# Patient Record
Sex: FEMALE | Race: WHITE | NOT HISPANIC OR LATINO | Employment: OTHER | URBAN - METROPOLITAN AREA
[De-identification: names, ages, dates, MRNs, and addresses within clinical notes are randomized per-mention and may not be internally consistent; named-entity substitution may affect disease eponyms.]

---

## 2017-03-06 ENCOUNTER — APPOINTMENT (EMERGENCY)
Dept: RADIOLOGY | Facility: HOSPITAL | Age: 72
End: 2017-03-06
Payer: MEDICARE

## 2017-03-06 ENCOUNTER — HOSPITAL ENCOUNTER (OUTPATIENT)
Facility: HOSPITAL | Age: 72
Setting detail: OBSERVATION
Discharge: HOME | End: 2017-03-07
Attending: EMERGENCY MEDICINE | Admitting: FAMILY MEDICINE
Payer: MEDICARE

## 2017-03-06 ENCOUNTER — GENERIC CONVERSION - ENCOUNTER (OUTPATIENT)
Dept: OTHER | Facility: OTHER | Age: 72
End: 2017-03-06

## 2017-03-06 DIAGNOSIS — R06.00 DYSPNEA: ICD-10-CM

## 2017-03-06 DIAGNOSIS — R07.9 CHEST PAIN: Primary | ICD-10-CM

## 2017-03-06 LAB
ALBUMIN SERPL BCP-MCNC: 3.6 G/DL (ref 3.5–5)
ALP SERPL-CCNC: 60 U/L (ref 46–116)
ALT SERPL W P-5'-P-CCNC: 28 U/L (ref 12–78)
ANION GAP SERPL CALCULATED.3IONS-SCNC: 10 MMOL/L (ref 4–13)
APTT PPP: 27 SECONDS (ref 24–36)
AST SERPL W P-5'-P-CCNC: 15 U/L (ref 5–45)
BASOPHILS # BLD AUTO: 0.05 THOUSANDS/ΜL (ref 0–0.1)
BASOPHILS NFR BLD AUTO: 1 % (ref 0–1)
BILIRUB SERPL-MCNC: 0.4 MG/DL (ref 0.2–1)
BUN SERPL-MCNC: 35 MG/DL (ref 5–25)
CALCIUM SERPL-MCNC: 9.5 MG/DL (ref 8.3–10.1)
CHLORIDE SERPL-SCNC: 99 MMOL/L (ref 100–108)
CO2 SERPL-SCNC: 30 MMOL/L (ref 21–32)
CREAT SERPL-MCNC: 1.61 MG/DL (ref 0.6–1.3)
EOSINOPHIL # BLD AUTO: 0.37 THOUSAND/ΜL (ref 0–0.61)
EOSINOPHIL NFR BLD AUTO: 4 % (ref 0–6)
ERYTHROCYTE [DISTWIDTH] IN BLOOD BY AUTOMATED COUNT: 14.3 % (ref 11.6–15.1)
GFR SERPL CREATININE-BSD FRML MDRD: 31.4 ML/MIN/1.73SQ M
GLUCOSE SERPL-MCNC: 220 MG/DL (ref 65–140)
GLUCOSE SERPL-MCNC: 260 MG/DL (ref 65–140)
HCT VFR BLD AUTO: 41.2 % (ref 34.8–46.1)
HGB BLD-MCNC: 13.3 G/DL (ref 11.5–15.4)
INR PPP: 1.12 (ref 0.86–1.16)
LYMPHOCYTES # BLD AUTO: 2.38 THOUSANDS/ΜL (ref 0.6–4.47)
LYMPHOCYTES NFR BLD AUTO: 23 % (ref 14–44)
MCH RBC QN AUTO: 27.7 PG (ref 26.8–34.3)
MCHC RBC AUTO-ENTMCNC: 32.3 G/DL (ref 31.4–37.4)
MCV RBC AUTO: 86 FL (ref 82–98)
MONOCYTES # BLD AUTO: 0.74 THOUSAND/ΜL (ref 0.17–1.22)
MONOCYTES NFR BLD AUTO: 7 % (ref 4–12)
NEUTROPHILS # BLD AUTO: 6.58 THOUSANDS/ΜL (ref 1.85–7.62)
NEUTS SEG NFR BLD AUTO: 65 % (ref 43–75)
NRBC BLD AUTO-RTO: 0 /100 WBCS
NT-PROBNP SERPL-MCNC: 105 PG/ML
NT-PROBNP SERPL-MCNC: 110 PG/ML
PLATELET # BLD AUTO: 256 THOUSANDS/UL (ref 149–390)
PMV BLD AUTO: 9.4 FL (ref 8.9–12.7)
POTASSIUM SERPL-SCNC: 3.6 MMOL/L (ref 3.5–5.3)
PROT SERPL-MCNC: 8.1 G/DL (ref 6.4–8.2)
PROTHROMBIN TIME: 14.3 SECONDS (ref 12–14.3)
RBC # BLD AUTO: 4.81 MILLION/UL (ref 3.81–5.12)
SODIUM SERPL-SCNC: 139 MMOL/L (ref 136–145)
SPECIMEN SOURCE: NORMAL
TROPONIN I BLD-MCNC: 0.01 NG/ML (ref 0–0.08)
TROPONIN I SERPL-MCNC: <0.02 NG/ML
WBC # BLD AUTO: 10.16 THOUSAND/UL (ref 4.31–10.16)

## 2017-03-06 PROCEDURE — 93005 ELECTROCARDIOGRAM TRACING: CPT | Performed by: EMERGENCY MEDICINE

## 2017-03-06 PROCEDURE — 83036 HEMOGLOBIN GLYCOSYLATED A1C: CPT | Performed by: PHYSICIAN ASSISTANT

## 2017-03-06 PROCEDURE — 85610 PROTHROMBIN TIME: CPT | Performed by: EMERGENCY MEDICINE

## 2017-03-06 PROCEDURE — 84484 ASSAY OF TROPONIN QUANT: CPT | Performed by: PHYSICIAN ASSISTANT

## 2017-03-06 PROCEDURE — 36415 COLL VENOUS BLD VENIPUNCTURE: CPT | Performed by: PHYSICIAN ASSISTANT

## 2017-03-06 PROCEDURE — 36415 COLL VENOUS BLD VENIPUNCTURE: CPT | Performed by: EMERGENCY MEDICINE

## 2017-03-06 PROCEDURE — 84484 ASSAY OF TROPONIN QUANT: CPT

## 2017-03-06 PROCEDURE — 83880 ASSAY OF NATRIURETIC PEPTIDE: CPT | Performed by: EMERGENCY MEDICINE

## 2017-03-06 PROCEDURE — 85025 COMPLETE CBC W/AUTO DIFF WBC: CPT | Performed by: EMERGENCY MEDICINE

## 2017-03-06 PROCEDURE — 82948 REAGENT STRIP/BLOOD GLUCOSE: CPT

## 2017-03-06 PROCEDURE — 71020 HB CHEST X-RAY 2VW FRONTAL&LATL: CPT

## 2017-03-06 PROCEDURE — 85730 THROMBOPLASTIN TIME PARTIAL: CPT | Performed by: EMERGENCY MEDICINE

## 2017-03-06 PROCEDURE — 80053 COMPREHEN METABOLIC PANEL: CPT | Performed by: EMERGENCY MEDICINE

## 2017-03-06 RX ORDER — METOLAZONE 2.5 MG/1
2.5 TABLET ORAL DAILY
Status: DISCONTINUED | OUTPATIENT
Start: 2017-03-07 | End: 2017-03-07 | Stop reason: HOSPADM

## 2017-03-06 RX ORDER — INSULIN GLARGINE 100 [IU]/ML
65 INJECTION, SOLUTION SUBCUTANEOUS EVERY MORNING
Status: DISCONTINUED | OUTPATIENT
Start: 2017-03-07 | End: 2017-03-07 | Stop reason: HOSPADM

## 2017-03-06 RX ORDER — ACETAMINOPHEN 325 MG/1
650 TABLET ORAL EVERY 6 HOURS PRN
Status: DISCONTINUED | OUTPATIENT
Start: 2017-03-06 | End: 2017-03-07 | Stop reason: HOSPADM

## 2017-03-06 RX ORDER — LEVETIRACETAM 500 MG/1
250 TABLET ORAL 2 TIMES DAILY
Status: DISCONTINUED | OUTPATIENT
Start: 2017-03-07 | End: 2017-03-07 | Stop reason: HOSPADM

## 2017-03-06 RX ORDER — ALBUTEROL SULFATE 2.5 MG/3ML
2.5 SOLUTION RESPIRATORY (INHALATION) EVERY 6 HOURS PRN
Status: DISCONTINUED | OUTPATIENT
Start: 2017-03-06 | End: 2017-03-07 | Stop reason: HOSPADM

## 2017-03-06 RX ORDER — SACCHAROMYCES BOULARDII 250 MG
250 CAPSULE ORAL 2 TIMES DAILY
Status: DISCONTINUED | OUTPATIENT
Start: 2017-03-07 | End: 2017-03-07 | Stop reason: HOSPADM

## 2017-03-06 RX ORDER — IPRATROPIUM BROMIDE AND ALBUTEROL SULFATE 2.5; .5 MG/3ML; MG/3ML
3 SOLUTION RESPIRATORY (INHALATION) ONCE
Status: COMPLETED | OUTPATIENT
Start: 2017-03-06 | End: 2017-03-06

## 2017-03-06 RX ORDER — CALCIUM CARBONATE 200(500)MG
1000 TABLET,CHEWABLE ORAL DAILY PRN
Status: DISCONTINUED | OUTPATIENT
Start: 2017-03-06 | End: 2017-03-07 | Stop reason: HOSPADM

## 2017-03-06 RX ORDER — PANTOPRAZOLE SODIUM 20 MG/1
20 TABLET, DELAYED RELEASE ORAL
Status: DISCONTINUED | OUTPATIENT
Start: 2017-03-07 | End: 2017-03-07 | Stop reason: HOSPADM

## 2017-03-06 RX ORDER — ASPIRIN 81 MG/1
81 TABLET ORAL DAILY
Status: DISCONTINUED | OUTPATIENT
Start: 2017-03-07 | End: 2017-03-07 | Stop reason: HOSPADM

## 2017-03-06 RX ORDER — ONDANSETRON 2 MG/ML
4 INJECTION INTRAMUSCULAR; INTRAVENOUS EVERY 6 HOURS PRN
Status: DISCONTINUED | OUTPATIENT
Start: 2017-03-06 | End: 2017-03-07 | Stop reason: HOSPADM

## 2017-03-06 RX ORDER — DOCUSATE SODIUM 100 MG/1
100 CAPSULE, LIQUID FILLED ORAL 2 TIMES DAILY
Status: DISCONTINUED | OUTPATIENT
Start: 2017-03-07 | End: 2017-03-07 | Stop reason: HOSPADM

## 2017-03-06 RX ORDER — FUROSEMIDE 20 MG/1
20 TABLET ORAL DAILY
Status: DISCONTINUED | OUTPATIENT
Start: 2017-03-07 | End: 2017-03-07 | Stop reason: HOSPADM

## 2017-03-06 RX ORDER — PRAVASTATIN SODIUM 40 MG
40 TABLET ORAL
Status: DISCONTINUED | OUTPATIENT
Start: 2017-03-07 | End: 2017-03-07 | Stop reason: HOSPADM

## 2017-03-06 RX ORDER — PENTOXIFYLLINE 400 MG/1
400 TABLET, EXTENDED RELEASE ORAL 2 TIMES DAILY
Status: DISCONTINUED | OUTPATIENT
Start: 2017-03-07 | End: 2017-03-07 | Stop reason: HOSPADM

## 2017-03-06 RX ORDER — FOSINOPRIL SODIUM 20 MG/1
20 TABLET ORAL DAILY
Status: DISCONTINUED | OUTPATIENT
Start: 2017-03-07 | End: 2017-03-07 | Stop reason: HOSPADM

## 2017-03-06 RX ADMIN — IPRATROPIUM BROMIDE AND ALBUTEROL SULFATE 3 ML: .5; 3 SOLUTION RESPIRATORY (INHALATION) at 20:43

## 2017-03-07 VITALS
HEART RATE: 95 BPM | DIASTOLIC BLOOD PRESSURE: 82 MMHG | WEIGHT: 164 LBS | SYSTOLIC BLOOD PRESSURE: 113 MMHG | TEMPERATURE: 98.5 F | BODY MASS INDEX: 38.12 KG/M2 | OXYGEN SATURATION: 94 % | RESPIRATION RATE: 18 BRPM

## 2017-03-07 LAB
ANION GAP SERPL CALCULATED.3IONS-SCNC: 7 MMOL/L (ref 4–13)
ATRIAL RATE: 91 BPM
BUN SERPL-MCNC: 34 MG/DL (ref 5–25)
CALCIUM SERPL-MCNC: 8.9 MG/DL (ref 8.3–10.1)
CHLORIDE SERPL-SCNC: 101 MMOL/L (ref 100–108)
CO2 SERPL-SCNC: 30 MMOL/L (ref 21–32)
CREAT SERPL-MCNC: 1.43 MG/DL (ref 0.6–1.3)
ERYTHROCYTE [DISTWIDTH] IN BLOOD BY AUTOMATED COUNT: 14.6 % (ref 11.6–15.1)
EST. AVERAGE GLUCOSE BLD GHB EST-MCNC: 189 MG/DL
GFR SERPL CREATININE-BSD FRML MDRD: 36 ML/MIN/1.73SQ M
GLUCOSE SERPL-MCNC: 162 MG/DL (ref 65–140)
GLUCOSE SERPL-MCNC: 177 MG/DL (ref 65–140)
HBA1C MFR BLD: 8.2 % (ref 4.2–6.3)
HCT VFR BLD AUTO: 37.6 % (ref 34.8–46.1)
HGB BLD-MCNC: 12.1 G/DL (ref 11.5–15.4)
MCH RBC QN AUTO: 27.8 PG (ref 26.8–34.3)
MCHC RBC AUTO-ENTMCNC: 32.2 G/DL (ref 31.4–37.4)
MCV RBC AUTO: 86 FL (ref 82–98)
P AXIS: 53 DEGREES
PLATELET # BLD AUTO: 213 THOUSANDS/UL (ref 149–390)
PMV BLD AUTO: 9.3 FL (ref 8.9–12.7)
POTASSIUM SERPL-SCNC: 3.3 MMOL/L (ref 3.5–5.3)
PR INTERVAL: 174 MS
QRS AXIS: 16 DEGREES
QRSD INTERVAL: 104 MS
QT INTERVAL: 400 MS
QTC INTERVAL: 492 MS
RBC # BLD AUTO: 4.35 MILLION/UL (ref 3.81–5.12)
SODIUM SERPL-SCNC: 138 MMOL/L (ref 136–145)
T WAVE AXIS: 16 DEGREES
TROPONIN I SERPL-MCNC: <0.02 NG/ML
TROPONIN I SERPL-MCNC: <0.02 NG/ML
VENTRICULAR RATE: 91 BPM
WBC # BLD AUTO: 8.43 THOUSAND/UL (ref 4.31–10.16)

## 2017-03-07 PROCEDURE — 99285 EMERGENCY DEPT VISIT HI MDM: CPT

## 2017-03-07 PROCEDURE — 85027 COMPLETE CBC AUTOMATED: CPT | Performed by: PHYSICIAN ASSISTANT

## 2017-03-07 PROCEDURE — 82948 REAGENT STRIP/BLOOD GLUCOSE: CPT

## 2017-03-07 PROCEDURE — 84484 ASSAY OF TROPONIN QUANT: CPT | Performed by: PHYSICIAN ASSISTANT

## 2017-03-07 PROCEDURE — 80048 BASIC METABOLIC PNL TOTAL CA: CPT | Performed by: PHYSICIAN ASSISTANT

## 2017-03-07 PROCEDURE — 36415 COLL VENOUS BLD VENIPUNCTURE: CPT | Performed by: PHYSICIAN ASSISTANT

## 2017-03-07 RX ORDER — HEPARIN SODIUM 5000 [USP'U]/ML
5000 INJECTION, SOLUTION INTRAVENOUS; SUBCUTANEOUS EVERY 8 HOURS SCHEDULED
Status: DISCONTINUED | OUTPATIENT
Start: 2017-03-07 | End: 2017-03-07 | Stop reason: HOSPADM

## 2017-03-07 RX ADMIN — ASPIRIN 81 MG: 81 TABLET ORAL at 08:36

## 2017-03-07 RX ADMIN — INSULIN LISPRO 1 UNITS: 100 INJECTION, SOLUTION INTRAVENOUS; SUBCUTANEOUS at 08:36

## 2017-03-07 RX ADMIN — Medication 250 MG: at 11:55

## 2017-03-07 RX ADMIN — ALBUTEROL SULFATE 2.5 MG: 2.5 SOLUTION RESPIRATORY (INHALATION) at 05:57

## 2017-03-07 RX ADMIN — FOSINOPRIL SODIUM 20 MG: 20 TABLET ORAL at 11:56

## 2017-03-07 RX ADMIN — DOCUSATE SODIUM 100 MG: 100 CAPSULE, LIQUID FILLED ORAL at 08:35

## 2017-03-07 RX ADMIN — METOLAZONE 2.5 MG: 2.5 TABLET ORAL at 11:56

## 2017-03-07 RX ADMIN — PENTOXIFYLLINE 400 MG: 400 TABLET, FILM COATED, EXTENDED RELEASE ORAL at 11:56

## 2017-03-07 RX ADMIN — INSULIN LISPRO 2 UNITS: 100 INJECTION, SOLUTION INTRAVENOUS; SUBCUTANEOUS at 00:18

## 2017-03-07 RX ADMIN — LEVETIRACETAM 250 MG: 500 TABLET ORAL at 08:35

## 2017-03-07 RX ADMIN — HEPARIN SODIUM 5000 UNITS: 5000 INJECTION, SOLUTION INTRAVENOUS; SUBCUTANEOUS at 05:38

## 2017-03-07 RX ADMIN — INSULIN GLARGINE 65 UNITS: 100 INJECTION, SOLUTION SUBCUTANEOUS at 08:34

## 2017-03-07 RX ADMIN — FUROSEMIDE 20 MG: 20 TABLET ORAL at 08:35

## 2017-03-07 RX ADMIN — PANTOPRAZOLE SODIUM 20 MG: 20 TABLET, DELAYED RELEASE ORAL at 05:38

## 2017-05-30 ENCOUNTER — ALLSCRIPTS OFFICE VISIT (OUTPATIENT)
Dept: OTHER | Facility: OTHER | Age: 72
End: 2017-05-30

## 2017-05-30 DIAGNOSIS — R60.0 LOCALIZED EDEMA: ICD-10-CM

## 2017-05-30 DIAGNOSIS — I73.9 PERIPHERAL VASCULAR DISEASE (HCC): ICD-10-CM

## 2017-06-10 ENCOUNTER — HOSPITAL ENCOUNTER (EMERGENCY)
Facility: HOSPITAL | Age: 72
Discharge: HOME | End: 2017-06-10
Attending: EMERGENCY MEDICINE | Admitting: EMERGENCY MEDICINE
Payer: MEDICARE

## 2017-06-10 ENCOUNTER — APPOINTMENT (EMERGENCY)
Dept: CT IMAGING | Facility: HOSPITAL | Age: 72
End: 2017-06-10
Payer: MEDICARE

## 2017-06-10 VITALS
OXYGEN SATURATION: 97 % | SYSTOLIC BLOOD PRESSURE: 106 MMHG | HEART RATE: 84 BPM | BODY MASS INDEX: 37.51 KG/M2 | RESPIRATION RATE: 16 BRPM | WEIGHT: 161.38 LBS | TEMPERATURE: 97.8 F | DIASTOLIC BLOOD PRESSURE: 56 MMHG

## 2017-06-10 DIAGNOSIS — R19.7 NAUSEA VOMITING AND DIARRHEA: Primary | ICD-10-CM

## 2017-06-10 DIAGNOSIS — R11.2 NAUSEA VOMITING AND DIARRHEA: Primary | ICD-10-CM

## 2017-06-10 DIAGNOSIS — T50.905A MEDICATION SIDE EFFECT, INITIAL ENCOUNTER: ICD-10-CM

## 2017-06-10 LAB
ALBUMIN SERPL BCP-MCNC: 2.9 G/DL (ref 3.5–5)
ALP SERPL-CCNC: 52 U/L (ref 46–116)
ALT SERPL W P-5'-P-CCNC: 20 U/L (ref 12–78)
ANION GAP SERPL CALCULATED.3IONS-SCNC: 10 MMOL/L (ref 4–13)
AST SERPL W P-5'-P-CCNC: 20 U/L (ref 5–45)
BASOPHILS # BLD AUTO: 0.02 THOUSANDS/ΜL (ref 0–0.1)
BASOPHILS NFR BLD AUTO: 0 % (ref 0–1)
BILIRUB SERPL-MCNC: 0.4 MG/DL (ref 0.2–1)
BILIRUB UR QL STRIP: NEGATIVE
BUN SERPL-MCNC: 43 MG/DL (ref 5–25)
CALCIUM SERPL-MCNC: 8.8 MG/DL (ref 8.3–10.1)
CHLORIDE SERPL-SCNC: 105 MMOL/L (ref 100–108)
CLARITY UR: CLEAR
CO2 SERPL-SCNC: 24 MMOL/L (ref 21–32)
COLOR UR: YELLOW
CREAT SERPL-MCNC: 1.53 MG/DL (ref 0.6–1.3)
EOSINOPHIL # BLD AUTO: 0.34 THOUSAND/ΜL (ref 0–0.61)
EOSINOPHIL NFR BLD AUTO: 2 % (ref 0–6)
ERYTHROCYTE [DISTWIDTH] IN BLOOD BY AUTOMATED COUNT: 15.3 % (ref 11.6–15.1)
GFR SERPL CREATININE-BSD FRML MDRD: 33.3 ML/MIN/1.73SQ M
GLUCOSE SERPL-MCNC: 76 MG/DL (ref 65–140)
GLUCOSE UR STRIP-MCNC: NEGATIVE MG/DL
HCT VFR BLD AUTO: 38.8 % (ref 34.8–46.1)
HGB BLD-MCNC: 12.7 G/DL (ref 11.5–15.4)
HGB UR QL STRIP.AUTO: NEGATIVE
KETONES UR STRIP-MCNC: NEGATIVE MG/DL
LEUKOCYTE ESTERASE UR QL STRIP: NEGATIVE
LIPASE SERPL-CCNC: 47 U/L (ref 73–393)
LYMPHOCYTES # BLD AUTO: 2.55 THOUSANDS/ΜL (ref 0.6–4.47)
LYMPHOCYTES NFR BLD AUTO: 17 % (ref 14–44)
MCH RBC QN AUTO: 28.3 PG (ref 26.8–34.3)
MCHC RBC AUTO-ENTMCNC: 32.7 G/DL (ref 31.4–37.4)
MCV RBC AUTO: 86 FL (ref 82–98)
MONOCYTES # BLD AUTO: 0.98 THOUSAND/ΜL (ref 0.17–1.22)
MONOCYTES NFR BLD AUTO: 6 % (ref 4–12)
NEUTROPHILS # BLD AUTO: 11.29 THOUSANDS/ΜL (ref 1.85–7.62)
NEUTS SEG NFR BLD AUTO: 74 % (ref 43–75)
NITRITE UR QL STRIP: NEGATIVE
NRBC BLD AUTO-RTO: 0 /100 WBCS
PH UR STRIP.AUTO: 5.5 [PH] (ref 4.5–8)
PLATELET # BLD AUTO: 249 THOUSANDS/UL (ref 149–390)
PMV BLD AUTO: 9.8 FL (ref 8.9–12.7)
POTASSIUM SERPL-SCNC: 3.5 MMOL/L (ref 3.5–5.3)
PROT SERPL-MCNC: 6.8 G/DL (ref 6.4–8.2)
PROT UR STRIP-MCNC: NEGATIVE MG/DL
RBC # BLD AUTO: 4.49 MILLION/UL (ref 3.81–5.12)
SODIUM SERPL-SCNC: 139 MMOL/L (ref 136–145)
SP GR UR STRIP.AUTO: 1.01 (ref 1–1.03)
UROBILINOGEN UR QL STRIP.AUTO: 0.2 E.U./DL
WBC # BLD AUTO: 15.26 THOUSAND/UL (ref 4.31–10.16)

## 2017-06-10 PROCEDURE — 81003 URINALYSIS AUTO W/O SCOPE: CPT | Performed by: EMERGENCY MEDICINE

## 2017-06-10 PROCEDURE — 36415 COLL VENOUS BLD VENIPUNCTURE: CPT | Performed by: EMERGENCY MEDICINE

## 2017-06-10 PROCEDURE — 83690 ASSAY OF LIPASE: CPT | Performed by: EMERGENCY MEDICINE

## 2017-06-10 PROCEDURE — 80053 COMPREHEN METABOLIC PANEL: CPT | Performed by: EMERGENCY MEDICINE

## 2017-06-10 PROCEDURE — 96374 THER/PROPH/DIAG INJ IV PUSH: CPT

## 2017-06-10 PROCEDURE — 99284 EMERGENCY DEPT VISIT MOD MDM: CPT

## 2017-06-10 PROCEDURE — 96361 HYDRATE IV INFUSION ADD-ON: CPT

## 2017-06-10 PROCEDURE — 74176 CT ABD & PELVIS W/O CONTRAST: CPT

## 2017-06-10 PROCEDURE — 85025 COMPLETE CBC W/AUTO DIFF WBC: CPT | Performed by: EMERGENCY MEDICINE

## 2017-06-10 RX ORDER — ALBUTEROL SULFATE 2.5 MG/3ML
2.5 SOLUTION RESPIRATORY (INHALATION) EVERY 4 HOURS PRN
COMMUNITY
End: 2021-12-17

## 2017-06-10 RX ORDER — ONDANSETRON 4 MG/1
4 TABLET, ORALLY DISINTEGRATING ORAL EVERY 6 HOURS PRN
Qty: 20 TABLET | Refills: 0 | Status: SHIPPED | OUTPATIENT
Start: 2017-06-10 | End: 2019-02-26

## 2017-06-10 RX ORDER — ONDANSETRON 2 MG/ML
4 INJECTION INTRAMUSCULAR; INTRAVENOUS ONCE
Status: COMPLETED | OUTPATIENT
Start: 2017-06-10 | End: 2017-06-10

## 2017-06-10 RX ORDER — DIPHENOXYLATE HYDROCHLORIDE AND ATROPINE SULFATE 2.5; .025 MG/1; MG/1
TABLET ORAL
Qty: 60 TABLET | Refills: 0 | Status: SHIPPED | OUTPATIENT
Start: 2017-06-10 | End: 2019-02-26

## 2017-06-10 RX ORDER — FUROSEMIDE 40 MG/1
40 TABLET ORAL DAILY
COMMUNITY
End: 2019-05-15

## 2017-06-10 RX ORDER — CHOLECALCIFEROL (VITAMIN D3) 25 MCG
1000 CAPSULE ORAL DAILY
COMMUNITY
End: 2019-02-26

## 2017-06-10 RX ORDER — MONTELUKAST SODIUM 10 MG/1
10 TABLET ORAL
COMMUNITY
End: 2020-02-23

## 2017-06-10 RX ORDER — LEVETIRACETAM 250 MG/1
250 TABLET ORAL 2 TIMES DAILY
COMMUNITY
End: 2017-06-10

## 2017-06-10 RX ORDER — GLIPIZIDE 5 MG/1
5 TABLET ORAL 2 TIMES DAILY
Status: ON HOLD | COMMUNITY
End: 2019-03-26 | Stop reason: ALTCHOICE

## 2017-06-10 RX ORDER — FOSINOPRIL SODIUM 20 MG/1
20 TABLET ORAL DAILY
COMMUNITY
End: 2017-06-10

## 2017-06-10 RX ORDER — REPAGLINIDE 0.5 MG/1
0.5 TABLET ORAL
COMMUNITY
End: 2019-02-26

## 2017-06-10 RX ORDER — NICOTINE POLACRILEX 4 MG/1
20 GUM, CHEWING ORAL DAILY
COMMUNITY
End: 2017-06-10

## 2017-06-10 RX ADMIN — SODIUM CHLORIDE 1000 ML: 0.9 INJECTION, SOLUTION INTRAVENOUS at 14:51

## 2017-06-10 RX ADMIN — ONDANSETRON 4 MG: 2 INJECTION INTRAMUSCULAR; INTRAVENOUS at 14:52

## 2017-07-05 ENCOUNTER — HOSPITAL ENCOUNTER (OUTPATIENT)
Dept: NON INVASIVE DIAGNOSTICS | Facility: CLINIC | Age: 72
Discharge: HOME | End: 2017-07-05
Payer: MEDICARE

## 2017-07-05 DIAGNOSIS — I73.9 PERIPHERAL VASCULAR DISEASE (HCC): ICD-10-CM

## 2017-07-05 DIAGNOSIS — R60.0 LOCALIZED EDEMA: ICD-10-CM

## 2017-07-05 PROCEDURE — 93923 UPR/LXTR ART STDY 3+ LVLS: CPT

## 2017-07-05 PROCEDURE — 93970 EXTREMITY STUDY: CPT

## 2018-01-16 NOTE — PROCEDURES
Results/Data  Procedure: Electroencephalography (EEG)   Indications for the procedure include seizure (NOS) and Memory Difficulties  Were discussed with the patient  Written consent was obtained prior to the procedure and is detailed in the patient's record  Prior to the start of the procedure, a time out was taken and the identity of the patient was confirmed via name and date of birth with the patient  The correct site(s) and the procedure to be performed were confirmed and the site(s) were marked appropriately  The positioning of the patient and the availabilty of the correct equipment were verified  Certain medications (such as anticonvulsants and tranquilizers), stimulants, and alcohol were avoided for at least 24-48 hours prior to the procedure  Procedure Note:   Performed by: Talon File  Start Time: 10:00   End Time: 10:30   Electrode(s) Placement: Fp1, Fp2, F7, F3, Fz, F4, F8, T3, C3, CZ, C4, T4, T5, T6, P3, PZ, P4, O1, O2, A1 and A2  They were placed in a bipolar montage, referential montage, average reference montage, laplacian montage  The EEG was performed while the patient was exposed to of photic stimulation and awake and drowsy, but not hyperventilated and not sedated  Findings: EEG    This is a routine 18 channel EEG recording performed on a 53-year-old woman  with a history of memory difficulty   Background activities during wakefulness consist of mid amplitude 10 cycle per second activities emanating from the posterior head region  These activities are reactive to eye opening  Intermingled with background activities are a moderate amount of lower amplitude beta activities emanating from the frontocentral head regions  Episodes of drowsiness and sleep are manifest by attenuation of background activities, V waves and K complexes    Photic stimulation was performed over a wide range of flash frequencies and produced a symmetrical driving response   Hyperventilation was not obtained  Concomitant EKG revealed a sinus rhythm  IMPRESSION: This is a normal routine EEG    YANNI Loya  Impression:    Post-Procedure:   the patient tolerated the procedure well  Complications: There were no complications        Signatures   Electronically signed by : Bar Pablo MD; Lake 10 2016  4:28PM EST                       (Author)

## 2018-05-26 ENCOUNTER — HOSPITAL ENCOUNTER (OUTPATIENT)
Facility: HOSPITAL | Age: 73
Discharge: HOME HEALTH | End: 2018-06-11
Attending: PHYSICAL MEDICINE & REHABILITATION | Admitting: PHYSICAL MEDICINE & REHABILITATION

## 2018-05-28 LAB
ALBUMIN SERPL BCP-MCNC: 2.6 G/DL (ref 3.5–5)
ALP SERPL-CCNC: 53 U/L (ref 46–116)
ALT SERPL W P-5'-P-CCNC: 22 U/L (ref 12–78)
ANION GAP SERPL CALCULATED.3IONS-SCNC: 4 MMOL/L (ref 4–13)
AST SERPL W P-5'-P-CCNC: 17 U/L (ref 5–45)
BASOPHILS # BLD AUTO: 0.08 THOUSANDS/ΜL (ref 0–0.1)
BASOPHILS NFR BLD AUTO: 1 % (ref 0–1)
BILIRUB SERPL-MCNC: 0.5 MG/DL (ref 0.2–1)
BUN SERPL-MCNC: 25 MG/DL (ref 5–25)
CALCIUM SERPL-MCNC: 9.4 MG/DL (ref 8.3–10.1)
CHLORIDE SERPL-SCNC: 97 MMOL/L (ref 100–108)
CO2 SERPL-SCNC: 30 MMOL/L (ref 21–32)
CREAT SERPL-MCNC: 1.33 MG/DL (ref 0.6–1.3)
EOSINOPHIL # BLD AUTO: 0.23 THOUSAND/ΜL (ref 0–0.61)
EOSINOPHIL NFR BLD AUTO: 1 % (ref 0–6)
ERYTHROCYTE [DISTWIDTH] IN BLOOD BY AUTOMATED COUNT: 14.5 % (ref 11.6–15.1)
GFR SERPL CREATININE-BSD FRML MDRD: 39 ML/MIN/1.73SQ M
GLUCOSE SERPL-MCNC: 273 MG/DL (ref 65–140)
HCT VFR BLD AUTO: 36.6 % (ref 34.8–46.1)
HGB BLD-MCNC: 11.8 G/DL (ref 11.5–15.4)
IMM GRANULOCYTES # BLD AUTO: 0.14 THOUSAND/UL (ref 0–0.2)
IMM GRANULOCYTES NFR BLD AUTO: 1 % (ref 0–2)
LYMPHOCYTES # BLD AUTO: 2.13 THOUSANDS/ΜL (ref 0.6–4.47)
LYMPHOCYTES NFR BLD AUTO: 12 % (ref 14–44)
MCH RBC QN AUTO: 28.1 PG (ref 26.8–34.3)
MCHC RBC AUTO-ENTMCNC: 32.2 G/DL (ref 31.4–37.4)
MCV RBC AUTO: 87 FL (ref 82–98)
MONOCYTES # BLD AUTO: 1.14 THOUSAND/ΜL (ref 0.17–1.22)
MONOCYTES NFR BLD AUTO: 7 % (ref 4–12)
NEUTROPHILS # BLD AUTO: 13.67 THOUSANDS/ΜL (ref 1.85–7.62)
NEUTS SEG NFR BLD AUTO: 78 % (ref 43–75)
NRBC BLD AUTO-RTO: 0 /100 WBCS
PLATELET # BLD AUTO: 272 THOUSANDS/UL (ref 149–390)
PMV BLD AUTO: 9.4 FL (ref 8.9–12.7)
POTASSIUM SERPL-SCNC: 4.7 MMOL/L (ref 3.5–5.3)
PREALB SERPL-MCNC: 22.1 MG/DL (ref 18–40)
PROT SERPL-MCNC: 7.3 G/DL (ref 6.4–8.2)
RBC # BLD AUTO: 4.2 MILLION/UL (ref 3.81–5.12)
SODIUM SERPL-SCNC: 131 MMOL/L (ref 136–145)
WBC # BLD AUTO: 17.39 THOUSAND/UL (ref 4.31–10.16)

## 2018-05-28 PROCEDURE — 84134 ASSAY OF PREALBUMIN: CPT | Performed by: PHYSICAL MEDICINE & REHABILITATION

## 2018-05-28 PROCEDURE — 85025 COMPLETE CBC W/AUTO DIFF WBC: CPT | Performed by: PHYSICAL MEDICINE & REHABILITATION

## 2018-05-28 PROCEDURE — 80053 COMPREHEN METABOLIC PANEL: CPT | Performed by: PHYSICAL MEDICINE & REHABILITATION

## 2018-05-29 LAB
ANION GAP SERPL CALCULATED.3IONS-SCNC: 5 MMOL/L (ref 4–13)
BASOPHILS # BLD AUTO: 0.06 THOUSANDS/ΜL (ref 0–0.1)
BASOPHILS NFR BLD AUTO: 0 % (ref 0–1)
BUN SERPL-MCNC: 29 MG/DL (ref 5–25)
CALCIUM SERPL-MCNC: 8.8 MG/DL (ref 8.3–10.1)
CHLORIDE SERPL-SCNC: 97 MMOL/L (ref 100–108)
CO2 SERPL-SCNC: 29 MMOL/L (ref 21–32)
CREAT SERPL-MCNC: 1.2 MG/DL (ref 0.6–1.3)
EOSINOPHIL # BLD AUTO: 0.36 THOUSAND/ΜL (ref 0–0.61)
EOSINOPHIL NFR BLD AUTO: 2 % (ref 0–6)
ERYTHROCYTE [DISTWIDTH] IN BLOOD BY AUTOMATED COUNT: 14.5 % (ref 11.6–15.1)
GFR SERPL CREATININE-BSD FRML MDRD: 45 ML/MIN/1.73SQ M
GLUCOSE SERPL-MCNC: 219 MG/DL (ref 65–140)
HCT VFR BLD AUTO: 35.7 % (ref 34.8–46.1)
HGB BLD-MCNC: 11.6 G/DL (ref 11.5–15.4)
IMM GRANULOCYTES # BLD AUTO: 0.13 THOUSAND/UL (ref 0–0.2)
IMM GRANULOCYTES NFR BLD AUTO: 1 % (ref 0–2)
LYMPHOCYTES # BLD AUTO: 2.47 THOUSANDS/ΜL (ref 0.6–4.47)
LYMPHOCYTES NFR BLD AUTO: 16 % (ref 14–44)
MCH RBC QN AUTO: 28.1 PG (ref 26.8–34.3)
MCHC RBC AUTO-ENTMCNC: 32.5 G/DL (ref 31.4–37.4)
MCV RBC AUTO: 86 FL (ref 82–98)
MONOCYTES # BLD AUTO: 1 THOUSAND/ΜL (ref 0.17–1.22)
MONOCYTES NFR BLD AUTO: 7 % (ref 4–12)
NEUTROPHILS # BLD AUTO: 11.3 THOUSANDS/ΜL (ref 1.85–7.62)
NEUTS SEG NFR BLD AUTO: 74 % (ref 43–75)
NRBC BLD AUTO-RTO: 0 /100 WBCS
PLATELET # BLD AUTO: 259 THOUSANDS/UL (ref 149–390)
PMV BLD AUTO: 9.4 FL (ref 8.9–12.7)
POTASSIUM SERPL-SCNC: 4.9 MMOL/L (ref 3.5–5.3)
RBC # BLD AUTO: 4.13 MILLION/UL (ref 3.81–5.12)
SODIUM SERPL-SCNC: 131 MMOL/L (ref 136–145)
WBC # BLD AUTO: 15.32 THOUSAND/UL (ref 4.31–10.16)

## 2018-05-29 PROCEDURE — 80048 BASIC METABOLIC PNL TOTAL CA: CPT | Performed by: PHYSICAL MEDICINE & REHABILITATION

## 2018-05-29 PROCEDURE — 85025 COMPLETE CBC W/AUTO DIFF WBC: CPT | Performed by: PHYSICAL MEDICINE & REHABILITATION

## 2018-06-04 LAB
ANION GAP SERPL CALCULATED.3IONS-SCNC: 9 MMOL/L (ref 4–13)
BASOPHILS # BLD AUTO: 0.04 THOUSANDS/ΜL (ref 0–0.1)
BASOPHILS NFR BLD AUTO: 0 % (ref 0–1)
BUN SERPL-MCNC: 34 MG/DL (ref 5–25)
CALCIUM SERPL-MCNC: 9.7 MG/DL (ref 8.3–10.1)
CHLORIDE SERPL-SCNC: 101 MMOL/L (ref 100–108)
CO2 SERPL-SCNC: 28 MMOL/L (ref 21–32)
CREAT SERPL-MCNC: 1.38 MG/DL (ref 0.6–1.3)
EOSINOPHIL # BLD AUTO: 0.4 THOUSAND/ΜL (ref 0–0.61)
EOSINOPHIL NFR BLD AUTO: 4 % (ref 0–6)
ERYTHROCYTE [DISTWIDTH] IN BLOOD BY AUTOMATED COUNT: 14.4 % (ref 11.6–15.1)
GFR SERPL CREATININE-BSD FRML MDRD: 38 ML/MIN/1.73SQ M
GLUCOSE SERPL-MCNC: 192 MG/DL (ref 65–140)
HCT VFR BLD AUTO: 34.6 % (ref 34.8–46.1)
HGB BLD-MCNC: 11 G/DL (ref 11.5–15.4)
IMM GRANULOCYTES # BLD AUTO: 0.08 THOUSAND/UL (ref 0–0.2)
IMM GRANULOCYTES NFR BLD AUTO: 1 % (ref 0–2)
LYMPHOCYTES # BLD AUTO: 2.35 THOUSANDS/ΜL (ref 0.6–4.47)
LYMPHOCYTES NFR BLD AUTO: 21 % (ref 14–44)
MCH RBC QN AUTO: 27.8 PG (ref 26.8–34.3)
MCHC RBC AUTO-ENTMCNC: 31.8 G/DL (ref 31.4–37.4)
MCV RBC AUTO: 88 FL (ref 82–98)
MONOCYTES # BLD AUTO: 0.6 THOUSAND/ΜL (ref 0.17–1.22)
MONOCYTES NFR BLD AUTO: 5 % (ref 4–12)
NEUTROPHILS # BLD AUTO: 7.96 THOUSANDS/ΜL (ref 1.85–7.62)
NEUTS SEG NFR BLD AUTO: 69 % (ref 43–75)
NRBC BLD AUTO-RTO: 0 /100 WBCS
PLATELET # BLD AUTO: 323 THOUSANDS/UL (ref 149–390)
PMV BLD AUTO: 9.1 FL (ref 8.9–12.7)
POTASSIUM SERPL-SCNC: 4.6 MMOL/L (ref 3.5–5.3)
RBC # BLD AUTO: 3.95 MILLION/UL (ref 3.81–5.12)
SODIUM SERPL-SCNC: 138 MMOL/L (ref 136–145)
WBC # BLD AUTO: 11.43 THOUSAND/UL (ref 4.31–10.16)

## 2018-06-04 PROCEDURE — 80048 BASIC METABOLIC PNL TOTAL CA: CPT | Performed by: INTERNAL MEDICINE

## 2018-06-04 PROCEDURE — 85025 COMPLETE CBC W/AUTO DIFF WBC: CPT | Performed by: INTERNAL MEDICINE

## 2018-06-08 LAB
ANION GAP SERPL CALCULATED.3IONS-SCNC: 11 MMOL/L (ref 4–13)
BUN SERPL-MCNC: 33 MG/DL (ref 5–25)
CALCIUM SERPL-MCNC: 9.4 MG/DL (ref 8.3–10.1)
CHLORIDE SERPL-SCNC: 101 MMOL/L (ref 100–108)
CO2 SERPL-SCNC: 28 MMOL/L (ref 21–32)
CREAT SERPL-MCNC: 1.45 MG/DL (ref 0.6–1.3)
GFR SERPL CREATININE-BSD FRML MDRD: 36 ML/MIN/1.73SQ M
GLUCOSE SERPL-MCNC: 58 MG/DL (ref 65–140)
POTASSIUM SERPL-SCNC: 3.8 MMOL/L (ref 3.5–5.3)
SODIUM SERPL-SCNC: 140 MMOL/L (ref 136–145)

## 2018-06-08 PROCEDURE — 80048 BASIC METABOLIC PNL TOTAL CA: CPT | Performed by: PHYSICAL MEDICINE & REHABILITATION

## 2018-06-11 LAB
ANION GAP SERPL CALCULATED.3IONS-SCNC: 8 MMOL/L (ref 4–13)
BASOPHILS # BLD AUTO: 0.05 THOUSANDS/ΜL (ref 0–0.1)
BASOPHILS NFR BLD AUTO: 1 % (ref 0–1)
BUN SERPL-MCNC: 23 MG/DL (ref 5–25)
CALCIUM SERPL-MCNC: 9.5 MG/DL (ref 8.3–10.1)
CHLORIDE SERPL-SCNC: 103 MMOL/L (ref 100–108)
CO2 SERPL-SCNC: 29 MMOL/L (ref 21–32)
CREAT SERPL-MCNC: 1.12 MG/DL (ref 0.6–1.3)
EOSINOPHIL # BLD AUTO: 0.3 THOUSAND/ΜL (ref 0–0.61)
EOSINOPHIL NFR BLD AUTO: 3 % (ref 0–6)
ERYTHROCYTE [DISTWIDTH] IN BLOOD BY AUTOMATED COUNT: 13.9 % (ref 11.6–15.1)
GFR SERPL CREATININE-BSD FRML MDRD: 49 ML/MIN/1.73SQ M
GLUCOSE P FAST SERPL-MCNC: 72 MG/DL (ref 65–99)
GLUCOSE SERPL-MCNC: 72 MG/DL (ref 65–140)
HCT VFR BLD AUTO: 36.4 % (ref 34.8–46.1)
HGB BLD-MCNC: 11.7 G/DL (ref 11.5–15.4)
IMM GRANULOCYTES # BLD AUTO: 0.04 THOUSAND/UL (ref 0–0.2)
IMM GRANULOCYTES NFR BLD AUTO: 0 % (ref 0–2)
LYMPHOCYTES # BLD AUTO: 2.01 THOUSANDS/ΜL (ref 0.6–4.47)
LYMPHOCYTES NFR BLD AUTO: 19 % (ref 14–44)
MCH RBC QN AUTO: 28.1 PG (ref 26.8–34.3)
MCHC RBC AUTO-ENTMCNC: 32.1 G/DL (ref 31.4–37.4)
MCV RBC AUTO: 87 FL (ref 82–98)
MONOCYTES # BLD AUTO: 0.66 THOUSAND/ΜL (ref 0.17–1.22)
MONOCYTES NFR BLD AUTO: 6 % (ref 4–12)
NEUTROPHILS # BLD AUTO: 7.32 THOUSANDS/ΜL (ref 1.85–7.62)
NEUTS SEG NFR BLD AUTO: 71 % (ref 43–75)
NRBC BLD AUTO-RTO: 0 /100 WBCS
PLATELET # BLD AUTO: 344 THOUSANDS/UL (ref 149–390)
PMV BLD AUTO: 9 FL (ref 8.9–12.7)
POTASSIUM SERPL-SCNC: 4.3 MMOL/L (ref 3.5–5.3)
RBC # BLD AUTO: 4.17 MILLION/UL (ref 3.81–5.12)
SODIUM SERPL-SCNC: 140 MMOL/L (ref 136–145)
WBC # BLD AUTO: 10.38 THOUSAND/UL (ref 4.31–10.16)

## 2018-06-11 PROCEDURE — 85025 COMPLETE CBC W/AUTO DIFF WBC: CPT | Performed by: INTERNAL MEDICINE

## 2018-06-11 PROCEDURE — 80048 BASIC METABOLIC PNL TOTAL CA: CPT | Performed by: INTERNAL MEDICINE

## 2018-06-26 ENCOUNTER — APPOINTMENT (OUTPATIENT)
Dept: RADIOLOGY | Facility: CLINIC | Age: 73
End: 2018-06-26
Payer: MEDICARE

## 2018-06-26 ENCOUNTER — TRANSCRIBE ORDERS (OUTPATIENT)
Dept: ADMINISTRATIVE | Facility: HOSPITAL | Age: 73
End: 2018-06-26

## 2018-06-26 DIAGNOSIS — M48.02 CERVICAL STENOSIS OF SPINE: ICD-10-CM

## 2018-06-26 DIAGNOSIS — M48.02 CERVICAL STENOSIS OF SPINE: Primary | ICD-10-CM

## 2018-06-26 PROCEDURE — 72050 X-RAY EXAM NECK SPINE 4/5VWS: CPT

## 2019-02-26 ENCOUNTER — OFFICE VISIT (OUTPATIENT)
Dept: GASTROENTEROLOGY | Facility: CLINIC | Age: 74
End: 2019-02-26
Payer: MEDICARE

## 2019-02-26 VITALS
HEART RATE: 83 BPM | RESPIRATION RATE: 18 BRPM | BODY MASS INDEX: 36.33 KG/M2 | WEIGHT: 157 LBS | HEIGHT: 55 IN | SYSTOLIC BLOOD PRESSURE: 126 MMHG | DIASTOLIC BLOOD PRESSURE: 80 MMHG

## 2019-02-26 DIAGNOSIS — R10.84 GENERALIZED ABDOMINAL PAIN: Primary | ICD-10-CM

## 2019-02-26 DIAGNOSIS — R10.13 DYSPEPSIA: ICD-10-CM

## 2019-02-26 DIAGNOSIS — K92.1 MELENA: ICD-10-CM

## 2019-02-26 PROBLEM — R10.9 ABDOMINAL PAIN: Status: ACTIVE | Noted: 2019-02-26

## 2019-02-26 PROCEDURE — 99214 OFFICE O/P EST MOD 30 MIN: CPT | Performed by: NURSE PRACTITIONER

## 2019-02-26 RX ORDER — ASPIRIN 81 MG/1
81 TABLET ORAL DAILY
COMMUNITY
End: 2022-06-24

## 2019-02-26 RX ORDER — OMEPRAZOLE 20 MG/1
20 CAPSULE, DELAYED RELEASE ORAL DAILY
Status: ON HOLD | COMMUNITY
End: 2019-03-26 | Stop reason: ALTCHOICE

## 2019-02-26 RX ORDER — FAMOTIDINE 20 MG/1
20 TABLET, FILM COATED ORAL 2 TIMES DAILY
Qty: 180 TABLET | Refills: 3 | Status: SHIPPED | OUTPATIENT
Start: 2019-02-26 | End: 2020-11-19

## 2019-02-26 RX ORDER — LEVETIRACETAM 250 MG/1
250 TABLET ORAL 2 TIMES DAILY
COMMUNITY
End: 2020-02-23

## 2019-02-26 RX ORDER — MELATONIN
1000 DAILY
COMMUNITY
End: 2022-06-24

## 2019-02-26 RX ORDER — ATORVASTATIN CALCIUM 10 MG/1
10 TABLET, FILM COATED ORAL DAILY
COMMUNITY
End: 2019-08-17 | Stop reason: ALTCHOICE

## 2019-02-26 RX ORDER — HYOSCYAMINE SULFATE 0.125 MG
0.12 TABLET ORAL EVERY 6 HOURS PRN
Qty: 30 TABLET | Refills: 3 | Status: SHIPPED | OUTPATIENT
Start: 2019-02-26 | End: 2021-03-22

## 2019-02-26 RX ORDER — FOSINOPRIL SODIUM 20 MG/1
20 TABLET ORAL DAILY
COMMUNITY
End: 2020-02-10

## 2019-02-26 NOTE — LETTER
February 26, 2019     [place] [address]    Patient: Romero Dukes   YOB: 1945   Date of Visit: 2/26/2019       Dear Dr Catia Boo: Thank you for referring Romero Dukes to me for evaluation  Below are my notes for this consultation  If you have questions, please do not hesitate to call me  I look forward to following your patient along with you  Sincerely,        JOSHUA Hewitt        CC: No Recipients  JOSHUA Hewitt  2/26/2019 12:37 PM  Attested  Assessment/Plan:    The patient reports melena bright red blood per rectum as well as generalized abdominal pain, urgency and a two week history of melena mixed with diarrhea  Plan:  EGD and colonoscopy  Fit test and blood work  Pepcid b i d  Past medical history includes diabetes and renal insufficiency, chronic back pain and spinal surgery  Patient denies use of NC  NSAIDS       Diagnoses and all orders for this visit:    Generalized abdominal pain  -     Iron Panel; Future  -     Comprehensive metabolic panel; Future  -     CBC and differential; Future  -     Occult Blood, Fecal Immunochemical; Future  -     famotidine (PEPCID) 20 mg tablet; Take 1 tablet (20 mg total) by mouth 2 (two) times a day for 90 days  -     hyoscyamine (ANASPAZ,LEVSIN) 0 125 MG tablet; Take 1 tablet (0 125 mg total) by mouth every 6 (six) hours as needed for cramping    Melena  -     Iron Panel; Future  -     Comprehensive metabolic panel; Future  -     CBC and differential; Future  -     Occult Blood, Fecal Immunochemical; Future  -     famotidine (PEPCID) 20 mg tablet; Take 1 tablet (20 mg total) by mouth 2 (two) times a day for 90 days  -     hyoscyamine (ANASPAZ,LEVSIN) 0 125 MG tablet; Take 1 tablet (0 125 mg total) by mouth every 6 (six) hours as needed for cramping    Dyspepsia  -     Iron Panel; Future  -     Comprehensive metabolic panel;  Future  -     CBC and differential; Future  -     Occult Blood, Fecal Immunochemical; Future  -     famotidine (PEPCID) 20 mg tablet; Take 1 tablet (20 mg total) by mouth 2 (two) times a day for 90 days  -     hyoscyamine (ANASPAZ,LEVSIN) 0 125 MG tablet; Take 1 tablet (0 125 mg total) by mouth every 6 (six) hours as needed for cramping    Other orders  -     omeprazole (PriLOSEC) 20 mg delayed release capsule; Take 20 mg by mouth daily  -     aspirin (ECOTRIN LOW STRENGTH) 81 mg EC tablet; Take 81 mg by mouth daily  -     atorvastatin (LIPITOR) 10 mg tablet; Take 10 mg by mouth daily  -     fosinopril (MONOPRIL) 20 mg tablet; Take 20 mg by mouth daily  -     levETIRAcetam (KEPPRA) 250 mg tablet; Take 250 mg by mouth 2 (two) times a day  -     cholecalciferol (VITAMIN D3) 1,000 units tablet; Take 1,000 Units by mouth daily            Subjective:      Patient ID: Leilani Desouza is a 74 y o  female  68-year-old female with two week history of epigastric pain, dyspepsia, looser than normal stools up to 3 times a day with melena and bright red blood per rectum  No constipation a, hoarse voice or sore throat no unintentional weight loss  Patient with past medical history includes diabetes type 2, glaucoma, chronic kidney insufficiency, COPD  She states she started truly a insulin and while she was on that she had all of these GI symptoms  She states she had the symptoms before and had a colonoscopy in 2016 that she believes was normal but I do not have it with me as and she does not have the results with her  She has not had an endoscopy when she lived in [place] so that was before 2015 and does not remember what the results of that were either  She has history of colon resection when her bowel was neck during abdominal surgery for fibroid  That was very many years ago  She has had no unintentional weight loss but she is tired and no recent blood work and no recent fit test      Abdominal Pain   Associated symptoms include diarrhea     Diarrhea    Associated symptoms include abdominal pain  The following portions of the patient's history were reviewed and updated as appropriate: She  has a past medical history of COPD (chronic obstructive pulmonary disease) ([facility] ), Diabetes mellitus ([facility] ), Hyperlipidemia, Hypertension, Irritable bowel syndrome, and Renal disorder  She   Patient Active Problem List    Diagnosis Date Noted    Dyspepsia 02/26/2019    Melena 02/26/2019    Generalized abdominal pain 02/26/2019    Chest pain 03/06/2017    Urinary tract infection without hematuria 11/26/2016    Acute kidney injury superimposed on chronic kidney disease ([facility] ) 11/26/2016    Diarrhea 11/26/2016    COPD (chronic obstructive pulmonary disease) ([facility] )     Diabetes mellitus (HCC)     Hyperlipidemia     Hypertension     Renal disorder      She  has a past surgical history that includes Neck surgery (05/24/2018) and Brain surgery  Her family history includes Asthma in her mother; Brain cancer in her brother; Cancer in her father; Diabetes in her sister; Emphysema in her father; Heart disease in her brother and mother; Kidney cancer in her sister; Prostate cancer in her father  She  reports that she has quit smoking  She quit after 10 00 years of use  She has never used smokeless tobacco  She reports that she does not drink alcohol or use drugs    Current Outpatient Medications   Medication Sig Dispense Refill    albuterol (2 5 mg/3 mL) 0 083 % nebulizer solution Take 2 5 mg by nebulization every 4 (four) hours as needed      aspirin (ECOTRIN LOW STRENGTH) 81 mg EC tablet Take 81 mg by mouth daily      atorvastatin (LIPITOR) 10 mg tablet Take 10 mg by mouth daily      cholecalciferol (VITAMIN D3) 1,000 units tablet Take 1,000 Units by mouth daily      fosinopril (MONOPRIL) 20 mg tablet Take 20 mg by mouth daily      furosemide (LASIX) 40 mg tablet Take 40 mg by mouth daily      glipiZIDE (GLUCOTROL) 5 mg tablet Take 5 mg by mouth 2 (two) times a day      insulin aspart (NovoLOG) 100 units/mL injection Inject 1 Units under the skin 3 (three) times a day before meals      INSULIN GLARGINE SC Inject 65 Units under the skin daily      levETIRAcetam (KEPPRA) 250 mg tablet Take 250 mg by mouth 2 (two) times a day      montelukast (SINGULAIR) 10 mg tablet Take 10 mg by mouth daily at bedtime      Multiple Vitamin (MULTI VITAMIN DAILY PO) Take 1 tablet by mouth daily      omeprazole (PriLOSEC) 20 mg delayed release capsule Take 20 mg by mouth daily      Probiotic Product (PROBIOTIC DAILY PO) Take by mouth      famotidine (PEPCID) 20 mg tablet Take 1 tablet (20 mg total) by mouth 2 (two) times a day for 90 days 180 tablet 3    hyoscyamine (ANASPAZ,LEVSIN) 0 125 MG tablet Take 1 tablet (0 125 mg total) by mouth every 6 (six) hours as needed for cramping 30 tablet 3     No current facility-administered medications for this visit        Current Outpatient Medications on File Prior to Visit   Medication Sig    albuterol (2 5 mg/3 mL) 0 083 % nebulizer solution Take 2 5 mg by nebulization every 4 (four) hours as needed    aspirin (ECOTRIN LOW STRENGTH) 81 mg EC tablet Take 81 mg by mouth daily    atorvastatin (LIPITOR) 10 mg tablet Take 10 mg by mouth daily    cholecalciferol (VITAMIN D3) 1,000 units tablet Take 1,000 Units by mouth daily    fosinopril (MONOPRIL) 20 mg tablet Take 20 mg by mouth daily    furosemide (LASIX) 40 mg tablet Take 40 mg by mouth daily    glipiZIDE (GLUCOTROL) 5 mg tablet Take 5 mg by mouth 2 (two) times a day    insulin aspart (NovoLOG) 100 units/mL injection Inject 1 Units under the skin 3 (three) times a day before meals    INSULIN GLARGINE SC Inject 65 Units under the skin daily    levETIRAcetam (KEPPRA) 250 mg tablet Take 250 mg by mouth 2 (two) times a day    montelukast (SINGULAIR) 10 mg tablet Take 10 mg by mouth daily at bedtime    Multiple Vitamin (MULTI VITAMIN DAILY PO) Take 1 tablet by mouth daily    omeprazole (PriLOSEC) 20 mg delayed release capsule Take 20 mg by mouth daily    Probiotic Product (PROBIOTIC DAILY PO) Take by mouth    [DISCONTINUED] aspirin (ASPIR-81) 81 mg EC tablet Take 1 tablet by mouth daily for 30 days    [DISCONTINUED] Cholecalciferol (VITAMIN D-3) 1000 units CAPS Take 1,000 Units by mouth daily    [DISCONTINUED] diphenoxylate-atropine (LOMOTIL) 2 5-0 025 mg per tablet Take two tablets 4 times a day until bowel movements slow, decrease by 2 tablets a day as bowel movements remain stable (Patient not taking: Reported on 2/26/2019)    [DISCONTINUED] levETIRAcetam (KEPPRA) 250 mg tablet Take 1 tablet by mouth 2 (two) times a day for 30 days    [DISCONTINUED] omeprazole (PriLOSEC) 20 mg delayed release capsule Take 1 capsule by mouth daily for 15 days    [DISCONTINUED] ondansetron (ZOFRAN-ODT) 4 mg disintegrating tablet Take 1 tablet by mouth every 6 (six) hours as needed for nausea or vomiting (Patient not taking: Reported on 2/26/2019)    [DISCONTINUED] Pentoxifylline (PENTOXIL PO) Take by mouth    [DISCONTINUED] repaglinide (PRANDIN) 0 5 mg tablet Take 0 5 mg by mouth 3 (three) times a day before meals    [DISCONTINUED] simvastatin (ZOCOR) 40 mg tablet Take 1 tablet by mouth daily at bedtime for 30 days     No current facility-administered medications on file prior to visit  She is allergic to sulfa antibiotics       Review of Systems   Gastrointestinal: Positive for abdominal pain and diarrhea  Objective:      /80   Pulse 83   Resp 18   Ht 4' 7" (1 397 m)   Wt 71 2 kg (157 lb)   BMI 36 49 kg/m²           Physical Exam    Attestation signed by Yoana Peter MD at 2/26/2019  1:41 PM:  I supervised the Advanced Practitioner  I reviewed the Advanced Practitioner note and agree      Yoana Peter MD 02/26/19

## 2019-02-26 NOTE — PROGRESS NOTES
Assessment/Plan:    The patient reports melena bright red blood per rectum as well as generalized abdominal pain, urgency and a two week history of melena mixed with diarrhea  Plan:  EGD and colonoscopy  Fit test and blood work  Pepcid b i d  Past medical history includes diabetes and renal insufficiency, chronic back pain and spinal surgery  Patient denies use of NC  NSAIDS       Diagnoses and all orders for this visit:    Generalized abdominal pain  -     Iron Panel; Future  -     Comprehensive metabolic panel; Future  -     CBC and differential; Future  -     Occult Blood, Fecal Immunochemical; Future  -     famotidine (PEPCID) 20 mg tablet; Take 1 tablet (20 mg total) by mouth 2 (two) times a day for 90 days  -     hyoscyamine (ANASPAZ,LEVSIN) 0 125 MG tablet; Take 1 tablet (0 125 mg total) by mouth every 6 (six) hours as needed for cramping    Melena  -     Iron Panel; Future  -     Comprehensive metabolic panel; Future  -     CBC and differential; Future  -     Occult Blood, Fecal Immunochemical; Future  -     famotidine (PEPCID) 20 mg tablet; Take 1 tablet (20 mg total) by mouth 2 (two) times a day for 90 days  -     hyoscyamine (ANASPAZ,LEVSIN) 0 125 MG tablet; Take 1 tablet (0 125 mg total) by mouth every 6 (six) hours as needed for cramping    Dyspepsia  -     Iron Panel; Future  -     Comprehensive metabolic panel; Future  -     CBC and differential; Future  -     Occult Blood, Fecal Immunochemical; Future  -     famotidine (PEPCID) 20 mg tablet; Take 1 tablet (20 mg total) by mouth 2 (two) times a day for 90 days  -     hyoscyamine (ANASPAZ,LEVSIN) 0 125 MG tablet; Take 1 tablet (0 125 mg total) by mouth every 6 (six) hours as needed for cramping    Other orders  -     omeprazole (PriLOSEC) 20 mg delayed release capsule; Take 20 mg by mouth daily  -     aspirin (ECOTRIN LOW STRENGTH) 81 mg EC tablet; Take 81 mg by mouth daily  -     atorvastatin (LIPITOR) 10 mg tablet;  Take 10 mg by mouth daily  - fosinopril (MONOPRIL) 20 mg tablet; Take 20 mg by mouth daily  -     levETIRAcetam (KEPPRA) 250 mg tablet; Take 250 mg by mouth 2 (two) times a day  -     cholecalciferol (VITAMIN D3) 1,000 units tablet; Take 1,000 Units by mouth daily            Subjective:      Patient ID: Sona Duron is a 74 y o  female  39-year-old female with two week history of epigastric pain, dyspepsia, looser than normal stools up to 3 times a day with melena and bright red blood per rectum  No constipation a, hoarse voice or sore throat no unintentional weight loss  Patient with past medical history includes diabetes type 2, glaucoma, chronic kidney insufficiency, COPD  She states she started truly a insulin and while she was on that she had all of these GI symptoms  She states she had the symptoms before and had a colonoscopy in 2016 that she believes was normal but I do not have it with me as and she does not have the results with her  She has not had an endoscopy when she lived in [place] so that was before 2015 and does not remember what the results of that were either  She has history of colon resection when her bowel was neck during abdominal surgery for fibroid  That was very many years ago  She has had no unintentional weight loss but she is tired and no recent blood work and no recent fit test      Abdominal Pain   Associated symptoms include diarrhea  Diarrhea    Associated symptoms include abdominal pain  The following portions of the patient's history were reviewed and updated as appropriate: She  has a past medical history of COPD (chronic obstructive pulmonary disease) ([facility] ), Diabetes mellitus ([facility] ), Hyperlipidemia, Hypertension, Irritable bowel syndrome, and Renal disorder    She   Patient Active Problem List    Diagnosis Date Noted    Dyspepsia 02/26/2019    Melena 02/26/2019    Generalized abdominal pain 02/26/2019    Chest pain 03/06/2017    Urinary tract infection without hematuria 11/26/2016    Acute kidney injury superimposed on chronic kidney disease ([facility] ) 11/26/2016    Diarrhea 11/26/2016    COPD (chronic obstructive pulmonary disease) ([place] [address] )     Diabetes mellitus (HCC)     Hyperlipidemia     Hypertension     Renal disorder      She  has a past surgical history that includes Neck surgery (05/24/2018) and Brain surgery  Her family history includes Asthma in her mother; Brain cancer in her brother; Cancer in her father; Diabetes in her sister; Emphysema in her father; Heart disease in her brother and mother; Kidney cancer in her sister; Prostate cancer in her father  She  reports that she has quit smoking  She quit after 10 00 years of use  She has never used smokeless tobacco  She reports that she does not drink alcohol or use drugs    Current Outpatient Medications   Medication Sig Dispense Refill    albuterol (2 5 mg/3 mL) 0 083 % nebulizer solution Take 2 5 mg by nebulization every 4 (four) hours as needed      aspirin (ECOTRIN LOW STRENGTH) 81 mg EC tablet Take 81 mg by mouth daily      atorvastatin (LIPITOR) 10 mg tablet Take 10 mg by mouth daily      cholecalciferol (VITAMIN D3) 1,000 units tablet Take 1,000 Units by mouth daily      fosinopril (MONOPRIL) 20 mg tablet Take 20 mg by mouth daily      furosemide (LASIX) 40 mg tablet Take 40 mg by mouth daily      glipiZIDE (GLUCOTROL) 5 mg tablet Take 5 mg by mouth 2 (two) times a day      insulin aspart (NovoLOG) 100 units/mL injection Inject 1 Units under the skin 3 (three) times a day before meals      INSULIN GLARGINE SC Inject 65 Units under the skin daily      levETIRAcetam (KEPPRA) 250 mg tablet Take 250 mg by mouth 2 (two) times a day      montelukast (SINGULAIR) 10 mg tablet Take 10 mg by mouth daily at bedtime      Multiple Vitamin (MULTI VITAMIN DAILY PO) Take 1 tablet by mouth daily      omeprazole (PriLOSEC) 20 mg delayed release capsule Take 20 mg by mouth daily      Probiotic Product (PROBIOTIC DAILY PO) Take by mouth      famotidine (PEPCID) 20 mg tablet Take 1 tablet (20 mg total) by mouth 2 (two) times a day for 90 days 180 tablet 3    hyoscyamine (ANASPAZ,LEVSIN) 0 125 MG tablet Take 1 tablet (0 125 mg total) by mouth every 6 (six) hours as needed for cramping 30 tablet 3     No current facility-administered medications for this visit        Current Outpatient Medications on File Prior to Visit   Medication Sig    albuterol (2 5 mg/3 mL) 0 083 % nebulizer solution Take 2 5 mg by nebulization every 4 (four) hours as needed    aspirin (ECOTRIN LOW STRENGTH) 81 mg EC tablet Take 81 mg by mouth daily    atorvastatin (LIPITOR) 10 mg tablet Take 10 mg by mouth daily    cholecalciferol (VITAMIN D3) 1,000 units tablet Take 1,000 Units by mouth daily    fosinopril (MONOPRIL) 20 mg tablet Take 20 mg by mouth daily    furosemide (LASIX) 40 mg tablet Take 40 mg by mouth daily    glipiZIDE (GLUCOTROL) 5 mg tablet Take 5 mg by mouth 2 (two) times a day    insulin aspart (NovoLOG) 100 units/mL injection Inject 1 Units under the skin 3 (three) times a day before meals    INSULIN GLARGINE SC Inject 65 Units under the skin daily    levETIRAcetam (KEPPRA) 250 mg tablet Take 250 mg by mouth 2 (two) times a day    montelukast (SINGULAIR) 10 mg tablet Take 10 mg by mouth daily at bedtime    Multiple Vitamin (MULTI VITAMIN DAILY PO) Take 1 tablet by mouth daily    omeprazole (PriLOSEC) 20 mg delayed release capsule Take 20 mg by mouth daily    Probiotic Product (PROBIOTIC DAILY PO) Take by mouth    [DISCONTINUED] aspirin (ASPIR-81) 81 mg EC tablet Take 1 tablet by mouth daily for 30 days    [DISCONTINUED] Cholecalciferol (VITAMIN D-3) 1000 units CAPS Take 1,000 Units by mouth daily    [DISCONTINUED] diphenoxylate-atropine (LOMOTIL) 2 5-0 025 mg per tablet Take two tablets 4 times a day until bowel movements slow, decrease by 2 tablets a day as bowel movements remain stable (Patient not taking: Reported on 2/26/2019)    [DISCONTINUED] levETIRAcetam (KEPPRA) 250 mg tablet Take 1 tablet by mouth 2 (two) times a day for 30 days    [DISCONTINUED] omeprazole (PriLOSEC) 20 mg delayed release capsule Take 1 capsule by mouth daily for 15 days    [DISCONTINUED] ondansetron (ZOFRAN-ODT) 4 mg disintegrating tablet Take 1 tablet by mouth every 6 (six) hours as needed for nausea or vomiting (Patient not taking: Reported on 2/26/2019)    [DISCONTINUED] Pentoxifylline (PENTOXIL PO) Take by mouth    [DISCONTINUED] repaglinide (PRANDIN) 0 5 mg tablet Take 0 5 mg by mouth 3 (three) times a day before meals    [DISCONTINUED] simvastatin (ZOCOR) 40 mg tablet Take 1 tablet by mouth daily at bedtime for 30 days     No current facility-administered medications on file prior to visit  She is allergic to sulfa antibiotics       Review of Systems   Gastrointestinal: Positive for abdominal pain and diarrhea           Objective:      /80   Pulse 83   Resp 18   Ht 4' 7" (1 397 m)   Wt 71 2 kg (157 lb)   BMI 36 49 kg/m²          Physical Exam

## 2019-03-09 ENCOUNTER — APPOINTMENT (OUTPATIENT)
Dept: LAB | Facility: CLINIC | Age: 74
End: 2019-03-09
Payer: MEDICARE

## 2019-03-09 DIAGNOSIS — K92.1 MELENA: ICD-10-CM

## 2019-03-09 DIAGNOSIS — R10.13 DYSPEPSIA: ICD-10-CM

## 2019-03-09 DIAGNOSIS — R10.84 GENERALIZED ABDOMINAL PAIN: ICD-10-CM

## 2019-03-09 LAB
ALBUMIN SERPL BCP-MCNC: 3.4 G/DL (ref 3.5–5)
ALP SERPL-CCNC: 60 U/L (ref 46–116)
ALT SERPL W P-5'-P-CCNC: 26 U/L (ref 12–78)
ANION GAP SERPL CALCULATED.3IONS-SCNC: 9 MMOL/L (ref 4–13)
AST SERPL W P-5'-P-CCNC: 18 U/L (ref 5–45)
BASOPHILS # BLD AUTO: 0.07 THOUSANDS/ΜL (ref 0–0.1)
BASOPHILS NFR BLD AUTO: 1 % (ref 0–1)
BILIRUB SERPL-MCNC: 0.4 MG/DL (ref 0.2–1)
BUN SERPL-MCNC: 24 MG/DL (ref 5–25)
CALCIUM SERPL-MCNC: 9.1 MG/DL (ref 8.3–10.1)
CHLORIDE SERPL-SCNC: 103 MMOL/L (ref 100–108)
CO2 SERPL-SCNC: 27 MMOL/L (ref 21–32)
CREAT SERPL-MCNC: 1.17 MG/DL (ref 0.6–1.3)
EOSINOPHIL # BLD AUTO: 0.43 THOUSAND/ΜL (ref 0–0.61)
EOSINOPHIL NFR BLD AUTO: 5 % (ref 0–6)
ERYTHROCYTE [DISTWIDTH] IN BLOOD BY AUTOMATED COUNT: 14.3 % (ref 11.6–15.1)
FERRITIN SERPL-MCNC: 68 NG/ML (ref 8–388)
GFR SERPL CREATININE-BSD FRML MDRD: 46 ML/MIN/1.73SQ M
GLUCOSE P FAST SERPL-MCNC: 160 MG/DL (ref 65–99)
HCT VFR BLD AUTO: 41.3 % (ref 34.8–46.1)
HGB BLD-MCNC: 12.8 G/DL (ref 11.5–15.4)
IMM GRANULOCYTES # BLD AUTO: 0.04 THOUSAND/UL (ref 0–0.2)
IMM GRANULOCYTES NFR BLD AUTO: 1 % (ref 0–2)
IRON SATN MFR SERPL: 24 %
IRON SERPL-MCNC: 60 UG/DL (ref 50–170)
LYMPHOCYTES # BLD AUTO: 1.88 THOUSANDS/ΜL (ref 0.6–4.47)
LYMPHOCYTES NFR BLD AUTO: 22 % (ref 14–44)
MCH RBC QN AUTO: 27.4 PG (ref 26.8–34.3)
MCHC RBC AUTO-ENTMCNC: 31 G/DL (ref 31.4–37.4)
MCV RBC AUTO: 88 FL (ref 82–98)
MONOCYTES # BLD AUTO: 0.6 THOUSAND/ΜL (ref 0.17–1.22)
MONOCYTES NFR BLD AUTO: 7 % (ref 4–12)
NEUTROPHILS # BLD AUTO: 5.43 THOUSANDS/ΜL (ref 1.85–7.62)
NEUTS SEG NFR BLD AUTO: 64 % (ref 43–75)
NRBC BLD AUTO-RTO: 0 /100 WBCS
PLATELET # BLD AUTO: 231 THOUSANDS/UL (ref 149–390)
PMV BLD AUTO: 9.8 FL (ref 8.9–12.7)
POTASSIUM SERPL-SCNC: 4.2 MMOL/L (ref 3.5–5.3)
PROT SERPL-MCNC: 7.2 G/DL (ref 6.4–8.2)
RBC # BLD AUTO: 4.68 MILLION/UL (ref 3.81–5.12)
SODIUM SERPL-SCNC: 139 MMOL/L (ref 136–145)
TIBC SERPL-MCNC: 250 UG/DL (ref 250–450)
WBC # BLD AUTO: 8.45 THOUSAND/UL (ref 4.31–10.16)

## 2019-03-09 PROCEDURE — 85025 COMPLETE CBC W/AUTO DIFF WBC: CPT

## 2019-03-09 PROCEDURE — 80053 COMPREHEN METABOLIC PANEL: CPT

## 2019-03-09 PROCEDURE — 83540 ASSAY OF IRON: CPT

## 2019-03-09 PROCEDURE — 82728 ASSAY OF FERRITIN: CPT

## 2019-03-09 PROCEDURE — 83550 IRON BINDING TEST: CPT

## 2019-03-09 PROCEDURE — 36415 COLL VENOUS BLD VENIPUNCTURE: CPT

## 2019-03-13 ENCOUNTER — APPOINTMENT (OUTPATIENT)
Dept: LAB | Facility: CLINIC | Age: 74
End: 2019-03-13
Payer: MEDICARE

## 2019-03-13 ENCOUNTER — TELEPHONE (OUTPATIENT)
Dept: GASTROENTEROLOGY | Facility: CLINIC | Age: 74
End: 2019-03-13

## 2019-03-13 DIAGNOSIS — R10.13 DYSPEPSIA: ICD-10-CM

## 2019-03-13 DIAGNOSIS — K92.1 MELENA: ICD-10-CM

## 2019-03-13 DIAGNOSIS — R10.84 GENERALIZED ABDOMINAL PAIN: ICD-10-CM

## 2019-03-13 LAB — HEMOCCULT STL QL IA: NEGATIVE

## 2019-03-13 PROCEDURE — G0328 FECAL BLOOD SCRN IMMUNOASSAY: HCPCS

## 2019-03-13 NOTE — TELEPHONE ENCOUNTER
----- Message from Lon Krueger PA-C sent at 3/13/2019 12:49 PM EDT -----  Please let patient know that her blood counts are normal, and she is not iron deficient  Proceed with EGD/colonoscopy was planned

## 2019-03-14 ENCOUNTER — TELEPHONE (OUTPATIENT)
Dept: GASTROENTEROLOGY | Facility: CLINIC | Age: 74
End: 2019-03-14

## 2019-03-14 NOTE — TELEPHONE ENCOUNTER
Called pt and informed her that her stool came back negative but to keep her colon appointment due to her symptoms

## 2019-03-14 NOTE — TELEPHONE ENCOUNTER
----- Message from Amber Johnson PA-C sent at 3/14/2019 12:09 PM EDT -----  Please let patient know that her fecal immunochemical test was negative  However, would still recommend colonoscopy because of her symptoms    Thank you

## 2019-03-25 ENCOUNTER — ANESTHESIA EVENT (OUTPATIENT)
Dept: PERIOP | Facility: HOSPITAL | Age: 74
End: 2019-03-25
Payer: MEDICARE

## 2019-03-26 ENCOUNTER — HOSPITAL ENCOUNTER (OUTPATIENT)
Facility: HOSPITAL | Age: 74
Setting detail: OUTPATIENT SURGERY
Discharge: HOME | End: 2019-03-26
Attending: INTERNAL MEDICINE | Admitting: INTERNAL MEDICINE
Payer: MEDICARE

## 2019-03-26 ENCOUNTER — ANESTHESIA (OUTPATIENT)
Dept: PERIOP | Facility: HOSPITAL | Age: 74
End: 2019-03-26
Payer: MEDICARE

## 2019-03-26 VITALS
RESPIRATION RATE: 22 BRPM | DIASTOLIC BLOOD PRESSURE: 68 MMHG | WEIGHT: 154.4 LBS | SYSTOLIC BLOOD PRESSURE: 121 MMHG | HEIGHT: 55 IN | OXYGEN SATURATION: 94 % | BODY MASS INDEX: 35.73 KG/M2 | HEART RATE: 82 BPM | TEMPERATURE: 98.5 F

## 2019-03-26 DIAGNOSIS — R10.9 ABDOMINAL PAIN, UNSPECIFIED ABDOMINAL LOCATION: ICD-10-CM

## 2019-03-26 DIAGNOSIS — R10.13 DYSPEPSIA: ICD-10-CM

## 2019-03-26 DIAGNOSIS — K92.1 MELENA: ICD-10-CM

## 2019-03-26 LAB — GLUCOSE SERPL-MCNC: 215 MG/DL (ref 65–140)

## 2019-03-26 PROCEDURE — 45380 COLONOSCOPY AND BIOPSY: CPT | Performed by: INTERNAL MEDICINE

## 2019-03-26 PROCEDURE — 88305 TISSUE EXAM BY PATHOLOGIST: CPT | Performed by: PATHOLOGY

## 2019-03-26 PROCEDURE — 82948 REAGENT STRIP/BLOOD GLUCOSE: CPT

## 2019-03-26 PROCEDURE — 45385 COLONOSCOPY W/LESION REMOVAL: CPT | Performed by: INTERNAL MEDICINE

## 2019-03-26 PROCEDURE — 43251 EGD REMOVE LESION SNARE: CPT | Performed by: INTERNAL MEDICINE

## 2019-03-26 PROCEDURE — 43239 EGD BIOPSY SINGLE/MULTIPLE: CPT | Performed by: INTERNAL MEDICINE

## 2019-03-26 RX ORDER — PROPOFOL 10 MG/ML
INJECTION, EMULSION INTRAVENOUS AS NEEDED
Status: DISCONTINUED | OUTPATIENT
Start: 2019-03-26 | End: 2019-03-26 | Stop reason: SURG

## 2019-03-26 RX ORDER — SODIUM CHLORIDE, SODIUM LACTATE, POTASSIUM CHLORIDE, CALCIUM CHLORIDE 600; 310; 30; 20 MG/100ML; MG/100ML; MG/100ML; MG/100ML
125 INJECTION, SOLUTION INTRAVENOUS CONTINUOUS
Status: DISCONTINUED | OUTPATIENT
Start: 2019-03-26 | End: 2019-03-26 | Stop reason: HOSPADM

## 2019-03-26 RX ORDER — PENTOXIFYLLINE 400 MG/1
400 TABLET, EXTENDED RELEASE ORAL
COMMUNITY
End: 2020-05-04

## 2019-03-26 RX ADMIN — PROPOFOL 20 MG: 10 INJECTION, EMULSION INTRAVENOUS at 07:34

## 2019-03-26 RX ADMIN — SODIUM CHLORIDE, SODIUM LACTATE, POTASSIUM CHLORIDE, AND CALCIUM CHLORIDE 125 ML/HR: .6; .31; .03; .02 INJECTION, SOLUTION INTRAVENOUS at 07:07

## 2019-03-26 RX ADMIN — PROPOFOL 20 MG: 10 INJECTION, EMULSION INTRAVENOUS at 07:48

## 2019-03-26 RX ADMIN — PROPOFOL 20 MG: 10 INJECTION, EMULSION INTRAVENOUS at 07:43

## 2019-03-26 RX ADMIN — PROPOFOL 20 MG: 10 INJECTION, EMULSION INTRAVENOUS at 07:38

## 2019-03-26 RX ADMIN — PROPOFOL 100 MG: 10 INJECTION, EMULSION INTRAVENOUS at 07:30

## 2019-03-26 NOTE — ANESTHESIA POSTPROCEDURE EVALUATION
Post-Op Assessment Note    CV Status:  Stable  Pain Score: 0    Pain management: adequate     Mental Status:  Alert and awake   Hydration Status:  Stable   PONV Controlled:  None   Airway Patency:  Patent and adequate   Post Op Vitals Reviewed: Yes      Staff: Anesthesiologist, CRNA           BP   92/56   Temp      Pulse  76   Resp   18   SpO2  97%

## 2019-03-26 NOTE — ANESTHESIA PREPROCEDURE EVALUATION
Review of Systems/Medical History  Patient summary reviewed        Cardiovascular  Negative cardio ROS Hyperlipidemia, Hypertension ,    Pulmonary  COPD , Shortness of breath,        GI/Hepatic    GERD ,        Negative  ROS        Endo/Other  Diabetes ,      GYN  Negative gynecology ROS          Hematology  Negative hematology ROS      Musculoskeletal  Negative musculoskeletal ROS        Neurology  Seizures well controlled,     Psychology   Negative psychology ROS              Physical Exam    Airway    Mallampati score: II  TM Distance: >3 FB  Neck ROM: full     Dental   upper dentures,     Cardiovascular  Comment: Negative ROS, Cardiovascular exam normal    Pulmonary  Pulmonary exam normal     Other Findings        Anesthesia Plan  ASA Score- 3     Anesthesia Type- IV sedation with anesthesia with ASA Monitors  Additional Monitors:   Airway Plan:         Plan Factors-    Induction- intravenous  Postoperative Plan-     Informed Consent- Anesthetic plan and risks discussed with patient  I personally reviewed this patient with the CRNA  Discussed and agreed on the Anesthesia Plan with the CRNA  Williams Howard

## 2019-04-02 ENCOUNTER — TELEPHONE (OUTPATIENT)
Dept: GASTROENTEROLOGY | Facility: CLINIC | Age: 74
End: 2019-04-02

## 2019-05-14 PROBLEM — Z76.89 ENCOUNTER TO ESTABLISH CARE: Status: ACTIVE | Noted: 2019-05-14

## 2019-05-15 ENCOUNTER — OFFICE VISIT (OUTPATIENT)
Dept: FAMILY MEDICINE CLINIC | Facility: CLINIC | Age: 74
End: 2019-05-15
Payer: MEDICARE

## 2019-05-15 VITALS
RESPIRATION RATE: 18 BRPM | HEART RATE: 84 BPM | DIASTOLIC BLOOD PRESSURE: 70 MMHG | WEIGHT: 158 LBS | SYSTOLIC BLOOD PRESSURE: 124 MMHG | HEIGHT: 55 IN | OXYGEN SATURATION: 95 % | BODY MASS INDEX: 36.57 KG/M2

## 2019-05-15 DIAGNOSIS — D68.9 COAGULOPATHY (HCC): ICD-10-CM

## 2019-05-15 DIAGNOSIS — N18.9 ACUTE KIDNEY INJURY SUPERIMPOSED ON CHRONIC KIDNEY DISEASE (HCC): ICD-10-CM

## 2019-05-15 DIAGNOSIS — E11.9 TYPE 2 DIABETES MELLITUS WITHOUT COMPLICATION, WITH LONG-TERM CURRENT USE OF INSULIN (HCC): ICD-10-CM

## 2019-05-15 DIAGNOSIS — E78.2 MIXED HYPERLIPIDEMIA: ICD-10-CM

## 2019-05-15 DIAGNOSIS — Z76.89 ENCOUNTER TO ESTABLISH CARE: Primary | ICD-10-CM

## 2019-05-15 DIAGNOSIS — Z79.4 TYPE 2 DIABETES MELLITUS WITHOUT COMPLICATION, WITH LONG-TERM CURRENT USE OF INSULIN (HCC): ICD-10-CM

## 2019-05-15 DIAGNOSIS — E55.9 VITAMIN D DEFICIENCY: ICD-10-CM

## 2019-05-15 DIAGNOSIS — I10 ESSENTIAL HYPERTENSION: ICD-10-CM

## 2019-05-15 DIAGNOSIS — J41.1 MUCOPURULENT CHRONIC BRONCHITIS (HCC): ICD-10-CM

## 2019-05-15 DIAGNOSIS — N17.9 ACUTE KIDNEY INJURY SUPERIMPOSED ON CHRONIC KIDNEY DISEASE (HCC): ICD-10-CM

## 2019-05-15 DIAGNOSIS — I73.9 PAD (PERIPHERAL ARTERY DISEASE) (HCC): ICD-10-CM

## 2019-05-15 DIAGNOSIS — E66.9 OBESITY (BMI 30-39.9): ICD-10-CM

## 2019-05-15 PROCEDURE — 99204 OFFICE O/P NEW MOD 45 MIN: CPT | Performed by: NURSE PRACTITIONER

## 2019-05-15 RX ORDER — FUROSEMIDE 20 MG/1
TABLET ORAL
COMMUNITY
Start: 2017-10-25 | End: 2020-11-14 | Stop reason: HOSPADM

## 2019-05-15 RX ORDER — REPAGLINIDE 0.5 MG/1
TABLET ORAL
COMMUNITY
End: 2020-06-04 | Stop reason: ALTCHOICE

## 2019-05-15 RX ORDER — INSULIN DEGLUDEC INJECTION 100 U/ML
INJECTION, SOLUTION SUBCUTANEOUS
Refills: 0 | COMMUNITY
Start: 2019-04-15 | End: 2019-05-15

## 2019-07-08 ENCOUNTER — TELEPHONE (OUTPATIENT)
Dept: FAMILY MEDICINE CLINIC | Facility: CLINIC | Age: 74
End: 2019-07-08

## 2019-07-08 NOTE — TELEPHONE ENCOUNTER
Pt called in needing an application/ letter  to [facility]  - diabetic shoes  Her podiatrist wrote a script ut they will only accepts something from her PCP  She wants Medicare to be able to pay for the shoes  No application was provided    [place] phone # is [phone]  Fax # [phone]

## 2019-08-16 ENCOUNTER — APPOINTMENT (OUTPATIENT)
Dept: LAB | Facility: CLINIC | Age: 74
End: 2019-08-16
Payer: MEDICARE

## 2019-08-16 DIAGNOSIS — Z76.89 ENCOUNTER TO ESTABLISH CARE: ICD-10-CM

## 2019-08-16 DIAGNOSIS — Z79.4 TYPE 2 DIABETES MELLITUS WITHOUT COMPLICATION, WITH LONG-TERM CURRENT USE OF INSULIN (HCC): ICD-10-CM

## 2019-08-16 DIAGNOSIS — I73.9 PAD (PERIPHERAL ARTERY DISEASE) (HCC): ICD-10-CM

## 2019-08-16 DIAGNOSIS — N18.9 ACUTE KIDNEY INJURY SUPERIMPOSED ON CHRONIC KIDNEY DISEASE (HCC): ICD-10-CM

## 2019-08-16 DIAGNOSIS — J41.1 MUCOPURULENT CHRONIC BRONCHITIS (HCC): ICD-10-CM

## 2019-08-16 DIAGNOSIS — N17.9 ACUTE KIDNEY INJURY SUPERIMPOSED ON CHRONIC KIDNEY DISEASE (HCC): ICD-10-CM

## 2019-08-16 DIAGNOSIS — I10 ESSENTIAL HYPERTENSION: ICD-10-CM

## 2019-08-16 DIAGNOSIS — E66.9 OBESITY (BMI 30-39.9): ICD-10-CM

## 2019-08-16 DIAGNOSIS — E78.2 MIXED HYPERLIPIDEMIA: ICD-10-CM

## 2019-08-16 DIAGNOSIS — E55.9 VITAMIN D DEFICIENCY: ICD-10-CM

## 2019-08-16 DIAGNOSIS — E11.9 TYPE 2 DIABETES MELLITUS WITHOUT COMPLICATION, WITH LONG-TERM CURRENT USE OF INSULIN (HCC): ICD-10-CM

## 2019-08-16 LAB
25(OH)D3 SERPL-MCNC: 28.1 NG/ML (ref 30–100)
ALBUMIN SERPL BCP-MCNC: 4 G/DL (ref 3.5–5)
ALP SERPL-CCNC: 51 U/L (ref 46–116)
ALT SERPL W P-5'-P-CCNC: 22 U/L (ref 12–78)
ANION GAP SERPL CALCULATED.3IONS-SCNC: 7 MMOL/L (ref 4–13)
AST SERPL W P-5'-P-CCNC: 14 U/L (ref 5–45)
BASOPHILS # BLD AUTO: 0.08 THOUSANDS/ΜL (ref 0–0.1)
BASOPHILS NFR BLD AUTO: 1 % (ref 0–1)
BILIRUB SERPL-MCNC: 0.35 MG/DL (ref 0.2–1)
BUN SERPL-MCNC: 36 MG/DL (ref 5–25)
CALCIUM SERPL-MCNC: 9.7 MG/DL (ref 8.3–10.1)
CHLORIDE SERPL-SCNC: 107 MMOL/L (ref 100–108)
CHOLEST SERPL-MCNC: 215 MG/DL (ref 50–200)
CO2 SERPL-SCNC: 27 MMOL/L (ref 21–32)
CREAT SERPL-MCNC: 1.18 MG/DL (ref 0.6–1.3)
EOSINOPHIL # BLD AUTO: 0.42 THOUSAND/ΜL (ref 0–0.61)
EOSINOPHIL NFR BLD AUTO: 5 % (ref 0–6)
ERYTHROCYTE [DISTWIDTH] IN BLOOD BY AUTOMATED COUNT: 13.9 % (ref 11.6–15.1)
EST. AVERAGE GLUCOSE BLD GHB EST-MCNC: 160 MG/DL
GFR SERPL CREATININE-BSD FRML MDRD: 45 ML/MIN/1.73SQ M
GLUCOSE P FAST SERPL-MCNC: 79 MG/DL (ref 65–99)
HBA1C MFR BLD: 7.2 % (ref 4.2–6.3)
HCT VFR BLD AUTO: 43 % (ref 34.8–46.1)
HDLC SERPL-MCNC: 42 MG/DL (ref 40–60)
HGB BLD-MCNC: 13.5 G/DL (ref 11.5–15.4)
IMM GRANULOCYTES # BLD AUTO: 0.04 THOUSAND/UL (ref 0–0.2)
IMM GRANULOCYTES NFR BLD AUTO: 0 % (ref 0–2)
LDLC SERPL CALC-MCNC: 116 MG/DL (ref 0–100)
LYMPHOCYTES # BLD AUTO: 1.85 THOUSANDS/ΜL (ref 0.6–4.47)
LYMPHOCYTES NFR BLD AUTO: 20 % (ref 14–44)
MCH RBC QN AUTO: 28.5 PG (ref 26.8–34.3)
MCHC RBC AUTO-ENTMCNC: 31.4 G/DL (ref 31.4–37.4)
MCV RBC AUTO: 91 FL (ref 82–98)
MONOCYTES # BLD AUTO: 0.65 THOUSAND/ΜL (ref 0.17–1.22)
MONOCYTES NFR BLD AUTO: 7 % (ref 4–12)
NEUTROPHILS # BLD AUTO: 6.34 THOUSANDS/ΜL (ref 1.85–7.62)
NEUTS SEG NFR BLD AUTO: 67 % (ref 43–75)
NONHDLC SERPL-MCNC: 173 MG/DL
NRBC BLD AUTO-RTO: 0 /100 WBCS
PLATELET # BLD AUTO: 223 THOUSANDS/UL (ref 149–390)
PMV BLD AUTO: 10.2 FL (ref 8.9–12.7)
POTASSIUM SERPL-SCNC: 4.2 MMOL/L (ref 3.5–5.3)
PROT SERPL-MCNC: 8 G/DL (ref 6.4–8.2)
RBC # BLD AUTO: 4.73 MILLION/UL (ref 3.81–5.12)
SODIUM SERPL-SCNC: 141 MMOL/L (ref 136–145)
TRIGL SERPL-MCNC: 283 MG/DL
TSH SERPL DL<=0.05 MIU/L-ACNC: 3.37 UIU/ML (ref 0.36–3.74)
WBC # BLD AUTO: 9.38 THOUSAND/UL (ref 4.31–10.16)

## 2019-08-16 PROCEDURE — 80053 COMPREHEN METABOLIC PANEL: CPT

## 2019-08-16 PROCEDURE — 82306 VITAMIN D 25 HYDROXY: CPT

## 2019-08-16 PROCEDURE — 36415 COLL VENOUS BLD VENIPUNCTURE: CPT

## 2019-08-16 PROCEDURE — 80061 LIPID PANEL: CPT

## 2019-08-16 PROCEDURE — 84443 ASSAY THYROID STIM HORMONE: CPT

## 2019-08-16 PROCEDURE — 83036 HEMOGLOBIN GLYCOSYLATED A1C: CPT

## 2019-08-16 PROCEDURE — 85025 COMPLETE CBC W/AUTO DIFF WBC: CPT

## 2019-08-17 DIAGNOSIS — E78.2 ELEVATED TRIGLYCERIDES WITH HIGH CHOLESTEROL: ICD-10-CM

## 2019-08-17 DIAGNOSIS — E78.2 HYPERLIPIDEMIA, MIXED: Primary | ICD-10-CM

## 2019-08-17 RX ORDER — ATORVASTATIN CALCIUM 20 MG/1
20 TABLET, FILM COATED ORAL DAILY
Qty: 30 TABLET | Refills: 5 | Status: SHIPPED | OUTPATIENT
Start: 2019-08-17 | End: 2019-09-11

## 2019-08-17 RX ORDER — FENOFIBRATE 145 MG/1
145 TABLET, COATED ORAL DAILY
Qty: 30 TABLET | Refills: 2 | Status: SHIPPED | OUTPATIENT
Start: 2019-08-17 | End: 2020-11-19

## 2019-09-09 NOTE — PROGRESS NOTES
Subjective:      66year old   female  who presents to the office today for a preoperative consultation at the request of surgeon [facility] who plans on performing lumbar laminectomy on September 19  This consultation is requested for the specific conditions prompting preoperative evaluation (i e  because of potential affect on operative risk): hypertension, COPD, obesity, seizure d/o, hyperlipidemia, CKD III, diabetic neuropathy  Planned anesthesia: general  The patient has the following known anesthesia issues: none  Patients bleeding risk: no recent abnormal bleeding, no remote history of abnormal bleeding and no use of Ca-channel blockers  Patient does not have objections to receiving blood products if needed  The following portions of the patient's history were reviewed and updated as appropriate:   She  has a past medical history of Carpal tunnel syndrome, Colloid cyst of brain ([facility] [address] ), COPD (chronic obstructive pulmonary disease) ([facility] [address] ), Diabetes mellitus ([facility] [address] ), GERD (gastroesophageal reflux disease), Glaucoma, Hyperlipidemia, Hypertension, Irritable bowel syndrome, Renal disorder, Seizures ([facility] [address] ), and Shortness of breath    She   Patient Active Problem List    Diagnosis Date Noted    Pre-op examination 09/11/2019    CKD (chronic kidney disease) stage 3, GFR 30-59 ml/min ([facility]) 09/11/2019    Vitamin D deficiency 05/15/2019    PAD (peripheral artery disease) ([facility] [address] ) 05/15/2019    Obesity (BMI 30-39 9) 05/15/2019    Coagulopathy ([facility] [address] ) 05/15/2019    Encounter to establish care 05/14/2019    Dyspepsia 02/26/2019    Melena 02/26/2019    Generalized abdominal pain 02/26/2019    Abdominal pain 02/26/2019    Chest pain 03/06/2017    Urinary tract infection without hematuria 11/26/2016    Acute kidney injury superimposed on chronic kidney disease ([address] ) 11/26/2016    Diarrhea 11/26/2016    COPD (chronic obstructive pulmonary disease) ([address] )     Diabetes mellitus (HCC)     Hyperlipidemia  Hypertension     Renal disorder      She  has a past surgical history that includes Neck surgery (05/24/2018); Brain surgery; Cataract extraction; Cholecystectomy; Incision tendon sheath hand; Hysterectomy; Neuroplasty / transposition median nerve at carpal tunnel; COLECTOMY RADHA; Retinal detachment surgery; Replacement total knee (Right); Hernia repair; Tubal ligation; pr colonoscopy flx dx w/collj spec when pfrmd (N/A, 3/26/2019); and pr esophagogastroduodenoscopy transoral diagnostic (N/A, 3/26/2019)  Her family history includes Asthma in her mother; Bone cancer in her brother; Brain cancer in her brother; Cancer in her father; Diabetes in her sister; Emphysema in her father; Heart disease in her brother and mother; Kidney cancer in her sister; Kidney disease in her sister; Prostate cancer in her father  She  reports that she quit smoking about 18 years ago  She quit after 10 00 years of use  She has never used smokeless tobacco  She reports that she does not drink alcohol or use drugs    Current Outpatient Medications   Medication Sig Dispense Refill    atorvastatin (LIPITOR) 10 mg tablet Take 10 mg by mouth daily      albuterol (2 5 mg/3 mL) 0 083 % nebulizer solution Take 2 5 mg by nebulization every 4 (four) hours as needed      aspirin (ECOTRIN LOW STRENGTH) 81 mg EC tablet Take 81 mg by mouth daily      B-D ULTRAFINE III SHORT PEN 31G X 8 MM MISC   0    BASAGLAR KWIKPEN 100 units/mL injection pen   0    brimonidine (ALPHAGAN P) 0 15 % ophthalmic solution brimonidine 0 15 % eye drops   INSTILL 1 DROP INTO AFFECTED EYE(S) BY OPHTHALMIC ROUTE 2 TIMES PER DAY      brimonidine tartrate 0 2 % ophthalmic solution brimonidine 0 2 % eye drops      cholecalciferol (VITAMIN D3) 1,000 units tablet Take 1,000 Units by mouth daily      famotidine (PEPCID) 20 mg tablet Take 1 tablet (20 mg total) by mouth 2 (two) times a day for 90 days 180 tablet 3    fenofibrate (TRICOR) 145 mg tablet Take 1 tablet (145 mg total) by mouth daily for 30 days 30 tablet 2    fosinopril (MONOPRIL) 20 mg tablet Take 20 mg by mouth daily      furosemide (LASIX) 20 mg tablet furosemide 20 mg tablet      hyoscyamine (ANASPAZ,LEVSIN) 0 125 MG tablet Take 1 tablet (0 125 mg total) by mouth every 6 (six) hours as needed for cramping 30 tablet 3    ibuprofen (MOTRIN) 600 mg tablet take 1 tablet by mouth every 6 hours if needed with food  0    insulin aspart (NOVOLOG FLEXPEN) 100 Units/mL injection pen Novolog Flexpen U-100 Insulin aspart 100 unit/mL (3 mL) subcutaneous      JANUVIA 25 MG tablet   0    latanoprost (XALATAN) 0 005 % ophthalmic solution latanoprost 0 005 % eye drops      levETIRAcetam (KEPPRA) 250 mg tablet Take 250 mg by mouth 2 (two) times a day      metolazone (ZAROXOLYN) 2 5 mg tablet Daily      montelukast (SINGULAIR) 10 mg tablet Take 10 mg by mouth daily at bedtime      Multiple Vitamin (MULTI VITAMIN DAILY PO) Take 1 tablet by mouth daily      omeprazole (PriLOSEC OTC) 20 MG tablet Take 1 tablet by mouth      pentoxifylline (TRENtal) 400 mg ER tablet Take 400 mg by mouth 3 (three) times a day with meals      Probiotic Product (PROBIOTIC DAILY PO) Take by mouth      repaglinide (PRANDIN) 0 5 mg tablet repaglinide 0 5 mg tablet      traMADol (ULTRAM) 50 mg tablet 1 tablet       No current facility-administered medications for this visit        Current Outpatient Medications on File Prior to Visit   Medication Sig    atorvastatin (LIPITOR) 10 mg tablet Take 10 mg by mouth daily    albuterol (2 5 mg/3 mL) 0 083 % nebulizer solution Take 2 5 mg by nebulization every 4 (four) hours as needed    aspirin (ECOTRIN LOW STRENGTH) 81 mg EC tablet Take 81 mg by mouth daily    B-D ULTRAFINE III SHORT PEN 31G X 8 MM MISC     BASAGLAR KWIKPEN 100 units/mL injection pen     brimonidine (ALPHAGAN P) 0 15 % ophthalmic solution brimonidine 0 15 % eye drops   INSTILL 1 DROP INTO AFFECTED EYE(S) BY OPHTHALMIC ROUTE 2 TIMES PER DAY    brimonidine tartrate 0 2 % ophthalmic solution brimonidine 0 2 % eye drops    cholecalciferol (VITAMIN D3) 1,000 units tablet Take 1,000 Units by mouth daily    famotidine (PEPCID) 20 mg tablet Take 1 tablet (20 mg total) by mouth 2 (two) times a day for 90 days    fenofibrate (TRICOR) 145 mg tablet Take 1 tablet (145 mg total) by mouth daily for 30 days    fosinopril (MONOPRIL) 20 mg tablet Take 20 mg by mouth daily    furosemide (LASIX) 20 mg tablet furosemide 20 mg tablet    hyoscyamine (ANASPAZ,LEVSIN) 0 125 MG tablet Take 1 tablet (0 125 mg total) by mouth every 6 (six) hours as needed for cramping    ibuprofen (MOTRIN) 600 mg tablet take 1 tablet by mouth every 6 hours if needed with food    insulin aspart (NOVOLOG FLEXPEN) 100 Units/mL injection pen Novolog Flexpen U-100 Insulin aspart 100 unit/mL (3 mL) subcutaneous    JANUVIA 25 MG tablet     latanoprost (XALATAN) 0 005 % ophthalmic solution latanoprost 0 005 % eye drops    levETIRAcetam (KEPPRA) 250 mg tablet Take 250 mg by mouth 2 (two) times a day    metolazone (ZAROXOLYN) 2 5 mg tablet Daily    montelukast (SINGULAIR) 10 mg tablet Take 10 mg by mouth daily at bedtime    Multiple Vitamin (MULTI VITAMIN DAILY PO) Take 1 tablet by mouth daily    omeprazole (PriLOSEC OTC) 20 MG tablet Take 1 tablet by mouth    pentoxifylline (TRENtal) 400 mg ER tablet Take 400 mg by mouth 3 (three) times a day with meals    Probiotic Product (PROBIOTIC DAILY PO) Take by mouth    repaglinide (PRANDIN) 0 5 mg tablet repaglinide 0 5 mg tablet    traMADol (ULTRAM) 50 mg tablet 1 tablet    [DISCONTINUED] atorvastatin (LIPITOR) 20 mg tablet Take 1 tablet (20 mg total) by mouth daily    [DISCONTINUED] insulin aspart (NovoLOG) 100 units/mL injection Inject 1 Units under the skin 3 (three) times a day before meals     No current facility-administered medications on file prior to visit  She is allergic to sulfa antibiotics       Review of Systems  Pertinent items are noted in HPI  Neuro- no recent seizures  One seizure s/p colloid cyst removal   Cardiac-+ LE edema  Denies palpitations or chest pain  Resp- SPB with activity, denies cough  + ex tobacco  GI- Denies n/v/d/c  Musc- chornic back pain  Neuro- denies headache or dizziness   + numbness of fingers r/t diabetic neuropathy    Objective:     /62 (BP Location: Left arm, Patient Position: Sitting)   Pulse 68   Resp 18   Ht 4' 7" (1 397 m)   Wt 71 8 kg (158 lb 6 oz)   SpO2 96%   BMI 36 81 kg/m²     General Appearance:    Alert, cooperative, no distress, appears stated age   Head:    Normocephalic, without obvious abnormality, atraumatic   Eyes:    PERRL, conjunctiva/corneas clear, EOM's intact, fundi     benign, both eyes   Ears:    Normal TM's and external ear canals, both ears   Nose:   Nares normal, septum midline, mucosa normal, no drainage    or sinus tenderness   Throat:   Lips, mucosa, and tongue normal; teeth and gums normal   Neck:   Supple, symmetrical, trachea midline, no adenopathy;     thyroid:  no enlargement/tenderness/nodules; no carotid    bruit or JVD   Back:     Symmetric, no curvature, ROM normal, no CVA tenderness   Lungs:     Clear to auscultation bilaterally, respirations unlabored   Chest Wall:    No tenderness or deformity    Heart:    Regular rate and rhythm, S1 and S2 normal, no murmur, rub   or gallop   Breast Exam:       Abdomen:     Soft, non-tender, bowel sounds active all four quadrants,     no masses, no organomegaly   Genitalia:       Rectal:      Extremities:   Extremities normal, atraumatic, no cyanosis or edema   Pulses:   2+ and symmetric all extremities   Skin:   Skin color, texture, turgor normal, no rashes or lesions   Lymph nodes:   Cervical, supraclavicular, and axillary nodes normal   Neurologic:   CNII-XII intact, normal strength, sensation and reflexes     throughout         Cardiographics  EC19 NSR no ischemia    Imaging  Chest x-ray: needs cxr     Lab Review    Results for Deborah Joyce (MRN [id number]) as of 9/9/2019 12:08   Ref  Range 8/16/2019 08:56   Sodium Latest Ref Range: 136 - 145 mmol/L 141   Potassium Latest Ref Range: 3 5 - 5 3 mmol/L 4 2   Chloride Latest Ref Range: 100 - 108 mmol/L 107   CO2 Latest Ref Range: 21 - 32 mmol/L 27   Anion Gap Latest Ref Range: 4 - 13 mmol/L 7   BUN Latest Ref Range: 5 - 25 mg/dL 36 (H)   Creatinine Latest Ref Range: 0 60 - 1 30 mg/dL 1 18   GLUCOSE FASTING Latest Ref Range: 65 - 99 mg/dL 79   Calcium Latest Ref Range: 8 3 - 10 1 mg/dL 9 7   AST Latest Ref Range: 5 - 45 U/L 14   ALT Latest Ref Range: 12 - 78 U/L 22   Alkaline Phosphatase Latest Ref Range: 46 - 116 U/L 51   Total Protein Latest Ref Range: 6 4 - 8 2 g/dL 8 0   Albumin Latest Ref Range: 3 5 - 5 0 g/dL 4 0   TOTAL BILIRUBIN Latest Ref Range: 0 20 - 1 00 mg/dL 0 35   eGFR Latest Units: ml/min/1 73sq m 45   Cholesterol Latest Ref Range: 50 - 200 mg/dL 215 (H)   Triglycerides Latest Ref Range: <=150 mg/dL 283 (H)   HDL Latest Ref Range: 40 - 60 mg/dL 42   Non-HDL Cholesterol Latest Units: mg/dl 173   LDL Direct Latest Ref Range: 0 - 100 mg/dL 116 (H)       Assessment:     66 y o  female with planned surgery as above  Known risk factors for perioperative complications:hypertension, uncontrolled TG, diabetes, COPD, h/o seizure disorder, PAD, CKD III  Difficulty with intubation is not anticipated  Cardiac Risk Estimation: Class III risk 10 1%    Current medications which may produce withdrawal symptoms if withheld perioperatively: none    Plan:     1  Preoperative workup as follows chest x-ray, ECG  2  Change in medication regimen before surgery: discontinue ASA 14 days before surgery and discontinue NSAIDs (trental) 14 days before surgery   Discontinue vitamin D tomorrow 9-12-19

## 2019-09-11 ENCOUNTER — OFFICE VISIT (OUTPATIENT)
Dept: FAMILY MEDICINE CLINIC | Facility: CLINIC | Age: 74
End: 2019-09-11
Payer: MEDICARE

## 2019-09-11 ENCOUNTER — APPOINTMENT (OUTPATIENT)
Dept: RADIOLOGY | Facility: CLINIC | Age: 74
End: 2019-09-11
Payer: MEDICARE

## 2019-09-11 VITALS
SYSTOLIC BLOOD PRESSURE: 134 MMHG | RESPIRATION RATE: 18 BRPM | OXYGEN SATURATION: 96 % | BODY MASS INDEX: 36.65 KG/M2 | DIASTOLIC BLOOD PRESSURE: 62 MMHG | HEART RATE: 68 BPM | HEIGHT: 55 IN | WEIGHT: 158.38 LBS

## 2019-09-11 DIAGNOSIS — I10 ESSENTIAL HYPERTENSION: ICD-10-CM

## 2019-09-11 DIAGNOSIS — E66.9 OBESITY (BMI 30-39.9): ICD-10-CM

## 2019-09-11 DIAGNOSIS — Z00.00 MEDICARE ANNUAL WELLNESS VISIT, SUBSEQUENT: Primary | ICD-10-CM

## 2019-09-11 DIAGNOSIS — E78.2 MIXED HYPERLIPIDEMIA: ICD-10-CM

## 2019-09-11 DIAGNOSIS — N18.30 TYPE 2 DIABETES MELLITUS WITH STAGE 3 CHRONIC KIDNEY DISEASE, WITH LONG-TERM CURRENT USE OF INSULIN (HCC): ICD-10-CM

## 2019-09-11 DIAGNOSIS — D68.9 COAGULOPATHY (HCC): ICD-10-CM

## 2019-09-11 DIAGNOSIS — J06.9 URI WITH COUGH AND CONGESTION: ICD-10-CM

## 2019-09-11 DIAGNOSIS — J41.1 MUCOPURULENT CHRONIC BRONCHITIS (HCC): ICD-10-CM

## 2019-09-11 DIAGNOSIS — E11.22 TYPE 2 DIABETES MELLITUS WITH STAGE 3 CHRONIC KIDNEY DISEASE, WITH LONG-TERM CURRENT USE OF INSULIN (HCC): ICD-10-CM

## 2019-09-11 DIAGNOSIS — I73.9 PAD (PERIPHERAL ARTERY DISEASE) (HCC): ICD-10-CM

## 2019-09-11 DIAGNOSIS — Z23 NEED FOR VACCINATION AGAINST STREPTOCOCCUS PNEUMONIAE: ICD-10-CM

## 2019-09-11 DIAGNOSIS — Z79.4 TYPE 2 DIABETES MELLITUS WITH STAGE 3 CHRONIC KIDNEY DISEASE, WITH LONG-TERM CURRENT USE OF INSULIN (HCC): ICD-10-CM

## 2019-09-11 DIAGNOSIS — N18.30 CKD (CHRONIC KIDNEY DISEASE) STAGE 3, GFR 30-59 ML/MIN (HCC): ICD-10-CM

## 2019-09-11 DIAGNOSIS — Z01.818 PRE-OP EXAMINATION: ICD-10-CM

## 2019-09-11 DIAGNOSIS — E55.9 VITAMIN D DEFICIENCY: ICD-10-CM

## 2019-09-11 PROCEDURE — G0438 PPPS, INITIAL VISIT: HCPCS | Performed by: NURSE PRACTITIONER

## 2019-09-11 PROCEDURE — G0403 EKG FOR INITIAL PREVENT EXAM: HCPCS | Performed by: NURSE PRACTITIONER

## 2019-09-11 PROCEDURE — 99214 OFFICE O/P EST MOD 30 MIN: CPT | Performed by: NURSE PRACTITIONER

## 2019-09-11 PROCEDURE — 90732 PPSV23 VACC 2 YRS+ SUBQ/IM: CPT | Performed by: NURSE PRACTITIONER

## 2019-09-11 PROCEDURE — 71046 X-RAY EXAM CHEST 2 VIEWS: CPT

## 2019-09-11 PROCEDURE — G0009 ADMIN PNEUMOCOCCAL VACCINE: HCPCS | Performed by: NURSE PRACTITIONER

## 2019-09-11 RX ORDER — PEN NEEDLE, DIABETIC 31 GX5/16"
NEEDLE, DISPOSABLE MISCELLANEOUS
Refills: 0 | COMMUNITY
Start: 2019-08-26 | End: 2020-02-07 | Stop reason: SDUPTHER

## 2019-09-11 RX ORDER — SITAGLIPTIN 25 MG/1
TABLET, FILM COATED ORAL
Refills: 0 | COMMUNITY
Start: 2019-08-22 | End: 2020-11-19

## 2019-09-11 RX ORDER — BRIMONIDINE TARTRATE 0.15 %
DROPS OPHTHALMIC (EYE)
COMMUNITY
End: 2020-11-12 | Stop reason: ALTCHOICE

## 2019-09-11 RX ORDER — INSULIN GLARGINE 100 [IU]/ML
INJECTION, SOLUTION SUBCUTANEOUS
Refills: 0 | COMMUNITY
Start: 2019-08-26 | End: 2020-10-30

## 2019-09-11 RX ORDER — ATORVASTATIN CALCIUM 10 MG/1
10 TABLET, FILM COATED ORAL DAILY
COMMUNITY
Start: 2019-03-17 | End: 2020-02-07 | Stop reason: SDUPTHER

## 2019-09-11 RX ORDER — LATANOPROST 50 UG/ML
SOLUTION/ DROPS OPHTHALMIC
COMMUNITY
End: 2021-09-07

## 2019-09-11 RX ORDER — BROMPHENIRAMINE MALEATE, PSEUDOEPHEDRINE HYDROCHLORIDE, AND DEXTROMETHORPHAN HYDROBROMIDE 2; 30; 10 MG/5ML; MG/5ML; MG/5ML
5 SYRUP ORAL 4 TIMES DAILY PRN
Qty: 120 ML | Refills: 0 | Status: SHIPPED | OUTPATIENT
Start: 2019-09-11 | End: 2019-09-18

## 2019-09-11 RX ORDER — OMEPRAZOLE 20 MG/1
1 TABLET, DELAYED RELEASE ORAL
COMMUNITY
End: 2020-11-19 | Stop reason: SDUPTHER

## 2019-09-11 RX ORDER — METOLAZONE 2.5 MG/1
TABLET ORAL DAILY
COMMUNITY
End: 2020-11-14 | Stop reason: HOSPADM

## 2019-09-11 RX ORDER — IBUPROFEN 600 MG/1
TABLET ORAL
Refills: 0 | COMMUNITY
Start: 2019-07-19 | End: 2020-11-14 | Stop reason: HOSPADM

## 2019-09-11 RX ORDER — TRAMADOL HYDROCHLORIDE 50 MG/1
1 TABLET ORAL
COMMUNITY
End: 2020-11-19

## 2019-09-11 RX ORDER — BRIMONIDINE TARTRATE 2 MG/ML
1 SOLUTION/ DROPS OPHTHALMIC
COMMUNITY
End: 2020-11-19

## 2019-09-11 NOTE — PATIENT INSTRUCTIONS
Patient is here for preop clearance  Chronic medical conditions:  Diabetes- A1C is 7 2  Epilepsy- s/p colloid cyst removal  NO further seizures  Stable on Keppra  Hypertension- stable  COPD- stable  PAD- on trental  Hyperlipidemia- TG not at goal  Needs chest xray  EKG done in office today    Will be cleared for surgery  Medicare Preventive Visit Patient Instructions  Thank you for completing your Welcome to Medicare Visit or Medicare Annual Wellness Visit today  Your next wellness visit will be due in one year (9/11/2020)  The screening/preventive services that you may require over the next 5-10 years are detailed below  Some tests may not apply to you based off risk factors and/or age  Screening tests ordered at today's visit but not completed yet may show as past due  Also, please note that scanned in results may not display below  Preventive Screenings:  Service Recommendations Previous Testing/Comments   Colorectal Cancer Screening  * Colonoscopy    * Fecal Occult Blood Test (FOBT)/Fecal Immunochemical Test (FIT)  * Fecal DNA/Cologuard Test  * Flexible Sigmoidoscopy Age: 54-65 years old   Colonoscopy: every 10 years (may be performed more frequently if at higher risk)  OR  FOBT/FIT: every 1 year  OR  Cologuard: every 3 years  OR  Sigmoidoscopy: every 5 years  Screening may be recommended earlier than age 48 if at higher risk for colorectal cancer  Also, an individualized decision between you and your healthcare provider will decide whether screening between the ages of 74-80 would be appropriate  Colonoscopy: 03/26/2019  FOBT/FIT: 03/13/2019  Cologuard: Not on file  Sigmoidoscopy: Not on file    Screening Current     Breast Cancer Screening Age: 36 years old  Frequency: every 1-2 years  Not required if history of left and right mastectomy Mammogram: Not on file       Cervical Cancer Screening Between the ages of 21-29, pap smear recommended once every 3 years     Between the ages of 33-67, can perform pap smear with HPV co-testing every 5 years  Recommendations may differ for women with a history of total hysterectomy, cervical cancer, or abnormal pap smears in past  Pap Smear: Not on file    Screening Not Indicated   Hepatitis C Screening Once for adults born between 1945 and 1965  More frequently in patients at high risk for Hepatitis C Hep C Antibody: Not on file       Diabetes Screening 1-2 times per year if you're at risk for diabetes or have pre-diabetes Fasting glucose: 79 mg/dL   A1C: 7 2 %    Screening Not Indicated  History Diabetes   Cholesterol Screening Once every 5 years if you don't have a lipid disorder  May order more often based on risk factors  Lipid panel: 08/16/2019    Screening Not Indicated  History Lipid Disorder     Other Preventive Screenings Covered by Medicare:  1  Abdominal Aortic Aneurysm (AAA) Screening: covered once if your at risk  You're considered to be at risk if you have a family history of AAA  2  Lung Cancer Screening: covers low dose CT scan once per year if you meet all of the following conditions: (1) Age 50-69; (2) No signs or symptoms of lung cancer; (3) Current smoker or have quit smoking within the last 15 years; (4) You have a tobacco smoking history of at least 30 pack years (packs per day multiplied by number of years you smoked); (5) You get a written order from a healthcare provider  3  Glaucoma Screening: covered annually if you're considered high risk: (1) You have diabetes OR (2) Family history of glaucoma OR (3)  aged 48 and older OR (3)  American aged 72 and older  3   Osteoporosis Screening: covered every 2 years if you meet one of the following conditions: (1) You're estrogen deficient and at risk for osteoporosis based off medical history and other findings; (2) Have a vertebral abnormality; (3) On glucocorticoid therapy for more than 3 months; (4) Have primary hyperparathyroidism; (5) On osteoporosis medications and need to assess response to drug therapy  · Last bone density test (DXA Scan): Not on file  5  HIV Screening: covered annually if you're between the age of 12-76  Also covered annually if you are younger than 13 and older than 72 with risk factors for HIV infection  For pregnant patients, it is covered up to 3 times per pregnancy  Immunizations:  Immunization Recommendations   Influenza Vaccine Annual influenza vaccination during flu season is recommended for all persons aged >= 6 months who do not have contraindications   Pneumococcal Vaccine (Prevnar and Pneumovax)  * Prevnar = PCV13  * Pneumovax = PPSV23   Adults 25-60 years old: 1-3 doses may be recommended based on certain risk factors  Adults 72 years old: Prevnar (PCV13) vaccine recommended followed by Pneumovax (PPSV23) vaccine  If already received PPSV23 since turning 65, then PCV13 recommended at least one year after PPSV23 dose  Hepatitis B Vaccine 3 dose series if at intermediate or high risk (ex: diabetes, end stage renal disease, liver disease)   Tetanus (Td) Vaccine - COST NOT COVERED BY MEDICARE PART B Following completion of primary series, a booster dose should be given every 10 years to maintain immunity against tetanus  Td may also be given as tetanus wound prophylaxis  Tdap Vaccine - COST NOT COVERED BY MEDICARE PART B Recommended at least once for all adults  For pregnant patients, recommended with each pregnancy  Shingles Vaccine (Shingrix) - COST NOT COVERED BY MEDICARE PART B  2 shot series recommended in those aged 48 and above     Health Maintenance Due:      Topic Date Due    CRC Screening: Colonoscopy  03/26/2024     Immunizations Due:      Topic Date Due    Pneumococcal Vaccine: 65+ Years (2 of 2 - PPSV23) 11/27/2018     Advance Directives   What are advance directives? Advance directives are legal documents that state your wishes and plans for medical care   These plans are made ahead of time in case you lose your ability to make decisions for yourself  Advance directives can apply to any medical decision, such as the treatments you want, and if you want to donate organs  What are the types of advance directives? There are many types of advance directives, and each state has rules about how to use them  You may choose a combination of any of the following:  · Living will: This is a written record of the treatment you want  You can also choose which treatments you do not want, which to limit, and which to stop at a certain time  This includes surgery, medicine, IV fluid, and tube feedings  · Durable power of  for healthcare [facility]): This is a written record that states who you want to make healthcare choices for you when you are unable to make them for yourself  This person, called a proxy, is usually a family member or a friend  You may choose more than 1 proxy  · Do not resuscitate (DNR) order:  A DNR order is used in case your heart stops beating or you stop breathing  It is a request not to have certain forms of treatment, such as CPR  A DNR order may be included in other types of advance directives  · Medical directive: This covers the care that you want if you are in a coma, near death, or unable to make decisions for yourself  You can list the treatments you want for each condition  Treatment may include pain medicine, surgery, blood transfusions, dialysis, IV or tube feedings, and a ventilator (breathing machine)  · Values history: This document has questions about your views, beliefs, and how you feel and think about life  This information can help others choose the care that you would choose  Why are advance directives important? An advance directive helps you control your care  Although spoken wishes may be used, it is better to have your wishes written down  Spoken wishes can be misunderstood, or not followed  Treatments may be given even if you do not want them   An advance directive may make it easier for your family to make difficult choices about your care  Fall Prevention    Fall prevention  includes ways to make your home and other areas safer  It also includes ways you can move more carefully to prevent a fall  Health conditions that cause changes in your blood pressure, vision, or muscle strength and coordination may increase your risk for falls  Medicines may also increase your risk for falls if they make you dizzy, weak, or sleepy  Fall prevention tips:   · Stand or sit up slowly  · Use assistive devices as directed  · Wear shoes that fit well and have soles that   · Wear a personal alarm  · Stay active  · Manage your medical conditions  Home Safety Tips:  · Add items to prevent falls in the bathroom  · Keep paths clear  · Install bright lights in your home  · Keep items you use often on shelves within reach  · Paint or place reflective tape on the edges of your stairs  Urinary Incontinence   Urinary incontinence (UI)  is when you lose control of your bladder  UI develops because your bladder cannot store or empty urine properly  The 3 most common types of UI are stress incontinence, urge incontinence, or both  Medicines:   · May be given to help strengthen your bladder control  Report any side effects of medication to your healthcare provider  Do pelvic muscle exercises often:  Your pelvic muscles help you stop urinating  Squeeze these muscles tight for 5 seconds, then relax for 5 seconds  Gradually work up to squeezing for 10 seconds  Do 3 sets of 15 repetitions a day, or as directed  This will help strengthen your pelvic muscles and improve bladder control  Train your bladder:  Go to the bathroom at set times, such as every 2 hours, even if you do not feel the urge to go  You can also try to hold your urine when you feel the urge to go  For example, hold your urine for 5 minutes when you feel the urge to go  As that becomes easier, hold your urine for 10 minutes  Self-care:   · Keep a UI record  Write down how often you leak urine and how much you leak  Make a note of what you were doing when you leaked urine  · Drink liquids as directed  You may need to limit the amount of liquid you drink to help control your urine leakage  Do not drink any liquid right before you go to bed  Limit or do not have drinks that contain caffeine or alcohol  · Prevent constipation  Eat a variety of high-fiber foods  Good examples are high-fiber cereals, beans, vegetables, and whole-grain breads  Walking is the best way to trigger your intestines to have a bowel movement  · Exercise regularly and maintain a healthy weight  Weight loss and exercise will decrease pressure on your bladder and help you control your leakage  · Use a catheter as directed  to help empty your bladder  A catheter is a tiny, plastic tube that is put into your bladder to drain your urine  · Go to behavior therapy as directed  Behavior therapy may be used to help you learn to control your urge to urinate  Weight Management   Why it is important to manage your weight:  Being overweight increases your risk of health conditions such as heart disease, high blood pressure, type 2 diabetes, and certain types of cancer  It can also increase your risk for osteoarthritis, sleep apnea, and other respiratory problems  Aim for a slow, steady weight loss  Even a small amount of weight loss can lower your risk of health problems  How to lose weight safely:  A safe and healthy way to lose weight is to eat fewer calories and get regular exercise  You can lose up about 1 pound a week by decreasing the number of calories you eat by 500 calories each day  Healthy meal plan for weight management:  A healthy meal plan includes a variety of foods, contains fewer calories, and helps you stay healthy  A healthy meal plan includes the following:  · Eat whole-grain foods more often  A healthy meal plan should contain fiber   Fiber is the part of grains, fruits, and vegetables that is not broken down by your body  Whole-grain foods are healthy and provide extra fiber in your diet  Some examples of whole-grain foods are whole-wheat breads and pastas, oatmeal, brown rice, and bulgur  · Eat a variety of vegetables every day  Include dark, leafy greens such as spinach, kale, estefany greens, and mustard greens  Eat yellow and orange vegetables such as carrots, sweet potatoes, and winter squash  · Eat a variety of fruits every day  Choose fresh or canned fruit (canned in its own juice or light syrup) instead of juice  Fruit juice has very little or no fiber  · Eat low-fat dairy foods  Drink fat-free (skim) milk or 1% milk  Eat fat-free yogurt and low-fat cottage cheese  Try low-fat cheeses such as mozzarella and other reduced-fat cheeses  · Choose meat and other protein foods that are low in fat  Choose beans or other legumes such as split peas or lentils  Choose fish, skinless poultry (chicken or turkey), or lean cuts of red meat (beef or pork)  Before you cook meat or poultry, cut off any visible fat  · Use less fat and oil  Try baking foods instead of frying them  Add less fat, such as margarine, sour cream, regular salad dressing and mayonnaise to foods  Eat fewer high-fat foods  Some examples of high-fat foods include french fries, doughnuts, ice cream, and cakes  · Eat fewer sweets  Limit foods and drinks that are high in sugar  This includes candy, cookies, regular soda, and sweetened drinks  Exercise:  Exercise at least 30 minutes per day on most days of the week  Some examples of exercise include walking, biking, dancing, and swimming  You can also fit in more physical activity by taking the stairs instead of the elevator or parking farther away from stores  Ask your healthcare provider about the best exercise plan for you        © Copyright [employer] 2018 Information is for End User's use only and may not be sold, redistributed or otherwise used for commercial purposes   All illustrations and images included in CareNotes® are the copyrighted property of [employer]  or [facility] Osorio Batista

## 2019-09-11 NOTE — PROGRESS NOTES
Assessment and Plan:     Problem List Items Addressed This Visit        Endocrine    Diabetes mellitus ([address] )    Relevant Medications    JANUVIA 25 MG tablet    BASAGLAR KWIKPEN 100 units/mL injection pen       Respiratory    COPD (chronic obstructive pulmonary disease) (HCC)       Cardiovascular and Mediastinum    Hypertension    Relevant Medications    metolazone (ZAROXOLYN) 2 5 mg tablet    PAD (peripheral artery disease) (HCC)       Hematopoietic and Hemostatic    Coagulopathy (HCC)       Genitourinary    CKD (chronic kidney disease) stage 3, GFR 30-59 ml/min (HCC)    Relevant Medications    metolazone (ZAROXOLYN) 2 5 mg tablet       Other    Hyperlipidemia    Relevant Medications    atorvastatin (LIPITOR) 10 mg tablet    Medicare annual wellness visit, subsequent - Primary    Vitamin D deficiency    Obesity (BMI 30-39  9)    Pre-op examination    Relevant Orders    ECG 12 lead    XR chest pa & lateral    Need for vaccination against Streptococcus pneumoniae    Relevant Orders    PNEUMOCOCCAL POLYSACCHARIDE VACCINE 23-VALENT =>1YO SQ IM          Falls Plan of Care: balance, strength, and gait training instructions were provided  Recommended assistive device to help with gait and balance  Medications that increase falls were reviewed  Assessed feet and footwear  Vitamin D supplementation was recommended  Cognitive screening performed  Lumbar surgery scheduled for next week  She will have physical therapy after surgery      Preventive health issues were discussed with patient, and age appropriate screening tests were ordered as noted in patient's After Visit Summary  Personalized health advice and appropriate referrals for health education or preventive services given if needed, as noted in patient's After Visit Summary       History of Present Illness:     Patient presents for Medicare Annual Wellness visit    Patient Care Team:  Iris Tang as PCP - General (Nurse Practitioner)  MD Shashank Villarreal MD Elsy Lock MD as Endoscopist     Problem List:     Patient Active Problem List   Diagnosis    COPD (chronic obstructive pulmonary disease) ([place] [address] )    Diabetes mellitus ([place] [address] )    Hyperlipidemia    Hypertension    Renal disorder    Urinary tract infection without hematuria    Acute kidney injury superimposed on chronic kidney disease ([place] [address] )    Diarrhea    Chest pain    Dyspepsia    Melena    Generalized abdominal pain    Abdominal pain    Medicare annual wellness visit, subsequent    Vitamin D deficiency    PAD (peripheral artery disease) ([place] [address] )    Obesity (BMI 30-39  9)    Coagulopathy ([place] [address] )    Pre-op examination    CKD (chronic kidney disease) stage 3, GFR 30-59 ml/min ([facility])    Need for vaccination against Streptococcus pneumoniae      Past Medical and Surgical History:     Past Medical History:   Diagnosis Date    Carpal tunnel syndrome     Colloid cyst of brain ([place] [address] )     COPD (chronic obstructive pulmonary disease) ([place] [address] )     Diabetes mellitus ([facility])     GERD (gastroesophageal reflux disease)     Glaucoma     Hyperlipidemia     Hypertension     Irritable bowel syndrome     Renal disorder     Seizures ([place] [address] )     last 2015    Shortness of breath      Past Surgical History:   Procedure Laterality Date    BRAIN SURGERY      CATARACT EXTRACTION      CHOLECYSTECTOMY      COLECTOMY RADHA      HERNIA REPAIR      HYSTERECTOMY      INCISION TENDON SHEATH HAND      NECK SURGERY  05/24/2018    NEUROPLASTY / TRANSPOSITION MEDIAN NERVE AT CARPAL TUNNEL      AL COLONOSCOPY FLX DX W/COLLJ SPEC WHEN PFRMD N/A 3/26/2019    Procedure: COLONOSCOPY;  Surgeon: Elsy Rey MD;  Location: [facility]; Service: Gastroenterology    AL ESOPHAGOGASTRODUODENOSCOPY TRANSORAL DIAGNOSTIC N/A 3/26/2019    Procedure: ESOPHAGOGASTRODUODENOSCOPY (EGD); Surgeon: Elsy Rey MD;  Location: [facility];   Service: Gastroenterology    REPLACEMENT TOTAL KNEE Right     RETINAL DETACHMENT SURGERY      TUBAL LIGATION        Family History:     Family History   Problem Relation Age of Onset    Asthma Mother     Heart disease Mother     Emphysema Father     Cancer Father     Prostate cancer Father     Kidney cancer Sister     Diabetes Sister     Kidney disease Sister     Brain cancer Brother     Bone cancer Brother     Heart disease Brother       Social History:     Social History     Socioeconomic History    Marital status:      Spouse name: None    Number of children: None    Years of education: None    Highest education level: None   Occupational History    None   Social Needs    Financial resource strain: None    Food insecurity:     Worry: None     Inability: None    Transportation needs:     Medical: None     Non-medical: None   Tobacco Use    Smoking status: Former Smoker     Years: 10 00     Last attempt to quit:      Years since quittin 7    Smokeless tobacco: Never Used    Tobacco comment: stopped many years ago   Substance and Sexual Activity    Alcohol use: No    Drug use: No    Sexual activity: Never   Lifestyle    Physical activity:     Days per week: None     Minutes per session: None    Stress: None   Relationships    Social connections:     Talks on phone: None     Gets together: None     Attends [employer] witness service: None     Active member of club or organization: None     Attends meetings of clubs or organizations: None     Relationship status: None    Intimate partner violence:     Fear of current or ex partner: None     Emotionally abused: None     Physically abused: None     Forced sexual activity: None   Other Topics Concern    None   Social History Narrative    None       Medications and Allergies:     Current Outpatient Medications   Medication Sig Dispense Refill    atorvastatin (LIPITOR) 10 mg tablet Take 10 mg by mouth daily      albuterol (2 5 mg/3 mL) 0 083 % nebulizer solution Take 2 5 mg by nebulization every 4 (four) hours as needed      aspirin (ECOTRIN LOW STRENGTH) 81 mg EC tablet Take 81 mg by mouth daily      B-D ULTRAFINE III SHORT PEN 31G X 8 MM MISC   0    BASAGLAR KWIKPEN 100 units/mL injection pen   0    brimonidine (ALPHAGAN P) 0 15 % ophthalmic solution brimonidine 0 15 % eye drops   INSTILL 1 DROP INTO AFFECTED EYE(S) BY OPHTHALMIC ROUTE 2 TIMES PER DAY      brimonidine tartrate 0 2 % ophthalmic solution brimonidine 0 2 % eye drops      cholecalciferol (VITAMIN D3) 1,000 units tablet Take 1,000 Units by mouth daily      famotidine (PEPCID) 20 mg tablet Take 1 tablet (20 mg total) by mouth 2 (two) times a day for 90 days 180 tablet 3    fenofibrate (TRICOR) 145 mg tablet Take 1 tablet (145 mg total) by mouth daily for 30 days 30 tablet 2    fosinopril (MONOPRIL) 20 mg tablet Take 20 mg by mouth daily      furosemide (LASIX) 20 mg tablet furosemide 20 mg tablet      hyoscyamine (ANASPAZ,LEVSIN) 0 125 MG tablet Take 1 tablet (0 125 mg total) by mouth every 6 (six) hours as needed for cramping 30 tablet 3    ibuprofen (MOTRIN) 600 mg tablet take 1 tablet by mouth every 6 hours if needed with food  0    insulin aspart (NOVOLOG FLEXPEN) 100 Units/mL injection pen Novolog Flexpen U-100 Insulin aspart 100 unit/mL (3 mL) subcutaneous      JANUVIA 25 MG tablet   0    latanoprost (XALATAN) 0 005 % ophthalmic solution latanoprost 0 005 % eye drops      levETIRAcetam (KEPPRA) 250 mg tablet Take 250 mg by mouth 2 (two) times a day      metolazone (ZAROXOLYN) 2 5 mg tablet Daily      montelukast (SINGULAIR) 10 mg tablet Take 10 mg by mouth daily at bedtime      Multiple Vitamin (MULTI VITAMIN DAILY PO) Take 1 tablet by mouth daily      omeprazole (PriLOSEC OTC) 20 MG tablet Take 1 tablet by mouth      pentoxifylline (TRENtal) 400 mg ER tablet Take 400 mg by mouth 3 (three) times a day with meals      Probiotic Product (PROBIOTIC DAILY PO) Take by mouth      repaglinide (PRANDIN) 0 5 mg tablet repaglinide 0 5 mg tablet      traMADol (ULTRAM) 50 mg tablet 1 tablet       No current facility-administered medications for this visit  Allergies   Allergen Reactions    Sulfa Antibiotics Rash      Immunizations:     Immunization History   Administered Date(s) Administered    Pneumococcal Conjugate 13-Valent 11/27/2017      Health Maintenance:         Topic Date Due    CRC Screening: Colonoscopy  03/26/2024         Topic Date Due    Pneumococcal Vaccine: 65+ Years (2 of 2 - PPSV23) 11/27/2018      Medicare Health Risk Assessment:     /62 (BP Location: Left arm, Patient Position: Sitting)   Pulse 68   Resp 18   Ht 4' 7" (1 397 m)   Wt 71 8 kg (158 lb 6 oz)   SpO2 96%   BMI 36 81 kg/m²      Vanessa Armstrong is here for her Initial Wellness visit  Health Risk Assessment:   Patient rates overall health as fair  Patient feels that their physical health rating is same  Eyesight was rated as same  Hearing was rated as same  Patient feels that their emotional and mental health rating is same  Pain experienced in the last 7 days has been some  Patient's pain rating has been 6/10  Patient states that she has experienced no weight loss or gain in last 6 months  Fall Risk Screening: In the past year, patient has experienced: history of falling in past year    Number of falls: 1  Injured during fall?: Yes    Feels unsteady when standing or walking?: Yes    Worried about falling?: Yes      Urinary Incontinence Screening:   Patient has leaked urine accidently in the last six months  Home Safety:  Patient has trouble with stairs inside or outside of their home  Patient has working smoke alarms and has working carbon monoxide detector  Home safety hazards include: poor household lighting  Nutrition:   Current diet is Regular  Medications:   Patient is currently taking over-the-counter supplements  OTC medications include: see medication list  Patient is able to manage medications       Activities of Daily Living (ADLs)/Instrumental Activities of Daily Living (IADLs):   Walk and transfer into and out of bed and chair?: Yes  Dress and groom yourself?: Yes    Bathe or shower yourself?: Yes    Feed yourself? Yes  Do your laundry/housekeeping?: Yes  Manage your money, pay your bills and track your expenses?: Yes  Make your own meals?: Yes    Do your own shopping?: Yes    Previous Hospitalizations:   Any hospitalizations or ED visits within the last 12 months?: Yes    How many hospitalizations have you had in the last year?: 1-2    Advance Care Planning:   Living will: No    Durable POA for healthcare: No    Advanced directive: No    Advanced directive counseling given: Yes    Five wishes given: Yes    Patient declined ACP directive: No    End of Life Decisions reviewed with patient: Yes    Provider agrees with end of life decisions: Yes      Comments: Pt wishes dnr/dni    Cognitive Screening:   Provider or family/friend/caregiver concerned regarding cognition?: No    PREVENTIVE SCREENINGS      Cardiovascular Screening:    General: Screening Not Indicated, History Lipid Disorder and Screening Current      Diabetes Screening:     General: Screening Not Indicated, History Diabetes and Screening Current      Colorectal Cancer Screening:     General: Screening Current      Breast Cancer Screening:     General: Screening Current      Cervical Cancer Screening:    General: Screening Not Indicated      Osteoporosis Screening:    General: Patient Declines      Abdominal Aortic Aneurysm (AAA) Screening:        General: Screening Not Indicated      Lung Cancer Screening:     General: Screening Not Indicated    Other Counseling Topics:   Car/seat belt/driving safety, skin self-exam and regular weightbearing exercise and calcium and vitamin D intake         JOSHUA Carpenter

## 2019-09-23 ENCOUNTER — TELEPHONE (OUTPATIENT)
Dept: FAMILY MEDICINE CLINIC | Facility: CLINIC | Age: 74
End: 2019-09-23

## 2019-09-23 DIAGNOSIS — K59.03 DRUG-INDUCED CONSTIPATION: Primary | ICD-10-CM

## 2019-09-23 RX ORDER — DOCUSATE SODIUM 100 MG/1
100 CAPSULE, LIQUID FILLED ORAL 2 TIMES DAILY
Qty: 10 CAPSULE | Refills: 0 | Status: SHIPPED | OUTPATIENT
Start: 2019-09-23 | End: 2021-03-22

## 2019-09-23 NOTE — TELEPHONE ENCOUNTER
Patient has not had a bowel  Movement since Wed 9/18/19  She did go in for surgery on Thurs 9/19  They were wondering if you could recommend something over the counter, send in a RX or if you would like to see her       She can be reached at [phone]

## 2019-09-23 NOTE — TELEPHONE ENCOUNTER
I started colace twice daily  Also sennekot liquid to be taken at nighttime   If no results in next two days, she will need fleets enema--which is over the counter

## 2019-09-23 NOTE — TELEPHONE ENCOUNTER
She was taking Vicodin every 4-6 hours in the hospital but since she came home she only takes it at bedtime  She was also on the colace in the hospital but they did not give her a script for that for home  She is taking Doculax and drinking water and prune juice but that does not seem to be working  [facility]  If you call in  colace or something else

## 2019-10-21 ENCOUNTER — TRANSCRIBE ORDERS (OUTPATIENT)
Dept: ADMINISTRATIVE | Facility: HOSPITAL | Age: 74
End: 2019-10-21

## 2019-10-21 DIAGNOSIS — M25.561 RIGHT KNEE PAIN, UNSPECIFIED CHRONICITY: Primary | ICD-10-CM

## 2019-10-31 ENCOUNTER — HOSPITAL ENCOUNTER (OUTPATIENT)
Dept: NUCLEAR MEDICINE | Facility: HOSPITAL | Age: 74
Discharge: HOME | End: 2019-10-31
Payer: MEDICARE

## 2019-10-31 DIAGNOSIS — M25.561 RIGHT KNEE PAIN, UNSPECIFIED CHRONICITY: ICD-10-CM

## 2019-10-31 PROCEDURE — 78315 BONE IMAGING 3 PHASE: CPT

## 2019-10-31 PROCEDURE — A9503 TC99M MEDRONATE: HCPCS

## 2019-11-11 LAB
LEFT EYE DIABETIC RETINOPATHY: NORMAL
RIGHT EYE DIABETIC RETINOPATHY: NORMAL

## 2019-11-13 DIAGNOSIS — I73.9 PVD (PERIPHERAL VASCULAR DISEASE) (HCC): Primary | ICD-10-CM

## 2020-01-12 NOTE — PROGRESS NOTES
Assessment/Plan:       Diagnoses and all orders for this visit:    Type 2 diabetes mellitus with stage 3 chronic kidney disease, with long-term current use of insulin (HCC)  -     CBC and differential; Future  -     Comprehensive metabolic panel; Future  -     Lipid panel; Future  -     Hemoglobin A1C; Future  -     Microalbumin / creatinine urine ratio    Mucopurulent chronic bronchitis (HCC)  -     CBC and differential; Future  -     Comprehensive metabolic panel; Future  -     Lipid panel; Future  -     Hemoglobin A1C; Future  -     Microalbumin / creatinine urine ratio    Essential hypertension  -     CBC and differential; Future  -     Comprehensive metabolic panel; Future  -     Lipid panel; Future  -     Hemoglobin A1C; Future  -     Microalbumin / creatinine urine ratio    PAD (peripheral artery disease) (HCC)  -     CBC and differential; Future  -     Comprehensive metabolic panel; Future  -     Lipid panel; Future  -     Hemoglobin A1C; Future  -     Microalbumin / creatinine urine ratio    Coagulopathy (HCC)  -     CBC and differential; Future  -     Comprehensive metabolic panel; Future  -     Lipid panel; Future  -     Hemoglobin A1C; Future  -     Microalbumin / creatinine urine ratio    CKD (chronic kidney disease) stage 3, GFR 30-59 ml/min (HCC)  -     CBC and differential; Future  -     Comprehensive metabolic panel; Future  -     Lipid panel; Future  -     Hemoglobin A1C; Future  -     Microalbumin / creatinine urine ratio    Mixed hyperlipidemia  -     CBC and differential; Future  -     Comprehensive metabolic panel; Future  -     Lipid panel; Future  -     Hemoglobin A1C; Future  -     Microalbumin / creatinine urine ratio    Vitamin D deficiency  -     CBC and differential; Future  -     Comprehensive metabolic panel; Future  -     Lipid panel;  Future  -     Hemoglobin A1C; Future  -     Microalbumin / creatinine urine ratio    Obesity (BMI 30-39 9)  -     CBC and differential; Future  - Comprehensive metabolic panel; Future  -     Lipid panel; Future  -     Hemoglobin A1C; Future  -     Microalbumin / creatinine urine ratio    Neuropathy  -     Ambulatory referral to Neurology; Future  -     Ambulatory referral to Pain Management; Future  -     gabapentin (NEURONTIN) 300 mg capsule; Take 1 capsule (300 mg total) by mouth 2 (two) times a day    Pain in both lower extremities  -     Ambulatory referral to Neurology; Future  -     Ambulatory referral to Pain Management; Future  -     Pneumatic compression pumps  -     gabapentin (NEURONTIN) 300 mg capsule; Take 1 capsule (300 mg total) by mouth 2 (two) times a day    Bilateral leg edema  -     Pneumatic compression pumps    Chronic pain syndrome  -     gabapentin (NEURONTIN) 300 mg capsule; Take 1 capsule (300 mg total) by mouth 2 (two) times a day        No problem-specific Assessment & Plan notes found for this encounter  Subjective:      Patient ID: Julio Matthews is a 66 y o  female  Patient is here for follow up of chronic medical conditions    htn-stable  hld-? status  Obesity-no exercise  PAD-b/l leg pain  Needs to see Neurology  She has seen Vascular surgery  DM-home glucose running over 200  She insists her glucose is controlled and rises with anxiety  She is managed by endocrine  She does see eye doctor and had appmnt December 2019  Vit d def-? status  B/l leg pain- patient insists this is not Neuropathy  Patient underwent lower back spine surgery in September and she is having daily low back pain with radiation down the left leg         The following portions of the patient's history were reviewed and updated as appropriate:   She has a past medical history of Carpal tunnel syndrome, Colloid cyst of brain ([address] ), COPD (chronic obstructive pulmonary disease) ([address] ), Diabetes mellitus ([address] ), GERD (gastroesophageal reflux disease), Glaucoma, Hyperlipidemia, Hypertension, Irritable bowel syndrome, Renal disorder, Seizures ([address] ), and Shortness of breath ,  does not have any pertinent problems on file  ,   has a past surgical history that includes Neck surgery (05/24/2018); Brain surgery; Cataract extraction; Cholecystectomy; Incision tendon sheath hand; Hysterectomy; Neuroplasty / transposition median nerve at carpal tunnel; COLECTOMY RADHA; Retinal detachment surgery; Replacement total knee (Right); Hernia repair; Tubal ligation; pr colonoscopy flx dx w/collj spec when pfrmd (N/A, 3/26/2019); and pr esophagogastroduodenoscopy transoral diagnostic (N/A, 3/26/2019)  ,  family history includes Asthma in her mother; Bone cancer in her brother; Brain cancer in her brother; Cancer in her father; Diabetes in her sister; Emphysema in her father; Heart disease in her brother and mother; Kidney cancer in her sister; Kidney disease in her sister; Prostate cancer in her father  ,   reports that she quit smoking about 19 years ago  She quit after 10 00 years of use  She has never used smokeless tobacco  She reports that she does not drink alcohol or use drugs  ,  is allergic to sulfa antibiotics     Current Outpatient Medications   Medication Sig Dispense Refill    albuterol (2 5 mg/3 mL) 0 083 % nebulizer solution Take 2 5 mg by nebulization every 4 (four) hours as needed      aspirin (ECOTRIN LOW STRENGTH) 81 mg EC tablet Take 81 mg by mouth daily      atorvastatin (LIPITOR) 10 mg tablet Take 10 mg by mouth daily      B-D ULTRAFINE III SHORT PEN 31G X 8 MM MISC   0    BASAGLAR KWIKPEN 100 units/mL injection pen   0    brimonidine (ALPHAGAN P) 0 15 % ophthalmic solution brimonidine 0 15 % eye drops   INSTILL 1 DROP INTO AFFECTED EYE(S) BY OPHTHALMIC ROUTE 2 TIMES PER DAY      brimonidine tartrate 0 2 % ophthalmic solution brimonidine 0 2 % eye drops      cholecalciferol (VITAMIN D3) 1,000 units tablet Take 1,000 Units by mouth daily      docusate sodium (COLACE) 100 mg capsule Take 1 capsule (100 mg total) by mouth 2 (two) times a day 10 capsule 0    famotidine (PEPCID) 20 mg tablet Take 1 tablet (20 mg total) by mouth 2 (two) times a day for 90 days 180 tablet 3    fenofibrate (TRICOR) 145 mg tablet Take 1 tablet (145 mg total) by mouth daily for 30 days 30 tablet 2    fosinopril (MONOPRIL) 20 mg tablet Take 20 mg by mouth daily      furosemide (LASIX) 20 mg tablet furosemide 20 mg tablet      gabapentin (NEURONTIN) 300 mg capsule Take 1 capsule (300 mg total) by mouth 2 (two) times a day 60 capsule 3    hyoscyamine (ANASPAZ,LEVSIN) 0 125 MG tablet Take 1 tablet (0 125 mg total) by mouth every 6 (six) hours as needed for cramping 30 tablet 3    ibuprofen (MOTRIN) 600 mg tablet take 1 tablet by mouth every 6 hours if needed with food  0    insulin aspart (NOVOLOG FLEXPEN) 100 Units/mL injection pen Novolog Flexpen U-100 Insulin aspart 100 unit/mL (3 mL) subcutaneous      JANUVIA 25 MG tablet   0    latanoprost (XALATAN) 0 005 % ophthalmic solution latanoprost 0 005 % eye drops      levETIRAcetam (KEPPRA) 250 mg tablet Take 250 mg by mouth 2 (two) times a day      metolazone (ZAROXOLYN) 2 5 mg tablet Daily      montelukast (SINGULAIR) 10 mg tablet Take 10 mg by mouth daily at bedtime      Multiple Vitamin (MULTI VITAMIN DAILY PO) Take 1 tablet by mouth daily      omeprazole (PriLOSEC OTC) 20 MG tablet Take 1 tablet by mouth      pentoxifylline (TRENtal) 400 mg ER tablet Take 400 mg by mouth 3 (three) times a day with meals      Probiotic Product (PROBIOTIC DAILY PO) Take by mouth      repaglinide (PRANDIN) 0 5 mg tablet repaglinide 0 5 mg tablet      senna 8 8 mg/5 mL syrup Take 10 mL (17 6 mg total) by mouth daily at bedtime as needed for constipation 240 mL 0    traMADol (ULTRAM) 50 mg tablet 1 tablet       No current facility-administered medications for this visit  Review of Systems   Constitutional: Negative  Negative for fatigue and fever  HENT: Negative  Negative for congestion  Eyes: Positive for visual disturbance  Respiratory: Negative for cough, chest tightness, shortness of breath and wheezing  Cardiovascular: Positive for leg swelling  Gastrointestinal: Negative  Negative for abdominal distention, abdominal pain, blood in stool, diarrhea and nausea  Endocrine: Negative for polydipsia, polyphagia and polyuria  Genitourinary: Negative for difficulty urinating and flank pain  Musculoskeletal: Positive for arthralgias, back pain, gait problem, joint swelling and myalgias  Skin: Negative  Negative for color change, pallor and rash  Allergic/Immunologic: Negative for immunocompromised state  Neurological: Positive for numbness  Negative for dizziness, weakness, light-headedness and headaches  Hematological: Negative for adenopathy  Bruises/bleeds easily  Psychiatric/Behavioral: Positive for sleep disturbance  Negative for confusion and decreased concentration  All other systems reviewed and are negative  Objective:  Vitals:    01/14/20 0954   BP: 128/72   BP Location: Left arm   Patient Position: Sitting   Pulse: 89   Resp: 18   Temp: 98 4 °F (36 9 °C)   SpO2: 95%   Weight: 70 8 kg (156 lb)   Height: 4' 7" (1 397 m)     Body mass index is 36 26 kg/m²  Physical Exam   Constitutional: She is oriented to person, place, and time  She appears well-developed and well-nourished  No distress  HENT:   Head: Normocephalic and atraumatic  Right Ear: External ear normal    Left Ear: External ear normal    Nose: Nose normal    Mouth/Throat: Oropharynx is clear and moist  No oropharyngeal exudate  Eyes: Pupils are equal, round, and reactive to light  Conjunctivae are normal  No scleral icterus  Disconjugate gaze with blindness right eye   Neck: Normal range of motion  Neck supple  No JVD present  No thyromegaly present  Cardiovascular: Normal rate, regular rhythm, normal heart sounds and intact distal pulses  Exam reveals no gallop and no friction rub  No murmur heard    Pulmonary/Chest: Effort normal and breath sounds normal  No respiratory distress  Abdominal: Soft  Bowel sounds are normal  She exhibits no distension  There is no tenderness  There is no guarding  Musculoskeletal: She exhibits no edema  Lymphadenopathy:     She has no cervical adenopathy  Neurological: She is alert and oriented to person, place, and time  Coordination normal    Skin: Skin is warm and dry  Capillary refill takes less than 2 seconds  No rash noted  She is not diaphoretic  Psychiatric: She has a normal mood and affect  Her behavior is normal  Judgment and thought content normal    Nursing note and vitals reviewed

## 2020-01-14 ENCOUNTER — OFFICE VISIT (OUTPATIENT)
Dept: FAMILY MEDICINE CLINIC | Facility: CLINIC | Age: 75
End: 2020-01-14
Payer: COMMERCIAL

## 2020-01-14 VITALS
TEMPERATURE: 98.4 F | DIASTOLIC BLOOD PRESSURE: 72 MMHG | SYSTOLIC BLOOD PRESSURE: 128 MMHG | HEART RATE: 89 BPM | BODY MASS INDEX: 36.1 KG/M2 | HEIGHT: 55 IN | OXYGEN SATURATION: 95 % | RESPIRATION RATE: 18 BRPM | WEIGHT: 156 LBS

## 2020-01-14 DIAGNOSIS — E66.9 OBESITY (BMI 30-39.9): ICD-10-CM

## 2020-01-14 DIAGNOSIS — M79.604 PAIN IN BOTH LOWER EXTREMITIES: ICD-10-CM

## 2020-01-14 DIAGNOSIS — J41.1 MUCOPURULENT CHRONIC BRONCHITIS (HCC): ICD-10-CM

## 2020-01-14 DIAGNOSIS — Z79.4 TYPE 2 DIABETES MELLITUS WITH STAGE 3 CHRONIC KIDNEY DISEASE, WITH LONG-TERM CURRENT USE OF INSULIN (HCC): Primary | ICD-10-CM

## 2020-01-14 DIAGNOSIS — I73.9 PAD (PERIPHERAL ARTERY DISEASE) (HCC): ICD-10-CM

## 2020-01-14 DIAGNOSIS — G62.9 NEUROPATHY: ICD-10-CM

## 2020-01-14 DIAGNOSIS — I10 ESSENTIAL HYPERTENSION: ICD-10-CM

## 2020-01-14 DIAGNOSIS — N18.30 CKD (CHRONIC KIDNEY DISEASE) STAGE 3, GFR 30-59 ML/MIN (HCC): ICD-10-CM

## 2020-01-14 DIAGNOSIS — G89.4 CHRONIC PAIN SYNDROME: ICD-10-CM

## 2020-01-14 DIAGNOSIS — N18.30 TYPE 2 DIABETES MELLITUS WITH STAGE 3 CHRONIC KIDNEY DISEASE, WITH LONG-TERM CURRENT USE OF INSULIN (HCC): Primary | ICD-10-CM

## 2020-01-14 DIAGNOSIS — D68.9 COAGULOPATHY (HCC): ICD-10-CM

## 2020-01-14 DIAGNOSIS — R60.0 BILATERAL LEG EDEMA: ICD-10-CM

## 2020-01-14 DIAGNOSIS — M79.605 PAIN IN BOTH LOWER EXTREMITIES: ICD-10-CM

## 2020-01-14 DIAGNOSIS — E11.22 TYPE 2 DIABETES MELLITUS WITH STAGE 3 CHRONIC KIDNEY DISEASE, WITH LONG-TERM CURRENT USE OF INSULIN (HCC): Primary | ICD-10-CM

## 2020-01-14 DIAGNOSIS — E55.9 VITAMIN D DEFICIENCY: ICD-10-CM

## 2020-01-14 DIAGNOSIS — E78.2 MIXED HYPERLIPIDEMIA: ICD-10-CM

## 2020-01-14 PROCEDURE — 3074F SYST BP LT 130 MM HG: CPT | Performed by: NURSE PRACTITIONER

## 2020-01-14 PROCEDURE — 1160F RVW MEDS BY RX/DR IN RCRD: CPT | Performed by: NURSE PRACTITIONER

## 2020-01-14 PROCEDURE — 3078F DIAST BP <80 MM HG: CPT | Performed by: NURSE PRACTITIONER

## 2020-01-14 PROCEDURE — 99214 OFFICE O/P EST MOD 30 MIN: CPT | Performed by: NURSE PRACTITIONER

## 2020-01-14 RX ORDER — GABAPENTIN 300 MG/1
300 CAPSULE ORAL 2 TIMES DAILY
Qty: 60 CAPSULE | Refills: 3 | Status: SHIPPED | OUTPATIENT
Start: 2020-01-14 | End: 2020-01-24

## 2020-01-14 NOTE — PATIENT INSTRUCTIONS
htn-stable  Encouraged to follow a low sodium diet  hld-? Status  Encouraged diet high in fruits and veggies  Obesity-no exercise  Encouraged decreased calories, portions and lower carbohydrates  PAD-b/l leg pain  Refer to Neurology  She does see Vascular surgery  DM-home glucose running over 200  She insists her glucose is controlled and rises with anxiety  Will need to check labs  She does follow with endocrine  Vit d def-? status  B/l leg pain- patient insists this is not Neuropathy  Refer to Pain Mngmnt  Patient underwent lower back spine surgery in September and she is having daily low back pain with radiation down the left leg  Advised that family practice cannot prescribe long term pain medication and I recommend she follow with a pain mngmtn specialist  Referral placed for Dr Cipriano Sandifer  Bilateral leg edema- compression device ordered  Company will contact you and come out for set up  Obtain labs  and follow up in 4 months in office

## 2020-01-23 ENCOUNTER — TELEPHONE (OUTPATIENT)
Dept: FAMILY MEDICINE CLINIC | Facility: CLINIC | Age: 75
End: 2020-01-23

## 2020-01-23 NOTE — TELEPHONE ENCOUNTER
Patient called and left a message stating that she was recently started on Neurontin and is having side effects - stated " Diarrhea and stuff"  Would like to know if there is anything else you can rx her?

## 2020-01-24 ENCOUNTER — TELEPHONE (OUTPATIENT)
Dept: FAMILY MEDICINE CLINIC | Facility: CLINIC | Age: 75
End: 2020-01-24

## 2020-01-24 DIAGNOSIS — G62.9 NEUROPATHY: Primary | ICD-10-CM

## 2020-01-24 RX ORDER — DULOXETIN HYDROCHLORIDE 30 MG/1
30 CAPSULE, DELAYED RELEASE ORAL DAILY
Qty: 30 CAPSULE | Refills: 1 | Status: SHIPPED | OUTPATIENT
Start: 2020-01-24 | End: 2020-11-12 | Stop reason: ALTCHOICE

## 2020-01-24 NOTE — TELEPHONE ENCOUNTER
Since she is already taking high dose ibuprofen, I cannot add additional NSAID  I did refer her to [facility] and I would advise that she follow up there

## 2020-02-04 ENCOUNTER — TRANSCRIBE ORDERS (OUTPATIENT)
Dept: ADMINISTRATIVE | Facility: HOSPITAL | Age: 75
End: 2020-02-04

## 2020-02-04 ENCOUNTER — APPOINTMENT (OUTPATIENT)
Dept: LAB | Facility: CLINIC | Age: 75
End: 2020-02-04
Payer: COMMERCIAL

## 2020-02-04 DIAGNOSIS — N18.30 TYPE 2 DIABETES MELLITUS WITH STAGE 3 CHRONIC KIDNEY DISEASE, WITH LONG-TERM CURRENT USE OF INSULIN (HCC): ICD-10-CM

## 2020-02-04 DIAGNOSIS — I73.9 PAD (PERIPHERAL ARTERY DISEASE) (HCC): ICD-10-CM

## 2020-02-04 DIAGNOSIS — J41.1 MUCOPURULENT CHRONIC BRONCHITIS (HCC): ICD-10-CM

## 2020-02-04 DIAGNOSIS — E78.5 HYPERLIPIDEMIA, UNSPECIFIED HYPERLIPIDEMIA TYPE: ICD-10-CM

## 2020-02-04 DIAGNOSIS — E66.9 OBESITY (BMI 30-39.9): ICD-10-CM

## 2020-02-04 DIAGNOSIS — E78.2 MIXED HYPERLIPIDEMIA: ICD-10-CM

## 2020-02-04 DIAGNOSIS — E55.9 VITAMIN D DEFICIENCY: ICD-10-CM

## 2020-02-04 DIAGNOSIS — N18.30 CKD (CHRONIC KIDNEY DISEASE) STAGE 3, GFR 30-59 ML/MIN (HCC): ICD-10-CM

## 2020-02-04 DIAGNOSIS — D68.9 COAGULOPATHY (HCC): ICD-10-CM

## 2020-02-04 DIAGNOSIS — Z79.4 TYPE 2 DIABETES MELLITUS WITH STAGE 3 CHRONIC KIDNEY DISEASE, WITH LONG-TERM CURRENT USE OF INSULIN (HCC): ICD-10-CM

## 2020-02-04 DIAGNOSIS — E04.1 NONTOXIC UNINODULAR GOITER: ICD-10-CM

## 2020-02-04 DIAGNOSIS — I10 ESSENTIAL HYPERTENSION: ICD-10-CM

## 2020-02-04 DIAGNOSIS — E11.65 UNCONTROLLED TYPE 2 DIABETES MELLITUS WITH HYPERGLYCEMIA (HCC): Primary | ICD-10-CM

## 2020-02-04 DIAGNOSIS — E11.22 TYPE 2 DIABETES MELLITUS WITH STAGE 3 CHRONIC KIDNEY DISEASE, WITH LONG-TERM CURRENT USE OF INSULIN (HCC): ICD-10-CM

## 2020-02-04 DIAGNOSIS — E11.65 UNCONTROLLED TYPE 2 DIABETES MELLITUS WITH HYPERGLYCEMIA (HCC): ICD-10-CM

## 2020-02-04 LAB
25(OH)D3 SERPL-MCNC: 42.4 NG/ML (ref 30–100)
ALBUMIN SERPL BCP-MCNC: 3.7 G/DL (ref 3.5–5)
ALP SERPL-CCNC: 56 U/L (ref 46–116)
ALT SERPL W P-5'-P-CCNC: 22 U/L (ref 12–78)
ANION GAP SERPL CALCULATED.3IONS-SCNC: 6 MMOL/L (ref 4–13)
AST SERPL W P-5'-P-CCNC: 14 U/L (ref 5–45)
BASOPHILS # BLD AUTO: 0.05 THOUSANDS/ΜL (ref 0–0.1)
BASOPHILS NFR BLD AUTO: 1 % (ref 0–1)
BILIRUB SERPL-MCNC: 0.54 MG/DL (ref 0.2–1)
BUN SERPL-MCNC: 21 MG/DL (ref 5–25)
CALCIUM SERPL-MCNC: 9.6 MG/DL (ref 8.3–10.1)
CHLORIDE SERPL-SCNC: 103 MMOL/L (ref 100–108)
CHOLEST SERPL-MCNC: 184 MG/DL (ref 50–200)
CK SERPL-CCNC: 28 U/L (ref 26–192)
CO2 SERPL-SCNC: 28 MMOL/L (ref 21–32)
CREAT SERPL-MCNC: 1.15 MG/DL (ref 0.6–1.3)
CREAT UR-MCNC: 123 MG/DL
EOSINOPHIL # BLD AUTO: 1.2 THOUSAND/ΜL (ref 0–0.61)
EOSINOPHIL NFR BLD AUTO: 13 % (ref 0–6)
ERYTHROCYTE [DISTWIDTH] IN BLOOD BY AUTOMATED COUNT: 14 % (ref 11.6–15.1)
EST. AVERAGE GLUCOSE BLD GHB EST-MCNC: 189 MG/DL
GFR SERPL CREATININE-BSD FRML MDRD: 46 ML/MIN/1.73SQ M
GLUCOSE P FAST SERPL-MCNC: 197 MG/DL (ref 65–99)
HBA1C MFR BLD: 8.2 % (ref 4.2–6.3)
HCT VFR BLD AUTO: 44.9 % (ref 34.8–46.1)
HDLC SERPL-MCNC: 40 MG/DL
HGB BLD-MCNC: 14 G/DL (ref 11.5–15.4)
IMM GRANULOCYTES # BLD AUTO: 0.03 THOUSAND/UL (ref 0–0.2)
IMM GRANULOCYTES NFR BLD AUTO: 0 % (ref 0–2)
LDLC SERPL CALC-MCNC: 106 MG/DL (ref 0–100)
LYMPHOCYTES # BLD AUTO: 1.29 THOUSANDS/ΜL (ref 0.6–4.47)
LYMPHOCYTES NFR BLD AUTO: 14 % (ref 14–44)
MCH RBC QN AUTO: 28.5 PG (ref 26.8–34.3)
MCHC RBC AUTO-ENTMCNC: 31.2 G/DL (ref 31.4–37.4)
MCV RBC AUTO: 91 FL (ref 82–98)
MICROALBUMIN UR-MCNC: 36.8 MG/L (ref 0–20)
MICROALBUMIN/CREAT 24H UR: 30 MG/G CREATININE (ref 0–30)
MONOCYTES # BLD AUTO: 0.54 THOUSAND/ΜL (ref 0.17–1.22)
MONOCYTES NFR BLD AUTO: 6 % (ref 4–12)
NEUTROPHILS # BLD AUTO: 6.47 THOUSANDS/ΜL (ref 1.85–7.62)
NEUTS SEG NFR BLD AUTO: 66 % (ref 43–75)
NONHDLC SERPL-MCNC: 144 MG/DL
NRBC BLD AUTO-RTO: 0 /100 WBCS
PLATELET # BLD AUTO: 256 THOUSANDS/UL (ref 149–390)
PMV BLD AUTO: 10.4 FL (ref 8.9–12.7)
POTASSIUM SERPL-SCNC: 4.2 MMOL/L (ref 3.5–5.3)
PROT SERPL-MCNC: 8.1 G/DL (ref 6.4–8.2)
RBC # BLD AUTO: 4.91 MILLION/UL (ref 3.81–5.12)
SODIUM SERPL-SCNC: 137 MMOL/L (ref 136–145)
TRIGL SERPL-MCNC: 188 MG/DL
TSH SERPL DL<=0.05 MIU/L-ACNC: 2.19 UIU/ML (ref 0.36–3.74)
WBC # BLD AUTO: 9.58 THOUSAND/UL (ref 4.31–10.16)

## 2020-02-04 PROCEDURE — 83036 HEMOGLOBIN GLYCOSYLATED A1C: CPT

## 2020-02-04 PROCEDURE — 80061 LIPID PANEL: CPT

## 2020-02-04 PROCEDURE — 82570 ASSAY OF URINE CREATININE: CPT | Performed by: NURSE PRACTITIONER

## 2020-02-04 PROCEDURE — 82306 VITAMIN D 25 HYDROXY: CPT

## 2020-02-04 PROCEDURE — 82550 ASSAY OF CK (CPK): CPT

## 2020-02-04 PROCEDURE — 36415 COLL VENOUS BLD VENIPUNCTURE: CPT

## 2020-02-04 PROCEDURE — 80053 COMPREHEN METABOLIC PANEL: CPT

## 2020-02-04 PROCEDURE — 82043 UR ALBUMIN QUANTITATIVE: CPT | Performed by: NURSE PRACTITIONER

## 2020-02-04 PROCEDURE — 85025 COMPLETE CBC W/AUTO DIFF WBC: CPT

## 2020-02-04 PROCEDURE — 84443 ASSAY THYROID STIM HORMONE: CPT

## 2020-02-04 PROCEDURE — 84681 ASSAY OF C-PEPTIDE: CPT

## 2020-02-05 LAB — C PEPTIDE SERPL-MCNC: 3.6 NG/ML (ref 1.1–4.4)

## 2020-02-07 DIAGNOSIS — Z79.4 TYPE 2 DIABETES MELLITUS WITH STAGE 3 CHRONIC KIDNEY DISEASE, WITH LONG-TERM CURRENT USE OF INSULIN (HCC): Primary | ICD-10-CM

## 2020-02-07 DIAGNOSIS — N18.30 TYPE 2 DIABETES MELLITUS WITH STAGE 3 CHRONIC KIDNEY DISEASE, WITH LONG-TERM CURRENT USE OF INSULIN (HCC): Primary | ICD-10-CM

## 2020-02-07 DIAGNOSIS — E11.22 TYPE 2 DIABETES MELLITUS WITH STAGE 3 CHRONIC KIDNEY DISEASE, WITH LONG-TERM CURRENT USE OF INSULIN (HCC): Primary | ICD-10-CM

## 2020-02-07 DIAGNOSIS — E78.2 MIXED HYPERLIPIDEMIA: ICD-10-CM

## 2020-02-07 RX ORDER — PEN NEEDLE, DIABETIC 31 GX5/16"
NEEDLE, DISPOSABLE MISCELLANEOUS DAILY
Qty: 90 EACH | Refills: 1 | Status: SHIPPED | OUTPATIENT
Start: 2020-02-07 | End: 2020-04-27 | Stop reason: SDUPTHER

## 2020-02-07 RX ORDER — ATORVASTATIN CALCIUM 10 MG/1
10 TABLET, FILM COATED ORAL DAILY
Qty: 30 TABLET | Refills: 11 | Status: SHIPPED | OUTPATIENT
Start: 2020-02-07 | End: 2021-06-15

## 2020-02-09 DIAGNOSIS — I10 ESSENTIAL HYPERTENSION: Primary | ICD-10-CM

## 2020-02-10 RX ORDER — FOSINOPRIL SODIUM 20 MG/1
TABLET ORAL
Qty: 90 TABLET | Refills: 1 | Status: SHIPPED | OUTPATIENT
Start: 2020-02-10 | End: 2020-08-03 | Stop reason: SDUPTHER

## 2020-02-21 DIAGNOSIS — J41.1 MUCOPURULENT CHRONIC BRONCHITIS (HCC): Primary | ICD-10-CM

## 2020-02-21 DIAGNOSIS — R56.9 SEIZURE (HCC): ICD-10-CM

## 2020-02-23 RX ORDER — LEVETIRACETAM 250 MG/1
TABLET ORAL
Qty: 180 TABLET | Refills: 0 | Status: SHIPPED | OUTPATIENT
Start: 2020-02-23 | End: 2020-04-21

## 2020-02-23 RX ORDER — MONTELUKAST SODIUM 10 MG/1
TABLET ORAL
Qty: 90 TABLET | Refills: 1 | Status: SHIPPED | OUTPATIENT
Start: 2020-02-23 | End: 2020-07-20

## 2020-04-21 DIAGNOSIS — R56.9 SEIZURE (HCC): ICD-10-CM

## 2020-04-21 RX ORDER — LEVETIRACETAM 250 MG/1
TABLET ORAL
Qty: 180 TABLET | Refills: 0 | Status: SHIPPED | OUTPATIENT
Start: 2020-04-21 | End: 2020-05-27 | Stop reason: SDUPTHER

## 2020-04-27 DIAGNOSIS — E11.22 TYPE 2 DIABETES MELLITUS WITH STAGE 3 CHRONIC KIDNEY DISEASE, WITH LONG-TERM CURRENT USE OF INSULIN (HCC): ICD-10-CM

## 2020-04-27 DIAGNOSIS — N18.30 TYPE 2 DIABETES MELLITUS WITH STAGE 3 CHRONIC KIDNEY DISEASE, WITH LONG-TERM CURRENT USE OF INSULIN (HCC): ICD-10-CM

## 2020-04-27 DIAGNOSIS — Z79.4 TYPE 2 DIABETES MELLITUS WITH STAGE 3 CHRONIC KIDNEY DISEASE, WITH LONG-TERM CURRENT USE OF INSULIN (HCC): ICD-10-CM

## 2020-04-27 RX ORDER — PEN NEEDLE, DIABETIC 31 GX5/16"
NEEDLE, DISPOSABLE MISCELLANEOUS DAILY
Qty: 90 EACH | Refills: 1 | Status: SHIPPED | OUTPATIENT
Start: 2020-04-27 | End: 2020-04-28

## 2020-04-28 ENCOUNTER — TELEPHONE (OUTPATIENT)
Dept: NEUROLOGY | Facility: CLINIC | Age: 75
End: 2020-04-28

## 2020-04-28 DIAGNOSIS — Z79.4 TYPE 2 DIABETES MELLITUS WITH STAGE 3 CHRONIC KIDNEY DISEASE, WITH LONG-TERM CURRENT USE OF INSULIN (HCC): ICD-10-CM

## 2020-04-28 DIAGNOSIS — E11.22 TYPE 2 DIABETES MELLITUS WITH STAGE 3 CHRONIC KIDNEY DISEASE, WITH LONG-TERM CURRENT USE OF INSULIN (HCC): ICD-10-CM

## 2020-04-28 DIAGNOSIS — N18.30 TYPE 2 DIABETES MELLITUS WITH STAGE 3 CHRONIC KIDNEY DISEASE, WITH LONG-TERM CURRENT USE OF INSULIN (HCC): ICD-10-CM

## 2020-04-28 RX ORDER — PEN NEEDLE, DIABETIC 31 GX5/16"
NEEDLE, DISPOSABLE MISCELLANEOUS DAILY
Qty: 90 EACH | Refills: 1 | Status: SHIPPED | OUTPATIENT
Start: 2020-04-28 | End: 2022-05-03 | Stop reason: SDUPTHER

## 2020-05-04 DIAGNOSIS — D68.9 COAGULOPATHY (HCC): Primary | ICD-10-CM

## 2020-05-04 RX ORDER — PENTOXIFYLLINE 400 MG/1
TABLET, EXTENDED RELEASE ORAL
Qty: 270 TABLET | Refills: 1 | Status: SHIPPED | OUTPATIENT
Start: 2020-05-04 | End: 2021-03-02

## 2020-05-12 ENCOUNTER — OFFICE VISIT (OUTPATIENT)
Dept: GASTROENTEROLOGY | Facility: CLINIC | Age: 75
End: 2020-05-12
Payer: COMMERCIAL

## 2020-05-12 VITALS
WEIGHT: 154 LBS | BODY MASS INDEX: 35.79 KG/M2 | SYSTOLIC BLOOD PRESSURE: 144 MMHG | DIASTOLIC BLOOD PRESSURE: 87 MMHG | HEART RATE: 88 BPM

## 2020-05-12 DIAGNOSIS — K58.9 IRRITABLE BOWEL SYNDROME, UNSPECIFIED TYPE: ICD-10-CM

## 2020-05-12 DIAGNOSIS — R19.7 DIARRHEA, UNSPECIFIED TYPE: ICD-10-CM

## 2020-05-12 DIAGNOSIS — K92.1 MELENA: Primary | ICD-10-CM

## 2020-05-12 DIAGNOSIS — R10.30 LOWER ABDOMINAL PAIN: ICD-10-CM

## 2020-05-12 DIAGNOSIS — R19.8 ALTERNATING CONSTIPATION AND DIARRHEA: ICD-10-CM

## 2020-05-12 DIAGNOSIS — Z11.59 SPECIAL SCREENING EXAMINATION FOR UNSPECIFIED VIRAL DISEASE: ICD-10-CM

## 2020-05-12 DIAGNOSIS — Z86.010 HISTORY OF COLON POLYPS: ICD-10-CM

## 2020-05-12 DIAGNOSIS — D13.1 GASTRIC ADENOMA: ICD-10-CM

## 2020-05-12 PROCEDURE — 1036F TOBACCO NON-USER: CPT | Performed by: INTERNAL MEDICINE

## 2020-05-12 PROCEDURE — 4040F PNEUMOC VAC/ADMIN/RCVD: CPT | Performed by: INTERNAL MEDICINE

## 2020-05-12 PROCEDURE — 99214 OFFICE O/P EST MOD 30 MIN: CPT | Performed by: INTERNAL MEDICINE

## 2020-05-12 PROCEDURE — 3052F HG A1C>EQUAL 8.0%<EQUAL 9.0%: CPT | Performed by: INTERNAL MEDICINE

## 2020-05-12 PROCEDURE — 1160F RVW MEDS BY RX/DR IN RCRD: CPT | Performed by: INTERNAL MEDICINE

## 2020-05-12 PROCEDURE — 2022F DILAT RTA XM EVC RTNOPTHY: CPT | Performed by: INTERNAL MEDICINE

## 2020-05-12 PROCEDURE — 3066F NEPHROPATHY DOC TX: CPT | Performed by: INTERNAL MEDICINE

## 2020-05-12 PROCEDURE — 3079F DIAST BP 80-89 MM HG: CPT | Performed by: INTERNAL MEDICINE

## 2020-05-12 PROCEDURE — 3077F SYST BP >= 140 MM HG: CPT | Performed by: INTERNAL MEDICINE

## 2020-05-12 PROCEDURE — 3060F POS MICROALBUMINURIA REV: CPT | Performed by: INTERNAL MEDICINE

## 2020-05-27 DIAGNOSIS — R56.9 SEIZURE (HCC): ICD-10-CM

## 2020-05-27 RX ORDER — LEVETIRACETAM 250 MG/1
250 TABLET ORAL EVERY 12 HOURS
Qty: 180 TABLET | Refills: 3 | Status: SHIPPED | OUTPATIENT
Start: 2020-05-27 | End: 2021-04-29

## 2020-05-29 DIAGNOSIS — Z11.59 SPECIAL SCREENING EXAMINATION FOR UNSPECIFIED VIRAL DISEASE: ICD-10-CM

## 2020-05-29 PROCEDURE — U0003 INFECTIOUS AGENT DETECTION BY NUCLEIC ACID (DNA OR RNA); SEVERE ACUTE RESPIRATORY SYNDROME CORONAVIRUS 2 (SARS-COV-2) (CORONAVIRUS DISEASE [COVID-19]), AMPLIFIED PROBE TECHNIQUE, MAKING USE OF HIGH THROUGHPUT TECHNOLOGIES AS DESCRIBED BY CMS-2020-01-R: HCPCS

## 2020-05-31 LAB — SARS-COV-2 RNA SPEC QL NAA+PROBE: NOT DETECTED

## 2020-06-03 ENCOUNTER — TELEPHONE (OUTPATIENT)
Dept: GASTROENTEROLOGY | Facility: CLINIC | Age: 75
End: 2020-06-03

## 2020-06-04 ENCOUNTER — HOSPITAL ENCOUNTER (OUTPATIENT)
Dept: GASTROENTEROLOGY | Facility: HOSPITAL | Age: 75
Setting detail: OUTPATIENT SURGERY
Discharge: HOME | End: 2020-06-04
Attending: INTERNAL MEDICINE | Admitting: INTERNAL MEDICINE
Payer: COMMERCIAL

## 2020-06-04 ENCOUNTER — ANESTHESIA (OUTPATIENT)
Dept: GASTROENTEROLOGY | Facility: HOSPITAL | Age: 75
End: 2020-06-04

## 2020-06-04 ENCOUNTER — ANESTHESIA EVENT (OUTPATIENT)
Dept: GASTROENTEROLOGY | Facility: HOSPITAL | Age: 75
End: 2020-06-04

## 2020-06-04 VITALS
SYSTOLIC BLOOD PRESSURE: 114 MMHG | OXYGEN SATURATION: 98 % | HEART RATE: 82 BPM | TEMPERATURE: 98.4 F | RESPIRATION RATE: 16 BRPM | DIASTOLIC BLOOD PRESSURE: 84 MMHG

## 2020-06-04 DIAGNOSIS — R19.7 DIARRHEA, UNSPECIFIED TYPE: ICD-10-CM

## 2020-06-04 DIAGNOSIS — R10.13 DYSPEPSIA: ICD-10-CM

## 2020-06-04 DIAGNOSIS — R19.8 ALTERNATING CONSTIPATION AND DIARRHEA: ICD-10-CM

## 2020-06-04 DIAGNOSIS — K58.9 IRRITABLE BOWEL SYNDROME, UNSPECIFIED TYPE: ICD-10-CM

## 2020-06-04 DIAGNOSIS — K92.1 MELENA: ICD-10-CM

## 2020-06-04 DIAGNOSIS — R10.30 LOWER ABDOMINAL PAIN: ICD-10-CM

## 2020-06-04 DIAGNOSIS — R10.84 GENERALIZED ABDOMINAL PAIN: ICD-10-CM

## 2020-06-04 DIAGNOSIS — Z86.010 HISTORY OF COLON POLYPS: ICD-10-CM

## 2020-06-04 DIAGNOSIS — D13.1 GASTRIC ADENOMA: ICD-10-CM

## 2020-06-04 LAB — GLUCOSE SERPL-MCNC: 155 MG/DL (ref 65–140)

## 2020-06-04 PROCEDURE — 82948 REAGENT STRIP/BLOOD GLUCOSE: CPT

## 2020-06-04 PROCEDURE — 45380 COLONOSCOPY AND BIOPSY: CPT | Performed by: INTERNAL MEDICINE

## 2020-06-04 PROCEDURE — NC001 PR NO CHARGE: Performed by: INTERNAL MEDICINE

## 2020-06-04 PROCEDURE — 43235 EGD DIAGNOSTIC BRUSH WASH: CPT | Performed by: INTERNAL MEDICINE

## 2020-06-04 PROCEDURE — 88305 TISSUE EXAM BY PATHOLOGIST: CPT | Performed by: PATHOLOGY

## 2020-06-04 RX ORDER — PROPOFOL 10 MG/ML
INJECTION, EMULSION INTRAVENOUS AS NEEDED
Status: DISCONTINUED | OUTPATIENT
Start: 2020-06-04 | End: 2020-06-04 | Stop reason: SURG

## 2020-06-04 RX ORDER — SODIUM CHLORIDE, SODIUM LACTATE, POTASSIUM CHLORIDE, CALCIUM CHLORIDE 600; 310; 30; 20 MG/100ML; MG/100ML; MG/100ML; MG/100ML
125 INJECTION, SOLUTION INTRAVENOUS CONTINUOUS
Status: DISCONTINUED | OUTPATIENT
Start: 2020-06-04 | End: 2020-06-08 | Stop reason: HOSPADM

## 2020-06-04 RX ORDER — SODIUM CHLORIDE, SODIUM LACTATE, POTASSIUM CHLORIDE, CALCIUM CHLORIDE 600; 310; 30; 20 MG/100ML; MG/100ML; MG/100ML; MG/100ML
INJECTION, SOLUTION INTRAVENOUS CONTINUOUS PRN
Status: DISCONTINUED | OUTPATIENT
Start: 2020-06-04 | End: 2020-06-04 | Stop reason: SURG

## 2020-06-04 RX ADMIN — PROPOFOL 50 MG: 10 INJECTION, EMULSION INTRAVENOUS at 08:14

## 2020-06-04 RX ADMIN — PROPOFOL 100 MG: 10 INJECTION, EMULSION INTRAVENOUS at 08:08

## 2020-06-04 RX ADMIN — PROPOFOL 100 MG: 10 INJECTION, EMULSION INTRAVENOUS at 08:11

## 2020-06-04 RX ADMIN — SODIUM CHLORIDE, SODIUM LACTATE, POTASSIUM CHLORIDE, AND CALCIUM CHLORIDE: .6; .31; .03; .02 INJECTION, SOLUTION INTRAVENOUS at 08:03

## 2020-06-04 RX ADMIN — PHENYLEPHRINE HYDROCHLORIDE 200 MCG: 10 INJECTION INTRAVENOUS at 08:14

## 2020-06-04 RX ADMIN — PROPOFOL 50 MG: 10 INJECTION, EMULSION INTRAVENOUS at 08:17

## 2020-06-11 ENCOUNTER — TELEPHONE (OUTPATIENT)
Dept: GASTROENTEROLOGY | Facility: CLINIC | Age: 75
End: 2020-06-11

## 2020-06-11 DIAGNOSIS — D13.1 GASTRIC ADENOMA: ICD-10-CM

## 2020-06-11 DIAGNOSIS — R10.30 LOWER ABDOMINAL PAIN: Primary | ICD-10-CM

## 2020-06-11 DIAGNOSIS — R10.84 GENERALIZED ABDOMINAL PAIN: Primary | ICD-10-CM

## 2020-06-11 RX ORDER — DICYCLOMINE HCL 20 MG
20 TABLET ORAL 4 TIMES DAILY
Qty: 90 TABLET | Refills: 5 | Status: SHIPPED | OUTPATIENT
Start: 2020-06-11 | End: 2020-11-19

## 2020-06-24 ENCOUNTER — TELEPHONE (OUTPATIENT)
Dept: SURGERY | Facility: CLINIC | Age: 75
End: 2020-06-24

## 2020-07-07 ENCOUNTER — CONSULT (OUTPATIENT)
Dept: SURGERY | Facility: CLINIC | Age: 75
End: 2020-07-07
Payer: COMMERCIAL

## 2020-07-07 VITALS
WEIGHT: 150.8 LBS | HEIGHT: 55 IN | TEMPERATURE: 97.8 F | DIASTOLIC BLOOD PRESSURE: 72 MMHG | HEART RATE: 80 BPM | SYSTOLIC BLOOD PRESSURE: 128 MMHG | BODY MASS INDEX: 34.9 KG/M2

## 2020-07-07 DIAGNOSIS — R10.84 GENERALIZED ABDOMINAL PAIN: Primary | ICD-10-CM

## 2020-07-07 PROCEDURE — 99204 OFFICE O/P NEW MOD 45 MIN: CPT | Performed by: SURGERY

## 2020-07-07 PROCEDURE — 3052F HG A1C>EQUAL 8.0%<EQUAL 9.0%: CPT | Performed by: SURGERY

## 2020-07-07 NOTE — PROGRESS NOTES
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  Glaucoma  Hyperlipidemia  Hypertension  Plan:  The patient recently had EGD and colonoscopy, I will complete the workup with CT scan of the abdomen and pelvis, she will return to my office in a week or so to discuss the results  Further recommendations will be made pending on the results  In the meantime the patient was advised to continue taking Metamucil once a day  History of Present Illness     HPI:  Gaurang Camilo is a 66 y o  female who presents to my office accompanied by her daughter for evaluation of abdominal pain  The patient stated that her pain started approximately 4 months ago, localized to the mid abdomen and radiates on both sides, lower and upper abdomen  It is not related with any type of food ingestion but she denies nausea or vomiting  She does complain of constipation for which she takes Metamucil, may care stool loose, she has been incontinent couple times  Usually she started experiencing severe mid abdominal pain followed by loose bowel movement, the pain radiates to both flanks, lower and upper abdomen, she has noted dark stools but she was taking the Pepto-Bismol  She had EGD and colonoscopy last month revealing no evidence of source for bleeding  Review of Systems   Constitutional: Negative for chills and fever  HENT: Negative for sore throat  Eyes: Negative for pain and discharge  Respiratory: Negative for cough and shortness of breath  Cardiovascular: Negative for chest pain and palpitations  Gastrointestinal:        As per history of present illness  Endocrine: Negative for cold intolerance and heat intolerance  Genitourinary: Negative for dysuria and hematuria     Neurological: Negative for seizures and headaches  Hematological: Negative for adenopathy  Does not bruise/bleed easily  Psychiatric/Behavioral: Negative for confusion  The patient is not nervous/anxious  Historical Information   Past Medical History:   Diagnosis Date    Carpal tunnel syndrome     Chronic kidney disease     Colloid cyst of brain (HCC)     COPD (chronic obstructive pulmonary disease) (HCC)     Diabetes mellitus (HCC)     GERD (gastroesophageal reflux disease)     Glaucoma     Hyperlipidemia     Hypertension     Irritable bowel syndrome     Renal disorder     Seizures ([address] )     last     Shortness of breath      Past Surgical History:   Procedure Laterality Date    BRAIN SURGERY      CATARACT EXTRACTION      CHOLECYSTECTOMY      COLECTOMY RADHA      DECOMPRESSION SPINE LUMBAR POSTERIOR  2019    HERNIA REPAIR      HYSTERECTOMY      INCISION TENDON SHEATH HAND      NECK SURGERY  2018    NEUROPLASTY / TRANSPOSITION MEDIAN NERVE AT CARPAL TUNNEL      NV COLONOSCOPY FLX DX W/COLLJ SPEC WHEN PFRMD N/A 3/26/2019    Procedure: COLONOSCOPY;  Surgeon: Jennifer Lanza MD;  Location: [facility]; Service: Gastroenterology    NV ESOPHAGOGASTRODUODENOSCOPY TRANSORAL DIAGNOSTIC N/A 3/26/2019    Procedure: ESOPHAGOGASTRODUODENOSCOPY (EGD); Surgeon: Jennifer Lanza MD;  Location: [facility];   Service: Gastroenterology    REPLACEMENT TOTAL KNEE Right     RETINAL DETACHMENT SURGERY      TUBAL LIGATION       Social History   Social History     Substance and Sexual Activity   Alcohol Use No     Social History     Substance and Sexual Activity   Drug Use No     Social History     Tobacco Use   Smoking Status Former Smoker    Years: 10     Last attempt to quit:     Years since quittin 5   Smokeless Tobacco Never Used   Tobacco Comment    stopped many years ago     Family History: non-contributory    Meds/Allergies   all medications and allergies reviewed     Current Outpatient Medications:     albuterol (2 5 mg/3 mL) 0 083 % nebulizer solution, Take 2 5 mg by nebulization every 4 (four) hours as needed, Disp: , Rfl:     aspirin (ECOTRIN LOW STRENGTH) 81 mg EC tablet, Take 81 mg by mouth daily, Disp: , Rfl:     atorvastatin (LIPITOR) 10 mg tablet, Take 1 tablet (10 mg total) by mouth daily, Disp: 30 tablet, Rfl: 11    B-D ULTRAFINE III SHORT PEN 31G X 8 MM MISC, inject UNDER THE SKIN DAILY, Disp: 90 each, Rfl: 1    BASAGLAR KWIKPEN 100 units/mL injection pen, , Disp: , Rfl: 0    brimonidine (ALPHAGAN P) 0 15 % ophthalmic solution, brimonidine 0 15 % eye drops  INSTILL 1 DROP INTO AFFECTED EYE(S) BY OPHTHALMIC ROUTE 2 TIMES PER DAY, Disp: , Rfl:     brimonidine tartrate 0 2 % ophthalmic solution, brimonidine 0 2 % eye drops, Disp: , Rfl:     cholecalciferol (VITAMIN D3) 1,000 units tablet, Take 1,000 Units by mouth daily, Disp: , Rfl:     clotrimazole (LOTRIMIN) 1 % cream, APPLY TO INGUINAL AREA ONCE DAILY, Disp: , Rfl:     dicyclomine (BENTYL) 20 mg tablet, Take 1 tablet (20 mg total) by mouth 4 (four) times a day 1-1/2 hour a c   And HS, Disp: 90 tablet, Rfl: 5    docusate sodium (COLACE) 100 mg capsule, Take 1 capsule (100 mg total) by mouth 2 (two) times a day, Disp: 10 capsule, Rfl: 0    DULoxetine (CYMBALTA) 30 mg delayed release capsule, Take 1 capsule (30 mg total) by mouth daily, Disp: 30 capsule, Rfl: 1    famotidine (PEPCID) 20 mg tablet, Take 1 tablet (20 mg total) by mouth 2 (two) times a day for 90 days, Disp: 180 tablet, Rfl: 3    fenofibrate (TRICOR) 145 mg tablet, Take 1 tablet (145 mg total) by mouth daily for 30 days, Disp: 30 tablet, Rfl: 2    fosinopril (MONOPRIL) 20 mg tablet, take 1 tablet by mouth daily, Disp: 90 tablet, Rfl: 1    furosemide (LASIX) 20 mg tablet, furosemide 20 mg tablet, Disp: , Rfl:     hyoscyamine (ANASPAZ,LEVSIN) 0 125 MG tablet, Take 1 tablet (0 125 mg total) by mouth every 6 (six) hours as needed for cramping, Disp: 30 tablet, Rfl: 3    ibuprofen (MOTRIN) 600 mg tablet, take 1 tablet by mouth every 6 hours if needed with food, Disp: , Rfl: 0    insulin aspart (NOVOLOG FLEXPEN) 100 Units/mL injection pen, Novolog Flexpen U-100 Insulin aspart 100 unit/mL (3 mL) subcutaneous, Disp: , Rfl:     JANUVIA 25 MG tablet, , Disp: , Rfl: 0    latanoprost (XALATAN) 0 005 % ophthalmic solution, latanoprost 0 005 % eye drops, Disp: , Rfl:     levETIRAcetam (KEPPRA) 250 mg tablet, Take 1 tablet (250 mg total) by mouth every 12 (twelve) hours, Disp: 180 tablet, Rfl: 3    metolazone (ZAROXOLYN) 2 5 mg tablet, Daily, Disp: , Rfl:     montelukast (SINGULAIR) 10 mg tablet, take 1 tablet by mouth every evening, Disp: 90 tablet, Rfl: 1    Multiple Vitamin (MULTI VITAMIN DAILY PO), Take 1 tablet by mouth daily, Disp: , Rfl:     omeprazole (PriLOSEC OTC) 20 MG tablet, Take 1 tablet by mouth, Disp: , Rfl:     pentoxifylline (TRENtal) 400 mg ER tablet, take 1 tablet by mouth three times a day with meals, Disp: 270 tablet, Rfl: 1    Probiotic Product (PROBIOTIC DAILY PO), Take by mouth, Disp: , Rfl:     senna 8 8 mg/5 mL syrup, Take 10 mL (17 6 mg total) by mouth daily at bedtime as needed for constipation, Disp: 240 mL, Rfl: 0    traMADol (ULTRAM) 50 mg tablet, 1 tablet, Disp: , Rfl:   Allergies   Allergen Reactions    Sulfa Antibiotics Rash       Objective     Current Vitals:   Blood Pressure: 128/72 (07/07/20 1109)  Pulse: 80 (07/07/20 1109)  Temperature: 97 8 °F (36 6 °C) (07/07/20 1109)  Temp Source: Oral (07/07/20 1109)  Height: 4' 7" (139 7 cm) (07/07/20 1109)  Weight - Scale: 68 4 kg (150 lb 12 8 oz) (07/07/20 1109)      Physical Exam   Constitutional: She is oriented to person, place, and time  She appears well-developed and well-nourished  No distress  Morbidly obese  Patient uses the walker, she usually has imbalance and she has fallen several times  HENT:   Head: Normocephalic  Mouth/Throat: No oropharyngeal exudate  Cardiovascular: Normal rate and regular rhythm  No murmur heard  Pulmonary/Chest: Effort normal and breath sounds normal  No respiratory distress  Abdominal:   Abdomen is obese, soft, mildly diffusely tender without guarding or rebound  Multiple surgical scar in the abdomen  There is no obvious visceromegaly or mass palpable  Neurological: She is alert and oriented to person, place, and time  No cranial nerve deficit  Skin: No rash noted  No erythema  Psychiatric: She has a normal mood and affect  Her behavior is normal    Nursing note and vitals reviewed

## 2020-07-07 NOTE — PROGRESS NOTES
Assessment/Plan:    No problem-specific Assessment & Plan notes found for this encounter  Subjective: Lower abdominal pain     Patient ID: Elder Mathis is a 66 y o  female  Objective: There were no vitals taken for this visit           Physical Exam

## 2020-07-20 ENCOUNTER — APPOINTMENT (OUTPATIENT)
Dept: LAB | Facility: CLINIC | Age: 75
End: 2020-07-20
Payer: COMMERCIAL

## 2020-07-20 DIAGNOSIS — J41.1 MUCOPURULENT CHRONIC BRONCHITIS (HCC): ICD-10-CM

## 2020-07-20 DIAGNOSIS — R10.84 GENERALIZED ABDOMINAL PAIN: ICD-10-CM

## 2020-07-20 PROCEDURE — 36415 COLL VENOUS BLD VENIPUNCTURE: CPT

## 2020-07-20 PROCEDURE — 84520 ASSAY OF UREA NITROGEN: CPT

## 2020-07-20 PROCEDURE — 82565 ASSAY OF CREATININE: CPT

## 2020-07-20 RX ORDER — MONTELUKAST SODIUM 10 MG/1
TABLET ORAL
Qty: 90 TABLET | Refills: 1 | Status: SHIPPED | OUTPATIENT
Start: 2020-07-20 | End: 2020-10-05

## 2020-07-21 LAB
BUN SERPL-MCNC: 20 MG/DL (ref 5–25)
CREAT SERPL-MCNC: 1.11 MG/DL (ref 0.6–1.3)
GFR SERPL CREATININE-BSD FRML MDRD: 48 ML/MIN/1.73SQ M

## 2020-07-22 ENCOUNTER — HOSPITAL ENCOUNTER (OUTPATIENT)
Dept: CT IMAGING | Facility: HOSPITAL | Age: 75
Discharge: HOME | End: 2020-07-22
Attending: SURGERY
Payer: COMMERCIAL

## 2020-07-22 DIAGNOSIS — R10.84 GENERALIZED ABDOMINAL PAIN: ICD-10-CM

## 2020-07-22 PROCEDURE — 74177 CT ABD & PELVIS W/CONTRAST: CPT

## 2020-07-22 RX ADMIN — IOHEXOL 100 ML: 350 INJECTION, SOLUTION INTRAVENOUS at 10:50

## 2020-08-03 DIAGNOSIS — I10 ESSENTIAL HYPERTENSION: ICD-10-CM

## 2020-08-03 RX ORDER — FOSINOPRIL SODIUM 20 MG/1
20 TABLET ORAL DAILY
Qty: 90 TABLET | Refills: 3 | Status: SHIPPED | OUTPATIENT
Start: 2020-08-03 | End: 2020-11-14 | Stop reason: HOSPADM

## 2020-08-11 ENCOUNTER — OFFICE VISIT (OUTPATIENT)
Dept: SURGERY | Facility: CLINIC | Age: 75
End: 2020-08-11
Payer: COMMERCIAL

## 2020-08-11 VITALS
DIASTOLIC BLOOD PRESSURE: 72 MMHG | BODY MASS INDEX: 34.07 KG/M2 | HEART RATE: 71 BPM | WEIGHT: 147.2 LBS | SYSTOLIC BLOOD PRESSURE: 122 MMHG | HEIGHT: 55 IN | TEMPERATURE: 97.9 F

## 2020-08-11 DIAGNOSIS — D13.1 GASTRIC ADENOMA: ICD-10-CM

## 2020-08-11 DIAGNOSIS — R10.84 GENERALIZED ABDOMINAL PAIN: Primary | ICD-10-CM

## 2020-08-11 DIAGNOSIS — R10.30 LOWER ABDOMINAL PAIN: ICD-10-CM

## 2020-08-11 PROCEDURE — 4040F PNEUMOC VAC/ADMIN/RCVD: CPT | Performed by: SURGERY

## 2020-08-11 PROCEDURE — 1160F RVW MEDS BY RX/DR IN RCRD: CPT | Performed by: SURGERY

## 2020-08-11 PROCEDURE — 2022F DILAT RTA XM EVC RTNOPTHY: CPT | Performed by: SURGERY

## 2020-08-11 PROCEDURE — 1036F TOBACCO NON-USER: CPT | Performed by: SURGERY

## 2020-08-11 PROCEDURE — 3078F DIAST BP <80 MM HG: CPT | Performed by: SURGERY

## 2020-08-11 PROCEDURE — 3074F SYST BP LT 130 MM HG: CPT | Performed by: SURGERY

## 2020-08-11 PROCEDURE — 3066F NEPHROPATHY DOC TX: CPT | Performed by: SURGERY

## 2020-08-11 PROCEDURE — 3052F HG A1C>EQUAL 8.0%<EQUAL 9.0%: CPT | Performed by: SURGERY

## 2020-08-11 PROCEDURE — 99214 OFFICE O/P EST MOD 30 MIN: CPT | Performed by: SURGERY

## 2020-08-11 PROCEDURE — 3060F POS MICROALBUMINURIA REV: CPT | Performed by: SURGERY

## 2020-08-11 PROCEDURE — 3008F BODY MASS INDEX DOCD: CPT | Performed by: SURGERY

## 2020-08-11 NOTE — PROGRESS NOTES
Follow Up - General Surgery   Karthik Metcalf 66 y o  female MRN: [id number]  Unit/Bed#:  Encounter: [id number]    Assessment/Plan     Assessment:  Chronic abdominal pain most likely secondary to intra-abdominal adhesions from multiple abdominal surgeries  Long-term diabetes with peripheral neuropathy  COPD  Hyperlipidemia  Glaucoma  Hypertension  History of seizure disorder  History of brain surgery  Plan:  I had a long discussion with patient and daughter regarding the CT scan findings and overall medical condition  No surgical intervention or further workup is needed from the surgical standpoint  Patient will be referred to dietitian  History of Present Illness     HPI:  Karthik Metcalf is a 66 y o  female who presents to my office accompanied by her daughter for follow-up after a CT scan of the abdomen and pelvis for chronic abdominal pain  I reviewed the CT scan report and films with patient and daughter  Patient overall feeling better with less abdominal pain and having better control of the bowel movements after taking probiotics  She stated that she does have occasional loose bowel movement depending on what he she eats  Denies any blood in the stools or mucus in stool  Review of Systems   All other systems reviewed and are negative        Historical Information   Past Medical History:   Diagnosis Date    Carpal tunnel syndrome     Chronic kidney disease     Colloid cyst of brain (HCC)     COPD (chronic obstructive pulmonary disease) (HCC)     Diabetes mellitus ([address] )     GERD (gastroesophageal reflux disease)     Glaucoma     Hyperlipidemia     Hypertension     Irritable bowel syndrome     Renal disorder     Seiz[facility] [address]     last 2015    Shortness of breath      Past Surgical History:   Procedure Laterality Date    BRAIN SURGERY      CATARACT EXTRACTION      CHOLECYSTECTOMY      COLECTOMY RADHA      DECOMPRESSION SPINE LUMBAR POSTERIOR  08/19/2019    HERNIA REPAIR      HYSTERECTOMY  INCISION TENDON SHEATH HAND      NECK SURGERY  2018    NEUROPLASTY / TRANSPOSITION MEDIAN NERVE AT CARPAL TUNNEL      MS COLONOSCOPY FLX DX W/COLLJ SPEC WHEN PFRMD N/A 3/26/2019    Procedure: COLONOSCOPY;  Surgeon: Yoana Peter MD;  Location: [facility]; Service: Gastroenterology    MS ESOPHAGOGASTRODUODENOSCOPY TRANSORAL DIAGNOSTIC N/A 3/26/2019    Procedure: ESOPHAGOGASTRODUODENOSCOPY (EGD); Surgeon: Yoana Peter MD;  Location[facility];   Service: Gastroenterology    REPLACEMENT TOTAL KNEE Right     RETINAL DETACHMENT SURGERY      TUBAL LIGATION       Social History   Social History     Substance and Sexual Activity   Alcohol Use No     Social History     Substance and Sexual Activity   Drug Use No     Social History     Tobacco Use   Smoking Status Former Smoker    Years: 10 00    Last attempt to quit:     Years since quittin 6   Smokeless Tobacco Never Used   Tobacco Comment    stopped many years ago     Family History: non-contributory    Meds/Allergies   all medications and allergies reviewed     Current Outpatient Medications:     albuterol (2 5 mg/3 mL) 0 083 % nebulizer solution, Take 2 5 mg by nebulization every 4 (four) hours as needed, Disp: , Rfl:     aspirin (ECOTRIN LOW STRENGTH) 81 mg EC tablet, Take 81 mg by mouth daily, Disp: , Rfl:     atorvastatin (LIPITOR) 10 mg tablet, Take 1 tablet (10 mg total) by mouth daily, Disp: 30 tablet, Rfl: 11    B-D ULTRAFINE III SHORT PEN 31G X 8 MM MISC, inject UNDER THE SKIN DAILY, Disp: 90 each, Rfl: 1    BASAGLAR KWIKPEN 100 units/mL injection pen, , Disp: , Rfl: 0    brimonidine (ALPHAGAN P) 0 15 % ophthalmic solution, brimonidine 0 15 % eye drops  INSTILL 1 DROP INTO AFFECTED EYE(S) BY OPHTHALMIC ROUTE 2 TIMES PER DAY, Disp: , Rfl:     brimonidine tartrate 0 2 % ophthalmic solution, brimonidine 0 2 % eye drops, Disp: , Rfl:     cholecalciferol (VITAMIN D3) 1,000 units tablet, Take 1,000 Units by mouth daily, Disp: , Rfl:     clotrimazole (LOTRIMIN) 1 % cream, APPLY TO INGUINAL AREA ONCE DAILY, Disp: , Rfl:     dicyclomine (BENTYL) 20 mg tablet, Take 1 tablet (20 mg total) by mouth 4 (four) times a day 1-1/2 hour a c   And HS, Disp: 90 tablet, Rfl: 5    docusate sodium (COLACE) 100 mg capsule, Take 1 capsule (100 mg total) by mouth 2 (two) times a day, Disp: 10 capsule, Rfl: 0    DULoxetine (CYMBALTA) 30 mg delayed release capsule, Take 1 capsule (30 mg total) by mouth daily, Disp: 30 capsule, Rfl: 1    famotidine (PEPCID) 20 mg tablet, Take 1 tablet (20 mg total) by mouth 2 (two) times a day for 90 days, Disp: 180 tablet, Rfl: 3    fenofibrate (TRICOR) 145 mg tablet, Take 1 tablet (145 mg total) by mouth daily for 30 days, Disp: 30 tablet, Rfl: 2    fosinopril (MONOPRIL) 20 mg tablet, Take 1 tablet (20 mg total) by mouth daily, Disp: 90 tablet, Rfl: 3    furosemide (LASIX) 20 mg tablet, furosemide 20 mg tablet, Disp: , Rfl:     hyoscyamine (ANASPAZ,LEVSIN) 0 125 MG tablet, Take 1 tablet (0 125 mg total) by mouth every 6 (six) hours as needed for cramping, Disp: 30 tablet, Rfl: 3    ibuprofen (MOTRIN) 600 mg tablet, take 1 tablet by mouth every 6 hours if needed with food, Disp: , Rfl: 0    insulin aspart (NOVOLOG FLEXPEN) 100 Units/mL injection pen, Novolog Flexpen U-100 Insulin aspart 100 unit/mL (3 mL) subcutaneous, Disp: , Rfl:     JANUVIA 25 MG tablet, , Disp: , Rfl: 0    latanoprost (XALATAN) 0 005 % ophthalmic solution, latanoprost 0 005 % eye drops, Disp: , Rfl:     levETIRAcetam (KEPPRA) 250 mg tablet, Take 1 tablet (250 mg total) by mouth every 12 (twelve) hours, Disp: 180 tablet, Rfl: 3    metolazone (ZAROXOLYN) 2 5 mg tablet, Daily, Disp: , Rfl:     montelukast (SINGULAIR) 10 mg tablet, take 1 tablet by mouth every evening, Disp: 90 tablet, Rfl: 1    Multiple Vitamin (MULTI VITAMIN DAILY PO), Take 1 tablet by mouth daily, Disp: , Rfl:     omeprazole (PriLOSEC OTC) 20 MG tablet, Take 1 tablet by mouth, Disp: , Rfl:     pentoxifylline (TRENtal) 400 mg ER tablet, take 1 tablet by mouth three times a day with meals, Disp: 270 tablet, Rfl: 1    Probiotic Product (PROBIOTIC DAILY PO), Take by mouth, Disp: , Rfl:     senna 8 8 mg/5 mL syrup, Take 10 mL (17 6 mg total) by mouth daily at bedtime as needed for constipation, Disp: 240 mL, Rfl: 0    traMADol (ULTRAM) 50 mg tablet, 1 tablet, Disp: , Rfl:   Allergies   Allergen Reactions    Sulfa Antibiotics Rash       Objective     Current Vitals:   Blood Pressure: 122/72 (08/11/20 1259)  Pulse: 71 (08/11/20 1259)  Temperature: 97 9 °F (36 6 °C) (08/11/20 1259)  Temp Source: Temporal (08/11/20 1259)  Height: 4' 7" (139 7 cm) (08/11/20 1259)  Weight - Scale: 66 8 kg (147 lb 3 2 oz) (08/11/20 1259)      Physical Exam  Vitals signs and nursing note reviewed  Constitutional:       Appearance: She is obese  She is not ill-appearing  HENT:      Head: Normocephalic and atraumatic  Cardiovascular:      Rate and Rhythm: Normal rate and regular rhythm  Heart sounds: No murmur  Pulmonary:      Effort: Pulmonary effort is normal  No respiratory distress  Breath sounds: Normal breath sounds  Abdominal:      Comments: Abdomen multiple surgical scar, mildly diffusely tender without guarding or rebound  There is no obvious palpable incisional hernias noted  Skin:     Findings: No erythema or rash  Neurological:      Mental Status: She is alert and oriented to person, place, and time  Cranial Nerves: No cranial nerve deficit  Psychiatric:         Mood and Affect: Mood normal          Thought Content: Thought content normal            Imaging: I have personally reviewed pertinent reports  Procedure: Ct Abdomen Pelvis W Contrast    Result Date: 7/24/2020  Narrative: CT ABDOMEN AND PELVIS WITH IV CONTRAST INDICATION:   R10 84: Generalized abdominal pain  Chronic abdominal pain  Loose bowel movements    Mid abdominal pain with radiation for approximately 4 months  COMPARISON:  CT, dated 6/10/2017  TECHNIQUE:  CT examination of the abdomen and pelvis was performed  Axial, sagittal, and coronal 2D reformatted images were created from the source data and submitted for interpretation  Radiation dose length product (DLP) for this visit:  507 mGy-cm   This examination, like all CT scans performed in the [facility], was performed utilizing techniques to minimize radiation dose exposure, including the use of iterative reconstruction and automated exposure control  IV Contrast:  100 mL of iohexol (OMNIPAQUE) Enteric Contrast:  Enteric contrast was not administered  FINDINGS: ABDOMEN LOWER CHEST:  No clinically significant abnormality identified in the visualized lower chest   Note is made of a single small pulmonary cyst in the left lower lobe  LIVER/BILIARY TREE:  Unremarkable  GALLBLADDER:  Gallbladder is surgically absent  Mild intrahepatic biliary ductal dilatation and common bile duct dilatation is expected  SPLEEN:  Unremarkable  PANCREAS:  Unremarkable  ADRENAL GLANDS:  Unremarkable  KIDNEYS/URETERS:  No hydronephrosis or urinary tract calculus  One or more sharply circumscribed subcentimeter renal hypodensities are present, too small to accurately characterize, and statistically most likely benign findings  According to recent literature (Radiology 2019) no further workup of these findings is recommended  STOMACH AND BOWEL:  Unremarkable  APPENDIX:  No findings to suggest appendicitis  ABDOMINOPELVIC CAVITY:  No ascites  No pneumoperitoneum  No lymphadenopathy  VESSELS:  Atherosclerotic changes are present  No evidence of aneurysm  PELVIS REPRODUCTIVE ORGANS:  Unremarkable for patient's age  URINARY BLADDER:  Unremarkable  ABDOMINAL WALL/INGUINAL REGIONS:  Multiple dilated veins are present throughout the bilateral lower groin regions  OSSEOUS STRUCTURES:  No acute fracture or destructive osseous lesion    Spinal degenerative changes are noted  Impression: 1  No intra-abdominal findings to explain the patient's chronic abdominal pain  2   Dilated collateral veins throughout the bilateral groins are nonspecific, however could relate to a component of venous stasis or central venous pathology   Workstation performed: [id number]     EKG, Pathology, and Other Studies: I have personally reviewed pertinent films in PACS

## 2020-08-11 NOTE — PROGRESS NOTES
Assessment/Plan:    No problem-specific Assessment & Plan notes found for this encounter  Subjective: CT Results / Abdominal pain     Patient ID: Thao Bonds is a 66 y o  female  Objective: There were no vitals taken for this visit           Physical Exam

## 2020-10-03 DIAGNOSIS — J41.1 MUCOPURULENT CHRONIC BRONCHITIS (HCC): ICD-10-CM

## 2020-10-05 RX ORDER — MONTELUKAST SODIUM 10 MG/1
TABLET ORAL
Qty: 90 TABLET | Refills: 1 | Status: SHIPPED | OUTPATIENT
Start: 2020-10-05 | End: 2021-04-26

## 2020-10-07 ENCOUNTER — OFFICE VISIT (OUTPATIENT)
Dept: FAMILY MEDICINE CLINIC | Facility: CLINIC | Age: 75
End: 2020-10-07
Payer: COMMERCIAL

## 2020-10-07 VITALS
BODY MASS INDEX: 34.25 KG/M2 | TEMPERATURE: 97.8 F | OXYGEN SATURATION: 97 % | HEART RATE: 89 BPM | DIASTOLIC BLOOD PRESSURE: 78 MMHG | WEIGHT: 148 LBS | SYSTOLIC BLOOD PRESSURE: 124 MMHG | HEIGHT: 55 IN

## 2020-10-07 DIAGNOSIS — L03.032 PARONYCHIA OF GREAT TOE OF LEFT FOOT: Primary | ICD-10-CM

## 2020-10-07 DIAGNOSIS — Z00.00 MEDICARE ANNUAL WELLNESS VISIT, SUBSEQUENT: ICD-10-CM

## 2020-10-07 DIAGNOSIS — E11.22 TYPE 2 DIABETES MELLITUS WITH STAGE 3 CHRONIC KIDNEY DISEASE, UNSPECIFIED WHETHER LONG TERM INSULIN USE, UNSPECIFIED WHETHER STAGE 3A OR 3B CKD (HCC): ICD-10-CM

## 2020-10-07 DIAGNOSIS — N18.30 TYPE 2 DIABETES MELLITUS WITH STAGE 3 CHRONIC KIDNEY DISEASE, UNSPECIFIED WHETHER LONG TERM INSULIN USE, UNSPECIFIED WHETHER STAGE 3A OR 3B CKD (HCC): ICD-10-CM

## 2020-10-07 PROBLEM — M79.646 PAIN IN FINGER: Status: ACTIVE | Noted: 2017-06-15

## 2020-10-07 PROBLEM — M20.029 BOUTONNIERE DEFORMITY: Status: RESOLVED | Noted: 2017-07-08 | Resolved: 2020-10-07

## 2020-10-07 PROBLEM — Z79.4 LONG TERM CURRENT USE OF INSULIN (HCC): Status: ACTIVE | Noted: 2017-11-20

## 2020-10-07 PROBLEM — F90.9 ADHD (ATTENTION DEFICIT HYPERACTIVITY DISORDER): Status: RESOLVED | Noted: 2019-03-17 | Resolved: 2020-10-07

## 2020-10-07 PROBLEM — R10.84 GENERALIZED ABDOMINAL PAIN: Status: RESOLVED | Noted: 2019-02-26 | Resolved: 2020-10-07

## 2020-10-07 PROBLEM — M79.646 PAIN IN FINGER: Status: RESOLVED | Noted: 2017-06-15 | Resolved: 2020-10-07

## 2020-10-07 PROBLEM — F90.9 ADHD (ATTENTION DEFICIT HYPERACTIVITY DISORDER): Status: ACTIVE | Noted: 2019-03-17

## 2020-10-07 PROBLEM — Z01.818 PRE-OP EXAMINATION: Status: RESOLVED | Noted: 2019-09-11 | Resolved: 2020-10-07

## 2020-10-07 PROBLEM — R10.9 ABDOMINAL PAIN: Status: RESOLVED | Noted: 2019-02-26 | Resolved: 2020-10-07

## 2020-10-07 PROBLEM — H35.372 MACULAR PUCKER, LEFT: Status: ACTIVE | Noted: 2020-10-07

## 2020-10-07 PROBLEM — G62.9 NEUROPATHY: Status: RESOLVED | Noted: 2020-01-14 | Resolved: 2020-10-07

## 2020-10-07 PROBLEM — Z86.69 HISTORY OF BRAIN DISORDER: Status: ACTIVE | Noted: 2020-10-07

## 2020-10-07 PROBLEM — S62.627A: Status: RESOLVED | Noted: 2017-07-08 | Resolved: 2020-10-07

## 2020-10-07 PROBLEM — Z86.69 HISTORY OF BRAIN DISORDER: Status: RESOLVED | Noted: 2020-10-07 | Resolved: 2020-10-07

## 2020-10-07 PROBLEM — I73.9 CLAUDICATION (HCC): Status: ACTIVE | Noted: 2019-03-15

## 2020-10-07 PROBLEM — E11.65 UNCONTROLLED TYPE 2 DIABETES MELLITUS WITH HYPERGLYCEMIA (HCC): Status: ACTIVE | Noted: 2017-11-20

## 2020-10-07 PROBLEM — M79.604 PAIN IN BOTH LOWER EXTREMITIES: Status: RESOLVED | Noted: 2020-01-14 | Resolved: 2020-10-07

## 2020-10-07 PROBLEM — S62.627A: Status: ACTIVE | Noted: 2017-07-08

## 2020-10-07 PROBLEM — Z23 NEED FOR VACCINATION AGAINST STREPTOCOCCUS PNEUMONIAE: Status: RESOLVED | Noted: 2019-09-11 | Resolved: 2020-10-07

## 2020-10-07 PROBLEM — M79.605 PAIN IN BOTH LOWER EXTREMITIES: Status: RESOLVED | Noted: 2020-01-14 | Resolved: 2020-10-07

## 2020-10-07 PROBLEM — S62.629A: Status: ACTIVE | Noted: 2017-06-22

## 2020-10-07 PROBLEM — S62.629A: Status: RESOLVED | Noted: 2017-06-22 | Resolved: 2020-10-07

## 2020-10-07 PROBLEM — E11.65 UNCONTROLLED TYPE 2 DIABETES MELLITUS WITH HYPERGLYCEMIA (HCC): Status: RESOLVED | Noted: 2017-11-20 | Resolved: 2020-10-07

## 2020-10-07 PROBLEM — K92.1 MELENA: Status: RESOLVED | Noted: 2019-02-26 | Resolved: 2020-10-07

## 2020-10-07 PROBLEM — R07.9 CHEST PAIN: Status: RESOLVED | Noted: 2017-03-06 | Resolved: 2020-10-07

## 2020-10-07 PROBLEM — M20.029 BOUTONNIERE DEFORMITY: Status: ACTIVE | Noted: 2017-07-08

## 2020-10-07 PROBLEM — Z79.4 LONG TERM CURRENT USE OF INSULIN (HCC): Status: RESOLVED | Noted: 2017-11-20 | Resolved: 2020-10-07

## 2020-10-07 PROBLEM — J06.9 URI WITH COUGH AND CONGESTION: Status: RESOLVED | Noted: 2019-09-11 | Resolved: 2020-10-07

## 2020-10-07 LAB — SL AMB POCT HEMOGLOBIN AIC: 6.7 (ref ?–6.5)

## 2020-10-07 PROCEDURE — 99214 OFFICE O/P EST MOD 30 MIN: CPT | Performed by: FAMILY MEDICINE

## 2020-10-07 PROCEDURE — 83036 HEMOGLOBIN GLYCOSYLATED A1C: CPT | Performed by: FAMILY MEDICINE

## 2020-10-07 PROCEDURE — G0439 PPPS, SUBSEQ VISIT: HCPCS | Performed by: FAMILY MEDICINE

## 2020-10-07 RX ORDER — CEPHALEXIN 500 MG/1
500 CAPSULE ORAL EVERY 8 HOURS SCHEDULED
Qty: 15 CAPSULE | Refills: 0 | Status: SHIPPED | OUTPATIENT
Start: 2020-10-07 | End: 2020-10-12

## 2020-10-14 ENCOUNTER — TRANSCRIBE ORDERS (OUTPATIENT)
Dept: ADMINISTRATIVE | Facility: HOSPITAL | Age: 75
End: 2020-10-14

## 2020-10-14 DIAGNOSIS — L03.032 CELLULITIS OF LEFT TOE: Primary | ICD-10-CM

## 2020-10-30 DIAGNOSIS — E11.42 DIABETIC PERIPHERAL NEUROPATHY (HCC): Primary | ICD-10-CM

## 2020-10-30 RX ORDER — INSULIN GLARGINE 100 [IU]/ML
INJECTION, SOLUTION SUBCUTANEOUS
Qty: 27 ML | Refills: 3 | Status: SHIPPED | OUTPATIENT
Start: 2020-10-30 | End: 2020-11-14 | Stop reason: HOSPADM

## 2020-11-11 ENCOUNTER — HOSPITAL ENCOUNTER (INPATIENT)
Facility: HOSPITAL | Age: 75
LOS: 2 days | Discharge: HOME HEALTH | DRG: 641 | End: 2020-11-14
Attending: EMERGENCY MEDICINE | Admitting: INTERNAL MEDICINE
Payer: COMMERCIAL

## 2020-11-11 ENCOUNTER — TELEPHONE (OUTPATIENT)
Dept: FAMILY MEDICINE CLINIC | Facility: CLINIC | Age: 75
End: 2020-11-11

## 2020-11-11 ENCOUNTER — APPOINTMENT (EMERGENCY)
Dept: CT IMAGING | Facility: HOSPITAL | Age: 75
DRG: 641 | End: 2020-11-11
Payer: COMMERCIAL

## 2020-11-11 DIAGNOSIS — G89.4 CHRONIC PAIN SYNDROME: ICD-10-CM

## 2020-11-11 DIAGNOSIS — G40.909 SEIZURE DISORDER (HCC): ICD-10-CM

## 2020-11-11 DIAGNOSIS — R55 SYNCOPE: ICD-10-CM

## 2020-11-11 DIAGNOSIS — N18.32 STAGE 3B CHRONIC KIDNEY DISEASE (HCC): ICD-10-CM

## 2020-11-11 DIAGNOSIS — E11.9 DIABETES MELLITUS (HCC): ICD-10-CM
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
## 2020-11-11 LAB
ALBUMIN SERPL BCP-MCNC: 3.5 G/DL (ref 3.5–5)
ALP SERPL-CCNC: 52 U/L (ref 46–116)
ALT SERPL W P-5'-P-CCNC: 24 U/L (ref 12–78)
ANION GAP SERPL CALCULATED.3IONS-SCNC: 9 MMOL/L (ref 4–13)
AST SERPL W P-5'-P-CCNC: 15 U/L (ref 5–45)
BACTERIA UR QL AUTO: ABNORMAL /HPF
BASOPHILS # BLD AUTO: 0.05 THOUSANDS/ΜL (ref 0–0.1)
BASOPHILS NFR BLD AUTO: 0 % (ref 0–1)
BILIRUB SERPL-MCNC: 0.3 MG/DL (ref 0.2–1)
BILIRUB UR QL STRIP: NEGATIVE
BUN SERPL-MCNC: 37 MG/DL (ref 5–25)
CALCIUM SERPL-MCNC: 9.7 MG/DL (ref 8.3–10.1)
CHLORIDE SERPL-SCNC: 105 MMOL/L (ref 100–108)
CLARITY UR: CLEAR
CO2 SERPL-SCNC: 28 MMOL/L (ref 21–32)
COLOR UR: YELLOW
CREAT SERPL-MCNC: 1.49 MG/DL (ref 0.6–1.3)
EOSINOPHIL # BLD AUTO: 0.46 THOUSAND/ΜL (ref 0–0.61)
EOSINOPHIL NFR BLD AUTO: 4 % (ref 0–6)
ERYTHROCYTE [DISTWIDTH] IN BLOOD BY AUTOMATED COUNT: 13.7 % (ref 11.6–15.1)
GFR SERPL CREATININE-BSD FRML MDRD: 34 ML/MIN/1.73SQ M
GLUCOSE SERPL-MCNC: 183 MG/DL (ref 65–140)
GLUCOSE SERPL-MCNC: 187 MG/DL (ref 65–140)
GLUCOSE UR STRIP-MCNC: NEGATIVE MG/DL
HCT VFR BLD AUTO: 41.7 % (ref 34.8–46.1)
HGB BLD-MCNC: 13.1 G/DL (ref 11.5–15.4)
HGB UR QL STRIP.AUTO: NEGATIVE
IMM GRANULOCYTES # BLD AUTO: 0.05 THOUSAND/UL (ref 0–0.2)
IMM GRANULOCYTES NFR BLD AUTO: 0 % (ref 0–2)
KETONES UR STRIP-MCNC: NEGATIVE MG/DL
LEUKOCYTE ESTERASE UR QL STRIP: ABNORMAL
LYMPHOCYTES # BLD AUTO: 2.45 THOUSANDS/ΜL (ref 0.6–4.47)
LYMPHOCYTES NFR BLD AUTO: 21 % (ref 14–44)
MCH RBC QN AUTO: 28.4 PG (ref 26.8–34.3)
MCHC RBC AUTO-ENTMCNC: 31.4 G/DL (ref 31.4–37.4)
MCV RBC AUTO: 90 FL (ref 82–98)
MONOCYTES # BLD AUTO: 0.67 THOUSAND/ΜL (ref 0.17–1.22)
MONOCYTES NFR BLD AUTO: 6 % (ref 4–12)
NEUTROPHILS # BLD AUTO: 8.05 THOUSANDS/ΜL (ref 1.85–7.62)
NEUTS SEG NFR BLD AUTO: 69 % (ref 43–75)
NITRITE UR QL STRIP: NEGATIVE
NON-SQ EPI CELLS URNS QL MICRO: ABNORMAL /HPF
NRBC BLD AUTO-RTO: 0 /100 WBCS
PH UR STRIP.AUTO: 6 [PH]
PLATELET # BLD AUTO: 215 THOUSANDS/UL (ref 149–390)
PLATELET # BLD AUTO: 246 THOUSANDS/UL (ref 149–390)
PMV BLD AUTO: 9.7 FL (ref 8.9–12.7)
PMV BLD AUTO: 9.8 FL (ref 8.9–12.7)
POTASSIUM SERPL-SCNC: 4.1 MMOL/L (ref 3.5–5.3)
PROT SERPL-MCNC: 7.8 G/DL (ref 6.4–8.2)
PROT UR STRIP-MCNC: NEGATIVE MG/DL
RBC # BLD AUTO: 4.62 MILLION/UL (ref 3.81–5.12)
RBC #/AREA URNS AUTO: ABNORMAL /HPF
SODIUM SERPL-SCNC: 142 MMOL/L (ref 136–145)
SP GR UR STRIP.AUTO: 1.02 (ref 1–1.03)
TROPONIN I SERPL-MCNC: <0.02 NG/ML
TROPONIN I SERPL-MCNC: <0.02 NG/ML
UROBILINOGEN UR QL STRIP.AUTO: 0.2 E.U./DL
WBC # BLD AUTO: 11.73 THOUSAND/UL (ref 4.31–10.16)
WBC #/AREA URNS AUTO: ABNORMAL /HPF

## 2020-11-11 PROCEDURE — 99285 EMERGENCY DEPT VISIT HI MDM: CPT | Performed by: EMERGENCY MEDICINE

## 2020-11-11 PROCEDURE — 81001 URINALYSIS AUTO W/SCOPE: CPT | Performed by: EMERGENCY MEDICINE

## 2020-11-11 PROCEDURE — 85025 COMPLETE CBC W/AUTO DIFF WBC: CPT | Performed by: EMERGENCY MEDICINE

## 2020-11-11 PROCEDURE — 36415 COLL VENOUS BLD VENIPUNCTURE: CPT | Performed by: EMERGENCY MEDICINE

## 2020-11-11 PROCEDURE — 70450 CT HEAD/BRAIN W/O DYE: CPT

## 2020-11-11 PROCEDURE — 85049 AUTOMATED PLATELET COUNT: CPT | Performed by: INTERNAL MEDICINE

## 2020-11-11 PROCEDURE — 80053 COMPREHEN METABOLIC PANEL: CPT | Performed by: EMERGENCY MEDICINE

## 2020-11-11 PROCEDURE — 99285 EMERGENCY DEPT VISIT HI MDM: CPT

## 2020-11-11 PROCEDURE — 93005 ELECTROCARDIOGRAM TRACING: CPT

## 2020-11-11 PROCEDURE — G1004 CDSM NDSC: HCPCS

## 2020-11-11 PROCEDURE — 82948 REAGENT STRIP/BLOOD GLUCOSE: CPT

## 2020-11-11 PROCEDURE — 84484 ASSAY OF TROPONIN QUANT: CPT | Performed by: INTERNAL MEDICINE

## 2020-11-11 PROCEDURE — 84484 ASSAY OF TROPONIN QUANT: CPT | Performed by: EMERGENCY MEDICINE

## 2020-11-11 PROCEDURE — 99219 PR INITIAL OBSERVATION CARE/DAY 50 MINUTES: CPT | Performed by: INTERNAL MEDICINE

## 2020-11-11 RX ORDER — BRIMONIDINE TARTRATE 0.15 %
1 DROPS OPHTHALMIC (EYE) 2 TIMES DAILY
Status: DISCONTINUED | OUTPATIENT
Start: 2020-11-11 | End: 2020-11-14 | Stop reason: HOSPADM

## 2020-11-11 RX ORDER — ALBUTEROL SULFATE 2.5 MG/3ML
2.5 SOLUTION RESPIRATORY (INHALATION) EVERY 4 HOURS PRN
Status: DISCONTINUED | OUTPATIENT
Start: 2020-11-11 | End: 2020-11-14 | Stop reason: HOSPADM

## 2020-11-11 RX ORDER — DULOXETIN HYDROCHLORIDE 30 MG/1
30 CAPSULE, DELAYED RELEASE ORAL DAILY
Status: DISCONTINUED | OUTPATIENT
Start: 2020-11-12 | End: 2020-11-14 | Stop reason: HOSPADM

## 2020-11-11 RX ORDER — INSULIN GLARGINE 100 [IU]/ML
25 INJECTION, SOLUTION SUBCUTANEOUS
Status: DISCONTINUED | OUTPATIENT
Start: 2020-11-11 | End: 2020-11-14 | Stop reason: HOSPADM

## 2020-11-11 RX ORDER — ACETAMINOPHEN 325 MG/1
650 TABLET ORAL EVERY 6 HOURS PRN
Status: DISCONTINUED | OUTPATIENT
Start: 2020-11-11 | End: 2020-11-14 | Stop reason: HOSPADM

## 2020-11-11 RX ORDER — FENOFIBRATE 145 MG/1
145 TABLET, COATED ORAL DAILY
Status: DISCONTINUED | OUTPATIENT
Start: 2020-11-12 | End: 2020-11-11

## 2020-11-11 RX ORDER — FAMOTIDINE 20 MG/1
20 TABLET, FILM COATED ORAL 2 TIMES DAILY
Status: DISCONTINUED | OUTPATIENT
Start: 2020-11-12 | End: 2020-11-14 | Stop reason: HOSPADM

## 2020-11-11 RX ORDER — HEPARIN SODIUM 5000 [USP'U]/ML
5000 INJECTION, SOLUTION INTRAVENOUS; SUBCUTANEOUS EVERY 8 HOURS SCHEDULED
Status: DISCONTINUED | OUTPATIENT
Start: 2020-11-11 | End: 2020-11-14 | Stop reason: HOSPADM

## 2020-11-11 RX ORDER — LATANOPROST 50 UG/ML
1 SOLUTION/ DROPS OPHTHALMIC
Status: DISCONTINUED | OUTPATIENT
Start: 2020-11-11 | End: 2020-11-14 | Stop reason: HOSPADM

## 2020-11-11 RX ORDER — ATORVASTATIN CALCIUM 10 MG/1
10 TABLET, FILM COATED ORAL DAILY
Status: DISCONTINUED | OUTPATIENT
Start: 2020-11-12 | End: 2020-11-14 | Stop reason: HOSPADM

## 2020-11-11 RX ORDER — LEVETIRACETAM 500 MG/1
250 TABLET ORAL EVERY 12 HOURS
Status: DISCONTINUED | OUTPATIENT
Start: 2020-11-11 | End: 2020-11-14 | Stop reason: HOSPADM

## 2020-11-11 RX ORDER — ASPIRIN 81 MG/1
81 TABLET ORAL DAILY
Status: DISCONTINUED | OUTPATIENT
Start: 2020-11-12 | End: 2020-11-14 | Stop reason: HOSPADM

## 2020-11-11 RX ORDER — DOCUSATE SODIUM 100 MG/1
100 CAPSULE, LIQUID FILLED ORAL 2 TIMES DAILY
Status: DISCONTINUED | OUTPATIENT
Start: 2020-11-12 | End: 2020-11-14 | Stop reason: HOSPADM

## 2020-11-11 RX ADMIN — LEVETIRACETAM 250 MG: 500 TABLET, FILM COATED ORAL at 23:59

## 2020-11-11 RX ADMIN — HEPARIN SODIUM 5000 UNITS: 5000 INJECTION INTRAVENOUS; SUBCUTANEOUS at 23:59

## 2020-11-11 RX ADMIN — ACETAMINOPHEN 650 MG: 325 TABLET, FILM COATED ORAL at 23:59

## 2020-11-12 ENCOUNTER — APPOINTMENT (OUTPATIENT)
Dept: NON INVASIVE DIAGNOSTICS | Facility: HOSPITAL | Age: 75
DRG: 641 | End: 2020-11-12
Payer: COMMERCIAL

## 2020-11-12 ENCOUNTER — APPOINTMENT (OUTPATIENT)
Dept: RADIOLOGY | Facility: HOSPITAL | Age: 75
DRG: 641 | End: 2020-11-12
Payer: COMMERCIAL

## 2020-11-12 PROBLEM — R26.9 NEUROLOGIC GAIT DYSFUNCTION: Status: ACTIVE | Noted: 2020-11-12

## 2020-11-12 LAB
ANION GAP SERPL CALCULATED.3IONS-SCNC: 7 MMOL/L (ref 4–13)
ATRIAL RATE: 89 BPM
BASOPHILS # BLD AUTO: 0.03 THOUSANDS/ΜL (ref 0–0.1)
BASOPHILS NFR BLD AUTO: 0 % (ref 0–1)
BUN SERPL-MCNC: 32 MG/DL (ref 5–25)
CALCIUM SERPL-MCNC: 9.3 MG/DL (ref 8.3–10.1)
CHLORIDE SERPL-SCNC: 104 MMOL/L (ref 100–108)
CO2 SERPL-SCNC: 29 MMOL/L (ref 21–32)
CREAT SERPL-MCNC: 1.29 MG/DL (ref 0.6–1.3)
EOSINOPHIL # BLD AUTO: 0.34 THOUSAND/ΜL (ref 0–0.61)
EOSINOPHIL NFR BLD AUTO: 4 % (ref 0–6)
ERYTHROCYTE [DISTWIDTH] IN BLOOD BY AUTOMATED COUNT: 13.5 % (ref 11.6–15.1)
GFR SERPL CREATININE-BSD FRML MDRD: 40 ML/MIN/1.73SQ M
GLUCOSE SERPL-MCNC: 120 MG/DL (ref 65–140)
GLUCOSE SERPL-MCNC: 139 MG/DL (ref 65–140)
GLUCOSE SERPL-MCNC: 146 MG/DL (ref 65–140)
GLUCOSE SERPL-MCNC: 183 MG/DL (ref 65–140)
GLUCOSE SERPL-MCNC: 270 MG/DL (ref 65–140)
GLUCOSE SERPL-MCNC: 304 MG/DL (ref 65–140)
HCT VFR BLD AUTO: 41.3 % (ref 34.8–46.1)
HGB BLD-MCNC: 13 G/DL (ref 11.5–15.4)
IMM GRANULOCYTES # BLD AUTO: 0.02 THOUSAND/UL (ref 0–0.2)
IMM GRANULOCYTES NFR BLD AUTO: 0 % (ref 0–2)
LYMPHOCYTES # BLD AUTO: 1.73 THOUSANDS/ΜL (ref 0.6–4.47)
LYMPHOCYTES NFR BLD AUTO: 21 % (ref 14–44)
MCH RBC QN AUTO: 28.4 PG (ref 26.8–34.3)
MCHC RBC AUTO-ENTMCNC: 31.5 G/DL (ref 31.4–37.4)
MCV RBC AUTO: 90 FL (ref 82–98)
MONOCYTES # BLD AUTO: 0.45 THOUSAND/ΜL (ref 0.17–1.22)
MONOCYTES NFR BLD AUTO: 6 % (ref 4–12)
NEUTROPHILS # BLD AUTO: 5.64 THOUSANDS/ΜL (ref 1.85–7.62)
NEUTS SEG NFR BLD AUTO: 69 % (ref 43–75)
NRBC BLD AUTO-RTO: 0 /100 WBCS
P AXIS: 59 DEGREES
PLATELET # BLD AUTO: 231 THOUSANDS/UL (ref 149–390)
PMV BLD AUTO: 10.7 FL (ref 8.9–12.7)
POTASSIUM SERPL-SCNC: 4.3 MMOL/L (ref 3.5–5.3)
PR INTERVAL: 168 MS
QRS AXIS: 31 DEGREES
QRSD INTERVAL: 100 MS
QT INTERVAL: 400 MS
QTC INTERVAL: 486 MS
RBC # BLD AUTO: 4.58 MILLION/UL (ref 3.81–5.12)
SODIUM SERPL-SCNC: 140 MMOL/L (ref 136–145)
T WAVE AXIS: 13 DEGREES
TROPONIN I SERPL-MCNC: <0.02 NG/ML
VENTRICULAR RATE: 89 BPM
WBC # BLD AUTO: 8.21 THOUSAND/UL (ref 4.31–10.16)

## 2020-11-12 PROCEDURE — 83540 ASSAY OF IRON: CPT | Performed by: INTERNAL MEDICINE

## 2020-11-12 PROCEDURE — 82948 REAGENT STRIP/BLOOD GLUCOSE: CPT

## 2020-11-12 PROCEDURE — 93306 TTE W/DOPPLER COMPLETE: CPT

## 2020-11-12 PROCEDURE — 71046 X-RAY EXAM CHEST 2 VIEWS: CPT

## 2020-11-12 PROCEDURE — 99222 1ST HOSP IP/OBS MODERATE 55: CPT | Performed by: INTERNAL MEDICINE

## 2020-11-12 PROCEDURE — 93306 TTE W/DOPPLER COMPLETE: CPT | Performed by: INTERNAL MEDICINE

## 2020-11-12 PROCEDURE — 82746 ASSAY OF FOLIC ACID SERUM: CPT | Performed by: INTERNAL MEDICINE

## 2020-11-12 PROCEDURE — 93010 ELECTROCARDIOGRAM REPORT: CPT | Performed by: INTERNAL MEDICINE

## 2020-11-12 PROCEDURE — 84484 ASSAY OF TROPONIN QUANT: CPT | Performed by: INTERNAL MEDICINE

## 2020-11-12 PROCEDURE — 97163 PT EVAL HIGH COMPLEX 45 MIN: CPT

## 2020-11-12 PROCEDURE — 85025 COMPLETE CBC W/AUTO DIFF WBC: CPT | Performed by: INTERNAL MEDICINE

## 2020-11-12 PROCEDURE — 36415 COLL VENOUS BLD VENIPUNCTURE: CPT | Performed by: INTERNAL MEDICINE

## 2020-11-12 PROCEDURE — 80048 BASIC METABOLIC PNL TOTAL CA: CPT | Performed by: INTERNAL MEDICINE

## 2020-11-12 PROCEDURE — 83550 IRON BINDING TEST: CPT | Performed by: INTERNAL MEDICINE

## 2020-11-12 PROCEDURE — 82728 ASSAY OF FERRITIN: CPT | Performed by: INTERNAL MEDICINE

## 2020-11-12 PROCEDURE — 99222 1ST HOSP IP/OBS MODERATE 55: CPT | Performed by: PSYCHIATRY & NEUROLOGY

## 2020-11-12 PROCEDURE — 99233 SBSQ HOSP IP/OBS HIGH 50: CPT | Performed by: INTERNAL MEDICINE

## 2020-11-12 PROCEDURE — 97167 OT EVAL HIGH COMPLEX 60 MIN: CPT

## 2020-11-12 RX ORDER — SIMETHICONE 80 MG
80 TABLET,CHEWABLE ORAL EVERY 6 HOURS PRN
Status: DISCONTINUED | OUTPATIENT
Start: 2020-11-12 | End: 2020-11-14 | Stop reason: HOSPADM

## 2020-11-12 RX ORDER — LISINOPRIL 20 MG/1
20 TABLET ORAL DAILY
Status: DISCONTINUED | OUTPATIENT
Start: 2020-11-12 | End: 2020-11-13

## 2020-11-12 RX ADMIN — LEVETIRACETAM 250 MG: 500 TABLET, FILM COATED ORAL at 22:36

## 2020-11-12 RX ADMIN — LATANOPROST 1 DROP: 50 SOLUTION OPHTHALMIC at 00:00

## 2020-11-12 RX ADMIN — LATANOPROST 1 DROP: 50 SOLUTION OPHTHALMIC at 22:37

## 2020-11-12 RX ADMIN — INSULIN LISPRO 4 UNITS: 100 INJECTION, SOLUTION INTRAVENOUS; SUBCUTANEOUS at 12:41

## 2020-11-12 RX ADMIN — LEVETIRACETAM 250 MG: 500 TABLET, FILM COATED ORAL at 10:28

## 2020-11-12 RX ADMIN — HEPARIN SODIUM 5000 UNITS: 5000 INJECTION INTRAVENOUS; SUBCUTANEOUS at 10:20

## 2020-11-12 RX ADMIN — ASPIRIN 81 MG: 81 TABLET ORAL at 10:19

## 2020-11-12 RX ADMIN — ATORVASTATIN CALCIUM 10 MG: 10 TABLET, FILM COATED ORAL at 17:13

## 2020-11-12 RX ADMIN — HEPARIN SODIUM 5000 UNITS: 5000 INJECTION INTRAVENOUS; SUBCUTANEOUS at 22:36

## 2020-11-12 RX ADMIN — INSULIN GLARGINE 25 UNITS: 100 INJECTION, SOLUTION SUBCUTANEOUS at 22:36

## 2020-11-12 RX ADMIN — BRIMONIDINE TARTRATE 1 DROP: 1.5 SOLUTION OPHTHALMIC at 10:19

## 2020-11-12 RX ADMIN — SIMETHICONE 80 MG: 80 TABLET, CHEWABLE ORAL at 23:34

## 2020-11-12 RX ADMIN — LISINOPRIL 20 MG: 20 TABLET ORAL at 13:43

## 2020-11-12 RX ADMIN — INSULIN LISPRO 4 UNITS: 100 INJECTION, SOLUTION INTRAVENOUS; SUBCUTANEOUS at 18:17

## 2020-11-12 RX ADMIN — INSULIN LISPRO 1 UNITS: 100 INJECTION, SOLUTION INTRAVENOUS; SUBCUTANEOUS at 18:17

## 2020-11-12 RX ADMIN — INSULIN LISPRO 8 UNITS: 100 INJECTION, SOLUTION INTRAVENOUS; SUBCUTANEOUS at 12:42

## 2020-11-12 RX ADMIN — HEPARIN SODIUM 5000 UNITS: 5000 INJECTION INTRAVENOUS; SUBCUTANEOUS at 17:13

## 2020-11-13 ENCOUNTER — APPOINTMENT (INPATIENT)
Dept: NEUROLOGY | Facility: HOSPITAL | Age: 75
DRG: 641 | End: 2020-11-13
Payer: COMMERCIAL

## 2020-11-13 ENCOUNTER — APPOINTMENT (INPATIENT)
Dept: NUCLEAR MEDICINE | Facility: HOSPITAL | Age: 75
DRG: 641 | End: 2020-11-13
Payer: COMMERCIAL

## 2020-11-13 ENCOUNTER — APPOINTMENT (INPATIENT)
Dept: NON INVASIVE DIAGNOSTICS | Facility: HOSPITAL | Age: 75
DRG: 641 | End: 2020-11-13
Payer: COMMERCIAL

## 2020-11-13 DIAGNOSIS — R55 SYNCOPE AND COLLAPSE: Primary | ICD-10-CM

## 2020-11-13 LAB
ANION GAP SERPL CALCULATED.3IONS-SCNC: 9 MMOL/L (ref 4–13)
ATRIAL RATE: 91 BPM
BACTERIA UR QL AUTO: ABNORMAL /HPF
BASOPHILS # BLD AUTO: 0.06 THOUSANDS/ΜL (ref 0–0.1)
BASOPHILS NFR BLD AUTO: 1 % (ref 0–1)
BILIRUB UR QL STRIP: NEGATIVE
BUN SERPL-MCNC: 44 MG/DL (ref 5–25)
CALCIUM SERPL-MCNC: 9.4 MG/DL (ref 8.3–10.1)
CHEST PAIN STATEMENT: NORMAL
CHLORIDE SERPL-SCNC: 106 MMOL/L (ref 100–108)
CK SERPL-CCNC: 25 U/L (ref 26–192)
CLARITY UR: ABNORMAL
CO2 SERPL-SCNC: 24 MMOL/L (ref 21–32)
COLOR UR: YELLOW
CREAT SERPL-MCNC: 2.46 MG/DL (ref 0.6–1.3)
CREAT UR-MCNC: 164 MG/DL
EOSINOPHIL # BLD AUTO: 0.47 THOUSAND/ΜL (ref 0–0.61)
EOSINOPHIL NFR BLD AUTO: 5 % (ref 0–6)
ERYTHROCYTE [DISTWIDTH] IN BLOOD BY AUTOMATED COUNT: 13.8 % (ref 11.6–15.1)
FERRITIN SERPL-MCNC: 122 NG/ML (ref 8–388)
FOLATE SERPL-MCNC: >20 NG/ML (ref 3.1–17.5)
GFR SERPL CREATININE-BSD FRML MDRD: 18 ML/MIN/1.73SQ M
GLUCOSE SERPL-MCNC: 128 MG/DL (ref 65–140)
GLUCOSE SERPL-MCNC: 139 MG/DL (ref 65–140)
GLUCOSE SERPL-MCNC: 144 MG/DL (ref 65–140)
GLUCOSE SERPL-MCNC: 148 MG/DL (ref 65–140)
GLUCOSE SERPL-MCNC: 195 MG/DL (ref 65–140)
GLUCOSE SERPL-MCNC: 196 MG/DL (ref 65–140)
GLUCOSE UR STRIP-MCNC: NEGATIVE MG/DL
HCT VFR BLD AUTO: 40.6 % (ref 34.8–46.1)
HGB BLD-MCNC: 12.5 G/DL (ref 11.5–15.4)
HGB UR QL STRIP.AUTO: ABNORMAL
IMM GRANULOCYTES # BLD AUTO: 0.03 THOUSAND/UL (ref 0–0.2)
IMM GRANULOCYTES NFR BLD AUTO: 0 % (ref 0–2)
IRON SATN MFR SERPL: 22 %
IRON SERPL-MCNC: 50 UG/DL (ref 50–170)
KETONES UR STRIP-MCNC: NEGATIVE MG/DL
LEUKOCYTE ESTERASE UR QL STRIP: ABNORMAL
LYMPHOCYTES # BLD AUTO: 2.62 THOUSANDS/ΜL (ref 0.6–4.47)
LYMPHOCYTES NFR BLD AUTO: 25 % (ref 14–44)
MAX DIASTOLIC BP: 70 MMHG
MAX HEART RATE: 104 BPM
MAX PREDICTED HEART RATE: 143 BPM
MAX. SYSTOLIC BP: 147 MMHG
MCH RBC QN AUTO: 28.3 PG (ref 26.8–34.3)
MCHC RBC AUTO-ENTMCNC: 30.8 G/DL (ref 31.4–37.4)
MCV RBC AUTO: 92 FL (ref 82–98)
MONOCYTES # BLD AUTO: 0.79 THOUSAND/ΜL (ref 0.17–1.22)
MONOCYTES NFR BLD AUTO: 8 % (ref 4–12)
NEUTROPHILS # BLD AUTO: 6.48 THOUSANDS/ΜL (ref 1.85–7.62)
NEUTS SEG NFR BLD AUTO: 61 % (ref 43–75)
NITRITE UR QL STRIP: NEGATIVE
NON-SQ EPI CELLS URNS QL MICRO: ABNORMAL /HPF
NRBC BLD AUTO-RTO: 0 /100 WBCS
P AXIS: 68 DEGREES
PH UR STRIP.AUTO: 5.5 [PH]
PLATELET # BLD AUTO: 220 THOUSANDS/UL (ref 149–390)
PMV BLD AUTO: 9.8 FL (ref 8.9–12.7)
POTASSIUM SERPL-SCNC: 4.7 MMOL/L (ref 3.5–5.3)
PR INTERVAL: 192 MS
PROT UR STRIP-MCNC: ABNORMAL MG/DL
PROTOCOL NAME: NORMAL
QRS AXIS: 6 DEGREES
QRSD INTERVAL: 104 MS
QT INTERVAL: 400 MS
QTC INTERVAL: 492 MS
RBC # BLD AUTO: 4.41 MILLION/UL (ref 3.81–5.12)
RBC #/AREA URNS AUTO: ABNORMAL /HPF
REASON FOR TERMINATION: NORMAL
SODIUM 24H UR-SCNC: 44 MOL/L
SODIUM SERPL-SCNC: 139 MMOL/L (ref 136–145)
SP GR UR STRIP.AUTO: 1.02 (ref 1–1.03)
T WAVE AXIS: 33 DEGREES
T4 FREE SERPL-MCNC: 1.05 NG/DL (ref 0.76–1.46)
TARGET HR FORMULA: NORMAL
TEST INDICATION: NORMAL
TIBC SERPL-MCNC: 224 UG/DL (ref 250–450)
TIME IN EXERCISE PHASE: NORMAL
TROPONIN I SERPL-MCNC: <0.02 NG/ML
TROPONIN I SERPL-MCNC: <0.02 NG/ML
TSH SERPL DL<=0.05 MIU/L-ACNC: 3.91 UIU/ML (ref 0.36–3.74)
UROBILINOGEN UR QL STRIP.AUTO: 0.2 E.U./DL
VENTRICULAR RATE: 91 BPM
WBC # BLD AUTO: 10.45 THOUSAND/UL (ref 4.31–10.16)
WBC #/AREA URNS AUTO: ABNORMAL /HPF

## 2020-11-13 PROCEDURE — 80177 DRUG SCRN QUAN LEVETIRACETAM: CPT | Performed by: INTERNAL MEDICINE

## 2020-11-13 PROCEDURE — 84484 ASSAY OF TROPONIN QUANT: CPT | Performed by: INTERNAL MEDICINE

## 2020-11-13 PROCEDURE — 95816 EEG AWAKE AND DROWSY: CPT

## 2020-11-13 PROCEDURE — 85025 COMPLETE CBC W/AUTO DIFF WBC: CPT | Performed by: INTERNAL MEDICINE

## 2020-11-13 PROCEDURE — 82570 ASSAY OF URINE CREATININE: CPT | Performed by: INTERNAL MEDICINE

## 2020-11-13 PROCEDURE — 82607 VITAMIN B-12: CPT | Performed by: INTERNAL MEDICINE

## 2020-11-13 PROCEDURE — A9502 TC99M TETROFOSMIN: HCPCS

## 2020-11-13 PROCEDURE — 93005 ELECTROCARDIOGRAM TRACING: CPT

## 2020-11-13 PROCEDURE — 99232 SBSQ HOSP IP/OBS MODERATE 35: CPT | Performed by: INTERNAL MEDICINE

## 2020-11-13 PROCEDURE — 94760 N-INVAS EAR/PLS OXIMETRY 1: CPT

## 2020-11-13 PROCEDURE — 82550 ASSAY OF CK (CPK): CPT | Performed by: INTERNAL MEDICINE

## 2020-11-13 PROCEDURE — 93018 CV STRESS TEST I&R ONLY: CPT | Performed by: INTERNAL MEDICINE

## 2020-11-13 PROCEDURE — 78452 HT MUSCLE IMAGE SPECT MULT: CPT

## 2020-11-13 PROCEDURE — 94664 DEMO&/EVAL PT USE INHALER: CPT

## 2020-11-13 PROCEDURE — 93017 CV STRESS TEST TRACING ONLY: CPT

## 2020-11-13 PROCEDURE — 78452 HT MUSCLE IMAGE SPECT MULT: CPT | Performed by: INTERNAL MEDICINE

## 2020-11-13 PROCEDURE — 81001 URINALYSIS AUTO W/SCOPE: CPT | Performed by: INTERNAL MEDICINE

## 2020-11-13 PROCEDURE — 84443 ASSAY THYROID STIM HORMONE: CPT | Performed by: INTERNAL MEDICINE

## 2020-11-13 PROCEDURE — 93016 CV STRESS TEST SUPVJ ONLY: CPT | Performed by: INTERNAL MEDICINE

## 2020-11-13 PROCEDURE — 82948 REAGENT STRIP/BLOOD GLUCOSE: CPT

## 2020-11-13 PROCEDURE — 84439 ASSAY OF FREE THYROXINE: CPT | Performed by: INTERNAL MEDICINE

## 2020-11-13 PROCEDURE — 80048 BASIC METABOLIC PNL TOTAL CA: CPT | Performed by: INTERNAL MEDICINE

## 2020-11-13 PROCEDURE — 93010 ELECTROCARDIOGRAM REPORT: CPT | Performed by: INTERNAL MEDICINE

## 2020-11-13 PROCEDURE — G1004 CDSM NDSC: HCPCS

## 2020-11-13 PROCEDURE — 84300 ASSAY OF URINE SODIUM: CPT | Performed by: INTERNAL MEDICINE

## 2020-11-13 PROCEDURE — 99233 SBSQ HOSP IP/OBS HIGH 50: CPT | Performed by: INTERNAL MEDICINE

## 2020-11-13 RX ORDER — PREGABALIN 75 MG/1
75 CAPSULE ORAL 3 TIMES DAILY
Status: DISCONTINUED | OUTPATIENT
Start: 2020-11-13 | End: 2020-11-13

## 2020-11-13 RX ORDER — PREGABALIN 50 MG/1
50 CAPSULE ORAL 3 TIMES DAILY
Status: DISCONTINUED | OUTPATIENT
Start: 2020-11-13 | End: 2020-11-14

## 2020-11-13 RX ORDER — SODIUM CHLORIDE, SODIUM LACTATE, POTASSIUM CHLORIDE, CALCIUM CHLORIDE 600; 310; 30; 20 MG/100ML; MG/100ML; MG/100ML; MG/100ML
100 INJECTION, SOLUTION INTRAVENOUS CONTINUOUS
Status: DISPENSED | OUTPATIENT
Start: 2020-11-13 | End: 2020-11-14

## 2020-11-13 RX ORDER — LOPERAMIDE HYDROCHLORIDE 2 MG/1
2 CAPSULE ORAL 4 TIMES DAILY PRN
Status: DISCONTINUED | OUTPATIENT
Start: 2020-11-13 | End: 2020-11-14 | Stop reason: HOSPADM

## 2020-11-13 RX ADMIN — LATANOPROST 1 DROP: 50 SOLUTION OPHTHALMIC at 21:08

## 2020-11-13 RX ADMIN — BRIMONIDINE TARTRATE 1 DROP: 1.5 SOLUTION OPHTHALMIC at 11:33

## 2020-11-13 RX ADMIN — DULOXETINE HYDROCHLORIDE 30 MG: 30 CAPSULE, DELAYED RELEASE ORAL at 11:38

## 2020-11-13 RX ADMIN — REGADENOSON 0.4 MG: 0.08 INJECTION, SOLUTION INTRAVENOUS at 10:07

## 2020-11-13 RX ADMIN — ATORVASTATIN CALCIUM 10 MG: 10 TABLET, FILM COATED ORAL at 18:26

## 2020-11-13 RX ADMIN — LOPERAMIDE HYDROCHLORIDE 2 MG: 2 CAPSULE ORAL at 18:26

## 2020-11-13 RX ADMIN — PREGABALIN 50 MG: 50 CAPSULE ORAL at 14:40

## 2020-11-13 RX ADMIN — LEVETIRACETAM 250 MG: 500 TABLET, FILM COATED ORAL at 11:32

## 2020-11-13 RX ADMIN — LEVETIRACETAM 250 MG: 500 TABLET, FILM COATED ORAL at 22:03

## 2020-11-13 RX ADMIN — HEPARIN SODIUM 5000 UNITS: 5000 INJECTION INTRAVENOUS; SUBCUTANEOUS at 14:09

## 2020-11-13 RX ADMIN — SIMETHICONE 80 MG: 80 TABLET, CHEWABLE ORAL at 11:32

## 2020-11-13 RX ADMIN — PREGABALIN 50 MG: 50 CAPSULE ORAL at 21:08

## 2020-11-13 RX ADMIN — INSULIN GLARGINE 25 UNITS: 100 INJECTION, SOLUTION SUBCUTANEOUS at 21:07

## 2020-11-13 RX ADMIN — INSULIN LISPRO 8 UNITS: 100 INJECTION, SOLUTION INTRAVENOUS; SUBCUTANEOUS at 18:27

## 2020-11-13 RX ADMIN — INSULIN LISPRO 2 UNITS: 100 INJECTION, SOLUTION INTRAVENOUS; SUBCUTANEOUS at 18:27

## 2020-11-13 RX ADMIN — ASPIRIN 81 MG: 81 TABLET ORAL at 11:32

## 2020-11-13 RX ADMIN — FAMOTIDINE 20 MG: 20 TABLET, FILM COATED ORAL at 18:26

## 2020-11-13 RX ADMIN — SODIUM CHLORIDE, SODIUM LACTATE, POTASSIUM CHLORIDE, AND CALCIUM CHLORIDE 100 ML/HR: .6; .31; .03; .02 INJECTION, SOLUTION INTRAVENOUS at 11:31

## 2020-11-13 RX ADMIN — HEPARIN SODIUM 5000 UNITS: 5000 INJECTION INTRAVENOUS; SUBCUTANEOUS at 21:07

## 2020-11-14 LAB
ANION GAP SERPL CALCULATED.3IONS-SCNC: 7 MMOL/L (ref 4–13)
BASOPHILS # BLD AUTO: 0.05 THOUSANDS/ΜL (ref 0–0.1)
BASOPHILS NFR BLD AUTO: 1 % (ref 0–1)
BUN SERPL-MCNC: 40 MG/DL (ref 5–25)
CALCIUM SERPL-MCNC: 9.2 MG/DL (ref 8.3–10.1)
CHLORIDE SERPL-SCNC: 107 MMOL/L (ref 100–108)
CO2 SERPL-SCNC: 26 MMOL/L (ref 21–32)
CREAT SERPL-MCNC: 2.15 MG/DL (ref 0.6–1.3)
EOSINOPHIL # BLD AUTO: 0.53 THOUSAND/ΜL (ref 0–0.61)
EOSINOPHIL NFR BLD AUTO: 7 % (ref 0–6)
ERYTHROCYTE [DISTWIDTH] IN BLOOD BY AUTOMATED COUNT: 13.4 % (ref 11.6–15.1)
GFR SERPL CREATININE-BSD FRML MDRD: 22 ML/MIN/1.73SQ M
GLUCOSE SERPL-MCNC: 133 MG/DL (ref 65–140)
GLUCOSE SERPL-MCNC: 145 MG/DL (ref 65–140)
HCT VFR BLD AUTO: 39.2 % (ref 34.8–46.1)
HGB BLD-MCNC: 12.5 G/DL (ref 11.5–15.4)
IMM GRANULOCYTES # BLD AUTO: 0.03 THOUSAND/UL (ref 0–0.2)
IMM GRANULOCYTES NFR BLD AUTO: 0 % (ref 0–2)
LACTATE SERPL-SCNC: 1.2 MMOL/L (ref 0.5–2)
LYMPHOCYTES # BLD AUTO: 1.72 THOUSANDS/ΜL (ref 0.6–4.47)
LYMPHOCYTES NFR BLD AUTO: 22 % (ref 14–44)
MCH RBC QN AUTO: 28.7 PG (ref 26.8–34.3)
MCHC RBC AUTO-ENTMCNC: 31.9 G/DL (ref 31.4–37.4)
MCV RBC AUTO: 90 FL (ref 82–98)
MONOCYTES # BLD AUTO: 0.53 THOUSAND/ΜL (ref 0.17–1.22)
MONOCYTES NFR BLD AUTO: 7 % (ref 4–12)
NEUTROPHILS # BLD AUTO: 5.08 THOUSANDS/ΜL (ref 1.85–7.62)
NEUTS SEG NFR BLD AUTO: 63 % (ref 43–75)
NRBC BLD AUTO-RTO: 0 /100 WBCS
PLATELET # BLD AUTO: 194 THOUSANDS/UL (ref 149–390)
PMV BLD AUTO: 9.7 FL (ref 8.9–12.7)
POTASSIUM SERPL-SCNC: 4.8 MMOL/L (ref 3.5–5.3)
RBC # BLD AUTO: 4.35 MILLION/UL (ref 3.81–5.12)
SODIUM SERPL-SCNC: 140 MMOL/L (ref 136–145)
TROPONIN I SERPL-MCNC: <0.02 NG/ML
VIT B12 SERPL-MCNC: 957 PG/ML (ref 100–900)
WBC # BLD AUTO: 7.94 THOUSAND/UL (ref 4.31–10.16)

## 2020-11-14 PROCEDURE — 83605 ASSAY OF LACTIC ACID: CPT | Performed by: INTERNAL MEDICINE

## 2020-11-14 PROCEDURE — 85025 COMPLETE CBC W/AUTO DIFF WBC: CPT | Performed by: INTERNAL MEDICINE

## 2020-11-14 PROCEDURE — 95819 EEG AWAKE AND ASLEEP: CPT | Performed by: STUDENT IN AN ORGANIZED HEALTH CARE EDUCATION/TRAINING PROGRAM

## 2020-11-14 PROCEDURE — 84484 ASSAY OF TROPONIN QUANT: CPT | Performed by: INTERNAL MEDICINE

## 2020-11-14 PROCEDURE — 82948 REAGENT STRIP/BLOOD GLUCOSE: CPT

## 2020-11-14 PROCEDURE — 80048 BASIC METABOLIC PNL TOTAL CA: CPT | Performed by: INTERNAL MEDICINE

## 2020-11-14 PROCEDURE — 99239 HOSP IP/OBS DSCHRG MGMT >30: CPT | Performed by: INTERNAL MEDICINE

## 2020-11-14 RX ORDER — PREGABALIN 50 MG/1
50 CAPSULE ORAL
Status: DISCONTINUED | OUTPATIENT
Start: 2020-11-14 | End: 2020-11-14

## 2020-11-14 RX ORDER — INSULIN GLARGINE 100 [IU]/ML
25 INJECTION, SOLUTION SUBCUTANEOUS
Qty: 750 UNITS | Refills: 0 | Status: SHIPPED | OUTPATIENT
Start: 2020-11-14 | End: 2021-03-23 | Stop reason: SDUPTHER

## 2020-11-14 RX ORDER — PREGABALIN 25 MG/1
25 CAPSULE ORAL
Status: DISCONTINUED | OUTPATIENT
Start: 2020-11-14 | End: 2020-11-14 | Stop reason: HOSPADM

## 2020-11-14 RX ORDER — ACETAMINOPHEN 325 MG/1
650 TABLET ORAL EVERY 6 HOURS PRN
Qty: 50 TABLET | Refills: 0 | Status: SHIPPED | OUTPATIENT
Start: 2020-11-14 | End: 2020-12-14

## 2020-11-14 RX ORDER — BRIMONIDINE TARTRATE 0.15 %
1 DROPS OPHTHALMIC (EYE) 3 TIMES DAILY
Qty: 5 ML | Refills: 0 | Status: SHIPPED | OUTPATIENT
Start: 2020-11-14 | End: 2020-11-19

## 2020-11-14 RX ADMIN — ASPIRIN 81 MG: 81 TABLET ORAL at 08:32

## 2020-11-14 RX ADMIN — BRIMONIDINE TARTRATE 1 DROP: 1.5 SOLUTION OPHTHALMIC at 08:35

## 2020-11-14 RX ADMIN — HEPARIN SODIUM 5000 UNITS: 5000 INJECTION INTRAVENOUS; SUBCUTANEOUS at 08:33

## 2020-11-14 RX ADMIN — DULOXETINE HYDROCHLORIDE 30 MG: 30 CAPSULE, DELAYED RELEASE ORAL at 08:32

## 2020-11-14 RX ADMIN — FAMOTIDINE 20 MG: 20 TABLET, FILM COATED ORAL at 08:32

## 2020-11-14 RX ADMIN — PREGABALIN 50 MG: 50 CAPSULE ORAL at 08:33

## 2020-11-15 VITALS
BODY MASS INDEX: 35.36 KG/M2 | HEART RATE: 94 BPM | RESPIRATION RATE: 18 BRPM | HEIGHT: 55 IN | WEIGHT: 152.78 LBS | SYSTOLIC BLOOD PRESSURE: 127 MMHG | DIASTOLIC BLOOD PRESSURE: 69 MMHG | OXYGEN SATURATION: 95 % | TEMPERATURE: 98.1 F

## 2020-11-16 ENCOUNTER — TRANSITIONAL CARE MANAGEMENT (OUTPATIENT)
Dept: FAMILY MEDICINE CLINIC | Facility: CLINIC | Age: 75
End: 2020-11-16

## 2020-11-17 LAB — LEVETIRACETAM SERPL-MCNC: 13.7 UG/ML (ref 10–40)

## 2020-11-18 ENCOUNTER — LAB (OUTPATIENT)
Dept: LAB | Facility: CLINIC | Age: 75
End: 2020-11-18
Payer: COMMERCIAL

## 2020-11-18 DIAGNOSIS — N18.32 STAGE 3B CHRONIC KIDNEY DISEASE (HCC): ICD-10-CM

## 2020-11-18 LAB
ANION GAP SERPL CALCULATED.3IONS-SCNC: 6 MMOL/L (ref 4–13)
BUN SERPL-MCNC: 37 MG/DL (ref 5–25)
CALCIUM SERPL-MCNC: 10 MG/DL (ref 8.3–10.1)
CHLORIDE SERPL-SCNC: 106 MMOL/L (ref 100–108)
CO2 SERPL-SCNC: 27 MMOL/L (ref 21–32)
CREAT SERPL-MCNC: 1.51 MG/DL (ref 0.6–1.3)
GFR SERPL CREATININE-BSD FRML MDRD: 33 ML/MIN/1.73SQ M
GLUCOSE P FAST SERPL-MCNC: 154 MG/DL (ref 65–99)
POTASSIUM SERPL-SCNC: 4.2 MMOL/L (ref 3.5–5.3)
SODIUM SERPL-SCNC: 139 MMOL/L (ref 136–145)

## 2020-11-18 PROCEDURE — 36415 COLL VENOUS BLD VENIPUNCTURE: CPT

## 2020-11-18 PROCEDURE — 80048 BASIC METABOLIC PNL TOTAL CA: CPT

## 2020-11-19 ENCOUNTER — OFFICE VISIT (OUTPATIENT)
Dept: FAMILY MEDICINE CLINIC | Facility: CLINIC | Age: 75
End: 2020-11-19
Payer: COMMERCIAL

## 2020-11-19 DIAGNOSIS — R29.898 MUSCULAR DECONDITIONING: Primary | ICD-10-CM

## 2020-11-19 DIAGNOSIS — Z76.89 ENCOUNTER FOR SUPPORT AND COORDINATION OF TRANSITION OF CARE: Primary | ICD-10-CM

## 2020-11-19 DIAGNOSIS — R29.898 MUSCULAR DECONDITIONING: ICD-10-CM

## 2020-11-19 DIAGNOSIS — N18.32 STAGE 3B CHRONIC KIDNEY DISEASE (HCC): ICD-10-CM

## 2020-11-19 DIAGNOSIS — R55 SYNCOPE AND COLLAPSE: ICD-10-CM

## 2020-11-19 DIAGNOSIS — K21.9 GASTROESOPHAGEAL REFLUX DISEASE WITHOUT ESOPHAGITIS: ICD-10-CM

## 2020-11-19 DIAGNOSIS — J41.1 MUCOPURULENT CHRONIC BRONCHITIS (HCC): ICD-10-CM

## 2020-11-19 DIAGNOSIS — E11.69 HYPERLIPIDEMIA ASSOCIATED WITH TYPE 2 DIABETES MELLITUS (HCC): ICD-10-CM

## 2020-11-19 DIAGNOSIS — E78.5 HYPERLIPIDEMIA ASSOCIATED WITH TYPE 2 DIABETES MELLITUS (HCC): ICD-10-CM

## 2020-11-19 DIAGNOSIS — I73.9 PAD (PERIPHERAL ARTERY DISEASE) (HCC): ICD-10-CM

## 2020-11-19 PROCEDURE — 99495 TRANSJ CARE MGMT MOD F2F 14D: CPT | Performed by: NURSE PRACTITIONER

## 2020-11-19 RX ORDER — OMEPRAZOLE 20 MG/1
20 TABLET, DELAYED RELEASE ORAL DAILY
Qty: 90 TABLET | Refills: 1 | Status: SHIPPED | OUTPATIENT
Start: 2020-11-19 | End: 2021-04-27

## 2020-11-19 RX ORDER — PNEUMOCOCCAL 13-VALENT CONJUGATE VACCINE 2.2; 2.2; 2.2; 2.2; 2.2; 4.4; 2.2; 2.2; 2.2; 2.2; 2.2; 2.2; 2.2 UG/.5ML; UG/.5ML; UG/.5ML; UG/.5ML; UG/.5ML; UG/.5ML; UG/.5ML; UG/.5ML; UG/.5ML; UG/.5ML; UG/.5ML; UG/.5ML; UG/.5ML
INJECTION, SUSPENSION INTRAMUSCULAR
COMMUNITY
End: 2020-11-19

## 2020-11-20 ENCOUNTER — TELEPHONE (OUTPATIENT)
Dept: FAMILY MEDICINE CLINIC | Facility: CLINIC | Age: 75
End: 2020-11-20

## 2020-11-20 DIAGNOSIS — E11.42 DIABETIC PERIPHERAL NEUROPATHY (HCC): Primary | ICD-10-CM

## 2020-11-20 RX ORDER — INSULIN ASPART 100 [IU]/ML
INJECTION, SOLUTION INTRAVENOUS; SUBCUTANEOUS
Qty: 15 ML | Refills: 3 | Status: SHIPPED | OUTPATIENT
Start: 2020-11-20 | End: 2021-03-25

## 2021-03-02 DIAGNOSIS — D68.9 COAGULOPATHY (HCC): ICD-10-CM

## 2021-03-02 RX ORDER — PENTOXIFYLLINE 400 MG/1
TABLET, EXTENDED RELEASE ORAL
Qty: 270 TABLET | Refills: 1 | Status: SHIPPED | OUTPATIENT
Start: 2021-03-02 | End: 2021-10-18

## 2021-03-22 PROBLEM — E11.40 TYPE 2 DIABETES MELLITUS WITH DIABETIC NEUROPATHY (HCC): Status: ACTIVE | Noted: 2020-10-21

## 2021-03-22 PROBLEM — E11.9 TYPE 2 DIABETES MELLITUS WITHOUT COMPLICATION (HCC): Status: ACTIVE | Noted: 2020-10-21

## 2021-03-22 PROBLEM — R29.898 MUSCULAR DECONDITIONING: Status: RESOLVED | Noted: 2020-11-19 | Resolved: 2021-03-22

## 2021-03-22 PROBLEM — R10.13 DYSPEPSIA: Status: RESOLVED | Noted: 2019-02-26 | Resolved: 2021-03-22

## 2021-03-22 PROBLEM — L03.032 PARONYCHIA OF GREAT TOE OF LEFT FOOT: Status: RESOLVED | Noted: 2020-10-07 | Resolved: 2021-03-22

## 2021-03-22 PROBLEM — Z76.89 ENCOUNTER FOR SUPPORT AND COORDINATION OF TRANSITION OF CARE: Status: RESOLVED | Noted: 2019-05-14 | Resolved: 2021-03-22

## 2021-03-22 PROBLEM — R55 SYNCOPE AND COLLAPSE: Status: RESOLVED | Noted: 2020-11-11 | Resolved: 2021-03-22

## 2021-03-22 RX ORDER — CARBOXYMETHYLCELLULOSE SODIUM 5 MG/ML
SOLUTION/ DROPS OPHTHALMIC
COMMUNITY
End: 2022-06-24

## 2021-03-22 RX ORDER — OMEPRAZOLE 20 MG/1
20 CAPSULE, DELAYED RELEASE ORAL DAILY
COMMUNITY
Start: 2021-02-09 | End: 2021-04-27 | Stop reason: SDUPTHER

## 2021-03-22 RX ORDER — FOSINOPRIL SODIUM 20 MG/1
TABLET ORAL
COMMUNITY
End: 2021-04-13 | Stop reason: SDUPTHER

## 2021-03-22 RX ORDER — SENNOSIDES 8.6 MG
650 CAPSULE ORAL
COMMUNITY
End: 2022-06-24

## 2021-03-22 RX ORDER — FUROSEMIDE 20 MG/1
20 TABLET ORAL DAILY
COMMUNITY
Start: 2021-02-16 | End: 2022-05-03 | Stop reason: SDUPTHER

## 2021-03-22 NOTE — PROGRESS NOTES
Herbert Hill 1945 female MRN: [id number]      ASSESSMENT/PLAN  Problem List Items Addressed This Visit        Endocrine    Diabetic peripheral neuropathy ([facility] )    Relevant Medications    insulin glargine (LANTUS) 100 units/mL subcutaneous injection    Other Relevant Orders    Ambulatory referral to Neurology    Hyperlipidemia associated with type 2 diabetes mellitus ([facility] )    Relevant Medications    insulin glargine (LANTUS) 100 units/mL subcutaneous injection    Other Relevant Orders    Comprehensive metabolic panel    Lipid panel    Type 2 diabetes mellitus with diabetic neuropathy (HCC) - Primary    Relevant Medications    insulin glargine (LANTUS) 100 units/mL subcutaneous injection    Other Relevant Orders    POCT hemoglobin A1c (Completed)    Comprehensive metabolic panel    Lipid panel    Microalbumin / creatinine urine ratio       Respiratory    COPD (chronic obstructive pulmonary disease) ([facility])       Cardiovascular and Mediastinum    Chronic heart failure with preserved ejection fraction ([facility])    Coronary artery disease without angina pectoris    Relevant Orders    Comprehensive metabolic panel    Lipid panel    Ambulatory referral to Cardiology    Hypertension    Relevant Medications    furosemide (LASIX) 20 mg tablet    fosinopril (MONOPRIL) 20 mg tablet    Other Relevant Orders    Comprehensive metabolic panel    Lipid panel    PAD (peripheral artery disease) ([facility])    Relevant Orders    Comprehensive metabolic panel    Lipid panel    Ambulatory referral to Cardiology       Nervous and Auditory    Seizure disorder [facility])    Relevant Orders    Ambulatory referral to Neurology       Hematopoietic and Hemostatic    Coagulopathy ([place] [address] )       Genitourinary    Stage 3b chronic kidney disease    Relevant Medications    furosemide (LASIX) 20 mg tablet    Other Relevant Orders    Ambulatory referral to Nephrology       Other    Claudication [facility])    Relevant Orders    Ambulatory referral to Cardiology Other Visit Diagnoses     Diabetes mellitus ([facility] )        Relevant Medications    insulin glargine (LANTUS) 100 units/mL subcutaneous injection    Other Relevant Orders    POCT hemoglobin A1c (Completed)    Comprehensive metabolic panel    Lipid panel    Microalbumin / creatinine urine ratio        DM: A1c 6 9% (up from 6 7%) -- somewhat increased, though still within goal  Continue current regimen  Referral to Neuro provided, per pt request   HTN: Within goal  HLD: Update lipids   HFpEF/PAD/Claudication: Referral to Cardio provided   CKD3b: Referral to Nephro provided   Seizure Dx: Stable on current AED  COPD: F/u with Pulm as scheduled       Of note, as pt was leaving appointment, she developed lightheadedness  Blood sugar was 74  Pt was given candy, and repeat sugar was 75  Pt was then given glucose tab with repeat sugar of 80  Pt's vitals remained stable, and pt's symptoms improved with PO glucose intake  No future appointments  SUBJECTIVE  CC: Follow-up (on medical issues) and Diabetes      HPI:  Dse Grubbs is a 66 y o  female who presents for chronic follow up as below  DM: Due for A1c, urine testing; home sugars 130 this morning; only has hypoglycemia if she doesn't eat    Has significant leg pain B/L that she states is from neuropathy -- she would like a referral to Neurology   HTN: Home BPs 121/65 this morning   HLD: Not in statin   HFpEF/PAD/Claudication: Pt would like a referral to Cardiology   CKD3b: Doesn't follow with Nephro -- they don't take her insurance -- she would like a referral to a new provider   Seizure Dx: No recent seizure activity   COPD: Following with Pulm     Review of Systems   Constitutional: Negative for diaphoresis  Eyes: Negative for visual disturbance  Respiratory: Positive for wheezing  Negative for shortness of breath  Cardiovascular: Positive for leg swelling  Negative for chest pain ("a little tiny bit once in a blue moon") and palpitations  Musculoskeletal: Positive for arthralgias  Neurological: Negative for dizziness, tremors and headaches  (+) Neuropathy in both legs   Psychiatric/Behavioral: Positive for sleep disturbance  Historical Information   The patient history was reviewed and updated as follows:    Past Medical History:   Diagnosis Date    Acute kidney injury superimposed on chronic kidney disease ([facility] ) 11/26/2016    ADHD (attention deficit hyperactivity disorder) 3/17/2019    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    Carpal tunnel syndrome     Chest pain 3/6/2017    Chronic kidney disease     Closed fracture of base of middle phalanx of finger 6/22/2017    Closed fracture of middle phalanx of left little finger 7/8/2017    Colloid cyst of brain (HCC)     COPD (chronic obstructive pulmonary disease) (HCC)     Diabetes mellitus (HCC)     GERD (gastroesophageal reflux disease)     Glaucoma     History of brain disorder 10/7/2020    Hyperlipidemia     Hypertension     Irritable bowel syndrome     Melena 2/26/2019    Paronychia of great toe of left foot 10/7/2020    Post-traumatic seizures ([facility] ) 7/23/2015    Renal disorder     Seizures ([facility] )     last 2015    Shortness of breath     Single seizure ([facility] ) 5/31/2016    Syncope and collapse 11/11/2020    Urinary tract infection without hematuria 11/26/2016     Past Surgical History:   Procedure Laterality Date    BRAIN SURGERY      CATARACT EXTRACTION      CHOLECYSTECTOMY      COLECTOMY RADHA      DECOMPRESSION SPINE LUMBAR POSTERIOR  08/19/2019    HERNIA REPAIR      HYSTERECTOMY      INCISION TENDON SHEATH HAND      NECK SURGERY  05/24/2018    NEUROPLASTY / TRANSPOSITION MEDIAN NERVE AT CARPAL TUNNEL      [place] COLONOSCOPY FLX DX W/COLLJ SPEC WHEN PFRMD N/A 3/26/2019    Procedure: COLONOSCOPY;  Surgeon: Michel Choi MD;  [facility];   Service: Gastroenterology    [place] ESOPHAGOGASTRODUODENOSCOPY TRANSORAL DIAGNOSTIC N/A 3/26/2019    Procedure: ESOPHAGOGASTRODUODENOSCOPY (EGD); Surgeon: Shefali Gottlieb MD;  Location: [facility];   Service: Gastroenterology    REPLACEMENT TOTAL KNEE Right     RETINAL DETACHMENT SURGERY      TUBAL LIGATION       Family History   Problem Relation Age of Onset    Asthma Mother     Heart disease Mother     Emphysema Father     Cancer Father     Prostate cancer Father     Kidney cancer Sister     Diabetes Sister     Kidney disease Sister     Brain cancer Brother     Bone cancer Brother     Heart disease Brother       Social History   Social History     Substance and Sexual Activity   Alcohol Use Never    Frequency: Never     Social History     Substance and Sexual Activity   Drug Use Never     Social History     Tobacco Use   Smoking Status Former Smoker    Years: 10 00    Quit date:     Years since quittin 2   Smokeless Tobacco Never Used   Tobacco Comment    stopped many years ago       Medications:     Current Outpatient Medications:     acetaminophen (TYLENOL) 650 mg CR tablet, Take 650 mg by mouth, Disp: , Rfl:     albuterol (2 5 mg/3 mL) 0 083 % nebulizer solution, Take 2 5 mg by nebulization every 4 (four) hours as needed, Disp: , Rfl:     aspirin (ECOTRIN LOW STRENGTH) 81 mg EC tablet, Take 81 mg by mouth daily, Disp: , Rfl:     atorvastatin (LIPITOR) 10 mg tablet, Take 1 tablet (10 mg total) by mouth daily (Patient taking differently: Take 10 mg by mouth daily at bedtime ), Disp: 30 tablet, Rfl: 11    B-D ULTRAFINE III SHORT PEN 31G X 8 MM MISC, inject UNDER THE SKIN DAILY, Disp: 90 each, Rfl: 1    carboxymethylcellulose (REFRESH PLUS) 0 5 % SOLN, As directed, Disp: , Rfl:     cholecalciferol (VITAMIN D3) 1,000 units tablet, Take 1,000 Units by mouth daily, Disp: , Rfl:     fosinopril (MONOPRIL) 20 mg tablet, On hold since last hospital stay 2020, Disp: , Rfl:     furosemide (LASIX) 20 mg tablet, Take 20 mg by mouth daily, Disp: , Rfl:     insulin glargine (LANTUS) 100 units/mL subcutaneous injection, Inject 30 Units under the skin daily at bedtime, Disp: 30 mL, Rfl: 1    insulin lispro (HumaLOG) 100 units/mL injection, Inject 8 Units under the skin 3 (three) times a day with meals, Disp: 7 2 mL, Rfl: 0    latanoprost (XALATAN) 0 005 % ophthalmic solution, latanoprost 0 005 % eye drops, Disp: , Rfl:     levETIRAcetam (KEPPRA) 250 mg tablet, Take 1 tablet (250 mg total) by mouth every 12 (twelve) hours, Disp: 180 tablet, Rfl: 3    montelukast (SINGULAIR) 10 mg tablet, take 1 tablet by mouth every evening, Disp: 90 tablet, Rfl: 1    Multiple Vitamin (MULTI VITAMIN DAILY PO), Take 1 tablet by mouth daily, Disp: , Rfl:     NovoLOG FlexPen 100 units/mL injection pen, INJECT 12 UNITS BEFORE MEALS, Disp: 15 mL, Rfl: 3    omeprazole (PriLOSEC OTC) 20 MG tablet, Take 1 tablet (20 mg total) by mouth daily, Disp: 90 tablet, Rfl: 1    pentoxifylline (TRENtal) 400 mg ER tablet, take 1 tablet by mouth three times a day with meals, Disp: 270 tablet, Rfl: 1    Probiotic Product (PROBIOTIC DAILY PO), Take by mouth, Disp: , Rfl:     senna 8 8 mg/5 mL syrup, Take 10 mL (17 6 mg total) by mouth daily at bedtime as needed for constipation, Disp: 240 mL, Rfl: 0    omeprazole (PriLOSEC) 20 mg delayed release capsule, Take 20 mg by mouth daily, Disp: , Rfl:   Allergies   Allergen Reactions    Brimonidine Other (See Comments)    Sulfa Antibiotics Rash       OBJECTIVE    Vitals:   Vitals:    03/23/21 1107   BP: 120/64   BP Location: Left arm   Patient Position: Sitting   Cuff Size: Adult   Pulse: 88   Resp: 18   Temp: (!) 97 1 °F (36 2 °C)   SpO2: 94%   Weight: 68 9 kg (151 lb 12 8 oz)   Height: 4' 7" (1 397 m)           Physical Exam  Vitals signs and nursing note reviewed  Constitutional:       General: She is not in acute distress  Appearance: Normal appearance  HENT:      Head: Normocephalic and atraumatic        Right Ear: Tympanic membrane and ear canal normal       Left Ear: Tympanic membrane and ear canal normal    Eyes:      Conjunctiva/sclera: Conjunctivae normal    Cardiovascular:      Rate and Rhythm: Normal rate and regular rhythm  Pulmonary:      Effort: Pulmonary effort is normal  No respiratory distress  Breath sounds: Normal breath sounds  Abdominal:      General: Bowel sounds are normal  There is no distension  Palpations: Abdomen is soft  Tenderness: There is no abdominal tenderness  Musculoskeletal:      Right lower leg: No edema  Left lower leg: No edema  Comments: Ambulating with rolator   Lymphadenopathy:      Cervical: No cervical adenopathy  Skin:     General: Skin is warm and dry  Neurological:      General: No focal deficit present  Mental Status: She is alert     Psychiatric:         Mood and Affect: Mood normal                     DO Nirali Lambert [address] [facility]   3/23/2021  11:31 AM

## 2021-03-23 ENCOUNTER — OFFICE VISIT (OUTPATIENT)
Dept: FAMILY MEDICINE CLINIC | Facility: CLINIC | Age: 76
End: 2021-03-23
Payer: COMMERCIAL

## 2021-03-23 VITALS
TEMPERATURE: 97.1 F | HEIGHT: 55 IN | WEIGHT: 151.8 LBS | HEART RATE: 88 BPM | SYSTOLIC BLOOD PRESSURE: 120 MMHG | RESPIRATION RATE: 18 BRPM | BODY MASS INDEX: 35.13 KG/M2 | DIASTOLIC BLOOD PRESSURE: 64 MMHG | OXYGEN SATURATION: 94 %

## 2021-03-23 DIAGNOSIS — I50.32 CHRONIC HEART FAILURE WITH PRESERVED EJECTION FRACTION (HCC): ICD-10-CM

## 2021-03-23 DIAGNOSIS — E78.5 HYPERLIPIDEMIA ASSOCIATED WITH TYPE 2 DIABETES MELLITUS (HCC): ICD-10-CM

## 2021-03-23 DIAGNOSIS — I10 ESSENTIAL HYPERTENSION: ICD-10-CM

## 2021-03-23 DIAGNOSIS — N18.32 STAGE 3B CHRONIC KIDNEY DISEASE (HCC): ICD-10-CM

## 2021-03-23 DIAGNOSIS — I73.9 CLAUDICATION (HCC): ICD-10-CM

## 2021-03-23 DIAGNOSIS — I25.10 CORONARY ARTERY DISEASE INVOLVING NATIVE CORONARY ARTERY OF NATIVE HEART WITHOUT ANGINA PECTORIS: ICD-10-CM

## 2021-03-23 DIAGNOSIS — G40.909 SEIZURE DISORDER (HCC): ICD-10-CM

## 2021-03-23 DIAGNOSIS — J41.1 MUCOPURULENT CHRONIC BRONCHITIS (HCC): ICD-10-CM

## 2021-03-23 DIAGNOSIS — E11.42 DIABETIC PERIPHERAL NEUROPATHY (HCC): ICD-10-CM

## 2021-03-23 DIAGNOSIS — Z79.4 TYPE 2 DIABETES MELLITUS WITH DIABETIC NEUROPATHY, WITH LONG-TERM CURRENT USE OF INSULIN (HCC): Primary | ICD-10-CM

## 2021-03-23 DIAGNOSIS — I73.9 PAD (PERIPHERAL ARTERY DISEASE) (HCC): ICD-10-CM

## 2021-03-23 DIAGNOSIS — E11.69 HYPERLIPIDEMIA ASSOCIATED WITH TYPE 2 DIABETES MELLITUS (HCC): ICD-10-CM

## 2021-03-23 DIAGNOSIS — E11.9 DIABETES MELLITUS (HCC): ICD-10-CM

## 2021-03-23 DIAGNOSIS — E11.40 TYPE 2 DIABETES MELLITUS WITH DIABETIC NEUROPATHY, WITH LONG-TERM CURRENT USE OF INSULIN (HCC): Primary | ICD-10-CM

## 2021-03-23 DIAGNOSIS — D68.9 COAGULOPATHY (HCC): ICD-10-CM

## 2021-03-23 LAB — SL AMB POCT HEMOGLOBIN AIC: 6.9 (ref ?–6.5)

## 2021-03-23 PROCEDURE — 99214 OFFICE O/P EST MOD 30 MIN: CPT | Performed by: FAMILY MEDICINE

## 2021-03-23 PROCEDURE — 83036 HEMOGLOBIN GLYCOSYLATED A1C: CPT | Performed by: FAMILY MEDICINE

## 2021-03-23 RX ORDER — INSULIN GLARGINE 100 [IU]/ML
30 INJECTION, SOLUTION SUBCUTANEOUS
Qty: 30 ML | Refills: 1 | Status: SHIPPED | OUTPATIENT
Start: 2021-03-23 | End: 2021-03-25

## 2021-03-25 ENCOUNTER — TELEPHONE (OUTPATIENT)
Dept: FAMILY MEDICINE CLINIC | Facility: CLINIC | Age: 76
End: 2021-03-25

## 2021-03-25 DIAGNOSIS — E11.40 TYPE 2 DIABETES MELLITUS WITH DIABETIC NEUROPATHY, WITH LONG-TERM CURRENT USE OF INSULIN (HCC): Primary | ICD-10-CM

## 2021-03-25 DIAGNOSIS — E11.42 DIABETIC PERIPHERAL NEUROPATHY (HCC): ICD-10-CM

## 2021-03-25 DIAGNOSIS — Z79.4 TYPE 2 DIABETES MELLITUS WITH DIABETIC NEUROPATHY, WITH LONG-TERM CURRENT USE OF INSULIN (HCC): Primary | ICD-10-CM

## 2021-03-25 RX ORDER — INSULIN GLARGINE 100 [IU]/ML
30 INJECTION, SOLUTION SUBCUTANEOUS
Qty: 15 ML | Refills: 2 | Status: SHIPPED | OUTPATIENT
Start: 2021-03-25 | End: 2021-06-09 | Stop reason: SDUPTHER

## 2021-03-25 RX ORDER — INSULIN ASPART 100 [IU]/ML
INJECTION, SOLUTION INTRAVENOUS; SUBCUTANEOUS
Qty: 15 ML | Refills: 3 | Status: SHIPPED | OUTPATIENT
Start: 2021-03-25 | End: 2021-09-07

## 2021-04-13 DIAGNOSIS — I10 ESSENTIAL HYPERTENSION: Primary | ICD-10-CM

## 2021-04-13 RX ORDER — FOSINOPRIL SODIUM 20 MG/1
20 TABLET ORAL DAILY
Qty: 90 TABLET | Refills: 1 | Status: SHIPPED | OUTPATIENT
Start: 2021-04-13 | End: 2021-10-12 | Stop reason: SDUPTHER

## 2021-04-19 ENCOUNTER — TELEPHONE (OUTPATIENT)
Dept: FAMILY MEDICINE CLINIC | Facility: CLINIC | Age: 76
End: 2021-04-19

## 2021-04-19 NOTE — TELEPHONE ENCOUNTER
Spoke with Nicolas Clifton and she has tried them but has has extreme diarrhea  She did use Toujeo and it worked  I put form on your desk if your desk if you can sign ?

## 2021-04-19 NOTE — TELEPHONE ENCOUNTER
Pt's insurance does not cover Lantus -- has she ever taken/had an issue with Levemir or [place] or James Shingles?

## 2021-04-19 NOTE — TELEPHONE ENCOUNTER
If she has had an adverse effect with them, then her insurance should cover the Lantus -- I will send in the form! Thanks!

## 2021-04-24 DIAGNOSIS — J41.1 MUCOPURULENT CHRONIC BRONCHITIS (HCC): ICD-10-CM

## 2021-04-26 RX ORDER — MONTELUKAST SODIUM 10 MG/1
TABLET ORAL
Qty: 90 TABLET | Refills: 1 | Status: SHIPPED | OUTPATIENT
Start: 2021-04-26 | End: 2021-08-02

## 2021-04-27 DIAGNOSIS — K21.9 GASTROESOPHAGEAL REFLUX DISEASE WITHOUT ESOPHAGITIS: ICD-10-CM

## 2021-04-27 RX ORDER — OMEPRAZOLE 20 MG/1
20 CAPSULE, DELAYED RELEASE ORAL DAILY
Qty: 90 CAPSULE | Refills: 1 | Status: SHIPPED | OUTPATIENT
Start: 2021-04-27 | End: 2021-10-12 | Stop reason: SDUPTHER

## 2021-04-29 DIAGNOSIS — R56.9 SEIZURE (HCC): ICD-10-CM

## 2021-04-29 RX ORDER — LEVETIRACETAM 250 MG/1
TABLET ORAL
Qty: 180 TABLET | Refills: 3 | Status: SHIPPED | OUTPATIENT
Start: 2021-04-29 | End: 2022-03-07

## 2021-06-09 DIAGNOSIS — Z79.4 TYPE 2 DIABETES MELLITUS WITH DIABETIC NEUROPATHY, WITH LONG-TERM CURRENT USE OF INSULIN (HCC): ICD-10-CM

## 2021-06-09 DIAGNOSIS — E11.40 TYPE 2 DIABETES MELLITUS WITH DIABETIC NEUROPATHY, WITH LONG-TERM CURRENT USE OF INSULIN (HCC): ICD-10-CM

## 2021-06-09 RX ORDER — INSULIN GLARGINE 100 [IU]/ML
30 INJECTION, SOLUTION SUBCUTANEOUS
Qty: 15 ML | Refills: 2 | Status: SHIPPED | OUTPATIENT
Start: 2021-06-09 | End: 2021-11-17

## 2021-06-15 DIAGNOSIS — E78.2 MIXED HYPERLIPIDEMIA: ICD-10-CM

## 2021-06-15 RX ORDER — ATORVASTATIN CALCIUM 10 MG/1
10 TABLET, FILM COATED ORAL
Qty: 90 TABLET | Refills: 3 | Status: SHIPPED | OUTPATIENT
Start: 2021-06-15 | End: 2022-06-01

## 2021-06-24 ENCOUNTER — APPOINTMENT (OUTPATIENT)
Dept: LAB | Facility: CLINIC | Age: 76
End: 2021-06-24
Payer: COMMERCIAL

## 2021-06-24 DIAGNOSIS — Z79.4 TYPE 2 DIABETES MELLITUS WITH DIABETIC NEUROPATHY, WITH LONG-TERM CURRENT USE OF INSULIN (HCC): ICD-10-CM

## 2021-06-24 DIAGNOSIS — I25.10 CORONARY ARTERY DISEASE INVOLVING NATIVE CORONARY ARTERY OF NATIVE HEART WITHOUT ANGINA PECTORIS: ICD-10-CM

## 2021-06-24 DIAGNOSIS — E11.40 TYPE 2 DIABETES MELLITUS WITH DIABETIC NEUROPATHY, WITH LONG-TERM CURRENT USE OF INSULIN (HCC): ICD-10-CM

## 2021-06-24 DIAGNOSIS — I10 ESSENTIAL HYPERTENSION: ICD-10-CM

## 2021-06-24 DIAGNOSIS — E11.69 HYPERLIPIDEMIA ASSOCIATED WITH TYPE 2 DIABETES MELLITUS (HCC): ICD-10-CM

## 2021-06-24 DIAGNOSIS — I73.9 PAD (PERIPHERAL ARTERY DISEASE) (HCC): ICD-10-CM

## 2021-06-24 DIAGNOSIS — E78.5 HYPERLIPIDEMIA ASSOCIATED WITH TYPE 2 DIABETES MELLITUS (HCC): ICD-10-CM

## 2021-06-24 LAB
ALBUMIN SERPL BCP-MCNC: 3.7 G/DL (ref 3.5–5)
ALP SERPL-CCNC: 50 U/L (ref 46–116)
ALT SERPL W P-5'-P-CCNC: 26 U/L (ref 12–78)
ANION GAP SERPL CALCULATED.3IONS-SCNC: 4 MMOL/L (ref 4–13)
AST SERPL W P-5'-P-CCNC: 12 U/L (ref 5–45)
BILIRUB SERPL-MCNC: 0.51 MG/DL (ref 0.2–1)
BUN SERPL-MCNC: 33 MG/DL (ref 5–25)
CALCIUM SERPL-MCNC: 9.3 MG/DL (ref 8.3–10.1)
CHLORIDE SERPL-SCNC: 108 MMOL/L (ref 100–108)
CHOLEST SERPL-MCNC: 138 MG/DL (ref 50–200)
CO2 SERPL-SCNC: 27 MMOL/L (ref 21–32)
CREAT SERPL-MCNC: 1.27 MG/DL (ref 0.6–1.3)
CREAT UR-MCNC: 39 MG/DL
GFR SERPL CREATININE-BSD FRML MDRD: 41 ML/MIN/1.73SQ M
GLUCOSE P FAST SERPL-MCNC: 125 MG/DL (ref 65–99)
HDLC SERPL-MCNC: 46 MG/DL
LDLC SERPL CALC-MCNC: 68 MG/DL (ref 0–100)
MICROALBUMIN UR-MCNC: 26.5 MG/L (ref 0–20)
MICROALBUMIN/CREAT 24H UR: 68 MG/G CREATININE (ref 0–30)
NONHDLC SERPL-MCNC: 92 MG/DL
POTASSIUM SERPL-SCNC: 4.3 MMOL/L (ref 3.5–5.3)
PROT SERPL-MCNC: 8.1 G/DL (ref 6.4–8.2)
SODIUM SERPL-SCNC: 139 MMOL/L (ref 136–145)
TRIGL SERPL-MCNC: 120 MG/DL

## 2021-06-24 PROCEDURE — 80053 COMPREHEN METABOLIC PANEL: CPT

## 2021-06-24 PROCEDURE — 82570 ASSAY OF URINE CREATININE: CPT | Performed by: FAMILY MEDICINE

## 2021-06-24 PROCEDURE — 82043 UR ALBUMIN QUANTITATIVE: CPT | Performed by: FAMILY MEDICINE

## 2021-06-24 PROCEDURE — 36415 COLL VENOUS BLD VENIPUNCTURE: CPT

## 2021-06-24 PROCEDURE — 80061 LIPID PANEL: CPT

## 2021-06-29 ENCOUNTER — OFFICE VISIT (OUTPATIENT)
Dept: FAMILY MEDICINE CLINIC | Facility: CLINIC | Age: 76
End: 2021-06-29
Payer: COMMERCIAL

## 2021-06-29 VITALS
OXYGEN SATURATION: 96 % | BODY MASS INDEX: 34.9 KG/M2 | WEIGHT: 150.8 LBS | SYSTOLIC BLOOD PRESSURE: 100 MMHG | HEART RATE: 80 BPM | HEIGHT: 55 IN | DIASTOLIC BLOOD PRESSURE: 60 MMHG | TEMPERATURE: 99 F

## 2021-06-29 DIAGNOSIS — E11.69 HYPERLIPIDEMIA ASSOCIATED WITH TYPE 2 DIABETES MELLITUS (HCC): ICD-10-CM

## 2021-06-29 DIAGNOSIS — I50.32 CHRONIC HEART FAILURE WITH PRESERVED EJECTION FRACTION (HCC): ICD-10-CM

## 2021-06-29 DIAGNOSIS — I25.10 CORONARY ARTERY DISEASE INVOLVING NATIVE CORONARY ARTERY OF NATIVE HEART WITHOUT ANGINA PECTORIS: ICD-10-CM

## 2021-06-29 DIAGNOSIS — I73.9 PAD (PERIPHERAL ARTERY DISEASE) (HCC): ICD-10-CM

## 2021-06-29 DIAGNOSIS — I73.9 CLAUDICATION (HCC): ICD-10-CM

## 2021-06-29 DIAGNOSIS — J41.1 MUCOPURULENT CHRONIC BRONCHITIS (HCC): ICD-10-CM

## 2021-06-29 DIAGNOSIS — Z79.4 TYPE 2 DIABETES MELLITUS WITH DIABETIC NEUROPATHY, WITH LONG-TERM CURRENT USE OF INSULIN (HCC): Primary | ICD-10-CM

## 2021-06-29 DIAGNOSIS — E78.5 HYPERLIPIDEMIA ASSOCIATED WITH TYPE 2 DIABETES MELLITUS (HCC): ICD-10-CM

## 2021-06-29 DIAGNOSIS — I10 ESSENTIAL HYPERTENSION: ICD-10-CM

## 2021-06-29 DIAGNOSIS — G40.909 SEIZURE DISORDER (HCC): ICD-10-CM

## 2021-06-29 DIAGNOSIS — N18.32 STAGE 3B CHRONIC KIDNEY DISEASE (HCC): ICD-10-CM

## 2021-06-29 DIAGNOSIS — E11.40 TYPE 2 DIABETES MELLITUS WITH DIABETIC NEUROPATHY, WITH LONG-TERM CURRENT USE OF INSULIN (HCC): Primary | ICD-10-CM

## 2021-06-29 DIAGNOSIS — E11.42 DIABETIC PERIPHERAL NEUROPATHY (HCC): ICD-10-CM

## 2021-06-29 LAB — SL AMB POCT HEMOGLOBIN AIC: 6.2 (ref ?–6.5)

## 2021-06-29 PROCEDURE — 83036 HEMOGLOBIN GLYCOSYLATED A1C: CPT | Performed by: FAMILY MEDICINE

## 2021-06-29 PROCEDURE — 99214 OFFICE O/P EST MOD 30 MIN: CPT | Performed by: FAMILY MEDICINE

## 2021-06-29 RX ORDER — FLASH GLUCOSE SENSOR
1 KIT MISCELLANEOUS
Qty: 6 EACH | Refills: 3 | Status: SHIPPED | OUTPATIENT
Start: 2021-06-29 | End: 2022-03-23

## 2021-06-29 RX ORDER — DULOXETIN HYDROCHLORIDE 30 MG/1
CAPSULE, DELAYED RELEASE ORAL
COMMUNITY
End: 2021-09-29

## 2021-06-29 RX ORDER — FLASH GLUCOSE SCANNING READER
1 EACH MISCELLANEOUS
Qty: 1 EACH | Refills: 0 | Status: SHIPPED | OUTPATIENT
Start: 2021-06-29 | End: 2022-03-23

## 2021-06-29 NOTE — PROGRESS NOTES
Scott Diaz 1945 female MRN: [id number]      ASSESSMENT/PLAN  Problem List Items Addressed This Visit        Endocrine    Diabetic peripheral neuropathy ([place] [address] )    Hyperlipidemia associated with type 2 diabetes mellitus ([place] [address] )    Type 2 diabetes mellitus with diabetic neuropathy ([facility]) - Primary    Relevant Medications    Continuous Blood Gluc Sensor (FreeStyle Mary 14 Day Sensor) MISC    Continuous Blood Gluc  (FreeStyle Mary 14 Day [place]) YOLANDA    Other Relevant Orders    POCT hemoglobin A1c (Completed)       Respiratory    COPD (chronic obstructive pulmonary disease) ([facility])       Cardiovascular and Mediastinum    Chronic heart failure with preserved ejection fraction ([facility])    Coronary artery disease without angina pectoris    Hypertension    PAD (peripheral artery disease) ([facility])       Nervous and Auditory    Seizure disorder ([facility])       Genitourinary    Stage 3b chronic kidney disease ([place] [address] )       Other    Claudication ([place] [address] )        DM w/ Neuropathy and Microalbuminuria: A1c 6 2%, well controlled  On ACE  Will send for Freestyle Mary to lessen finger sticks   HTN: Within goal   HLD: Well controlled   CAD/HFpEF/PAD w/ Claudication: F/u with Cardio as scheduled, reviewed that her leg symptoms are likely PAD and Neuropathy and she would benefit from follow up for both   CKD3: Stable, improved  COPD: F/u with Pulm as scheduled   Seizure Dx: Asymptomatic, well controlled     Had J&J vaccine       Future Appointments   Date Time Provider Steffany Porras   9/28/2021 11:00 AM DO [facility]          SUBJECTIVE  CC: Diabetes      HPI:  Scott Diaz is a 66 y o  female who presents with her granddaughter for chronic follow up and lab review as below  DM w/ Neuropathy and Microalbuminuria: Home sugars: 120s in AM; Having significant leg pain -- using topical tx with some relief; no hypo/hyperglycemia   She is checking her blood sugars four times daily, but is having a lot of pain in her fingers and difficulty manipulating testing equipment due to poor circulation and arthritis  ADDENDUM 07/30: Pt is currently injection insulin 4 times daily -- Lantus once daily and Humalog TID   ADDENDUM 08/13: Pt has been on insulin for >1 year  HTN: Home BPs "normal"   HLD: Lipids: Total 138, LDL 68, HDL 46, ; LFTs WNL; Tolerating statin without issue   CAD/HFpEF/PAD w/ Claudication: Following with Cardio   CKD3: Cr 1 27/GFR 41 (improved from previous)   COPD: Following with Pulm, having shortness of breath   Seizure Dx: No recent seizure activity     Review of Systems   Constitutional: Negative for diaphoresis  Respiratory: Positive for shortness of breath and wheezing  Cardiovascular: Positive for chest pain (intermittent pinching in her chest)  Negative for palpitations and leg swelling  Gastrointestinal: Positive for diarrhea (sometimes)  Negative for constipation  Taking Colon Health pill   Endocrine: Negative for polydipsia and polyuria  Neurological: Negative for dizziness, tremors and seizures         Historical Information   The patient history was reviewed and updated as follows:    Past Medical History:   Diagnosis Date    Acute kidney injury superimposed on chronic kidney disease ([facility] ) 11/26/2016    ADHD (attention deficit hyperactivity disorder) 3/17/2019    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    Carpal tunnel syndrome     Chest pain 3/6/2017    Chronic kidney disease     Closed fracture of base of middle phalanx of finger 6/22/2017    Closed fracture of middle phalanx of left little finger 7/8/2017    Colloid cyst of brain ([facility] )     COPD (chronic obstructive pulmonary disease) (HCC)     Diabetes mellitus (HCC)     GERD (gastroesophageal reflux disease)     Glaucoma     History of brain disorder 10/7/2020    Hyperlipidemia     Hypertension     Irritable bowel syndrome     Melena 2/26/2019    Paronychia of great toe of left foot 10/7/2020    [facility]-traumatic seizures ([facility] ) 2015    Renal disorder     Seizures ([facility] )     last     Shortness of breath     Single seizure ([facility] ) 2016    Syncope and collapse 2020    Urinary tract infection without hematuria 2016     Past Surgical History:   Procedure Laterality Date    BRAIN SURGERY      CATARACT EXTRACTION      CHOLECYSTECTOMY      COLECTOMY RADHA      DECOMPRESSION SPINE LUMBAR POSTERIOR  2019    HERNIA REPAIR      HYSTERECTOMY      INCISION TENDON SHEATH HAND      NECK SURGERY  2018    NEUROPLASTY / TRANSPOSITION MEDIAN NERVE AT CARPAL TUNNEL      [place] COLONOSCOPY FLX DX W/COLLJ SPEC WHEN PFRMD N/A 3/26/2019    Procedure: COLONOSCOPY;  Surgeon: Shaila Miramontes MD;  [facility]: [facility]; Service: Gastroenterology    [place] ESOPHAGOGASTRODUODENOSCOPY TRANSORAL DIAGNOSTIC N/A 3/26/2019    Procedure: ESOPHAGOGASTRODUODENOSCOPY (EGD); Surgeon: Shaila Miramontes MD;  [facility];   Service: Gastroenterology    REPLACEMENT TOTAL KNEE Right     RETINAL DETACHMENT SURGERY      TUBAL LIGATION       Family History   Problem Relation Age of Onset    Asthma Mother     Heart disease Mother     Emphysema Father     Cancer Father     Prostate cancer Father     Kidney cancer Sister     Diabetes Sister     Kidney disease Sister     Brain cancer Brother     Bone cancer Brother     Heart disease Brother       Social History   Social History     Substance and Sexual Activity   Alcohol Use Never     Social History     Substance and Sexual Activity   Drug Use Never     Social History     Tobacco Use   Smoking Status Former Smoker    Years: 10     Quit date:     Years since quittin 5   Smokeless Tobacco Never Used   Tobacco Comment    stopped many years ago       Medications:     Current Outpatient Medications:     acetaminophen (TYLENOL) 650 mg CR tablet, Take 650 mg by mouth, Disp: , Rfl:     albuterol (2 5 mg/3 mL) 0 083 % nebulizer solution, Take 2 5 mg by nebulization every 4 (four) hours as needed, Disp: , Rfl:     aspirin (ECOTRIN LOW STRENGTH) 81 mg EC tablet, Take 81 mg by mouth daily, Disp: , Rfl:     atorvastatin (LIPITOR) 10 mg tablet, Take 1 tablet (10 mg total) by mouth daily at bedtime, Disp: 90 tablet, Rfl: 3    B-D ULTRAFINE III SHORT PEN 31G X 8 MM MISC, inject UNDER THE SKIN DAILY, Disp: 90 each, Rfl: 1    carboxymethylcellulose (REFRESH PLUS) 0 5 % SOLN, As directed, Disp: , Rfl:     cholecalciferol (VITAMIN D3) 1,000 units tablet, Take 1,000 Units by mouth daily, Disp: , Rfl:     DULoxetine (CYMBALTA) 30 mg delayed release capsule, duloxetine 30 mg capsule,delayed release, Disp: , Rfl:     fosinopril (MONOPRIL) 20 mg tablet, Take 1 tablet (20 mg total) by mouth daily, Disp: 90 tablet, Rfl: 1    furosemide (LASIX) 20 mg tablet, Take 20 mg by mouth daily, Disp: , Rfl:     glucose blood test strip, Use 1 each 4 (four) times a day (before meals and at bedtime) Use as instructed, Disp: 100 each, Rfl: 5    insulin glargine (Lantus SoloStar) 100 units/mL injection pen, Inject 30 Units under the skin daily at bedtime, Disp: 15 mL, Rfl: 2    insulin lispro (HumaLOG) 100 units/mL injection, Inject 8 Units under the skin 3 (three) times a day with meals, Disp: 7 2 mL, Rfl: 0    Insulin Pen Needle 31G X 8 MM MISC, use as directed four times a day, Disp: , Rfl:     Lancets MISC, Use 4 (four) times a day (before meals and at bedtime), Disp: 100 each, Rfl: 5    latanoprost (XALATAN) 0 005 % ophthalmic solution, latanoprost 0 005 % eye drops, Disp: , Rfl:     levETIRAcetam (KEPPRA) 250 mg tablet, take 1 tablet by mouth every 12 hours, Disp: 180 tablet, Rfl: 3    montelukast (SINGULAIR) 10 mg tablet, take 1 tablet by mouth every evening, Disp: 90 tablet, Rfl: 1    Multiple Vitamin (MULTI VITAMIN DAILY PO), Take 1 tablet by mouth daily, Disp: , Rfl:     mupirocin (BACTROBAN) 2 % ointment, mupirocin 2 % topical ointment, Disp: , Rfl:     NovoLOG FlexPen 100 units/mL injection pen, INJECT 12 UNITS BEFORE MEALS, Disp: 15 mL, Rfl: 3    omeprazole (PriLOSEC) 20 mg delayed release capsule, Take 1 capsule (20 mg total) by mouth daily, Disp: 90 capsule, Rfl: 1    pentoxifylline (TRENtal) 400 mg ER tablet, take 1 tablet by mouth three times a day with meals, Disp: 270 tablet, Rfl: 1    Probiotic Product (PROBIOTIC DAILY PO), Take by mouth, Disp: , Rfl:     Continuous Blood Gluc  (FreeStyle Mary 14 Day [place]) YOLANDA, Use 1 application 4 times day, Disp: 1 each, Rfl: 0    Continuous Blood Gluc Sensor (FreeStyle Mary 14 Day Sensor) MISC, Use 1 application every 14 (fourteen) days, Disp: 6 each, Rfl: 3    senna 8 8 mg/5 mL syrup, Take 10 mL (17 6 mg total) by mouth daily at bedtime as needed for constipation, Disp: 240 mL, Rfl: 0  Allergies   Allergen Reactions    Brimonidine Other (See Comments)    Sulfa Antibiotics Rash       OBJECTIVE    Vitals:   Vitals:    06/29/21 1111   BP: 100/60   BP Location: Left arm   Patient Position: Sitting   Cuff Size: Large   Pulse: 80   Temp: 99 °F (37 2 °C)   SpO2: 96%   Weight: 68 4 kg (150 lb 12 8 oz)   Height: 4' 7" (1 397 m)           Physical Exam  Vitals and nursing note reviewed  Constitutional:       General: She is not in acute distress  Appearance: Normal appearance  HENT:      Head: Normocephalic and atraumatic  Right Ear: Tympanic membrane, ear canal and external ear normal       Left Ear: Tympanic membrane, ear canal and external ear normal    Eyes:      Conjunctiva/sclera: Conjunctivae normal    Cardiovascular:      Rate and Rhythm: Normal rate and regular rhythm  Comments: B/L LE pitting edema up to mid shin, tender without erythema, warmth   Pulmonary:      Effort: Pulmonary effort is normal  No respiratory distress  Breath sounds: Normal breath sounds  Abdominal:      General: Bowel sounds are normal  There is no distension  Palpations: Abdomen is soft        Tenderness: There is no abdominal tenderness  Musculoskeletal:      Comments: Ambulates with rolling walker with seat   Lymphadenopathy:      Cervical: No cervical adenopathy  Skin:     General: Skin is warm and dry  Neurological:      General: No focal deficit present  Mental Status: She is alert     Psychiatric:         Mood and Affect: Mood normal                     DO Nirali Lambert [address] [facility]   6/29/2021  12:53 PM

## 2021-06-30 DIAGNOSIS — Z79.4 TYPE 2 DIABETES MELLITUS WITH DIABETIC NEUROPATHY, WITH LONG-TERM CURRENT USE OF INSULIN (HCC): Primary | ICD-10-CM

## 2021-06-30 DIAGNOSIS — E11.40 TYPE 2 DIABETES MELLITUS WITH DIABETIC NEUROPATHY, WITH LONG-TERM CURRENT USE OF INSULIN (HCC): Primary | ICD-10-CM

## 2021-07-30 ENCOUNTER — TELEPHONE (OUTPATIENT)
Dept: FAMILY MEDICINE CLINIC | Facility: CLINIC | Age: 76
End: 2021-07-30

## 2021-07-30 NOTE — TELEPHONE ENCOUNTER
Maggie Ward called asking us to please call [facility] and inform them how many times she injects her diabetic meds so she can get her freestyle supplies  Apparently they are waiting on us  The number to call is [phone] ext [phone]  I spoke with Phyllis Sanchez she asked if we could do an addendum to the office notes stating how many time she injects a day have dr sign it and fax back to [facility]  They did receive everything Dalila Patton faxed down to them but it was missing this info in the note   Maggie Ward told me she injects meds 7 times a day but she  Seemed very confused so Im not sure that's correct

## 2021-08-02 DIAGNOSIS — J41.1 MUCOPURULENT CHRONIC BRONCHITIS (HCC): ICD-10-CM

## 2021-08-02 RX ORDER — MONTELUKAST SODIUM 10 MG/1
TABLET ORAL
Qty: 90 TABLET | Refills: 1 | Status: SHIPPED | OUTPATIENT
Start: 2021-08-02 | End: 2022-02-02

## 2021-09-05 DIAGNOSIS — E11.42 DIABETIC PERIPHERAL NEUROPATHY (HCC): Primary | ICD-10-CM

## 2021-09-07 RX ORDER — LATANOPROST 50 UG/ML
SOLUTION/ DROPS OPHTHALMIC
Qty: 2.5 ML | Refills: 2 | Status: SHIPPED | OUTPATIENT
Start: 2021-09-07 | End: 2022-03-24 | Stop reason: SDUPTHER

## 2021-09-07 RX ORDER — INSULIN ASPART 100 [IU]/ML
INJECTION, SOLUTION INTRAVENOUS; SUBCUTANEOUS
Qty: 15 ML | Refills: 3 | Status: SHIPPED | OUTPATIENT
Start: 2021-09-07 | End: 2022-02-11

## 2021-09-20 ENCOUNTER — TELEPHONE (OUTPATIENT)
Dept: FAMILY MEDICINE CLINIC | Facility: CLINIC | Age: 76
End: 2021-09-20

## 2021-09-20 NOTE — TELEPHONE ENCOUNTER
Pt calls and asks for cream for rash she gets uner her stomach   Says she had it before from not getting enough air

## 2021-09-27 LAB
LEFT EYE DIABETIC RETINOPATHY: NORMAL
RIGHT EYE DIABETIC RETINOPATHY: NORMAL

## 2021-09-28 ENCOUNTER — TELEPHONE (OUTPATIENT)
Dept: FAMILY MEDICINE CLINIC | Facility: CLINIC | Age: 76
End: 2021-09-28

## 2021-09-28 NOTE — TELEPHONE ENCOUNTER
Pt and pt's daughter (?) presented to office for an appointment today  Informed pt she was not scheduled for today, but instead tomorrow @ 11a with Dr Daniel Rankin  Pt proceeded to get very upset stating we changed her appointment without telling her  I did inform pt the appointment was rescheduled in August from 9/28 to 9/29 but I was unsure why as I did not change the appointment  Pt's daughter (?) then got very upset with pt stating she cannot bring her in tomorrow  I offered to reschedule pt to another day that worked for both of them and apologized for the inconvenience  Pt requested I ask Dr Daniel Rankin to see pt today  Asked Dr Daniel Rankin if she was able to and unfortunately she could not d/t mandatory meeting and other pt's  Pt stated her "legs are so bad she may end up in the hospital later this week"  Pt was informed to be seen at urgent care or ER if legs were this bad, or I could try and schedule her with another provider  Pt declined stating no one else knows her  Pt and daughter (?) left office and stated they will not be back

## 2021-09-29 ENCOUNTER — OFFICE VISIT (OUTPATIENT)
Dept: FAMILY MEDICINE CLINIC | Facility: CLINIC | Age: 76
End: 2021-09-29
Payer: COMMERCIAL

## 2021-09-29 VITALS
TEMPERATURE: 97.9 F | DIASTOLIC BLOOD PRESSURE: 74 MMHG | HEIGHT: 55 IN | BODY MASS INDEX: 35.64 KG/M2 | WEIGHT: 154 LBS | OXYGEN SATURATION: 97 % | HEART RATE: 86 BPM | SYSTOLIC BLOOD PRESSURE: 132 MMHG

## 2021-09-29 DIAGNOSIS — E11.42 DIABETIC PERIPHERAL NEUROPATHY (HCC): ICD-10-CM

## 2021-09-29 DIAGNOSIS — I73.9 PAD (PERIPHERAL ARTERY DISEASE) (HCC): ICD-10-CM

## 2021-09-29 DIAGNOSIS — E11.40 TYPE 2 DIABETES MELLITUS WITH DIABETIC NEUROPATHY, WITH LONG-TERM CURRENT USE OF INSULIN (HCC): Primary | ICD-10-CM

## 2021-09-29 DIAGNOSIS — J41.1 MUCOPURULENT CHRONIC BRONCHITIS (HCC): ICD-10-CM

## 2021-09-29 DIAGNOSIS — Z79.4 TYPE 2 DIABETES MELLITUS WITH DIABETIC NEUROPATHY, WITH LONG-TERM CURRENT USE OF INSULIN (HCC): Primary | ICD-10-CM

## 2021-09-29 DIAGNOSIS — I50.32 CHRONIC HEART FAILURE WITH PRESERVED EJECTION FRACTION (HCC): ICD-10-CM

## 2021-09-29 DIAGNOSIS — N18.32 STAGE 3B CHRONIC KIDNEY DISEASE (HCC): ICD-10-CM

## 2021-09-29 DIAGNOSIS — E11.69 HYPERLIPIDEMIA ASSOCIATED WITH TYPE 2 DIABETES MELLITUS (HCC): ICD-10-CM

## 2021-09-29 DIAGNOSIS — I10 ESSENTIAL HYPERTENSION: ICD-10-CM

## 2021-09-29 DIAGNOSIS — I73.9 CLAUDICATION (HCC): ICD-10-CM

## 2021-09-29 DIAGNOSIS — I25.10 CORONARY ARTERY DISEASE INVOLVING NATIVE CORONARY ARTERY OF NATIVE HEART WITHOUT ANGINA PECTORIS: ICD-10-CM

## 2021-09-29 DIAGNOSIS — E78.5 HYPERLIPIDEMIA ASSOCIATED WITH TYPE 2 DIABETES MELLITUS (HCC): ICD-10-CM

## 2021-09-29 DIAGNOSIS — E66.01 OBESITY, MORBID (HCC): ICD-10-CM

## 2021-09-29 LAB — SL AMB POCT HEMOGLOBIN AIC: 6.1 (ref ?–6.5)

## 2021-09-29 PROCEDURE — 83036 HEMOGLOBIN GLYCOSYLATED A1C: CPT | Performed by: FAMILY MEDICINE

## 2021-09-29 PROCEDURE — 99214 OFFICE O/P EST MOD 30 MIN: CPT | Performed by: FAMILY MEDICINE

## 2021-09-29 RX ORDER — PREDNISOLONE ACETATE 10 MG/ML
SUSPENSION/ DROPS OPHTHALMIC
COMMUNITY
Start: 2021-09-27 | End: 2021-12-17

## 2021-09-29 NOTE — PROGRESS NOTES
Aakash Manuel 1945 female MRN: [id number]      ASSESSMENT/PLAN  Problem List Items Addressed This Visit        Endocrine    Diabetic peripheral neuropathy ([place] [address] )    Hyperlipidemia associated with type 2 diabetes mellitus ([place] [address] )    Type 2 diabetes mellitus with diabetic neuropathy ([place] [address] ) - Primary    Relevant Orders    POCT hemoglobin A1c (Completed)       Respiratory    COPD (chronic obstructive pulmonary disease) ([place] [address] )    Relevant Orders    Ambulatory referral to Pulmonology       Cardiovascular and Mediastinum    Chronic heart failure with preserved ejection fraction [facility])    Relevant Orders    Ambulatory referral to Cardiology    Coronary artery disease without angina pectoris    Relevant Orders    Ambulatory referral to Cardiology    Hypertension    PAD (peripheral artery disease) ([place] [address] )    Relevant Orders    Ambulatory referral to Cardiology       Nervous and Auditory    Seizure disorder ([place] [address] )       Genitourinary    Stage 3b chronic kidney disease ([place] [address] )    Relevant Orders    Ambulatory referral to Nephrology       Other    Claudication ([place] [address] )    Obesity, morbid ([place] [address] )        DM: A1c 6 1% (down from 6 2%)   F/u with Podiatry as scheduled   HTN: Within goal   HLD: Continue statin   COPD: F/u with Pulm   CKD3b: F/u with Nephro, continue good hydration     Encouraged to schedule visit with Cardiology, though discuss that LE edema/pain is likely multifactorial in setting of PAD, neuropathy, and CHF/venous stasis  Would benefit from compression stockings, but pt unable to tolerate  Discussed trial of Turmeric + Tart Cherry supplement for overall decrease in inflammation to help with joint pain  No future appointments  SUBJECTIVE  CC: Follow-up (swollen legs and feet )      HPI:  Aakash Manuel is a 64 y o  female who presents with her daughter for chronic follow up as below       DM w/ neuropathy: Home sugars are quite variable 80s-270s   Taking Lantus 30U + Humalog sliding scale   HTN: 100s/50s at home; avoids salt   HLD: Tolerating statin without issue   COPD: Coughing, bring up phlegm -- needs to follow up with Pulm   CKD3b: Following with Neurology   She has been having swelling in her feet/legs, intermittent -- affecting her ability to walk at times  Is planning to see Dr Flores Cregeraldine  Planning to see Dr Jordan Butcher for Podiatry     Having some trouble with her eyes -- on prednisone for one week   Pt also has significant joint pain, but has tried multiple topical therapies without relief  Review of Systems   Constitutional: Negative for diaphoresis  Respiratory: Positive for cough  Cardiovascular: Positive for leg swelling  Negative for chest pain and palpitations  Gastrointestinal: Positive for constipation and diarrhea  Musculoskeletal: Positive for arthralgias and gait problem  Neurological: Positive for weakness and numbness  Negative for dizziness and tremors         Historical Information   The patient history was reviewed and updated as follows:    Past Medical History:   Diagnosis Date    Acute kidney injury superimposed on chronic kidney disease ([facility] ) 11/26/2016    ADHD (attention deficit hyperactivity disorder) 3/17/2019    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    Carpal tunnel syndrome     Chest pain 3/6/2017    Chronic kidney disease     Closed fracture of base of middle phalanx of finger 6/22/2017    Closed fracture of middle phalanx of left little finger 7/8/2017    [facility]oid cyst [facility] brain [facility] )     COPD (chronic obstructive pulmonary disease) (HCC)     Diabetes mellitus (HCC)     GERD (gastroesophageal reflux disease)     Glaucoma     History of brain disorder 10/7/2020    Hyperlipidemia     Hypertension     Irritable bowel syndrome     Melena 2/26/2019    Paronychia of great toe of left foot 10/7/2020    Post-traumatic seizures ([facility] ) 7/23/2015    Renal disorder     Seizures ([facility] )     last 2015    Shortness of breath     [facility] ) 5/31/2016    Syncope and collapse 2020    Urinary tract infection without hematuria 2016     Past Surgical History:   Procedure Laterality Date    BRAIN SURGERY      CATARACT EXTRACTION      CHOLECYSTECTOMY      COLECTOMY RADHA      DECOMPRESSION SPINE LUMBAR POSTERIOR  2019    HERNIA REPAIR      HYSTERECTOMY      INCISION TENDON SHEATH HAND      NECK SURGERY  2018    NEUROPLASTY / TRANSPOSITION MEDIAN NERVE AT CARPAL TUNNEL      MN COLONOSCOPY FLX DX W/COLLJ SPEC WHEN PFRMD N/A 3/26/2019    Procedure: COLONOSCOPY;  Surgeon: Betty Breen MD;  Location: [facility]; Service: Gastroenterology    MN ESOPHAGOGASTRODUODENOSCOPY TRANSORAL DIAGNOSTIC N/A 3/26/2019    Procedure: ESOPHAGOGASTRODUODENOSCOPY (EGD); Surgeon: Betty Breen MD;  Location: [facility];   Service: Gastroenterology    REPLACEMENT TOTAL KNEE Right     RETINAL DETACHMENT SURGERY      TUBAL LIGATION       Family History   Problem Relation Age of Onset    Asthma Mother     Heart disease Mother     Emphysema Father     Cancer Father     Prostate cancer Father     Kidney cancer Sister     Diabetes Sister     Kidney disease Sister     Brain cancer Brother     Bone cancer Brother     Heart disease Brother       Social History   Social History     Substance and Sexual Activity   Alcohol Use Never     Social History     Substance and Sexual Activity   Drug Use Never     Social History     Tobacco Use   Smoking Status Former Smoker    Years: 10 00    Quit date:     Years since quittin 7   Smokeless Tobacco Never Used   Tobacco Comment    stopped many years ago       Medications:     Current Outpatient Medications:     acetaminophen (TYLENOL) 650 mg CR tablet, Take 650 mg by mouth, Disp: , Rfl:     albuterol (2 5 mg/3 mL) 0 083 % nebulizer solution, Take 2 5 mg by nebulization every 4 (four) hours as needed, Disp: , Rfl:     aspirin (ECOTRIN LOW STRENGTH) 81 mg EC tablet, Take 81 mg by mouth daily, Disp: , Rfl:    atorvastatin (LIPITOR) 10 mg tablet, Take 1 tablet (10 mg total) by mouth daily at bedtime, Disp: 90 tablet, Rfl: 3    B-D ULTRAFINE III SHORT PEN 31G X 8 MM MISC, inject UNDER THE SKIN DAILY, Disp: 90 each, Rfl: 1    carboxymethylcellulose (REFRESH PLUS) 0 5 % SOLN, As directed, Disp: , Rfl:     cholecalciferol (VITAMIN D3) 1,000 units tablet, Take 1,000 Units by mouth daily, Disp: , Rfl:     Continuous Blood Gluc  (FreeStyle Mary 14 Day [place]) YOLANDA, Use 1 application 4 times day, Disp: 1 each, Rfl: 0    Continuous Blood Gluc Sensor (FreeStyle Mary 14 Day Sensor) MISC, Use 1 application every 14 (fourteen) days, Disp: 6 each, Rfl: 3    DULoxetine (CYMBALTA) 30 mg delayed release capsule, duloxetine 30 mg capsule,delayed release, Disp: , Rfl:     fosinopril (MONOPRIL) 20 mg tablet, Take 1 tablet (20 mg total) by mouth daily, Disp: 90 tablet, Rfl: 1    furosemide (LASIX) 20 mg tablet, Take 20 mg by mouth daily, Disp: , Rfl:     glucose blood test strip, Use 1 each 4 (four) times a day (before meals and at bedtime) Use as instructed, Disp: 100 each, Rfl: 5    insulin glargine (Lantus SoloStar) 100 units/mL injection pen, Inject 30 Units under the skin daily at bedtime, Disp: 15 mL, Rfl: 2    Insulin Pen Needle 31G X 8 MM MISC, use as directed four times a day, Disp: , Rfl:     ketoconazole (NIZORAL) 2 % cream, Apply topically daily, Disp: 15 g, Rfl: 0    latanoprost (XALATAN) 0 005 % ophthalmic solution, INSTILL 1 DROP INTO LEFT EYE AT BEDTIME, Disp: 2 5 mL, Rfl: 2    levETIRAcetam (KEPPRA) 250 mg tablet, take 1 tablet by mouth every 12 hours, Disp: 180 tablet, Rfl: 3    [employer] Lancets MISC, TEST BS 5 X A DAY, Disp: 100 each, Rfl: 5    montelukast (SINGULAIR) 10 mg tablet, take 1 tablet by mouth every evening, Disp: 90 tablet, Rfl: 1    Multiple Vitamin (MULTI VITAMIN DAILY PO), Take 1 tablet by mouth daily, Disp: , Rfl:     mupirocin (BACTROBAN) 2 % ointment, mupirocin 2 % topical ointment, Disp: , Rfl:     NovoLOG FlexPen 100 units/mL injection pen, INJECT 12 UNITS BEFORE MEALS (3 MEALS), Disp: 15 mL, Rfl: 3    nystatin (MYCOSTATIN) powder, Apply topically 3 (three) times a day as needed (rash), Disp: 15 g, Rfl: 0    omeprazole (PriLOSEC) 20 mg delayed release capsule, Take 1 capsule (20 mg total) by mouth daily, Disp: 90 capsule, Rfl: 1    pentoxifylline (TRENtal) 400 mg ER tablet, take 1 tablet by mouth three times a day with meals, Disp: 270 tablet, Rfl: 1    prednisoLONE acetate (PRED FORTE) 1 % ophthalmic suspension, , Disp: , Rfl:     Probiotic Product (PROBIOTIC DAILY PO), Take by mouth, Disp: , Rfl:     insulin lispro (HumaLOG) 100 units/mL injection, Inject 8 Units under the skin 3 (three) times a day with meals, Disp: 7 2 mL, Rfl: 0    senna 8 8 mg/5 mL syrup, Take 10 mL (17 6 mg total) by mouth daily at bedtime as needed for constipation, Disp: 240 mL, Rfl: 0  Allergies   Allergen Reactions    Brimonidine Other (See Comments)    Sulfa Antibiotics Rash       OBJECTIVE    Vitals:   Vitals:    09/29/21 1111   BP: 132/74   Pulse: 86   Temp: 97 9 °F (36 6 °C)   SpO2: 97%   Weight: 69 9 kg (154 lb)   Height: 4' 7" (1 397 m)           Physical Exam  Vitals and nursing note reviewed  Constitutional:       General: She is not in acute distress  Appearance: Normal appearance  HENT:      Head: Normocephalic and atraumatic  Right Ear: External ear normal  There is impacted cerumen  Left Ear: External ear normal  There is impacted cerumen  Eyes:      Conjunctiva/sclera: Conjunctivae normal    Cardiovascular:      Rate and Rhythm: Normal rate and regular rhythm  Pulses: Pulses are weak  Dorsalis pedis pulses are 1+ on the right side and 1+ on the left side  Pulmonary:      Effort: Pulmonary effort is normal  No respiratory distress  Breath sounds: Normal breath sounds     Abdominal:      General: Bowel sounds are normal  There is no distension  Palpations: Abdomen is soft  Tenderness: There is no abdominal tenderness  Musculoskeletal:      Right lower leg: Edema (pedal) present  Left lower leg: Edema (pedal) present  Comments: Ambulates with rolater   Feet:      Right foot:      Skin integrity: Callus and dry skin present  Left foot:      Skin integrity: Callus and dry skin present  Lymphadenopathy:      Cervical: No cervical adenopathy  Skin:     General: Skin is warm and dry  Neurological:      General: No focal deficit present  Mental Status: She is alert  Psychiatric:         Mood and Affect: Mood normal               Patient's shoes and socks removed  Right Foot/Ankle   Right Foot Inspection  Skin Exam: dry skin, callus and callus                          Toe Exam:  no right toe deformity  Sensory   Vibration: intact    Monofilament testing: diminished  Vascular  Capillary refills: < 3 seconds  The right DP pulse is 1+  Left Foot/Ankle  Left Foot Inspection  Skin Exam: dry skin and callus                         Toe Exam: no left toe deformity                   Sensory   Vibration: intact    Monofilament: diminished  Vascular  Capillary refills: < 3 seconds  The left DP pulse is 1+  Assign Risk Category:  No deformity present;  Loss of protective sensation; Weak pulses       Risk: 2          DO Lili [facility]   9/29/2021  11:39 AM

## 2021-10-05 ENCOUNTER — TELEPHONE (OUTPATIENT)
Dept: NEPHROLOGY | Facility: CLINIC | Age: 76
End: 2021-10-05

## 2021-10-12 DIAGNOSIS — I10 ESSENTIAL HYPERTENSION: ICD-10-CM

## 2021-10-12 DIAGNOSIS — K21.9 GASTROESOPHAGEAL REFLUX DISEASE WITHOUT ESOPHAGITIS: ICD-10-CM

## 2021-10-12 RX ORDER — OMEPRAZOLE 20 MG/1
20 CAPSULE, DELAYED RELEASE ORAL DAILY
Qty: 90 CAPSULE | Refills: 1 | Status: SHIPPED | OUTPATIENT
Start: 2021-10-12 | End: 2022-03-04 | Stop reason: SDUPTHER

## 2021-10-12 RX ORDER — FOSINOPRIL SODIUM 20 MG/1
20 TABLET ORAL DAILY
Qty: 90 TABLET | Refills: 1 | Status: SHIPPED | OUTPATIENT
Start: 2021-10-12 | End: 2022-06-24 | Stop reason: SDUPTHER

## 2021-10-16 DIAGNOSIS — D68.9 COAGULOPATHY (HCC): ICD-10-CM

## 2021-10-18 RX ORDER — PENTOXIFYLLINE 400 MG/1
TABLET, EXTENDED RELEASE ORAL
Qty: 270 TABLET | Refills: 1 | Status: SHIPPED | OUTPATIENT
Start: 2021-10-18 | End: 2022-08-03

## 2021-11-08 ENCOUNTER — CONSULT (OUTPATIENT)
Dept: CARDIOLOGY CLINIC | Facility: CLINIC | Age: 76
End: 2021-11-08
Payer: COMMERCIAL

## 2021-11-08 VITALS
SYSTOLIC BLOOD PRESSURE: 128 MMHG | BODY MASS INDEX: 35.18 KG/M2 | WEIGHT: 152 LBS | HEIGHT: 55 IN | HEART RATE: 86 BPM | DIASTOLIC BLOOD PRESSURE: 60 MMHG | RESPIRATION RATE: 16 BRPM | OXYGEN SATURATION: 98 %

## 2021-11-08 DIAGNOSIS — I25.10 CORONARY ARTERY DISEASE INVOLVING NATIVE CORONARY ARTERY OF NATIVE HEART WITHOUT ANGINA PECTORIS: Primary | ICD-10-CM

## 2021-11-08 DIAGNOSIS — R07.9 CHEST PAIN AT REST: ICD-10-CM

## 2021-11-08 DIAGNOSIS — I73.9 PAD (PERIPHERAL ARTERY DISEASE) (HCC): ICD-10-CM

## 2021-11-08 DIAGNOSIS — I50.32 CHRONIC HEART FAILURE WITH PRESERVED EJECTION FRACTION (HCC): ICD-10-CM

## 2021-11-08 PROCEDURE — 93000 ELECTROCARDIOGRAM COMPLETE: CPT | Performed by: INTERNAL MEDICINE

## 2021-11-08 PROCEDURE — 3078F DIAST BP <80 MM HG: CPT | Performed by: INTERNAL MEDICINE

## 2021-11-08 PROCEDURE — 3074F SYST BP LT 130 MM HG: CPT | Performed by: INTERNAL MEDICINE

## 2021-11-08 PROCEDURE — 99204 OFFICE O/P NEW MOD 45 MIN: CPT | Performed by: INTERNAL MEDICINE

## 2021-11-17 DIAGNOSIS — E11.40 TYPE 2 DIABETES MELLITUS WITH DIABETIC NEUROPATHY, WITH LONG-TERM CURRENT USE OF INSULIN (HCC): ICD-10-CM

## 2021-11-17 DIAGNOSIS — Z79.4 TYPE 2 DIABETES MELLITUS WITH DIABETIC NEUROPATHY, WITH LONG-TERM CURRENT USE OF INSULIN (HCC): ICD-10-CM

## 2021-11-17 RX ORDER — INSULIN GLARGINE 100 [IU]/ML
30 INJECTION, SOLUTION SUBCUTANEOUS
Qty: 15 ML | Refills: 2 | Status: SHIPPED | OUTPATIENT
Start: 2021-11-17 | End: 2022-01-24 | Stop reason: SDUPTHER

## 2021-11-26 ENCOUNTER — HOSPITAL ENCOUNTER (EMERGENCY)
Facility: HOSPITAL | Age: 76
Discharge: HOME | End: 2021-11-26
Attending: EMERGENCY MEDICINE | Admitting: EMERGENCY MEDICINE
Payer: COMMERCIAL

## 2021-11-26 ENCOUNTER — TELEPHONE (OUTPATIENT)
Dept: FAMILY MEDICINE CLINIC | Facility: CLINIC | Age: 76
End: 2021-11-26

## 2021-11-26 ENCOUNTER — APPOINTMENT (EMERGENCY)
Dept: RADIOLOGY | Facility: HOSPITAL | Age: 76
End: 2021-11-26
Payer: COMMERCIAL

## 2021-11-26 VITALS
BODY MASS INDEX: 34.71 KG/M2 | RESPIRATION RATE: 19 BRPM | TEMPERATURE: 97.5 F | HEART RATE: 80 BPM | DIASTOLIC BLOOD PRESSURE: 63 MMHG | HEIGHT: 55 IN | WEIGHT: 150 LBS | SYSTOLIC BLOOD PRESSURE: 158 MMHG | OXYGEN SATURATION: 98 %

## 2021-11-26 DIAGNOSIS — L03.116 CELLULITIS OF LEFT FOOT: Primary | ICD-10-CM

## 2021-11-26 LAB
GLUCOSE SERPL-MCNC: 240 MG/DL (ref 65–140)
GLUCOSE SERPL-MCNC: 96 MG/DL (ref 65–140)

## 2021-11-26 PROCEDURE — 99283 EMERGENCY DEPT VISIT LOW MDM: CPT

## 2021-11-26 PROCEDURE — 73630 X-RAY EXAM OF FOOT: CPT

## 2021-11-26 PROCEDURE — 82948 REAGENT STRIP/BLOOD GLUCOSE: CPT

## 2021-11-26 PROCEDURE — 99284 EMERGENCY DEPT VISIT MOD MDM: CPT | Performed by: EMERGENCY MEDICINE

## 2021-11-26 RX ORDER — OXYCODONE HYDROCHLORIDE AND ACETAMINOPHEN 5; 325 MG/1; MG/1
1 TABLET ORAL ONCE
Status: COMPLETED | OUTPATIENT
Start: 2021-11-26 | End: 2021-11-26

## 2021-11-26 RX ORDER — CEPHALEXIN 250 MG/1
500 CAPSULE ORAL ONCE
Status: COMPLETED | OUTPATIENT
Start: 2021-11-26 | End: 2021-11-26

## 2021-11-26 RX ORDER — CEPHALEXIN 500 MG/1
500 CAPSULE ORAL EVERY 6 HOURS SCHEDULED
Qty: 40 CAPSULE | Refills: 0 | Status: SHIPPED | OUTPATIENT
Start: 2021-11-26 | End: 2021-12-07

## 2021-11-26 RX ORDER — OXYCODONE HYDROCHLORIDE AND ACETAMINOPHEN 5; 325 MG/1; MG/1
1 TABLET ORAL EVERY 4 HOURS PRN
Qty: 10 TABLET | Refills: 0 | Status: SHIPPED | OUTPATIENT
Start: 2021-11-26 | End: 2021-12-17

## 2021-11-26 RX ADMIN — CEPHALEXIN 500 MG: 250 CAPSULE ORAL at 21:08

## 2021-11-26 RX ADMIN — OXYCODONE HYDROCHLORIDE AND ACETAMINOPHEN 1 TABLET: 5; 325 TABLET ORAL at 21:08

## 2021-12-06 ENCOUNTER — TELEPHONE (OUTPATIENT)
Dept: NEPHROLOGY | Facility: CLINIC | Age: 76
End: 2021-12-06

## 2021-12-07 ENCOUNTER — CONSULT (OUTPATIENT)
Dept: NEPHROLOGY | Facility: CLINIC | Age: 76
End: 2021-12-07
Payer: COMMERCIAL

## 2021-12-07 VITALS
HEIGHT: 56 IN | SYSTOLIC BLOOD PRESSURE: 120 MMHG | RESPIRATION RATE: 16 BRPM | BODY MASS INDEX: 33.52 KG/M2 | DIASTOLIC BLOOD PRESSURE: 70 MMHG | WEIGHT: 149 LBS | TEMPERATURE: 97.2 F | HEART RATE: 78 BPM

## 2021-12-07 DIAGNOSIS — E11.40 TYPE 2 DIABETES MELLITUS WITH DIABETIC NEUROPATHY, WITH LONG-TERM CURRENT USE OF INSULIN (HCC): ICD-10-CM

## 2021-12-07 DIAGNOSIS — N18.9 CHRONIC KIDNEY DISEASE-MINERAL AND BONE DISORDER: Primary | ICD-10-CM

## 2021-12-07 DIAGNOSIS — I10 PRIMARY HYPERTENSION: ICD-10-CM

## 2021-12-07 DIAGNOSIS — E66.9 OBESITY (BMI 30-39.9): ICD-10-CM

## 2021-12-07 DIAGNOSIS — I25.10 CORONARY ARTERY DISEASE INVOLVING NATIVE CORONARY ARTERY OF NATIVE HEART WITHOUT ANGINA PECTORIS: ICD-10-CM

## 2021-12-07 DIAGNOSIS — M89.9 CHRONIC KIDNEY DISEASE-MINERAL AND BONE DISORDER: Primary | ICD-10-CM

## 2021-12-07 DIAGNOSIS — I50.32 CHRONIC HEART FAILURE WITH PRESERVED EJECTION FRACTION (HCC): ICD-10-CM

## 2021-12-07 DIAGNOSIS — Z79.4 TYPE 2 DIABETES MELLITUS WITH DIABETIC NEUROPATHY, WITH LONG-TERM CURRENT USE OF INSULIN (HCC): ICD-10-CM

## 2021-12-07 DIAGNOSIS — E83.9 CHRONIC KIDNEY DISEASE-MINERAL AND BONE DISORDER: Primary | ICD-10-CM

## 2021-12-07 DIAGNOSIS — N18.32 STAGE 3B CHRONIC KIDNEY DISEASE (HCC): ICD-10-CM

## 2021-12-07 PROCEDURE — 99204 OFFICE O/P NEW MOD 45 MIN: CPT | Performed by: INTERNAL MEDICINE

## 2021-12-08 ENCOUNTER — APPOINTMENT (OUTPATIENT)
Dept: LAB | Facility: CLINIC | Age: 76
End: 2021-12-08
Payer: COMMERCIAL

## 2021-12-08 ENCOUNTER — CONSULT (OUTPATIENT)
Dept: PULMONOLOGY | Facility: CLINIC | Age: 76
End: 2021-12-08
Payer: COMMERCIAL

## 2021-12-08 VITALS
BODY MASS INDEX: 33.47 KG/M2 | DIASTOLIC BLOOD PRESSURE: 80 MMHG | WEIGHT: 148.8 LBS | TEMPERATURE: 97 F | OXYGEN SATURATION: 95 % | HEIGHT: 56 IN | HEART RATE: 67 BPM | SYSTOLIC BLOOD PRESSURE: 124 MMHG

## 2021-12-08 DIAGNOSIS — N18.30 STAGE 3 CHRONIC KIDNEY DISEASE, UNSPECIFIED WHETHER STAGE 3A OR 3B CKD (HCC): ICD-10-CM

## 2021-12-08 DIAGNOSIS — E66.9 OBESITY (BMI 30-39.9): ICD-10-CM

## 2021-12-08 DIAGNOSIS — Z87.891 FORMER SMOKER: ICD-10-CM

## 2021-12-08 DIAGNOSIS — M89.9 CHRONIC KIDNEY DISEASE-MINERAL AND BONE DISORDER: ICD-10-CM

## 2021-12-08 DIAGNOSIS — N18.9 CHRONIC KIDNEY DISEASE-MINERAL AND BONE DISORDER: ICD-10-CM

## 2021-12-08 DIAGNOSIS — E83.9 CHRONIC KIDNEY DISEASE-MINERAL AND BONE DISORDER: ICD-10-CM

## 2021-12-08 DIAGNOSIS — R06.02 SOB (SHORTNESS OF BREATH): Primary | ICD-10-CM

## 2021-12-08 DIAGNOSIS — N18.32 STAGE 3B CHRONIC KIDNEY DISEASE (HCC): ICD-10-CM

## 2021-12-08 DIAGNOSIS — J41.0 SIMPLE CHRONIC BRONCHITIS (HCC): ICD-10-CM

## 2021-12-08 LAB
25(OH)D3 SERPL-MCNC: 61 NG/ML (ref 30–100)
ANION GAP SERPL CALCULATED.3IONS-SCNC: 6 MMOL/L (ref 4–13)
BASOPHILS # BLD AUTO: 0.07 THOUSANDS/ΜL (ref 0–0.1)
BASOPHILS NFR BLD AUTO: 1 % (ref 0–1)
BUN SERPL-MCNC: 29 MG/DL (ref 5–25)
CALCIUM SERPL-MCNC: 10.7 MG/DL (ref 8.3–10.1)
CHLORIDE SERPL-SCNC: 105 MMOL/L (ref 100–108)
CO2 SERPL-SCNC: 25 MMOL/L (ref 21–32)
CREAT SERPL-MCNC: 1.57 MG/DL (ref 0.6–1.3)
EOSINOPHIL # BLD AUTO: 2.37 THOUSAND/ΜL (ref 0–0.61)
EOSINOPHIL NFR BLD AUTO: 20 % (ref 0–6)
ERYTHROCYTE [DISTWIDTH] IN BLOOD BY AUTOMATED COUNT: 14.2 % (ref 11.6–15.1)
GFR SERPL CREATININE-BSD FRML MDRD: 31 ML/MIN/1.73SQ M
GLUCOSE P FAST SERPL-MCNC: 120 MG/DL (ref 65–99)
HCT VFR BLD AUTO: 44.3 % (ref 34.8–46.1)
HGB BLD-MCNC: 13.7 G/DL (ref 11.5–15.4)
IMM GRANULOCYTES # BLD AUTO: 0.04 THOUSAND/UL (ref 0–0.2)
IMM GRANULOCYTES NFR BLD AUTO: 0 % (ref 0–2)
LYMPHOCYTES # BLD AUTO: 2.14 THOUSANDS/ΜL (ref 0.6–4.47)
LYMPHOCYTES NFR BLD AUTO: 18 % (ref 14–44)
MCH RBC QN AUTO: 28.7 PG (ref 26.8–34.3)
MCHC RBC AUTO-ENTMCNC: 30.9 G/DL (ref 31.4–37.4)
MCV RBC AUTO: 93 FL (ref 82–98)
MONOCYTES # BLD AUTO: 0.51 THOUSAND/ΜL (ref 0.17–1.22)
MONOCYTES NFR BLD AUTO: 4 % (ref 4–12)
NEUTROPHILS # BLD AUTO: 7 THOUSANDS/ΜL (ref 1.85–7.62)
NEUTS SEG NFR BLD AUTO: 57 % (ref 43–75)
NRBC BLD AUTO-RTO: 0 /100 WBCS
PHOSPHATE SERPL-MCNC: 3.7 MG/DL (ref 2.3–4.1)
PLATELET # BLD AUTO: 234 THOUSANDS/UL (ref 149–390)
PMV BLD AUTO: 10.3 FL (ref 8.9–12.7)
POTASSIUM SERPL-SCNC: 5 MMOL/L (ref 3.5–5.3)
PTH-INTACT SERPL-MCNC: 42.5 PG/ML (ref 18.4–80.1)
RBC # BLD AUTO: 4.77 MILLION/UL (ref 3.81–5.12)
SODIUM SERPL-SCNC: 136 MMOL/L (ref 136–145)
WBC # BLD AUTO: 12.13 THOUSAND/UL (ref 4.31–10.16)

## 2021-12-08 PROCEDURE — 80048 BASIC METABOLIC PNL TOTAL CA: CPT

## 2021-12-08 PROCEDURE — 82306 VITAMIN D 25 HYDROXY: CPT

## 2021-12-08 PROCEDURE — 85025 COMPLETE CBC W/AUTO DIFF WBC: CPT

## 2021-12-08 PROCEDURE — 83970 ASSAY OF PARATHORMONE: CPT

## 2021-12-08 PROCEDURE — 84100 ASSAY OF PHOSPHORUS: CPT

## 2021-12-08 PROCEDURE — 36415 COLL VENOUS BLD VENIPUNCTURE: CPT

## 2021-12-08 PROCEDURE — 99204 OFFICE O/P NEW MOD 45 MIN: CPT | Performed by: INTERNAL MEDICINE

## 2021-12-08 RX ORDER — ALBUTEROL SULFATE 90 UG/1
2 AEROSOL, METERED RESPIRATORY (INHALATION) EVERY 6 HOURS PRN
Qty: 8 G | Refills: 1 | Status: SHIPPED | OUTPATIENT
Start: 2021-12-08 | End: 2022-06-24

## 2021-12-08 RX ORDER — ALBUTEROL SULFATE 2.5 MG/3ML
2.5 SOLUTION RESPIRATORY (INHALATION) EVERY 6 HOURS PRN
Qty: 360 ML | Refills: 1 | Status: SHIPPED | OUTPATIENT
Start: 2021-12-08 | End: 2022-06-24

## 2021-12-09 ENCOUNTER — TELEPHONE (OUTPATIENT)
Dept: NEPHROLOGY | Facility: CLINIC | Age: 76
End: 2021-12-09

## 2021-12-09 ENCOUNTER — TELEPHONE (OUTPATIENT)
Dept: FAMILY MEDICINE CLINIC | Facility: CLINIC | Age: 76
End: 2021-12-09

## 2021-12-10 ENCOUNTER — TELEPHONE (OUTPATIENT)
Dept: FAMILY MEDICINE CLINIC | Facility: CLINIC | Age: 76
End: 2021-12-10

## 2021-12-16 PROBLEM — E66.811 CLASS 1 OBESITY DUE TO EXCESS CALORIES WITH SERIOUS COMORBIDITY AND BODY MASS INDEX (BMI) OF 33.0 TO 33.9 IN ADULT: Status: ACTIVE | Noted: 2021-09-29

## 2021-12-16 PROBLEM — E66.09 CLASS 1 OBESITY DUE TO EXCESS CALORIES WITH SERIOUS COMORBIDITY AND BODY MASS INDEX (BMI) OF 33.0 TO 33.9 IN ADULT: Status: ACTIVE | Noted: 2021-09-29

## 2021-12-17 ENCOUNTER — OFFICE VISIT (OUTPATIENT)
Dept: FAMILY MEDICINE CLINIC | Facility: CLINIC | Age: 76
End: 2021-12-17
Payer: COMMERCIAL

## 2021-12-17 ENCOUNTER — TELEPHONE (OUTPATIENT)
Dept: ADMINISTRATIVE | Facility: OTHER | Age: 76
End: 2021-12-17

## 2021-12-17 VITALS
DIASTOLIC BLOOD PRESSURE: 78 MMHG | TEMPERATURE: 97 F | WEIGHT: 149 LBS | HEIGHT: 56 IN | HEART RATE: 63 BPM | OXYGEN SATURATION: 99 % | SYSTOLIC BLOOD PRESSURE: 114 MMHG | BODY MASS INDEX: 33.52 KG/M2

## 2021-12-17 DIAGNOSIS — Z00.00 MEDICARE ANNUAL WELLNESS VISIT, SUBSEQUENT: ICD-10-CM

## 2021-12-17 DIAGNOSIS — Z78.0 POSTMENOPAUSAL ESTROGEN DEFICIENCY: ICD-10-CM

## 2021-12-17 DIAGNOSIS — Z13.31 SCREENING FOR DEPRESSION: ICD-10-CM

## 2021-12-17 DIAGNOSIS — Z13.820 SCREENING FOR OSTEOPOROSIS: ICD-10-CM

## 2021-12-17 DIAGNOSIS — L03.116 CELLULITIS OF LEFT FOOT: Primary | ICD-10-CM

## 2021-12-17 PROCEDURE — 1170F FXNL STATUS ASSESSED: CPT | Performed by: FAMILY MEDICINE

## 2021-12-17 PROCEDURE — 1160F RVW MEDS BY RX/DR IN RCRD: CPT | Performed by: FAMILY MEDICINE

## 2021-12-17 PROCEDURE — 3078F DIAST BP <80 MM HG: CPT | Performed by: FAMILY MEDICINE

## 2021-12-17 PROCEDURE — 3725F SCREEN DEPRESSION PERFORMED: CPT | Performed by: FAMILY MEDICINE

## 2021-12-17 PROCEDURE — 1125F AMNT PAIN NOTED PAIN PRSNT: CPT | Performed by: FAMILY MEDICINE

## 2021-12-17 PROCEDURE — G0439 PPPS, SUBSEQ VISIT: HCPCS | Performed by: FAMILY MEDICINE

## 2021-12-17 PROCEDURE — G0444 DEPRESSION SCREEN ANNUAL: HCPCS | Performed by: FAMILY MEDICINE

## 2021-12-17 PROCEDURE — 3288F FALL RISK ASSESSMENT DOCD: CPT | Performed by: FAMILY MEDICINE

## 2021-12-17 PROCEDURE — 3074F SYST BP LT 130 MM HG: CPT | Performed by: FAMILY MEDICINE

## 2021-12-17 PROCEDURE — 99213 OFFICE O/P EST LOW 20 MIN: CPT | Performed by: FAMILY MEDICINE

## 2021-12-17 RX ORDER — CEPHALEXIN 500 MG/1
500 CAPSULE ORAL EVERY 12 HOURS SCHEDULED
Qty: 14 CAPSULE | Refills: 0 | Status: CANCELLED | OUTPATIENT
Start: 2021-12-17 | End: 2021-12-24

## 2022-01-12 ENCOUNTER — HOSPITAL ENCOUNTER (OUTPATIENT)
Dept: RADIOLOGY | Facility: HOSPITAL | Age: 77
Discharge: HOME | End: 2022-01-12
Payer: COMMERCIAL

## 2022-01-12 ENCOUNTER — HOSPITAL ENCOUNTER (OUTPATIENT)
Dept: PULMONOLOGY | Facility: HOSPITAL | Age: 77
Discharge: HOME | End: 2022-01-12
Attending: INTERNAL MEDICINE
Payer: COMMERCIAL

## 2022-01-12 DIAGNOSIS — J41.0 SIMPLE CHRONIC BRONCHITIS (HCC): ICD-10-CM

## 2022-01-12 DIAGNOSIS — R06.02 SOB (SHORTNESS OF BREATH): ICD-10-CM

## 2022-01-12 PROCEDURE — 94618 PULMONARY STRESS TESTING: CPT | Performed by: INTERNAL MEDICINE

## 2022-01-12 PROCEDURE — 94060 EVALUATION OF WHEEZING: CPT

## 2022-01-12 PROCEDURE — 94761 N-INVAS EAR/PLS OXIMETRY MLT: CPT

## 2022-01-12 PROCEDURE — 94727 GAS DIL/WSHOT DETER LNG VOL: CPT | Performed by: INTERNAL MEDICINE

## 2022-01-12 PROCEDURE — 94060 EVALUATION OF WHEEZING: CPT | Performed by: INTERNAL MEDICINE

## 2022-01-12 PROCEDURE — 94729 DIFFUSING CAPACITY: CPT | Performed by: INTERNAL MEDICINE

## 2022-01-12 PROCEDURE — 94729 DIFFUSING CAPACITY: CPT

## 2022-01-12 PROCEDURE — 71046 X-RAY EXAM CHEST 2 VIEWS: CPT

## 2022-01-12 PROCEDURE — 94727 GAS DIL/WSHOT DETER LNG VOL: CPT

## 2022-01-12 RX ORDER — ALBUTEROL SULFATE 2.5 MG/3ML
2.5 SOLUTION RESPIRATORY (INHALATION) ONCE
Status: COMPLETED | OUTPATIENT
Start: 2022-01-12 | End: 2022-01-12

## 2022-01-12 RX ADMIN — ALBUTEROL SULFATE 2.5 MG: 2.5 SOLUTION RESPIRATORY (INHALATION) at 14:00

## 2022-01-20 ENCOUNTER — TELEPHONE (OUTPATIENT)
Dept: OTHER | Facility: OTHER | Age: 77
End: 2022-01-20

## 2022-01-20 NOTE — TELEPHONE ENCOUNTER
Patient's son is looking to see if they can have the patient's appointment for 1/25 moved back to 12pm  That was what the office wrote on their original card

## 2022-01-20 NOTE — TELEPHONE ENCOUNTER
SPOKE TO PT'S SON AND DAUGHTER, PT DOESN'T HAVE TRANSPORTATION BEFORE 12 PM, PT WILL BE HERE AT 12 PM

## 2022-01-24 DIAGNOSIS — Z79.4 TYPE 2 DIABETES MELLITUS WITH DIABETIC NEUROPATHY, WITH LONG-TERM CURRENT USE OF INSULIN (HCC): ICD-10-CM

## 2022-01-24 DIAGNOSIS — E11.40 TYPE 2 DIABETES MELLITUS WITH DIABETIC NEUROPATHY, WITH LONG-TERM CURRENT USE OF INSULIN (HCC): ICD-10-CM

## 2022-01-24 RX ORDER — INSULIN GLARGINE 100 [IU]/ML
30 INJECTION, SOLUTION SUBCUTANEOUS
Qty: 15 ML | Refills: 2 | Status: SHIPPED | OUTPATIENT
Start: 2022-01-24 | End: 2022-06-24 | Stop reason: SDUPTHER

## 2022-01-24 RX ORDER — INSULIN GLARGINE 100 [IU]/ML
30 INJECTION, SOLUTION SUBCUTANEOUS
Qty: 15 ML | Refills: 2 | Status: SHIPPED | OUTPATIENT
Start: 2022-01-24 | End: 2022-01-24 | Stop reason: SDUPTHER

## 2022-01-25 ENCOUNTER — OFFICE VISIT (OUTPATIENT)
Dept: PULMONOLOGY | Facility: CLINIC | Age: 77
End: 2022-01-25

## 2022-01-25 VITALS
OXYGEN SATURATION: 94 % | SYSTOLIC BLOOD PRESSURE: 114 MMHG | HEART RATE: 78 BPM | DIASTOLIC BLOOD PRESSURE: 70 MMHG | RESPIRATION RATE: 16 BRPM

## 2022-01-25 DIAGNOSIS — Z87.891 FORMER SMOKER: ICD-10-CM

## 2022-01-25 DIAGNOSIS — E66.01 OBESITY, MORBID (HCC): ICD-10-CM

## 2022-01-25 DIAGNOSIS — R06.02 SOB (SHORTNESS OF BREATH): Primary | ICD-10-CM

## 2022-01-25 DIAGNOSIS — E66.9 OBESITY (BMI 30-39.9): ICD-10-CM

## 2022-01-25 DIAGNOSIS — J41.0 SIMPLE CHRONIC BRONCHITIS (HCC): ICD-10-CM

## 2022-01-25 PROCEDURE — 99214 OFFICE O/P EST MOD 30 MIN: CPT | Performed by: INTERNAL MEDICINE

## 2022-01-25 PROCEDURE — 3078F DIAST BP <80 MM HG: CPT | Performed by: INTERNAL MEDICINE

## 2022-01-25 PROCEDURE — 1160F RVW MEDS BY RX/DR IN RCRD: CPT | Performed by: INTERNAL MEDICINE

## 2022-01-25 PROCEDURE — 3074F SYST BP LT 130 MM HG: CPT | Performed by: INTERNAL MEDICINE

## 2022-01-25 NOTE — PROGRESS NOTES
Assessment/Plan:   Diagnoses and all orders for this visit:    SOB (shortness of breath)    Simple chronic bronchitis ([facility] [address] )  -     Spacer Device for Inhaler    Former smoker    Obesity (BMI 30-39  9)    Obesity, morbid (HCC)      shortness of breath and dyspnea on exertion chronic simple bronchitis in this patient with 40 pack-year smoking history who quit about 20 years ago, PFTs not demonstrate any evidence of any obstructive lung disease, mildly decreased DLCO  6 minute walk test without any need for supplemental oxygen  Chest x-ray PA and lateral view was normal   She does have relief with her shortness of breath and mucus cough and mucus with the use of albuterol via nebulizer discussed with the patient to use it 3 to 4 times a day as needed   MDI technique again reviewed with the patient and also give her a spacer to use along with the inhaler   Again discussed there is no need for any long-acting bronchodilators   If any worsening symptoms they will give us a call   Otherwise will see her back in 3 months   Vaccinations up-to-date    Return in about 3 months (around 4/25/2022)  All questions are answered to the patient's satisfaction and understanding  She verbalizes understanding  She is encouraged to call with any further questions or concerns  Portions of the record may have been created with voice recognition software  Occasional wrong word or "sound a like" substitutions may have occurred due to the inherent limitations of voice recognition software  Read the chart carefully and recognize, using context, where substitutions have occurred      Electronically Signed by Sahra Candelario MD    ______________________________________________________________________    Chief Complaint:   Chief Complaint   Patient presents with    Follow-up       Patient ID: Giovanni Becerra is a 64 y o  y o  female has a past medical history of Acute kidney injury superimposed on chronic kidney disease ([facility] [address] ) (11/26/2016), ADHD (attention deficit hyperactivity disorder) (3/17/2019), Boutonniere deformity (7/8/2017), Carpal tunnel syndrome, Chest pain (3/6/2017), Chronic kidney disease, Closed fracture of base of middle phalanx of finger (6/22/2017), Closed fracture of middle phalanx of left little finger (7/8/2017), Colloid cyst of brain ([facility] [address] ), COPD (chronic obstructive pulmonary disease) ([facility] [address] ), COPD (chronic obstructive pulmonary disease) ([facility] [address] ), Coronary artery disease, Cough, Diabetes mellitus ([facility] [address] ), GERD (gastroesophageal reflux disease), Glaucoma, History of brain disorder (10/7/2020), Hyperlipidemia, Hypertension, Irritable bowel syndrome, Melena (2/26/2019), Paronychia of great toe of left foot (10/7/2020), Post-traumatic seizures ([facility] [address] ) (7/23/2015), Renal disorder, Seizures ([facility] [address] ), Shortness of breath, Single seizure ([facility] [address] ) (5/31/2016), SOB (shortness of breath), Syncope and collapse (11/11/2020), Urinary tract infection without hematuria (11/26/2016), and Wheezing     1/25/2022  Patient presents today for follow-up visit  Antoine Ribeiro is a very pleasant 68-year-old lady here for evaluation for COPD she states with her son for this office visit  She has about 40 pack-year smoking history who quit 20 years ago, she was diagnosed with COPD for used to follow-up at [facility] but has not followed up with a pulmonologist there for about 2-3 years and currently wants to establish with lili Werner[facility] she states      She does have shortness of breath and dyspnea on exertion which she states is very gradually worsening, she used to be on bronchodilators in the past currently does not have any albuterol inhaler she does not remember if she was on any long-acting bronchodilators  Last visit she was given albuterol via nebulizer which she used once or twice a day and she states it did help her, but when she went in for the PFT they asked her to hold off on the medication so since then she states she thought she should not be taking it until she comes into the office so she has not taken it since her PFT testing and she has been short of breath she states, she has been trying to use her inhaler albuterol but because of the arthritis has not been able to use the inhaler      Review of Systems   Constitutional: Negative  HENT: Negative  Eyes: Negative  Respiratory: Positive for cough and shortness of breath  Cardiovascular: Negative  Gastrointestinal: Negative  Endocrine: Negative  Genitourinary: Negative  Musculoskeletal: Negative  Allergic/Immunologic: Negative  Neurological: Negative  Hematological: Negative  Psychiatric/Behavioral: Negative  Smoking history: She reports that she quit smoking about 21 years ago  She started smoking about 61 years ago  She has a 20 00 pack-year smoking history   She has never used smokeless tobacco     The following portions of the patient's history were reviewed and updated as appropriate: allergies, current medications, past family history, past medical history, past social history, past surgical history and problem list     Immunization History   Administered Date(s) Administered    COVID-19 J&J ([employer]) vaccine 0 5 mL 06/03/2021    Pneumococcal Conjugate 13-Valent 11/27/2017, 10/07/2020    Pneumococcal Polysaccharide PPV23 09/11/2019     Current Outpatient Medications   Medication Sig Dispense Refill    albuterol (2 5 mg/3 mL) 0 083 % nebulizer solution Take 3 mL (2 5 mg total) by nebulization every 6 (six) hours as needed for wheezing or shortness of breath 360 mL 1    albuterol (Ventolin HFA) 90 mcg/act inhaler Inhale 2 puffs every 6 (six) hours as needed for wheezing 8 g 1    aspirin (ECOTRIN LOW STRENGTH) 81 mg EC tablet Take 81 mg by mouth daily      atorvastatin (LIPITOR) 10 mg tablet Take 1 tablet (10 mg total) by mouth daily at bedtime 90 tablet 3    B-D ULTRAFINE III SHORT PEN 31G X 8 MM MISC inject UNDER THE SKIN DAILY 90 each 1    carboxymethylcellulose (REFRESH PLUS) 0 5 % SOLN As directed      cholecalciferol (VITAMIN D3) 1,000 units tablet Take 1,000 Units by mouth daily      Continuous Blood Gluc Sensor (FreeStyle Mary 14 Day Sensor) MISC Use 1 application every 14 (fourteen) days 6 each 3    fosinopril (MONOPRIL) 20 mg tablet Take 1 tablet (20 mg total) by mouth daily 90 tablet 1    furosemide (LASIX) 20 mg tablet Take 20 mg by mouth daily      insulin glargine (Lantus SoloStar) 100 units/mL injection pen Inject 30 Units under the skin daily at bedtime 15 mL 2    acetaminophen (TYLENOL) 650 mg CR tablet Take 650 mg by mouth   (Patient not taking: Reported on 1/25/2022 )      Continuous Blood Gluc  (FreeStyle Mary 14 Day [place]) YOLANDA Use 1 application 4 times day 1 each 0    insulin lispro (HumaLOG) 100 units/mL injection Inject 8 Units under the skin 3 (three) times a day with meals (Patient not taking: Reported on 11/8/2021 ) 7 2 mL 0    Insulin Pen Needle 31G X 8 MM MISC use as directed four times a day      ketoconazole (NIZORAL) 2 % cream Apply topically daily 15 g 0    latanoprost (XALATAN) 0 005 % ophthalmic solution INSTILL 1 DROP INTO LEFT EYE AT BEDTIME 2 5 mL 2    levETIRAcetam (KEPPRA) 250 mg tablet take 1 tablet by mouth every 12 hours 180 tablet 3    montelukast (SINGULAIR) 10 mg tablet take 1 tablet by mouth every evening 90 tablet 1    Multiple Vitamin (MULTI VITAMIN DAILY PO) Take 1 tablet by mouth daily      NovoLOG FlexPen 100 units/mL injection pen INJECT 12 UNITS BEFORE MEALS (3 MEALS) 15 mL 3    omeprazole (PriLOSEC) 20 mg delayed release capsule Take 1 capsule (20 mg total) by mouth daily 90 capsule 1    pentoxifylline (TRENtal) 400 mg ER tablet take 1 tablet by mouth three times a day with meals 270 tablet 1    Probiotic Product (PROBIOTIC DAILY PO) Take by mouth (Patient not taking: Reported on 1/25/2022 )       No current facility-administered medications for this visit       Allergies: Brimonidine and Sulfa antibiotics    Objective:  Vitals:    01/25/22 1226   BP: 114/70   Pulse: 78   Resp: 16   SpO2: 94%   Oxygen Therapy  SpO2: 94 % (Room air)    Wt Readings from Last 3 Encounters:   12/17/21 67 6 kg (149 lb)   12/08/21 67 5 kg (148 lb 12 8 oz)   12/07/21 67 6 kg (149 lb)     There is no height or weight on file to calculate BMI  Physical Exam  Vitals and nursing note reviewed  Constitutional:       Appearance: She is well-developed  HENT:      Head: Normocephalic and atraumatic  Eyes:      Conjunctiva/sclera: Conjunctivae normal       Pupils: Pupils are equal, round, and reactive to light  Neck:      Thyroid: No thyromegaly  Vascular: No JVD  Cardiovascular:      Rate and Rhythm: Normal rate and regular rhythm  Heart sounds: Normal heart sounds  No murmur heard  No friction rub  No gallop  Pulmonary:      Effort: Pulmonary effort is normal  No respiratory distress  Breath sounds: Normal breath sounds  No wheezing or rales  Chest:      Chest wall: No tenderness  Musculoskeletal:         General: No tenderness or deformity  Normal range of motion  Cervical back: Normal range of motion and neck supple  Lymphadenopathy:      Cervical: No cervical adenopathy  Skin:     General: Skin is warm and dry  Neurological:      Mental Status: She is alert and oriented to person, place, and time  Diagnostics:  I have personally reviewed pertinent films in PACS  XR chest pa & lateral    Result Date: 1/14/2022  Narrative: CHEST INDICATION:   J41 0: Simple chronic bronchitis  COMPARISON:  Chest radiograph from 11/12/2020 and abdomen CT from 7/22/2020  EXAM PERFORMED/VIEWS:  XR CHEST PA & LATERAL  DUAL ENERGY SUBTRACTION  FINDINGS: Cardiomediastinal silhouette appears unremarkable  The lungs are clear  No pneumothorax or pleural effusion  Cervical fusion  Cholecystectomy  Impression: No acute cardiopulmonary disease   Workstation performed: [id number]     Complete PFT with post Bronchodilator and Six Minute walk    Result Date: 2022  Narrative: Pulmonary Function and a 6 minute walk Test Report Mauricio Castro 64 y o  female MRN: [id number] Date of Testin2022 Requesting Physician: Keith Mckeon MD Reason for Testing:  Shortness of breath Reference set for interpretation: NHANES III Procedure: The patient was taken to pulmonary function testing laboratory  The patient demonstrated good effort and cooperation  The results of this test meet ATS standards for acceptability and repeatability  Data set appears appropriate for interpretation  Post bronchodilator testing performed after the administration of 2 5mg albuterol in 3cc normal saline administered via nebulizer per bronchodilator protocol  Results: FEV1/FVC Ratio:  77 Forced Vital Capacity:  1 80 L, 95 % predicted FEV1:  1 40 L, 95 % predicted After administration of bronchodilator FEV1:  1 53 L, 104% predicted with no appreciable response to the bronchodilator Lung volumes by  nitrogen wash out : Total Lung Capacity 88 % predicted Residual volume  76 % predicted DLCO corrected for patients hemoglobin level:  72 % Patient had a 6 minute walk test prior to the walk the blood pressure was 122/73 mmHg heart rate 73 beats per minute saturating 96% on room air on ambulation on room air oxygen saturation remained between 93-96% throughout, heart rate was between 64-87 beats per minute patient could walk a total distance of 122 m in 6 minutes  Stopped once because of back pain and neck pain  Interpretation: Patient had a full lung function testing with spirometry lung volumes and DLCO  And a 6 minute walk test Patient gave a good effort  Results meet the ats standards for acceptability and repeatability  The flow volume curve is normal   The spirometry is normal   The lung volumes are normal   The DLCO is mildly decreased    Patient could walk a total distance of 122 m in 6 minutes without the need for supplemental oxygen    Modesto Olivier MD [facility]

## 2022-02-02 DIAGNOSIS — J41.1 MUCOPURULENT CHRONIC BRONCHITIS (HCC): ICD-10-CM

## 2022-02-02 RX ORDER — MONTELUKAST SODIUM 10 MG/1
TABLET ORAL
Qty: 90 TABLET | Refills: 1 | Status: SHIPPED | OUTPATIENT
Start: 2022-02-02 | End: 2022-07-05

## 2022-02-11 DIAGNOSIS — E11.42 DIABETIC PERIPHERAL NEUROPATHY (HCC): ICD-10-CM

## 2022-02-11 RX ORDER — INSULIN ASPART 100 [IU]/ML
INJECTION, SOLUTION INTRAVENOUS; SUBCUTANEOUS
Qty: 15 ML | Refills: 3 | Status: SHIPPED | OUTPATIENT
Start: 2022-02-11 | End: 2022-06-24

## 2022-02-22 ENCOUNTER — TELEPHONE (OUTPATIENT)
Dept: NEPHROLOGY | Facility: CLINIC | Age: 77
End: 2022-02-22

## 2022-02-22 NOTE — TELEPHONE ENCOUNTER
Pt's son called regarding labs prior to 03/03 appt  Labs ordered, pt will get it done prior to 03/03 appt

## 2022-02-24 ENCOUNTER — HOSPITAL ENCOUNTER (OUTPATIENT)
Dept: BONE DENSITY | Facility: CLINIC | Age: 77
Discharge: HOME | End: 2022-02-24
Payer: COMMERCIAL

## 2022-02-24 DIAGNOSIS — Z78.0 POSTMENOPAUSAL ESTROGEN DEFICIENCY: ICD-10-CM

## 2022-02-24 DIAGNOSIS — Z13.820 SCREENING FOR OSTEOPOROSIS: ICD-10-CM

## 2022-02-24 PROCEDURE — 77080 DXA BONE DENSITY AXIAL: CPT

## 2022-02-26 ENCOUNTER — APPOINTMENT (OUTPATIENT)
Dept: LAB | Facility: CLINIC | Age: 77
End: 2022-02-26
Payer: COMMERCIAL

## 2022-02-26 DIAGNOSIS — N18.32 STAGE 3B CHRONIC KIDNEY DISEASE (HCC): ICD-10-CM

## 2022-02-26 LAB
25(OH)D3 SERPL-MCNC: 40.6 NG/ML (ref 30–100)
ANION GAP SERPL CALCULATED.3IONS-SCNC: 4 MMOL/L (ref 4–13)
BASOPHILS # BLD AUTO: 0.06 THOUSANDS/ΜL (ref 0–0.1)
BASOPHILS NFR BLD AUTO: 1 % (ref 0–1)
BUN SERPL-MCNC: 42 MG/DL (ref 5–25)
CALCIUM SERPL-MCNC: 10 MG/DL (ref 8.3–10.1)
CHLORIDE SERPL-SCNC: 112 MMOL/L (ref 100–108)
CO2 SERPL-SCNC: 25 MMOL/L (ref 21–32)
CREAT SERPL-MCNC: 1.47 MG/DL (ref 0.6–1.3)
EOSINOPHIL # BLD AUTO: 0.32 THOUSAND/ΜL (ref 0–0.61)
EOSINOPHIL NFR BLD AUTO: 4 % (ref 0–6)
ERYTHROCYTE [DISTWIDTH] IN BLOOD BY AUTOMATED COUNT: 13.2 % (ref 11.6–15.1)
GFR SERPL CREATININE-BSD FRML MDRD: 33 ML/MIN/1.73SQ M
GLUCOSE P FAST SERPL-MCNC: 76 MG/DL (ref 65–99)
HCT VFR BLD AUTO: 38.2 % (ref 34.8–46.1)
HGB BLD-MCNC: 12.1 G/DL (ref 11.5–15.4)
IMM GRANULOCYTES # BLD AUTO: 0.04 THOUSAND/UL (ref 0–0.2)
IMM GRANULOCYTES NFR BLD AUTO: 1 % (ref 0–2)
LYMPHOCYTES # BLD AUTO: 1.84 THOUSANDS/ΜL (ref 0.6–4.47)
LYMPHOCYTES NFR BLD AUTO: 23 % (ref 14–44)
MCH RBC QN AUTO: 28.5 PG (ref 26.8–34.3)
MCHC RBC AUTO-ENTMCNC: 31.7 G/DL (ref 31.4–37.4)
MCV RBC AUTO: 90 FL (ref 82–98)
MONOCYTES # BLD AUTO: 0.59 THOUSAND/ΜL (ref 0.17–1.22)
MONOCYTES NFR BLD AUTO: 7 % (ref 4–12)
NEUTROPHILS # BLD AUTO: 5.22 THOUSANDS/ΜL (ref 1.85–7.62)
NEUTS SEG NFR BLD AUTO: 64 % (ref 43–75)
NRBC BLD AUTO-RTO: 0 /100 WBCS
PHOSPHATE SERPL-MCNC: 3 MG/DL (ref 2.3–4.1)
PLATELET # BLD AUTO: 257 THOUSANDS/UL (ref 149–390)
PMV BLD AUTO: 10.6 FL (ref 8.9–12.7)
POTASSIUM SERPL-SCNC: 4.5 MMOL/L (ref 3.5–5.3)
PTH-INTACT SERPL-MCNC: 45.8 PG/ML (ref 18.4–80.1)
RBC # BLD AUTO: 4.24 MILLION/UL (ref 3.81–5.12)
SODIUM SERPL-SCNC: 141 MMOL/L (ref 136–145)
WBC # BLD AUTO: 8.07 THOUSAND/UL (ref 4.31–10.16)

## 2022-02-26 PROCEDURE — 80048 BASIC METABOLIC PNL TOTAL CA: CPT

## 2022-02-26 PROCEDURE — 36415 COLL VENOUS BLD VENIPUNCTURE: CPT

## 2022-02-26 PROCEDURE — 82306 VITAMIN D 25 HYDROXY: CPT

## 2022-02-26 PROCEDURE — 85025 COMPLETE CBC W/AUTO DIFF WBC: CPT

## 2022-02-26 PROCEDURE — 84100 ASSAY OF PHOSPHORUS: CPT

## 2022-02-26 PROCEDURE — 83970 ASSAY OF PARATHORMONE: CPT

## 2022-03-02 ENCOUNTER — TELEPHONE (OUTPATIENT)
Dept: NEPHROLOGY | Facility: CLINIC | Age: 77
End: 2022-03-02

## 2022-03-03 ENCOUNTER — OFFICE VISIT (OUTPATIENT)
Dept: NEPHROLOGY | Facility: CLINIC | Age: 77
End: 2022-03-03
Payer: COMMERCIAL

## 2022-03-03 VITALS
DIASTOLIC BLOOD PRESSURE: 70 MMHG | OXYGEN SATURATION: 99 % | RESPIRATION RATE: 16 BRPM | HEART RATE: 98 BPM | BODY MASS INDEX: 34.07 KG/M2 | HEIGHT: 55 IN | TEMPERATURE: 97.5 F | WEIGHT: 147.2 LBS | SYSTOLIC BLOOD PRESSURE: 120 MMHG

## 2022-03-03 DIAGNOSIS — I10 PRIMARY HYPERTENSION: ICD-10-CM

## 2022-03-03 DIAGNOSIS — M89.9 CHRONIC KIDNEY DISEASE-MINERAL AND BONE DISORDER: ICD-10-CM

## 2022-03-03 DIAGNOSIS — Z79.4 TYPE 2 DIABETES MELLITUS WITH DIABETIC NEUROPATHY, WITH LONG-TERM CURRENT USE OF INSULIN (HCC): ICD-10-CM

## 2022-03-03 DIAGNOSIS — E11.40 TYPE 2 DIABETES MELLITUS WITH DIABETIC NEUROPATHY, WITH LONG-TERM CURRENT USE OF INSULIN (HCC): ICD-10-CM

## 2022-03-03 DIAGNOSIS — N18.9 CHRONIC KIDNEY DISEASE-MINERAL AND BONE DISORDER: ICD-10-CM

## 2022-03-03 DIAGNOSIS — E83.9 CHRONIC KIDNEY DISEASE-MINERAL AND BONE DISORDER: ICD-10-CM

## 2022-03-03 DIAGNOSIS — N18.32 STAGE 3B CHRONIC KIDNEY DISEASE (HCC): Primary | ICD-10-CM

## 2022-03-03 PROCEDURE — 99214 OFFICE O/P EST MOD 30 MIN: CPT | Performed by: INTERNAL MEDICINE

## 2022-03-03 NOTE — ASSESSMENT & PLAN NOTE
Lab Results   Component Value Date    EGFR 33 02/26/2022    EGFR 31 12/08/2021    EGFR 41 06/24/2021    CREATININE 1 47 (H) 02/26/2022    CREATININE 1 57 (H) 12/08/2021    CREATININE 1 27 06/24/2021   Patient renal function seems stable with GFR of about 33  Likely etiology diabetic nephropathy  Asymptomatic and progressive nature of kidney disease discussed with the patient    Importance of hydration and avoiding nephrotoxic medicine also discussed with them

## 2022-03-03 NOTE — ASSESSMENT & PLAN NOTE
Lab Results   Component Value Date    HGBA1C 6 1 09/29/2021   Diabetes is reasonably well control    Importance of diabetic control and effect on kidney disease discussed with the patient

## 2022-03-03 NOTE — PATIENT INSTRUCTIONS
Chronic Kidney Disease   AMBULATORY CARE:   Chronic kidney disease (CKD)  is the gradual and permanent loss of kidney function  Normally, the kidneys remove fluid, chemicals, and waste from your blood  These wastes are turned into urine by your kidneys  CKD may worsen over time and lead to kidney failure  Common signs and symptoms include the following:   · Changes in how often you need to urinate    · Swelling in your arms, legs, or feet    · Shortness of breath    · Fatigue or weakness    · Bad or bitter taste in your mouth    · Nausea, vomiting, or loss of appetite    Call your local emergency number (911 in the [address] [facility]) if:   · You have a seizure  · You have shortness of breath  Seek care immediately if:   · You are confused and very drowsy  Call your doctor or nephrologist if:   · You suddenly gain or lose more weight than your healthcare provider has told you is okay  · You have itchy skin or a rash  · You urinate more or less than you normally do  · You have blood in your urine  · You have nausea and are vomiting  · You have fatigue or muscle weakness  · You have hiccups that will not stop  · You have questions or concerns about your condition or care  How CKD is diagnosed:  CKD has 5 stages  Your healthcare provider will use results from the following tests to find the stage of CKD you have:  · Blood and urine tests  show how well your kidneys are working  They may also show the cause of your CKD  · Ultrasound, CT scan, or MRI  pictures may be used to check your kidneys  You may be given contrast liquid to help your kidneys show up better in the pictures  Tell the healthcare provider if you have ever had an allergic reaction to contrast liquid  Do not enter the MRI room with anything metal  Metal can cause serious injury  Tell the healthcare provider if you have any metal in or on your body      · A biopsy  is a procedure to remove a small piece of tissue from your kidney  It is done to find the cause of your CKD  Treatment  can help control signs and symptoms, and prevent a worse stage of CKD  Your care team may include specialists, such as a dietitian or a heart specialist  This depends on the stage of your CKD and if you have other health conditions to manage  Healthcare providers will work with you to create a plan based on your decisions for treatment  Your treatment plan may include any of the following:  · Medicines  may be given to decrease your blood pressure and get rid of extra fluid  You may also receive medicine to manage health conditions that may occur with CKD, such as anemia, diabetes, and heart disease  · Dialysis  is a treatment to remove chemicals and waste from your blood when your kidneys can no longer do this  · Surgery  may be needed to create an arteriovenous fistula (AVF) in your arm or insert a catheter into your abdomen  This is done so you can receive dialysis  · A kidney transplant  may be done if your CKD becomes severe  What you can do to manage CKD: Management may include making some lifestyle changes  Tell your healthcare provider if you have any concerns about being able to make changes  He or she can help you find solutions, including working with specialists  Ask for help creating a plan to break large goals into smaller steps  Your plan may include any of the following:  · Manage other health conditions  Your healthcare provider will work with you to make a care plan that meets your needs  You will be checked regularly for heart disease or other conditions that can make CKD worse, such as diabetes  Your blood pressure will be closely monitored  You will also get a target blood pressure and help making a plan to reach your target  This may include taking your blood pressure at home  · Maintain a healthy weight  Your weight and body mass index (BMI) will be checked regularly   BMI helps find if your weight is healthy for your height  Your healthcare provider will use other tests to check your muscle and protein levels  Extra weight can strain your kidneys  A low weight or low muscle mass can make you feel more tired  You may have trouble doing your daily activities  Ask your provider what a healthy weight is for you  He or she can help you create a plan to lose or gain weight safely, if needed  The plan may include keeping a food diary  This is a list of foods and liquids you have each day  Your provider will use the diary to help you make changes, if needed  Changes are based on your health and any other conditions you have, such as diabetes  · Create an exercise plan  Regular exercise can help you manage CKD, high blood pressure, and diabetes  Exercise also helps control weight  Your provider can help you create exercise goals and a plan to reach those goals  For example, your goal may be to exercise for 30 minutes in a day  Your plan can include breaking exercise into 10 minute sessions, 3 times during the day  · Create a healthy eating plan  Your provider may tell you to eat food low in potassium, phosphorus, or protein  Your provider may also recommend vitamin or mineral supplements  Do not take any supplements without talking to your provider  A dietitian can help you plan meals if needed  Ask how much liquid to drink each day and which liquids are best for you  · Limit sodium (salt) as directed  You may need to limit sodium to less than 2,300 milligrams (mg) each day  Ask your dietitian or healthcare provider how much sodium you can have each day  The amount depends on your stage of kidney disease  Table salt, canned foods, soups, salted snacks, and processed meats, like deli meats and sausage, are high in sodium  Your provider or a dietitian can show you how to read food labels for sodium  · Limit alcohol as directed  Alcohol can cause fluid retention and can affect your kidneys   Ask how much alcohol is safe for you  A drink of alcohol is 12 ounces of beer, 5 ounces of wine, or 1½ ounces of liquor  · Do not smoke  Nicotine and other chemicals in cigarettes and cigars can cause kidney damage  Ask your provider for information if you currently smoke and need help to quit  E-cigarettes or smokeless tobacco still contain nicotine  Talk to your provider before you use these products  · Ask about over-the-counter medicines  Medicines such as NSAIDs and laxatives may harm your kidneys  Some cough and cold medicines can raise your blood pressure  Always ask if a medicine is safe before you take it  · Ask about vaccines you may need  CKD can increase your risk for infections such as pneumonia, influenza, and hepatitis  Vaccines lower your risk for infection  Your healthcare provider will tell you which vaccines you need and when to get them  Follow up with your doctor or nephrologist as directed: You will need to return for tests to monitor your kidney and nerve function, and your parathyroid hormone level  Your medicines may be changed, based on certain test results  Write down your questions so you remember to ask them during your visits  © Copyright [employer] 2022 Information is for End User's use only and may not be sold, redistributed or otherwise used for commercial purposes  All illustrations and images included in CareNotes® are the copyrighted property of [employer]  or Becky Batista  The above information is an  only  It is not intended as medical advice for individual conditions or treatments  Talk to your doctor, nurse or pharmacist before following any medical regimen to see if it is safe and effective for you

## 2022-03-03 NOTE — PROGRESS NOTES
NEPHROLOGY OFFICE FOLLOW UP  Stacia Dumont 64 y o  female MRN: [id number]    Encounter: [id number] 3/3/2022    REASON FOR VISIT: Stacia Dumont is a 64 y o  female who is here on 3/3/2022 for Chronic Kidney Disease and Follow-up    HPI:    Robel Camp came in today for follow-up of CKD  [de-identified] year woman who is doing well overall  Denies any acute complaint  Patient does COPD with some breathing problem being closely monitored by pulmonologist     Denies any chest pain or palpitation     Does have diffuse joint pain with hip and back pain but does not taking nonsteroidal pain killer     Denies any urinary complaint      REVIEW OF SYSTEMS:    Review of Systems   Constitutional: Negative for activity change and fatigue  HENT: Negative for congestion and ear discharge  Eyes: Negative for photophobia and pain  Respiratory: Negative for apnea and choking  Cardiovascular: Negative for chest pain and palpitations  Gastrointestinal: Negative for abdominal distention and blood in stool  Endocrine: Negative for heat intolerance and polyphagia  Genitourinary: Negative for flank pain and urgency  Musculoskeletal: Negative for neck pain and neck stiffness  Skin: Negative for color change and wound  Allergic/Immunologic: Negative for food allergies and immunocompromised state  Neurological: Negative for seizures and facial asymmetry  Hematological: Negative for adenopathy  Does not bruise/bleed easily  Psychiatric/Behavioral: Negative for self-injury and suicidal ideas           PAST MEDICAL HISTORY:  Past Medical History:   Diagnosis Date    Acute kidney injury superimposed on chronic kidney disease (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    Carpal tunnel syndrome     Chest pain 3/6/2017    Chronic kidney disease     Closed fracture of base of middle phalanx of finger 6/22/2017    Closed fracture of middle phalanx of left little finger 7/8/2017    Colloid cyst of brain (HCC)     COPD (chronic obstructive pulmonary disease) (HCC)     COPD (chronic obstructive pulmonary disease) (HCC)     Coronary artery disease     Cough     Diabetes mellitus (HCC)     GERD (gastroesophageal reflux disease)     Glaucoma     History of brain disorder 10/7/2020    Hyperlipidemia     Hypertension     Irritable bowel syndrome     Melena 2/26/2019    Paronychia of great toe of left foot 10/7/2020    Post-traumatic seizures ([facility] ) 7/23/2015    Renal disorder     Seizures ([facility] )     last 2015    Shortness of breath     Single seizure ([facility] ) 5/31/2016    SOB (shortness of breath)     Syncope and collapse 11/11/2020    Urinary tract infection without hematuria 11/26/2016    Wheezing        PAST SURGICAL HISTORY:  Past Surgical History:   Procedure Laterality Date    BRAIN SURGERY      CATARACT EXTRACTION      CHOLECYSTECTOMY      COLECTOMY RADHA      DECOMPRESSION SPINE LUMBAR POSTERIOR  08/19/2019    HERNIA REPAIR      HYSTERECTOMY      INCISION TENDON SHEATH HAND      NECK SURGERY  05/24/2018    NEUROPLASTY / TRANSPOSITION MEDIAN NERVE AT CARPAL TUNNEL      [place] COLONOSCOPY FLX DX W/COLLJ SPEC WHEN PFRMD N/A 3/26/2019    Procedure: COLONOSCOPY;  Surgeon: Tarun Garcia MD;  [facility]; Service: Gastroenterology    MN ESOPHAGOGASTRODUODENOSCOPY TRANSORAL DIAGNOSTIC N/A 3/26/2019    Procedure: ESOPHAGOGASTRODUODENOSCOPY (EGD); Surgeon: Tarun Garcia MD;  [facility];   Service: Gastroenterology    REPLACEMENT TOTAL KNEE Right     RETINAL DETACHMENT SURGERY      TUBAL LIGATION         SOCIAL HISTORY:  Social History     Substance and Sexual Activity   Alcohol Use Never     Social History     Substance and Sexual Activity   Drug Use Never     Social History     Tobacco Use   Smoking Status Former Smoker    Packs/day: 0 50    Years: 40 00    Pack years: 20 00    Start date: 1961    Quit date:     Years since quittin 1   Smokeless Tobacco Never Used   Tobacco Comment    stopped many years ago       FAMILY HISTORY:  Family History   Problem Relation Age of Onset    Asthma Mother     Heart disease Mother     Emphysema Father     Cancer Father     Prostate cancer Father     Kidney cancer Sister     Diabetes Sister     Kidney disease Sister     Brain cancer Brother     Bone cancer Brother     Heart disease Brother        MEDICATIONS:    Current Outpatient Medications:     acetaminophen (TYLENOL) 650 mg CR tablet, Take 650 mg by mouth  , Disp: , Rfl:     albuterol (2 5 mg/3 mL) 0 083 % nebulizer solution, Take 3 mL (2 5 mg total) by nebulization every 6 (six) hours as needed for wheezing or shortness of breath, Disp: 360 mL, Rfl: 1    albuterol (Ventolin HFA) 90 mcg/act inhaler, Inhale 2 puffs every 6 (six) hours as needed for wheezing, Disp: 8 g, Rfl: 1    aspirin (ECOTRIN LOW STRENGTH) 81 mg EC tablet, Take 81 mg by mouth daily, Disp: , Rfl:     atorvastatin (LIPITOR) 10 mg tablet, Take 1 tablet (10 mg total) by mouth daily at bedtime, Disp: 90 tablet, Rfl: 3    B-D ULTRAFINE III SHORT PEN 31G X 8 MM MISC, inject UNDER THE SKIN DAILY, Disp: 90 each, Rfl: 1    carboxymethylcellulose (REFRESH PLUS) 0 5 % SOLN, As directed, Disp: , Rfl:     cholecalciferol (VITAMIN D3) 1,000 units tablet, Take 1,000 Units by mouth daily, Disp: , Rfl:     Continuous Blood Gluc  (FreeStyle Mary 14 Day [place]) [facility], Use 1 application 4 times day, Disp: 1 each, Rfl: 0    Continuous Blood Gluc Sensor (FreeStyle Mary 14 Day Sensor) MISC, Use 1 application every 14 (fourteen) days, Disp: 6 each, Rfl: 3    fosinopril (MONOPRIL) 20 mg tablet, Take 1 tablet (20 mg total) by mouth daily, Disp: 90 tablet, Rfl: 1    furosemide (LASIX) 20 mg tablet, Take 20 mg by mouth daily, Disp: , Rfl:     insulin glargine (Lantus SoloStar) 100 units/mL injection pen, Inject 30 Units under the skin daily at bedtime, Disp: 15 mL, Rfl: 2    Insulin Pen Needle 31G X 8 MM MISC, use as directed four times a day, Disp: , Rfl:     latanoprost (XALATAN) 0 005 % ophthalmic solution, INSTILL 1 DROP INTO LEFT EYE AT BEDTIME, Disp: 2 5 mL, Rfl: 2    levETIRAcetam (KEPPRA) 250 mg tablet, take 1 tablet by mouth every 12 hours, Disp: 180 tablet, Rfl: 3    montelukast (SINGULAIR) 10 mg tablet, take 1 tablet by mouth every evening, Disp: 90 tablet, Rfl: 1    Multiple Vitamin (MULTI VITAMIN DAILY PO), Take 1 tablet by mouth daily, Disp: , Rfl:     NovoLOG FlexPen 100 units/mL injection pen, INJECT 12 UNITS BEFORE MEALS (3 MEALS) (Patient taking differently: Pt injects 12 units before meals (4 Meals) ), Disp: 15 mL, Rfl: 3    omeprazole (PriLOSEC) 20 mg delayed release capsule, Take 1 capsule (20 mg total) by mouth daily (Patient taking differently: Take 20 mg by mouth 2 (two) times a day  ), Disp: 90 capsule, Rfl: 1    pentoxifylline (TRENtal) 400 mg ER tablet, take 1 tablet by mouth three times a day with meals, Disp: 270 tablet, Rfl: 1    Probiotic Product (PROBIOTIC DAILY PO), Take by mouth  , Disp: , Rfl:     insulin lispro (HumaLOG) 100 units/mL injection, Inject 8 Units under the skin 3 (three) times a day with meals (Patient not taking: Reported on 11/8/2021 ), Disp: 7 2 mL, Rfl: 0    ketoconazole (NIZORAL) 2 % cream, Apply topically daily (Patient not taking: Reported on 3/3/2022 ), Disp: 15 g, Rfl: 0    PHYSICAL EXAM:  Vitals:    03/03/22 1047   BP: 120/70   BP Location: Right arm   Patient Position: Sitting   Pulse: 98   Resp: 16   Temp: 97 5 °F (36 4 °C)   TempSrc: Temporal   SpO2: 99%   Weight: 66 8 kg (147 lb 3 2 oz)   Height: 4' 7" (1 397 m)     Body mass index is 34 21 kg/m²  Physical Exam  Constitutional:       General: She is not in acute distress  Appearance: She is well-developed  HENT:      Head: Normocephalic  Eyes:      General: No scleral icterus       Conjunctiva/sclera: Conjunctivae normal  Pupils: Pupils are equal, round, and reactive to light  Neck:      Vascular: No JVD  Cardiovascular:      Rate and Rhythm: Normal rate  Heart sounds: Normal heart sounds  Pulmonary:      Effort: Pulmonary effort is normal       Breath sounds: No wheezing  Abdominal:      General: Bowel sounds are normal  There is no distension  Palpations: Abdomen is soft  Tenderness: There is no abdominal tenderness  Musculoskeletal:         General: No swelling  Normal range of motion  Cervical back: Neck supple  Skin:     General: Skin is warm  Findings: No rash  Neurological:      Mental Status: She is alert and oriented to person, place, and time     Psychiatric:         Behavior: Behavior normal          LAB RESULTS:  Results for orders placed or performed in visit on 77/13/11   Basic metabolic panel   Result Value Ref Range    Sodium 141 136 - 145 mmol/L    Potassium 4 5 3 5 - 5 3 mmol/L    Chloride 112 (H) 100 - 108 mmol/L    CO2 25 21 - 32 mmol/L    ANION GAP 4 4 - 13 mmol/L    BUN 42 (H) 5 - 25 mg/dL    Creatinine 1 47 (H) 0 60 - 1 30 mg/dL    Glucose, Fasting 76 65 - 99 mg/dL    Calcium 10 0 8 3 - 10 1 mg/dL    eGFR 33 ml/min/1 73sq m   CBC and differential   Result Value Ref Range    WBC 8 07 4 31 - 10 16 Thousand/uL    RBC 4 24 3 81 - 5 12 Million/uL    Hemoglobin 12 1 11 5 - 15 4 g/dL    Hematocrit 38 2 34 8 - 46 1 %    MCV 90 82 - 98 fL    MCH 28 5 26 8 - 34 3 pg    MCHC 31 7 31 4 - 37 4 g/dL    RDW 13 2 11 6 - 15 1 %    MPV 10 6 8 9 - 12 7 fL    Platelets 739 044 - 635 Thousands/uL    nRBC 0 /100 WBCs    Neutrophils Relative 64 43 - 75 %    Immat GRANS % 1 0 - 2 %    Lymphocytes Relative 23 14 - 44 %    Monocytes Relative 7 4 - 12 %    Eosinophils Relative 4 0 - 6 %    Basophils Relative 1 0 - 1 %    Neutrophils Absolute 5 22 1 85 - 7 62 Thousands/µL    Immature Grans Absolute 0 04 0 00 - 0 20 Thousand/uL    Lymphocytes Absolute 1 84 0 60 - 4 47 Thousands/µL    Monocytes Absolute 0 59 0 17 - 1 22 Thousand/µL    Eosinophils Absolute 0 32 0 00 - 0 61 Thousand/µL    Basophils Absolute 0 06 0 00 - 0 10 Thousands/µL   Phosphorus   Result Value Ref Range    Phosphorus 3 0 2 3 - 4 1 mg/dL   PTH, intact   Result Value Ref Range    PTH 45 8 18 4 - 80 1 pg/mL   Vitamin D 25 hydroxy   Result Value Ref Range    Vit D, 25-Hydroxy 40 6 30 0 - 100 0 ng/mL       ASSESSMENT and PLAN:      Stage 3b chronic kidney disease (HCC)  Lab Results   Component Value Date    EGFR 33 02/26/2022    EGFR 31 12/08/2021    EGFR 41 06/24/2021    CREATININE 1 47 (H) 02/26/2022    CREATININE 1 57 (H) 12/08/2021    CREATININE 1 27 06/24/2021   Patient renal function seems stable with GFR of about 33  Likely etiology diabetic nephropathy  Asymptomatic and progressive nature of kidney disease discussed with the patient  Importance of hydration and avoiding nephrotoxic medicine also discussed with them    Chronic kidney disease-mineral and bone disorder  Lab Results   Component Value Date    EGFR 33 02/26/2022    EGFR 31 12/08/2021    EGFR 41 06/24/2021    CREATININE 1 47 (H) 02/26/2022    CREATININE 1 57 (H) 12/08/2021    CREATININE 1 27 06/24/2021   PTH and phosphorus along with vitamin-D are within acceptable range and will continue to monitor    Type 2 diabetes mellitus with diabetic neuropathy [facility])    Lab Results   Component Value Date    HGBA1C 6 1 09/29/2021   Diabetes is reasonably well control  Importance of diabetic control and effect on kidney disease discussed with the patient    Hypertension  Very well controlled with present medication        Everything discussed with patient at length  I will see her back in 4 months  Will get blood and urine test before that visit      Portions of the record may have been created with voice recognition software  Occasional wrong word or "sound a like" substitutions may have occurred due to the inherent limitations of voice recognition software   Read the chart carefully and recognize, using context, where substitutions have occurred  If you have any questions, please contact the dictating provider

## 2022-03-03 NOTE — ASSESSMENT & PLAN NOTE
Lab Results   Component Value Date    EGFR 33 02/26/2022    EGFR 31 12/08/2021    EGFR 41 06/24/2021    CREATININE 1 47 (H) 02/26/2022    CREATININE 1 57 (H) 12/08/2021    CREATININE 1 27 06/24/2021   PTH and phosphorus along with vitamin-D are within acceptable range and will continue to monitor

## 2022-03-04 DIAGNOSIS — K21.9 GASTROESOPHAGEAL REFLUX DISEASE WITHOUT ESOPHAGITIS: ICD-10-CM

## 2022-03-04 RX ORDER — OMEPRAZOLE 20 MG/1
20 CAPSULE, DELAYED RELEASE ORAL 2 TIMES DAILY
Qty: 180 CAPSULE | Refills: 1 | Status: SHIPPED | OUTPATIENT
Start: 2022-03-04 | End: 2022-06-24 | Stop reason: SDUPTHER

## 2022-03-07 DIAGNOSIS — R56.9 SEIZURE (HCC): ICD-10-CM

## 2022-03-07 RX ORDER — LEVETIRACETAM 250 MG/1
TABLET ORAL
Qty: 180 TABLET | Refills: 3 | Status: SHIPPED | OUTPATIENT
Start: 2022-03-07

## 2022-03-18 ENCOUNTER — OFFICE VISIT (OUTPATIENT)
Dept: FAMILY MEDICINE CLINIC | Facility: CLINIC | Age: 77
End: 2022-03-18
Payer: COMMERCIAL

## 2022-03-18 VITALS
HEART RATE: 76 BPM | HEIGHT: 55 IN | DIASTOLIC BLOOD PRESSURE: 60 MMHG | BODY MASS INDEX: 33.56 KG/M2 | WEIGHT: 145 LBS | SYSTOLIC BLOOD PRESSURE: 106 MMHG | OXYGEN SATURATION: 97 % | TEMPERATURE: 98.5 F

## 2022-03-18 DIAGNOSIS — M89.9 CHRONIC KIDNEY DISEASE-MINERAL AND BONE DISORDER: ICD-10-CM

## 2022-03-18 DIAGNOSIS — I73.9 PAD (PERIPHERAL ARTERY DISEASE) (HCC): ICD-10-CM

## 2022-03-18 DIAGNOSIS — I25.10 CORONARY ARTERY DISEASE INVOLVING NATIVE CORONARY ARTERY OF NATIVE HEART WITHOUT ANGINA PECTORIS: ICD-10-CM

## 2022-03-18 DIAGNOSIS — I50.32 CHRONIC HEART FAILURE WITH PRESERVED EJECTION FRACTION (HCC): ICD-10-CM

## 2022-03-18 DIAGNOSIS — G40.909 SEIZURE DISORDER (HCC): ICD-10-CM

## 2022-03-18 DIAGNOSIS — I10 PRIMARY HYPERTENSION: ICD-10-CM

## 2022-03-18 DIAGNOSIS — Z79.4 TYPE 2 DIABETES MELLITUS WITH DIABETIC NEUROPATHY, WITH LONG-TERM CURRENT USE OF INSULIN (HCC): Primary | ICD-10-CM

## 2022-03-18 DIAGNOSIS — E11.42 DIABETIC PERIPHERAL NEUROPATHY (HCC): ICD-10-CM

## 2022-03-18 DIAGNOSIS — E78.5 HYPERLIPIDEMIA ASSOCIATED WITH TYPE 2 DIABETES MELLITUS (HCC): ICD-10-CM

## 2022-03-18 DIAGNOSIS — M25.552 BILATERAL HIP PAIN: ICD-10-CM

## 2022-03-18 DIAGNOSIS — N18.9 CHRONIC KIDNEY DISEASE-MINERAL AND BONE DISORDER: ICD-10-CM

## 2022-03-18 DIAGNOSIS — N18.32 STAGE 3B CHRONIC KIDNEY DISEASE (HCC): ICD-10-CM

## 2022-03-18 DIAGNOSIS — I73.9 CLAUDICATION (HCC): ICD-10-CM

## 2022-03-18 DIAGNOSIS — E11.40 TYPE 2 DIABETES MELLITUS WITH DIABETIC NEUROPATHY, WITH LONG-TERM CURRENT USE OF INSULIN (HCC): Primary | ICD-10-CM

## 2022-03-18 DIAGNOSIS — M25.551 BILATERAL HIP PAIN: ICD-10-CM

## 2022-03-18 DIAGNOSIS — E11.69 HYPERLIPIDEMIA ASSOCIATED WITH TYPE 2 DIABETES MELLITUS (HCC): ICD-10-CM

## 2022-03-18 DIAGNOSIS — E83.9 CHRONIC KIDNEY DISEASE-MINERAL AND BONE DISORDER: ICD-10-CM

## 2022-03-18 DIAGNOSIS — J41.1 MUCOPURULENT CHRONIC BRONCHITIS (HCC): ICD-10-CM

## 2022-03-18 PROBLEM — E66.9 OBESITY (BMI 30-39.9): Status: RESOLVED | Noted: 2019-05-15 | Resolved: 2022-03-18

## 2022-03-18 PROBLEM — E66.01 OBESITY, MORBID (HCC): Status: RESOLVED | Noted: 2022-01-25 | Resolved: 2022-03-18

## 2022-03-18 PROBLEM — M85.89 OSTEOPENIA OF MULTIPLE SITES: Status: ACTIVE | Noted: 2022-03-18

## 2022-03-18 PROBLEM — D68.9 COAGULOPATHY (HCC): Status: RESOLVED | Noted: 2019-05-15 | Resolved: 2022-03-18

## 2022-03-18 LAB — SL AMB POCT HEMOGLOBIN AIC: 6.3 (ref ?–6.5)

## 2022-03-18 PROCEDURE — 3078F DIAST BP <80 MM HG: CPT | Performed by: FAMILY MEDICINE

## 2022-03-18 PROCEDURE — 1160F RVW MEDS BY RX/DR IN RCRD: CPT | Performed by: FAMILY MEDICINE

## 2022-03-18 PROCEDURE — 83036 HEMOGLOBIN GLYCOSYLATED A1C: CPT | Performed by: FAMILY MEDICINE

## 2022-03-18 PROCEDURE — 3074F SYST BP LT 130 MM HG: CPT | Performed by: FAMILY MEDICINE

## 2022-03-18 PROCEDURE — 99214 OFFICE O/P EST MOD 30 MIN: CPT | Performed by: FAMILY MEDICINE

## 2022-03-18 PROCEDURE — 3725F SCREEN DEPRESSION PERFORMED: CPT | Performed by: FAMILY MEDICINE

## 2022-03-18 NOTE — PROGRESS NOTES
Sudeep Dose 1945 female MRN: [id number]      ASSESSMENT/PLAN  Problem List Items Addressed This Visit        Endocrine    Diabetic peripheral neuropathy ([place] [address] )    Hyperlipidemia associated with type 2 diabetes mellitus ([place] [address] )    Type 2 diabetes mellitus with diabetic neuropathy ([place] [address] ) - Primary    Relevant Orders    POCT hemoglobin A1c       Respiratory    COPD (chronic obstructive pulmonary disease) (HCC)       Cardiovascular and Mediastinum    Chronic heart failure with preserved ejection fraction (HCC)    Coronary artery disease without angina pectoris    Hypertension    PAD (peripheral artery disease) ([facility])       Nervous and Auditory    Seizure disorder (HCC)       Genitourinary    Chronic kidney disease-mineral and bone disorder    Stage 3b chronic kidney disease (HCC)       Other    Class 1 obesity due to excess calories with serious comorbidity and body mass index (BMI) of 34 0 to 34 9 in adult    Claudication ([place] [address] )        DM w/ neuropathy: A1c 6 3% (up from 6 1%); blood sugars are a bit labile, pt adjusts insulin on her own   HTN: Discussed she is low-normal, could consider cutting back on ACE; pt would like to continue current regimen as it is stable   HLD: Continue Lipitor   CAD/HFpEF: Stable, asymptomatic  PAD/Claudication: Persistent LE pain   Will XR B/L hips, as pt states that it where her pain originates, though it is likely combination of PAD and neuropathy   CKD3a with bone disease: Stable, f/u with Nephro as scheduled   COPD: Stable, f/u with Pulm   Seizure Dx: Asymptomatic     Pt agreeable to Prolia -- will start prior auth      Future Appointments   Date Time Provider Steffany Santanai   4/13/2022  9:20 AM MD SILVINO [facility]   7/5/2022 11:00 AM Noam Portillo MD [address] [place] [address]   12/19/2022  9:20 AM DO ELISA Lambert  [facility]          SUBJECTIVE  CC: Diabetes (Patient seen in office today for a 3 month check up) and Results (Had Dexa Scan done )      HPI:  Neil Coronado is a 64 y o  female who presents with her son for chronic follow up as detailed below  DM w/ neuropathy: Home sugars 110s; intermittent low sugars when she is having a lot of GI symptoms   HTN: Home BPs 100s/50s  HLD: Tolerating statin without issue   CAD/HFpEF: ROS as below   PAD/Claudication: Continues B/L LE pain   CKD3a with bone disease: Following with Nephro, stable on last check   COPD: Breathing is "okay"; sometimes uses nebulizer; follows with Pulm   Seizure Dx: No breakthrough symptoms     DEXA scan showed osteopenia + high FRAX risk   Needs prior auth for omeprazole     Review of Systems   Constitutional: Negative for diaphoresis  Respiratory: Positive for shortness of breath  Cardiovascular: Negative for chest pain and palpitations  Gastrointestinal: Positive for diarrhea  Endocrine: Negative for polydipsia and polyuria  Musculoskeletal: Positive for arthralgias and myalgias  Neurological: Negative for dizziness, tremors and seizures         Historical Information   The patient history was reviewed and updated as follows:    Past Medical History:   Diagnosis Date    Acute kidney injury superimposed on chronic kidney disease ([facility] ) 11/26/2016    ADHD (attention deficit hyperactivity disorder) 3/17/2019    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    Carpal tunnel syndrome     Chest pain 3/6/2017    Chronic kidney disease     Closed fracture of base of middle phalanx of finger 6/22/2017    Closed fracture of middle phalanx of left little finger 7/8/2017    Coagulopathy ([facility] ) 5/15/2019    Colloid cyst of brain (HCC)     COPD (chronic obstructive pulmonary disease) (HCC)     COPD (chronic obstructive pulmonary disease) (HCC)     Coronary artery disease     Cough     Diabetes mellitus (HCC)     GERD (gastroesophageal reflux disease)     Glaucoma     History of brain disorder 10/7/2020    Hyperlipidemia     Hypertension     Irritable bowel syndrome     Melena 2019    Paronychia of great toe of left foot 10/7/2020    Post-traumatic seizures ([facility] ) 2015    Renal disorder     Seizures ([facility] )     last     Shortness of breath     Single seizure ([facility] ) 2016    SOB (shortness of breath)     Syncope and collapse 2020    Urinary tract infection without hematuria 2016    Wheezing      Past Surgical History:   Procedure Laterality Date    BRAIN SURGERY      CATARACT EXTRACTION      CHOLECYSTECTOMY      COLECTOMY RADHA      DECOMPRESSION SPINE LUMBAR POSTERIOR  2019    HERNIA REPAIR      HYSTERECTOMY      INCISION TENDON SHEATH HAND      NECK SURGERY  2018    NEUROPLASTY / TRANSPOSITION MEDIAN NERVE AT CARPAL TUNNEL      [place] COLONOSCOPY FLX DX W/COLLJ SPEC WHEN PFRMD N/A 3/26/2019    Procedure: COLONOSCOPY;  Surgeon: Mckay Cortez MD;  [facility]; Service: Gastroenterology    VA ESOPHAGOGASTRODUODENOSCOPY TRANSORAL DIAGNOSTIC N/A 3/26/2019    Procedure: ESOPHAGOGASTRODUODENOSCOPY (EGD); Surgeon: Mckay Cortez MD;  [facility];   Service: Gastroenterology    REPLACEMENT TOTAL KNEE Right     RETINAL DETACHMENT SURGERY      TUBAL LIGATION       Family History   Problem Relation Age of Onset    Asthma Mother     Heart disease Mother     Emphysema Father     Cancer Father     Prostate cancer Father     Kidney cancer Sister     Diabetes Sister     Kidney disease Sister     Brain cancer Brother     Bone cancer Brother     Heart disease Brother       Social History   Social History     Substance and Sexual Activity   Alcohol Use Never     Social History     Substance and Sexual Activity   Drug Use Never     Social History     Tobacco Use   Smoking Status Former Smoker    Packs/day: 0 50    Years: 40 00    Pack years: 20 00    Start date:     Quit date:     Years since quittin 2   Smokeless Tobacco Never Used   Tobacco Comment    stopped many years ago       Medications: Current Outpatient Medications:     acetaminophen (TYLENOL) 650 mg CR tablet, Take 650 mg by mouth  , Disp: , Rfl:     albuterol (2 5 mg/3 mL) 0 083 % nebulizer solution, Take 3 mL (2 5 mg total) by nebulization every 6 (six) hours as needed for wheezing or shortness of breath, Disp: 360 mL, Rfl: 1    albuterol (Ventolin HFA) 90 mcg/act inhaler, Inhale 2 puffs every 6 (six) hours as needed for wheezing, Disp: 8 g, Rfl: 1    aspirin (ECOTRIN LOW STRENGTH) 81 mg EC tablet, Take 81 mg by mouth daily, Disp: , Rfl:     atorvastatin (LIPITOR) 10 mg tablet, Take 1 tablet (10 mg total) by mouth daily at bedtime, Disp: 90 tablet, Rfl: 3    B-D ULTRAFINE III SHORT PEN 31G X 8 MM MISC, inject UNDER THE SKIN DAILY, Disp: 90 each, Rfl: 1    carboxymethylcellulose (REFRESH PLUS) 0 5 % SOLN, As directed, Disp: , Rfl:     cholecalciferol (VITAMIN D3) 1,000 units tablet, Take 1,000 Units by mouth daily, Disp: , Rfl:     Continuous Blood Gluc  (FreeStyle Mary 14 Day [place]) [facility], Use 1 application 4 times day, Disp: 1 each, Rfl: 0    Continuous Blood Gluc Sensor (FreeStyle Mary 14 Day Sensor) MISC, Use 1 application every 14 (fourteen) days, Disp: 6 each, Rfl: 3    fosinopril (MONOPRIL) 20 mg tablet, Take 1 tablet (20 mg total) by mouth daily, Disp: 90 tablet, Rfl: 1    furosemide (LASIX) 20 mg tablet, Take 20 mg by mouth daily, Disp: , Rfl:     insulin glargine (Lantus SoloStar) 100 units/mL injection pen, Inject 30 Units under the skin daily at bedtime, Disp: 15 mL, Rfl: 2    Insulin Pen Needle 31G X 8 MM MISC, use as directed four times a day, Disp: , Rfl:     latanoprost (XALATAN) 0 005 % ophthalmic solution, INSTILL 1 DROP INTO LEFT EYE AT BEDTIME, Disp: 2 5 mL, Rfl: 2    levETIRAcetam (KEPPRA) 250 mg tablet, take 1 tablet by mouth every 12 hours, Disp: 180 tablet, Rfl: 3    montelukast (SINGULAIR) 10 mg tablet, take 1 tablet by mouth every evening, Disp: 90 tablet, Rfl: 1    Multiple Vitamin (MULTI VITAMIN DAILY PO), Take 1 tablet by mouth daily, Disp: , Rfl:     NovoLOG FlexPen 100 units/mL injection pen, INJECT 12 UNITS BEFORE MEALS (3 MEALS) (Patient taking differently: Pt injects 12 units before meals (4 Meals) ), Disp: 15 mL, Rfl: 3    omeprazole (PriLOSEC) 20 mg delayed release capsule, Take 1 capsule (20 mg total) by mouth 2 (two) times a day, Disp: 180 capsule, Rfl: 1    pentoxifylline (TRENtal) 400 mg ER tablet, take 1 tablet by mouth three times a day with meals, Disp: 270 tablet, Rfl: 1    Probiotic Product (PROBIOTIC DAILY PO), Take by mouth  , Disp: , Rfl:   Allergies   Allergen Reactions    Brimonidine Other (See Comments)    Sulfa Antibiotics Rash       OBJECTIVE    Vitals:   Vitals:    03/18/22 1355   BP: 106/60   BP Location: Left arm   Patient Position: Sitting   Cuff Size: Standard   Pulse: 76   Temp: 98 5 °F (36 9 °C)   SpO2: 97%   Weight: 65 8 kg (145 lb)   Height: 4' 7" (1 397 m)           Physical Exam  Vitals and nursing note reviewed  Constitutional:       General: She is not in acute distress  Appearance: Normal appearance  HENT:      Head: Normocephalic and atraumatic  Right Ear: Tympanic membrane, ear canal and external ear normal       Left Ear: Tympanic membrane, ear canal and external ear normal       Nose: Nose normal       Mouth/Throat:      Mouth: Mucous membranes are moist       Pharynx: No oropharyngeal exudate or posterior oropharyngeal erythema  Eyes:      Conjunctiva/sclera: Conjunctivae normal    Cardiovascular:      Rate and Rhythm: Normal rate and regular rhythm  Comments: B/L LE pitting edema  Pulmonary:      Effort: Pulmonary effort is normal  No respiratory distress  Breath sounds: Normal breath sounds  Abdominal:      General: Bowel sounds are normal  There is no distension  Palpations: Abdomen is soft  Tenderness: There is no abdominal tenderness     Musculoskeletal:      Comments: Ambulates with rolling walker  Legs quite tender to palpation    Lymphadenopathy:      Cervical: No cervical adenopathy  Skin:     General: Skin is warm and dry  Neurological:      General: No focal deficit present  Mental Status: She is alert     Psychiatric:         Mood and Affect: Mood normal                     DO Nirali Lambert [address] [facility]   3/18/2022  2:02 PM

## 2022-03-23 ENCOUNTER — APPOINTMENT (OUTPATIENT)
Dept: RADIOLOGY | Facility: CLINIC | Age: 77
End: 2022-03-23
Payer: COMMERCIAL

## 2022-03-23 DIAGNOSIS — E11.40 TYPE 2 DIABETES MELLITUS WITH DIABETIC NEUROPATHY, WITH LONG-TERM CURRENT USE OF INSULIN (HCC): Primary | ICD-10-CM

## 2022-03-23 DIAGNOSIS — M25.552 BILATERAL HIP PAIN: ICD-10-CM

## 2022-03-23 DIAGNOSIS — Z79.4 TYPE 2 DIABETES MELLITUS WITH DIABETIC NEUROPATHY, WITH LONG-TERM CURRENT USE OF INSULIN (HCC): Primary | ICD-10-CM

## 2022-03-23 DIAGNOSIS — M25.551 BILATERAL HIP PAIN: ICD-10-CM

## 2022-03-23 PROCEDURE — 73522 X-RAY EXAM HIPS BI 3-4 VIEWS: CPT

## 2022-03-23 RX ORDER — FLASH GLUCOSE SCANNING READER
EACH MISCELLANEOUS
Qty: 1 EACH | Refills: 0 | Status: SHIPPED | OUTPATIENT
Start: 2022-03-23

## 2022-03-23 RX ORDER — FLASH GLUCOSE SENSOR
KIT MISCELLANEOUS
Qty: 1 EACH | Refills: 3 | Status: SHIPPED | OUTPATIENT
Start: 2022-03-23 | End: 2022-03-28 | Stop reason: SDUPTHER

## 2022-03-24 DIAGNOSIS — H35.372 MACULAR PUCKER, LEFT: Primary | ICD-10-CM

## 2022-03-24 DIAGNOSIS — E11.42 DIABETIC PERIPHERAL NEUROPATHY (HCC): ICD-10-CM

## 2022-03-24 RX ORDER — LATANOPROST 50 UG/ML
1 SOLUTION/ DROPS OPHTHALMIC
Qty: 2.5 ML | Refills: 2 | Status: SHIPPED | OUTPATIENT
Start: 2022-03-24

## 2022-03-28 NOTE — TELEPHONE ENCOUNTER
Pt is requesting an rx for [employer] sent to [facility]  Pt has a voucher for a free new one after receiving a defective device

## 2022-04-13 ENCOUNTER — OFFICE VISIT (OUTPATIENT)
Dept: PULMONOLOGY | Facility: CLINIC | Age: 77
End: 2022-04-13
Payer: COMMERCIAL

## 2022-04-13 VITALS
WEIGHT: 148 LBS | BODY MASS INDEX: 34.25 KG/M2 | HEART RATE: 65 BPM | OXYGEN SATURATION: 97 % | SYSTOLIC BLOOD PRESSURE: 100 MMHG | DIASTOLIC BLOOD PRESSURE: 56 MMHG | TEMPERATURE: 97.6 F | HEIGHT: 55 IN

## 2022-04-13 DIAGNOSIS — E66.9 OBESITY (BMI 30-39.9): ICD-10-CM

## 2022-04-13 DIAGNOSIS — Z87.891 FORMER SMOKER: ICD-10-CM

## 2022-04-13 DIAGNOSIS — R06.02 SOB (SHORTNESS OF BREATH): ICD-10-CM

## 2022-04-13 DIAGNOSIS — J41.0 SIMPLE CHRONIC BRONCHITIS (HCC): Primary | ICD-10-CM

## 2022-04-13 PROCEDURE — 99214 OFFICE O/P EST MOD 30 MIN: CPT | Performed by: INTERNAL MEDICINE

## 2022-04-13 PROCEDURE — 1160F RVW MEDS BY RX/DR IN RCRD: CPT | Performed by: INTERNAL MEDICINE

## 2022-04-13 RX ORDER — FLUTICASONE FUROATE, UMECLIDINIUM BROMIDE AND VILANTEROL TRIFENATATE 200; 62.5; 25 UG/1; UG/1; UG/1
1 POWDER RESPIRATORY (INHALATION) DAILY
Qty: 60 BLISTER | Refills: 0 | Status: SHIPPED | OUTPATIENT
Start: 2022-04-13 | End: 2022-06-24 | Stop reason: SDUPTHER

## 2022-04-13 NOTE — PROGRESS NOTES
Assessment/Plan:   Diagnoses and all orders for this visit:    Simple chronic bronchitis ([address] )  -     fluticasone-umeclidinium-vilanterol (Trelegy Ellipta) 200-62 5-25 MCG/INH AEPB inhaler; Inhale 1 puff daily Rinse mouth after use  SOB (shortness of breath)    Obesity (BMI 30-39  9)    Former smoker        Shortness of breath and dyspnea on exertion chronic simple bronchitis in this patient with 40 pack-year smoking history who quit about 20 years ago, PFTs no evidence of any obstructive lung disease mildly decreased DLCO  6 minute walk test without any need for supplemental oxygen  Chest x-ray PA and lateral view was normal  She does have some relief with the shortness of breath and mucus with the use of the albuterol again via nebulizer she is using it once or twice a day but still given the worsening shortness of breath she wants to see if he can use a long-acting inhaler discussed that given the PFTs demonstrate normal FEV1 but given significant coughing and the mucus with and anticholinergic agent into the nebulizer ipratropium, but she does not want to use the nebulizer to medications and she states she heard about trelegy wants to try  Discussed that it has 3 medications also discussed regarding inhaled corticosteroid anti cholinergic as well as long-acting beta agonist  Side effects of the medication discussed with the patient also patient's son was in the office discussed  We see her back in 3 months or p r n  earlier as needed  Return in about 3 months (around 7/13/2022)  All questions are answered to the patient's satisfaction and understanding  She verbalizes understanding  She is encouraged to call with any further questions or concerns  Portions of the record may have been created with voice recognition software  Occasional wrong word or "sound a like" substitutions may have occurred due to the inherent limitations of voice recognition software    Read the chart carefully and recognize, using context, where substitutions have occurred  Electronically Signed by Oma Callejas MD    ______________________________________________________________________    Chief Complaint: No chief complaint on file  Patient ID: Lizzeth Malik is a 64 y o  y o  female has a past medical history of Acute kidney injury superimposed on chronic kidney disease ([facility] ) (11/26/2016), ADHD (attention deficit hyperactivity disorder) (3/17/2019), Arthritis, Boutonniere deformity (7/8/2017), Carpal tunnel syndrome, Chest pain (3/6/2017), Chronic kidney disease, Closed fracture of base of middle phalanx of finger (6/22/2017), Closed fracture of middle phalanx of left little finger (7/8/2017), Coagulopathy ([facility] ) (5/15/2019), Colloid cyst of brain ([facility] ), COPD (chronic obstructive pulmonary disease) ([facility] ), COPD (chronic obstructive pulmonary disease) ([facility] ), Coronary artery disease, Cough, Diabetes mellitus ([facility] ), GERD (gastroesophageal reflux disease), Glaucoma, History of brain disorder (10/7/2020), Hyperlipidemia, Hypertension, Irritable bowel syndrome, Melena (2/26/2019), Paronychia of great toe of left foot (10/7/2020), Post-traumatic seizures ([facility] ) (7/23/2015), Renal disorder, Seizures ([facility] ), Shortness of breath, Single seizure ([facility] ) (5/31/2016), SOB (shortness of breath), Syncope and collapse (11/11/2020), Urinary tract infection without hematuria (11/26/2016), and Wheezing  4/13/2022  Patient presents today for follow-up visit  Lizzeth Malik is a very pleasant 38-year-old lady here for evaluation for COPD she states with her son for this office visit  She has about 40 pack-year smoking history who quit 20 years ago, she was diagnosed with COPD for used to follow-up at [facility] but has not followed up with a pulmonologist there for about 2-3 years and currently wants to establish with [facility] she states      She does have shortness of breath and dyspnea on exertion which she states is very gradually worsening, she used to be on bronchodilators in the past currently does not have any albuterol inhaler she does not remember if she was on any long-acting bronchodilators           Review of Systems   Constitutional: Negative  HENT: Negative  Eyes: Negative  Respiratory: Positive for cough, shortness of breath and wheezing  Cardiovascular: Negative  Gastrointestinal: Negative  Endocrine: Negative  Genitourinary: Negative  Musculoskeletal: Negative  Allergic/Immunologic: Negative  Neurological: Negative  Hematological: Negative  Psychiatric/Behavioral: Negative  Smoking history: She reports that she quit smoking about 21 years ago  She started smoking about 61 years ago  She has a 20 00 pack-year smoking history   She has never used smokeless tobacco     The following portions of the patient's history were reviewed and updated as appropriate: allergies, current medications, past family history, past medical history, past social history, past surgical history and problem list     Immunization History   Administered Date(s) Administered    COVID-19 J&J (Yugma) vaccine 0 5 mL 06/03/2021    Pneumococcal Conjugate 13-Valent 11/27/2017, 10/07/2020    Pneumococcal Polysaccharide PPV23 09/11/2019     Current Outpatient Medications   Medication Sig Dispense Refill    acetaminophen (TYLENOL) 650 mg CR tablet Take 650 mg by mouth        albuterol (2 5 mg/3 mL) 0 083 % nebulizer solution Take 3 mL (2 5 mg total) by nebulization every 6 (six) hours as needed for wheezing or shortness of breath 360 mL 1    albuterol (Ventolin HFA) 90 mcg/act inhaler Inhale 2 puffs every 6 (six) hours as needed for wheezing 8 g 1    aspirin (ECOTRIN LOW STRENGTH) 81 mg EC tablet Take 81 mg by mouth daily      atorvastatin (LIPITOR) 10 mg tablet Take 1 tablet (10 mg total) by mouth daily at bedtime 90 tablet 3    B-D ULTRAFINE III SHORT PEN 31G X 8 MM MISC inject UNDER THE SKIN DAILY 90 each 1    carboxymethylcellulose (REFRESH PLUS) 0 5 % SOLN As directed      cholecalciferol (VITAMIN D3) 1,000 units tablet Take 1,000 Units by mouth daily      Continuous Blood Gluc  (FreeStyle [place] 2 Bypro) YOLANDA Before Breakfast and 2 hours after meals 1 each 0    Continuous Blood Gluc Sensor (FreeStyle Mary 2 Sensor) MISC Before Breakfast and 2 hours after meals 1 each 3    fosinopril (MONOPRIL) 20 mg tablet Take 1 tablet (20 mg total) by mouth daily 90 tablet 1    furosemide (LASIX) 20 mg tablet Take 20 mg by mouth daily      insulin glargine (Lantus SoloStar) 100 units/mL injection pen Inject 30 Units under the skin daily at bedtime 15 mL 2    Insulin Pen Needle 31G X 8 MM MISC use as directed four times a day      latanoprost (XALATAN) 0 005 % ophthalmic solution Administer 1 drop into the left eye daily at bedtime 2 5 mL 2    levETIRAcetam (KEPPRA) 250 mg tablet take 1 tablet by mouth every 12 hours 180 tablet 3    montelukast (SINGULAIR) 10 mg tablet take 1 tablet by mouth every evening 90 tablet 1    Multiple Vitamin (MULTI VITAMIN DAILY PO) Take 1 tablet by mouth daily      NovoLOG FlexPen 100 units/mL injection pen INJECT 12 UNITS BEFORE MEALS (3 MEALS) (Patient taking differently: Pt injects 12 units before meals (4 Meals) ) 15 mL 3    omeprazole (PriLOSEC) 20 mg delayed release capsule Take 1 capsule (20 mg total) by mouth 2 (two) times a day 180 capsule 1    pentoxifylline (TRENtal) 400 mg ER tablet take 1 tablet by mouth three times a day with meals 270 tablet 1    Probiotic Product (PROBIOTIC DAILY PO) Take by mouth        fluticasone-umeclidinium-vilanterol (Trelegy Ellipta) 200-62 5-25 MCG/INH AEPB inhaler Inhale 1 puff daily Rinse mouth after use  60 blister 0     No current facility-administered medications for this visit       Allergies: Brimonidine and Sulfa antibiotics    Objective:  Vitals:    04/13/22 0904   BP: 100/56   Pulse: 65   Temp: 97 6 °F (36 4 °C)   SpO2: 97%   Weight: 67 1 kg (148 lb) Height: 4' 7" (1 397 m)   Oxygen Therapy  SpO2: 97 %    Wt Readings from Last 3 Encounters:   04/13/22 67 1 kg (148 lb)   03/18/22 65 8 kg (145 lb)   03/03/22 66 8 kg (147 lb 3 2 oz)     Body mass index is 34 4 kg/m²  Physical Exam  Vitals and nursing note reviewed  Constitutional:       Appearance: She is well-developed  HENT:      Head: Normocephalic and atraumatic  Eyes:      Conjunctiva/sclera: Conjunctivae normal       Pupils: Pupils are equal, round, and reactive to light  Neck:      Thyroid: No thyromegaly  Vascular: No JVD  Cardiovascular:      Rate and Rhythm: Normal rate and regular rhythm  Heart sounds: Normal heart sounds  No murmur heard  No friction rub  No gallop  Pulmonary:      Effort: Pulmonary effort is normal  No respiratory distress  Breath sounds: Normal breath sounds  No wheezing or rales  Chest:      Chest wall: No tenderness  Musculoskeletal:         General: No tenderness or deformity  Normal range of motion  Cervical back: Normal range of motion and neck supple  Lymphadenopathy:      Cervical: No cervical adenopathy  Skin:     General: Skin is warm and dry  Neurological:      Mental Status: She is alert and oriented to person, place, and time  Diagnostics:  I have personally reviewed pertinent reports  XR hips bilateral 3-4 vw w pelvis if performed    Result Date: 3/26/2022  Narrative: BILATERAL HIPS AND PELVIS INDICATION:   M25 551: Pain in right hip M25 552: Pain in left hip  COMPARISON:  None VIEWS:  XR HIPS BILATERAL 3-4 VW W PELVIS IF PERFORMED  FINDINGS: The bony pelvis appears intact  Degenerative changes visualized lower lumbar spine  There is curvature convex right LEFT HIP: There is no acute fracture or dislocation  Mild left hip osteoarthritis is seen  No lytic or blastic osseous lesion  Soft tissues are unremarkable  RIGHT HIP: There is no acute fracture or dislocation  Mild right hip osteoarthritis is seen   No lytic or blastic osseous lesion  Soft tissues are unremarkable  Impression: Mild bilateral hip osteoarthritis and degenerative changes of the lumbar spine   No acute osseous abnormality Workstation performed: [id number]

## 2022-04-20 ENCOUNTER — TELEPHONE (OUTPATIENT)
Dept: PAIN MEDICINE | Facility: CLINIC | Age: 77
End: 2022-04-20

## 2022-04-20 ENCOUNTER — CONSULT (OUTPATIENT)
Dept: PAIN MEDICINE | Facility: CLINIC | Age: 77
End: 2022-04-20
Payer: COMMERCIAL

## 2022-04-20 VITALS
BODY MASS INDEX: 34.07 KG/M2 | RESPIRATION RATE: 20 BRPM | HEART RATE: 68 BPM | HEIGHT: 55 IN | SYSTOLIC BLOOD PRESSURE: 118 MMHG | DIASTOLIC BLOOD PRESSURE: 76 MMHG | WEIGHT: 147.2 LBS

## 2022-04-20 DIAGNOSIS — M25.551 BILATERAL HIP PAIN: ICD-10-CM

## 2022-04-20 DIAGNOSIS — R29.898 BILATERAL LEG WEAKNESS: Primary | ICD-10-CM

## 2022-04-20 DIAGNOSIS — M25.552 BILATERAL HIP PAIN: ICD-10-CM

## 2022-04-20 DIAGNOSIS — M51.36 DEGENERATIVE DISC DISEASE, LUMBAR: ICD-10-CM

## 2022-04-20 DIAGNOSIS — G89.4 CHRONIC PAIN SYNDROME: ICD-10-CM

## 2022-04-20 PROCEDURE — 3078F DIAST BP <80 MM HG: CPT | Performed by: STUDENT IN AN ORGANIZED HEALTH CARE EDUCATION/TRAINING PROGRAM

## 2022-04-20 PROCEDURE — 99204 OFFICE O/P NEW MOD 45 MIN: CPT | Performed by: STUDENT IN AN ORGANIZED HEALTH CARE EDUCATION/TRAINING PROGRAM

## 2022-04-20 PROCEDURE — 1160F RVW MEDS BY RX/DR IN RCRD: CPT | Performed by: STUDENT IN AN ORGANIZED HEALTH CARE EDUCATION/TRAINING PROGRAM

## 2022-04-20 PROCEDURE — 3074F SYST BP LT 130 MM HG: CPT | Performed by: STUDENT IN AN ORGANIZED HEALTH CARE EDUCATION/TRAINING PROGRAM

## 2022-04-20 NOTE — TELEPHONE ENCOUNTER
Pt was seen at the following facilities for xrays- pls add to release form    Coord [facility] in 2018 in [facility] or [facility] [phone]

## 2022-04-20 NOTE — PATIENT INSTRUCTIONS
Physical Activity for Older Adults   WHAT YOU NEED TO KNOW:   What do I need to know about physical activity and aging? Physical activity can help you get or stay physically and mentally healthy as you age  You may be able to do your daily activities more easily  If you have arthritis, your joints may move more easily and with less pain  Your bones and muscles will get stronger  Strength and better balance help prevent osteoporosis and reduce your risk for falls  Regular activity can improve your mood and appetite, and help you sleep better  Your risk for diseases such as cancer, heart disease, diabetes, and stroke will be lower  Activity can help you control your blood pressure and blood sugar levels, and lower your cholesterol  Activity can also slow or prevent cognitive (thinking) problems as you age  What do I need to know about physical activity safety? · Talk to your healthcare provider before you start doing physical activities  Your provider will check your general health  He or she may recommend you avoid certain activities based on your health  He or she will also make sure your physical activity plan works with any medicines you are taking  Some medicines can make you sleepy or cause balance problems  These can make certain physical activities less safe  · Start slowly and work up to more activity  It is important not to become highly active too quickly  Your body will need time to adjust  For example, you can cause serious injury if you try to lift a weight that is too heavy  Your healthcare provider can help you create a plan  The plan may start with a few minutes of physical activity on certain days of the week  You can add activities slowly over time  This may include making sessions longer, or being active on more days of the week  You can also add more weight as you get stronger  · Do a variety of activities throughout the week    Do conditioning, strength training, flexibility, and balance activities  General guidelines are included below for how much of each activity to get  Your healthcare provider or physical therapist may give you specific instructions to follow  · Stretch before and after you do any conditioning or strengthening activities  You can also do stretching activities on days you do not do any other kind of activity  · Move slowly and smoothly  Avoid fast or jerky motions to help prevent an injury  · Do exercises and stretches on both sides  This helps the muscles on both sides remain strong and flexible  · Stop if you feel sharp pain or an increase in pain  Stop the exercise and contact your healthcare provider if you have these symptoms  It is normal to feel some discomfort, such as a dull ache, during exercise  Regular exercise will help decrease your discomfort over time  · Drink liquid before, during, and after activity  Liquid helps prevent dehydration during exercise  Dehydration can cause you to become dizzy or to fall  Liquids are especially important on hot days  What kinds of activities help improve flexibility? You can improve your flexibility by doing stretching activities  Only stretch far enough that you feel the stretch but do not feel pain  Hold the stretch for 30 to 60 seconds, or for as long as directed  Repeat the stretch 2 or 3 times before you move to the next body area  Breathe normally  Do not hold your breath  It is important to breathe in and out so you do not tense up during exercise  Tension could prevent your muscles from stretching  The following are examples of flexibility activities:  · Crossover arm stretch:  Relax your shoulders  Hold your upper arm with the opposite hand  Pull your arm across your chest until you feel a stretch  Hold the stretch for 30 seconds  Return to the starting position  · Back stretch:  Lie on your back with your hands behind your head   Bend your knees and turn the lower half of your body to one side  Hold this position for 10 seconds  Repeat 3 times on each side  · Hip stretch:  Lie on the ground  Place both hands on the shin of 1 leg  Pull your knee toward your chest  Repeat on the other side  · Standing quadriceps stretch:  Stand and place one hand against a wall or hold the back of a chair for balance  With your weight on one leg, bend your other leg and grab your ankle  Pull your heel toward your buttocks  · Standing hamstring stretch:  Stand with your feet hips distance apart  Life 1 leg and rest it on a firm surface, such as a table or chair  Keep your toes pointing up  Slide your hands forward along the side of your leg until you feel a stretch  Keep your chest lifted and your back straight  Hold for 30 seconds  What kinds of activities help improve balance? Good balance can help you prevent falls  Do balance activities a few times each week  You might want to ask someone to help you while you do these activities  The person can help make sure you do not fall  Make sure your path or activity area is clear of objects before you begin  The following are examples of activities that help improve balance:  · Balance on 1 foot:  Hold something sturdy, such as a wall, chair, or walker  Balance your weight on both feet first  Then slowly lift 1 foot off the ground  When you have your balance, you can try letting go of the sturdy object  Make sure it is available for you to hold again if needed  Try to stand on 1 foot for about 10 seconds  Then repeat on the other foot  · Walking in a straight line:  Put your weight equally on both feet to start  Make sure you have your balance  Then walk forward slowly, putting 1 foot directly in front of the other  · Walking backward: If no one is available to stand next to you, stand next to a wall  Lean against the wall, and keep your arm on the wall as you walk  Make sure you have your balance  Then walk backward  Go slowly  Make sure you have good balance before you take the next step back  · Shahid Chi:  Shahid Chi combines slow, precise movements with deep breathing and meditation  Classes may be available  An instructor will show you how to do the moves  What kinds of activities help improve conditioning? Conditioning includes activities that increase your heart rate and breathing  Activity that uses body weight is called weightbearing exercise  Examples include walking, dancing, and raking leaves  Weightbearing activities help strengthen bones  Nonweightbearing activities include riding a bicycle and swimming  Aim for 150 to 300 minutes (2 5 to 5 hours) of moderate aerobic activity each week  The following are examples of activities that help improve conditioning:  · Walking:  Walking is a good, low-impact exercise  If you are not able to go outdoors, you can still walk in your home  Make sure the walking path in your home is clear and has good lighting  You can also march in place  · Chair exercises:  Chair exercises are a safe way to move if you have balance problems  Sit in a hard chair that has a back  Keep your feet on the ground when you work your arms  Hold the seat or arms of the chair when you work your legs  You can lift weights, use a resistance band, hold an object such as a soup can, or just move your arms and legs  Chair exercise group classes may also be available  An instructor shows you moves to do while you sit in the chair  · Water aerobics:  Water creates resistance  This means it is harder to move in water than it is on dry ground  Water aerobics can help prevent falls while you exercise  You will raise your heart rate and breathing just by walking along the bottom of the pool through the water  You can also  place and work your upper body  Hold pool equipment such as a flotation device or pool noodle to add weight to your activity   Water aerobics group classes may also be available  An instructor shows you moves to do in the water  What kinds of activities help build strength? Strength training helps you keep the muscles you have and build new muscles  Strong muscles help protect your bones  You can also improve your balance by strengthening your leg, back, and core (abdomen) muscles  If you do not have wights or resistance bands, you can use household items, such as soup cans  You can stand or sit in a chair or wheelchair when you do strength training  Try to work all the major muscle groups, such as your legs, arms, abdomen, and chest  Do strength training at least 2 times each week  Spread the days out so you are not doing strength training 2 days in a row  You can cause injury if you do not rest muscles in between sessions  The following are examples of strength training exercises:  · Weightlifting:  Start with a weight you can lift easily  You can work up to [employer]  Do not try to lift heavy weights right away  You might cause a muscle or tendon injury  Repeat each move until you cannot do it even 1 more time  That is 1 set  Do 2 or 3 sets on each area  · Resistance training: The ends of a resistance band can be held in your hands  You can tie 1 end to a sturdy object  Repeat each move until you cannot do it even 1 more time  That is 1 set  Do 2 or 3 sets on each area  · Other activities:  Work such as digging and shoveling can strengthen muscles  Exercises such as push-ups, sit-ups, or squats can also build muscles  What are some tips to help me stay on track? · Start slowly and work up  You do not have to do all the activity at one time  You can do a few minutes at a time  Remember that some physical activity is better than none  Stand up during the day, even if you cannot walk around  Your body uses more energy when you stand  When you do conditioning activities, aim for a speed that is challenging but not too difficult   You should be able to speak a few words at a time but not be able to sing  · Plan activities you enjoy  Do a variety of activities so you stay challenged  You do not have to do regular exercises to be active  Walk outdoors, go shopping, or visit a museum  The more you enjoy your activities, the easier it will be to continue doing them  · Ask for support from the people in your life  Go for a walk after dinner with your family  Meet friends at the park  Find someone who likes the same classes you like  Make plans to attend class together  You may be more likely to go if you know another person is counting on you to be there  Get involved in community events, such as cleaning a community park  Ask someone to help you stay on track  For example, you can tell the person about your daily or weekly activity  What do I need to know about nutrition and activity? Healthy foods will give you the energy you need to be active  Activity and good nutrition work together to help you reach or maintain a healthy weight  Healthy foods include fruits, vegetables, lean meats, fish, cooked beans, whole-grain breads, and low-fat dairy products  Your healthcare provider can help you create a healthy meal plan  He or she can tell you how many calories you need to stay active and still lose weight if needed  When should I call my doctor or physical therapist?   · You have pain with any physical activity  · You have questions or concerns about physical activity or your health  CARE AGREEMENT:   You have the right to help plan your care  Learn about your health condition and how it may be treated  Discuss treatment options with your healthcare providers to decide what care you want to receive  You always have the right to refuse treatment  The above information is an  only  It is not intended as medical advice for individual conditions or treatments   Talk to your doctor, nurse or pharmacist before following any medical regimen to see if it is safe and effective for you  © Copyright [employer] 2022 Information is for End User's use only and may not be sold, redistributed or otherwise used for commercial purposes   All illustrations and images included in CareNotes® are the copyrighted property of A [employer]  or [address] [place], [place] [address]clotilde delaney

## 2022-05-03 DIAGNOSIS — Z79.4 TYPE 2 DIABETES MELLITUS WITH STAGE 3 CHRONIC KIDNEY DISEASE, WITH LONG-TERM CURRENT USE OF INSULIN (HCC): ICD-10-CM

## 2022-05-03 DIAGNOSIS — R60.0 BILATERAL LEG EDEMA: ICD-10-CM

## 2022-05-03 DIAGNOSIS — I50.32 CHRONIC HEART FAILURE WITH PRESERVED EJECTION FRACTION (HCC): Primary | ICD-10-CM

## 2022-05-03 DIAGNOSIS — N18.30 TYPE 2 DIABETES MELLITUS WITH STAGE 3 CHRONIC KIDNEY DISEASE, WITH LONG-TERM CURRENT USE OF INSULIN (HCC): ICD-10-CM

## 2022-05-03 DIAGNOSIS — E11.22 TYPE 2 DIABETES MELLITUS WITH STAGE 3 CHRONIC KIDNEY DISEASE, WITH LONG-TERM CURRENT USE OF INSULIN (HCC): ICD-10-CM

## 2022-05-03 RX ORDER — FUROSEMIDE 20 MG/1
20 TABLET ORAL DAILY
Qty: 90 TABLET | Refills: 1 | Status: SHIPPED | OUTPATIENT
Start: 2022-05-03 | End: 2022-06-24 | Stop reason: SDUPTHER

## 2022-05-03 RX ORDER — PEN NEEDLE, DIABETIC 31 GX5/16"
NEEDLE, DISPOSABLE MISCELLANEOUS DAILY
Qty: 90 EACH | Refills: 1 | Status: SHIPPED | OUTPATIENT
Start: 2022-05-03 | End: 2022-06-24

## 2022-05-10 ENCOUNTER — EVALUATION (OUTPATIENT)
Dept: PHYSICAL THERAPY | Age: 77
End: 2022-05-10
Payer: COMMERCIAL

## 2022-05-10 DIAGNOSIS — M25.552 HIP PAIN, BILATERAL: Primary | ICD-10-CM

## 2022-05-10 DIAGNOSIS — M25.551 HIP PAIN, BILATERAL: Primary | ICD-10-CM

## 2022-05-10 DIAGNOSIS — M51.36 DDD (DEGENERATIVE DISC DISEASE), LUMBAR: ICD-10-CM

## 2022-05-10 PROCEDURE — 97113 AQUATIC THERAPY/EXERCISES: CPT | Performed by: PHYSICAL THERAPIST

## 2022-05-10 PROCEDURE — 97162 PT EVAL MOD COMPLEX 30 MIN: CPT | Performed by: PHYSICAL THERAPIST

## 2022-05-10 NOTE — PROGRESS NOTES
PT Evaluation     Today's date: 5/10/2022  Patient name: Antoine Noguera  : 1945  MRN: [id number]  Referring provider: Leslee Leija MD  Dx:   Encounter Diagnosis     ICD-10-CM    1  Hip pain, bilateral  M25 551     M25 552    2  DDD (degenerative disc disease), lumbar  M51 36        Start Time: 1100  Stop Time: 1200  Total time in clinic (min): 60 minutes    Assessment  Assessment details: Antoine Noguera is a 64 y o  female who presents with pain, decreased strength, decreased ROM, ambulatory dysfunction, postural dysfunction and balance dysfunction  Due to these impairments, Patient has difficulty performing a/iadls  Patient's clinical presentation is consistent with their referring diagnosis of lumbar DDD/LE weakness  Patient would benefit from skilled physical therapy to address their aforementioned impairments, improve their level of function and to improve their overall quality of life  Impairments: abnormal gait, abnormal muscle tone, abnormal or restricted ROM, abnormal movement, activity intolerance, difficulty understanding, impaired balance, impaired physical strength, lacks appropriate home exercise program, pain with function, safety issue, weight-bearing intolerance, poor posture  and poor body mechanics  Understanding of Dx/Px/POC: fair   Prognosis: fair    Goals  ST-3 WEEKS  1  Decrease pain by 2 points on VAS at its worst   2   Increase ROM by > 5 deg in all deficients planes  3   Increase CORE/LE by 1/2 MMT grade in all deficient planes  LT-6 WEEKS  1  Patient to be independent with a/iadls especially with increase walking tolerance and improve sleep  2  Increase functional activities for leisure and home activities to previous LOF    3  Independent with HEP and/or fitness program     Plan  Patient would benefit from: skilled physical therapy  Planned modality interventions: cryotherapy, electrical stimulation/Russian stimulation, thermotherapy: hydrocollator packs and unattended electrical stimulation  Planned therapy interventions: activity modification, behavior modification, body mechanics training, aquatic therapy, flexibility, functional ROM exercises, home exercise program, IADL retraining, joint mobilization, manual therapy, neuromuscular re-education, patient education, postural training, strengthening, stretching, therapeutic activities and therapeutic exercise  Frequency: 2x week (2-3x week)  Duration in weeks: 12  Plan of Care beginning date: 5/10/2022  Plan of Care expiration date: 8/10/2022  Treatment plan discussed with: patient        Subjective Evaluation    History of Present Illness  Date of onset: 2/3/2018  Mechanism of injury: Lumbar decompression surgery, patient complains of back pain and both hips and leg weakness uses a home health aide 3x/week          Recurrent probem    Quality of life: good    Pain  Current pain ratin  At best pain ratin  At worst pain ratin  Quality: cramping, dull ache and throbbing  Relieving factors: change in position  Aggravating factors: sitting  Progression: worsening    Social Support  Steps to enter house: yes  Stairs in house: yes   Lives in: multiple-level home  Lives with: adult children      Diagnostic Tests  X-ray: abnormal  MRI studies: abnormal  Treatments  Previous treatment: physical therapy  Patient Goals  Patient goals for therapy: decreased pain, increased motion, increased strength and independence with ADLs/IADLs          Objective     Palpation   Left   Hypertonic in the erector spinae  Tenderness of the erector spinae  Right   Hypertonic in the erector spinae  Tenderness of the erector spinae       Active Range of Motion   Cervical/Thoracic Spine       Thoracic    Extension:  Restriction level: moderate    Lumbar   Flexion: 50 degrees   Extension: 10 degrees   Left lateral flexion: 20 degrees       Right lateral flexion: 20 degrees   Left Hip   External rotation (90/90): 40 degrees Internal rotation (90/90): 25 degrees     Right Hip   External rotation (90/90): 45 degrees   Internal rotation (90/90): 15 degrees     Strength/Myotome Testing     Left Hip   Planes of Motion   Flexion: 4-  Extension: 4-  Abduction: 3+  Adduction: 4-    Right Hip   Planes of Motion   Flexion: 4-  Extension: 4-  Abduction: 3+  Adduction: 4-    Left Knee   Flexion: 4-  Extension: 3+    Right Knee   Flexion: 4-  Extension: 3+    Left Ankle/Foot   Dorsiflexion: 4-  Plantar flexion: 4-    Right Ankle/Foot   Dorsiflexion: 4-  Plantar flexion: 4    Additional Strength Details  CORE is 3/5    Tests     Lumbar     Left   Positive slump test    Negative passive SLR  Right   Negative passive SLR  Ambulation     Observational Gait   Gait: antalgic and crouched   Decreased walking speed, stride length, left stance time and right stance time       Functional Assessment        Single Leg Stance   Left: 1 seconds  Right: 1 seconds      Flowsheet Rows      Most Recent Value   PT/OT G-Codes    Current Score 40   Projected Score 49       Precautions: DM, COPD, OA, right TKR, lumbar lami, fall risk    Daily Treatment Diary     Manual                                                                                   Exercise Diary  5/10            Water walking 5            Postural training             Gait training 4            Home exercise pgm/patient education             Wall: t/h raises 1            Hip abd/add 2            Marching 1            squats 1            Knee flex/ext 1            Step-ups (fwd/bkwd/ss)             SLS (eyes open/closed)             SLS w UE mvmt  AROM/ball toss             Weight shifting             UE Noodle work x 4              UE AROM             Resistive UE work (paddles, bells, TB)             Core work on noodle (sitting/stdg)             Sit on noodle with movement             Seated on pool bench w proper posture             Ankle df/pf             marching 1            Hip Ab/add 1            Knee flex/ext 1            Deep water mvmt 5              Deep water tx/stretching             Specific self - stretches wall/steps                 Modalities  5/10            whirlpool 6

## 2022-05-24 ENCOUNTER — OFFICE VISIT (OUTPATIENT)
Dept: PHYSICAL THERAPY | Age: 77
End: 2022-05-24
Payer: COMMERCIAL

## 2022-05-24 DIAGNOSIS — M51.36 DDD (DEGENERATIVE DISC DISEASE), LUMBAR: ICD-10-CM

## 2022-05-24 DIAGNOSIS — M25.551 HIP PAIN, BILATERAL: Primary | ICD-10-CM

## 2022-05-24 DIAGNOSIS — M25.552 HIP PAIN, BILATERAL: Primary | ICD-10-CM

## 2022-05-24 PROCEDURE — 97113 AQUATIC THERAPY/EXERCISES: CPT | Performed by: PHYSICAL THERAPIST

## 2022-05-24 NOTE — PROGRESS NOTES
Daily Note     Today's date: 2022  Patient name: Anayeli Álvarez  : 1945  MRN: [id number]  Referring provider: Lourdes Rubin MD  Dx:   Encounter Diagnosis     ICD-10-CM    1  Hip pain, bilateral  M25 551     M25 552    2  DDD (degenerative disc disease), lumbar  M51 36        Start Time: 1000  Stop Time: 1100  Total time in clinic (min): 60 minutes    Subjective: some improvement      Objective: See treatment diary below      Assessment: Tolerated treatment fair  Patient demonstrated fatigue post treatment, exhibited good technique with therapeutic exercises and would benefit from continued PT, patient required VC's and direct supervision to complete all exercises      Plan: Progress treatment as tolerated             Precautions: DM, COPD, OA, right TKR, lumbar lami, fall risk    Daily Treatment Diary     Manual                                                                                   Exercise Diary  5/10 5/24           Water walking 5 10           Postural training             Gait training 4 5           Home exercise pgm/patient education             Wall: t/h raises 1 1           Hip abd/add 2 2            1           squats 1 1           Knee flex/ext 1 1           Step-ups (fwd/bkwd/ss)             SLS (eyes open/closed)             SLS w UE mvmt  AROM/ball toss             Weight shifting             UE Noodle work x 4   4           UE AROM             Resistive UE work (paddles, bells, TB)             Core work on noodle (sitting/stdg)             Sit on noodle with movement             Seated on pool bench w proper posture  1           Ankle df/pf  1            1           Hip Ab/add 1 1           Knee flex/ext 1 1           Deep water mvmt 5   5           Deep water tx/stretching  10           Specific self - stretches wall/steps  5               Modalities  5/10 5/24           whirlpool 6 10

## 2022-05-31 DIAGNOSIS — E78.2 MIXED HYPERLIPIDEMIA: ICD-10-CM

## 2022-06-01 RX ORDER — ATORVASTATIN CALCIUM 10 MG/1
10 TABLET, FILM COATED ORAL
Qty: 90 TABLET | Refills: 3 | Status: SHIPPED | OUTPATIENT
Start: 2022-06-01 | End: 2022-06-24 | Stop reason: SDUPTHER

## 2022-06-07 ENCOUNTER — OFFICE VISIT (OUTPATIENT)
Dept: PHYSICAL THERAPY | Age: 77
End: 2022-06-07
Payer: COMMERCIAL

## 2022-06-07 DIAGNOSIS — M25.552 HIP PAIN, BILATERAL: Primary | ICD-10-CM

## 2022-06-07 DIAGNOSIS — M25.551 HIP PAIN, BILATERAL: Primary | ICD-10-CM

## 2022-06-07 DIAGNOSIS — M51.36 DDD (DEGENERATIVE DISC DISEASE), LUMBAR: ICD-10-CM

## 2022-06-07 PROCEDURE — 97113 AQUATIC THERAPY/EXERCISES: CPT | Performed by: PHYSICAL THERAPIST

## 2022-06-07 NOTE — PROGRESS NOTES
Daily Note     Today's date: 2022  Patient name: Ronnie Concepcion  : 1945  MRN: [id number]  Referring provider: Felipe Mijares MD  Dx:   Encounter Diagnosis     ICD-10-CM    1  Hip pain, bilateral  M25 551     M25 552    2  DDD (degenerative disc disease), lumbar  M51 36        Start Time: 1000  Stop Time: 1100  Total time in clinic (min): 60 minutes    Subjective: some improvement , continues to ambulate with RW      Objective: See treatment diary below      Assessment: Tolerated treatment fair  Patient demonstrated fatigue post treatment, exhibited good technique with therapeutic exercises and would benefit from continued PT,VC's needed to complete all exercises and [facility] as patient was unable to perform DEW TX secondary to complaints of increasing her "neuropathy pain"      Plan: Progress treatment as tolerated             Precautions: DM, COPD, OA, right TKR, lumbar lami, fall risk    Daily Treatment Diary     Manual                                                                                   Exercise Diary  5/10 5/24 6/7          Water walking 5 10 10          Postural training             Gait training 4 5 5          Home exercise pgm/patient education             Wall: t/h raises 1 1 1          Hip abd/add 2 2 2           1 1 1          squats 1 1 1          Knee flex/ext 1 1 1          Step-ups (fwd/bkwd/ss)             SLS (eyes open/closed)             SLS w UE mvmt  AROM/ball toss             Weight shifting             UE Noodle work x 4   4 4          UE AROM             Resistive UE work (paddles, bells, TB)             Core work on noodle (sitting/stdg)             Sit on noodle with movement             Seated on pool bench w proper posture  1 1          Ankle df/pf  1 1           1 1 1          Hip Ab/add 1 1 1          Knee flex/ext 1 1 1          Deep water mvmt 5   5 5          Deep water tx/stretching  10 10          Specific self - stretches wall/steps  5 5              Modalities  5/10 5/24 6/7          whirlpool 6 10 10

## 2022-06-17 ENCOUNTER — RA CDI HCC (OUTPATIENT)
Dept: OTHER | Facility: HOSPITAL | Age: 77
End: 2022-06-17

## 2022-06-17 NOTE — PROGRESS NOTES
[facility]  coding opportunities          Chart Reviewed number of suggestions sent to Provider: 5   [id number]  E11 [id number]  E11 [phone] ([phone])  [id number] [phone]  I13 [phone]    Patients Insurance     Medicare Insurance: [employer]

## 2022-06-21 ENCOUNTER — OFFICE VISIT (OUTPATIENT)
Dept: PHYSICAL THERAPY | Age: 77
End: 2022-06-21
Payer: COMMERCIAL

## 2022-06-21 DIAGNOSIS — M25.551 HIP PAIN, BILATERAL: Primary | ICD-10-CM

## 2022-06-21 DIAGNOSIS — M25.552 HIP PAIN, BILATERAL: Primary | ICD-10-CM

## 2022-06-21 DIAGNOSIS — M51.36 DDD (DEGENERATIVE DISC DISEASE), LUMBAR: ICD-10-CM

## 2022-06-21 PROCEDURE — 97113 AQUATIC THERAPY/EXERCISES: CPT | Performed by: PHYSICAL THERAPIST

## 2022-06-21 NOTE — PROGRESS NOTES
DISCHARGE    Today's date: 2022  Patient name: Jimena Florentino  : 1945  MRN: [id number]  Referring provider: Miguel Angel Reese MD  Dx:   Encounter Diagnosis     ICD-10-CM    1  Hip pain, bilateral  M25 551     M25 552    2  DDD (degenerative disc disease), lumbar  M51 36        Start Time: 1030  Stop Time: 1100  Total time in clinic (min): 30 minutes    Subjective: patient reports pool is helpful for hip pain      Objective: See treatment diary below      Assessment: Tolerated treatment fair  Patient demonstrated fatigue post treatment, exhibited good technique with therapeutic exercises and would benefit from continued PT, no complaints of pain with water exercises but requires VC to complete all exercises, MD recheck is 2022  At this time, patient has achieved their maximum functional benefit from skilled physical therapy services and will be discharged to their [facility]  Patient is in agreement with the plan of care  As a result, patient is discharged from physical therapy        Plan: D/C to [facility]         Precautions: DM, COPD, OA, right TKR, lumbar lami, fall risk    Daily Treatment Diary     Manual                                                                                   Exercise Diary  5/10 5/24 6/7 6/21         Water walking 5 10 10 10         Postural training             Gait training 4 5 5 5         Home exercise pgm/patient education             Wall: t/h raises 1 1 1 1         Hip abd/add 2 2 2 2         Marching 1 1 1 1         squats 1 1 1 1         Knee flex/ext 1 1 1 1         Step-ups (fwd/bkwd/ss)             SLS (eyes open/closed)             SLS w UE mvmt  AROM/ball toss             Weight shifting             UE Noodle work x 4   4 4 4         UE AROM             Resistive UE work (paddles, bells, TB)             Core work on noodle (sitting/stdg)             Sit on noodle with movement             Seated on pool bench w proper posture  1 1 1         Ankle df/pf  1 1 1 marching 1 1 1 1         Hip Ab/add 1 1 1 1         Knee flex/ext 1 1 1 1         Deep water mvmt 5   5 5 5         Deep water tx/stretching  10 10 10         Specific self - stretches wall/steps  5 5 5             Modalities  5/10 5/24 6/7 6/21         whirlpool 6 10 10 10

## 2022-06-23 DIAGNOSIS — E11.42 DIABETIC PERIPHERAL NEUROPATHY (HCC): ICD-10-CM

## 2022-06-24 ENCOUNTER — OFFICE VISIT (OUTPATIENT)
Dept: FAMILY MEDICINE CLINIC | Facility: CLINIC | Age: 77
End: 2022-06-24
Payer: COMMERCIAL

## 2022-06-24 VITALS
OXYGEN SATURATION: 99 % | HEIGHT: 55 IN | HEART RATE: 63 BPM | BODY MASS INDEX: 34.23 KG/M2 | DIASTOLIC BLOOD PRESSURE: 80 MMHG | TEMPERATURE: 97 F | SYSTOLIC BLOOD PRESSURE: 128 MMHG | WEIGHT: 147.9 LBS

## 2022-06-24 DIAGNOSIS — I10 ESSENTIAL HYPERTENSION: ICD-10-CM

## 2022-06-24 DIAGNOSIS — Z79.4 TYPE 2 DIABETES MELLITUS WITH DIABETIC NEUROPATHY, WITH LONG-TERM CURRENT USE OF INSULIN (HCC): Primary | ICD-10-CM

## 2022-06-24 DIAGNOSIS — N18.32 STAGE 3B CHRONIC KIDNEY DISEASE (HCC): ICD-10-CM

## 2022-06-24 DIAGNOSIS — E11.69 HYPERLIPIDEMIA ASSOCIATED WITH TYPE 2 DIABETES MELLITUS (HCC): ICD-10-CM

## 2022-06-24 DIAGNOSIS — M81.0 AGE-RELATED OSTEOPOROSIS WITHOUT CURRENT PATHOLOGICAL FRACTURE: ICD-10-CM

## 2022-06-24 DIAGNOSIS — G40.909 SEIZURE DISORDER (HCC): ICD-10-CM

## 2022-06-24 DIAGNOSIS — E11.40 TYPE 2 DIABETES MELLITUS WITH DIABETIC NEUROPATHY, WITH LONG-TERM CURRENT USE OF INSULIN (HCC): Primary | ICD-10-CM

## 2022-06-24 DIAGNOSIS — R60.0 BILATERAL LEG EDEMA: ICD-10-CM

## 2022-06-24 DIAGNOSIS — L30.4 INTERTRIGO: ICD-10-CM

## 2022-06-24 DIAGNOSIS — M85.80 OSTEOPENIA, UNSPECIFIED LOCATION: ICD-10-CM

## 2022-06-24 DIAGNOSIS — J41.0 SIMPLE CHRONIC BRONCHITIS (HCC): ICD-10-CM

## 2022-06-24 DIAGNOSIS — K21.9 GASTROESOPHAGEAL REFLUX DISEASE WITHOUT ESOPHAGITIS: ICD-10-CM

## 2022-06-24 DIAGNOSIS — E78.5 HYPERLIPIDEMIA ASSOCIATED WITH TYPE 2 DIABETES MELLITUS (HCC): ICD-10-CM

## 2022-06-24 DIAGNOSIS — I73.9 PAD (PERIPHERAL ARTERY DISEASE) (HCC): ICD-10-CM

## 2022-06-24 DIAGNOSIS — E78.2 MIXED HYPERLIPIDEMIA: ICD-10-CM

## 2022-06-24 DIAGNOSIS — J41.1 MUCOPURULENT CHRONIC BRONCHITIS (HCC): ICD-10-CM

## 2022-06-24 DIAGNOSIS — I50.32 CHRONIC HEART FAILURE WITH PRESERVED EJECTION FRACTION (HCC): ICD-10-CM

## 2022-06-24 DIAGNOSIS — E11.42 DIABETIC PERIPHERAL NEUROPATHY (HCC): ICD-10-CM

## 2022-06-24 DIAGNOSIS — I25.10 CORONARY ARTERY DISEASE INVOLVING NATIVE CORONARY ARTERY OF NATIVE HEART WITHOUT ANGINA PECTORIS: ICD-10-CM

## 2022-06-24 DIAGNOSIS — I73.9 CLAUDICATION (HCC): ICD-10-CM

## 2022-06-24 DIAGNOSIS — I10 PRIMARY HYPERTENSION: ICD-10-CM

## 2022-06-24 LAB — SL AMB POCT HEMOGLOBIN AIC: 6.1 (ref ?–6.5)

## 2022-06-24 PROCEDURE — 99214 OFFICE O/P EST MOD 30 MIN: CPT | Performed by: FAMILY MEDICINE

## 2022-06-24 PROCEDURE — 1160F RVW MEDS BY RX/DR IN RCRD: CPT | Performed by: FAMILY MEDICINE

## 2022-06-24 PROCEDURE — 83036 HEMOGLOBIN GLYCOSYLATED A1C: CPT | Performed by: FAMILY MEDICINE

## 2022-06-24 PROCEDURE — 3074F SYST BP LT 130 MM HG: CPT | Performed by: FAMILY MEDICINE

## 2022-06-24 PROCEDURE — 3079F DIAST BP 80-89 MM HG: CPT | Performed by: FAMILY MEDICINE

## 2022-06-24 PROCEDURE — 96372 THER/PROPH/DIAG INJ SC/IM: CPT | Performed by: FAMILY MEDICINE

## 2022-06-24 RX ORDER — ATORVASTATIN CALCIUM 10 MG/1
10 TABLET, FILM COATED ORAL
Qty: 90 TABLET | Refills: 3
Start: 2022-06-24

## 2022-06-24 RX ORDER — INSULIN ASPART 100 [IU]/ML
INJECTION, SOLUTION INTRAVENOUS; SUBCUTANEOUS
Qty: 15 ML | Refills: 3 | Status: SHIPPED | OUTPATIENT
Start: 2022-06-24 | End: 2022-06-24 | Stop reason: SDUPTHER

## 2022-06-24 RX ORDER — NYSTATIN 100000 U/G
CREAM TOPICAL 2 TIMES DAILY
Qty: 30 G | Refills: 1 | Status: SHIPPED | OUTPATIENT
Start: 2022-06-24

## 2022-06-24 RX ORDER — FLUTICASONE FUROATE, UMECLIDINIUM BROMIDE AND VILANTEROL TRIFENATATE 200; 62.5; 25 UG/1; UG/1; UG/1
1 POWDER RESPIRATORY (INHALATION) DAILY
Qty: 60 BLISTER | Refills: 5 | Status: SHIPPED | OUTPATIENT
Start: 2022-06-24 | End: 2022-07-24

## 2022-06-24 RX ORDER — OMEPRAZOLE 20 MG/1
20 CAPSULE, DELAYED RELEASE ORAL 2 TIMES DAILY
Qty: 180 CAPSULE | Refills: 1 | Status: SHIPPED | OUTPATIENT
Start: 2022-06-24

## 2022-06-24 RX ORDER — INSULIN GLARGINE 100 [IU]/ML
30 INJECTION, SOLUTION SUBCUTANEOUS
Qty: 15 ML | Refills: 2 | Status: SHIPPED | OUTPATIENT
Start: 2022-06-24

## 2022-06-24 RX ORDER — NYSTATIN 100000 [USP'U]/G
POWDER TOPICAL 3 TIMES DAILY PRN
Qty: 60 G | Refills: 1 | Status: SHIPPED | OUTPATIENT
Start: 2022-06-24

## 2022-06-24 RX ORDER — FUROSEMIDE 20 MG/1
20 TABLET ORAL DAILY
Qty: 90 TABLET | Refills: 1
Start: 2022-06-24 | End: 2022-07-20

## 2022-06-24 RX ORDER — INSULIN ASPART 100 [IU]/ML
INJECTION, SOLUTION INTRAVENOUS; SUBCUTANEOUS
Qty: 15 ML | Refills: 3
Start: 2022-06-24 | End: 2022-06-30

## 2022-06-24 RX ORDER — FOSINOPRIL SODIUM 20 MG/1
20 TABLET ORAL DAILY
Qty: 90 TABLET | Refills: 1
Start: 2022-06-24 | End: 2022-07-20

## 2022-06-24 NOTE — PROGRESS NOTES
Hai Tarun 1945 female MRN: [id number]      ASSESSMENT/PLAN  Problem List Items Addressed This Visit        Digestive    Gastroesophageal reflux disease without esophagitis    Relevant Medications    Calcium Carbonate Antacid (CALCIUM CARBONATE PO)    omeprazole (PriLOSEC) 20 mg delayed release capsule       Endocrine    Diabetic peripheral neuropathy (HCC)    Relevant Medications    insulin glargine (Lantus SoloStar) 100 units/mL injection pen    insulin aspart (NovoLOG FlexPen) 100 UNIT/ML injection pen    Hyperlipidemia associated with type 2 diabetes mellitus (HCC)    Relevant Medications    insulin glargine (Lantus SoloStar) 100 units/mL injection pen    insulin aspart (NovoLOG FlexPen) 100 UNIT/ML injection pen    atorvastatin (LIPITOR) 10 mg tablet    Other Relevant Orders    Ambulatory Referral to Cardiology    Comprehensive metabolic panel    Lipid panel    Type 2 diabetes mellitus with diabetic neuropathy (HCC) - Primary    Relevant Medications    insulin glargine (Lantus SoloStar) 100 units/mL injection pen    insulin aspart (NovoLOG FlexPen) 100 UNIT/ML injection pen    Other Relevant Orders    POCT hemoglobin A1c (Completed)       Respiratory    COPD (chronic obstructive pulmonary disease) (HCC)    Relevant Medications    fluticasone-umeclidinium-vilanterol (Trelegy Ellipta) 200-62 5-25 MCG/INH AEPB inhaler       Cardiovascular and Mediastinum    Chronic heart failure with preserved ejection fraction (HCC)    Relevant Medications    furosemide (LASIX) 20 mg tablet    Other Relevant Orders    Ambulatory Referral to Cardiology    Coronary artery disease without angina pectoris    Relevant Orders    Ambulatory Referral to Cardiology    Hypertension    Relevant Medications    fosinopril (MONOPRIL) 20 mg tablet    furosemide (LASIX) 20 mg tablet    Other Relevant Orders    Ambulatory Referral to Cardiology    PAD (peripheral artery disease) ([facility] )    Relevant Orders    Ambulatory Referral to Cardiology       Nervous and Auditory    Seizure disorder [facility])       Genitourinary    Stage 3b chronic kidney disease (HCC)    Relevant Medications    furosemide (LASIX) 20 mg tablet       Other    Bilateral leg edema    Relevant Medications    furosemide (LASIX) 20 mg tablet    Claudication [facility])    Relevant Orders    Ambulatory Referral to Cardiology      Other Visit Diagnoses     Essential hypertension        Relevant Medications    fosinopril (MONOPRIL) 20 mg tablet    furosemide (LASIX) 20 mg tablet    Mixed hyperlipidemia        Relevant Medications    atorvastatin (LIPITOR) 10 mg tablet    Intertrigo        Relevant Medications    nystatin (MYCOSTATIN) cream    nystatin (MYCOSTATIN) powder    Osteopenia, unspecified location        Relevant Medications    denosumab (PROLIA) subcutaneous injection 60 mg    Age-related osteoporosis without current pathological fracture            DM: A1c 6 1% (down from 6 3) -- pt continues to have adjust her Novolog based on sliding scale, feels she has a good regimen   HTN: Within goal, home cuff inaccurate  HLD/PAD: Continue statin, update lipids  CAD/HFpEF: Refer to Cardio to establish care for montioring  COPD: Stable on current regimen, f/u with Pulm   CKD3a: F/u with Nephro as scheduled   Seizure Dx: Continue AED     Prolia given for osteopenia + elevated FRAX       Future Appointments   Date Time Provider Steffany Porras   7/13/2022  9:20 AM MD SILVINO [facility]   7/18/2022 11:00 AM Vic Snell MD [facility]   12/19/2022  9:20 AM Dozier Boeck Mintus, DO [facility]          SUBJECTIVE  CC: Diabetes      HPI:  Escobar Gonzales is a 64 y o  female who presents with her daughter and granddaughter for chronic follow up as detailed below       DM w/ neuropathy: Home sugars "jumping up and down"; sometimes has lows into the 50s, needs refill on Lantus    HTN: Home BPs have been low, but she is not sure that her cuff is accurate -- has been feeling dizzy and lightheaded -- thinks her ACE is too high   HLD/PAD w/ claudication: Tolerating statin without issue; using topical tx for LE pain/spasms   COPD: No nighttime symptoms, following with Pulm   CAD/HFpEF: ROS as below, not following with Cardio   CKD3b: Drinking plenty of water, following with Nephro   Seizure Dx: No breakthrough symptoms     Needs refill on Lantus, PPI, Trelegy       Review of Systems   Constitutional: Negative for diaphoresis  Eyes: Negative for visual disturbance  Respiratory: Negative for cough, shortness of breath and wheezing  Cardiovascular: Positive for leg swelling  Negative for chest pain and palpitations  Gastrointestinal: Positive for diarrhea  Negative for abdominal pain and constipation  Endocrine: Negative for polydipsia and polyuria  Musculoskeletal: Positive for arthralgias and myalgias  Neurological: Positive for dizziness and light-headedness  Negative for tremors, seizures and headaches         Historical Information   The patient history was reviewed and updated as follows:    Past Medical History:   Diagnosis Date    Acute kidney injury superimposed on chronic kidney disease ([facility] ) 11/26/2016    ADHD (attention deficit hyperactivity disorder) 3/17/2019    Arthritis     BL HIPS    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    Carpal tunnel syndrome     Chest pain 3/6/2017    Chronic kidney disease     Closed fracture of base of middle phalanx of finger 6/22/2017    Closed fracture of middle phalanx of left little finger 7/8/2017    [facility] 5/15/2019    Colloid cyst of brain (HCC)     COPD (chronic obstructive pulmonary disease) (HCC)     COPD (chronic obstructive pulmonary disease) (HCC)     Coronary artery disease     Cough     Diabetes mellitus (HCC)     GERD (gastroesophageal reflux disease)     Glaucoma     History of brain disorder 10/7/2020    Hyperlipidemia     Hypertension     Irritable bowel syndrome     Melena 2/26/2019  Paronychia of great toe of left foot 10/7/2020    Post-traumatic seizures [facility] 2015    Renal disorder     Seizures ([facility]     last     Shortness of breath     Single seizure ([facility] 2016    SOB (shortness of breath)     Syncope and collapse 2020    Urinary tract infection without hematuria 2016    Wheezing      Past Surgical History:   Procedure Laterality Date    BRAIN SURGERY      CATARACT EXTRACTION      CHOLECYSTECTOMY      COLECTOMY RADHA      DECOMPRESSION SPINE LUMBAR POSTERIOR  2019    HERNIA REPAIR      HYSTERECTOMY      INCISION TENDON SHEATH HAND      NECK SURGERY  2018    NEUROPLASTY / TRANSPOSITION MEDIAN NERVE AT CARPAL TUNNEL      LA COLONOSCOPY FLX DX W/COLLJ SPEC WHEN PFRMD N/A 3/26/2019    Procedure: COLONOSCOPY;  Surgeon: Julio Iglesias MD;  [facility]; Service: Gastroenterology    LA ESOPHAGOGASTRODUODENOSCOPY TRANSORAL DIAGNOSTIC N/A 3/26/2019    Procedure: ESOPHAGOGASTRODUODENOSCOPY (EGD); Surgeon: Julio Iglesias MD;  [facility];   Service: Gastroenterology    REPLACEMENT TOTAL KNEE Right     RETINAL DETACHMENT SURGERY      TUBAL LIGATION       Family History   Problem Relation Age of Onset    Asthma Mother     Heart disease Mother     Emphysema Father     Cancer Father     Prostate cancer Father     Kidney cancer Sister     Diabetes Sister     Kidney disease Sister     Brain cancer Brother     Bone cancer Brother     Heart disease Brother       Social History   Social History     Substance and Sexual Activity   Alcohol Use Never     Social History     Substance and Sexual Activity   Drug Use Never     Social History     Tobacco Use   Smoking Status Former Smoker    Packs/day: 0 50    Years: 40 00    Pack years: 20 00    Start date:     Quit date:     Years since quittin 4   Smokeless Tobacco Never Used   Tobacco Comment    stopped many years ago       Medications:     Current Outpatient Medications:     atorvastatin (LIPITOR) 10 mg tablet, Take 1 tablet (10 mg total) by mouth daily at bedtime, Disp: 90 tablet, Rfl: 3    Calcium Carbonate Antacid (CALCIUM CARBONATE PO), Take by mouth, Disp: , Rfl:     Continuous Blood Gluc  (FreeStyle Mary 2 [place]) YOLANDA, Before Breakfast and 2 hours after meals, Disp: 1 each, Rfl: 0    Continuous Blood Gluc Sensor (FreeStyle Mary 2 Sensor) MISC, Before Breakfast and 2 hours after meals, Disp: 1 each, Rfl: 3    fluticasone-umeclidinium-vilanterol (Trelegy Ellipta) 200-62 5-25 MCG/INH AEPB inhaler, Inhale 1 puff daily Rinse mouth after use , Disp: 60 blister, Rfl: 5    fosinopril (MONOPRIL) 20 mg tablet, Take 1 tablet (20 mg total) by mouth daily, Disp: 90 tablet, Rfl: 1    furosemide (LASIX) 20 mg tablet, Take 1 tablet (20 mg total) by mouth daily, Disp: 90 tablet, Rfl: 1    insulin aspart (NovoLOG FlexPen) 100 UNIT/ML injection pen, Per sliding scale, Disp: 15 mL, Rfl: 3    insulin glargine (Lantus SoloStar) 100 units/mL injection pen, Inject 30 Units under the skin daily at bedtime, Disp: 15 mL, Rfl: 2    latanoprost (XALATAN) 0 005 % ophthalmic solution, Administer 1 drop into the left eye daily at bedtime, Disp: 2 5 mL, Rfl: 2    levETIRAcetam (KEPPRA) 250 mg tablet, take 1 tablet by mouth every 12 hours, Disp: 180 tablet, Rfl: 3    montelukast (SINGULAIR) 10 mg tablet, take 1 tablet by mouth every evening, Disp: 90 tablet, Rfl: 1    Multiple Vitamin (MULTI VITAMIN DAILY PO), Take 1 tablet by mouth daily, Disp: , Rfl:     nystatin (MYCOSTATIN) cream, Apply topically 2 (two) times a day, Disp: 30 g, Rfl: 1    nystatin (MYCOSTATIN) powder, Apply topically 3 (three) times a day as needed (rash), Disp: 60 g, Rfl: 1    omeprazole (PriLOSEC) 20 mg delayed release capsule, Take 1 capsule (20 mg total) by mouth 2 (two) times a day, Disp: 180 capsule, Rfl: 1    pentoxifylline (TRENtal) 400 mg ER tablet, take 1 tablet by mouth three times a day with meals, Disp: 270 tablet, Rfl: 1    Probiotic Product (PROBIOTIC DAILY PO), Take by mouth  , Disp: , Rfl:     Insulin Pen Needle 31G X 8 MM MISC, use as directed four times a day, Disp: , Rfl:     Current Facility-Administered Medications:     denosumab (PROLIA) subcutaneous injection 60 mg, 60 mg, Subcutaneous, Q6 Months, Clarisa Billingsley, , 60 mg at 06/24/22 1442  Allergies   Allergen Reactions    Brimonidine Other (See Comments)    Sulfa Antibiotics Rash       OBJECTIVE    Vitals:   Vitals:    06/24/22 1403   BP: 128/80   BP Location: Right arm   Patient Position: Sitting   Pulse: 63   Temp: (!) 97 °F (36 1 °C)   SpO2: 99%   Weight: 67 1 kg (147 lb 14 4 oz)   Height: 4' 7" (1 397 m)           Physical Exam  Vitals and nursing note reviewed  Constitutional:       General: She is not in acute distress  Appearance: Normal appearance  HENT:      Head: Normocephalic and atraumatic  Right Ear: Ear canal and external ear normal  There is impacted cerumen  Left Ear: Ear canal and external ear normal  There is impacted cerumen  Nose: Nose normal       Mouth/Throat:      Mouth: Mucous membranes are moist       Pharynx: No oropharyngeal exudate or posterior oropharyngeal erythema  Eyes:      Conjunctiva/sclera: Conjunctivae normal    Cardiovascular:      Rate and Rhythm: Normal rate and regular rhythm  Comments: B/L LE edema, chronic unchanged   Pulmonary:      Effort: Pulmonary effort is normal  No respiratory distress  Breath sounds: Normal breath sounds  Abdominal:      General: Bowel sounds are normal  There is no distension  Palpations: Abdomen is soft  Tenderness: There is no abdominal tenderness  Lymphadenopathy:      Cervical: No cervical adenopathy  Skin:     General: Skin is warm and dry  Neurological:      General: No focal deficit present  Mental Status: She is alert     Psychiatric:         Mood and Affect: Mood normal  DO Lili [facility]   6/24/2022  2:45 PM

## 2022-06-30 ENCOUNTER — OFFICE VISIT (OUTPATIENT)
Dept: URGENT CARE | Facility: CLINIC | Age: 77
End: 2022-06-30
Payer: COMMERCIAL

## 2022-06-30 VITALS
WEIGHT: 147 LBS | BODY MASS INDEX: 34.02 KG/M2 | HEART RATE: 68 BPM | HEIGHT: 55 IN | OXYGEN SATURATION: 93 % | RESPIRATION RATE: 18 BRPM | TEMPERATURE: 97.5 F

## 2022-06-30 DIAGNOSIS — E11.42 DIABETIC PERIPHERAL NEUROPATHY (HCC): ICD-10-CM

## 2022-06-30 DIAGNOSIS — M10.9 ACUTE GOUT OF LEFT HAND, UNSPECIFIED CAUSE: Primary | ICD-10-CM

## 2022-06-30 PROCEDURE — 99213 OFFICE O/P EST LOW 20 MIN: CPT | Performed by: PHYSICIAN ASSISTANT

## 2022-06-30 PROCEDURE — S9083 URGENT CARE CENTER GLOBAL: HCPCS | Performed by: PHYSICIAN ASSISTANT

## 2022-06-30 RX ORDER — INSULIN ASPART 100 [IU]/ML
INJECTION, SOLUTION INTRAVENOUS; SUBCUTANEOUS
Qty: 15 ML | Refills: 3 | Status: SHIPPED | OUTPATIENT
Start: 2022-06-30

## 2022-06-30 RX ORDER — PREDNISONE 20 MG/1
40 TABLET ORAL DAILY
Qty: 10 TABLET | Refills: 0 | Status: SHIPPED | OUTPATIENT
Start: 2022-06-30 | End: 2022-07-05

## 2022-06-30 NOTE — PATIENT INSTRUCTIONS
Keep a closer eye on your sugars as the steroids are likely to increase your blood sugar levels  Call your primary care provider tomorrow and schedule a follow-up appointment with them next week  If any new or worsening symptoms start to develop get re-evaluated immediately

## 2022-06-30 NOTE — PROGRESS NOTES
[address] [facility]        NAME: Adriel Harrison is a 64 y o  female  : 1945    MRN: [id number]  DATE: 2022  TIME: 6:57 PM    Assessment and Plan   Acute gout of left hand, unspecified cause [M10 9]  1  Acute gout of left hand, unspecified cause  predniSONE 20 mg tablet         Patient Instructions     Patient Instructions   Keep a closer eye on your sugars as the steroids are likely to increase your blood sugar levels  Call your primary care provider tomorrow and schedule a follow-up appointment with them next week  If any new or worsening symptoms start to develop get re-evaluated immediately  Follow up with PCP in 3-5 days  Proceed to  ER if symptoms worsen  Chief Complaint     Chief Complaint   Patient presents with    Finger Pain     Left 5th finger swelling and pain, hx gout, denies injury, noticed today          History of Present Illness       Patient presents with left little finger pain and swelling starting this morning when she woke up  Denies any injury or event start the symptoms  Patient states she had a history of gout presenting this way in the past that resolved with medication  Denies fevers, wound around the area, fatigue  Review of Systems   Review of Systems   Constitutional: Negative for fever  Musculoskeletal: Positive for arthralgias and joint swelling  Skin: Positive for rash  Negative for wound  Neurological: Negative for weakness and numbness  Psychiatric/Behavioral: Negative for confusion           Current Medications       Current Outpatient Medications:     predniSONE 20 mg tablet, Take 2 tablets (40 mg total) by mouth daily for 5 days, Disp: 10 tablet, Rfl: 0    atorvastatin (LIPITOR) 10 mg tablet, Take 1 tablet (10 mg total) by mouth daily at bedtime, Disp: 90 tablet, Rfl: 3    Calcium Carbonate Antacid (CALCIUM CARBONATE PO), Take by mouth, Disp: , Rfl:     Continuous Blood Gluc  (FreeStyle Mary 2 [place]) YOLANDA, Before Breakfast and 2 hours after meals, Disp: 1 each, Rfl: 0    Continuous Blood Gluc Sensor (FreeStyle Mary 2 Sensor) MISC, Before Breakfast and 2 hours after meals, Disp: 1 each, Rfl: 3    fluticasone-umeclidinium-vilanterol (Trelegy Ellipta) 200-62 5-25 MCG/INH AEPB inhaler, Inhale 1 puff daily Rinse mouth after use , Disp: 60 blister, Rfl: 5    fosinopril (MONOPRIL) 20 mg tablet, Take 1 tablet (20 mg total) by mouth daily, Disp: 90 tablet, Rfl: 1    furosemide (LASIX) 20 mg tablet, Take 1 tablet (20 mg total) by mouth daily, Disp: 90 tablet, Rfl: 1    insulin aspart (NovoLOG FlexPen) 100 UNIT/ML injection pen, INJECT 12 UNITS BEFORE MEALS (3 MEALS), Disp: 15 mL, Rfl: 3    insulin glargine (Lantus SoloStar) 100 units/mL injection pen, Inject 30 Units under the skin daily at bedtime, Disp: 15 mL, Rfl: 2    Insulin Pen Needle 31G X 8 MM MISC, Use as directed, Disp: 90 each, Rfl: 1    latanoprost (XALATAN) 0 005 % ophthalmic solution, Administer 1 drop into the left eye daily at bedtime, Disp: 2 5 mL, Rfl: 2    levETIRAcetam (KEPPRA) 250 mg tablet, take 1 tablet by mouth every 12 hours, Disp: 180 tablet, Rfl: 3    montelukast (SINGULAIR) 10 mg tablet, take 1 tablet by mouth every evening, Disp: 90 tablet, Rfl: 1    Multiple Vitamin (MULTI VITAMIN DAILY PO), Take 1 tablet by mouth daily, Disp: , Rfl:     nystatin (MYCOSTATIN) cream, Apply topically 2 (two) times a day, Disp: 30 g, Rfl: 1    nystatin (MYCOSTATIN) powder, Apply topically 3 (three) times a day as needed (rash), Disp: 60 g, Rfl: 1    omeprazole (PriLOSEC) 20 mg delayed release capsule, Take 1 capsule (20 mg total) by mouth 2 (two) times a day, Disp: 180 capsule, Rfl: 1    pentoxifylline (TRENtal) 400 mg ER tablet, take 1 tablet by mouth three times a day with meals, Disp: 270 tablet, Rfl: 1    Probiotic Product (PROBIOTIC DAILY PO), Take by mouth  , Disp: , Rfl:     Current Facility-Administered Medications:     denosumab (PROLIA) subcutaneous injection 60 mg, 60 mg, Subcutaneous, Q6 Months, Clarisa Billingsley DO, 60 mg at 06/24/22 1442    Current Allergies     Allergies as of 06/30/2022 - Reviewed 06/30/2022   Allergen Reaction Noted    Brimonidine Other (See Comments) 12/08/2020    Sulfa antibiotics Rash 11/26/2016            The following portions of the patient's history were reviewed and updated as appropriate: allergies, current medications, past family history, past medical history, past social history, past surgical history and problem list      Past Medical History:   Diagnosis Date    Acute kidney injury superimposed on chronic kidney disease ([facility] ) 11/26/2016    ADHD (attention deficit hyperactivity disorder) 3/17/2019    Arthritis     BL HIPS    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    Carpal tunnel syndrome     Chest pain 3/6/2017    Chronic kidney disease     Closed fracture of base of middle phalanx of finger 6/22/2017    Closed fracture of middle phalanx of left little finger 7/8/2017    Coagulopathy ([facility] ) 5/15/2019    Colloid cyst of brain ([facility] )     COPD (chronic obstructive pulmonary disease) (HCC)     COPD (chronic obstructive pulmonary disease) ([facility] )     Coronary artery disease     Cough     Diabetes mellitus ([facility] )     GERD (gastroesophageal reflux disease)     Glaucoma     History of brain disorder 10/7/2020    Hyperlipidemia     Hypertension     Irritable bowel syndrome     Melena 2/26/2019    Paronychia of great toe of left foot 10/7/2020    Post-traumatic seizures ([facility] ) 7/23/2015    Renal disorder     Seizures ([facility] )     last 2015    Shortness of breath     Single seizure ([facility] ) 5/31/2016    SOB (shortness of breath)     Syncope and collapse 11/11/2020    Urinary tract infection without hematuria 11/26/2016    Wheezing        Past Surgical History:   Procedure Laterality Date    BRAIN SURGERY      CATARACT EXTRACTION      CHOLECYSTECTOMY      COLECTOMY RADHA      DECOMPRESSION SPINE LUMBAR POSTERIOR  08/19/2019    HERNIA REPAIR      HYSTERECTOMY      INCISION TENDON SHEATH HAND      NECK SURGERY  05/24/2018    NEUROPLASTY / TRANSPOSITION MEDIAN NERVE AT CARPAL TUNNEL      AR COLONOSCOPY FLX DX W/COLLJ SPEC WHEN PFRMD N/A 3/26/2019    Procedure: COLONOSCOPY;  Surgeon: Ramila Kline MD;  [facility]; Service: Gastroenterology    AR ESOPHAGOGASTRODUODENOSCOPY TRANSORAL DIAGNOSTIC N/A 3/26/2019    Procedure: ESOPHAGOGASTRODUODENOSCOPY (EGD); Surgeon: Ramila Kline MD;  [facility]; Service: Gastroenterology    REPLACEMENT TOTAL KNEE Right     RETINAL DETACHMENT SURGERY      TUBAL LIGATION         Family History   Problem Relation Age of Onset    Asthma Mother     Heart disease Mother     Emphysema Father     Cancer Father     Prostate cancer Father     Kidney cancer Sister     Diabetes Sister     Kidney disease Sister     Brain cancer Brother     Bone cancer Brother     Heart disease Brother          Medications have been verified  Objective   Pulse 68   Temp 97 5 °F (36 4 °C) (Temporal)   Resp 18   Ht 4' 7" (1 397 m)   Wt 66 7 kg (147 lb)   SpO2 93%   BMI 34 17 kg/m²        Physical Exam     Physical Exam  Constitutional:       Appearance: Normal appearance  Cardiovascular:      Pulses: Normal pulses  Musculoskeletal:         General: Swelling and tenderness present  Comments: Erythema and swelling around the PIP of the left 5th finger  Tenderness to palpation over the area as well  No ecchymosis noted  Erythematous not extend far past the joint  No streaking or fluctuance noted  Active range of motion of the finger limited due to pain  Skin:     Capillary Refill: Capillary refill takes less than 2 seconds  Findings: Rash present  Neurological:      Mental Status: She is alert     Psychiatric:         Mood and Affect: Mood normal          Behavior: Behavior normal

## 2022-07-05 DIAGNOSIS — J41.1 MUCOPURULENT CHRONIC BRONCHITIS (HCC): ICD-10-CM

## 2022-07-05 RX ORDER — MONTELUKAST SODIUM 10 MG/1
TABLET ORAL
Qty: 90 TABLET | Refills: 1 | Status: SHIPPED | OUTPATIENT
Start: 2022-07-05 | End: 2022-10-03

## 2022-07-11 ENCOUNTER — TELEPHONE (OUTPATIENT)
Dept: NEPHROLOGY | Facility: CLINIC | Age: 77
End: 2022-07-11

## 2022-07-12 ENCOUNTER — APPOINTMENT (OUTPATIENT)
Dept: LAB | Facility: CLINIC | Age: 77
End: 2022-07-12
Payer: COMMERCIAL

## 2022-07-12 DIAGNOSIS — E11.69 HYPERLIPIDEMIA ASSOCIATED WITH TYPE 2 DIABETES MELLITUS (HCC): ICD-10-CM

## 2022-07-12 DIAGNOSIS — M89.9 CHRONIC KIDNEY DISEASE-MINERAL AND BONE DISORDER: ICD-10-CM

## 2022-07-12 DIAGNOSIS — N18.32 STAGE 3B CHRONIC KIDNEY DISEASE (HCC): ICD-10-CM

## 2022-07-12 DIAGNOSIS — N18.9 CHRONIC KIDNEY DISEASE-MINERAL AND BONE DISORDER: ICD-10-CM

## 2022-07-12 DIAGNOSIS — E78.5 HYPERLIPIDEMIA ASSOCIATED WITH TYPE 2 DIABETES MELLITUS (HCC): ICD-10-CM

## 2022-07-12 DIAGNOSIS — E83.9 CHRONIC KIDNEY DISEASE-MINERAL AND BONE DISORDER: ICD-10-CM

## 2022-07-12 LAB
25(OH)D3 SERPL-MCNC: 57.4 NG/ML (ref 30–100)
ALBUMIN SERPL BCP-MCNC: 3.2 G/DL (ref 3.5–5)
ALP SERPL-CCNC: 43 U/L (ref 46–116)
ALT SERPL W P-5'-P-CCNC: 23 U/L (ref 12–78)
ANION GAP SERPL CALCULATED.3IONS-SCNC: 5 MMOL/L (ref 4–13)
AST SERPL W P-5'-P-CCNC: 12 U/L (ref 5–45)
BACTERIA UR QL AUTO: ABNORMAL /HPF
BASOPHILS # BLD AUTO: 0.06 THOUSANDS/ΜL (ref 0–0.1)
BASOPHILS NFR BLD AUTO: 1 % (ref 0–1)
BILIRUB SERPL-MCNC: 0.31 MG/DL (ref 0.2–1)
BILIRUB UR QL STRIP: NEGATIVE
BUN SERPL-MCNC: 30 MG/DL (ref 5–25)
CALCIUM ALBUM COR SERPL-MCNC: 9.3 MG/DL (ref 8.3–10.1)
CALCIUM SERPL-MCNC: 8.7 MG/DL (ref 8.3–10.1)
CHLORIDE SERPL-SCNC: 110 MMOL/L (ref 100–108)
CHOLEST SERPL-MCNC: 136 MG/DL
CLARITY UR: CLEAR
CO2 SERPL-SCNC: 26 MMOL/L (ref 21–32)
COLOR UR: ABNORMAL
CREAT SERPL-MCNC: 1.19 MG/DL (ref 0.6–1.3)
CREAT UR-MCNC: 53.8 MG/DL
EOSINOPHIL # BLD AUTO: 0.56 THOUSAND/ΜL (ref 0–0.61)
EOSINOPHIL NFR BLD AUTO: 5 % (ref 0–6)
ERYTHROCYTE [DISTWIDTH] IN BLOOD BY AUTOMATED COUNT: 13.4 % (ref 11.6–15.1)
GFR SERPL CREATININE-BSD FRML MDRD: 43 ML/MIN/1.73SQ M
GLUCOSE P FAST SERPL-MCNC: 129 MG/DL (ref 65–99)
GLUCOSE UR STRIP-MCNC: ABNORMAL MG/DL
HCT VFR BLD AUTO: 40.8 % (ref 34.8–46.1)
HDLC SERPL-MCNC: 46 MG/DL
HGB BLD-MCNC: 12.7 G/DL (ref 11.5–15.4)
HGB UR QL STRIP.AUTO: NEGATIVE
IMM GRANULOCYTES # BLD AUTO: 0.06 THOUSAND/UL (ref 0–0.2)
IMM GRANULOCYTES NFR BLD AUTO: 1 % (ref 0–2)
KETONES UR STRIP-MCNC: NEGATIVE MG/DL
LDLC SERPL CALC-MCNC: 58 MG/DL (ref 0–100)
LEUKOCYTE ESTERASE UR QL STRIP: ABNORMAL
LYMPHOCYTES # BLD AUTO: 1.92 THOUSANDS/ΜL (ref 0.6–4.47)
LYMPHOCYTES NFR BLD AUTO: 18 % (ref 14–44)
MCH RBC QN AUTO: 28.7 PG (ref 26.8–34.3)
MCHC RBC AUTO-ENTMCNC: 31.1 G/DL (ref 31.4–37.4)
MCV RBC AUTO: 92 FL (ref 82–98)
MONOCYTES # BLD AUTO: 0.8 THOUSAND/ΜL (ref 0.17–1.22)
MONOCYTES NFR BLD AUTO: 7 % (ref 4–12)
NEUTROPHILS # BLD AUTO: 7.53 THOUSANDS/ΜL (ref 1.85–7.62)
NEUTS SEG NFR BLD AUTO: 68 % (ref 43–75)
NITRITE UR QL STRIP: NEGATIVE
NON-SQ EPI CELLS URNS QL MICRO: ABNORMAL /HPF
NONHDLC SERPL-MCNC: 90 MG/DL
NRBC BLD AUTO-RTO: 0 /100 WBCS
PH UR STRIP.AUTO: 6 [PH]
PHOSPHATE SERPL-MCNC: 2.1 MG/DL (ref 2.3–4.1)
PLATELET # BLD AUTO: 204 THOUSANDS/UL (ref 149–390)
PMV BLD AUTO: 10.6 FL (ref 8.9–12.7)
POTASSIUM SERPL-SCNC: 3.7 MMOL/L (ref 3.5–5.3)
PROT SERPL-MCNC: 7.4 G/DL (ref 6.4–8.2)
PROT UR STRIP-MCNC: ABNORMAL MG/DL
PROT UR-MCNC: 21 MG/DL
PROT/CREAT UR: 0.39 MG/G{CREAT} (ref 0–0.1)
PTH-INTACT SERPL-MCNC: 94.8 PG/ML (ref 18.4–80.1)
RBC # BLD AUTO: 4.42 MILLION/UL (ref 3.81–5.12)
RBC #/AREA URNS AUTO: ABNORMAL /HPF
SODIUM SERPL-SCNC: 141 MMOL/L (ref 136–145)
SP GR UR STRIP.AUTO: 1.01 (ref 1–1.03)
TRIGL SERPL-MCNC: 159 MG/DL
UROBILINOGEN UR STRIP-ACNC: <2 MG/DL
WBC # BLD AUTO: 10.93 THOUSAND/UL (ref 4.31–10.16)
WBC #/AREA URNS AUTO: ABNORMAL /HPF

## 2022-07-12 PROCEDURE — 80053 COMPREHEN METABOLIC PANEL: CPT

## 2022-07-12 PROCEDURE — 82570 ASSAY OF URINE CREATININE: CPT

## 2022-07-12 PROCEDURE — 82306 VITAMIN D 25 HYDROXY: CPT

## 2022-07-12 PROCEDURE — 80061 LIPID PANEL: CPT

## 2022-07-12 PROCEDURE — 36415 COLL VENOUS BLD VENIPUNCTURE: CPT

## 2022-07-12 PROCEDURE — 84156 ASSAY OF PROTEIN URINE: CPT

## 2022-07-12 PROCEDURE — 84100 ASSAY OF PHOSPHORUS: CPT

## 2022-07-12 PROCEDURE — 83970 ASSAY OF PARATHORMONE: CPT

## 2022-07-12 PROCEDURE — 85025 COMPLETE CBC W/AUTO DIFF WBC: CPT

## 2022-07-12 PROCEDURE — 81001 URINALYSIS AUTO W/SCOPE: CPT

## 2022-07-15 ENCOUNTER — TELEPHONE (OUTPATIENT)
Dept: NEPHROLOGY | Facility: CLINIC | Age: 77
End: 2022-07-15

## 2022-07-18 ENCOUNTER — OFFICE VISIT (OUTPATIENT)
Dept: NEPHROLOGY | Facility: CLINIC | Age: 77
End: 2022-07-18
Payer: COMMERCIAL

## 2022-07-18 ENCOUNTER — TELEPHONE (OUTPATIENT)
Dept: NEPHROLOGY | Facility: CLINIC | Age: 77
End: 2022-07-18

## 2022-07-18 VITALS
WEIGHT: 150.2 LBS | BODY MASS INDEX: 34.76 KG/M2 | DIASTOLIC BLOOD PRESSURE: 60 MMHG | HEART RATE: 69 BPM | SYSTOLIC BLOOD PRESSURE: 90 MMHG | HEIGHT: 55 IN | OXYGEN SATURATION: 95 % | RESPIRATION RATE: 16 BRPM | TEMPERATURE: 97.8 F

## 2022-07-18 DIAGNOSIS — M89.9 CHRONIC KIDNEY DISEASE-MINERAL AND BONE DISORDER: ICD-10-CM

## 2022-07-18 DIAGNOSIS — E83.9 CHRONIC KIDNEY DISEASE-MINERAL AND BONE DISORDER: ICD-10-CM

## 2022-07-18 DIAGNOSIS — N18.32 STAGE 3B CHRONIC KIDNEY DISEASE (HCC): Primary | ICD-10-CM

## 2022-07-18 DIAGNOSIS — E66.09 CLASS 1 OBESITY DUE TO EXCESS CALORIES WITH SERIOUS COMORBIDITY AND BODY MASS INDEX (BMI) OF 34.0 TO 34.9 IN ADULT: ICD-10-CM

## 2022-07-18 DIAGNOSIS — E11.69 HYPERLIPIDEMIA ASSOCIATED WITH TYPE 2 DIABETES MELLITUS (HCC): ICD-10-CM

## 2022-07-18 DIAGNOSIS — E78.5 HYPERLIPIDEMIA ASSOCIATED WITH TYPE 2 DIABETES MELLITUS (HCC): ICD-10-CM

## 2022-07-18 DIAGNOSIS — I10 PRIMARY HYPERTENSION: ICD-10-CM

## 2022-07-18 DIAGNOSIS — N18.9 CHRONIC KIDNEY DISEASE-MINERAL AND BONE DISORDER: ICD-10-CM

## 2022-07-18 DIAGNOSIS — G40.909 SEIZURE DISORDER (HCC): ICD-10-CM

## 2022-07-18 PROCEDURE — 99214 OFFICE O/P EST MOD 30 MIN: CPT | Performed by: INTERNAL MEDICINE

## 2022-07-18 RX ORDER — CHOLECALCIFEROL (VITAMIN D3) 125 MCG
500 CAPSULE ORAL DAILY
COMMUNITY

## 2022-07-18 NOTE — ASSESSMENT & PLAN NOTE
Lab Results   Component Value Date    EGFR 43 07/12/2022    EGFR 33 02/26/2022    EGFR 31 12/08/2021    CREATININE 1 19 07/12/2022    CREATININE 1 47 (H) 02/26/2022    CREATININE 1 57 (H) 12/08/2021   Patient PTH and phosphorus along with the vitamin-D are within acceptable range and we will continue to monitor

## 2022-07-18 NOTE — ASSESSMENT & PLAN NOTE
Lab Results   Component Value Date    EGFR 43 07/12/2022    EGFR 33 02/26/2022    EGFR 31 12/08/2021    CREATININE 1 19 07/12/2022    CREATININE 1 47 (H) 02/26/2022    CREATININE 1 57 (H) 12/08/2021   Renal function is better  Advised to continue hydration which she needs more because of low blood pressure    Will monitor

## 2022-07-18 NOTE — ASSESSMENT & PLAN NOTE
Patient's blood pressure running on the lower side that has been making her dizzy and falling    Stop fosinopril with advised to monitor blood pressure and let me know

## 2022-07-18 NOTE — PROGRESS NOTES
NEPHROLOGY OFFICE FOLLOW UP  Stacia Dumont 76 y o  female MRN: [id number]    Encounter: [id number] 7/18/2022    REASON FOR VISIT: Stacia Dumont is a 76 y o  female who is here on 7/18/2022 for Chronic Kidney Disease and Follow-up    HPI:    Robel Camp came in today for follow-up of CKD  77-year-old woman who is doing well oral     Since I saw her last she had a fall at home her walk with a walker now     Six complaining of being dizzy and running low blood pressure    Denies any chest pain or palpitation     No nausea no vomiting      REVIEW OF SYSTEMS:    Review of Systems   Constitutional: Negative for activity change and fatigue  HENT: Negative for congestion and ear discharge  Eyes: Negative for photophobia and pain  Respiratory: Negative for apnea and choking  Cardiovascular: Negative for chest pain and palpitations  Gastrointestinal: Negative for abdominal distention and blood in stool  Endocrine: Negative for heat intolerance and polyphagia  Genitourinary: Negative for flank pain and urgency  Musculoskeletal: Negative for neck pain and neck stiffness  Skin: Negative for color change and wound  Allergic/Immunologic: Negative for food allergies and immunocompromised state  Neurological: Positive for weakness and light-headedness  Negative for seizures and facial asymmetry  Hematological: Negative for adenopathy  Does not bruise/bleed easily  Psychiatric/Behavioral: Negative for self-injury and suicidal ideas           PAST MEDICAL HISTORY:  Past Medical History:   Diagnosis Date    Acute kidney injury superimposed on chronic kidney disease (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    Carpal tunnel syndrome     Chest pain 03/06/2017    Chronic kidney disease     Closed fracture of base of middle phalanx of finger 06/22/2017    Closed fracture of middle phalanx of left little finger 07/08/2017    Coagulopathy ([facility] ) 05/15/2019    Colloid cyst of brain (HCC)     COPD (chronic obstructive pulmonary disease) (HCC)     COPD (chronic obstructive pulmonary disease) (HCC)     Coronary artery disease     Cough     Diabetes mellitus (HCC)     GERD (gastroesophageal reflux disease)     Glaucoma     Gout     History of brain disorder 10/07/2020    Hyperlipidemia     Hypertension     Irritable bowel syndrome     Melena 02/26/2019    Paronychia of great toe of left foot 10/07/2020    Post-traumatic seizures ([facility] ) 07/23/2015    Renal disorder     Seizures ([facility] )     last 2015    Shortness of breath     Single seizure ([facility] ) 05/31/2016    SOB (shortness of breath)     Syncope and collapse 11/11/2020    Urinary tract infection without hematuria 11/26/2016    Wheezing        PAST SURGICAL HISTORY:  Past Surgical History:   Procedure Laterality Date    BRAIN SURGERY      CATARACT EXTRACTION      CHOLECYSTECTOMY      COLECTOMY RADHA      DECOMPRESSION SPINE LUMBAR POSTERIOR  08/19/2019    HERNIA REPAIR      HYSTERECTOMY      INCISION TENDON SHEATH HAND      NECK SURGERY  05/24/2018    NEUROPLASTY / TRANSPOSITION MEDIAN NERVE AT CARPAL TUNNEL      IN COLONOSCOPY FLX DX W/COLLJ SPEC WHEN PFRMD N/A 3/26/2019    Procedure: COLONOSCOPY;  Surgeon: Yoana Peter MD;  [facility]; Service: Gastroenterology    IN ESOPHAGOGASTRODUODENOSCOPY TRANSORAL DIAGNOSTIC N/A 3/26/2019    Procedure: ESOPHAGOGASTRODUODENOSCOPY (EGD); Surgeon: Yoana Peter MD;  [facility]: [facility];   Service: Gastroenterology    REPLACEMENT TOTAL KNEE Right     RETINAL DETACHMENT SURGERY      TUBAL LIGATION         SOCIAL HISTORY:  Social History     Substance and Sexual Activity   Alcohol Use Never     Social History     Substance and Sexual Activity   Drug Use Never     Social History     Tobacco Use   Smoking Status Former Smoker    Packs/day: 0 50    Years: 40 00    Pack years: 20 00    Start date: 63    Quit date:     Years since quittin 5   Smokeless Tobacco Never Used   Tobacco Comment    stopped many years ago       FAMILY HISTORY:  Family History   Problem Relation Age of Onset    Asthma Mother     Heart disease Mother     Emphysema Father     Cancer Father     Prostate cancer Father     Kidney cancer Sister     Diabetes Sister     Kidney disease Sister     Brain cancer Brother     Bone cancer Brother     Heart disease Brother        MEDICATIONS:    Current Outpatient Medications:     atorvastatin (LIPITOR) 10 mg tablet, Take 1 tablet (10 mg total) by mouth daily at bedtime, Disp: 90 tablet, Rfl: 3    Calcium Carbonate Antacid (CALCIUM CARBONATE PO), Take by mouth, Disp: , Rfl:     Continuous Blood Gluc  (FreeStyle Mary 2 [place]) YOLANDA, Before Breakfast and 2 hours after meals, Disp: 1 each, Rfl: 0    Continuous Blood Gluc Sensor (FreeStyle Mary 2 Sensor) MISC, Before Breakfast and 2 hours after meals, Disp: 1 each, Rfl: 3    furosemide (LASIX) 20 mg tablet, Take 1 tablet (20 mg total) by mouth daily, Disp: 90 tablet, Rfl: 1    insulin aspart (NovoLOG FlexPen) 100 UNIT/ML injection pen, INJECT 12 UNITS BEFORE MEALS (3 MEALS) (Patient taking differently: INJECT 12 UNITS BEFORE MEALS (3 MEALS) 4 times daily), Disp: 15 mL, Rfl: 3    insulin glargine (Lantus SoloStar) 100 units/mL injection pen, Inject 30 Units under the skin daily at bedtime, Disp: 15 mL, Rfl: 2    Insulin Pen Needle 31G X 8 MM MISC, Use as directed, Disp: 90 each, Rfl: 1    latanoprost (XALATAN) 0 005 % ophthalmic solution, Administer 1 drop into the left eye daily at bedtime, Disp: 2 5 mL, Rfl: 2    levETIRAcetam (KEPPRA) 250 mg tablet, take 1 tablet by mouth every 12 hours, Disp: 180 tablet, Rfl: 3    montelukast (SINGULAIR) 10 mg tablet, take 1 tablet by mouth every evening, Disp: 90 tablet, Rfl: 1    Multiple Vitamin (MULTI VITAMIN DAILY PO), Take 1 tablet by mouth daily, Disp: , Rfl:     nystatin (MYCOSTATIN) cream, Apply topically 2 (two) times a day, Disp: 30 g, Rfl: 1    nystatin (MYCOSTATIN) powder, Apply topically 3 (three) times a day as needed (rash), Disp: 60 g, Rfl: 1    omeprazole (PriLOSEC) 20 mg delayed release capsule, Take 1 capsule (20 mg total) by mouth 2 (two) times a day, Disp: 180 capsule, Rfl: 1    pentoxifylline (TRENtal) 400 mg ER tablet, take 1 tablet by mouth three times a day with meals, Disp: 270 tablet, Rfl: 1    Probiotic Product (PROBIOTIC DAILY PO), Take by mouth  , Disp: , Rfl:     vitamin B-12 (VITAMIN B-12) 500 mcg tablet, Take 500 mcg by mouth daily, Disp: , Rfl:     fluticasone-umeclidinium-vilanterol (Trelegy Ellipta) 200-62 5-25 MCG/INH AEPB inhaler, Inhale 1 puff daily Rinse mouth after use  (Patient not taking: Reported on 7/18/2022), Disp: 60 blister, Rfl: 5    fosinopril (MONOPRIL) 20 mg tablet, Take 1 tablet (20 mg total) by mouth daily (Patient not taking: Reported on 7/18/2022), Disp: 90 tablet, Rfl: 1    Current Facility-Administered Medications:     denosumab (PROLIA) subcutaneous injection 60 mg, 60 mg, Subcutaneous, Q6 Months, Clarisa Billingsley DO, 60 mg at 06/24/22 1442    PHYSICAL EXAM:  Vitals:    07/18/22 1107   BP: 90/60   BP Location: Right arm   Patient Position: Sitting   Pulse: 69   Resp: 16   Temp: 97 8 °F (36 6 °C)   TempSrc: Oral   SpO2: 95%   Weight: 68 1 kg (150 lb 3 2 oz)   Height: 4' 7" (1 397 m)     Body mass index is 34 91 kg/m²  Physical Exam  Constitutional:       General: She is not in acute distress  Appearance: She is well-developed  HENT:      Head: Normocephalic  Eyes:      General: No scleral icterus  Conjunctiva/sclera: Conjunctivae normal    Neck:      Vascular: No JVD  Cardiovascular:      Rate and Rhythm: Normal rate  Heart sounds: Normal heart sounds  Pulmonary:      Effort: Pulmonary effort is normal       Breath sounds: No wheezing  Abdominal:      Palpations: Abdomen is soft  Tenderness: There is no abdominal tenderness  Musculoskeletal:         General: Normal range of motion  Cervical back: Neck supple  Skin:     General: Skin is warm  Findings: No rash  Neurological:      Mental Status: She is alert and oriented to person, place, and time     Psychiatric:         Behavior: Behavior normal          LAB RESULTS:  Results for orders placed or performed in visit on 07/12/22   CBC and differential   Result Value Ref Range    WBC 10 93 (H) 4 31 - 10 16 Thousand/uL    RBC 4 42 3 81 - 5 12 Million/uL    Hemoglobin 12 7 11 5 - 15 4 g/dL    Hematocrit 40 8 34 8 - 46 1 %    MCV 92 82 - 98 fL    MCH 28 7 26 8 - 34 3 pg    MCHC 31 1 (L) 31 4 - 37 4 g/dL    RDW 13 4 11 6 - 15 1 %    MPV 10 6 8 9 - 12 7 fL    Platelets 533 919 - 118 Thousands/uL    nRBC 0 /100 WBCs    Neutrophils Relative 68 43 - 75 %    Immat GRANS % 1 0 - 2 %    Lymphocytes Relative 18 14 - 44 %    Monocytes Relative 7 4 - 12 %    Eosinophils Relative 5 0 - 6 %    Basophils Relative 1 0 - 1 %    Neutrophils Absolute 7 53 1 85 - 7 62 Thousands/µL    Immature Grans Absolute 0 06 0 00 - 0 20 Thousand/uL    Lymphocytes Absolute 1 92 0 60 - 4 47 Thousands/µL    Monocytes Absolute 0 80 0 17 - 1 22 Thousand/µL    Eosinophils Absolute 0 56 0 00 - 0 61 Thousand/µL    Basophils Absolute 0 06 0 00 - 0 10 Thousands/µL   Phosphorus   Result Value Ref Range    Phosphorus 2 1 (L) 2 3 - 4 1 mg/dL   Protein / creatinine ratio, urine   Result Value Ref Range    Creatinine, Ur 53 8 mg/dL    Protein Urine Random 21 mg/dL    Prot/Creat Ratio, Ur 0 39 (H) 0 00 - 0 10   PTH, intact   Result Value Ref Range    PTH 94 8 (H) 18 4 - 80 1 pg/mL   UA (URINE) with reflex to Scope   Result Value Ref Range    Color, UA Light Yellow     Clarity, UA Clear     Specific [place], UA 1 013 1 003 - 1 030    pH, UA 6 0 4 5, 5 0, 5 5, 6 0, 6 5, 7 0, 7 5, 8 0    Leukocytes, UA Moderate (A) Negative    Nitrite, UA Negative Negative    Protein, UA Trace (A) Negative mg/dl    Glucose,  (1/10%) (A) Negative mg/dl    Ketones, UA Negative Negative mg/dl    Urobilinogen, UA <2 0 <2 0 mg/dl mg/dl    Bilirubin, UA Negative Negative    Occult Blood, UA Negative Negative   Vitamin D 25 hydroxy   Result Value Ref Range    Vit D, 25-Hydroxy 57 4 30 0 - 100 0 ng/mL   Comprehensive metabolic panel   Result Value Ref Range    Sodium 141 136 - 145 mmol/L    Potassium 3 7 3 5 - 5 3 mmol/L    Chloride 110 (H) 100 - 108 mmol/L    CO2 26 21 - 32 mmol/L    ANION GAP 5 4 - 13 mmol/L    BUN 30 (H) 5 - 25 mg/dL    Creatinine 1 19 0 60 - 1 30 mg/dL    Glucose, Fasting 129 (H) 65 - 99 mg/dL    Calcium 8 7 8 3 - 10 1 mg/dL    Corrected Calcium 9 3 8 3 - 10 1 mg/dL    AST 12 5 - 45 U/L    ALT 23 12 - 78 U/L    Alkaline Phosphatase 43 (L) 46 - 116 U/L    Total Protein 7 4 6 4 - 8 2 g/dL    Albumin 3 2 (L) 3 5 - 5 0 g/dL    Total Bilirubin 0 31 0 20 - 1 00 mg/dL    eGFR 43 ml/min/1 73sq m   Lipid panel   Result Value Ref Range    Cholesterol 136 See Comment mg/dL    Triglycerides 159 (H) See Comment mg/dL    HDL, Direct 46 (L) >=50 mg/dL    LDL Calculated 58 0 - 100 mg/dL    Non-HDL-Chol (CHOL-HDL) 90 mg/dl   Urine Microscopic   Result Value Ref Range    RBC, UA 1-2 None Seen, 1-2 /hpf    WBC, UA 4-10 (A) None Seen, 1-2 /hpf    Epithelial Cells Occasional None Seen, Occasional /hpf    Bacteria, UA None Seen None Seen, Occasional /hpf       ASSESSMENT and PLAN:      Stage 3b chronic kidney disease (HCC)  Lab Results   Component Value Date    EGFR 43 07/12/2022    EGFR 33 02/26/2022    EGFR 31 12/08/2021    CREATININE 1 19 07/12/2022    CREATININE 1 47 (H) 02/26/2022    CREATININE 1 57 (H) 12/08/2021   Renal function is better  Advised to continue hydration which she needs more because of low blood pressure    Will monitor    Chronic kidney disease-mineral and bone disorder  Lab Results   Component Value Date    EGFR 43 07/12/2022    EGFR 33 02/26/2022    EGFR 31 12/08/2021    CREATININE 1 19 07/12/2022    CREATININE 1 47 (H) 02/26/2022    CREATININE 1 57 (H) 12/08/2021   Patient PTH and phosphorus along with the vitamin-D are within acceptable range and we will continue to monitor    Hypertension  Patient's blood pressure running on the lower side that has been making her dizzy and falling  Stop fosinopril with advised to monitor blood pressure and let me know      I will see her back in 6 months  We will get blood work in 3 months in 6 months  Advised to follow up with primary doctor about hypotension        Portions of the record may have been created with voice recognition software  Occasional wrong word or "sound a like" substitutions may have occurred due to the inherent limitations of voice recognition software  Read the chart carefully and recognize, using context, where substitutions have occurred  If you have any questions, please contact the dictating provider

## 2022-07-18 NOTE — TELEPHONE ENCOUNTER
Good Afternoon,    Dr Malathi Collado  just FYI  I received a called from DEVON from the [facility] stating as per visit with you patient was advised to see PCP Dr Fuller Comfort decreasing  on patient blood pressure  DEVON stated she has been trying to contact patient unable to contact patient  DEVON stated she call 3xs and cant reach patient on tel number listed below  Called patient to inform PCP is trying to  contact her to schedule an appointment with Dr Nicholas Perez for decreasing in blood pressure    Spoke with Corbin Garcia he stated he will contact Dr Nicholas Perez to schedule the appointment

## 2022-07-20 ENCOUNTER — OFFICE VISIT (OUTPATIENT)
Dept: FAMILY MEDICINE CLINIC | Facility: CLINIC | Age: 77
End: 2022-07-20
Payer: COMMERCIAL

## 2022-07-20 VITALS
WEIGHT: 150 LBS | SYSTOLIC BLOOD PRESSURE: 118 MMHG | BODY MASS INDEX: 34.71 KG/M2 | DIASTOLIC BLOOD PRESSURE: 60 MMHG | HEIGHT: 55 IN | TEMPERATURE: 98.7 F | HEART RATE: 77 BPM | OXYGEN SATURATION: 98 %

## 2022-07-20 DIAGNOSIS — R60.0 BILATERAL LEG EDEMA: ICD-10-CM

## 2022-07-20 DIAGNOSIS — I50.32 CHRONIC HEART FAILURE WITH PRESERVED EJECTION FRACTION (HCC): ICD-10-CM

## 2022-07-20 DIAGNOSIS — M79.645 PAIN IN LEFT FINGER(S): ICD-10-CM

## 2022-07-20 DIAGNOSIS — M25.40 JOINT SWELLING: ICD-10-CM

## 2022-07-20 DIAGNOSIS — I95.9 HYPOTENSION, UNSPECIFIED HYPOTENSION TYPE: Primary | ICD-10-CM

## 2022-07-20 PROCEDURE — 99214 OFFICE O/P EST MOD 30 MIN: CPT | Performed by: FAMILY MEDICINE

## 2022-07-20 PROCEDURE — 3078F DIAST BP <80 MM HG: CPT | Performed by: FAMILY MEDICINE

## 2022-07-20 PROCEDURE — 1160F RVW MEDS BY RX/DR IN RCRD: CPT | Performed by: FAMILY MEDICINE

## 2022-07-20 PROCEDURE — 3074F SYST BP LT 130 MM HG: CPT | Performed by: FAMILY MEDICINE

## 2022-07-20 RX ORDER — FUROSEMIDE 20 MG/1
10 TABLET ORAL DAILY
Qty: 90 TABLET | Refills: 1
Start: 2022-07-20 | End: 2022-09-20 | Stop reason: SDUPTHER

## 2022-07-20 NOTE — PROGRESS NOTES
Stacia Dumont 1945 female MRN: [id number]      ASSESSMENT/PLAN  Problem List Items Addressed This Visit        Cardiovascular and Mediastinum    Chronic heart failure with preserved ejection fraction (HCC)    Relevant Medications    furosemide (LASIX) 20 mg tablet       Other    Bilateral leg edema    Relevant Medications    furosemide (LASIX) 20 mg tablet      Other Visit Diagnoses     Hypotension, unspecified hypotension type    -  Primary    Joint swelling        Relevant Orders    Ambulatory Referral to Hand Surgery    Pain in left finger(s)        Relevant Orders    Ambulatory Referral to Hand Surgery        BP improved, though still a bit low on home readings -- trial halving Lasix to 10 mg daily  Should monitor BP daily as well as signs of LE edema/shortness of breath  If fluid retention develops, should restart 20 mg daily and call  Persistent L 5th digit PIP swelling/pain s/p steroid -- will refer to Ortho Hand for further evaluation, consider joint aspiration/injection  No suggestion of septic joint on exam      Future Appointments   Date Time Provider Steffany Porras   9/26/2022 10:40 AM MD SILVINO Mabry E [facility]   9/26/2022  3:20 PM DO ELISA King  [facility]   12/19/2022  9:20 AM DO ELISA Lambert  [facility]   12/27/2022  1:00 PM PATRICIO [facility] NURSE ELISA  [facility]          SUBJECTIVE  CC: Hypotension (BP at nephrology 55/88 but diastolic has been consistently under 60/Hasnt taken bp medications since Sunday ) and Joint Swelling (Went to  and was given steroid for 5 days  Still swollen and limited ROM )      HPI:  Stacia Dumont is a 76 y o  female who presents with her son for follow up after Nephrology visit       Nephro stopped Lisinopril due to hypotension   Home BPs still in <60s, still experiencing lightheadedness     Was seen at Urgent Care on 6/30 due to L 5th digit pain/swelling -- was given Prednisone for presumed gout; continues to have symptoms       Review of Systems   Musculoskeletal: Positive for arthralgias and joint swelling  Neurological: Positive for light-headedness         Historical Information   The patient history was reviewed and updated as follows:    Past Medical History:   Diagnosis Date    Acute kidney injury superimposed on chronic kidney disease ([facility] ) 11/26/2016    ADHD (attention deficit hyperactivity disorder) 03/17/2019    Arthritis     BL HIPS    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    Carpal tunnel syndrome     Chest pain 03/06/2017    Chronic kidney disease     Closed fracture of base of middle phalanx of finger 06/22/2017    Closed fracture of middle phalanx of left little finger 07/08/2017    Coagulopathy ([facility] ) 05/15/2019    Colloid cyst of brain (HCC)     COPD (chronic obstructive pulmonary disease) (HCC)     COPD (chronic obstructive pulmonary disease) (HCC)     Coronary artery disease     Cough     Diabetes mellitus (HCC)     GERD (gastroesophageal reflux disease)     Glaucoma     Gout     History of brain disorder 10/07/2020    Hyperlipidemia     Hypertension     Irritable bowel syndrome     Melena 02/26/2019    Paronychia of great toe of left foot 10/07/2020    Post-traumatic seizures ([facility] ) 07/23/2015    Renal disorder     [facility]ures [facility]     last 2015    Shortness of breath     Single seizure [facility] ) 05/31/2016    SOB (shortness of breath)     Syncope and collapse 11/11/2020    Urinary tract infection without hematuria 11/26/2016    Wheezing      Past Surgical History:   Procedure Laterality Date    BRAIN SURGERY      CATARACT EXTRACTION      CHOLECYSTECTOMY      COLECTOMY RADHA      DECOMPRESSION SPINE LUMBAR POSTERIOR  08/19/2019    HERNIA REPAIR      HYSTERECTOMY      INCISION TENDON SHEATH HAND      NECK SURGERY  05/24/2018    NEUROPLASTY / TRANSPOSITION MEDIAN NERVE AT CARPAL TUNNEL      OK COLONOSCOPY FLX DX W/COLLJ SPEC WHEN PFRMD N/A 3/26/2019 Procedure: COLONOSCOPY;  Surgeon: Mckay Cortez MD;  [facility]: [facility]; Service: Gastroenterology    AL ESOPHAGOGASTRODUODENOSCOPY TRANSORAL DIAGNOSTIC N/A 3/26/2019    Procedure: ESOPHAGOGASTRODUODENOSCOPY (EGD); Surgeon: Mckay Cortez MD;  [facility];   Service: Gastroenterology    REPLACEMENT TOTAL KNEE Right     RETINAL DETACHMENT SURGERY      TUBAL LIGATION       Family History   Problem Relation Age of Onset    Asthma Mother     Heart disease Mother     Emphysema Father     Cancer Father     Prostate cancer Father     Kidney cancer Sister     Diabetes Sister     Kidney disease Sister     Brain cancer Brother     Bone cancer Brother     Heart disease Brother       Social History   Social History     Substance and Sexual Activity   Alcohol Use Never     Social History     Substance and Sexual Activity   Drug Use Never     Social History     Tobacco Use   Smoking Status Former Smoker    Packs/day: 0 50    Years: 40 00    Pack years: 20 00    Start date:     Quit date:     Years since quittin 5   Smokeless Tobacco Never Used   Tobacco Comment    stopped many years ago       Medications:     Current Outpatient Medications:     atorvastatin (LIPITOR) 10 mg tablet, Take 1 tablet (10 mg total) by mouth daily at bedtime, Disp: 90 tablet, Rfl: 3    Calcium Carbonate Antacid (CALCIUM CARBONATE PO), Take by mouth, Disp: , Rfl:     Continuous Blood Gluc  (FreeStyle Mary 2 [place]) YOLANDA, Before Breakfast and 2 hours after meals, Disp: 1 each, Rfl: 0    Continuous Blood Gluc Sensor (FreeStyle Mary 2 Sensor) MISC, Before Breakfast and 2 hours after meals, Disp: 1 each, Rfl: 3    fluticasone-umeclidinium-vilanterol (Trelegy Ellipta) 200-62 5-25 MCG/INH AEPB inhaler, Inhale 1 puff daily Rinse mouth after use , Disp: 60 blister, Rfl: 5    furosemide (LASIX) 20 mg tablet, Take 0 5 tablets (10 mg total) by mouth daily, Disp: 90 tablet, Rfl: 1    insulin aspart (NovoLOG FlexPen) 100 UNIT/ML injection pen, INJECT 12 UNITS BEFORE MEALS (3 MEALS) (Patient taking differently: INJECT 12 UNITS BEFORE MEALS (3 MEALS) 4 times daily), Disp: 15 mL, Rfl: 3    insulin glargine (Lantus SoloStar) 100 units/mL injection pen, Inject 30 Units under the skin daily at bedtime, Disp: 15 mL, Rfl: 2    Insulin Pen Needle 31G X 8 MM MISC, Use as directed, Disp: 90 each, Rfl: 1    latanoprost (XALATAN) 0 005 % ophthalmic solution, Administer 1 drop into the left eye daily at bedtime, Disp: 2 5 mL, Rfl: 2    levETIRAcetam (KEPPRA) 250 mg tablet, take 1 tablet by mouth every 12 hours, Disp: 180 tablet, Rfl: 3    montelukast (SINGULAIR) 10 mg tablet, take 1 tablet by mouth every evening, Disp: 90 tablet, Rfl: 1    Multiple Vitamin (MULTI VITAMIN DAILY PO), Take 1 tablet by mouth daily, Disp: , Rfl:     nystatin (MYCOSTATIN) cream, Apply topically 2 (two) times a day, Disp: 30 g, Rfl: 1    nystatin (MYCOSTATIN) powder, Apply topically 3 (three) times a day as needed (rash), Disp: 60 g, Rfl: 1    omeprazole (PriLOSEC) 20 mg delayed release capsule, Take 1 capsule (20 mg total) by mouth 2 (two) times a day, Disp: 180 capsule, Rfl: 1    pentoxifylline (TRENtal) 400 mg ER tablet, take 1 tablet by mouth three times a day with meals, Disp: 270 tablet, Rfl: 1    Probiotic Product (PROBIOTIC DAILY PO), Take by mouth  , Disp: , Rfl:     vitamin B-12 (VITAMIN B-12) 500 mcg tablet, Take 500 mcg by mouth daily, Disp: , Rfl:     Current Facility-Administered Medications:     denosumab (PROLIA) subcutaneous injection 60 mg, 60 mg, Subcutaneous, Q6 Months, Clarisa Billingsley DO, 60 mg at 06/24/22 1442  Allergies   Allergen Reactions    Brimonidine Other (See Comments)    Sulfa Antibiotics Rash       OBJECTIVE    Vitals:   Vitals:    07/20/22 1509   BP: 118/60   BP Location: Left arm   Patient Position: Sitting   Cuff Size: Large   Pulse: 77   Temp: 98 7 °F (37 1 °C)   SpO2: 98%   Weight: 68 kg (150 lb) Height: 4' 7" (1 397 m)           Physical Exam  Vitals and nursing note reviewed  Constitutional:       General: She is not in acute distress  Appearance: Normal appearance  HENT:      Head: Normocephalic and atraumatic  Pulmonary:      Effort: Pulmonary effort is normal  No respiratory distress  Musculoskeletal:      Comments: L 5th PIP swollen, no significant erythema, warmth; ROM decreased due to pain    Neurological:      General: No focal deficit present  Mental Status: She is alert     Psychiatric:         Mood and Affect: Mood normal                     Clarisa Billingsley DO  [facility]   7/20/2022  4:12 PM

## 2022-07-21 ENCOUNTER — TELEPHONE (OUTPATIENT)
Dept: OBGYN CLINIC | Facility: HOSPITAL | Age: 77
End: 2022-07-21

## 2022-07-21 NOTE — TELEPHONE ENCOUNTER
Hello,  Please advise if the following patient can be forced onto the schedule:    Patient: Claude Mar    : 1945    MRN: [id number]    Call back #: [phone] (son Soheila Harris)  Or Guevara Busby on house phone     Insurance: [employer] [facility]     Reason for appointment: Left hand digit # 5 , swelling , pain   Injections     Requested doctor/location: [place] location       Thank you

## 2022-08-02 ENCOUNTER — APPOINTMENT (OUTPATIENT)
Dept: RADIOLOGY | Facility: MEDICAL CENTER | Age: 77
End: 2022-08-02
Payer: COMMERCIAL

## 2022-08-02 ENCOUNTER — OFFICE VISIT (OUTPATIENT)
Dept: OBGYN CLINIC | Facility: MEDICAL CENTER | Age: 77
End: 2022-08-02
Payer: COMMERCIAL

## 2022-08-02 VITALS
BODY MASS INDEX: 34.71 KG/M2 | WEIGHT: 150 LBS | DIASTOLIC BLOOD PRESSURE: 76 MMHG | HEIGHT: 55 IN | SYSTOLIC BLOOD PRESSURE: 145 MMHG | HEART RATE: 71 BPM

## 2022-08-02 DIAGNOSIS — M79.645 PAIN IN LEFT FINGER(S): Primary | ICD-10-CM

## 2022-08-02 DIAGNOSIS — M79.645 PAIN IN LEFT FINGER(S): ICD-10-CM

## 2022-08-02 DIAGNOSIS — M25.40 JOINT SWELLING: ICD-10-CM

## 2022-08-02 PROCEDURE — 20600 DRAIN/INJ JOINT/BURSA W/O US: CPT | Performed by: ORTHOPAEDIC SURGERY

## 2022-08-02 PROCEDURE — 3077F SYST BP >= 140 MM HG: CPT | Performed by: ORTHOPAEDIC SURGERY

## 2022-08-02 PROCEDURE — 73140 X-RAY EXAM OF FINGER(S): CPT

## 2022-08-02 PROCEDURE — 99204 OFFICE O/P NEW MOD 45 MIN: CPT | Performed by: ORTHOPAEDIC SURGERY

## 2022-08-02 PROCEDURE — 1160F RVW MEDS BY RX/DR IN RCRD: CPT | Performed by: ORTHOPAEDIC SURGERY

## 2022-08-02 PROCEDURE — 3078F DIAST BP <80 MM HG: CPT | Performed by: ORTHOPAEDIC SURGERY

## 2022-08-02 RX ORDER — LIDOCAINE HYDROCHLORIDE 10 MG/ML
0.5 INJECTION, SOLUTION INFILTRATION; PERINEURAL
Status: COMPLETED | OUTPATIENT
Start: 2022-08-02 | End: 2022-08-02

## 2022-08-02 RX ORDER — TRIAMCINOLONE ACETONIDE 40 MG/ML
20 INJECTION, SUSPENSION INTRA-ARTICULAR; INTRAMUSCULAR
Status: COMPLETED | OUTPATIENT
Start: 2022-08-02 | End: 2022-08-02

## 2022-08-02 RX ADMIN — TRIAMCINOLONE ACETONIDE 20 MG: 40 INJECTION, SUSPENSION INTRA-ARTICULAR; INTRAMUSCULAR at 13:10

## 2022-08-02 RX ADMIN — LIDOCAINE HYDROCHLORIDE 0.5 ML: 10 INJECTION, SOLUTION INFILTRATION; PERINEURAL at 13:10

## 2022-08-02 NOTE — PROGRESS NOTES
[facility] [place]  Nola Lock [address] [place] [id number]       Sudeep Dose  [id number]  1945    ORTHOPAEDIC SURGERY OUTPATIENT NOTE  8/2/2022      HISTORY:  76 y o  RHD female who presents to the office today for evaluation and treatment of left small finger pain  Patient states this started approximately 1 month ago  No trauma  She states she went to urgent care and was told this looked like a gout flare  They gave her prednisone x 5 days, but she states this didn't help  She states she has h/o gout in this same finger 5 years ago  States it was treated with an injection and she did well just until recently  States her finger hurts at the PIP joint, even at wrist  Describes nocturnal symptoms         Past Medical History:   Diagnosis Date    Acute kidney injury superimposed on chronic kidney disease ([facility] ) 11/26/2016    ADHD (attention deficit hyperactivity disorder) 03/17/2019    Arthritis     BL HIPS    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    Carpal tunnel syndrome     Chest pain 03/06/2017    Chronic kidney disease     Closed fracture of base of middle phalanx of finger 06/22/2017    Closed fracture of middle phalanx of left little finger 07/08/2017    Coagulopathy ([facility] ) 05/15/2019    Colloid cyst of brain (HCC)     COPD (chronic obstructive pulmonary disease) (HCC)     COPD (chronic obstructive pulmonary disease) (HCC)     Coronary artery disease     Cough     Diabetes mellitus (HCC)     GERD (gastroesophageal reflux disease)     Glaucoma     Gout     History of brain disorder 10/07/2020    Hyperlipidemia     Hypertension     Irritable bowel syndrome     Melena 02/26/2019    Paronychia of great toe of left foot 10/07/2020    Post-traumatic seizures ([address] ) 07/23/2015    Renal disorder     Seizures ([facility] )     last 2015    Shortness of breath     Single seizure ([facility] [facility] ) 05/31/2016    SOB (shortness of breath)     Syncope and collapse 2020    Urinary tract infection without hematuria 2016    Wheezing        Past Surgical History:   Procedure Laterality Date    BRAIN SURGERY      CATARACT EXTRACTION      CHOLECYSTECTOMY      COLECTOMY RADHA      DECOMPRESSION SPINE LUMBAR POSTERIOR  2019    HERNIA REPAIR      HYSTERECTOMY      INCISION TENDON SHEATH HAND      NECK SURGERY  2018    NEUROPLASTY / TRANSPOSITION MEDIAN NERVE AT CARPAL TUNNEL      UT COLONOSCOPY FLX DX W/COLLJ SPEC WHEN PFRMD N/A 3/26/2019    Procedure: COLONOSCOPY;  Surgeon: Eugenie Vivar MD;  [facility]; Service: Gastroenterology    UT ESOPHAGOGASTRODUODENOSCOPY TRANSORAL DIAGNOSTIC N/A 3/26/2019    Procedure: ESOPHAGOGASTRODUODENOSCOPY (EGD); Surgeon: Eugenie Vivar MD;  [facility];   Service: Gastroenterology    REPLACEMENT TOTAL KNEE Right     RETINAL DETACHMENT SURGERY      TUBAL LIGATION         Social History     Socioeconomic History    Marital status:      Spouse name: Not on file    Number of children: Not on file    Years of education: Not on file    Highest education level: Not on file   Occupational History    Occupation: retired   Tobacco Use    Smoking status: Former Smoker     Packs/day: 0 50     Years: 40 00     Pack years: 20 00     Start date:      Quit date:      Years since quittin 5    Smokeless tobacco: Never Used    Tobacco comment: stopped many years ago   Vaping Use    Vaping Use: Never used   Substance and Sexual Activity    Alcohol use: Never    Drug use: Never    Sexual activity: Not Currently     Partners: Male   Other Topics Concern    Not on file   Social History Narrative    Not on file     Social Determinants of Health     Financial Resource Strain: Not on file   Food Insecurity: Not on file   Transportation Needs: Not on file   Physical Activity: Not on file   Stress: Not on file   Social Connections: Not on file   Intimate Partner Violence: Not on file   Housing Stability: Not on file       Family History   Problem Relation Age of Onset    Asthma Mother     Heart disease Mother     Emphysema Father     Cancer Father     Prostate cancer Father     Kidney cancer Sister     Diabetes Sister     Kidney disease Sister     Brain cancer Brother     Bone cancer Brother     Heart disease Brother         Patient's Medications   New Prescriptions    No medications on file   Previous Medications    ATORVASTATIN (LIPITOR) 10 MG TABLET    Take 1 tablet (10 mg total) by mouth daily at bedtime    CALCIUM CARBONATE ANTACID (CALCIUM CARBONATE PO)    Take by mouth    CONTINUOUS BLOOD GLUC  (FREESTYLE ROBBY 2 READER) YOLANDA    Before Breakfast and 2 hours after meals    CONTINUOUS BLOOD GLUC SENSOR (FREESTYLE ROBBY 2 SENSOR) MISC    Before Breakfast and 2 hours after meals    FLUTICASONE-UMECLIDINIUM-VILANTEROL (TRELEGY ELLIPTA) 200-62 5-25 MCG/INH AEPB INHALER    Inhale 1 puff daily Rinse mouth after use      FUROSEMIDE (LASIX) 20 MG TABLET    Take 0 5 tablets (10 mg total) by mouth daily    INSULIN ASPART (NOVOLOG FLEXPEN) 100 UNIT/ML INJECTION PEN    INJECT 12 UNITS BEFORE MEALS (3 MEALS)    INSULIN GLARGINE (LANTUS SOLOSTAR) 100 UNITS/ML INJECTION PEN    Inject 30 Units under the skin daily at bedtime    INSULIN PEN NEEDLE 31G X 8 MM MISC    Use as directed    LATANOPROST (XALATAN) 0 005 % OPHTHALMIC SOLUTION    Administer 1 drop into the left eye daily at bedtime    LEVETIRACETAM (KEPPRA) 250 MG TABLET    take 1 tablet by mouth every 12 hours    MONTELUKAST (SINGULAIR) 10 MG TABLET    take 1 tablet by mouth every evening    MULTIPLE VITAMIN (MULTI VITAMIN DAILY PO)    Take 1 tablet by mouth daily    NYSTATIN (MYCOSTATIN) CREAM    Apply topically 2 (two) times a day    NYSTATIN (MYCOSTATIN) POWDER    Apply topically 3 (three) times a day as needed (rash)    OMEPRAZOLE (PRILOSEC) 20 MG DELAYED RELEASE CAPSULE    Take 1 capsule (20 mg total) by mouth 2 (two) times a day    PENTOXIFYLLINE (TRENTAL) 400 MG ER TABLET    take 1 tablet by mouth three times a day with meals    PROBIOTIC PRODUCT (PROBIOTIC DAILY PO)    Take by mouth      VITAMIN B-12 (VITAMIN B-12) 500 MCG TABLET    Take 500 mcg by mouth daily   Modified Medications    No medications on file   Discontinued Medications    No medications on file       Allergies   Allergen Reactions    Brimonidine Other (See Comments)    Sulfa Antibiotics Rash        REVIEW OF SYSTEMS:  Constitutional: Negative  HEENT: Negative  Respiratory: Negative  Skin: Negative  Neurological: Negative  Psychiatric/Behavioral: Negative  Musculoskeletal: Negative except for that mentioned in the HPI  PHYSICAL EXAM:    /76   Pulse 71   Ht 4' 7" (1 397 m)   Wt 68 kg (150 lb)   BMI 34 86 kg/m²   Gen: Alert and oriented to person, place, time  HEENT: EOMI, eyes clear, moist mucus membranes, hearing intact  Respiratory: Bilateral chest rise  No audible wheezing found  Cardiovascular: Regular Rate and Rhythm  Abdomen: soft nontender/nondistended    MUSCULOSKELETAL EXAM:  Left Small Finger:  + edema along the PIP joint with mild flexion deformity  TTP PIP joint  Mild warmth  LROM of PIP 2/2 stiffness  Flexors/Extensors intact  Brisk capillary refill  IMAGING:  Images were reviewed in PACS by myself and demonstrate arthritic change about the small finger PIP joint  There is possible widening of the dorsal joint compared to ventrally on the lateral view  ASSESSMENT AND PLAN:  76 y o  female with left small finger PIP joint arthritis, possibly gout  We discussed patient's xrays today  Unfortunately, we were unable to withdraw any fluid from the joint today to evaluate for the presence of crystals  Since patient did well previously with steroid injection, this was offered to the patient today and she accepted  Patient tolerated the procedure well   We did advise the patient to call if any worsening of symptoms  PROCEDURES PERFORMED:  Small joint arthrocentesis: L small PIP  Universal Protocol:  Consent: Verbal consent obtained    Risks and benefits: risks, benefits and alternatives were discussed  Consent given by: patient  Patient understanding: patient states understanding of the procedure being performed  Patient identity confirmed: verbally with patient    Supporting Documentation  Indications: pain   Procedure Details  Location: small finger - L small PIP  Needle size: 25 G  Approach: dorsal  Medications administered: 0 5 mL lidocaine 1 %; 20 mg triamcinolone acetonide 40 mg/mL    Patient tolerance: patient tolerated the procedure well with no immediate complications  Dressing:  Sterile dressing applied             Scribe Attestation    I,:  Ruth Renner PA-C am acting as a scribe while in the presence of the attending physician :       I,:  Phylicia Cano personally performed the services described in this documentation    as scribed in my presence :

## 2022-08-03 DIAGNOSIS — D68.9 COAGULOPATHY (HCC): ICD-10-CM

## 2022-08-03 RX ORDER — PENTOXIFYLLINE 400 MG/1
TABLET, EXTENDED RELEASE ORAL
Qty: 270 TABLET | Refills: 1 | Status: SHIPPED | OUTPATIENT
Start: 2022-08-03

## 2022-09-14 DIAGNOSIS — E78.2 MIXED HYPERLIPIDEMIA: ICD-10-CM

## 2022-09-14 RX ORDER — ATORVASTATIN CALCIUM 10 MG/1
10 TABLET, FILM COATED ORAL
Qty: 90 TABLET | Refills: 3 | Status: SHIPPED | OUTPATIENT
Start: 2022-09-14

## 2022-09-20 ENCOUNTER — CONSULT (OUTPATIENT)
Dept: CARDIOLOGY CLINIC | Facility: CLINIC | Age: 77
End: 2022-09-20
Payer: COMMERCIAL

## 2022-09-20 VITALS
OXYGEN SATURATION: 95 % | BODY MASS INDEX: 34.25 KG/M2 | HEART RATE: 77 BPM | RESPIRATION RATE: 16 BRPM | WEIGHT: 148 LBS | HEIGHT: 55 IN | SYSTOLIC BLOOD PRESSURE: 122 MMHG | DIASTOLIC BLOOD PRESSURE: 68 MMHG

## 2022-09-20 DIAGNOSIS — I25.10 CORONARY ARTERY DISEASE INVOLVING NATIVE CORONARY ARTERY OF NATIVE HEART WITHOUT ANGINA PECTORIS: ICD-10-CM

## 2022-09-20 DIAGNOSIS — I50.32 CHRONIC HEART FAILURE WITH PRESERVED EJECTION FRACTION (HCC): ICD-10-CM

## 2022-09-20 DIAGNOSIS — E11.69 HYPERLIPIDEMIA ASSOCIATED WITH TYPE 2 DIABETES MELLITUS (HCC): ICD-10-CM

## 2022-09-20 DIAGNOSIS — I73.9 CLAUDICATION (HCC): ICD-10-CM

## 2022-09-20 DIAGNOSIS — I73.9 PAD (PERIPHERAL ARTERY DISEASE) (HCC): ICD-10-CM

## 2022-09-20 DIAGNOSIS — E78.5 HYPERLIPIDEMIA ASSOCIATED WITH TYPE 2 DIABETES MELLITUS (HCC): ICD-10-CM

## 2022-09-20 DIAGNOSIS — I10 PRIMARY HYPERTENSION: ICD-10-CM

## 2022-09-20 DIAGNOSIS — R60.0 BILATERAL LEG EDEMA: ICD-10-CM

## 2022-09-20 PROCEDURE — 3074F SYST BP LT 130 MM HG: CPT | Performed by: INTERNAL MEDICINE

## 2022-09-20 PROCEDURE — 3078F DIAST BP <80 MM HG: CPT | Performed by: INTERNAL MEDICINE

## 2022-09-20 PROCEDURE — 99214 OFFICE O/P EST MOD 30 MIN: CPT | Performed by: INTERNAL MEDICINE

## 2022-09-20 RX ORDER — FUROSEMIDE 20 MG/1
20 TABLET ORAL DAILY
Qty: 60 TABLET | Refills: 6 | Status: SHIPPED | OUTPATIENT
Start: 2022-09-20

## 2022-09-20 NOTE — PROGRESS NOTES
[address] [facility]   Office Consultation    Suma Steele 76 y o  female MRN: [id number]    09/20/22          Assessment:  1  Chronic HFpEF  2  Frequent falls/ ambulatory dysfunction   3  IDDM   4  Hyperlipidemia  5  Reported PAD-? 6  CKD3    Plan:  · Lipids well controlled  Continue atorvastatin  · She is close to euvolemic  Continue Lasix 20 mg daily  Will evaluate with a TTE   · No significant PAD on arterial duplex in 2017  Testing was equivocal in 2020  Will repeat a LEAD for confirmation  · She denies anginal symptoms or equivalents  Ambulatory blood pressure monitoring and maintaining a low sodium diet was advised  · She was advised to notify us with the onset of cardiac symptoms  Follow up: 3 months or sooner as needed    1  Hyperlipidemia associated with type 2 diabetes mellitus [facility])  Ambulatory Referral to Cardiology   2  Primary hypertension  Ambulatory Referral to Cardiology   3  PAD (peripheral artery disease) ([facility] [address] )  Ambulatory Referral to Cardiology    VAS lower limb arterial duplex, complete bilateral   4  Coronary artery disease involving native coronary artery of native heart without angina pectoris  Ambulatory Referral to Cardiology   5  Chronic heart failure with preserved ejection fraction [facility])  Ambulatory Referral to Cardiology    Echo complete w/ contrast if indicated    furosemide (LASIX) 20 mg tablet   6  Claudication [facility])  Ambulatory Referral to Cardiology   7  Bilateral leg edema  furosemide (LASIX) 20 mg tablet       HPI: Suma Steele is a 76y o  year old female with history of IDDM , COPD, and reported PAD who was referred by her PCP Dr Reyna Woods to re establish care  Past cardiac evaluation:  · Pharmacologic MPI 11/2020: negative for ischemia  · TTE: EF 55%, mild mitral stenosis    She reports prior history of "angina and PAD" per prior cardiologist in [place]  She was previously on Plavix and is currently on pentoxifylline   No significant PAD on arterial duplex in 2017  Testing was equivocal in 2020  She has noted progressive lower extremity edema is currently on Lasix 20 mg daily  She reports intermittent chest pain which she characterizes as a "pinching and/or burning" sensation  She notes chronic dyspnea secondary to COPD  She has significant radiculopathy from her back  She also notes significant gait dysfunction with frequent falls  She denies any other cardiac concerns at this time  ECG personally reviewed:  Normal sinus with sinus arrhythmia, incomplete right bundle-branch block      Family History: brother and mother with history of cardiac disease    Social history: former smoker; quit in 2003      Allergies   Allergen Reactions    Brimonidine Other (See Comments)    Sulfa Antibiotics Rash         Current Outpatient Medications:     atorvastatin (LIPITOR) 10 mg tablet, Take 1 tablet (10 mg total) by mouth daily at bedtime, Disp: 90 tablet, Rfl: 3    Calcium Carbonate Antacid (CALCIUM CARBONATE PO), Take by mouth, Disp: , Rfl:     Continuous Blood Gluc  (FreeStyle Mary 2 [place]) YOLNADA, Before Breakfast and 2 hours after meals, Disp: 1 each, Rfl: 0    Continuous Blood Gluc Sensor (FreeStyle Mary 2 Sensor) MISC, Before Breakfast and 2 hours after meals, Disp: 1 each, Rfl: 3    fluticasone-umeclidinium-vilanterol (Trelegy Ellipta) 200-62 5-25 MCG/INH AEPB inhaler, Inhale 1 puff daily Rinse mouth after use , Disp: 60 blister, Rfl: 5    furosemide (LASIX) 20 mg tablet, Take 1 tablet (20 mg total) by mouth daily, Disp: 60 tablet, Rfl: 6    insulin aspart (NovoLOG FlexPen) 100 UNIT/ML injection pen, INJECT 12 UNITS BEFORE MEALS (3 MEALS) (Patient taking differently: INJECT 12 UNITS BEFORE MEALS (3 MEALS) 4 times daily), Disp: 15 mL, Rfl: 3    insulin glargine (Lantus SoloStar) 100 units/mL injection pen, Inject 30 Units under the skin daily at bedtime, Disp: 15 mL, Rfl: 2    Insulin Pen Needle (Droplet Pen Needles) 31G X 8 MM MISC, USE 4 TIMES DAILY, Disp: 500 each, Rfl: 1    latanoprost (XALATAN) 0 005 % ophthalmic solution, Administer 1 drop into the left eye daily at bedtime, Disp: 2 5 mL, Rfl: 2    levETIRAcetam (KEPPRA) 250 mg tablet, take 1 tablet by mouth every 12 hours, Disp: 180 tablet, Rfl: 3    montelukast (SINGULAIR) 10 mg tablet, take 1 tablet by mouth every evening, Disp: 90 tablet, Rfl: 1    Multiple Vitamin (MULTI VITAMIN DAILY PO), Take 1 tablet by mouth daily, Disp: , Rfl:     nystatin (MYCOSTATIN) cream, Apply topically 2 (two) times a day, Disp: 30 g, Rfl: 1    nystatin (MYCOSTATIN) powder, Apply topically 3 (three) times a day as needed (rash), Disp: 60 g, Rfl: 1    omeprazole (PriLOSEC) 20 mg delayed release capsule, Take 1 capsule (20 mg total) by mouth 2 (two) times a day, Disp: 180 capsule, Rfl: 1    pentoxifylline (TRENtal) 400 mg ER tablet, take 1 tablet by mouth three times a day with meals, Disp: 270 tablet, Rfl: 1    Probiotic Product (PROBIOTIC DAILY PO), Take by mouth  , Disp: , Rfl:     vitamin B-12 (VITAMIN B-12) 500 mcg tablet, Take 500 mcg by mouth daily, Disp: , Rfl:     Current Facility-Administered Medications:     denosumab (PROLIA) subcutaneous injection 60 mg, 60 mg, Subcutaneous, Q6 Months, Clarisa Billingsley DO, 60 mg at 06/24/22 1442    Past Medical History:   Diagnosis Date    [facility] 11/26/2016    ADHD (attention deficit hyperactivity disorder) 03/17/2019    Arthritis     BL HIPS    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    Carpal tunnel syndrome     Chest pain 03/06/2017    Chronic kidney disease     Closed fracture of base of middle phalanx of finger 06/22/2017    Closed fracture of middle phalanx of left little finger 07/08/2017    Coagulopathy [facility] ) 05/15/2019    Colloid cyst of brain (HCC)     COPD (chronic obstructive pulmonary disease) (HCC)     COPD (chronic obstructive pulmonary disease) (HCC)     Coronary artery disease     Cough     Diabetes mellitus (HCC)     GERD (gastroesophageal reflux disease)     Glaucoma     Gout     History of brain disorder 10/07/2020    Hyperlipidemia     Hypertension     Irritable bowel syndrome     Melena 02/26/2019    Paronychia of great toe of left foot 10/07/2020    Post-traumatic seizures ([facility] [address] ) 07/23/2015    Renal disorder     Seizures ([facility] [address] )     last 2015    Shortness of breath     Single seizure ([facility] [address] ) 05/31/2016    SOB (shortness of breath)     Syncope and collapse 11/11/2020    Urinary tract infection without hematuria 11/26/2016    Wheezing        Family History   Problem Relation Age of Onset    Asthma Mother     Heart disease Mother     Emphysema Father     Cancer Father     Prostate cancer Father     Kidney cancer Sister     Diabetes Sister     Kidney disease Sister     Brain cancer Brother     Bone cancer Brother     Heart disease Brother        Past Surgical History:   Procedure Laterality Date    BRAIN SURGERY      CATARACT EXTRACTION      CHOLECYSTECTOMY      COLECTOMY RADHA      DECOMPRESSION SPINE LUMBAR POSTERIOR  08/19/2019    HERNIA REPAIR      HYSTERECTOMY      INCISION TENDON SHEATH HAND      NECK SURGERY  05/24/2018    NEUROPLASTY / TRANSPOSITION MEDIAN NERVE AT CARPAL TUNNEL      MD COLONOSCOPY FLX DX W/COLLJ SPEC WHEN PFRMD N/A 3/26/2019    Procedure: COLONOSCOPY;  Surgeon: Annabelle Page MD;  [facility]; Service: Gastroenterology    MD ESOPHAGOGASTRODUODENOSCOPY TRANSORAL DIAGNOSTIC N/A 3/26/2019    Procedure: ESOPHAGOGASTRODUODENOSCOPY (EGD); Surgeon: Annabelle Page MD;  [facility];   Service: Gastroenterology    REPLACEMENT TOTAL KNEE Right     RETINAL DETACHMENT SURGERY      TUBAL LIGATION         Social History     Socioeconomic History    Marital status:      Spouse name: Not on file    Number of children: Not on file    Years of education: Not on file    Highest education level: Not on file   Occupational History    Occupation: retired   Tobacco Use    Smoking status: Former Smoker     Packs/day: 0 50     Years: 40 00     Pack years: 20 00     Start date:      Quit date:      Years since quittin 7    Smokeless tobacco: Never Used    Tobacco comment: stopped many years ago   Vaping Use    Vaping Use: Never used   Substance and Sexual Activity    Alcohol use: Never    Drug use: Never    Sexual activity: Not Currently     Partners: Male   Other Topics Concern    Not on file   Social History Narrative    Not on file     Social Determinants of Health     Financial Resource Strain: Not on file   Food Insecurity: Not on file   Transportation Needs: Not on file   Physical Activity: Not on file   Stress: Not on file   Social Connections: Not on file   Intimate Partner Violence: Not on file   Housing Stability: Not on file       Review of Systems   Constitutional: Negative for diaphoresis, weight gain and weight loss  HENT: Negative for congestion  Cardiovascular: Negative for chest pain, dyspnea on exertion, irregular heartbeat, leg swelling, near-syncope, orthopnea, palpitations, paroxysmal nocturnal dyspnea and syncope  Respiratory: Positive for shortness of breath  Negative for sleep disturbances due to breathing and snoring  Hematologic/Lymphatic: Does not bruise/bleed easily  Skin: Negative for rash  Musculoskeletal: Positive for falls  Negative for myalgias  Gastrointestinal: Negative for nausea and vomiting  Neurological: Negative for excessive daytime sleepiness and light-headedness  Psychiatric/Behavioral: The patient is not nervous/anxious  Vitals: /68 (BP Location: Right arm, Patient Position: Sitting)   Pulse 77   Resp 16   Ht 4' 7" (1 397 m)   Wt 67 1 kg (148 lb)   SpO2 95%   BMI 34 40 kg/m²       Physical Exam:     GEN: Alert and oriented x 3, in no acute distress  Well appearing and well nourished     HEENT: Sclera anicteric, conjunctivae pink, mucous membranes moist  Oropharynx clear  NECK: Supple, no carotid bruits, no significant JVD  Trachea midline, no thyromegaly  HEART: Regular rhythm, normal S1 and S2, no murmurs, clicks, gallops or rubs  PMI nondisplaced, no thrills  LUNGS: Clear to auscultation bilaterally; no wheezes, rales, or rhonchi  No increased work of breathing or signs of respiratory distress  ABDOMEN: Soft, nontender, nondistended, normoactive bowel sounds  EXTREMITIES: Skin warm and well perfused, no clubbing, cyanosis, or edema  NEURO: Normal speech  Mood and affect normal    SKIN: Normal without suspicious lesions on exposed skin

## 2022-09-26 ENCOUNTER — OFFICE VISIT (OUTPATIENT)
Dept: FAMILY MEDICINE CLINIC | Facility: CLINIC | Age: 77
End: 2022-09-26
Payer: COMMERCIAL

## 2022-09-26 VITALS
DIASTOLIC BLOOD PRESSURE: 64 MMHG | WEIGHT: 152 LBS | BODY MASS INDEX: 35.18 KG/M2 | SYSTOLIC BLOOD PRESSURE: 123 MMHG | HEIGHT: 55 IN | OXYGEN SATURATION: 94 % | HEART RATE: 78 BPM | TEMPERATURE: 96.5 F

## 2022-09-26 DIAGNOSIS — E83.9 CHRONIC KIDNEY DISEASE-MINERAL AND BONE DISORDER: ICD-10-CM

## 2022-09-26 DIAGNOSIS — J41.1 MUCOPURULENT CHRONIC BRONCHITIS (HCC): ICD-10-CM

## 2022-09-26 DIAGNOSIS — I25.10 CORONARY ARTERY DISEASE INVOLVING NATIVE CORONARY ARTERY OF NATIVE HEART WITHOUT ANGINA PECTORIS: ICD-10-CM

## 2022-09-26 DIAGNOSIS — M89.9 CHRONIC KIDNEY DISEASE-MINERAL AND BONE DISORDER: ICD-10-CM

## 2022-09-26 DIAGNOSIS — E11.69 HYPERLIPIDEMIA ASSOCIATED WITH TYPE 2 DIABETES MELLITUS (HCC): ICD-10-CM

## 2022-09-26 DIAGNOSIS — N18.9 CHRONIC KIDNEY DISEASE-MINERAL AND BONE DISORDER: ICD-10-CM

## 2022-09-26 DIAGNOSIS — E11.42 DIABETIC PERIPHERAL NEUROPATHY (HCC): ICD-10-CM

## 2022-09-26 DIAGNOSIS — Z79.4 TYPE 2 DIABETES MELLITUS WITH DIABETIC NEUROPATHY, WITH LONG-TERM CURRENT USE OF INSULIN (HCC): Primary | ICD-10-CM

## 2022-09-26 DIAGNOSIS — G40.909 SEIZURE DISORDER (HCC): ICD-10-CM

## 2022-09-26 DIAGNOSIS — I73.9 PAD (PERIPHERAL ARTERY DISEASE) (HCC): ICD-10-CM

## 2022-09-26 DIAGNOSIS — N18.32 STAGE 3B CHRONIC KIDNEY DISEASE (HCC): ICD-10-CM

## 2022-09-26 DIAGNOSIS — I73.9 CLAUDICATION (HCC): ICD-10-CM

## 2022-09-26 DIAGNOSIS — E78.5 HYPERLIPIDEMIA ASSOCIATED WITH TYPE 2 DIABETES MELLITUS (HCC): ICD-10-CM

## 2022-09-26 DIAGNOSIS — I10 PRIMARY HYPERTENSION: ICD-10-CM

## 2022-09-26 DIAGNOSIS — I50.32 CHRONIC HEART FAILURE WITH PRESERVED EJECTION FRACTION (HCC): ICD-10-CM

## 2022-09-26 DIAGNOSIS — E11.40 TYPE 2 DIABETES MELLITUS WITH DIABETIC NEUROPATHY, WITH LONG-TERM CURRENT USE OF INSULIN (HCC): Primary | ICD-10-CM

## 2022-09-26 LAB — SL AMB POCT HEMOGLOBIN AIC: 6.8 (ref ?–6.5)

## 2022-09-26 PROCEDURE — 83036 HEMOGLOBIN GLYCOSYLATED A1C: CPT | Performed by: FAMILY MEDICINE

## 2022-09-26 PROCEDURE — 99214 OFFICE O/P EST MOD 30 MIN: CPT | Performed by: FAMILY MEDICINE

## 2022-09-26 PROCEDURE — 1160F RVW MEDS BY RX/DR IN RCRD: CPT | Performed by: FAMILY MEDICINE

## 2022-09-26 RX ORDER — PEN NEEDLE, DIABETIC 30 GX3/16"
NEEDLE, DISPOSABLE MISCELLANEOUS
Qty: 500 EACH | Refills: 1 | Status: SHIPPED | OUTPATIENT
Start: 2022-09-26

## 2022-09-26 NOTE — PROGRESS NOTES
Suma Steele 1945 female MRN: [id number]      ASSESSMENT/PLAN  Problem List Items Addressed This Visit        Endocrine    Diabetic peripheral neuropathy ([facility] [address] )    Relevant Medications    Insulin Pen Needle (Pen Needles) 30G X 8 MM MISC    Other Relevant Orders    POCT hemoglobin A1c (Completed)    Hyperlipidemia associated with type 2 diabetes mellitus (HCC)    Type 2 diabetes mellitus with diabetic neuropathy ([facility]) - Primary    Relevant Medications    Insulin Pen Needle (Pen Needles) 30G X 8 MM MISC    Other Relevant Orders    POCT hemoglobin A1c (Completed)       Respiratory    COPD (chronic obstructive pulmonary disease) ([facility])       Cardiovascular and Mediastinum    Chronic heart failure with preserved ejection fraction ([facility])    Coronary artery disease without angina pectoris    Hypertension    PAD (peripheral artery disease) ([facility])       Nervous and Auditory    Seizure disorder ([facility])       Genitourinary    Chronic kidney disease-mineral and bone disorder    Stage 3b chronic kidney disease ([facility] [address] )       Other    Claudication ([facility] )        DM w/ neuropathy: A1c 6 8% (up from 6 1), within good range for age  Continue  New needle script sent  HTN: Within goal   HLD: Last LDL at goal   CKD w/ bone disease: Last GFR improved, f/u with Nephro as scheduled   CAD/HFpEF/PAD w/ claudication: Discussed trial of increase Lasix which pt defers  Has previously trialed compression stockings, compression machine but is unable to tolerate due to neuropathy   Has been using topical therapies, which sometimes provided relief; f/u with Cardio as scheduled  Seizure Dx: Asymptomatic   COPD: Symptoms stable, f/u with Pulm as scheduled       Future Appointments   Date Time Provider Steffany Porras   11/1/2022  2:20 PM Pool Madrigal PA-C PULM E [facility]   11/4/2022  1:00 PM AN POC ECHO 2 AN Poc CarNI AN POC H&V   11/4/2022  2:00 PM AN POC VASCULAR 1 AN Poc CarNI AN POC H&V   12/22/2022 10:40 AM Pawan Ware MD CARD ELISA [place]   12/27/2022  1:20 PM PATRICIO [facility] NURSE ELISA [place]   12/27/2022  1:40 PM DO ELISA [facility]          SUBJECTIVE  CC: Diabetes      HPI:  Laura Cuellar is a 76 y o  female who presents with daughter for chronic follow up as below  DM w/ neuropathy: Home sugars 110 this AM; hasn't been eating appropriately the past few weeks, so has been having some low sugars  Feels like her needles are too flimsy -- would like a different kind      HTN: Home BPs in AM -- "not too high"   HLD: Tolerating statin without issue   CKD w/ bone disease: Follows with Nephro  CAD/HFpEF/PAD w/ claudication: Established with Cardio; planning for ECHO, LEADs  Her feet have been swollen more  Seizure Dx: On Keppra    COPD: Sometimes when she uses Trelegy she feels sick afterward; following with Pulm      Review of Systems   Eyes: Negative for visual disturbance  Respiratory: Negative for cough and shortness of breath  Cardiovascular: Positive for leg swelling  Negative for chest pain and palpitations  Gastrointestinal: Positive for diarrhea (chronic)  Negative for abdominal pain and constipation  Musculoskeletal: Positive for back pain and myalgias  Neurological: Negative for dizziness, seizures and headaches         Historical Information   The patient history was reviewed and updated as follows:    Past Medical History:   Diagnosis Date    Acute kidney injury superimposed on chronic kidney disease ([facility] [address] ) 11/26/2016    ADHD (attention deficit hyperactivity disorder) 03/17/2019    Arthritis     BL HIPS    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    Carpal tunnel syndrome     Chest pain 03/06/2017    Chronic kidney disease     Closed fracture of base of middle phalanx of finger 06/22/2017    Closed fracture of middle phalanx of left little finger 07/08/2017    Coagulopathy ([facility] [address] ) 05/15/2019    Colloid cyst of brain (HCC)     COPD (chronic obstructive pulmonary disease) ([facility] )     COPD (chronic obstructive pulmonary disease) (HCC)     Coronary artery disease     Cough     Diabetes mellitus (HCC)     GERD (gastroesophageal reflux disease)     Glaucoma     Gout     History of brain disorder 10/07/2020    Hyperlipidemia     Hypertension     Irritable bowel syndrome     Melena 02/26/2019    Paronychia of great toe of left foot 10/07/2020    Post-traumatic seizures ([facility] ) 07/23/2015    Renal disorder     Seizures ([place] [id number] )     last 2015    Shortness of breath     Single seizure ([place] [address] ) 05/31/2016    SOB (shortness of breath)     Syncope and collapse 11/11/2020    Urinary tract infection without hematuria 11/26/2016    Wheezing      Past Surgical History:   Procedure Laterality Date    BRAIN SURGERY      CATARACT EXTRACTION      CHOLECYSTECTOMY      COLECTOMY RADHA      DECOMPRESSION SPINE LUMBAR POSTERIOR  08/19/2019    HERNIA REPAIR      HYSTERECTOMY      INCISION TENDON SHEATH HAND      NECK SURGERY  05/24/2018    NEUROPLASTY / TRANSPOSITION MEDIAN NERVE AT CARPAL TUNNEL      MI COLONOSCOPY FLX DX W/COLLJ SPEC WHEN PFRMD N/A 3/26/2019    Procedure: COLONOSCOPY;  Surgeon: Sharmila Schultz MD;  Location: [facility]; Service: Gastroenterology    MI ESOPHAGOGASTRODUODENOSCOPY TRANSORAL DIAGNOSTIC N/A 3/26/2019    Procedure: ESOPHAGOGASTRODUODENOSCOPY (EGD); Surgeon: Sharmila Schultz MD;  Location: [facility];   Service: Gastroenterology    REPLACEMENT TOTAL KNEE Right     RETINAL DETACHMENT SURGERY      TUBAL LIGATION       Family History   Problem Relation Age of Onset    Asthma Mother     Heart disease Mother     Emphysema Father     Cancer Father     Prostate cancer Father     Kidney cancer Sister     Diabetes Sister     Kidney disease Sister     Brain cancer Brother     Bone cancer Brother     Heart disease Brother       Social History   Social History     Substance and Sexual Activity   Alcohol Use Never     Social History Substance and Sexual Activity   Drug Use Never     Social History     Tobacco Use   Smoking Status Former Smoker    Packs/day: 0 50    Years: 40 00    Pack years: 20 00    Start date:     Quit date:     Years since quittin 7   Smokeless Tobacco Never Used   Tobacco Comment    stopped many years ago       Medications:     Current Outpatient Medications:     Insulin Pen Needle (Pen Needles) 30G X 8 MM MISC, Use 4 (four) times a day (before meals and at bedtime), Disp: 500 each, Rfl: 1    atorvastatin (LIPITOR) 10 mg tablet, Take 1 tablet (10 mg total) by mouth daily at bedtime, Disp: 90 tablet, Rfl: 3    Calcium Carbonate Antacid (CALCIUM CARBONATE PO), Take by mouth, Disp: , Rfl:     Continuous Blood Gluc  (FreeStyle Mary 2 [place]) YOLANDA, Before Breakfast and 2 hours after meals, Disp: 1 each, Rfl: 0    Continuous Blood Gluc Sensor (FreeStyle Mary 2 Sensor) MISC, Before Breakfast and 2 hours after meals, Disp: 1 each, Rfl: 3    fluticasone-umeclidinium-vilanterol (Trelegy Ellipta) 200-62 5-25 MCG/INH AEPB inhaler, Inhale 1 puff daily Rinse mouth after use , Disp: 60 blister, Rfl: 5    furosemide (LASIX) 20 mg tablet, Take 1 tablet (20 mg total) by mouth daily, Disp: 60 tablet, Rfl: 6    insulin aspart (NovoLOG FlexPen) 100 UNIT/ML injection pen, INJECT 12 UNITS BEFORE MEALS (3 MEALS) (Patient taking differently: INJECT 12 UNITS BEFORE MEALS (3 MEALS) 4 times daily), Disp: 15 mL, Rfl: 3    insulin glargine (Lantus SoloStar) 100 units/mL injection pen, Inject 30 Units under the skin daily at bedtime, Disp: 15 mL, Rfl: 2    latanoprost (XALATAN) 0 005 % ophthalmic solution, Administer 1 drop into the left eye daily at bedtime, Disp: 2 5 mL, Rfl: 2    levETIRAcetam (KEPPRA) 250 mg tablet, take 1 tablet by mouth every 12 hours, Disp: 180 tablet, Rfl: 3    montelukast (SINGULAIR) 10 mg tablet, take 1 tablet by mouth every evening, Disp: 90 tablet, Rfl: 1    Multiple Vitamin (MULTI VITAMIN DAILY PO), Take 1 tablet by mouth daily, Disp: , Rfl:     nystatin (MYCOSTATIN) cream, Apply topically 2 (two) times a day, Disp: 30 g, Rfl: 1    nystatin (MYCOSTATIN) powder, Apply topically 3 (three) times a day as needed (rash), Disp: 60 g, Rfl: 1    omeprazole (PriLOSEC) 20 mg delayed release capsule, Take 1 capsule (20 mg total) by mouth 2 (two) times a day, Disp: 180 capsule, Rfl: 1    pentoxifylline (TRENtal) 400 mg ER tablet, take 1 tablet by mouth three times a day with meals, Disp: 270 tablet, Rfl: 1    Probiotic Product (PROBIOTIC DAILY PO), Take by mouth  , Disp: , Rfl:     vitamin B-12 (VITAMIN B-12) 500 mcg tablet, Take 500 mcg by mouth daily, Disp: , Rfl:     Current Facility-Administered Medications:     denosumab (PROLIA) subcutaneous injection 60 mg, 60 mg, Subcutaneous, Q6 Months, Clarisa Billingsley DO, 60 mg at 06/24/22 1442  Allergies   Allergen Reactions    Brimonidine Other (See Comments)    Sulfa Antibiotics Rash       OBJECTIVE    Vitals:   Vitals:    09/26/22 1521   BP: 123/64   Pulse: 78   Temp: (!) 96 5 °F (35 8 °C)   SpO2: 94%   Weight: 68 9 kg (152 lb)   Height: 4' 7" (1 397 m)           Physical Exam  Vitals and nursing note reviewed  Constitutional:       General: She is not in acute distress  Appearance: Normal appearance  HENT:      Head: Normocephalic and atraumatic  Right Ear: Tympanic membrane, ear canal and external ear normal       Left Ear: Tympanic membrane, ear canal and external ear normal       Nose: Nose normal       Mouth/Throat:      Mouth: Mucous membranes are moist       Pharynx: No oropharyngeal exudate or posterior oropharyngeal erythema  Eyes:      Conjunctiva/sclera: Conjunctivae normal    Cardiovascular:      Rate and Rhythm: Normal rate and regular rhythm  Comments: (+) B/L LE pitting edema up to mid shin  Pulmonary:      Effort: Pulmonary effort is normal  No respiratory distress        Breath sounds: Normal breath sounds  Abdominal:      General: Bowel sounds are normal  There is no distension  Palpations: Abdomen is soft  Tenderness: There is no abdominal tenderness  Musculoskeletal:      Comments: Ambulating with rolling walker   Lymphadenopathy:      Cervical: No cervical adenopathy  Skin:     General: Skin is warm and dry  Neurological:      General: No focal deficit present  Mental Status: She is alert     Psychiatric:         Mood and Affect: Mood normal                     Clarisa Billingsley DO  [facility]   9/26/2022  4:10 PM

## 2022-09-29 ENCOUNTER — TELEPHONE (OUTPATIENT)
Dept: NEPHROLOGY | Facility: CLINIC | Age: 77
End: 2022-09-29

## 2022-10-17 ENCOUNTER — APPOINTMENT (OUTPATIENT)
Dept: LAB | Facility: CLINIC | Age: 77
End: 2022-10-17
Payer: COMMERCIAL

## 2022-10-17 DIAGNOSIS — M89.9 CHRONIC KIDNEY DISEASE-MINERAL AND BONE DISORDER: ICD-10-CM

## 2022-10-17 DIAGNOSIS — N18.9 CHRONIC KIDNEY DISEASE-MINERAL AND BONE DISORDER: ICD-10-CM

## 2022-10-17 DIAGNOSIS — E83.9 CHRONIC KIDNEY DISEASE-MINERAL AND BONE DISORDER: ICD-10-CM

## 2022-10-17 DIAGNOSIS — N18.32 STAGE 3B CHRONIC KIDNEY DISEASE (HCC): ICD-10-CM

## 2022-10-17 LAB
25(OH)D3 SERPL-MCNC: 59 NG/ML (ref 30–100)
ANION GAP SERPL CALCULATED.3IONS-SCNC: 7 MMOL/L (ref 4–13)
BASOPHILS # BLD AUTO: 0.06 THOUSANDS/ΜL (ref 0–0.1)
BASOPHILS NFR BLD AUTO: 1 % (ref 0–1)
BUN SERPL-MCNC: 24 MG/DL (ref 5–25)
CALCIUM SERPL-MCNC: 10 MG/DL (ref 8.3–10.1)
CHLORIDE SERPL-SCNC: 103 MMOL/L (ref 96–108)
CO2 SERPL-SCNC: 26 MMOL/L (ref 21–32)
CREAT SERPL-MCNC: 1.2 MG/DL (ref 0.6–1.3)
EOSINOPHIL # BLD AUTO: 0.38 THOUSAND/ΜL (ref 0–0.61)
EOSINOPHIL NFR BLD AUTO: 5 % (ref 0–6)
ERYTHROCYTE [DISTWIDTH] IN BLOOD BY AUTOMATED COUNT: 13.6 % (ref 11.6–15.1)
GFR SERPL CREATININE-BSD FRML MDRD: 43 ML/MIN/1.73SQ M
GLUCOSE P FAST SERPL-MCNC: 191 MG/DL (ref 65–99)
HCT VFR BLD AUTO: 42.9 % (ref 34.8–46.1)
HGB BLD-MCNC: 13.6 G/DL (ref 11.5–15.4)
IMM GRANULOCYTES # BLD AUTO: 0.03 THOUSAND/UL (ref 0–0.2)
IMM GRANULOCYTES NFR BLD AUTO: 0 % (ref 0–2)
LYMPHOCYTES # BLD AUTO: 1.84 THOUSANDS/ΜL (ref 0.6–4.47)
LYMPHOCYTES NFR BLD AUTO: 24 % (ref 14–44)
MCH RBC QN AUTO: 28.5 PG (ref 26.8–34.3)
MCHC RBC AUTO-ENTMCNC: 31.7 G/DL (ref 31.4–37.4)
MCV RBC AUTO: 90 FL (ref 82–98)
MONOCYTES # BLD AUTO: 0.52 THOUSAND/ΜL (ref 0.17–1.22)
MONOCYTES NFR BLD AUTO: 7 % (ref 4–12)
NEUTROPHILS # BLD AUTO: 4.94 THOUSANDS/ΜL (ref 1.85–7.62)
NEUTS SEG NFR BLD AUTO: 63 % (ref 43–75)
NRBC BLD AUTO-RTO: 0 /100 WBCS
PHOSPHATE SERPL-MCNC: 3 MG/DL (ref 2.3–4.1)
PLATELET # BLD AUTO: 241 THOUSANDS/UL (ref 149–390)
PMV BLD AUTO: 10.3 FL (ref 8.9–12.7)
POTASSIUM SERPL-SCNC: 3.9 MMOL/L (ref 3.5–5.3)
PTH-INTACT SERPL-MCNC: 41.8 PG/ML (ref 18.4–80.1)
RBC # BLD AUTO: 4.78 MILLION/UL (ref 3.81–5.12)
SODIUM SERPL-SCNC: 136 MMOL/L (ref 135–147)
WBC # BLD AUTO: 7.77 THOUSAND/UL (ref 4.31–10.16)

## 2022-10-17 PROCEDURE — 83970 ASSAY OF PARATHORMONE: CPT

## 2022-10-17 PROCEDURE — 80048 BASIC METABOLIC PNL TOTAL CA: CPT

## 2022-10-17 PROCEDURE — 84100 ASSAY OF PHOSPHORUS: CPT

## 2022-10-17 PROCEDURE — 85025 COMPLETE CBC W/AUTO DIFF WBC: CPT

## 2022-10-17 PROCEDURE — 82306 VITAMIN D 25 HYDROXY: CPT

## 2022-10-17 PROCEDURE — 36415 COLL VENOUS BLD VENIPUNCTURE: CPT

## 2022-11-01 ENCOUNTER — OFFICE VISIT (OUTPATIENT)
Dept: PULMONOLOGY | Facility: CLINIC | Age: 77
End: 2022-11-01

## 2022-11-01 VITALS
HEART RATE: 65 BPM | HEIGHT: 55 IN | DIASTOLIC BLOOD PRESSURE: 74 MMHG | RESPIRATION RATE: 16 BRPM | WEIGHT: 148 LBS | BODY MASS INDEX: 34.25 KG/M2 | TEMPERATURE: 97.9 F | SYSTOLIC BLOOD PRESSURE: 121 MMHG | OXYGEN SATURATION: 94 %

## 2022-11-01 DIAGNOSIS — J41.0 SIMPLE CHRONIC BRONCHITIS (HCC): Primary | ICD-10-CM

## 2022-11-01 RX ORDER — PEN NEEDLE, DIABETIC 31 GX5/16"
NEEDLE, DISPOSABLE MISCELLANEOUS
COMMUNITY
Start: 2022-10-05

## 2022-11-01 NOTE — PROGRESS NOTES
Assessment/Plan:   Diagnoses and all orders for this visit:    Simple chronic bronchitis ([facility] [address] )    Other orders  -     Droplet Pen Needles 31G X 8 MM MISC; USE 4 TIMES DAILY BEFORE MEALS AND AT BEDTIME     Patient here today for follow-up  She does note improvement in her shortness of breath and cough with the addition of Trelegy  She is using her albuterol as needed which is less than 1-2 times per week  Prior PFT showed normal spirometry  She will follow-up with us in 6 months or sooner if necessary  Return in about 6 months (around 5/1/2023)  All questions are answered to the patient's satisfaction and understanding  She verbalizes understanding  She is encouraged to call with any further questions or concerns  Portions of the record may have been created with voice recognition software  Occasional wrong word or "sound a like" substitutions may have occurred due to the inherent limitations of voice recognition software  Read the chart carefully and recognize, using context, where substitutions have occurred      Electronically Signed by Josias Nguyen PA-C    ______________________________________________________________________    Chief Complaint:   Chief Complaint   Patient presents with   • Follow-up       Patient ID: Ciro Urena is a 76 y o  y o  female has a past medical history of Acute kidney injury superimposed on chronic kidney disease ([facility] [address] ) (11/26/2016), ADHD (attention deficit hyperactivity disorder) (03/17/2019), Arthritis, 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), Carpal tunnel syndrome, Chest pain (03/06/2017), Chronic kidney disease, Closed fracture of base of middle phalanx of finger (06/22/2017), Closed fracture of middle phalanx of left little finger (07/08/2017), Coagulopathy ([facility] [address] ) (05/15/2019), Colloid cyst of brain ([facility] [address] ), COPD (chronic obstructive pulmonary disease) ([facility] [address] ), COPD (chronic obstructive pulmonary disease) ([facility] [address] ), Coronary artery disease, Cough, Diabetes mellitus ([facility] ), GERD (gastroesophageal reflux disease), Glaucoma, Gout, History of brain disorder (10/07/2020), Hyperlipidemia, Hypertension, Irritable bowel syndrome, Melena (02/26/2019), Paronychia of great toe of left foot (10/07/2020), Post-traumatic seizures ([facility] ) (07/23/2015), Renal disorder, Seizures ([place] [address] ), Shortness of breath, Single seizure ([place] [address] ) (05/31/2016), SOB (shortness of breath), Syncope and collapse (11/11/2020), Urinary tract infection without hematuria (11/26/2016), and Wheezing  11/1/2022  Patient presents today for follow-up visit  Patient is a 64-year-old female with about a 40 pack year smoking history who quit 20 years ago with past medical history of reactive airway, hypertension, diabetes, CKD, CHF  She is here today for follow-up  She has been on the Trelegy since her last visit with us and does note improvement in her shortness of breath and cough with the use of the Trelegy  Still will use her rescue inhaler or nebulizer on occasion usually less than 1-2 times per week  Review of Systems   Constitutional: Negative  HENT: Negative  Respiratory: Positive for cough  Cardiovascular: Negative  Gastrointestinal: Negative  Genitourinary: Negative  Musculoskeletal: Negative  Skin: Negative  Allergic/Immunologic: Negative  Neurological: Negative  Psychiatric/Behavioral: Negative  Smoking history: She reports that she quit smoking about 21 years ago  She started smoking about 61 years ago  She has a 20 00 pack-year smoking history   She has never used smokeless tobacco     The following portions of the patient's history were reviewed and updated as appropriate: allergies, current medications, past family history, past medical history, past social history, past surgical history and problem list     Immunization History   Administered Date(s) Administered   • COVID-19 J&J (Alida) vaccine 0 5 mL 06/03/2021   • Pneumococcal Conjugate 13-Valent 11/27/2017, 10/07/2020   • Pneumococcal Polysaccharide PPV23 09/11/2019     Current Outpatient Medications   Medication Sig Dispense Refill   • atorvastatin (LIPITOR) 10 mg tablet Take 1 tablet (10 mg total) by mouth daily at bedtime 90 tablet 3   • Calcium Carbonate Antacid (CALCIUM CARBONATE PO) Take by mouth     • Continuous Blood Gluc  (FreeStyle [place] 2 [place]) YOLANDA Before Breakfast and 2 hours after meals 1 each 0   • Continuous Blood Gluc Sensor (FreeStyle Mary 2 Sensor) MISC Before Breakfast and 2 hours after meals 1 each 3   • Droplet Pen Needles 31G X 8 MM MISC USE 4 TIMES DAILY BEFORE MEALS AND AT BEDTIME     • furosemide (LASIX) 20 mg tablet Take 1 tablet (20 mg total) by mouth daily 60 tablet 6   • insulin aspart (NovoLOG FlexPen) 100 UNIT/ML injection pen INJECT 12 UNITS BEFORE MEALS (3 MEALS) (Patient taking differently: INJECT 12 UNITS BEFORE MEALS (3 MEALS) 4 times daily) 15 mL 3   • insulin glargine (Lantus SoloStar) 100 units/mL injection pen Inject 30 Units under the skin daily at bedtime 15 mL 2   • Insulin Pen Needle (Pen Needles) 30G X 8 MM MISC Use 4 (four) times a day (before meals and at bedtime) 500 each 1   • latanoprost (XALATAN) 0 005 % ophthalmic solution Administer 1 drop into the left eye daily at bedtime 2 5 mL 2   • levETIRAcetam (KEPPRA) 250 mg tablet take 1 tablet by mouth every 12 hours 180 tablet 3   • Multiple Vitamin (MULTI VITAMIN DAILY PO) Take 1 tablet by mouth daily     • nystatin (MYCOSTATIN) cream Apply topically 2 (two) times a day 30 g 1   • nystatin (MYCOSTATIN) powder Apply topically 3 (three) times a day as needed (rash) 60 g 1   • omeprazole (PriLOSEC) 20 mg delayed release capsule Take 1 capsule (20 mg total) by mouth 2 (two) times a day 180 capsule 1   • pentoxifylline (TRENtal) 400 mg ER tablet take 1 tablet by mouth three times a day with meals 270 tablet 1   • Probiotic Product (PROBIOTIC DAILY PO) Take by mouth       • vitamin B-12 (VITAMIN B-12) 500 mcg tablet Take 500 mcg by mouth daily     • fluticasone-umeclidinium-vilanterol (Trelegy Ellipta) 200-62 5-25 MCG/INH AEPB inhaler Inhale 1 puff daily Rinse mouth after use  60 blister 5   • montelukast (SINGULAIR) 10 mg tablet take 1 tablet by mouth every evening 90 tablet 1     Current Facility-Administered Medications   Medication Dose Route Frequency Provider Last Rate Last Admin   • denosumab (PROLIA) subcutaneous injection 60 mg  60 mg Subcutaneous Q6 Months Clarisa Billingsley DO   60 mg at 06/24/22 1442     Allergies: Brimonidine and Sulfa antibiotics    Objective:  Vitals:    11/01/22 1420 11/01/22 1422   BP: 121/74    BP Location: Left arm    Patient Position: Sitting    Cuff Size: Large    Pulse: 65    Resp: 16    Temp: 97 9 °F (36 6 °C)    TempSrc: Tympanic    SpO2: 94% 94%   Weight: 67 1 kg (148 lb)    Height: 4' 7" (1 397 m)    Oxygen Therapy  SpO2: 94 %  Oxygen Therapy: None (Room air)    Wt Readings from Last 3 Encounters:   11/01/22 67 1 kg (148 lb)   09/26/22 68 9 kg (152 lb)   09/20/22 67 1 kg (148 lb)     Body mass index is 34 4 kg/m²  Physical Exam  Vitals reviewed  Constitutional:       General: She is not in acute distress  Appearance: Normal appearance  She is not ill-appearing  HENT:      Head: Normocephalic and atraumatic  Mouth/Throat:      Pharynx: Oropharynx is clear  Eyes:      Conjunctiva/sclera: Conjunctivae normal    Cardiovascular:      Rate and Rhythm: Normal rate and regular rhythm  Pulmonary:      Effort: Pulmonary effort is normal  No respiratory distress  Breath sounds: Normal breath sounds  No decreased breath sounds, wheezing, rhonchi or rales  Abdominal:      General: Abdomen is flat  There is no distension  Musculoskeletal:         General: Normal range of motion  Cervical back: Normal range of motion  Right lower leg: No edema  Left lower leg: No edema  Skin:     General: Skin is warm and dry     Neurological:      Mental Status: She is alert and oriented to person, place, and time  Psychiatric:         Mood and Affect: Mood normal          Behavior: Behavior normal          Lab Review:   Lab Results   Component Value Date    K 3 9 10/17/2022     10/17/2022    CO2 26 10/17/2022    BUN 24 10/17/2022    CREATININE 1 20 10/17/2022    CALCIUM 10 0 10/17/2022     Lab Results   Component Value Date    WBC 7 77 10/17/2022    HGB 13 6 10/17/2022    HCT 42 9 10/17/2022    MCV 90 10/17/2022     10/17/2022       Diagnostics:  none pertinent  Office Spirometry Results:     ESS:    No results found

## 2022-11-04 ENCOUNTER — HOSPITAL ENCOUNTER (OUTPATIENT)
Dept: NON INVASIVE DIAGNOSTICS | Facility: CLINIC | Age: 77
Discharge: HOME | End: 2022-11-04

## 2022-11-04 VITALS
HEIGHT: 55 IN | HEART RATE: 57 BPM | DIASTOLIC BLOOD PRESSURE: 74 MMHG | WEIGHT: 148 LBS | BODY MASS INDEX: 34.25 KG/M2 | SYSTOLIC BLOOD PRESSURE: 121 MMHG

## 2022-11-04 DIAGNOSIS — I73.9 PAD (PERIPHERAL ARTERY DISEASE) (HCC): ICD-10-CM

## 2022-11-04 DIAGNOSIS — I50.32 CHRONIC HEART FAILURE WITH PRESERVED EJECTION FRACTION (HCC): ICD-10-CM

## 2022-11-04 LAB
AORTIC ROOT: 2.3 CM
AORTIC VALVE MEAN VELOCITY: 10.7 M/S
APICAL FOUR CHAMBER EJECTION FRACTION: 58 %
AV LVOT MEAN GRADIENT: 1 MMHG
AV LVOT PEAK GRADIENT: 2 MMHG
AV MEAN GRADIENT: 5 MMHG
AV PEAK GRADIENT: 9 MMHG
AV REGURGITATION PRESSURE HALF TIME: 870 MS
AV VELOCITY RATIO: 0.44
DOP CALC AO PEAK VEL: 1.51 M/S
DOP CALC AO VTI: 36.55 CM
DOP CALC LVOT PEAK VEL VTI: 13.5 CM
DOP CALC LVOT PEAK VEL: 0.66 M/S
E WAVE DECELERATION TIME: 234 MS
FRACTIONAL SHORTENING: 25 (ref 28–44)
INTERVENTRICULAR SEPTUM IN DIASTOLE (PARASTERNAL SHORT AXIS VIEW): 0.9 CM
INTERVENTRICULAR SEPTUM: 0.9 CM (ref 0.6–1.1)
LAAS-AP2: 19.7 CM2
LAAS-AP4: 18 CM2
LEFT ATRIUM AREA SYSTOLE SINGLE PLANE A4C: 18 CM2
LEFT ATRIUM SIZE: 3.6 CM
LEFT INTERNAL DIMENSION IN SYSTOLE: 4 CM (ref 2.1–4)
LEFT VENTRICULAR INTERNAL DIMENSION IN DIASTOLE: 5.3 CM (ref 3.5–6)
LEFT VENTRICULAR POSTERIOR WALL IN END DIASTOLE: 0.8 CM
LEFT VENTRICULAR STROKE VOLUME: 66 ML
LVSV (TEICH): 66 ML
MV E'TISSUE VEL-LAT: 7 CM/S
MV E'TISSUE VEL-SEP: 5 CM/S
MV PEAK A VEL: 0.85 M/S
MV PEAK E VEL: 55 CM/S
MV STENOSIS PRESSURE HALF TIME: 68 MS
MV VALVE AREA P 1/2 METHOD: 3.24
RIGHT ATRIUM AREA SYSTOLE A4C: 13.9 CM2
RIGHT VENTRICLE ID DIMENSION: 3.1 CM
SL CV AV DECELERATION TIME RETROGRADE: 2998 MS
SL CV AV PEAK GRADIENT RETROGRADE: 52 MMHG
SL CV LEFT ATRIUM LENGTH A2C: 5.4 CM
SL CV PED ECHO LEFT VENTRICLE DIASTOLIC VOLUME (MOD BIPLANE) 2D: 137 ML
SL CV PED ECHO LEFT VENTRICLE SYSTOLIC VOLUME (MOD BIPLANE) 2D: 71 ML

## 2022-11-07 DIAGNOSIS — E11.42 DIABETIC PERIPHERAL NEUROPATHY (HCC): ICD-10-CM

## 2022-11-07 RX ORDER — INSULIN ASPART 100 [IU]/ML
INJECTION, SOLUTION INTRAVENOUS; SUBCUTANEOUS
Qty: 15 ML | Refills: 3 | Status: SHIPPED | OUTPATIENT
Start: 2022-11-07

## 2022-11-30 ENCOUNTER — APPOINTMENT (OUTPATIENT)
Dept: RADIOLOGY | Facility: CLINIC | Age: 77
End: 2022-11-30

## 2022-11-30 ENCOUNTER — OFFICE VISIT (OUTPATIENT)
Dept: FAMILY MEDICINE CLINIC | Facility: CLINIC | Age: 77
End: 2022-11-30

## 2022-11-30 VITALS
BODY MASS INDEX: 35.13 KG/M2 | HEART RATE: 64 BPM | DIASTOLIC BLOOD PRESSURE: 82 MMHG | TEMPERATURE: 97.3 F | SYSTOLIC BLOOD PRESSURE: 130 MMHG | HEIGHT: 55 IN | OXYGEN SATURATION: 99 % | WEIGHT: 151.8 LBS

## 2022-11-30 DIAGNOSIS — J44.1 COPD EXACERBATION (HCC): ICD-10-CM

## 2022-11-30 DIAGNOSIS — J44.1 COPD EXACERBATION (HCC): Primary | ICD-10-CM

## 2022-11-30 RX ORDER — DOXYCYCLINE HYCLATE 100 MG/1
100 CAPSULE ORAL EVERY 12 HOURS SCHEDULED
Qty: 14 CAPSULE | Refills: 0 | Status: SHIPPED | OUTPATIENT
Start: 2022-11-30 | End: 2022-12-07

## 2022-11-30 RX ORDER — PREDNISONE 10 MG/1
TABLET ORAL
Qty: 20 TABLET | Refills: 0 | Status: SHIPPED | OUTPATIENT
Start: 2022-11-30 | End: 2022-12-08

## 2022-11-30 NOTE — PROGRESS NOTES
Name: Roya Aguero      : 1945      MRN: [id number]  Encounter Provider: Franny Yates PA-C  Encounter Date: 2022   Encounter department: [address] [place] [address]     1  COPD exacerbation (HCC)  -     predniSONE 10 mg tablet; Take 4 tablets (40 mg total) by mouth daily for 2 days, THEN 3 tablets (30 mg total) daily for 2 days, THEN 2 tablets (20 mg total) daily for 2 days, THEN 1 tablet (10 mg total) daily for 2 days  -     doxycycline hyclate (VIBRAMYCIN) 100 mg capsule; Take 1 capsule (100 mg total) by mouth every 12 (twelve) hours for 7 days  -     XR chest pa & lateral; Future; Expected date: 2022  hx and exam consistent with COPD exacerbation  There is diffuse rhonchi/wheezing on exam  Will taper prednisone, dose doxycycline  Continue inhalers  Seek CXR  O2 reassuring  Weights stable and appears euvolemic  Return precautions discussed  Subjective     Pt with hx of COPD, CHF, DM presents with concerns of 1 week of cough, fever tmax 101 8, wheezing, SOB  Multiple sick contacts  Sx mildly improved but still very tight, lots of coughing  Last fever 2 days ago  Covid negative at home  Review of Systems   Constitutional: Positive for fatigue and fever  Negative for chills  HENT: Positive for congestion  Negative for ear pain, hearing loss, nosebleeds, postnasal drip, rhinorrhea, sinus pressure, sinus pain, sneezing and sore throat  Eyes: Negative for pain, discharge, itching and visual disturbance  Respiratory: Positive for cough, chest tightness, shortness of breath and wheezing  Cardiovascular: Negative for chest pain, palpitations and leg swelling  Gastrointestinal: Negative for abdominal pain, blood in stool, constipation, diarrhea, nausea and vomiting  Genitourinary: Negative for frequency and urgency  Neurological: Positive for headaches  Negative for dizziness, light-headedness and numbness         Past Medical History:   Diagnosis Date   • Acute kidney injury superimposed on chronic kidney disease ([facility] ) 11/26/2016   • ADHD (attention deficit hyperactivity disorder) 03/17/2019   • Arthritis     BL HIPS   • 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   • Carpal tunnel syndrome    • Chest pain 03/06/2017   • Chronic kidney disease    • Closed fracture of base of middle phalanx of finger 06/22/2017   • Closed fracture of middle phalanx of left little finger 07/08/2017   • Coagulopathy ([facility] ) 05/15/2019   • Colloid cyst of brain (HCC)    • COPD (chronic obstructive pulmonary disease) (HCC)    • COPD (chronic obstructive pulmonary disease) (HCC)    • Coronary artery disease    • Cough    • Diabetes mellitus ([place] [address] )    • GERD (gastroesophageal reflux disease)    • Glaucoma    • Gout    • History of brain disorder 10/07/2020   • Hyperlipidemia    • Hypertension    • Irritable bowel syndrome    • Melena 02/26/2019   • Paronychia of great toe of left foot 10/07/2020   • Post-traumatic seizures ([facility] ) 07/23/2015   • Renal disorder    • Seizures ([place] [address] )     last 2015   • Shortness of breath    • Single seizure ([place] [address] ) 05/31/2016   • SOB (shortness of breath)    • Syncope and collapse 11/11/2020   • Urinary tract infection without hematuria 11/26/2016   • Wheezing      Past Surgical History:   Procedure Laterality Date   • BRAIN SURGERY     • CATARACT EXTRACTION     • CHOLECYSTECTOMY     • COLECTOMY RADHA     • DECOMPRESSION SPINE LUMBAR POSTERIOR  08/19/2019   • HERNIA REPAIR     • HYSTERECTOMY     • INCISION TENDON SHEATH HAND     • NECK SURGERY  05/24/2018   • NEUROPLASTY / TRANSPOSITION MEDIAN NERVE AT CARPAL TUNNEL     • AL COLONOSCOPY FLX DX W/COLLJ SPEC WHEN PFRMD N/A 3/26/2019    Procedure: COLONOSCOPY;  Surgeon: Brooke Javier MD;  Location: [facility]; Service: Gastroenterology   • AL ESOPHAGOGASTRODUODENOSCOPY TRANSORAL DIAGNOSTIC N/A 3/26/2019    Procedure: ESOPHAGOGASTRODUODENOSCOPY (EGD); Surgeon: Brooke Javier MD;  Location: [facility];   Service: Gastroenterology   • REPLACEMENT TOTAL KNEE Right    • RETINAL DETACHMENT SURGERY     • TUBAL LIGATION       Family History   Problem Relation Age of Onset   • Asthma Mother    • Heart disease Mother    • Emphysema Father    • Cancer Father    • Prostate cancer Father    • Kidney cancer Sister    • Diabetes Sister    • Kidney disease Sister    • Brain cancer Brother    • Bone cancer Brother    • Heart disease Brother      Social History     Socioeconomic History   • Marital status:      Spouse name: None   • Number of children: None   • Years of education: None   • Highest education level: None   Occupational History   • Occupation: retired   Tobacco Use   • Smoking status: Former     Packs/day: 0 50     Years: 40 00     Pack years: 20 00     Types: Cigarettes     Start date:      Quit date:      Years since quittin 9   • Smokeless tobacco: Never   • Tobacco comments:     stopped many years ago   Vaping Use   • Vaping Use: Never used   Substance and Sexual Activity   • Alcohol use: Never   • Drug use: Never   • Sexual activity: Not Currently     Partners: Male   Other Topics Concern   • None   Social History Narrative   • None     Social Determinants of Health     Financial Resource Strain: Not on file   Food Insecurity: Not on file   Transportation Needs: Not on file   Physical Activity: Not on file   Stress: Not on file   Social Connections: Not on file   Intimate Partner Violence: Not on file   Housing Stability: Not on file     Current Outpatient Medications on File Prior to Visit   Medication Sig   • atorvastatin (LIPITOR) 10 mg tablet Take 1 tablet (10 mg total) by mouth daily at bedtime   • Calcium Carbonate Antacid (CALCIUM CARBONATE PO) Take by mouth   • Continuous Blood Gluc  (FreeStyle [place] 2 [place]) YOLANDA Before Breakfast and 2 hours after meals   • Continuous Blood Gluc Sensor (FreeStyle Mary 2 Sensor) MISC Before Breakfast and 2 hours after meals   • Droplet Pen Needles 31G X 8 MM MISC USE 4 TIMES DAILY BEFORE MEALS AND AT BEDTIME   • furosemide (LASIX) 20 mg tablet Take 1 tablet (20 mg total) by mouth daily   • insulin aspart (NovoLOG FlexPen) 100 UNIT/ML injection pen INJECT 12 UNITS BEFORE MEALS (3 MEALS)   • insulin glargine (Lantus SoloStar) 100 units/mL injection pen Inject 30 Units under the skin daily at bedtime   • Insulin Pen Needle (Pen Needles) 30G X 8 MM MISC Use 4 (four) times a day (before meals and at bedtime)   • latanoprost (XALATAN) 0 005 % ophthalmic solution Administer 1 drop into the left eye daily at bedtime   • levETIRAcetam (KEPPRA) 250 mg tablet take 1 tablet by mouth every 12 hours   • Multiple Vitamin (MULTI VITAMIN DAILY PO) Take 1 tablet by mouth daily   • nystatin (MYCOSTATIN) cream Apply topically 2 (two) times a day   • nystatin (MYCOSTATIN) powder Apply topically 3 (three) times a day as needed (rash)   • omeprazole (PriLOSEC) 20 mg delayed release capsule Take 1 capsule (20 mg total) by mouth 2 (two) times a day   • pentoxifylline (TRENtal) 400 mg ER tablet take 1 tablet by mouth three times a day with meals   • Probiotic Product (PROBIOTIC DAILY PO) Take by mouth     • vitamin B-12 (VITAMIN B-12) 500 mcg tablet Take 500 mcg by mouth daily   • fluticasone-umeclidinium-vilanterol (Trelegy Ellipta) 200-62 5-25 MCG/INH AEPB inhaler Inhale 1 puff daily Rinse mouth after use     • montelukast (SINGULAIR) 10 mg tablet take 1 tablet by mouth every evening     Allergies   Allergen Reactions   • Brimonidine Other (See Comments)   • Sulfa Antibiotics Rash     Immunization History   Administered Date(s) Administered   • COVID-19 J&J (Alida) vaccine 0 5 mL 06/03/2021   • Pneumococcal Conjugate 13-Valent 11/27/2017, 10/07/2020   • Pneumococcal Polysaccharide PPV23 09/11/2019       Objective     /82 (BP Location: Left arm, Patient Position: Sitting, Cuff Size: Large)   Pulse 64   Temp (!) 97 3 °F (36 3 °C)   Ht 4' 7" (1 397 m)   Wt 68 9 kg (151 lb 12 8 oz)   SpO2 99%   BMI 35 28 kg/m²     Physical Exam  Vitals and nursing note reviewed  Constitutional:       General: She is not in acute distress  Appearance: Normal appearance  HENT:      Head: Normocephalic and atraumatic  Nose: Nose normal       Mouth/Throat:      Mouth: Mucous membranes are moist       Pharynx: Oropharynx is clear  No oropharyngeal exudate or posterior oropharyngeal erythema  Eyes:      Pupils: Pupils are equal, round, and reactive to light  Cardiovascular:      Rate and Rhythm: Normal rate and regular rhythm  Heart sounds: Normal heart sounds  No murmur heard  Pulmonary:      Effort: Pulmonary effort is normal  No respiratory distress  Breath sounds: Wheezing (diffuse) and rhonchi (diffuse) present  No rales  Musculoskeletal:         General: Normal range of motion  Cervical back: Normal range of motion and neck supple  Right lower leg: Edema present  Left lower leg: Edema present  Skin:     General: Skin is warm and dry  Neurological:      Mental Status: She is alert and oriented to person, place, and time     Psychiatric:         Mood and Affect: Mood and affect normal        Carly Mccarthy PA-C

## 2022-12-08 ENCOUNTER — TELEPHONE (OUTPATIENT)
Dept: NEPHROLOGY | Facility: CLINIC | Age: 77
End: 2022-12-08

## 2022-12-10 DIAGNOSIS — J41.1 MUCOPURULENT CHRONIC BRONCHITIS (HCC): ICD-10-CM

## 2022-12-12 RX ORDER — MONTELUKAST SODIUM 10 MG/1
TABLET ORAL
Qty: 90 TABLET | Refills: 1 | Status: SHIPPED | OUTPATIENT
Start: 2022-12-12 | End: 2023-03-12

## 2022-12-16 DIAGNOSIS — E11.40 TYPE 2 DIABETES MELLITUS WITH DIABETIC NEUROPATHY, WITH LONG-TERM CURRENT USE OF INSULIN (HCC): ICD-10-CM

## 2022-12-16 DIAGNOSIS — Z79.4 TYPE 2 DIABETES MELLITUS WITH DIABETIC NEUROPATHY, WITH LONG-TERM CURRENT USE OF INSULIN (HCC): ICD-10-CM

## 2022-12-16 DIAGNOSIS — E11.42 DIABETIC PERIPHERAL NEUROPATHY (HCC): ICD-10-CM

## 2022-12-16 RX ORDER — INSULIN ASPART 100 [IU]/ML
INJECTION, SOLUTION INTRAVENOUS; SUBCUTANEOUS
Qty: 15 ML | Refills: 3 | Status: SHIPPED | OUTPATIENT
Start: 2022-12-16

## 2022-12-16 RX ORDER — INSULIN GLARGINE 100 [IU]/ML
30 INJECTION, SOLUTION SUBCUTANEOUS
Qty: 15 ML | Refills: 2 | Status: SHIPPED | OUTPATIENT
Start: 2022-12-16

## 2022-12-22 ENCOUNTER — OFFICE VISIT (OUTPATIENT)
Dept: CARDIOLOGY CLINIC | Facility: CLINIC | Age: 77
End: 2022-12-22

## 2022-12-22 VITALS
DIASTOLIC BLOOD PRESSURE: 72 MMHG | OXYGEN SATURATION: 97 % | RESPIRATION RATE: 16 BRPM | HEIGHT: 55 IN | HEART RATE: 73 BPM | BODY MASS INDEX: 34.48 KG/M2 | WEIGHT: 149 LBS | SYSTOLIC BLOOD PRESSURE: 118 MMHG

## 2022-12-22 DIAGNOSIS — I50.32 CHRONIC HEART FAILURE WITH PRESERVED EJECTION FRACTION (HCC): Primary | ICD-10-CM

## 2022-12-22 NOTE — PROGRESS NOTES
[address] [facility]   Office Consultation    May William 76 y o  female MRN: [id number]    12/22/22          Assessment:  1  Chronic HFpEF  2  Ambulatory dysfunction/frequent falls   3  IDDM   4  Hyperlipidemia  5  CKD3    Plan:  · She is close to euvolemic  Continue Lasix 20 mg daily  · Continue atorvastatin  · No significant peripheral artery disease on lower extremity doppler  No clear indication for [place]   She was advised to try holding it and reassess  Ambulatory blood pressure monitoring and maintaining a low sodium diet was advised  · She was advised to notify us with the onset of cardiac symptoms  Follow up: 4 months or sooner as needed    1  Chronic heart failure with preserved ejection fraction (HCC)            HPI: May William is a 76y o  year old female with history of IDDM, and COPD who presents for routine follow-up  Past cardiac evaluation:  · Pharmacologic MPI 11/2020: negative for ischemia  · TTE 11/2020: EF 55%, mild mitral stenosis    She previously reported a history of "angina and PAD" and has been on pentoxifylline  She was evaluated with a lower extremity arterial doppler that revealed no evidence of PAD  She has chronic lower extremity edema that has been stable  Her weight has been stable at 148-149 pounds  She denies associated dyspnea or orthopnea  She has had neck and back surgery and notes chronic radiculopathy  She also notes significant gait dysfunction with frequent falls  She had a mechanical fall 2 weeks ago  She denies loss of consciousness  She denies anginal symptoms or equivalents  She denies any other cardiac concerns at this time            Family History: brother and mother with history of cardiac disease    Social history: former smoker; quit in 2003      Allergies   Allergen Reactions   • Brimonidine Other (See Comments)   • Sulfa Antibiotics Rash         Current Outpatient Medications:   •  atorvastatin (LIPITOR) 10 mg tablet, Take 1 tablet (10 mg total) by mouth daily at bedtime, Disp: 90 tablet, Rfl: 3  •  Calcium Carbonate Antacid (CALCIUM CARBONATE PO), Take by mouth, Disp: , Rfl:   •  Continuous Blood Gluc  (FreeStyle Mary 2 [place]) YOLANDA, Before Breakfast and 2 hours after meals, Disp: 1 each, Rfl: 0  •  Continuous Blood Gluc Sensor (FreeStyle Mary 2 Sensor) MISC, Before Breakfast and 2 hours after meals, Disp: 1 each, Rfl: 3  •  Droplet Pen Needles 31G X 8 MM MISC, USE 4 TIMES DAILY BEFORE MEALS AND AT BEDTIME, Disp: , Rfl:   •  fluticasone-umeclidinium-vilanterol (Trelegy Ellipta) 200-62 5-25 MCG/INH AEPB inhaler, Inhale 1 puff daily Rinse mouth after use , Disp: 60 blister, Rfl: 5  •  furosemide (LASIX) 20 mg tablet, Take 1 tablet (20 mg total) by mouth daily, Disp: 60 tablet, Rfl: 6  •  insulin aspart (NovoLOG FlexPen) 100 UNIT/ML injection pen, INJECT 12 UNITS BEFORE MEALS (3 MEALS), Disp: 15 mL, Rfl: 3  •  Insulin Pen Needle (Pen Needles) 30G X 8 MM MISC, Use 4 (four) times a day (before meals and at bedtime), Disp: 500 each, Rfl: 1  •  Lantus SoloStar 100 units/mL SOPN, INJECT 30 UNITS UNDER THE SKIN DAILY AT BEDTIME, Disp: 15 mL, Rfl: 2  •  latanoprost (XALATAN) 0 005 % ophthalmic solution, Administer 1 drop into the left eye daily at bedtime, Disp: 2 5 mL, Rfl: 2  •  levETIRAcetam (KEPPRA) 250 mg tablet, take 1 tablet by mouth every 12 hours, Disp: 180 tablet, Rfl: 3  •  montelukast (SINGULAIR) 10 mg tablet, take 1 tablet by mouth every evening, Disp: 90 tablet, Rfl: 1  •  Multiple Vitamin (MULTI VITAMIN DAILY PO), Take 1 tablet by mouth daily, Disp: , Rfl:   •  nystatin (MYCOSTATIN) cream, Apply topically 2 (two) times a day, Disp: 30 g, Rfl: 1  •  nystatin (MYCOSTATIN) powder, Apply topically 3 (three) times a day as needed (rash), Disp: 60 g, Rfl: 1  •  omeprazole (PriLOSEC) 20 mg delayed release capsule, Take 1 capsule (20 mg total) by mouth 2 (two) times a day, Disp: 180 capsule, Rfl: 1  •  pentoxifylline (TRENtal) 400 mg ER tablet, take 1 tablet by mouth three times a day with meals, Disp: 270 tablet, Rfl: 1  •  Probiotic Product (PROBIOTIC DAILY PO), Take by mouth  , Disp: , Rfl:   •  vitamin B-12 (VITAMIN B-12) 500 mcg tablet, Take 500 mcg by mouth daily, Disp: , Rfl:     Current Facility-Administered Medications:   •  denosumab (PROLIA) subcutaneous injection 60 mg, 60 mg, Subcutaneous, Q6 Months, Clarisa Billingsley DO, 60 mg at 06/24/22 1442    Past Medical History:   Diagnosis Date   • Acute kidney injury superimposed on chronic kidney disease ([facility] ) 11/26/2016   • ADHD (attention deficit hyperactivity disorder) 03/17/2019   • Arthritis     BL HIPS   • 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   • Carpal tunnel syndrome    • Chest pain 03/06/2017   • Chronic kidney disease    • Closed fracture of base of middle phalanx of finger 06/22/2017   • Closed fracture of middle phalanx of left little finger 07/08/2017   • Coagulopathy ([place] [address] ) 05/15/2019   • Colloid cyst of brain (HCC)    • COPD (chronic obstructive pulmonary disease) (HCC)    • COPD (chronic obstructive pulmonary disease) (HCC)    • Coronary artery disease    • Cough    • Diabetes mellitus (HCC)    • GERD (gastroesophageal reflux disease)    • Glaucoma    • Gout    • History of brain disorder 10/07/2020   • Hyperlipidemia    • Hypertension    • Irritable bowel syndrome    • Melena 02/26/2019   • Paronychia of great toe of left foot 10/07/2020   • Post-traumatic seizures ([place] [address] ) 07/23/2015   • Renal disorder    • Seizures ([place] [address] )     last 2015   • Shortness of breath    • Single seizure ([place] [address] ) 05/31/2016   • SOB (shortness of breath)    • Syncope and collapse 11/11/2020   • Urinary tract infection without hematuria 11/26/2016   • Wheezing        Family History   Problem Relation Age of Onset   • Asthma Mother    • Heart disease Mother    • Emphysema Father    • Cancer Father    • Prostate cancer Father    • Kidney cancer Sister    • Diabetes Sister    • Kidney disease Sister    • Brain cancer Brother    • Bone cancer Brother    • Heart disease Brother        Past Surgical History:   Procedure Laterality Date   • BRAIN SURGERY     • CATARACT EXTRACTION     • CHOLECYSTECTOMY     • COLECTOMY RADHA     • DECOMPRESSION SPINE LUMBAR POSTERIOR  2019   • HERNIA REPAIR     • HYSTERECTOMY     • INCISION TENDON SHEATH HAND     • NECK SURGERY  2018   • NEUROPLASTY / TRANSPOSITION MEDIAN NERVE AT CARPAL TUNNEL     • [place] COLONOSCOPY FLX DX W/COLLJ SPEC WHEN PFRMD N/A 3/26/2019    Procedure: COLONOSCOPY;  Surgeon: Jhonny Dover MD;  Location: [facility]; Service: Gastroenterology   • [place] ESOPHAGOGASTRODUODENOSCOPY TRANSORAL DIAGNOSTIC N/A 3/26/2019    Procedure: ESOPHAGOGASTRODUODENOSCOPY (EGD); Surgeon: Jhonny Dover MD;  Location: [facility];   Service: Gastroenterology   • REPLACEMENT TOTAL KNEE Right    • RETINAL DETACHMENT SURGERY     • TUBAL LIGATION         Social History     Socioeconomic History   • Marital status:      Spouse name: Not on file   • Number of children: Not on file   • Years of education: Not on file   • Highest education level: Not on file   Occupational History   • Occupation: retired   Tobacco Use   • Smoking status: Former     Packs/day: 0 50     Years: 40 00     Pack years: 20 00     Types: Cigarettes     Start date:      Quit date:      Years since quittin 9   • Smokeless tobacco: Never   • Tobacco comments:     stopped many years ago   Vaping Use   • Vaping Use: Never used   Substance and Sexual Activity   • Alcohol use: Never   • Drug use: Never   • Sexual activity: Not Currently     Partners: Male   Other Topics Concern   • Not on file   Social History Narrative   • Not on file     Social Determinants of Health     Financial Resource Strain: Not on file   Food Insecurity: Not on file   Transportation Needs: Not on file   Physical Activity: Not on file   Stress: Not on file   Social Connections: Not on file   Intimate Partner Violence: Not on file   Housing Stability: Not on file       Review of Systems   Constitutional: Negative for diaphoresis, weight gain and weight loss  HENT: Negative for congestion  Cardiovascular: Negative for chest pain, dyspnea on exertion, irregular heartbeat, leg swelling, near-syncope, orthopnea, palpitations, paroxysmal nocturnal dyspnea and syncope  Respiratory: Negative for shortness of breath, sleep disturbances due to breathing and snoring  Hematologic/Lymphatic: Does not bruise/bleed easily  Skin: Negative for rash  Musculoskeletal: Positive for arthritis, back pain and falls  Negative for myalgias  Gastrointestinal: Negative for nausea and vomiting  Neurological: Negative for excessive daytime sleepiness and light-headedness  Psychiatric/Behavioral: The patient is not nervous/anxious  Vitals: /72 (BP Location: Left arm, Patient Position: Sitting, Cuff Size: Standard)   Pulse 73   Resp 16   Ht 4' 7" (1 397 m)   Wt 67 6 kg (149 lb)   SpO2 97%   BMI 34 63 kg/m²       Physical Exam:     GEN: Alert and oriented x 3, in no acute distress  Well appearing and well nourished  HEENT: Sclera anicteric, conjunctivae pink, mucous membranes moist  Oropharynx clear  NECK: Supple, no carotid bruits, no significant JVD  Trachea midline, no thyromegaly  HEART: Regular rhythm, normal S1 and S2, no murmurs, clicks, gallops or rubs  PMI nondisplaced, no thrills  LUNGS: Clear to auscultation bilaterally; no wheezes, rales, or rhonchi  No increased work of breathing or signs of respiratory distress  ABDOMEN: Soft, nontender, nondistended, normoactive bowel sounds  EXTREMITIES: Skin warm and well perfused,1+ bilateral lower extremity edema  NEURO: No focal findings  Normal speech  Mood and affect normal    SKIN: Normal without suspicious lesions on exposed skin

## 2022-12-27 ENCOUNTER — OFFICE VISIT (OUTPATIENT)
Dept: FAMILY MEDICINE CLINIC | Facility: CLINIC | Age: 77
End: 2022-12-27

## 2022-12-27 ENCOUNTER — APPOINTMENT (OUTPATIENT)
Dept: RADIOLOGY | Facility: CLINIC | Age: 77
End: 2022-12-27

## 2022-12-27 VITALS
BODY MASS INDEX: 34.71 KG/M2 | OXYGEN SATURATION: 94 % | HEIGHT: 55 IN | SYSTOLIC BLOOD PRESSURE: 118 MMHG | DIASTOLIC BLOOD PRESSURE: 70 MMHG | WEIGHT: 150 LBS | TEMPERATURE: 96.5 F | HEART RATE: 65 BPM

## 2022-12-27 DIAGNOSIS — E11.69 HYPERLIPIDEMIA ASSOCIATED WITH TYPE 2 DIABETES MELLITUS (HCC): ICD-10-CM

## 2022-12-27 DIAGNOSIS — R19.8 ALTERNATING CONSTIPATION AND DIARRHEA: ICD-10-CM

## 2022-12-27 DIAGNOSIS — Z79.4 TYPE 2 DIABETES MELLITUS WITH DIABETIC NEUROPATHY, WITH LONG-TERM CURRENT USE OF INSULIN (HCC): Primary | ICD-10-CM

## 2022-12-27 DIAGNOSIS — I10 PRIMARY HYPERTENSION: ICD-10-CM

## 2022-12-27 DIAGNOSIS — G89.4 CHRONIC PAIN SYNDROME: ICD-10-CM

## 2022-12-27 DIAGNOSIS — N18.32 STAGE 3B CHRONIC KIDNEY DISEASE (HCC): ICD-10-CM

## 2022-12-27 DIAGNOSIS — I50.32 CHRONIC HEART FAILURE WITH PRESERVED EJECTION FRACTION (HCC): ICD-10-CM

## 2022-12-27 DIAGNOSIS — I25.10 CORONARY ARTERY DISEASE INVOLVING NATIVE CORONARY ARTERY OF NATIVE HEART WITHOUT ANGINA PECTORIS: ICD-10-CM

## 2022-12-27 DIAGNOSIS — Z13.31 SCREENING FOR DEPRESSION: ICD-10-CM

## 2022-12-27 DIAGNOSIS — G40.909 SEIZURE DISORDER (HCC): ICD-10-CM

## 2022-12-27 DIAGNOSIS — E11.40 TYPE 2 DIABETES MELLITUS WITH DIABETIC NEUROPATHY, WITH LONG-TERM CURRENT USE OF INSULIN (HCC): Primary | ICD-10-CM

## 2022-12-27 DIAGNOSIS — E78.5 HYPERLIPIDEMIA ASSOCIATED WITH TYPE 2 DIABETES MELLITUS (HCC): ICD-10-CM

## 2022-12-27 DIAGNOSIS — J41.1 MUCOPURULENT CHRONIC BRONCHITIS (HCC): ICD-10-CM

## 2022-12-27 DIAGNOSIS — Z00.00 MEDICARE ANNUAL WELLNESS VISIT, SUBSEQUENT: ICD-10-CM

## 2022-12-27 PROBLEM — I73.9 CLAUDICATION (HCC): Status: RESOLVED | Noted: 2019-03-15 | Resolved: 2022-12-27

## 2022-12-27 PROBLEM — I73.9 PAD (PERIPHERAL ARTERY DISEASE) (HCC): Status: RESOLVED | Noted: 2019-05-15 | Resolved: 2022-12-27

## 2022-12-27 LAB — SL AMB POCT HEMOGLOBIN AIC: 7.4 (ref ?–6.5)

## 2022-12-27 NOTE — PATIENT INSTRUCTIONS
Medicare Preventive Visit Patient Instructions  Thank you for completing your Welcome to Medicare Visit or Medicare Annual Wellness Visit today  Your next wellness visit will be due in one year (12/28/2023)  The screening/preventive services that you may require over the next 5-10 years are detailed below  Some tests may not apply to you based off risk factors and/or age  Screening tests ordered at today's visit but not completed yet may show as past due  Also, please note that scanned in results may not display below  Preventive Screenings:  Service Recommendations Previous Testing/Comments   Colorectal Cancer Screening  * Colonoscopy    * Fecal Occult Blood Test (FOBT)/Fecal Immunochemical Test (FIT)  * Fecal DNA/Cologuard Test  * Flexible Sigmoidoscopy Age: 39-70 years old   Colonoscopy: every 10 years (may be performed more frequently if at higher risk)  OR  FOBT/FIT: every 1 year  OR  Cologuard: every 3 years  OR  Sigmoidoscopy: every 5 years  Screening may be recommended earlier than age 39 if at higher risk for colorectal cancer  Also, an individualized decision between you and your healthcare provider will decide whether screening between the ages of 74-80 would be appropriate  Colonoscopy: 06/04/2020  FOBT/FIT: Not on file  Cologuard: Not on file  Sigmoidoscopy: Not on file    Screening Current     Breast Cancer Screening Age: 36 years old  Frequency: every 1-2 years  Not required if history of left and right mastectomy Mammogram: Not on file    Screening Not Indicated   Cervical Cancer Screening Between the ages of 21-29, pap smear recommended once every 3 years  Between the ages of 33-67, can perform pap smear with HPV co-testing every 5 years     Recommendations may differ for women with a history of total hysterectomy, cervical cancer, or abnormal pap smears in past  Pap Smear: Not on file    Screening Not Indicated   Hepatitis C Screening Once for adults born between [facility] frequently in patients at high risk for Hepatitis C Hep C Antibody: Not on file    Screening Not Indicated   Diabetes Screening 1-2 times per year if you're at risk for diabetes or have pre-diabetes Fasting glucose: 191 mg/dL (10/17/2022)  A1C: 6 8 (9/26/2022)  Screening Not Indicated  History Diabetes   Cholesterol Screening Once every 5 years if you don't have a lipid disorder  May order more often based on risk factors  Lipid panel: 07/12/2022    Screening Not Indicated  History Lipid Disorder     Other Preventive Screenings Covered by Medicare:  1  Abdominal Aortic Aneurysm (AAA) Screening: covered once if your at risk  You're considered to be at risk if you have a family history of AAA  2  Lung Cancer Screening: covers low dose CT scan once per year if you meet all of the following conditions: (1) Age 50-69; (2) No signs or symptoms of lung cancer; (3) Current smoker or have quit smoking within the last 15 years; (4) You have a tobacco smoking history of at least 20 pack years (packs per day multiplied by number of years you smoked); (5) You get a written order from a healthcare provider  3  Glaucoma Screening: covered annually if you're considered high risk: (1) You have diabetes OR (2) Family history of glaucoma OR (3)  aged 48 and older OR (3)  American aged 72 and older  3  Osteoporosis Screening: covered every 2 years if you meet one of the following conditions: (1) You're estrogen deficient and at risk for osteoporosis based off medical history and other findings; (2) Have a vertebral abnormality; (3) On glucocorticoid therapy for more than 3 months; (4) Have primary hyperparathyroidism; (5) On osteoporosis medications and need to assess response to drug therapy  · Last bone density test (DXA Scan): 02/24/2022   5  HIV Screening: covered annually if you're between the age of 15-65   Also covered annually if you are younger than 13 and older than 72 with risk factors for HIV infection  For pregnant patients, it is covered up to 3 times per pregnancy  Immunizations:  Immunization Recommendations   Influenza Vaccine Annual influenza vaccination during flu season is recommended for all persons aged >= 6 months who do not have contraindications   Pneumococcal Vaccine   * Pneumococcal conjugate vaccine = PCV13 (Prevnar 13), PCV15 (Vaxneuvance), PCV20 (Prevnar 20)  * Pneumococcal polysaccharide vaccine = PPSV23 (Pneumovax) Adults 25-60 years old: 1-3 doses may be recommended based on certain risk factors  Adults 72 years old: 1-2 doses may be recommended based off what pneumonia vaccine you previously received   Hepatitis B Vaccine 3 dose series if at intermediate or high risk (ex: diabetes, end stage renal disease, liver disease)   Tetanus (Td) Vaccine - COST NOT COVERED BY MEDICARE PART B Following completion of primary series, a booster dose should be given every 10 years to maintain immunity against tetanus  Td may also be given as tetanus wound prophylaxis  Tdap Vaccine - COST NOT COVERED BY MEDICARE PART B Recommended at least once for all adults  For pregnant patients, recommended with each pregnancy  Shingles Vaccine (Shingrix) - COST NOT COVERED BY MEDICARE PART B  2 shot series recommended in those aged 48 and above     Health Maintenance Due:      Topic Date Due   • Hepatitis C Screening  Never done   • Colorectal Cancer Screening  06/04/2030     Immunizations Due:      Topic Date Due   • Hepatitis B Vaccine (1 of 3 - 3-dose series) Never done   • COVID-19 Vaccine (2 - Booster for Alida series) 07/29/2021   • Influenza Vaccine (1) Never done     Advance Directives   What are advance directives? Advance directives are legal documents that state your wishes and plans for medical care  These plans are made ahead of time in case you lose your ability to make decisions for yourself   Advance directives can apply to any medical decision, such as the treatments you want, and if you want to donate organs  What are the types of advance directives? There are many types of advance directives, and each state has rules about how to use them  You may choose a combination of any of the following:  · Living will: This is a written record of the treatment you want  You can also choose which treatments you do not want, which to limit, and which to stop at a certain time  This includes surgery, medicine, IV fluid, and tube feedings  · Durable power of  for healthcare [facility]): This is a written record that states who you want to make healthcare choices for you when you are unable to make them for yourself  This person, called a proxy, is usually a family member or a friend  You may choose more than 1 proxy  · Do not resuscitate (DNR) order:  A DNR order is used in case your heart stops beating or you stop breathing  It is a request not to have certain forms of treatment, such as CPR  A DNR order may be included in other types of advance directives  · Medical directive: This covers the care that you want if you are in a coma, near death, or unable to make decisions for yourself  You can list the treatments you want for each condition  Treatment may include pain medicine, surgery, blood transfusions, dialysis, IV or tube feedings, and a ventilator (breathing machine)  · Values history: This document has questions about your views, beliefs, and how you feel and think about life  This information can help others choose the care that you would choose  Why are advance directives important? An advance directive helps you control your care  Although spoken wishes may be used, it is better to have your wishes written down  Spoken wishes can be misunderstood, or not followed  Treatments may be given even if you do not want them  An advance directive may make it easier for your family to make difficult choices about your care     Fall Prevention    Fall prevention  includes ways to make your home and other areas safer  It also includes ways you can move more carefully to prevent a fall  Health conditions that cause changes in your blood pressure, vision, or muscle strength and coordination may increase your risk for falls  Medicines may also increase your risk for falls if they make you dizzy, weak, or sleepy  Fall prevention tips:   · Stand or sit up slowly  · Use assistive devices as directed  · Wear shoes that fit well and have soles that   · Wear a personal alarm  · Stay active  · Manage your medical conditions  Home Safety Tips:  · Add items to prevent falls in the bathroom  · Keep paths clear  · Install bright lights in your home  · Keep items you use often on shelves within reach  · Paint or place reflective tape on the edges of your stairs  Urinary Incontinence   Urinary incontinence (UI)  is when you lose control of your bladder  UI develops because your bladder cannot store or empty urine properly  The 3 most common types of UI are stress incontinence, urge incontinence, or both  Medicines:   · May be given to help strengthen your bladder control  Report any side effects of medication to your healthcare provider  Do pelvic muscle exercises often:  Your pelvic muscles help you stop urinating  Squeeze these muscles tight for 5 seconds, then relax for 5 seconds  Gradually work up to squeezing for 10 seconds  Do 3 sets of 15 repetitions a day, or as directed  This will help strengthen your pelvic muscles and improve bladder control  Train your bladder:  Go to the bathroom at set times, such as every 2 hours, even if you do not feel the urge to go  You can also try to hold your urine when you feel the urge to go  For example, hold your urine for 5 minutes when you feel the urge to go  As that becomes easier, hold your urine for 10 minutes  Self-care:   · Keep a UI record  Write down how often you leak urine and how much you leak   Make a note of what you were doing when you leaked urine  · Drink liquids as directed  You may need to limit the amount of liquid you drink to help control your urine leakage  Do not drink any liquid right before you go to bed  Limit or do not have drinks that contain caffeine or alcohol  · Prevent constipation  Eat a variety of high-fiber foods  Good examples are high-fiber cereals, beans, vegetables, and whole-grain breads  Walking is the best way to trigger your intestines to have a bowel movement  · Exercise regularly and maintain a healthy weight  Weight loss and exercise will decrease pressure on your bladder and help you control your leakage  · Use a catheter as directed  to help empty your bladder  A catheter is a tiny, plastic tube that is put into your bladder to drain your urine  · Go to behavior therapy as directed  Behavior therapy may be used to help you learn to control your urge to urinate  Weight Management   Why it is important to manage your weight:  Being overweight increases your risk of health conditions such as heart disease, high blood pressure, type 2 diabetes, and certain types of cancer  It can also increase your risk for osteoarthritis, sleep apnea, and other respiratory problems  Aim for a slow, steady weight loss  Even a small amount of weight loss can lower your risk of health problems  How to lose weight safely:  A safe and healthy way to lose weight is to eat fewer calories and get regular exercise  You can lose up about 1 pound a week by decreasing the number of calories you eat by 500 calories each day  Healthy meal plan for weight management:  A healthy meal plan includes a variety of foods, contains fewer calories, and helps you stay healthy  A healthy meal plan includes the following:  · Eat whole-grain foods more often  A healthy meal plan should contain fiber  Fiber is the part of grains, fruits, and vegetables that is not broken down by your body   Whole-grain foods are healthy and provide extra fiber in your diet  Some examples of whole-grain foods are whole-wheat breads and pastas, oatmeal, brown rice, and bulgur  · Eat a variety of vegetables every day  Include dark, leafy greens such as spinach, kale, estefany greens, and mustard greens  Eat yellow and orange vegetables such as carrots, sweet potatoes, and winter squash  · Eat a variety of fruits every day  Choose fresh or canned fruit (canned in its own juice or light syrup) instead of juice  Fruit juice has very little or no fiber  · Eat low-fat dairy foods  Drink fat-free (skim) milk or 1% milk  Eat fat-free yogurt and low-fat cottage cheese  Try low-fat cheeses such as mozzarella and other reduced-fat cheeses  · Choose meat and other protein foods that are low in fat  Choose beans or other legumes such as split peas or lentils  Choose fish, skinless poultry (chicken or turkey), or lean cuts of red meat (beef or pork)  Before you cook meat or poultry, cut off any visible fat  · Use less fat and oil  Try baking foods instead of frying them  Add less fat, such as margarine, sour cream, regular salad dressing and mayonnaise to foods  Eat fewer high-fat foods  Some examples of high-fat foods include french fries, doughnuts, ice cream, and cakes  · Eat fewer sweets  Limit foods and drinks that are high in sugar  This includes candy, cookies, regular soda, and sweetened drinks  Exercise:  Exercise at least 30 minutes per day on most days of the week  Some examples of exercise include walking, biking, dancing, and swimming  You can also fit in more physical activity by taking the stairs instead of the elevator or parking farther away from stores  Ask your healthcare provider about the best exercise plan for you  © Copyright [employer] 2018 Information is for End User's use only and may not be sold, redistributed or otherwise used for commercial purposes   All illustrations and images included in CareNotes® are the copyrighted property of [employer] M [employer]  or [address] [place], [place] [id number]

## 2022-12-27 NOTE — PROGRESS NOTES
Assessment and Plan:     Problem List Items Addressed This Visit        Endocrine    Hyperlipidemia associated with type 2 diabetes mellitus ([facility] )    Relevant Orders    POCT hemoglobin A1c (Completed)    Type 2 diabetes mellitus with diabetic neuropathy (HCC) - Primary    Relevant Orders    POCT hemoglobin A1c (Completed)       Respiratory    COPD (chronic obstructive pulmonary disease) (HCC)       Cardiovascular and Mediastinum    Chronic heart failure with preserved ejection fraction (HCC)    Coronary artery disease without angina pectoris    Hypertension       Nervous and Auditory    Seizure disorder ([facility])       Genitourinary    Stage 3b chronic kidney disease ([facility] )       Other    Alternating constipation and diarrhea    Relevant Orders    Ambulatory referral to Gastroenterology    Chronic pain syndrome    Relevant Orders    XR spine lumbar minimum 4 views non injury    XR spine cervical complete 4 or 5 vw non injury   Other Visit Diagnoses     Medicare annual wellness visit, subsequent        Screening for depression            DM: A1c 7 4% (up from 6 8) -- still within reasonable range for age, though increasing  It is unclear what scale pt is using to determine insulin administration  The readings she provides to me in the office are all reasonable (90-110s fasting, <200 after eating), but this is not matching up with A1c  Pt is hesitant to make any changes to insulin regimen  Levels likely affected by eating habits as detailed below  Pt was also recently on Prednisone, which may be contributing to A1c increase  Will continue to monitor  HTN: Within goal   HLD: Update labs as above   HFpEF/CAD: Stable, f/u with Cardio as scheduled   COPD: Symptoms returned to baseline   CKD: Continue good hydration, f/u with Nephro   Seizure Dx: Continue Keppra     Unclear etiology of LE symptoms -- possibly DM neuropathy vs lumbar radiculopathy   Will get XR to update L-spine imaging and pending results could consider Spine & Pain vs Ortho Spine  EMG would be helpful but given pt's significant sensitivity on exam, do not think she would be able to tolerate  Encouraged to schedule f/u with GI to discuss management of alternating constipation/diarrhea, as this is significantly affecting her day to day life  Preventive health issues were discussed with patient, and age appropriate screening tests were ordered as noted in patient's After Visit Summary  Personalized health advice and appropriate referrals for health education or preventive services given if needed, as noted in patient's After Visit Summary  History of Present Illness:     Patient presents with her daughter for chronic follow up and for a Medicare Wellness Visit    HPI     DM: Home sugars 90-120s in AM; 110s after meals  Rare hypoglycemic symptoms  States she does check her sugar after eating, and only take Humalog if it is high, but is not able to describe exactly what sliding scale she uses  HTN: Home BPs 260 systolic yesterday   HLD: Tolerating statin without issue    HFpEF/CAD: Follows with Cardio, no recent changes at visit   COPD: Had exacerbation at end of November, symptoms improved  No nighttime symptoms  CKD: Stable on last check, following with Nephro    Seizure Dx: No breakthrough symptoms     Had a fall on 12/12 -- hit head and back; still having headaches  Didn't go to ED  Continues to have pain in her LE -- keeps her awake at night, has trouble walking  Has previously tried Cymbalta but did not tolerate  Had vascular study done with Cardiology -- no evidence of PAD  Pt previously did aquatherapy, which helped, but her insurance wouldn't pay for it and it is too expensive out of pocket  Pt continues to have alternating constipation/diarrhea  She often avoids eating as she is afraid she will need to run to the bathroom  She often has to wake up at a night to go  Sometimes she has accidents without feeling it         Patient Care Team:  Alex Chandler DO as PCP - General (Family Medicine)  MD Carlie Hinton MD Glenard Balzarine, MD as Endoscopist     Review of Systems:     Review of Systems   Constitutional: Negative for diaphoresis  Respiratory: Negative for cough, shortness of breath and wheezing  Cardiovascular: Positive for leg swelling  Negative for chest pain and palpitations  Gastrointestinal: Positive for constipation and diarrhea  Negative for abdominal pain  Endocrine: Positive for polyuria  Negative for polydipsia  Musculoskeletal: Positive for back pain, gait problem, myalgias (in LE) and neck pain  Neurological: Positive for headaches  Negative for dizziness and tremors  Psychiatric/Behavioral: Positive for sleep disturbance          Problem List:     Patient Active Problem List   Diagnosis   • COPD (chronic obstructive pulmonary disease) ([place] [address] )   • Hypertension   • Alternating constipation and diarrhea   • Vitamin D deficiency   • Stage 3b chronic kidney disease ([facility] )   • Bilateral leg edema   • Chronic pain syndrome   • Coronary artery disease without angina pectoris   • Chronic heart failure with preserved ejection fraction (HCC)   • Gastroesophageal reflux disease without esophagitis   • Hyperlipidemia associated with type 2 diabetes mellitus ([facility] )   • Left ventricular hypertrophy   • Lung nodule   • Macular pucker, left   • Memory loss   • Osteoarthritis   • Seizure disorder ([facility] )   • Strabismic amblyopia of right eye   • Neurologic gait dysfunction   • Type 2 diabetes mellitus with diabetic neuropathy (HCC)   • Class 1 obesity due to excess calories with serious comorbidity and body mass index (BMI) of 34 0 to 34 9 in adult   • Chronic kidney disease-mineral and bone disorder   • Osteopenia of multiple sites      Past Medical and Surgical History:     Past Medical History:   Diagnosis Date   • Acute kidney injury superimposed on chronic kidney disease ([facility] ) 11/26/2016   • ADHD (attention deficit hyperactivity disorder) 03/17/2019   • Arthritis     BL HIPS   • 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   • Carpal tunnel syndrome    • Chest pain 03/06/2017   • Chronic kidney disease    • Claudication ([facility] ) 3/15/2019   • Closed fracture of base of middle phalanx of finger 06/22/2017   • Closed fracture of middle phalanx of left little finger 07/08/2017   • Coagulopathy ([facility] ) 05/15/2019   • Colloid cyst of brain (HCC)    • COPD (chronic obstructive pulmonary disease) (HCC)    • COPD (chronic obstructive pulmonary disease) (HCC)    • Coronary artery disease    • Cough    • Diabetes mellitus ([place] [address] )    • GERD (gastroesophageal reflux disease)    • Glaucoma    • Gout    • History of brain disorder 10/07/2020   • Hyperlipidemia    • Hypertension    • Irritable bowel syndrome    • Melena 02/26/2019   • PAD (peripheral artery disease) ([facility] ) 5/15/2019   • Paronychia of great toe of left foot 10/07/2020   • Post-traumatic seizures ([facility] ) 07/23/2015   • Renal disorder    • Seizures ([place] [address] )     last 2015   • Shortness of breath    • Single seizure ([facility] ) 05/31/2016   • SOB (shortness of breath)    • Syncope and collapse 11/11/2020   • Urinary tract infection without hematuria 11/26/2016   • Wheezing      Past Surgical History:   Procedure Laterality Date   • BRAIN SURGERY     • CATARACT EXTRACTION     • CHOLECYSTECTOMY     • COLECTOMY RADHA     • DECOMPRESSION SPINE LUMBAR POSTERIOR  08/19/2019   • HERNIA REPAIR     • HYSTERECTOMY     • INCISION TENDON SHEATH HAND     • NECK SURGERY  05/24/2018   • NEUROPLASTY / TRANSPOSITION MEDIAN NERVE AT CARPAL TUNNEL     • LA COLONOSCOPY FLX DX W/COLLJ SPEC WHEN PFRMD N/A 3/26/2019    Procedure: COLONOSCOPY;  Surgeon: Gemini Tang MD;  Location: [facility]; Service: Gastroenterology   • LA ESOPHAGOGASTRODUODENOSCOPY TRANSORAL DIAGNOSTIC N/A 3/26/2019    Procedure: ESOPHAGOGASTRODUODENOSCOPY (EGD); Surgeon: Gemini Tang MD;  Location: [facility];   Service: Gastroenterology   • REPLACEMENT TOTAL KNEE Right    • RETINAL DETACHMENT SURGERY     • TUBAL LIGATION        Family History:     Family History   Problem Relation Age of Onset   • Asthma Mother    • Heart disease Mother    • Emphysema Father    • Cancer Father    • Prostate cancer Father    • Kidney cancer Sister    • Diabetes Sister    • Kidney disease Sister    • Brain cancer Brother    • Bone cancer Brother    • Heart disease Brother       Social History:     Social History     Socioeconomic History   • Marital status:      Spouse name: None   • Number of children: None   • Years of education: None   • Highest education level: None   Occupational History   • Occupation: retired   Tobacco Use   • Smoking status: Former     Packs/day: 0 50     Years: 40 00     Pack years: 20 00     Types: Cigarettes     Start date:      Quit date:      Years since quittin 0   • Smokeless tobacco: Never   • Tobacco comments:     stopped many years ago   Vaping Use   • Vaping Use: Never used   Substance and Sexual Activity   • Alcohol use: Never   • Drug use: Never   • Sexual activity: Not Currently     Partners: Male   Other Topics Concern   • None   Social History Narrative   • None     Social Determinants of Health     Financial Resource Strain: Unknown   • Difficulty of Paying Living Expenses: Patient refused   Food Insecurity: Not on file   Transportation Needs: Unknown   • Lack of Transportation (Medical): Patient refused   • Lack of Transportation (Non-Medical): Patient refused   Physical Activity: Not on file   Stress: Not on file   Social Connections: Not on file   Intimate Partner Violence: Not on file   Housing Stability: Not on file      Medications and Allergies:     Current Outpatient Medications   Medication Sig Dispense Refill   • atorvastatin (LIPITOR) 10 mg tablet Take 1 tablet (10 mg total) by mouth daily at bedtime 90 tablet 3   • Calcium Carbonate Antacid (CALCIUM CARBONATE PO) Take by mouth • Continuous Blood Gluc  (FreeStyle [place] 2 [place]) YOLANDA Before Breakfast and 2 hours after meals 1 each 0   • Continuous Blood Gluc Sensor (FreeStyle Mary 2 Sensor) MISC Before Breakfast and 2 hours after meals 1 each 3   • Droplet Pen Needles 31G X 8 MM MISC USE 4 TIMES DAILY BEFORE MEALS AND AT BEDTIME     • fluticasone-umeclidinium-vilanterol (Trelegy Ellipta) 200-62 5-25 MCG/INH AEPB inhaler Inhale 1 puff daily Rinse mouth after use   60 blister 5   • furosemide (LASIX) 20 mg tablet Take 1 tablet (20 mg total) by mouth daily 60 tablet 6   • insulin aspart (NovoLOG FlexPen) 100 UNIT/ML injection pen INJECT 12 UNITS BEFORE MEALS (3 MEALS) 15 mL 3   • Insulin Pen Needle (Pen Needles) 30G X 8 MM MISC Use 4 (four) times a day (before meals and at bedtime) 500 each 1   • Lantus SoloStar 100 units/mL SOPN INJECT 30 UNITS UNDER THE SKIN DAILY AT BEDTIME 15 mL 2   • latanoprost (XALATAN) 0 005 % ophthalmic solution Administer 1 drop into the left eye daily at bedtime 2 5 mL 2   • levETIRAcetam (KEPPRA) 250 mg tablet take 1 tablet by mouth every 12 hours 180 tablet 3   • montelukast (SINGULAIR) 10 mg tablet take 1 tablet by mouth every evening 90 tablet 1   • Multiple Vitamin (MULTI VITAMIN DAILY PO) Take 1 tablet by mouth daily     • nystatin (MYCOSTATIN) cream Apply topically 2 (two) times a day 30 g 1   • nystatin (MYCOSTATIN) powder Apply topically 3 (three) times a day as needed (rash) 60 g 1   • omeprazole (PriLOSEC) 20 mg delayed release capsule Take 1 capsule (20 mg total) by mouth 2 (two) times a day 180 capsule 1   • Probiotic Product (PROBIOTIC DAILY PO) Take by mouth       • vitamin B-12 (VITAMIN B-12) 500 mcg tablet Take 500 mcg by mouth daily       Current Facility-Administered Medications   Medication Dose Route Frequency Provider Last Rate Last Admin   • denosumab (PROLIA) subcutaneous injection 60 mg  60 mg Subcutaneous Q6 Months Clarisa Billingsley DO   60 mg at 06/24/22 1442     Allergies Allergen Reactions   • Brimonidine Other (See Comments)   • Sulfa Antibiotics Rash      Immunizations:     Immunization History   Administered Date(s) Administered   • COVID-19 J&J (Alida) vaccine 0 5 mL 06/03/2021   • Pneumococcal Conjugate 13-Valent 11/27/2017, 10/07/2020   • Pneumococcal Polysaccharide PPV23 09/11/2019      Health Maintenance:         Topic Date Due   • Hepatitis C Screening  Never done   • Colorectal Cancer Screening  06/04/2030         Topic Date Due   • Hepatitis B Vaccine (1 of 3 - 3-dose series) Never done   • COVID-19 Vaccine (2 - Booster for Alida series) 07/29/2021   • Influenza Vaccine (1) Never done      Medicare Screening Tests and Risk Assessments:     Ruth Wynn is here for her Subsequent Wellness visit  Health Risk Assessment:   Patient rates overall health as fair  Patient feels that their physical health rating is slightly worse  Patient is very satisfied with their life  Eyesight was rated as much worse  Hearing was rated as same  Patient feels that their emotional and mental health rating is same  Patients states they are never, rarely angry  Patient states they are sometimes unusually tired/fatigued  Pain experienced in the last 7 days has been some  Patient's pain rating has been 10/10  Patient states that she has experienced weight loss or gain in last 6 months  Depression Screening:   PHQ-2 Score: 2      Fall Risk Screening: In the past year, patient has experienced: history of falling in past year    Number of falls: 2 or more  Injured during fall?: Yes    Feels unsteady when standing or walking?: Yes    Worried about falling?: Yes      Urinary Incontinence Screening:   Patient has not leaked urine accidently in the last six months  Home Safety:  Patient has trouble with stairs inside or outside of their home  Patient has working smoke alarms and has working carbon monoxide detector  Home safety hazards include: none       Nutrition:   Current diet is Regular and Diabetic  BMI Counseling: @BMI@ The BMI is above normal  Nutrition recommendations include moderation in carbohydrate intake  Medications:   Patient is not currently taking any over-the-counter supplements  Patient is able to manage medications  Activities of Daily Living (ADLs)/Instrumental Activities of Daily Living (IADLs):   Walk and transfer into and out of bed and chair?: Yes  Dress and groom yourself?: No    Bathe or shower yourself?: No    Feed yourself?  Yes  Do your laundry/housekeeping?: No  Manage your money, pay your bills and track your expenses?: No  Make your own meals?: No    Do your own shopping?: No    Durable Medical Equipment Suppliers  N/A    Previous Hospitalizations:   Any hospitalizations or ED visits within the last 12 months?: No    How many hospitalizations have you had in the last year?: 1-2    Advance Care Planning:   Living will: No    Five wishes given: Yes      Cognitive Screening:   Provider or family/friend/caregiver concerned regarding cognition?: No    PREVENTIVE SCREENINGS      Cardiovascular Screening:    General: Screening Not Indicated and History Lipid Disorder      Diabetes Screening:     General: Screening Not Indicated and History Diabetes      Colorectal Cancer Screening:     General: Screening Current      Breast Cancer Screening:     General: Screening Not Indicated      Cervical Cancer Screening:    General: Screening Not Indicated      Osteoporosis Screening:    General: Screening Not Indicated and History Osteoporosis    Due for: DXA Appendicular      Abdominal Aortic Aneurysm (AAA) Screening:        General: Screening Not Indicated      Lung Cancer Screening:     General: Screening Not Indicated      Hepatitis C Screening:    General: Screening Not Indicated    Screening, Brief Intervention, and Referral to Treatment (SBIRT)    Screening  Typical number of drinks in a day: 0  Typical number of drinks in a week: 0  Interpretation: Low risk drinking behavior  Single Item Drug Screening:  How often have you used an illegal drug (including marijuana) or a prescription medication for non-medical reasons in the past year? never    Single Item Drug Screen Score: 0  Interpretation: Negative screen for possible drug use disorder    No results found  Physical Exam:     /70 (BP Location: Left arm, Patient Position: Sitting, Cuff Size: Adult)   Pulse 65   Temp (!) 96 5 °F (35 8 °C)   Ht 4' 7" (1 397 m)   Wt 68 kg (150 lb)   SpO2 94%   BMI 34 86 kg/m²     Physical Exam  Vitals and nursing note reviewed  Constitutional:       General: She is not in acute distress  Appearance: She is well-developed  HENT:      Head: Normocephalic and atraumatic  Right Ear: Tympanic membrane, ear canal and external ear normal       Left Ear: Tympanic membrane, ear canal and external ear normal       Nose: Nose normal  No rhinorrhea  Mouth/Throat:      Mouth: Mucous membranes are moist       Pharynx: No oropharyngeal exudate or posterior oropharyngeal erythema  Eyes:      Conjunctiva/sclera: Conjunctivae normal       Comments: Chronic R eye change    Neck:      Thyroid: No thyromegaly  Cardiovascular:      Rate and Rhythm: Normal rate and regular rhythm  Pulmonary:      Effort: Pulmonary effort is normal  No respiratory distress  Breath sounds: Normal breath sounds  Abdominal:      General: Bowel sounds are normal  There is no distension  Palpations: Abdomen is soft  Tenderness: There is no abdominal tenderness  Musculoskeletal:         General: Normal range of motion  Comments: Ambulating with walker  B/L LE edema in feet  B/L LE tender to palpation    Lymphadenopathy:      Cervical: No cervical adenopathy  Skin:     General: Skin is warm and dry  Neurological:      General: No focal deficit present  Mental Status: She is alert     Psychiatric:         Mood and Affect: Mood normal           Clarisa Billingsley DO

## 2022-12-27 NOTE — PROGRESS NOTES
Diabetic Foot Exam    Patient's shoes and socks removed  Right Foot/Ankle   Right Foot Inspection  Skin Exam: dry skin, callus, abnormal color and callus  Toe Exam: swelling  no right toe deformity    Sensory   Vibration: intact  Monofilament testing: intact    Vascular  Capillary refills: < 3 seconds          Left Foot/Ankle  Left Foot Inspection  Skin Exam: dry skin and callus  Toe Exam: swelling and left toe deformity (curling down of digits 2-5)       Sensory   Vibration: intact  Monofilament testing: intact    Vascular  Capillary refills: < 3 seconds        Assign Risk Category  Deformity present  Loss of protective sensation  No weak pulses  Risk: 2

## 2022-12-30 DIAGNOSIS — G89.4 CHRONIC PAIN SYNDROME: Primary | ICD-10-CM

## 2022-12-30 DIAGNOSIS — M51.36 LUMBAR DEGENERATIVE DISC DISEASE: ICD-10-CM

## 2023-01-03 ENCOUNTER — TELEPHONE (OUTPATIENT)
Dept: NEPHROLOGY | Facility: CLINIC | Age: 78
End: 2023-01-03

## 2023-01-03 NOTE — TELEPHONE ENCOUNTER
Patient of Dr Halie Bagley son Marvin Whitmore called stating that the needed to reschedule the patient appointment for 1/31/2023 nephrology follow up with Dr Halie Bagley, because of the patient having transportation issue  The patient appointment was reschedule to 2/9/2023 and the patient son understood and was okay with the day and time of the patient next appointment

## 2023-01-26 DIAGNOSIS — K21.9 GASTROESOPHAGEAL REFLUX DISEASE WITHOUT ESOPHAGITIS: ICD-10-CM

## 2023-01-26 RX ORDER — OMEPRAZOLE 20 MG/1
CAPSULE, DELAYED RELEASE ORAL
Qty: 180 CAPSULE | Refills: 1 | Status: SHIPPED | OUTPATIENT
Start: 2023-01-26

## 2023-01-31 ENCOUNTER — TELEPHONE (OUTPATIENT)
Dept: NEPHROLOGY | Facility: CLINIC | Age: 78
End: 2023-01-31

## 2023-01-31 DIAGNOSIS — N18.32 STAGE 3B CHRONIC KIDNEY DISEASE (HCC): Primary | ICD-10-CM

## 2023-02-02 ENCOUNTER — APPOINTMENT (OUTPATIENT)
Dept: LAB | Facility: CLINIC | Age: 78
End: 2023-02-02

## 2023-02-02 DIAGNOSIS — N18.32 STAGE 3B CHRONIC KIDNEY DISEASE (HCC): ICD-10-CM

## 2023-02-02 LAB
25(OH)D3 SERPL-MCNC: 40.5 NG/ML (ref 30–100)
ANION GAP SERPL CALCULATED.3IONS-SCNC: 4 MMOL/L (ref 4–13)
BACTERIA UR QL AUTO: ABNORMAL /HPF
BASOPHILS # BLD AUTO: 0.05 THOUSANDS/ÂΜL (ref 0–0.1)
BASOPHILS NFR BLD AUTO: 1 % (ref 0–1)
BILIRUB UR QL STRIP: NEGATIVE
BUN SERPL-MCNC: 22 MG/DL (ref 5–25)
CALCIUM SERPL-MCNC: 9.5 MG/DL (ref 8.3–10.1)
CAOX CRY URNS QL MICRO: ABNORMAL /HPF
CHLORIDE SERPL-SCNC: 107 MMOL/L (ref 96–108)
CLARITY UR: CLEAR
CO2 SERPL-SCNC: 28 MMOL/L (ref 21–32)
COLOR UR: ABNORMAL
CREAT SERPL-MCNC: 1.13 MG/DL (ref 0.6–1.3)
CREAT UR-MCNC: 66.6 MG/DL
EOSINOPHIL # BLD AUTO: 0.34 THOUSAND/ÂΜL (ref 0–0.61)
EOSINOPHIL NFR BLD AUTO: 5 % (ref 0–6)
ERYTHROCYTE [DISTWIDTH] IN BLOOD BY AUTOMATED COUNT: 14.2 % (ref 11.6–15.1)
GFR SERPL CREATININE-BSD FRML MDRD: 46 ML/MIN/1.73SQ M
GLUCOSE P FAST SERPL-MCNC: 178 MG/DL (ref 65–99)
GLUCOSE UR STRIP-MCNC: ABNORMAL MG/DL
HCT VFR BLD AUTO: 44 % (ref 34.8–46.1)
HGB BLD-MCNC: 13.5 G/DL (ref 11.5–15.4)
HGB UR QL STRIP.AUTO: NEGATIVE
IMM GRANULOCYTES # BLD AUTO: 0.02 THOUSAND/UL (ref 0–0.2)
IMM GRANULOCYTES NFR BLD AUTO: 0 % (ref 0–2)
KETONES UR STRIP-MCNC: NEGATIVE MG/DL
LEUKOCYTE ESTERASE UR QL STRIP: ABNORMAL
LYMPHOCYTES # BLD AUTO: 1.66 THOUSANDS/ÂΜL (ref 0.6–4.47)
LYMPHOCYTES NFR BLD AUTO: 23 % (ref 14–44)
MCH RBC QN AUTO: 27.8 PG (ref 26.8–34.3)
MCHC RBC AUTO-ENTMCNC: 30.7 G/DL (ref 31.4–37.4)
MCV RBC AUTO: 91 FL (ref 82–98)
MONOCYTES # BLD AUTO: 0.39 THOUSAND/ÂΜL (ref 0.17–1.22)
MONOCYTES NFR BLD AUTO: 6 % (ref 4–12)
NEUTROPHILS # BLD AUTO: 4.68 THOUSANDS/ÂΜL (ref 1.85–7.62)
NEUTS SEG NFR BLD AUTO: 65 % (ref 43–75)
NITRITE UR QL STRIP: NEGATIVE
NON-SQ EPI CELLS URNS QL MICRO: ABNORMAL /HPF
NRBC BLD AUTO-RTO: 0 /100 WBCS
PH UR STRIP.AUTO: 5.5 [PH]
PHOSPHATE SERPL-MCNC: 2.7 MG/DL (ref 2.3–4.1)
PLATELET # BLD AUTO: 239 THOUSANDS/UL (ref 149–390)
PMV BLD AUTO: 10.8 FL (ref 8.9–12.7)
POTASSIUM SERPL-SCNC: 4 MMOL/L (ref 3.5–5.3)
PROT UR STRIP-MCNC: NEGATIVE MG/DL
PROT UR-MCNC: 11 MG/DL
PROT/CREAT UR: 0.17 MG/G{CREAT} (ref 0–0.1)
PTH-INTACT SERPL-MCNC: 47.4 PG/ML (ref 18.4–80.1)
RBC # BLD AUTO: 4.85 MILLION/UL (ref 3.81–5.12)
RBC #/AREA URNS AUTO: ABNORMAL /HPF
SODIUM SERPL-SCNC: 139 MMOL/L (ref 135–147)
SP GR UR STRIP.AUTO: 1.01 (ref 1–1.03)
UROBILINOGEN UR STRIP-ACNC: <2 MG/DL
WBC # BLD AUTO: 7.14 THOUSAND/UL (ref 4.31–10.16)
WBC #/AREA URNS AUTO: ABNORMAL /HPF

## 2023-02-03 ENCOUNTER — OFFICE VISIT (OUTPATIENT)
Dept: GASTROENTEROLOGY | Facility: CLINIC | Age: 78
End: 2023-02-03

## 2023-02-03 VITALS
SYSTOLIC BLOOD PRESSURE: 130 MMHG | BODY MASS INDEX: 36.57 KG/M2 | DIASTOLIC BLOOD PRESSURE: 72 MMHG | WEIGHT: 158 LBS | HEIGHT: 55 IN | RESPIRATION RATE: 18 BRPM

## 2023-02-03 DIAGNOSIS — R15.9 FULL INCONTINENCE OF FECES: ICD-10-CM

## 2023-02-03 DIAGNOSIS — K21.9 GASTROESOPHAGEAL REFLUX DISEASE WITHOUT ESOPHAGITIS: Primary | ICD-10-CM

## 2023-02-03 DIAGNOSIS — R11.0 NAUSEA: ICD-10-CM

## 2023-02-03 DIAGNOSIS — R10.30 LOWER ABDOMINAL PAIN: ICD-10-CM

## 2023-02-03 DIAGNOSIS — K92.1 MELENA: ICD-10-CM

## 2023-02-03 DIAGNOSIS — R10.10 PAIN OF UPPER ABDOMEN: ICD-10-CM

## 2023-02-03 DIAGNOSIS — R19.8 ALTERNATING CONSTIPATION AND DIARRHEA: ICD-10-CM

## 2023-02-03 DIAGNOSIS — R63.4 WEIGHT LOSS: ICD-10-CM

## 2023-02-03 DIAGNOSIS — R13.19 ESOPHAGEAL DYSPHAGIA: ICD-10-CM

## 2023-02-03 NOTE — PATIENT INSTRUCTIONS
Scheduled date of EGD/colonoscopy (as of today): 3/15/23  Physician performing EGD/colonoscopy: Radha Gaona  Location of EGD/colonoscopy: [facility]  Desired bowel prep reviewed with patient: Donta/Calos  Instructions reviewed with patient by: Millie RODRIGUEZ  Clearances:

## 2023-02-03 NOTE — PROGRESS NOTES
[facility]      Chief Complaint: Diarrhea    HPI:  Dayo Walters is a 76 y o   female who presents with a history of GI issues going back to when she had a bowel nicked during hysterectomy many years ago  According to the patient she has alternating constipation and diarrhea  She has other GI symptomatology with upper abdominal pain nausea dysphagia for solids  She has black stool and rare right bright red blood per rectum  She has a great deal of difficulty holding her bowels when she has to go  She feels if she presses on her abdomen it helps to have a bowel movement but she is also occasionally incontinent  She has pound weight loss according to her  She has tried multiple medications in the past but none of them have worked  Apparently she saw a surgeon for some reason recently who decided that likely adhesions  She has had multiple abdominal surgeries  Colonoscopy 3 years ago showed small polyps  She has no other symptomatology  No matter what she seems to go through her when she is having a diarrheal episode  She does have nocturnal symptomatology  There is no relation to any specific food type  No other definitive exacerbating or remitting factor  Review of Systems:   Constitutional: No fever or chills, feels well, no tiredness, no recent weight gain or weight loss  HENT: No complaints of earache, no hearing loss, no nosebleeds, no nasal discharge, no sore throat, no hoarseness  Eyes: Positive visual issues  Cardiovascular: No complaints of slow heart rate, no fast heart rate, no chest pain, no palpitations, no leg claudication, no lower extremity edema  Respiratory: No complaints of shortness of breath, no wheezing, no cough, no SOB on exertion, no orthopnea  Gastrointestinal: As noted in HPI  Genitourinary: No complaints of dysuria, no incontinence, no hesitancy, no nocturia     Musculoskeletal: Back and leg pain  Neurological: No complaints of headache, no confusion, no convulsions, no numbness or tingling, no dizziness or fainting, no limb weakness, no difficulty walking  Skin: No complaints of skin rash or skin lesions, no itching, no skin wound, no dry skin  Hematological/Lymphatic: No complaints of swollen glands, does not bleed easy  Allergic/Immunologic: No immunocompromised state  Endocrine:  No complaints of polyuria, no polydipsia  Psychiatric/Behavioral: is not suicidal, no sleep disturbances, no anxiety or depression, no change in personality, no emotional problems         Historical Information   Past Medical History:   Diagnosis Date   • Acute kidney injury superimposed on chronic kidney disease ([place] [address] ) 11/26/2016   • ADHD (attention deficit hyperactivity disorder) 03/17/2019   • Arthritis     BL HIPS   • 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   • Carpal tunnel syndrome    • Chest pain 03/06/2017   • Chronic kidney disease    • Claudication ([place] [address] ) 3/15/2019   • Closed fracture of base of middle phalanx of finger 06/22/2017   • Closed fracture of middle phalanx of left little finger 07/08/2017   • Coagulopathy ([place] [address] ) 05/15/2019   • Colloid cyst of brain (HCC)    • COPD (chronic obstructive pulmonary disease) (HCC)    • COPD (chronic obstructive pulmonary disease) (HCC)    • Coronary artery disease    • Cough    • Diabetes mellitus ([place] [address] )    • GERD (gastroesophageal reflux disease)    • Glaucoma    • Gout    • History of brain disorder 10/07/2020   • Hyperlipidemia    • Hypertension    • Irritable bowel syndrome    • Melena 02/26/2019   • PAD (peripheral artery disease) ([place] [address] ) 5/15/2019   • Paronychia of great toe of left foot 10/07/2020   • Post-traumatic seizures ([place] [address] ) 07/23/2015   • Renal disorder    • Seizures ([place] [address] )     last 2015   • Shortness of breath    • Single seizure ([place] [address] ) 05/31/2016   • SOB (shortness of breath)    • Syncope and collapse 11/11/2020   • Urinary tract infection without hematuria 11/26/2016   • Wheezing      Past Surgical History:   Procedure Laterality Date   • BRAIN SURGERY     • CATARACT EXTRACTION     • CHOLECYSTECTOMY     • COLECTOMY RADHA     • DECOMPRESSION SPINE LUMBAR POSTERIOR  2019   • HERNIA REPAIR     • HYSTERECTOMY     • INCISION TENDON SHEATH HAND     • NECK SURGERY  2018   • NEUROPLASTY / TRANSPOSITION MEDIAN NERVE AT CARPAL TUNNEL     • [place] COLONOSCOPY FLX DX W/COLLJ SPEC WHEN PFRMD N/A 3/26/2019    Procedure: COLONOSCOPY;  Surgeon: Abel Arzate MD;  Location: [facility]; Service: Gastroenterology   • [place] ESOPHAGOGASTRODUODENOSCOPY TRANSORAL DIAGNOSTIC N/A 3/26/2019    Procedure: ESOPHAGOGASTRODUODENOSCOPY (EGD); Surgeon: Abel Arzate MD;  Location: [facility];   Service: Gastroenterology   • REPLACEMENT TOTAL KNEE Right    • RETINAL DETACHMENT SURGERY     • TUBAL LIGATION       Social History   Social History     Substance and Sexual Activity   Alcohol Use Never     Social History     Substance and Sexual Activity   Drug Use Never     Social History     Tobacco Use   Smoking Status Former   • Packs/day: 0 50   • Years: 40 00   • Pack years: 20 00   • Types: Cigarettes   • Start date:    • Quit date:    • Years since quittin 1   Smokeless Tobacco Never   Tobacco Comments    stopped many years ago     Family History   Problem Relation Age of Onset   • Asthma Mother    • Heart disease Mother    • Emphysema Father    • Cancer Father    • Prostate cancer Father    • Kidney cancer Sister    • Diabetes Sister    • Kidney disease Sister    • Brain cancer Brother    • Bone cancer Brother    • Heart disease Brother          Current Medications: has a current medication list which includes the following prescription(s): atorvastatin, calcium carbonate antacid, freestyle jakob 2 reader, freestyle jakob 2 sensor, droplet pen needles, trelegy ellipta, furosemide, insulin aspart, pen needles, lantus solostar, latanoprost, levetiracetam, montelukast, multiple vitamin, nystatin, nystatin, omeprazole, probiotic product, and vitamin b-12, and the following Facility-Administered Medications: denosumab  Vital Signs: /72   Resp 18   Ht 4' 7" (1 397 m)   Wt 71 7 kg (158 lb)   BMI 36 72 kg/m²       Physical Exam:   Constitutional  General Appearance: No acute distress, well appearing and well nourished  Head  Normocephalic  Eyes  Conjunctivae and lids: No swelling, erythema, or discharge  Pupils and irises: Equal, round and reactive to light  Ears, Nose, Mouth, and Throat  External inspection of ears and nose: Normal  Nasal mucosa, septum and turbinates: Normal without edema or erythema/   Oropharynx: Normal with no erythema, edema, exudate or lesions  Neck  Normal range of motion  Neck supple  Cardiovascular  Auscultation of the heart: Normal rate and rhythm, normal S1 and S2 without murmurs  Examination of the extremities for edema and/or varicosities: Normal  Pulmonary/Chest  Respiratory effort: No increased work of breathing or signs of respiratory distress  Auscultation of lungs: Clear to auscultation, equal breath sounds bilaterally, no wheezes, rales, no rhonchi  Abdomen  Abdomen: Diffuse tenderness with no point tenderness or rebound  Liver and spleen: No hepatomegaly or splenomegaly  Musculoskeletal  Gait and station: Uses a walker  Digits and Nails: normal without clubbing or cyanosis  Inspection/palpation of joints, bones, and muscles: Normal  Neurological  No nystagmus or asterixis  Skin  Skin and subcutaneous tissue: Normal without rashes or lesions  Lymphatic  Palpation of the lymph nodes in neck: No lymphadenopathy     Psychiatric  Orientation to person, place and time: Normal   Mood and affect: Normal          Labs:  Lab Results   Component Value Date    ALT 23 07/12/2022    AST 12 07/12/2022    BUN 22 02/02/2023    CALCIUM 9 5 02/02/2023     02/02/2023    CO2 28 02/02/2023    CREATININE 1 13 02/02/2023    HDL 46 (L) 07/12/2022    HCT 44 0 02/02/2023    HGB 13 5 02/02/2023    HGBA1C 7 4 (A) 12/27/2022    PHOS 2 7 02/02/2023     02/02/2023    K 4 0 02/02/2023    TRIG 159 (H) 07/12/2022    WBC 7 14 02/02/2023         X-Rays & Procedures:   No orders to display           ______________________________________________________________________      Assessment & Plan:     Diagnoses and all orders for this visit:    Gastroesophageal reflux disease without esophagitis  -     EGD; Future    Alternating constipation and diarrhea  -     Ambulatory referral to Gastroenterology  -     Colonoscopy; Future    Pain of upper abdomen  -     EGD; Future    Lower abdominal pain  -     Colonoscopy; Future    Nausea  -     EGD; Future    Melena  -     EGD; Future    Esophageal dysphagia  -     EGD; Future    Full incontinence of feces  -     Colonoscopy; Future    Weight loss  -     Colonoscopy; Future  -     EGD; Future      Patient will undergo repeat EGD and colonoscopy with biopsies  I feel this is in all likelihood a significant functional issue  She cannot take dicyclomine because of her glaucoma  She has tried Metamucil which does not work  She cannot take Imodium  In fact nothing she has tried works  We may wind up getting some anorectal manometry studies for sphincter pressures    Further recommendations will depend on study results

## 2023-02-08 ENCOUNTER — TELEPHONE (OUTPATIENT)
Dept: NEPHROLOGY | Facility: CLINIC | Age: 78
End: 2023-02-08

## 2023-02-09 ENCOUNTER — OFFICE VISIT (OUTPATIENT)
Dept: NEPHROLOGY | Facility: CLINIC | Age: 78
End: 2023-02-09

## 2023-02-09 VITALS
HEART RATE: 75 BPM | SYSTOLIC BLOOD PRESSURE: 130 MMHG | RESPIRATION RATE: 16 BRPM | DIASTOLIC BLOOD PRESSURE: 70 MMHG | OXYGEN SATURATION: 95 % | WEIGHT: 151 LBS | BODY MASS INDEX: 34.94 KG/M2 | TEMPERATURE: 97.9 F | HEIGHT: 55 IN

## 2023-02-09 DIAGNOSIS — E11.40 TYPE 2 DIABETES MELLITUS WITH DIABETIC NEUROPATHY, WITH LONG-TERM CURRENT USE OF INSULIN (HCC): ICD-10-CM

## 2023-02-09 DIAGNOSIS — I10 PRIMARY HYPERTENSION: ICD-10-CM

## 2023-02-09 DIAGNOSIS — N18.32 STAGE 3B CHRONIC KIDNEY DISEASE (HCC): Primary | ICD-10-CM

## 2023-02-09 DIAGNOSIS — G40.909 SEIZURE DISORDER (HCC): ICD-10-CM

## 2023-02-09 DIAGNOSIS — N18.9 CHRONIC KIDNEY DISEASE-MINERAL AND BONE DISORDER: ICD-10-CM

## 2023-02-09 DIAGNOSIS — I50.32 CHRONIC HEART FAILURE WITH PRESERVED EJECTION FRACTION (HCC): ICD-10-CM

## 2023-02-09 DIAGNOSIS — Z79.4 TYPE 2 DIABETES MELLITUS WITH DIABETIC NEUROPATHY, WITH LONG-TERM CURRENT USE OF INSULIN (HCC): ICD-10-CM

## 2023-02-09 DIAGNOSIS — M89.9 CHRONIC KIDNEY DISEASE-MINERAL AND BONE DISORDER: ICD-10-CM

## 2023-02-09 DIAGNOSIS — E83.9 CHRONIC KIDNEY DISEASE-MINERAL AND BONE DISORDER: ICD-10-CM

## 2023-02-09 NOTE — ASSESSMENT & PLAN NOTE
Wt Readings from Last 3 Encounters:   02/09/23 68 5 kg (151 lb)   02/03/23 71 7 kg (158 lb)   12/27/22 68 kg (150 lb)       Seems to be stable with help of diuretic

## 2023-02-09 NOTE — PROGRESS NOTES
NEPHROLOGY OFFICE FOLLOW UP  Surya Salamanca 76 y o  female MRN: [id number]    Encounter: [id number] 2/9/2023    REASON FOR VISIT: Surya Salamanca is a 76 y o  female who is here on 2/9/2023 for Chronic Kidney Disease and Follow-up    HPI:    Issa Angelo came in today for follow-up of CKD  49-year-old woman who is doing well overall denies any acute complaint    No chest pain no palpitation or shortness of breath    No nausea no vomiting no abdominal discomfort          REVIEW OF SYSTEMS:    Review of Systems   Constitutional: Negative for activity change and fatigue  HENT: Negative for congestion and ear discharge  Eyes: Negative for photophobia and pain  Respiratory: Negative for apnea and choking  Cardiovascular: Negative for chest pain and palpitations  Gastrointestinal: Negative for abdominal distention and blood in stool  Endocrine: Negative for heat intolerance and polyphagia  Genitourinary: Negative for flank pain and urgency  Musculoskeletal: Negative for neck pain and neck stiffness  Skin: Negative for color change and wound  Allergic/Immunologic: Negative for food allergies and immunocompromised state  Neurological: Negative for seizures and facial asymmetry  Hematological: Negative for adenopathy  Does not bruise/bleed easily  Psychiatric/Behavioral: Negative for self-injury and suicidal ideas           PAST MEDICAL HISTORY:  Past Medical History:   Diagnosis Date   • Acute kidney injury superimposed on chronic kidney disease ([facility] ) 11/26/2016   • ADHD (attention deficit hyperactivity disorder) 03/17/2019   • Arthritis     BL HIPS   • 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   • Carpal tunnel syndrome    • Chest pain 03/06/2017   • Chronic kidney disease    • Claudication ([facility] ) 3/15/2019   • Closed fracture of base of middle phalanx of finger 06/22/2017   • Closed fracture of middle phalanx of left little finger 07/08/2017   • Coagulopathy ([facility] ) 05/15/2019   • Colloid cyst of brain ([facility] )    • COPD (chronic obstructive pulmonary disease) (HCC)    • COPD (chronic obstructive pulmonary disease) (HCC)    • Coronary artery disease    • Cough    • Diabetes mellitus (HCC)    • GERD (gastroesophageal reflux disease)    • Glaucoma    • Gout    • History of brain disorder 10/07/2020   • Hyperlipidemia    • Hypertension    • Irritable bowel syndrome    • Melena 02/26/2019   • PAD (peripheral artery disease) ([facility] ) 5/15/2019   • Paronychia of great toe of left foot 10/07/2020   • Post-traumatic seizures ([facility] ) 07/23/2015   • Renal disorder    • Seizures ([facility] )     last 2015   • Shortness of breath    • Single seizure ([facility] ) 05/31/2016   • SOB (shortness of breath)    • Syncope and collapse 11/11/2020   • Urinary tract infection without hematuria 11/26/2016   • Wheezing        PAST SURGICAL HISTORY:  Past Surgical History:   Procedure Laterality Date   • BRAIN SURGERY     • CATARACT EXTRACTION     • CHOLECYSTECTOMY     • COLECTOMY RADHA     • DECOMPRESSION SPINE LUMBAR POSTERIOR  08/19/2019   • HERNIA REPAIR     • HYSTERECTOMY     • INCISION TENDON SHEATH HAND     • NECK SURGERY  05/24/2018   • NEUROPLASTY / TRANSPOSITION MEDIAN NERVE AT CARPAL TUNNEL     • [place] COLONOSCOPY FLX DX W/COLLJ SPEC WHEN PFRMD N/A 3/26/2019    Procedure: COLONOSCOPY;  Surgeon: Arya Arreola MD;  Location: [facility]; Service: Gastroenterology   • [place] ESOPHAGOGASTRODUODENOSCOPY TRANSORAL DIAGNOSTIC N/A 3/26/2019    Procedure: ESOPHAGOGASTRODUODENOSCOPY (EGD); Surgeon: Arya Arreola MD;  Location: [facility];   Service: Gastroenterology   • REPLACEMENT TOTAL KNEE Right    • RETINAL DETACHMENT SURGERY     • TUBAL LIGATION         SOCIAL HISTORY:  Social History     Substance and Sexual Activity   Alcohol Use Never     Social History     Substance and Sexual Activity   Drug Use Never     Social History     Tobacco Use   Smoking Status Former   • Packs/day: 0 50   • Years: 40 00   • Pack years: 20 00   • Types: Cigarettes   • Start date: 1961 • Quit date:    • Years since quittin 1   Smokeless Tobacco Never   Tobacco Comments    stopped many years ago       FAMILY HISTORY:  Family History   Problem Relation Age of Onset   • Asthma Mother    • Heart disease Mother    • Emphysema Father    • Cancer Father    • Prostate cancer Father    • Kidney cancer Sister    • Diabetes Sister    • Kidney disease Sister    • Brain cancer Brother    • Bone cancer Brother    • Heart disease Brother        MEDICATIONS:    Current Outpatient Medications:   •  atorvastatin (LIPITOR) 10 mg tablet, Take 1 tablet (10 mg total) by mouth daily at bedtime, Disp: 90 tablet, Rfl: 3  •  Calcium Carbonate Antacid (CALCIUM CARBONATE PO), Take by mouth, Disp: , Rfl:   •  Continuous Blood Gluc  (FreeStyle Mary 2 [place]) YOLANDA, Before Breakfast and 2 hours after meals, Disp: 1 each, Rfl: 0  •  Continuous Blood Gluc Sensor (FreeStyle Mary 2 Sensor) MISC, Before Breakfast and 2 hours after meals, Disp: 1 each, Rfl: 3  •  Droplet Pen Needles 31G X 8 MM MISC, USE 4 TIMES DAILY BEFORE MEALS AND AT BEDTIME, Disp: , Rfl:   •  fluticasone-umeclidinium-vilanterol (Trelegy Ellipta) 200-62 5-25 MCG/INH AEPB inhaler, Inhale 1 puff daily Rinse mouth after use , Disp: 60 blister, Rfl: 5  •  furosemide (LASIX) 20 mg tablet, Take 1 tablet (20 mg total) by mouth daily, Disp: 60 tablet, Rfl: 6  •  insulin aspart (NovoLOG FlexPen) 100 UNIT/ML injection pen, INJECT 12 UNITS BEFORE MEALS (3 MEALS), Disp: 15 mL, Rfl: 3  •  Insulin Pen Needle (Pen Needles) 30G X 8 MM MISC, Use 4 (four) times a day (before meals and at bedtime), Disp: 500 each, Rfl: 1  •  Lantus SoloStar 100 units/mL SOPN, INJECT 30 UNITS UNDER THE SKIN DAILY AT BEDTIME, Disp: 15 mL, Rfl: 2  •  latanoprost (XALATAN) 0 005 % ophthalmic solution, Administer 1 drop into the left eye daily at bedtime, Disp: 2 5 mL, Rfl: 2  •  levETIRAcetam (KEPPRA) 250 mg tablet, take 1 tablet by mouth every 12 hours, Disp: 180 tablet, Rfl: 3  • montelukast (SINGULAIR) 10 mg tablet, take 1 tablet by mouth every evening, Disp: 90 tablet, Rfl: 1  •  Multiple Vitamin (MULTI VITAMIN DAILY PO), Take 1 tablet by mouth daily, Disp: , Rfl:   •  nystatin (MYCOSTATIN) cream, Apply topically 2 (two) times a day, Disp: 30 g, Rfl: 1  •  nystatin (MYCOSTATIN) powder, Apply topically 3 (three) times a day as needed (rash), Disp: 60 g, Rfl: 1  •  omeprazole (PriLOSEC) 20 mg delayed release capsule, take 1 capsule by mouth twice a day, Disp: 180 capsule, Rfl: 1  •  Probiotic Product (PROBIOTIC DAILY PO), Take by mouth  , Disp: , Rfl:   •  vitamin B-12 (VITAMIN B-12) 500 mcg tablet, Take 500 mcg by mouth daily, Disp: , Rfl:     Current Facility-Administered Medications:   •  denosumab (PROLIA) subcutaneous injection 60 mg, 60 mg, Subcutaneous, Q6 Months, Clarisa Billingsley DO, 60 mg at 06/24/22 1442    PHYSICAL EXAM:  Vitals:    02/09/23 1104   BP: 130/70   BP Location: Right arm   Patient Position: Sitting   Pulse: 75   Resp: 16   Temp: 97 9 °F (36 6 °C)   TempSrc: Temporal   SpO2: 95%   Weight: 68 5 kg (151 lb)   Height: 4' 7" (1 397 m)     Body mass index is 35 1 kg/m²  Physical Exam  Constitutional:       General: She is not in acute distress  Appearance: She is well-developed  HENT:      Head: Normocephalic  Eyes:      General: No scleral icterus  Conjunctiva/sclera: Conjunctivae normal    Neck:      Vascular: No JVD  Cardiovascular:      Rate and Rhythm: Normal rate  Heart sounds: Normal heart sounds  Pulmonary:      Effort: Pulmonary effort is normal       Breath sounds: No wheezing  Abdominal:      Palpations: Abdomen is soft  Tenderness: There is no abdominal tenderness  Musculoskeletal:         General: Normal range of motion  Cervical back: Neck supple  Skin:     General: Skin is warm  Findings: No rash  Neurological:      Mental Status: She is alert and oriented to person, place, and time     Psychiatric: Behavior: Behavior normal          LAB RESULTS:  Results for orders placed or performed in visit on 66/22/40   Basic metabolic panel   Result Value Ref Range    Sodium 139 135 - 147 mmol/L    Potassium 4 0 3 5 - 5 3 mmol/L    Chloride 107 96 - 108 mmol/L    CO2 28 21 - 32 mmol/L    ANION GAP 4 4 - 13 mmol/L    BUN 22 5 - 25 mg/dL    Creatinine 1 13 0 60 - 1 30 mg/dL    Glucose, Fasting 178 (H) 65 - 99 mg/dL    Calcium 9 5 8 3 - 10 1 mg/dL    eGFR 46 ml/min/1 73sq m   CBC and differential   Result Value Ref Range    WBC 7 14 4 31 - 10 16 Thousand/uL    RBC 4 85 3 81 - 5 12 Million/uL    Hemoglobin 13 5 11 5 - 15 4 g/dL    Hematocrit 44 0 34 8 - 46 1 %    MCV 91 82 - 98 fL    MCH 27 8 26 8 - 34 3 pg    MCHC 30 7 (L) 31 4 - 37 4 g/dL    RDW 14 2 11 6 - 15 1 %    MPV 10 8 8 9 - 12 7 fL    Platelets 271 686 - 275 Thousands/uL    nRBC 0 /100 WBCs    Neutrophils Relative 65 43 - 75 %    Immat GRANS % 0 0 - 2 %    Lymphocytes Relative 23 14 - 44 %    Monocytes Relative 6 4 - 12 %    Eosinophils Relative 5 0 - 6 %    Basophils Relative 1 0 - 1 %    Neutrophils Absolute 4 68 1 85 - 7 62 Thousands/µL    Immature Grans Absolute 0 02 0 00 - 0 20 Thousand/uL    Lymphocytes Absolute 1 66 0 60 - 4 47 Thousands/µL    Monocytes Absolute 0 39 0 17 - 1 22 Thousand/µL    Eosinophils Absolute 0 34 0 00 - 0 61 Thousand/µL    Basophils Absolute 0 05 0 00 - 0 10 Thousands/µL   Phosphorus   Result Value Ref Range    Phosphorus 2 7 2 3 - 4 1 mg/dL   PTH, intact   Result Value Ref Range    PTH 47 4 18 4 - 80 1 pg/mL   Vitamin D 25 hydroxy   Result Value Ref Range    Vit D, 25-Hydroxy 40 5 30 0 - 100 0 ng/mL       ASSESSMENT and PLAN:      Stage 3b chronic kidney disease (HCC)  Lab Results   Component Value Date    EGFR 46 02/02/2023    EGFR 43 10/17/2022    EGFR 43 07/12/2022    CREATININE 1 13 02/02/2023    CREATININE 1 20 10/17/2022    CREATININE 1 19 07/12/2022   Patient renal function stable with some fluctuation based on volume status  Advise hydration and avoiding nephrotoxic medication    Chronic kidney disease-mineral and bone disorder  Lab Results   Component Value Date    EGFR 46 02/02/2023    EGFR 43 10/17/2022    EGFR 43 07/12/2022    CREATININE 1 13 02/02/2023    CREATININE 1 20 10/17/2022    CREATININE 1 19 07/12/2022   Patient PTH and phosphorus along with vitamin D are within acceptable range and will continue to monitor    Hypertension  Very well controlled with present medication    Type 2 diabetes mellitus with diabetic neuropathy ([facility] )    Lab Results   Component Value Date    HGBA1C 7 4 (A) 12/27/2022   Reasonably well controlled  Importance of diabetic control effect on kidney disease discussed with the patient    Chronic heart failure with preserved ejection fraction (HCC)  Wt Readings from Last 3 Encounters:   02/09/23 68 5 kg (151 lb)   02/03/23 71 7 kg (158 lb)   12/27/22 68 kg (150 lb)       Seems to be stable with help of diuretic        Everything discussed with patient and her son  I will see her back in 6 months  We will get blood and urine test before that visit        Portions of the record may have been created with voice recognition software  Occasional wrong word or "sound a like" substitutions may have occurred due to the inherent limitations of voice recognition software  Read the chart carefully and recognize, using context, where substitutions have occurred  If you have any questions, please contact the dictating provider

## 2023-02-09 NOTE — ASSESSMENT & PLAN NOTE
Lab Results   Component Value Date    HGBA1C 7 4 (A) 12/27/2022   Reasonably well controlled    Importance of diabetic control effect on kidney disease discussed with the patient

## 2023-02-09 NOTE — ASSESSMENT & PLAN NOTE
Lab Results   Component Value Date    EGFR 46 02/02/2023    EGFR 43 10/17/2022    EGFR 43 07/12/2022    CREATININE 1 13 02/02/2023    CREATININE 1 20 10/17/2022    CREATININE 1 19 07/12/2022   Patient PTH and phosphorus along with vitamin D are within acceptable range and will continue to monitor

## 2023-02-09 NOTE — ASSESSMENT & PLAN NOTE
Lab Results   Component Value Date    EGFR 46 02/02/2023    EGFR 43 10/17/2022    EGFR 43 07/12/2022    CREATININE 1 13 02/02/2023    CREATININE 1 20 10/17/2022    CREATININE 1 19 07/12/2022   Patient renal function stable with some fluctuation based on volume status    Advise hydration and avoiding nephrotoxic medication

## 2023-03-05 DIAGNOSIS — J41.1 MUCOPURULENT CHRONIC BRONCHITIS (HCC): ICD-10-CM

## 2023-03-06 RX ORDER — MONTELUKAST SODIUM 10 MG/1
TABLET ORAL
Qty: 90 TABLET | Refills: 1 | Status: SHIPPED | OUTPATIENT
Start: 2023-03-06 | End: 2023-06-04

## 2023-03-15 ENCOUNTER — ANESTHESIA EVENT (OUTPATIENT)
Dept: GASTROENTEROLOGY | Facility: HOSPITAL | Age: 78
End: 2023-03-15

## 2023-03-15 ENCOUNTER — HOSPITAL ENCOUNTER (OUTPATIENT)
Dept: GASTROENTEROLOGY | Facility: HOSPITAL | Age: 78
Setting detail: OUTPATIENT SURGERY
Discharge: HOME | End: 2023-03-15
Attending: INTERNAL MEDICINE

## 2023-03-15 ENCOUNTER — ANESTHESIA (OUTPATIENT)
Dept: GASTROENTEROLOGY | Facility: HOSPITAL | Age: 78
End: 2023-03-15

## 2023-03-15 VITALS
HEART RATE: 77 BPM | BODY MASS INDEX: 34.54 KG/M2 | RESPIRATION RATE: 16 BRPM | OXYGEN SATURATION: 97 % | DIASTOLIC BLOOD PRESSURE: 59 MMHG | SYSTOLIC BLOOD PRESSURE: 105 MMHG | WEIGHT: 149.25 LBS | HEIGHT: 55 IN | TEMPERATURE: 98.1 F

## 2023-03-15 DIAGNOSIS — R10.10 PAIN OF UPPER ABDOMEN: ICD-10-CM

## 2023-03-15 DIAGNOSIS — R63.4 WEIGHT LOSS: ICD-10-CM

## 2023-03-15 DIAGNOSIS — K92.1 MELENA: ICD-10-CM

## 2023-03-15 DIAGNOSIS — R10.30 LOWER ABDOMINAL PAIN: ICD-10-CM

## 2023-03-15 DIAGNOSIS — K21.9 GASTROESOPHAGEAL REFLUX DISEASE WITHOUT ESOPHAGITIS: ICD-10-CM

## 2023-03-15 DIAGNOSIS — R11.0 NAUSEA: ICD-10-CM

## 2023-03-15 DIAGNOSIS — R15.9 FULL INCONTINENCE OF FECES: ICD-10-CM

## 2023-03-15 DIAGNOSIS — R19.8 ALTERNATING CONSTIPATION AND DIARRHEA: ICD-10-CM

## 2023-03-15 DIAGNOSIS — R13.19 ESOPHAGEAL DYSPHAGIA: ICD-10-CM

## 2023-03-15 LAB — GLUCOSE SERPL-MCNC: 84 MG/DL (ref 65–140)

## 2023-03-15 RX ORDER — PROPOFOL 10 MG/ML
INJECTION, EMULSION INTRAVENOUS AS NEEDED
Status: DISCONTINUED | OUTPATIENT
Start: 2023-03-15 | End: 2023-03-15

## 2023-03-15 RX ORDER — LIDOCAINE HYDROCHLORIDE 10 MG/ML
INJECTION, SOLUTION EPIDURAL; INFILTRATION; INTRACAUDAL; PERINEURAL AS NEEDED
Status: DISCONTINUED | OUTPATIENT
Start: 2023-03-15 | End: 2023-03-15

## 2023-03-15 RX ORDER — SODIUM CHLORIDE, SODIUM LACTATE, POTASSIUM CHLORIDE, CALCIUM CHLORIDE 600; 310; 30; 20 MG/100ML; MG/100ML; MG/100ML; MG/100ML
INJECTION, SOLUTION INTRAVENOUS CONTINUOUS PRN
Status: DISCONTINUED | OUTPATIENT
Start: 2023-03-15 | End: 2023-03-15

## 2023-03-15 RX ADMIN — PROPOFOL 20 MG: 10 INJECTION, EMULSION INTRAVENOUS at 07:31

## 2023-03-15 RX ADMIN — PROPOFOL 20 MG: 10 INJECTION, EMULSION INTRAVENOUS at 07:20

## 2023-03-15 RX ADMIN — PROPOFOL 80 MG: 10 INJECTION, EMULSION INTRAVENOUS at 07:18

## 2023-03-15 RX ADMIN — LIDOCAINE HYDROCHLORIDE 20 MG: 10 INJECTION, SOLUTION EPIDURAL; INFILTRATION; INTRACAUDAL; PERINEURAL at 07:18

## 2023-03-15 RX ADMIN — PROPOFOL 20 MG: 10 INJECTION, EMULSION INTRAVENOUS at 07:28

## 2023-03-15 RX ADMIN — PROPOFOL 20 MG: 10 INJECTION, EMULSION INTRAVENOUS at 07:25

## 2023-03-15 RX ADMIN — SODIUM CHLORIDE, SODIUM LACTATE, POTASSIUM CHLORIDE, AND CALCIUM CHLORIDE: .6; .31; .03; .02 INJECTION, SOLUTION INTRAVENOUS at 07:05

## 2023-03-15 RX ADMIN — LIDOCAINE HYDROCHLORIDE 30 MG: 10 INJECTION, SOLUTION EPIDURAL; INFILTRATION; INTRACAUDAL; PERINEURAL at 07:16

## 2023-03-15 RX ADMIN — PROPOFOL 20 MG: 10 INJECTION, EMULSION INTRAVENOUS at 07:22

## 2023-03-15 NOTE — ANESTHESIA POSTPROCEDURE EVALUATION
Post-Op Assessment Note    CV Status:  Stable  Pain Score: 0    Pain management: adequate     Mental Status:  Arousable and sleepy   Hydration Status:  Euvolemic   PONV Controlled:  Controlled   Airway Patency:  Patent      Post Op Vitals Reviewed: Yes      Staff: CRNA         No notable events documented      BP   102/53   Temp      Pulse 80   Resp 18   SpO2 98% RA

## 2023-03-15 NOTE — H&P
History and Physical - [facility]  Cabrera Hagan 76 y o  female MRN: [id number]                  HPI: Cabrera Hagan is a 76y o  year old female who presents for EGD and colonoscopy for GERD, upper abdominal pain, nausea, melena, dysphagia, lower abdominal pain, weight loss, incontinence, diarrhea, constipation  Last colonoscopy 3 years ago      REVIEW OF SYSTEMS: Per the HPI, and otherwise unremarkable      Historical Information   Past Medical History:   Diagnosis Date   • Acute kidney injury superimposed on chronic kidney disease ([place] [address] ) 11/26/2016   • ADHD (attention deficit hyperactivity disorder) 03/17/2019   • Arthritis     BL HIPS   • 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   • Carpal tunnel syndrome    • Chest pain 03/06/2017   • Chronic kidney disease    • Claudication ([place] [address] ) 3/15/2019   • Closed fracture of base of middle phalanx of finger 06/22/2017   • Closed fracture of middle phalanx of left little finger 07/08/2017   • Coagulopathy ([place] [address] ) 05/15/2019   • Colloid cyst of brain (HCC)    • COPD (chronic obstructive pulmonary disease) (HCC)    • COPD (chronic obstructive pulmonary disease) (HCC)    • Coronary artery disease    • Cough    • Diabetes mellitus ([place] [address] )    • GERD (gastroesophageal reflux disease)    • Glaucoma    • Gout    • History of brain disorder 10/07/2020   • Hyperlipidemia    • Hypertension    • Irritable bowel syndrome    • Melena 02/26/2019   • PAD (peripheral artery disease) ([place] [address] ) 5/15/2019   • Paronychia of great toe of left foot 10/07/2020   • Post-traumatic seizures ([place] [address] ) 07/23/2015   • Renal disorder    • Seizures ([place] [address] )     last 2015   • Shortness of breath    • Single seizure ([place] [address] ) 05/31/2016   • SOB (shortness of breath)    • Syncope and collapse 11/11/2020   • Urinary tract infection without hematuria 11/26/2016   • Wheezing      Past Surgical History:   Procedure Laterality Date   • BRAIN SURGERY     • CATARACT EXTRACTION     • CHOLECYSTECTOMY     • COLECTOMY RADHA • DECOMPRESSION SPINE LUMBAR POSTERIOR  2019   • HERNIA REPAIR     • HYSTERECTOMY     • INCISION TENDON SHEATH HAND     • NECK SURGERY  2018   • NEUROPLASTY / TRANSPOSITION MEDIAN NERVE AT CARPAL TUNNEL     • AZ COLONOSCOPY FLX DX W/COLLJ SPEC WHEN PFRMD N/A 3/26/2019    Procedure: COLONOSCOPY;  Surgeon: Eugenie Vivar MD;  Location: [facility]; Service: Gastroenterology   • AZ ESOPHAGOGASTRODUODENOSCOPY TRANSORAL DIAGNOSTIC N/A 3/26/2019    Procedure: ESOPHAGOGASTRODUODENOSCOPY (EGD); Surgeon: Eugenie Vivar MD;  Location: [facility]; Service: Gastroenterology   • REPLACEMENT TOTAL KNEE Right    • RETINAL DETACHMENT SURGERY     • TUBAL LIGATION       Social History   Social History     Substance and Sexual Activity   Alcohol Use Never     Social History     Substance and Sexual Activity   Drug Use Never     Social History     Tobacco Use   Smoking Status Former   • Packs/day: 0 50   • Years: 40 00   • Pack years: 20 00   • Types: Cigarettes   • Start date:    • Quit date:    • Years since quittin 2   Smokeless Tobacco Never   Tobacco Comments    stopped many years ago     Family History   Problem Relation Age of Onset   • Asthma Mother    • Heart disease Mother    • Emphysema Father    • Cancer Father    • Prostate cancer Father    • Kidney cancer Sister    • Diabetes Sister    • Kidney disease Sister    • Brain cancer Brother    • Bone cancer Brother    • Heart disease Brother        Meds/Allergies     (Not in a hospital admission)      Allergies   Allergen Reactions   • Brimonidine Other (See Comments)   • Sulfa Antibiotics Rash       Objective     Blood pressure 131/64, pulse 88, temperature 97 6 °F (36 4 °C), temperature source Temporal, resp  rate 18, height 4' 7" (1 397 m), weight 67 7 kg (149 lb 4 oz), SpO2 95 %, not currently breastfeeding        PHYSICAL EXAM    Gen: NAD  CV: RRR  CHEST: Clear  ABD: soft, NT/ND  EXT: no edema  Neuro: AAO      ASSESSMENT/PLAN:  This is a 76y o  year old female here for upper and lower abdominal pain, GERD, nausea and dysphagia, melena, weight loss, constipation and diarrhea, incontinence    PLAN:   Procedure: EGD and colonoscopy

## 2023-03-15 NOTE — ANESTHESIA PREPROCEDURE EVALUATION
Procedure:  COLONOSCOPY  EGD    Relevant Problems   CARDIO   (+) Coronary artery disease without angina pectoris   (+) Hyperlipidemia associated with type 2 diabetes mellitus (HCC)   (+) Hypertension      ENDO   (+) Type 2 diabetes mellitus with diabetic neuropathy (HCC)      GI/HEPATIC   (+) Gastroesophageal reflux disease without esophagitis      /RENAL   (+) Chronic kidney disease-mineral and bone disorder   (+) Stage 3b chronic kidney disease (HCC)      MUSCULOSKELETAL   (+) Osteoarthritis      NEURO/PSYCH   (+) Chronic pain syndrome   (+) Seizure disorder (HCC)   (+) Type 2 diabetes mellitus with diabetic neuropathy (HCC)      PULMONARY   (+) COPD (chronic obstructive pulmonary disease) (HCC)      Cardiovascular and Mediastinum   (+) Chronic heart failure with preserved ejection fraction (HCC)      Other   (+) Class 1 obesity due to excess calories with serious comorbidity and body mass index (BMI) of 34 0 to 34 9 in adult   (+) Neurologic gait dysfunction        Physical Exam    Airway    Mallampati score: III  TM Distance: >3 FB  Neck ROM: full     Dental   upper dentures and lower dentures,     Cardiovascular  Cardiovascular exam normal    Pulmonary  Pulmonary exam normal     Other Findings     Left Ventricle: Left ventricular cavity size is normal  Wall thickness is normal  Systolic function is normal (55%)  Wall motion is normal  Diastolic function is mildly abnormal, consistent with grade I (abnormal) relaxation  •  Right Ventricle: Right ventricular cavity size is normal  Systolic function is normal   •  Aortic Valve: There is mild regurgitation  •  Mitral Valve: There is trace regurgitation          Anesthesia Plan  ASA Score- 3     Anesthesia Type- IV sedation with anesthesia with ASA Monitors  Additional Monitors:   Airway Plan:           Plan Factors-    Chart reviewed  EKG reviewed  Imaging results reviewed  Existing labs reviewed  Patient summary reviewed              Induction- intravenous  Postoperative Plan-     Informed Consent- Anesthetic plan and risks discussed with patient  I personally reviewed this patient with the CRNA  Discussed and agreed on the Anesthesia Plan with the CRNA  Isiah Joy

## 2023-03-16 ENCOUNTER — TELEPHONE (OUTPATIENT)
Dept: NEPHROLOGY | Facility: CLINIC | Age: 78
End: 2023-03-16

## 2023-03-24 ENCOUNTER — RA CDI HCC (OUTPATIENT)
Dept: OTHER | Facility: HOSPITAL | Age: 78
End: 2023-03-24

## 2023-03-24 NOTE — PROGRESS NOTES
[facility]li [facility]  coding opportunities          Chart Reviewed number of suggestions sent to Provider: 2   E11 [id number]  I13 0      Patients Insurance     Medicare Insurance: [employer] Advantage

## 2023-03-28 NOTE — PROGRESS NOTES
Marianna Klein 1945 female MRN: [id number]      ASSESSMENT/PLAN  Problem List Items Addressed This Visit        Endocrine    Hyperlipidemia associated with type 2 diabetes mellitus ([address] )    Relevant Orders    Comprehensive metabolic panel    Lipid Panel with Direct LDL reflex    Type 2 diabetes mellitus with diabetic neuropathy (HCC) - Primary    Relevant Orders    POCT hemoglobin A1c (Completed)    Comprehensive metabolic panel    Lipid Panel with Direct LDL reflex    Microalbumin / creatinine urine ratio       Respiratory    COPD (chronic obstructive pulmonary disease) (HCC)    Relevant Medications    fluticasone-umeclidinium-vilanterol (Trelegy Ellipta) 200-62 5-25 mcg/actuation AEPB inhaler    Other Relevant Orders    CBC and differential       Cardiovascular and Mediastinum    Chronic heart failure with preserved ejection fraction (HCC)    Relevant Orders    Comprehensive metabolic panel    Lipid Panel with Direct LDL reflex    Coronary artery disease without angina pectoris    Relevant Orders    Comprehensive metabolic panel    Lipid Panel with Direct LDL reflex    Hypertension    Relevant Orders    Comprehensive metabolic panel    Lipid Panel with Direct LDL reflex       Genitourinary    Stage 3b chronic kidney disease (HCC)    Relevant Orders    Comprehensive metabolic panel       Other    Vitamin D deficiency    Relevant Orders    Vitamin D 25 hydroxy   Other Visit Diagnoses     Long term use of drug        Relevant Orders    TSH, 3rd generation with Free T4 reflex        DM: A1c 6 7% (from 7 4) -- improved from previous, though pt reports labile sugars, which are likely secondary to poor diet and insulin adjustment which pt does on her own; will continue to monitor   HTN: Within good range given age and CKD   HLD: Update lipids prior to next visit   CAD/HFpEF: No exacerbation on exam, f/u with Cardio as scheduled   COPD: Stable on exam, f/u with Pulm as scheduled   CKD: Update labs prior to next Nephro "visit     Pt defers home PT for ambulatory dysfunction, LE pain, recurrent falls     Future Appointments   Date Time Provider Steffany Porras   5/1/2023  1:40 PM TOMMY [facility]   7/19/2023  1:40 PM DO ELISA [facility]   8/16/2023 10:20 AM Leonarda Martinez MD [facility]          SUBJECTIVE  CC: Follow-up      HPI:  Morgan Jackson is a 76 y o  female who presents for chronic follow up  DM: Blood sugars are \"going crazy\" -- poor eating due to abdominal symptoms  HTN: 145/63 this AM per caregiver    HLD: Tolerating statin without issue   CAD/HFpEF: Following with Cardio   COPD: Following with Pulm, notes mucus in throat in AM; needs refill on inhaler; has to reschedule her PFTs    CKD: Following with Nephro     Is s/p colonoscopy/EGD -- had esophageal dilation, colon polyps  Per pt she is scheduled for surgical consult in April for rectal issue (does not recall name)   Pt states she saw [facility] and was told they couldn't do anything for her because she has arthritis through her whole spine (records not noted in chart)  Continues to use topical cream      Review of Systems   HENT: Positive for postnasal drip  Eyes: Positive for visual disturbance (R blind, L blurry)  Respiratory: Negative for cough, shortness of breath and wheezing  Cardiovascular: Positive for leg swelling  Negative for chest pain and palpitations  Gastrointestinal: Positive for abdominal pain, constipation and diarrhea  Musculoskeletal: Positive for back pain, myalgias and neck pain          (+) recurrent falls       Historical Information   The patient history was reviewed and updated as follows:    Past Medical History:   Diagnosis Date   • Acute kidney injury superimposed on chronic kidney disease (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   • Carpal tunnel syndrome    • Chest " pain 03/06/2017   • Chronic kidney disease    • Claudication ([facility] ) 3/15/2019   • Closed fracture of base of middle phalanx of finger 06/22/2017   • Closed fracture of middle phalanx of left little finger 07/08/2017   • Coagulopathy ([facility] ) 05/15/2019   • Colloid cyst of brain (HCC)    • COPD (chronic obstructive pulmonary disease) (HCC)    • COPD (chronic obstructive pulmonary disease) (HCC)    • Coronary artery disease    • Cough    • Diabetes mellitus ([place] [address] )    • GERD (gastroesophageal reflux disease)    • Glaucoma    • Gout    • History of brain disorder 10/07/2020   • Hyperlipidemia    • Hypertension    • Irritable bowel syndrome    • Melena 02/26/2019   • PAD (peripheral artery disease) ([place] [address] ) 5/15/2019   • Paronychia of great toe of left foot 10/07/2020   • Post-traumatic seizures ([place] [address] ) 07/23/2015   • Renal disorder    • Seizures ([place] [address] )     last 2015   • Shortness of breath    • Single seizure ([place] [address] ) 05/31/2016   • SOB (shortness of breath)    • Syncope and collapse 11/11/2020   • Urinary tract infection without hematuria 11/26/2016   • Wheezing      Past Surgical History:   Procedure Laterality Date   • BRAIN SURGERY     • CATARACT EXTRACTION     • CHOLECYSTECTOMY     • COLECTOMY RADHA     • DECOMPRESSION SPINE LUMBAR POSTERIOR  08/19/2019   • HERNIA REPAIR     • HYSTERECTOMY     • INCISION TENDON SHEATH HAND     • NECK SURGERY  05/24/2018   • NEUROPLASTY / TRANSPOSITION MEDIAN NERVE AT CARPAL TUNNEL     • IN COLONOSCOPY FLX DX W/COLLJ SPEC WHEN PFRMD N/A 3/26/2019    Procedure: COLONOSCOPY;  Surgeon: Stone Lucero MD;  Location: [facility]; Service: Gastroenterology   • IN ESOPHAGOGASTRODUODENOSCOPY TRANSORAL DIAGNOSTIC N/A 3/26/2019    Procedure: ESOPHAGOGASTRODUODENOSCOPY (EGD); Surgeon: Stone Lucero MD;  Location: [facility];   Service: Gastroenterology   • REPLACEMENT TOTAL KNEE Right    • RETINAL DETACHMENT SURGERY     • TUBAL LIGATION       Family History   Problem Relation Age of Onset   • Asthma Mother    • Heart disease Mother    • Emphysema Father    • Cancer Father    • Prostate cancer Father    • Kidney cancer Sister    • Diabetes Sister    • Kidney disease Sister    • Brain cancer Brother    • Bone cancer Brother    • Heart disease Brother       Social History   Social History     Substance and Sexual Activity   Alcohol Use Never     Social History     Substance and Sexual Activity   Drug Use Never     Social History     Tobacco Use   Smoking Status Former   • Packs/day: 0 50   • Years: 40 00   • Pack years: 20 00   • Types: Cigarettes   • Start date:    • Quit date:    • Years since quittin 2   Smokeless Tobacco Never   Tobacco Comments    stopped many years ago       Medications:     Current Outpatient Medications:   •  fluticasone-umeclidinium-vilanterol (Trelegy Ellipta) 200-62 5-25 mcg/actuation AEPB inhaler, Inhale 1 puff daily Rinse mouth after use , Disp: 180 each, Rfl: 1  •  atorvastatin (LIPITOR) 10 mg tablet, Take 1 tablet (10 mg total) by mouth daily at bedtime, Disp: 90 tablet, Rfl: 3  •  Calcium Carbonate Antacid (CALCIUM CARBONATE PO), Take by mouth, Disp: , Rfl:   •  Continuous Blood Gluc  (FreeStyle Mary 2 Ada) YOLANDA, Before Breakfast and 2 hours after meals, Disp: 1 each, Rfl: 0  •  Continuous Blood Gluc Sensor (FreeStyle Mary 2 Sensor) MISC, Before Breakfast and 2 hours after meals, Disp: 1 each, Rfl: 3  •  Droplet Pen Needles 31G X 8 MM MISC, USE 4 TIMES DAILY BEFORE MEALS AND AT BEDTIME, Disp: , Rfl:   •  furosemide (LASIX) 20 mg tablet, Take 1 tablet (20 mg total) by mouth daily, Disp: 60 tablet, Rfl: 6  •  insulin aspart (NovoLOG FlexPen) 100 UNIT/ML injection pen, INJECT 12 UNITS BEFORE MEALS (3 MEALS), Disp: 15 mL, Rfl: 3  •  Insulin Pen Needle (Pen Needles) 30G X 8 MM MISC, Use 4 (four) times a day (before meals and at bedtime), Disp: 500 each, Rfl: 1  •  Lantus SoloStar 100 units/mL SOPN, INJECT 30 UNITS UNDER THE SKIN DAILY AT BEDTIME, Disp: 15 mL, "Rfl: 2  •  latanoprost (XALATAN) 0 005 % ophthalmic solution, Administer 1 drop into the left eye daily at bedtime, Disp: 2 5 mL, Rfl: 2  •  levETIRAcetam (KEPPRA) 250 mg tablet, take 1 tablet by mouth every 12 hours, Disp: 180 tablet, Rfl: 3  •  montelukast (SINGULAIR) 10 mg tablet, take 1 tablet by mouth every evening, Disp: 90 tablet, Rfl: 1  •  Multiple Vitamin (MULTI VITAMIN DAILY PO), Take 1 tablet by mouth daily, Disp: , Rfl:   •  nystatin (MYCOSTATIN) cream, Apply topically 2 (two) times a day, Disp: 30 g, Rfl: 1  •  nystatin (MYCOSTATIN) powder, Apply topically 3 (three) times a day as needed (rash), Disp: 60 g, Rfl: 1  •  omeprazole (PriLOSEC) 20 mg delayed release capsule, take 1 capsule by mouth twice a day, Disp: 180 capsule, Rfl: 1  •  Probiotic Product (PROBIOTIC DAILY PO), Take by mouth  , Disp: , Rfl:   •  vitamin B-12 (VITAMIN B-12) 500 mcg tablet, Take 500 mcg by mouth daily, Disp: , Rfl:     Current Facility-Administered Medications:   •  denosumab (PROLIA) subcutaneous injection 60 mg, 60 mg, Subcutaneous, Q6 Months, Clarisa Billingsley DO, 60 mg at 06/24/22 1442  Allergies   Allergen Reactions   • Brimonidine Other (See Comments)   • Sulfa Antibiotics Rash       OBJECTIVE    Vitals:   Vitals:    03/29/23 1109   BP: 144/76   Pulse: 72   SpO2: 98%   Weight: 67 1 kg (148 lb)   Height: 4' 7\" (1 397 m)           Physical Exam  Vitals and nursing note reviewed  Constitutional:       General: She is not in acute distress  Appearance: Normal appearance  HENT:      Head: Normocephalic and atraumatic  Right Ear: Tympanic membrane, ear canal and external ear normal       Left Ear: Tympanic membrane, ear canal and external ear normal       Nose: Nose normal       Mouth/Throat:      Mouth: Mucous membranes are moist       Pharynx: No oropharyngeal exudate or posterior oropharyngeal erythema     Eyes:      Conjunctiva/sclera: Conjunctivae normal    Cardiovascular:      Rate and Rhythm: Normal rate " and regular rhythm  Comments: (+) B/L LE pitting edema, chronic  Pulmonary:      Effort: Pulmonary effort is normal  No respiratory distress  Breath sounds: Normal breath sounds  Abdominal:      General: Bowel sounds are normal  There is no distension  Palpations: Abdomen is soft  Tenderness: There is no abdominal tenderness  Musculoskeletal:      Comments: Ambulating with rolling walker   Lymphadenopathy:      Cervical: No cervical adenopathy  Skin:     General: Skin is warm and dry  Neurological:      Mental Status: She is alert  Mental status is at baseline        Gait: Gait abnormal    Psychiatric:         Mood and Affect: Mood normal                     DO Lester De La Rosa  [address] [facility]   3/29/2023  12:27 PM

## 2023-03-29 ENCOUNTER — OFFICE VISIT (OUTPATIENT)
Dept: FAMILY MEDICINE CLINIC | Facility: CLINIC | Age: 78
End: 2023-03-29

## 2023-03-29 VITALS
WEIGHT: 148 LBS | HEIGHT: 55 IN | SYSTOLIC BLOOD PRESSURE: 144 MMHG | OXYGEN SATURATION: 98 % | DIASTOLIC BLOOD PRESSURE: 76 MMHG | HEART RATE: 72 BPM | BODY MASS INDEX: 34.25 KG/M2

## 2023-03-29 DIAGNOSIS — Z79.899 LONG TERM USE OF DRUG: ICD-10-CM

## 2023-03-29 DIAGNOSIS — E55.9 VITAMIN D DEFICIENCY: ICD-10-CM

## 2023-03-29 DIAGNOSIS — N18.32 STAGE 3B CHRONIC KIDNEY DISEASE (HCC): ICD-10-CM

## 2023-03-29 DIAGNOSIS — E78.5 HYPERLIPIDEMIA ASSOCIATED WITH TYPE 2 DIABETES MELLITUS (HCC): ICD-10-CM

## 2023-03-29 DIAGNOSIS — I50.32 CHRONIC HEART FAILURE WITH PRESERVED EJECTION FRACTION (HCC): ICD-10-CM

## 2023-03-29 DIAGNOSIS — J41.1 MUCOPURULENT CHRONIC BRONCHITIS (HCC): ICD-10-CM

## 2023-03-29 DIAGNOSIS — E11.40 TYPE 2 DIABETES MELLITUS WITH DIABETIC NEUROPATHY, WITH LONG-TERM CURRENT USE OF INSULIN (HCC): Primary | ICD-10-CM

## 2023-03-29 DIAGNOSIS — E11.69 HYPERLIPIDEMIA ASSOCIATED WITH TYPE 2 DIABETES MELLITUS (HCC): ICD-10-CM

## 2023-03-29 DIAGNOSIS — Z79.4 TYPE 2 DIABETES MELLITUS WITH DIABETIC NEUROPATHY, WITH LONG-TERM CURRENT USE OF INSULIN (HCC): Primary | ICD-10-CM

## 2023-03-29 DIAGNOSIS — I25.10 CORONARY ARTERY DISEASE INVOLVING NATIVE CORONARY ARTERY OF NATIVE HEART WITHOUT ANGINA PECTORIS: ICD-10-CM

## 2023-03-29 DIAGNOSIS — I10 PRIMARY HYPERTENSION: ICD-10-CM

## 2023-03-29 LAB — SL AMB POCT HEMOGLOBIN AIC: 6.7 (ref ?–6.5)

## 2023-03-31 DIAGNOSIS — H35.372 MACULAR PUCKER, LEFT: ICD-10-CM

## 2023-03-31 DIAGNOSIS — E11.42 DIABETIC PERIPHERAL NEUROPATHY (HCC): ICD-10-CM

## 2023-03-31 RX ORDER — LATANOPROST 50 UG/ML
SOLUTION/ DROPS OPHTHALMIC
Qty: 2.5 ML | Refills: 2 | Status: SHIPPED | OUTPATIENT
Start: 2023-03-31

## 2023-04-10 LAB
LEFT EYE DIABETIC RETINOPATHY: NORMAL
RIGHT EYE DIABETIC RETINOPATHY: NORMAL

## 2023-05-01 PROBLEM — R32 URINARY INCONTINENCE: Status: ACTIVE | Noted: 2023-05-01

## 2023-05-02 ENCOUNTER — TELEPHONE (OUTPATIENT)
Dept: OTHER | Facility: OTHER | Age: 78
End: 2023-05-02

## 2023-05-02 NOTE — PROGRESS NOTES
Pulmonary Follow Up Note  Marcus Ahumada 76 y o  female MRN: [id number]  5/3/2023    Assessment:    COPD (chronic obstructive pulmonary disease) ([facility])  Symptoms controlled  She will maintain on Trelegy 200 1 puff daily  I sent refills to the pharmacy  Albuterol nebulizer or inhaler every 6 hours as needed  Discussed pulmonary rehab as patient would benefit, however she declined at this time due to transportation difficulty  She will let us know if she reconsiders  Continue Singulair daily  I recommend she start Flonase nasal spray for her post nasal drainage symptoms  Plan:    Diagnoses and all orders for this visit:    Simple chronic bronchitis ([facility])  -     albuterol (ProAir HFA) 90 mcg/act inhaler; Inhale 2 puffs every 6 (six) hours as needed for wheezing or shortness of breath  -     Spacer Device for Inhaler    Former smoker    Obesity (BMI 30-39  9)    Mucopurulent chronic bronchitis (HCC)  -     fluticasone-umeclidinium-vilanterol (Trelegy Ellipta) 200-62 5-25 mcg/actuation AEPB inhaler; Inhale 1 puff daily Rinse mouth after use  Postnasal discharge  -     fluticasone (FLONASE) 50 mcg/act nasal spray; 1 spray into each nostril daily        Education provided at this visit:   Need for Vaccination: UTD   Pulmonary Rehab: Patient would benefit, however declined today  Smoking Cessation: remains in remission x 20 years   Lung Cancer Screening: no longer candidate d/t length of smoking cessation   Inhaler Use: Reviewed proper use of inhaler/spacer and rinsing of mouth    Return in about 6 months (around 11/3/2023)  All of Shantelle's questions were answered prior to leaving the office today  She will follow-up in 6 months or sooner should the need arise  She is aware to call our office with any further questions or concerns  History of Present Illness     Chief Complaint: No chief complaint on file        Patient ID: Luisa Griggs is a 76 y o  y o  female has a past medical history of Acute kidney injury "superimposed on chronic kidney disease ([facility] ) (11/26/2016), ADHD (attention deficit hyperactivity disorder) (03/17/2019), Arthritis, 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), Carpal tunnel syndrome, Chest pain (03/06/2017), Chronic kidney disease, Claudication ([facility] ) (3/15/2019), Closed fracture of base of middle phalanx of finger (06/22/2017), Closed fracture of middle phalanx of left little finger (07/08/2017), Coagulopathy ([facility] ) (05/15/2019), Colloid cyst of brain ([place] [address] ), COPD (chronic obstructive pulmonary disease) ([facility] ), COPD (chronic obstructive pulmonary disease) ([place] [address] ), Coronary artery disease, Cough, Diabetes mellitus ([place] [address] ), GERD (gastroesophageal reflux disease), Glaucoma, Gout, History of brain disorder (10/07/2020), Hyperlipidemia, Hypertension, Irritable bowel syndrome, Melena (02/26/2019), PAD (peripheral artery disease) ([facility] ) (5/15/2019), Paronychia of great toe of left foot (10/07/2020), Post-traumatic seizures ([facility] ) (07/23/2015), Renal disorder, Seizures ([place] [address] ), Shortness of breath, Single seizure ([place] [address] ) (05/31/2016), SOB (shortness of breath), Syncope and collapse (11/11/2020), Urinary tract infection without hematuria (11/26/2016), and Wheezing  5/3/2023  HPI: Ced Zurita is a 76 y o  female former smoker with history of chronic bronchitis, HTN, diabetes, CHF, CKD who is here today for a follow up visit  She was last seen in the office on 11/1/2022  She was maintained on Trelegy and PRN albuterol  She had normal PFTs in 2022  CXR in November 2022 showed mild emphysematous changes  Today, she reports her \"breathing is ok, gets worse if I get a little excited\"  She is using her Trelegy daily  Not requiring daily use of Albuterol nebulizer  Will need it on occasion at nighttime  She does not have an Albuterol inhaler  She reports SOB with exertion as well as wheezing, a cough that is worse in the AM productive of clear sputum  She also notes some post nasal drainage   She takes Singulair nightly, " however, no nasal sprays  States she has difficulty ambulating due to her legs and not her breathing  Also has LE swelling  She is on Lasix daily and follows with Cardiology  Review of Systems   Constitutional: Negative for activity change, chills, diaphoresis, fever and unexpected weight change  HENT: Positive for postnasal drip  Negative for congestion, rhinorrhea, sore throat, trouble swallowing and voice change  Respiratory: Positive for cough, chest tightness, shortness of breath and wheezing  Cardiovascular: Positive for leg swelling  Negative for chest pain and palpitations         Historical Information   Past Medical History:   Diagnosis Date    Acute kidney injury superimposed on chronic kidney disease ([facility] ) 11/26/2016    ADHD (attention deficit hyperactivity disorder) 03/17/2019    Arthritis     BL HIPS    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    Carpal tunnel syndrome     Chest pain 03/06/2017    Chronic kidney disease     Claudication [facility]) 3/15/2019    Closed fracture of base of middle phalanx of finger 06/22/2017    Closed fracture of middle phalanx of left little finger 07/08/2017    Coagulopathy ([facility] ) 05/15/2019    Colloid cyst of brain (HCC)     COPD (chronic obstructive pulmonary disease) (HCC)     COPD (chronic obstructive pulmonary disease) (HCC)     Coronary artery disease     Cough     Diabetes mellitus (HCC)     GERD (gastroesophageal reflux disease)     Glaucoma     Gout     History of brain disorder 10/07/2020    Hyperlipidemia     Hypertension     Irritable bowel syndrome     Melena 02/26/2019    PAD (peripheral artery disease) ([facility] ) 5/15/2019    Paronychia of great toe of left foot 10/07/2020    Post-traumatic seizures ([facility] ) 07/23/2015    Renal disorder     Seizures ([facility] )     last 2015    Shortness of breath     Single seizure ([facility] ) 05/31/2016    SOB (shortness of breath)     Syncope and collapse 11/11/2020    Urinary tract infection without hematuria 11/26/2016    Wheezing      Past Surgical History:   Procedure Laterality Date    BRAIN SURGERY      CATARACT EXTRACTION      CHOLECYSTECTOMY      COLECTOMY RADHA      COLONOSCOPY      DECOMPRESSION SPINE LUMBAR POSTERIOR  08/19/2019    HERNIA REPAIR      HYSTERECTOMY      INCISION TENDON SHEATH HAND      NECK SURGERY  05/24/2018    NEUROPLASTY / TRANSPOSITION MEDIAN NERVE AT CARPAL TUNNEL      SC COLONOSCOPY FLX DX W/COLLJ SPEC WHEN PFRMD N/A 03/26/2019    Procedure: COLONOSCOPY;  Surgeon: Chel Ortiz MD;  [facility]; Service: Gastroenterology    SC ESOPHAGOGASTRODUODENOSCOPY TRANSORAL DIAGNOSTIC N/A 03/26/2019    Procedure: ESOPHAGOGASTRODUODENOSCOPY (EGD); Surgeon: Cehl Ortiz MD;  [facility]: [facility]; Service: Gastroenterology    REPLACEMENT TOTAL KNEE Right     RETINAL DETACHMENT SURGERY      TUBAL LIGATION       Family History   Problem Relation Age of Onset    Asthma Mother     Heart disease Mother     Emphysema Father     Cancer Father     Prostate cancer Father     Kidney cancer Sister     Diabetes Sister     Kidney disease Sister     Brain cancer Brother     Bone cancer Brother     Heart disease Brother        Smoking history: She reports that she quit smoking about 22 years ago  Her smoking use included cigarettes  She started smoking about 62 years ago  She has a 20 00 pack-year smoking history   She has never used smokeless tobacco       Immunization History   Administered Date(s) Administered    COVID-19 J[employer]J ([employer]) vaccine 0 5 mL 06/03/2021    Pneumococcal Conjugate 13-Valent 11/27/2017, 10/07/2020    Pneumococcal Polysaccharide PPV23 09/11/2019       Meds/Allergies     Current Outpatient Medications:     albuterol (ProAir HFA) 90 mcg/act inhaler, Inhale 2 puffs every 6 (six) hours as needed for wheezing or shortness of breath, Disp: 8 5 g, Rfl: 1    atorvastatin (LIPITOR) 10 mg tablet, Take 1 tablet (10 mg total) by mouth daily at bedtime, Disp: 90 tablet, Rfl: 3    Calcium Carbonate Antacid (CALCIUM CARBONATE PO), Take by mouth, Disp: , Rfl:     Continuous Blood Gluc  (FreeStyle Mary 2 [place]) YOLANDA, Before Breakfast and 2 hours after meals, Disp: 1 each, Rfl: 0    Continuous Blood Gluc Sensor (FreeStyle Mary 2 Sensor) MISC, Before Breakfast and 2 hours after meals, Disp: 1 each, Rfl: 3    fluticasone (FLONASE) 50 mcg/act nasal spray, 1 spray into each nostril daily, Disp: 15 8 mL, Rfl: 2    fluticasone-umeclidinium-vilanterol (Trelegy Ellipta) 200-62 5-25 mcg/actuation AEPB inhaler, Inhale 1 puff daily Rinse mouth after use , Disp: 180 each, Rfl: 1    furosemide (LASIX) 20 mg tablet, Take 1 tablet (20 mg total) by mouth daily, Disp: 60 tablet, Rfl: 6    insulin aspart (NovoLOG FlexPen) 100 UNIT/ML injection pen, INJECT 12 UNITS BEFORE MEALS (3 MEALS), Disp: 15 mL, Rfl: 3    Insulin Pen Needle (Pen Needles) 30G X 8 MM MISC, Use 4 (four) times a day (before meals and at bedtime), Disp: 500 each, Rfl: 1    Lantus SoloStar 100 units/mL SOPN, INJECT 30 UNITS UNDER THE SKIN DAILY AT BEDTIME, Disp: 15 mL, Rfl: 2    latanoprost (XALATAN) 0 005 % ophthalmic solution, INSTILL 1 DROP INTO LEFT EYE DAILY AT BEDTIME, Disp: 2 5 mL, Rfl: 2    montelukast (SINGULAIR) 10 mg tablet, take 1 tablet by mouth every evening, Disp: 90 tablet, Rfl: 1    Multiple Vitamin (MULTI VITAMIN DAILY PO), Take 1 tablet by mouth daily, Disp: , Rfl:     nystatin (MYCOSTATIN) cream, Apply topically 2 (two) times a day, Disp: 30 g, Rfl: 1    nystatin (MYCOSTATIN) powder, Apply topically 3 (three) times a day as needed (rash), Disp: 60 g, Rfl: 1    omeprazole (PriLOSEC) 20 mg delayed release capsule, take 1 capsule by mouth twice a day, Disp: 180 capsule, Rfl: 1    Probiotic Product (PROBIOTIC DAILY PO), Take by mouth  , Disp: , Rfl:     vitamin B-12 (VITAMIN B-12) 500 mcg tablet, Take 500 mcg by mouth daily, Disp: , Rfl:     Droplet Pen Needles 31G X 8 "MM MISC, USE 4 TIMES DAILY BEFORE MEALS AND AT BEDTIME (Patient not taking: Reported on 5/3/2023), Disp: , Rfl:     levETIRAcetam (KEPPRA) 250 mg tablet, take 1 tablet by mouth every 12 hours, Disp: 180 tablet, Rfl: 1    Current Facility-Administered Medications:     denosumab (PROLIA) subcutaneous injection 60 mg, 60 mg, Subcutaneous, Q6 Months, Clarisa Billingsley, , 60 mg at 06/24/22 1442  Allergies: Allergies   Allergen Reactions    Brimonidine Other (See Comments)    Sulfa Antibiotics Rash         Vitals:  Vitals:    05/03/23 1032   BP: 128/78   Pulse: 70   Temp: (!) 97 4 °F (36 3 °C)   SpO2: 99%   Weight: 66 7 kg (147 lb)   Height: 4' 7\" (1 397 m)   Oxygen Therapy  SpO2: 99 %    Wt Readings from Last 3 Encounters:   05/03/23 66 7 kg (147 lb)   03/29/23 67 1 kg (148 lb)   03/15/23 67 7 kg (149 lb 4 oz)     Body mass index is 34 17 kg/m²  Physical Exam      Labs: I have personally reviewed pertinent lab results  Lab Results   Component Value Date    WBC 7 14 02/02/2023    HGB 13 5 02/02/2023    HCT 44 0 02/02/2023    MCV 91 02/02/2023     02/02/2023     Lab Results   Component Value Date    CALCIUM 9 5 02/02/2023    K 4 0 02/02/2023    CO2 28 02/02/2023     02/02/2023    BUN 22 02/02/2023    CREATININE 1 13 02/02/2023     No results found for: IGE  Lab Results   Component Value Date    ALT 23 07/12/2022    AST 12 07/12/2022    ALKPHOS 43 (L) 07/12/2022       Imaging and other studies: I have personally reviewed pertinent reports and I have personally reviewed pertinent films in PACS    CXR 11/30/22  The lungs are clear  Mild emphysematous changes are seen  No pneumothorax or pleural effusion  Pulmonary function testing:     Pulmonary Functions Testing Results: 1/12/2022      FEV1/FVC ratio 77%    FEV1 95% predicted  FVC 95% predicted  (-) response to bronchodilators  TLC 88 % predicted  RV 76 % predicted  DLCO corrected for hemoglobin 72 % predicted    Impression:      The flow volume " curve is normal     The spirometry is normal     The lung volumes are normal     The DLCO is mildly decreased  Patient could walk a total distance of 122 m in 6 minutes without the need for supplemental oxygen

## 2023-05-03 ENCOUNTER — OFFICE VISIT (OUTPATIENT)
Age: 78
End: 2023-05-03

## 2023-05-03 VITALS
HEIGHT: 55 IN | HEART RATE: 70 BPM | TEMPERATURE: 97.4 F | SYSTOLIC BLOOD PRESSURE: 128 MMHG | DIASTOLIC BLOOD PRESSURE: 78 MMHG | BODY MASS INDEX: 34.02 KG/M2 | OXYGEN SATURATION: 99 % | WEIGHT: 147 LBS

## 2023-05-03 DIAGNOSIS — E66.9 OBESITY (BMI 30-39.9): ICD-10-CM

## 2023-05-03 DIAGNOSIS — J41.1 MUCOPURULENT CHRONIC BRONCHITIS (HCC): ICD-10-CM

## 2023-05-03 DIAGNOSIS — R09.82 POSTNASAL DISCHARGE: ICD-10-CM

## 2023-05-03 DIAGNOSIS — Z87.891 FORMER SMOKER: ICD-10-CM

## 2023-05-03 DIAGNOSIS — J41.0 SIMPLE CHRONIC BRONCHITIS (HCC): Primary | ICD-10-CM

## 2023-05-03 RX ORDER — FLUTICASONE PROPIONATE 50 MCG
1 SPRAY, SUSPENSION (ML) NASAL DAILY
Qty: 15.8 ML | Refills: 2 | Status: SHIPPED | OUTPATIENT
Start: 2023-05-03

## 2023-05-03 RX ORDER — ALBUTEROL SULFATE 90 UG/1
2 AEROSOL, METERED RESPIRATORY (INHALATION) EVERY 6 HOURS PRN
Qty: 8.5 G | Refills: 1 | Status: SHIPPED | OUTPATIENT
Start: 2023-05-03

## 2023-05-03 NOTE — ASSESSMENT & PLAN NOTE
Symptoms controlled  She will maintain on Trelegy 200 1 puff daily  I sent refills to the pharmacy  Albuterol nebulizer or inhaler every 6 hours as needed  Discussed pulmonary rehab as patient would benefit, however she declined at this time due to transportation difficulty  She will let us know if she reconsiders  Continue Singulair daily  I recommend she start Flonase nasal spray for her post nasal drainage symptoms

## 2023-05-03 NOTE — TELEPHONE ENCOUNTER
Called [facility] for Solomon to call the office to schedule office visit for Judson  Thank you

## 2023-05-04 ENCOUNTER — TELEPHONE (OUTPATIENT)
Dept: FAMILY MEDICINE CLINIC | Facility: CLINIC | Age: 78
End: 2023-05-04

## 2023-05-04 ENCOUNTER — HOME HEALTH ADMISSION (OUTPATIENT)
Dept: HOME HEALTH SERVICES | Facility: HOME HEALTHCARE | Age: 78
End: 2023-05-04

## 2023-05-04 DIAGNOSIS — R26.9 NEUROLOGIC GAIT DYSFUNCTION: ICD-10-CM

## 2023-05-04 DIAGNOSIS — G89.4 CHRONIC PAIN SYNDROME: Primary | ICD-10-CM

## 2023-05-04 NOTE — TELEPHONE ENCOUNTER
Family calls stating that pt will no longer be using Freestyle glucometer  They are faxing a request for [facility] from the pharmacy  Please erx that one along with supplies when the request comes      Thanks, Rosalinda Delgado

## 2023-05-04 NOTE — TELEPHONE ENCOUNTER
Pt's son says they have someone coming to the house on Wednesday to look at the bathroom  They are interested in  PT evaluation  They are assuming this is done at home?

## 2023-05-04 NOTE — TELEPHONE ENCOUNTER
Please let pt know:     Received letter from [employer] -- in order for pt to have walk-in shower adaptation in the home, she needs to have a PT evaluation to clarify her functional status and we also need to provide information on possible cost/details of modifications    Is she open to doing the PT evaluation? Have they talked to any contractors about bids for the modifications?

## 2023-05-05 ENCOUNTER — HOME CARE VISIT (OUTPATIENT)
Dept: HOME HEALTH SERVICES | Facility: HOME HEALTHCARE | Age: 78
End: 2023-05-05

## 2023-05-05 NOTE — CASE COMMUNICATION
I spoke to the patient's son Cori Hall regarding home physical and occupational therapy  He and the patient are agreeable to services an no other agencies in the home  Verified insurance and demographics  Cats in the home  Advised Solomon to have the patient's medication bottles available for the visit  Patient ambulates with a walker  Patient has 6 ELVI her home  Patient is agreeable to a [facility] visit on Monday  Explained that the therapist will c all to schedule the visit

## 2023-05-05 NOTE — CASE COMMUNICATION
This is for informational purposes only  Patient is agreeable to a home PT [facility] visit on Monday  New SOC date will be 5/8/23   Thank you

## 2023-05-08 ENCOUNTER — HOME CARE VISIT (OUTPATIENT)
Dept: HOME HEALTH SERVICES | Facility: HOME HEALTHCARE | Age: 78
End: 2023-05-08

## 2023-05-08 VITALS — OXYGEN SATURATION: 97 % | DIASTOLIC BLOOD PRESSURE: 62 MMHG | HEART RATE: 74 BPM | SYSTOLIC BLOOD PRESSURE: 116 MMHG

## 2023-05-09 NOTE — CASE COMMUNICATION
[facility] has admitted your patient to [address] [place] [address] service with the following disciplines:      PT and OT  Response needed, please respond via  contact patient with any medication instructions  Primary focus of home health care  impaired mobility  Patient stated goals of care to walk better with less pain  Anticipated visit pattern and next visit date 2 x 3 weeks  See medication list - meds in home differ from AVS  Patient does not  have omeprazole  Takes acetaminophen 650mg BID for pain  Takes Vit D3  25mcg 1 tab daily    Significant clinical findings  Patient with chronic pain as primary complaint and demonstrates faulty movement patterns, LE weakness, limited safety in home  Tinettis score of 11/28 indicates high fall risk  5OL does not fit into bathrooms so standard RW suggested and agreed upon, order sent through [employer]  Patient with previous PT intervent ion so plan is to review and update HEP to allow for long term work on mobility and strength  Will edu in home safety with mobility and work to improve home egress  OT eval pending  Patient already active with waiver program and has aides in home 5 days per week  Thank you for allowing us to participate in the care of your patient        Grupo Ward, PT

## 2023-05-10 ENCOUNTER — HOME CARE VISIT (OUTPATIENT)
Dept: HOME HEALTH SERVICES | Facility: HOME HEALTHCARE | Age: 78
End: 2023-05-10

## 2023-05-10 VITALS — DIASTOLIC BLOOD PRESSURE: 64 MMHG | OXYGEN SATURATION: 97 % | SYSTOLIC BLOOD PRESSURE: 124 MMHG | HEART RATE: 67 BPM

## 2023-05-10 DIAGNOSIS — R09.82 POSTNASAL DISCHARGE: ICD-10-CM

## 2023-05-10 RX ORDER — FLUTICASONE PROPIONATE 50 MCG
1 SPRAY, SUSPENSION (ML) NASAL DAILY
Start: 2023-05-10

## 2023-05-10 RX ORDER — SENNOSIDES 8.6 MG
650 CAPSULE ORAL 2 TIMES DAILY
COMMUNITY

## 2023-05-10 RX ORDER — INHALER,ASSIST DEVICE,LG MASK
SPACER (EA) MISCELLANEOUS
COMMUNITY
Start: 2023-05-05

## 2023-05-10 RX ORDER — KETOCONAZOLE 20 MG/G
CREAM TOPICAL
COMMUNITY
Start: 2023-05-09

## 2023-05-11 ENCOUNTER — HOME CARE VISIT (OUTPATIENT)
Dept: HOME HEALTH SERVICES | Facility: HOME HEALTHCARE | Age: 78
End: 2023-05-11

## 2023-05-11 VITALS
RESPIRATION RATE: 18 BRPM | SYSTOLIC BLOOD PRESSURE: 145 MMHG | OXYGEN SATURATION: 96 % | DIASTOLIC BLOOD PRESSURE: 62 MMHG | HEART RATE: 68 BPM

## 2023-05-15 ENCOUNTER — HOME CARE VISIT (OUTPATIENT)
Dept: HOME HEALTH SERVICES | Facility: HOME HEALTHCARE | Age: 78
End: 2023-05-15

## 2023-05-15 VITALS — SYSTOLIC BLOOD PRESSURE: 112 MMHG | DIASTOLIC BLOOD PRESSURE: 60 MMHG

## 2023-05-15 VITALS
HEART RATE: 78 BPM | SYSTOLIC BLOOD PRESSURE: 118 MMHG | DIASTOLIC BLOOD PRESSURE: 72 MMHG | OXYGEN SATURATION: 100 % | RESPIRATION RATE: 18 BRPM

## 2023-05-17 ENCOUNTER — HOME CARE VISIT (OUTPATIENT)
Dept: HOME HEALTH SERVICES | Facility: HOME HEALTHCARE | Age: 78
End: 2023-05-17

## 2023-05-17 VITALS
OXYGEN SATURATION: 97 % | DIASTOLIC BLOOD PRESSURE: 65 MMHG | RESPIRATION RATE: 18 BRPM | SYSTOLIC BLOOD PRESSURE: 102 MMHG | HEART RATE: 75 BPM

## 2023-05-18 ENCOUNTER — HOME CARE VISIT (OUTPATIENT)
Dept: HOME HEALTH SERVICES | Facility: HOME HEALTHCARE | Age: 78
End: 2023-05-18

## 2023-05-18 VITALS — OXYGEN SATURATION: 97 % | SYSTOLIC BLOOD PRESSURE: 148 MMHG | DIASTOLIC BLOOD PRESSURE: 70 MMHG | HEART RATE: 64 BPM

## 2023-05-22 ENCOUNTER — HOME CARE VISIT (OUTPATIENT)
Dept: HOME HEALTH SERVICES | Facility: HOME HEALTHCARE | Age: 78
End: 2023-05-22

## 2023-05-22 VITALS
RESPIRATION RATE: 18 BRPM | OXYGEN SATURATION: 97 % | HEART RATE: 67 BPM | DIASTOLIC BLOOD PRESSURE: 70 MMHG | SYSTOLIC BLOOD PRESSURE: 156 MMHG

## 2023-05-23 ENCOUNTER — HOME CARE VISIT (OUTPATIENT)
Dept: HOME HEALTH SERVICES | Facility: HOME HEALTHCARE | Age: 78
End: 2023-05-23

## 2023-05-23 VITALS — OXYGEN SATURATION: 99 % | HEART RATE: 75 BPM | SYSTOLIC BLOOD PRESSURE: 140 MMHG | DIASTOLIC BLOOD PRESSURE: 64 MMHG

## 2023-05-24 ENCOUNTER — HOME CARE VISIT (OUTPATIENT)
Dept: HOME HEALTH SERVICES | Facility: HOME HEALTHCARE | Age: 78
End: 2023-05-24

## 2023-05-24 VITALS
OXYGEN SATURATION: 94 % | HEART RATE: 71 BPM | DIASTOLIC BLOOD PRESSURE: 62 MMHG | SYSTOLIC BLOOD PRESSURE: 117 MMHG | RESPIRATION RATE: 18 BRPM

## 2023-05-26 ENCOUNTER — HOME CARE VISIT (OUTPATIENT)
Dept: HOME HEALTH SERVICES | Facility: HOME HEALTHCARE | Age: 78
End: 2023-05-26

## 2023-05-27 VITALS — SYSTOLIC BLOOD PRESSURE: 120 MMHG | DIASTOLIC BLOOD PRESSURE: 70 MMHG

## 2023-05-27 NOTE — CASE COMMUNICATION
PAtient is discharged from [place] [address] to [facility]  She has achieved goals of HHC with improved strength, improved transfers and gait, and reduced risk of falls as evident by Tinettis increase from 11/28 to 19/28  She remains moderate risk for falls  She does continue to vocalize fluctations in mobility she reports due to pain levels but no signficant fluctuations noted in therapy sessions  Standard RW obtained and patient demonstrates improved home  access into bathroom with use of same  She is inconsistent with use however as she vocalizes she likes the brakes on the [employer]  She has paid caregivers to provide assist in home  No further PT recommended  Discharged as complete

## 2023-06-07 ENCOUNTER — OFFICE VISIT (OUTPATIENT)
Dept: GASTROENTEROLOGY | Facility: CLINIC | Age: 78
End: 2023-06-07
Payer: COMMERCIAL

## 2023-06-07 VITALS
DIASTOLIC BLOOD PRESSURE: 70 MMHG | HEIGHT: 55 IN | WEIGHT: 148 LBS | OXYGEN SATURATION: 98 % | BODY MASS INDEX: 34.25 KG/M2 | SYSTOLIC BLOOD PRESSURE: 148 MMHG | HEART RATE: 74 BPM

## 2023-06-07 DIAGNOSIS — K62.3 RECTAL PROLAPSE: ICD-10-CM

## 2023-06-07 DIAGNOSIS — K59.00 CONSTIPATION, UNSPECIFIED CONSTIPATION TYPE: ICD-10-CM

## 2023-06-07 DIAGNOSIS — R15.9 INCONTINENCE OF FECES, UNSPECIFIED FECAL INCONTINENCE TYPE: Primary | ICD-10-CM

## 2023-06-07 DIAGNOSIS — R19.7 DIARRHEA, UNSPECIFIED TYPE: ICD-10-CM

## 2023-06-07 PROCEDURE — 99214 OFFICE O/P EST MOD 30 MIN: CPT | Performed by: PHYSICIAN ASSISTANT

## 2023-06-07 NOTE — PROGRESS NOTES
[facility] - Outpatient Follow-up Note  Es Jason 76 y o  female MRN: [id number]  Encounter: [id number]          ASSESSMENT AND PLAN:      1  Incontinence of feces  2  Diarrhea  3  Constipation  4  Rectal prolapse    Patient presents to the office for follow up:   She reports a long hx of alternating diarrhea/constipation, fecal incontinence  She had a colonoscopy 3/15 which showed a hyperplastic polyp, adenomatous polyp, and a rectal prolapse  Recommend she add Metamucil daily and then increase to BID as a bulking agent to help her bowel movements  She is scheduled to see colorectal surgery later this month regarding her rectal prolapse   ______________________________________________________________________    SUBJECTIVE:  Patient is a pleasant 76year old female who presents to the office for follow up  She reports a long history of alternating diarrhea and constipation as well as associated fecal incontinence  She had a colonoscopy 3/15 which showed a hyperplastic polyp, adenomatous polyp, and a rectal prolapse  She is scheduled to see Colorectal surgery later this month  REVIEW OF SYSTEMS IS OTHERWISE NEGATIVE        Historical Information   Past Medical History:   Diagnosis Date   • Acute kidney injury superimposed on chronic kidney disease ([place] [address] ) 11/26/2016   • ADHD (attention deficit hyperactivity disorder) 03/17/2019   • Arthritis     BL HIPS   • 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   • Carpal tunnel syndrome    • Chest pain 03/06/2017   • Chronic kidney disease    • Claudication ([place] [address] ) 3/15/2019   • Closed fracture of base of middle phalanx of finger 06/22/2017   • Closed fracture of middle phalanx of left little finger 07/08/2017   • Coagulopathy ([place] [address] ) 05/15/2019   • Colloid cyst of brain (HCC)    • COPD (chronic obstructive pulmonary disease) (HCC)    • COPD (chronic obstructive pulmonary disease) (HCC)    • Coronary artery disease    • Cough    • Diabetes mellitus [facility])    • GERD (gastroesophageal reflux disease)    • Glaucoma    • Gout    • History of brain disorder 10/07/2020   • Hyperlipidemia    • Hypertension    • Irritable bowel syndrome    • Melena 2019   • PAD (peripheral artery disease) ([facility] ) 5/15/2019   • Paronychia of great toe of left foot 10/07/2020   • Post-traumatic seizures ([facility] ) 2015   • Renal disorder    • Seizures ([facility] )     last    • Shortness of breath    • Single seizure ([facility] ) 2016   • SOB (shortness of breath)    • Syncope and collapse 2020   • Urinary tract infection without hematuria 2016   • Wheezing      Past Surgical History:   Procedure Laterality Date   • BRAIN SURGERY     • CATARACT EXTRACTION     • CHOLECYSTECTOMY     • COLECTOMY RADHA     • COLONOSCOPY     • DECOMPRESSION SPINE LUMBAR POSTERIOR  2019   • HERNIA REPAIR     • HYSTERECTOMY     • INCISION TENDON SHEATH HAND     • NECK SURGERY  2018   • NEUROPLASTY / TRANSPOSITION MEDIAN NERVE AT CARPAL TUNNEL     • SC COLONOSCOPY FLX DX W/COLLJ SPEC WHEN PFRMD N/A 2019    Procedure: COLONOSCOPY;  Surgeon: Evy Moya MD;  Location: [facility]; Service: Gastroenterology   • SC ESOPHAGOGASTRODUODENOSCOPY TRANSORAL DIAGNOSTIC N/A 2019    Procedure: ESOPHAGOGASTRODUODENOSCOPY (EGD); Surgeon: Evy Moya MD;  Location: [facility];   Service: Gastroenterology   • REPLACEMENT TOTAL KNEE Right    • RETINAL DETACHMENT SURGERY     • TUBAL LIGATION       Social History   Social History     Substance and Sexual Activity   Alcohol Use Never     Social History     Substance and Sexual Activity   Drug Use Never     Social History     Tobacco Use   Smoking Status Former   • Packs/day: 0 50   • Years: 40 00   • Total pack years: 20 00   • Types: Cigarettes   • Start date: 63   • Quit date:    • Years since quittin 4   Smokeless Tobacco Never   Tobacco Comments    stopped many years ago     Family History   Problem Relation Age of Onset "  • Asthma Mother    • Heart disease Mother    • Emphysema Father    • Cancer Father    • Prostate cancer Father    • Kidney cancer Sister    • Diabetes Sister    • Kidney disease Sister    • Brain cancer Brother    • Bone cancer Brother    • Heart disease Brother        Meds/Allergies       Current Outpatient Medications:   •  acetaminophen (TYLENOL) 650 mg CR tablet  •  albuterol (ProAir HFA) 90 mcg/act inhaler  •  atorvastatin (LIPITOR) 10 mg tablet  •  Calcium Carbonate-Vitamin D (CALCIUM-D PO)  •  Continuous Blood Gluc  (FreeStyle Mary 2 [place]) YOLANDA  •  Continuous Blood Gluc Sensor (FreeStyle Mary 2 Sensor) MISC  •  fluticasone (FLONASE) 50 mcg/act nasal spray  •  furosemide (LASIX) 20 mg tablet  •  insulin aspart (NovoLOG FlexPen) 100 UNIT/ML injection pen  •  Insulin Pen Needle (Pen Needles) 30G X 8 MM MISC  •  ketoconazole (NIZORAL) 2 % cream  •  Lantus SoloStar 100 units/mL SOPN  •  latanoprost (XALATAN) 0 005 % ophthalmic solution  •  levETIRAcetam (KEPPRA) 250 mg tablet  •  Multiple Vitamin (MULTI VITAMIN DAILY PO)  •  nystatin (MYCOSTATIN) cream  •  nystatin (MYCOSTATIN) powder  •  omeprazole (PriLOSEC) 20 mg delayed release capsule  •  Polyvinyl Alcohol-Povidone (REFRESH OP)  •  Probiotic Product (PROBIOTIC DAILY PO)  •  Spacer/Aero-Holding Chambers (AeroChamber Plus Elian-Vu Large) MISC  •  vitamin B-12 (VITAMIN B-12) 500 mcg tablet  •  fluticasone-umeclidinium-vilanterol (Trelegy Ellipta) 200-62 5-25 mcg/actuation AEPB inhaler  •  montelukast (SINGULAIR) 10 mg tablet    Allergies   Allergen Reactions   • Brimonidine Other (See Comments)   • Sulfa Antibiotics Rash           Objective     Blood pressure 148/70, pulse 74, height 4' 7\" (1 397 m), weight 67 1 kg (148 lb), SpO2 98 %, not currently breastfeeding  Body mass index is 34 4 kg/m²        PHYSICAL EXAM:      General Appearance:   Alert, cooperative, no distress   HEENT:   Normocephalic, atraumatic, anicteric      Neck:  Supple, " symmetrical, trachea midline   Lungs:   Clear to auscultation bilaterally; no rales, rhonchi or wheezing; respirations unlabored    Heart[de-identified]   Regular rate and rhythm; no murmur, rub, or gallop  Abdomen:   Soft, non-tender, non-distended; normal bowel sounds; no masses, no organomegaly    Genitalia:   Deferred    Rectal:   Deferred    Extremities:  No cyanosis, clubbing   Pulses:  2+ and symmetric    Skin:  No jaundice, rashes, or lesions    Lymph nodes:  No palpable cervical lymphadenopathy        Lab Results:   No visits with results within 1 Day(s) from this visit  Latest known visit with results is:   Office Visit on 03/29/2023   Component Date Value   • Hemoglobin A1C 03/29/2023 6 7 (A)          Radiology Results:   No results found

## 2023-07-15 DIAGNOSIS — K21.9 GASTROESOPHAGEAL REFLUX DISEASE WITHOUT ESOPHAGITIS: ICD-10-CM

## 2023-07-17 RX ORDER — OMEPRAZOLE 20 MG/1
CAPSULE, DELAYED RELEASE ORAL
Qty: 180 CAPSULE | Refills: 1 | Status: SHIPPED | OUTPATIENT
Start: 2023-07-17

## 2023-07-18 RX ORDER — CLOTRIMAZOLE 1 %
CREAM (GRAM) TOPICAL
COMMUNITY

## 2023-07-18 RX ORDER — DULOXETIN HYDROCHLORIDE 30 MG/1
CAPSULE, DELAYED RELEASE ORAL
COMMUNITY

## 2023-07-18 RX ORDER — DICYCLOMINE HCL 20 MG
TABLET ORAL
COMMUNITY

## 2023-07-18 NOTE — PROGRESS NOTES
Amarjit Genera 1945 female MRN: [id number]      ASSESSMENT/PLAN  Problem List Items Addressed This Visit        Endocrine    Type 2 diabetes mellitus with diabetic neuropathy ([address]) - Primary    Relevant Medications    Insulin Pen Needle (Pen Needles) 30G X 8 MM MISC    Insulin Glargine Solostar (Lantus SoloStar) 100 UNIT/ML SOPN    Continuous Blood Gluc Sensor (Dexcom G7 Sensor)    Continuous Blood Gluc  (Dexcom G7 ) YOLANDA    Other Relevant Orders    POCT hemoglobin A1c (Completed)    Hyperlipidemia associated with type 2 diabetes mellitus (HCC)    Relevant Medications    Insulin Glargine Solostar (Lantus SoloStar) 100 UNIT/ML SOPN       Respiratory    COPD (chronic obstructive pulmonary disease) (HCC)       Cardiovascular and Mediastinum    Hypertension    Coronary artery disease without angina pectoris    Chronic heart failure with preserved ejection fraction (HCC)       Genitourinary    Stage 3b chronic kidney disease (HCC)       Other    Macular pucker, left    Relevant Medications    latanoprost (XALATAN) 0.005 % ophthalmic solution     DM: A1c 7.3% (from 6.7) -- elevated, but in good range for her age especially given intermittent episodes of hypoglycemia. Encouraged pt to only take one insulin at a time for higher sugars so she does not overcorrect and drop too quickly.  Script sent for Dexcom as this will hopefully provide closer monitoring of blood sugars to prevent large swings either high or low   HTN: Within goal   HLD: Update lipids as previously ordered (reprinted script)   CAD/HFpEF: Asymptomatic, f/u with Cardio as scheduled   COPD: No acute exacerbation on exam, f/u with Pulm as scheduled   CKD: Stable on last check, update and f/u with Nephro as scheduled       Future Appointments   Date Time Provider [address]   8/4/2023 11:40 AM Mei Murrell MD [facility]   8/16/2023 10:20 AM Jose Hayes MD [facility]PH [facility] Med [facility]   10/19/2023  2:00 PM Clarisa Billingsley DO [facility] [place]   10/24/2023  2:00 PM MD PR[facility]          SUBJECTIVE  CC: Diabetes      HPI:  Jim Santos is a 78 y.o. female who presents for chronic follow up as below. DM: Home sugars "sometimes a little high" -- skips insulin if her reading is low; hypoglycemic symptoms every couple of weeks -- will drop down into the 50s and she will get sweaty but also has readings >200 and she will take both her Lantus and Novolog ; needs refill on Lantus, needles, would like Dexcom   HTN: Home BPs none   HLD: Tolerating statin without issue   CAD/HFpEF: ROS as below, follows with Cardio  COPD: Rare SRINATH use, using Trelegy + Singulair + Flonase daily   CKD: Following with Nephro     Scheduled to see Colorectal Surgeon on 8/4 to discuss rectal prolapse -- having significant IBS symptoms (did see GI)   Continues to have pain/weakness in legs and ambulatory dysfunction/falls due to neuropathy. Does have waiver services at home now, so someone comes to her house daily. Needs refill on Latanoprost     Review of Systems   Constitutional: Positive for diaphoresis (with low sugar). Eyes: Negative for visual disturbance. Respiratory: Negative for cough and shortness of breath. Cardiovascular: Negative for chest pain, palpitations and leg swelling. Gastrointestinal: Positive for abdominal distention, abdominal pain, constipation and diarrhea. Has IBS   Endocrine: Negative for polydipsia and polyuria. Musculoskeletal: Positive for myalgias (B/L leg pain). Neurological: Negative for dizziness, tremors and headaches. Psychiatric/Behavioral: Positive for sleep disturbance (due to pain).        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   • Carpal tunnel syndrome    • Chest pain 03/06/2017   • Chronic kidney disease    • Claudication ([address]) 3/15/2019   • Closed fracture of base of middle phalanx of finger 06/22/2017   • Closed fracture of middle phalanx of left little finger 07/08/2017   • Coagulopathy ([address]) 05/15/2019   • Colloid cyst of brain (HCC)    • COPD (chronic obstructive pulmonary disease) (HCC)    • COPD (chronic obstructive pulmonary disease) (HCC)    • Coronary artery disease    • Cough    • Diabetes mellitus ([address])    • GERD (gastroesophageal reflux disease)    • Glaucoma    • Gout    • History of brain disorder 10/07/2020   • Hyperlipidemia    • Hypertension    • Irritable bowel syndrome    • Melena 02/26/2019   • PAD (peripheral artery disease) ([address]) 5/15/2019   • Paronychia of great toe of left foot 10/07/2020   • Post-traumatic seizures ([address]) 07/23/2015   • Renal disorder    • Seizures ([address])     last 2015   • Shortness of breath    • Single seizure ([address]) 05/31/2016   • SOB (shortness of breath)    • Syncope and collapse 11/11/2020   • Urinary tract infection without hematuria 11/26/2016   • Wheezing      Past Surgical History:   Procedure Laterality Date   • BRAIN SURGERY     • CATARACT EXTRACTION     • CHOLECYSTECTOMY     • COLECTOMY RADHA     • COLONOSCOPY     • DECOMPRESSION SPINE LUMBAR POSTERIOR  08/19/2019   • HERNIA REPAIR     • HYSTERECTOMY     • INCISION TENDON SHEATH HAND     • NECK SURGERY  05/24/2018   • NEUROPLASTY / TRANSPOSITION MEDIAN NERVE AT CARPAL TUNNEL     • AL COLONOSCOPY FLX DX W/COLLJ SPEC WHEN PFRMD N/A 03/26/2019    Procedure: COLONOSCOPY;  Surgeon: Eladio Bush MD;  Location: [facility]; Service: Gastroenterology   • AL ESOPHAGOGASTRODUODENOSCOPY TRANSORAL DIAGNOSTIC N/A 03/26/2019    Procedure: ESOPHAGOGASTRODUODENOSCOPY (EGD); Surgeon: Eladio Bush MD;  Location: [facility];   Service: Gastroenterology   • REPLACEMENT TOTAL KNEE Right    • RETINAL DETACHMENT SURGERY     • TUBAL LIGATION       Family History   Problem Relation Age of Onset   • Asthma Mother    • Heart disease Mother    • Emphysema Father    • Cancer Father    • Prostate cancer Father    • Kidney cancer Sister    • Diabetes Sister    • Kidney disease Sister    • Brain cancer Brother    • Bone cancer Brother    • Heart disease Brother       Social History   Social History     Substance and Sexual Activity   Alcohol Use Never     Social History     Substance and Sexual Activity   Drug Use Never     Social History     Tobacco Use   Smoking Status Former   • Packs/day: 0.50   • Years: 40.00   • Total pack years: 20.00   • Types: Cigarettes   • Start date:    • Quit date:    • Years since quittin.5   Smokeless Tobacco Never   Tobacco Comments    stopped many years ago       Medications:     Current Outpatient Medications:   •  Continuous Blood Gluc  (Dexcom G7 ) YOLANDA, Use 1 Application if needed (check sugars), Disp: 1 each, Rfl: 0  •  Continuous Blood Gluc Sensor (Dexcom G7 Sensor), Use 1 Device every 10 days, Disp: 9 each, Rfl: 1  •  Insulin Glargine Solostar (Lantus SoloStar) 100 UNIT/ML SOPN, Inject 0.3 mL (30 Units total) under the skin daily at bedtime, Disp: 15 mL, Rfl: 2  •  Insulin Pen Needle (Pen Needles) 30G X 8 MM MISC, Use 4 (four) times a day (before meals and at bedtime), Disp: 500 each, Rfl: 1  •  latanoprost (XALATAN) 0.005 % ophthalmic solution, Administer 1 drop into the left eye daily, Disp: 2.5 mL, Rfl: 2  •  acetaminophen (TYLENOL) 650 mg CR tablet, Take 650 mg by mouth 2 (two) times a day, Disp: , Rfl:   •  albuterol (ProAir HFA) 90 mcg/act inhaler, Inhale 2 puffs every 6 (six) hours as needed for wheezing or shortness of breath, Disp: 8.5 g, Rfl: 1  •  atorvastatin (LIPITOR) 10 mg tablet, Take 1 tablet (10 mg total) by mouth daily at bedtime, Disp: 90 tablet, Rfl: 3  •  Calcium Carbonate-Vitamin D (CALCIUM-D PO), Take 1 tablet by mouth in the morning, Disp: , Rfl:   •  clotrimazole (LOTRIMIN) 1 % cream, , Disp: , Rfl:   •  dicyclomine (BENTYL) 20 mg tablet, , Disp: , Rfl:   •  DULoxetine (CYMBALTA) 30 mg delayed release capsule, , Disp: , Rfl:   •  fluticasone (FLONASE) 50 mcg/act nasal spray, 1 spray into each nostril daily pt uses as needed, Disp: , Rfl:   •  fluticasone-umeclidinium-vilanterol (Trelegy Ellipta) 200-62.5-25 mcg/actuation AEPB inhaler, Inhale 1 puff daily Rinse mouth after use., Disp: 180 each, Rfl: 1  •  furosemide (LASIX) 20 mg tablet, Take 1 tablet (20 mg total) by mouth daily, Disp: 60 tablet, Rfl: 6  •  insulin aspart (NovoLOG FlexPen) 100 UNIT/ML injection pen, INJECT 12 UNITS BEFORE MEALS (3 MEALS), Disp: 15 mL, Rfl: 3  •  ketoconazole (NIZORAL) 2 % cream, , Disp: , Rfl:   •  levETIRAcetam (KEPPRA) 250 mg tablet, take 1 tablet by mouth every 12 hours, Disp: 180 tablet, Rfl: 1  •  montelukast (SINGULAIR) 10 mg tablet, take 1 tablet by mouth every evening, Disp: 90 tablet, Rfl: 1  •  Multiple Vitamin (MULTI VITAMIN DAILY PO), Take 1 tablet by mouth daily, Disp: , Rfl:   •  mupirocin (BACTROBAN) 2 % ointment, APPLY A SMALL AMOUNT TO THE AFFECTED FOOT/TOE AREA BY TOPICAL ROUTE 3 TIMES PER DAY, Disp: , Rfl:   •  nystatin (MYCOSTATIN) cream, Apply topically 2 (two) times a day, Disp: 30 g, Rfl: 1  •  nystatin (MYCOSTATIN) powder, Apply topically 3 (three) times a day as needed (rash), Disp: 60 g, Rfl: 1  •  omeprazole (PriLOSEC) 20 mg delayed release capsule, take 1 capsule by mouth twice a day, Disp: 180 capsule, Rfl: 1  •  Polyvinyl Alcohol-Povidone (REFRESH OP), Apply to eye, Disp: , Rfl:   •  Probiotic Product (PROBIOTIC DAILY PO), Take by mouth  , Disp: , Rfl:   •  Spacer/Aero-Holding Chambers (AeroChamber Plus Elian-Vu Large) MISC, Use as directed, Disp: , Rfl:   •  vitamin B-12 (VITAMIN B-12) 500 mcg tablet, Take 1,000 mcg by mouth daily, Disp: , Rfl:   Allergies   Allergen Reactions   • Brimonidine Other (See Comments)   • Sulfa Antibiotics Rash       OBJECTIVE    Vitals:   Vitals:    07/19/23 1332   BP: 138/82   Pulse: 74   Weight: 68.9 kg (152 lb)   Height: 4' 7" (1.397 m)           Physical Exam  Vitals and nursing note reviewed. Constitutional:       General: She is not in acute distress. Appearance: Normal appearance. HENT:      Head: Normocephalic and atraumatic. Right Ear: Tympanic membrane, ear canal and external ear normal.      Left Ear: Tympanic membrane, ear canal and external ear normal.      Nose: Nose normal.      Mouth/Throat:      Mouth: Mucous membranes are moist.      Pharynx: No oropharyngeal exudate or posterior oropharyngeal erythema. Eyes:      Conjunctiva/sclera: Conjunctivae normal.   Cardiovascular:      Rate and Rhythm: Normal rate and regular rhythm. Comments: B/L LE edema  Pulmonary:      Effort: Pulmonary effort is normal. No respiratory distress. Breath sounds: Normal breath sounds. Abdominal:      General: Bowel sounds are normal. There is no distension. Palpations: Abdomen is soft. Tenderness: There is no abdominal tenderness. Musculoskeletal:      Comments: Ambulating with walker   Lymphadenopathy:      Cervical: No cervical adenopathy. Skin:     General: Skin is warm and dry. Neurological:      Mental Status: She is alert. Mental status is at baseline.    Psychiatric:         Mood and Affect: Mood normal.                    Clarisa Billingsley,   [facility] [address] [facility]   7/19/2023  2:50 PM

## 2023-07-19 ENCOUNTER — OFFICE VISIT (OUTPATIENT)
Dept: FAMILY MEDICINE CLINIC | Facility: CLINIC | Age: 78
End: 2023-07-19
Payer: COMMERCIAL

## 2023-07-19 VITALS
DIASTOLIC BLOOD PRESSURE: 82 MMHG | HEART RATE: 74 BPM | BODY MASS INDEX: 35.18 KG/M2 | WEIGHT: 152 LBS | SYSTOLIC BLOOD PRESSURE: 138 MMHG | HEIGHT: 55 IN

## 2023-07-19 DIAGNOSIS — E11.69 HYPERLIPIDEMIA ASSOCIATED WITH TYPE 2 DIABETES MELLITUS (HCC): ICD-10-CM

## 2023-07-19 DIAGNOSIS — I25.10 CORONARY ARTERY DISEASE INVOLVING NATIVE CORONARY ARTERY OF NATIVE HEART WITHOUT ANGINA PECTORIS: ICD-10-CM

## 2023-07-19 DIAGNOSIS — E11.40 TYPE 2 DIABETES MELLITUS WITH DIABETIC NEUROPATHY, WITH LONG-TERM CURRENT USE OF INSULIN (HCC): Primary | ICD-10-CM

## 2023-07-19 DIAGNOSIS — E78.5 HYPERLIPIDEMIA ASSOCIATED WITH TYPE 2 DIABETES MELLITUS (HCC): ICD-10-CM

## 2023-07-19 DIAGNOSIS — I50.32 CHRONIC HEART FAILURE WITH PRESERVED EJECTION FRACTION (HCC): ICD-10-CM

## 2023-07-19 DIAGNOSIS — I10 PRIMARY HYPERTENSION: ICD-10-CM

## 2023-07-19 DIAGNOSIS — N18.32 STAGE 3B CHRONIC KIDNEY DISEASE (HCC): ICD-10-CM

## 2023-07-19 DIAGNOSIS — J41.1 MUCOPURULENT CHRONIC BRONCHITIS (HCC): ICD-10-CM

## 2023-07-19 DIAGNOSIS — H35.372 MACULAR PUCKER, LEFT: ICD-10-CM

## 2023-07-19 DIAGNOSIS — Z79.4 TYPE 2 DIABETES MELLITUS WITH DIABETIC NEUROPATHY, WITH LONG-TERM CURRENT USE OF INSULIN (HCC): Primary | ICD-10-CM

## 2023-07-19 LAB — SL AMB POCT HEMOGLOBIN AIC: 7.3 (ref ?–6.5)

## 2023-07-19 PROCEDURE — 99214 OFFICE O/P EST MOD 30 MIN: CPT | Performed by: FAMILY MEDICINE

## 2023-07-19 PROCEDURE — 83036 HEMOGLOBIN GLYCOSYLATED A1C: CPT | Performed by: FAMILY MEDICINE

## 2023-07-19 RX ORDER — LATANOPROST 50 UG/ML
1 SOLUTION/ DROPS OPHTHALMIC DAILY
Qty: 2.5 ML | Refills: 2 | Status: SHIPPED | OUTPATIENT
Start: 2023-07-19

## 2023-07-19 RX ORDER — ACYCLOVIR 400 MG/1
1 TABLET ORAL AS NEEDED
Qty: 1 EACH | Refills: 0 | Status: SHIPPED | OUTPATIENT
Start: 2023-07-19 | End: 2023-07-26 | Stop reason: SDUPTHER

## 2023-07-19 RX ORDER — PEN NEEDLE, DIABETIC 30 GX3/16"
NEEDLE, DISPOSABLE MISCELLANEOUS
Qty: 500 EACH | Refills: 1 | Status: SHIPPED | OUTPATIENT
Start: 2023-07-19

## 2023-07-19 RX ORDER — ACYCLOVIR 400 MG/1
1 TABLET ORAL
Qty: 9 EACH | Refills: 1 | Status: SHIPPED | OUTPATIENT
Start: 2023-07-19 | End: 2023-07-26 | Stop reason: SDUPTHER

## 2023-07-19 RX ORDER — INSULIN GLARGINE 100 [IU]/ML
30 INJECTION, SOLUTION SUBCUTANEOUS
Qty: 15 ML | Refills: 2 | Status: SHIPPED | OUTPATIENT
Start: 2023-07-19

## 2023-07-26 DIAGNOSIS — E11.40 TYPE 2 DIABETES MELLITUS WITH DIABETIC NEUROPATHY, WITH LONG-TERM CURRENT USE OF INSULIN (HCC): ICD-10-CM

## 2023-07-26 DIAGNOSIS — Z79.4 TYPE 2 DIABETES MELLITUS WITH DIABETIC NEUROPATHY, WITH LONG-TERM CURRENT USE OF INSULIN (HCC): ICD-10-CM

## 2023-07-26 RX ORDER — ACYCLOVIR 400 MG/1
1 TABLET ORAL
Qty: 9 EACH | Refills: 3 | Status: SHIPPED | OUTPATIENT
Start: 2023-07-26

## 2023-07-26 RX ORDER — ACYCLOVIR 400 MG/1
1 TABLET ORAL AS NEEDED
Qty: 1 EACH | Refills: 0 | Status: SHIPPED | OUTPATIENT
Start: 2023-07-26

## 2023-07-26 NOTE — TELEPHONE ENCOUNTER
Rxes need to be faxed to [address] [place] [address] @ [phone]    Call Jacqueline Tyler when done @ [phone]

## 2023-07-31 NOTE — PROGRESS NOTES
Diagnoses and all orders for this visit:    Alternating constipation and diarrhea       Alternating constipation and diarrhea  Patient presents for evaluation of multiple GI complaints. Patient has alternating bowel habits with significant changes from constipation with straining to profuse diarrhea. Patient notes urgency with bowel movements after oral intake. Patient notes difficult time with swallowing and reflux. Patient notes intermittent rectal bleeding with straining. Patient also notes intermittent feeling with straining that tissues prolapsing from her anus. This does not occur on an every day basis. She recently underwent colonoscopy for these evaluations it was noted to have some prolapsing tissue from the anus and has been sent in for evaluation. Examination on straining today shows prolapsing internal hemorrhoids with perianal skin tags. Patient notes that this exam is consistent with what she feels at home. Patient is unable to produce full-thickness rectal prolapse today. Patient's mucosal prolapse can be corrected surgically. This would help with symptoms of intermittent rectal bleeding and prolapsing tissue from the anus. Her other significant symptoms of abdominal pain with alternating bowel habits diarrhea and constipation anorectal pain and discomfort and intermittent urgency with incontinence are unlikely to be improved by any surgical procedures. Excisional hemorrhoidectomy was discussed with patient and patient's family. At this point in time they will hold and seek nonoperative means as this will only assist in a small portion of her overall complaints. This will also have significant risk given her medical comorbidities although I suspect that will be well-tolerated. Patient will call if change in symptoms and requires repeat evaluation. TRAMAINE Eric is here today for evaluation of possible rectal prolapse.        Her most recent colonoscopy on 3/15/2023 by  Vena Hummingbird showed: Indication: Weight loss, Lower abdominal pain, Full incontinence of feces, Alternating constipation and diarrhea. Impression:   #1. Diminutive mid transverse polyp status post excisional biopsy removal.  #2.  Diminutive rectosigmoid polyp status post excisional biopsy removal.  #3.  Evidence of rectal prolapse. 5 year recall. Colonoscopy pathology:  H. Colon, "Large intestine, right ascending colon," Biopsy:  - Benign colonic mucosa with intact glandular architecture  - Negative for lymphocytic, collagenous, or active colitis  - Negative for acute colitis  - Negative for granulomas, dysplasia, or carcinoma  I. Colon, “Mid transverse colon polyp,” Biopsy:  - Tubular adenoma  - Negative for high grade dysplasia or carcinoma  J. Colon, "Left descending colon," Biopsy:  - Benign colonic mucosa with intact glandular architecture  - Negative for lymphocytic, collagenous, or active colitis  - Negative for acute colitis  - Negative for granulomas, dysplasia, or carcinoma  K. Colon, “Rectosigmoid colon polyp,” Biopsy:  - Hyperplastic polyp  - Negative for dysplasia or carcinoma  L.  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   • Carpal tunnel syndrome    • Chest pain 03/06/2017   • Chronic kidney disease    • Claudication ([address]) 3/15/2019   • Closed fracture of base of middle phalanx of finger 06/22/2017   • Closed fracture of middle phalanx of left little finger 07/08/2017   • Coagulopathy ([address]) 05/15/2019   • Colloid cyst of brain (HCC)    • COPD (chronic obstructive pulmonary disease) (HCC)    • COPD (chronic obstructive pulmonary disease) (HCC)    • Coronary artery disease    • Cough    • Diabetes mellitus (HCC)    • GERD (gastroesophageal reflux disease)    • Glaucoma    • Gout    • History of brain disorder 10/07/2020   • Hyperlipidemia    • Hypertension    • Irritable bowel syndrome    • Melena 02/26/2019   • PAD (peripheral artery disease) ([address]) 5/15/2019   • Paronychia of great toe of left foot 10/07/2020   • Post-traumatic seizures ([address]) 07/23/2015   • Renal disorder    • Seizures ([address])     last 2015   • Shortness of breath    • Single seizure ([address]) 05/31/2016   • SOB (shortness of breath)    • Syncope and collapse 11/11/2020   • Urinary tract infection without hematuria 11/26/2016   • Wheezing      Past Surgical History:   Procedure Laterality Date   • BRAIN SURGERY     • CATARACT EXTRACTION     • CHOLECYSTECTOMY     • COLECTOMY RADHA     • COLONOSCOPY     • DECOMPRESSION SPINE LUMBAR POSTERIOR  08/19/2019   • HERNIA REPAIR     • HYSTERECTOMY     • INCISION TENDON SHEATH HAND     • NECK SURGERY  05/24/2018   • NEUROPLASTY / TRANSPOSITION MEDIAN NERVE AT CARPAL TUNNEL     • WI COLONOSCOPY FLX DX W/COLLJ SPEC WHEN PFRMD N/A 03/26/2019    Procedure: COLONOSCOPY;  Surgeon: Juancarlos De La Torre MD;  Location: [facility]; Service: Gastroenterology   • WI ESOPHAGOGASTRODUODENOSCOPY TRANSORAL DIAGNOSTIC N/A 03/26/2019    Procedure: ESOPHAGOGASTRODUODENOSCOPY (EGD); Surgeon: Juancarlos De La Torre MD;  Location: [facility];   Service: Gastroenterology   • REPLACEMENT TOTAL KNEE Right    • RETINAL DETACHMENT SURGERY     • TUBAL LIGATION         Current Outpatient Medications:   •  acetaminophen (TYLENOL) 650 mg CR tablet, Take 650 mg by mouth 2 (two) times a day, Disp: , Rfl:   •  albuterol (ProAir HFA) 90 mcg/act inhaler, Inhale 2 puffs every 6 (six) hours as needed for wheezing or shortness of breath, Disp: 8.5 g, Rfl: 1  •  atorvastatin (LIPITOR) 10 mg tablet, Take 1 tablet (10 mg total) by mouth daily at bedtime, Disp: 90 tablet, Rfl: 3  •  Calcium Carbonate-Vitamin D (CALCIUM-D PO), Take 1 tablet by mouth in the morning, Disp: , Rfl:   •  clotrimazole (LOTRIMIN) 1 % cream, , Disp: , Rfl:   •  Continuous Blood Gluc  (Dexcom G7 ) YOLANDA, Use 1 Application if needed (check sugars), Disp: 1 each, Rfl: 0  •  Continuous Blood Gluc Sensor (Dexcom G7 Sensor), Use 1 Device every 10 days, Disp: 9 each, Rfl: 3  •  dicyclomine (BENTYL) 20 mg tablet, , Disp: , Rfl:   •  DULoxetine (CYMBALTA) 30 mg delayed release capsule, , Disp: , Rfl:   •  fluticasone (FLONASE) 50 mcg/act nasal spray, 1 spray into each nostril daily pt uses as needed, Disp: , Rfl:   •  fluticasone-umeclidinium-vilanterol (Trelegy Ellipta) 200-62.5-25 mcg/actuation AEPB inhaler, Inhale 1 puff daily Rinse mouth after use., Disp: 180 each, Rfl: 1  •  furosemide (LASIX) 20 mg tablet, Take 1 tablet (20 mg total) by mouth daily, Disp: 60 tablet, Rfl: 6  •  insulin aspart (NovoLOG FlexPen) 100 UNIT/ML injection pen, INJECT 12 UNITS BEFORE MEALS (3 MEALS), Disp: 15 mL, Rfl: 3  •  Insulin Glargine Solostar (Lantus SoloStar) 100 UNIT/ML SOPN, Inject 0.3 mL (30 Units total) under the skin daily at bedtime, Disp: 15 mL, Rfl: 2  •  Insulin Pen Needle (Pen Needles) 30G X 8 MM MISC, Use 4 (four) times a day (before meals and at bedtime), Disp: 500 each, Rfl: 1  •  ketoconazole (NIZORAL) 2 % cream, , Disp: , Rfl:   •  latanoprost (XALATAN) 0.005 % ophthalmic solution, Administer 1 drop into the left eye daily, Disp: 2.5 mL, Rfl: 2  •  levETIRAcetam (KEPPRA) 250 mg tablet, take 1 tablet by mouth every 12 hours, Disp: 180 tablet, Rfl: 1  •  montelukast (SINGULAIR) 10 mg tablet, take 1 tablet by mouth every evening, Disp: 90 tablet, Rfl: 1  •  Multiple Vitamin (MULTI VITAMIN DAILY PO), Take 1 tablet by mouth daily, Disp: , Rfl:   •  mupirocin (BACTROBAN) 2 % ointment, APPLY A SMALL AMOUNT TO THE AFFECTED FOOT/TOE AREA BY TOPICAL ROUTE 3 TIMES PER DAY, Disp: , Rfl:   •  nystatin (MYCOSTATIN) cream, Apply topically 2 (two) times a day, Disp: 30 g, Rfl: 1  •  nystatin (MYCOSTATIN) powder, Apply topically 3 (three) times a day as needed (rash), Disp: 60 g, Rfl: 1  •  omeprazole (PriLOSEC) 20 mg delayed release capsule, take 1 capsule by mouth twice a day, Disp: 180 capsule, Rfl: 1  •  Polyvinyl Alcohol-Povidone (REFRESH OP), Apply to eye, Disp: , Rfl:   •  Probiotic Product (PROBIOTIC DAILY PO), Take by mouth  , Disp: , Rfl:   •  Spacer/Aero-Holding Chambers (AeroChamber Plus Elian-Vu Large) MISC, Use as directed, Disp: , Rfl:   •  vitamin B-12 (VITAMIN B-12) 500 mcg tablet, Take 1,000 mcg by mouth daily, Disp: , Rfl:   Allergies as of 08/04/2023 - Reviewed 08/04/2023   Allergen Reaction Noted   • Brimonidine Other (See Comments) 12/08/2020   • Sulfa antibiotics Rash 11/26/2016     Review of Systems   All other systems reviewed and are negative. There were no vitals filed for this visit. Physical Exam  Constitutional:       Appearance: Normal appearance. HENT:      Head: Normocephalic and atraumatic. Eyes:      Extraocular Movements: Extraocular movements intact. Pupils: Pupils are equal, round, and reactive to light. Pulmonary:      Effort: Pulmonary effort is normal.   Musculoskeletal:         General: Normal range of motion. Skin:     General: Skin is warm and dry. Neurological:      General: No focal deficit present. Mental Status: She is alert and oriented to person, place, and time. Psychiatric:         Mood and Affect: Mood normal.         Behavior: Behavior normal.         Thought Content:  Thought content normal.         Judgment: Judgment normal.     Lower Endoscopy    Date/Time: 8/4/2023 11:40 AM    Performed by: Melissa Vanegas MD  Authorized by: Melissa Vanegas MD    Verbal consent obtained?: Yes    Risks and benefits: Risks, benefits and alternatives were discussed    Consent given by: Patient  Indications: rectal pain    Patient sedated: No    Scope type:   Anoscope  External exam performed: Yes    Pilonidal sinus tract: No    Pilonidal cyst: No    Pilonidal tenderness: No    Perianal skin tags: Yes    Perirectal warts: No    Perianal maceration: No    Perianal induration: No    Perianal erythema: No    External hemorrhoids: No    Digital exam performed: Yes    Laxity of anal sphincter: No    Internal hemorrhoids: Yes    Prolapsed: Yes    Intraluminal mass: No    Inflammation: No    Anal fissures: No    Anal fistulae: No    Anal stricture: No    Abscess: No    Procedure termination:  Procedure complete  Patient tolerance:  Patient tolerated the procedure well with no immediate complications

## 2023-08-04 ENCOUNTER — OFFICE VISIT (OUTPATIENT)
Age: 78
End: 2023-08-04
Payer: COMMERCIAL

## 2023-08-04 VITALS — HEIGHT: 55 IN | WEIGHT: 152 LBS | BODY MASS INDEX: 35.18 KG/M2

## 2023-08-04 DIAGNOSIS — R19.8 ALTERNATING CONSTIPATION AND DIARRHEA: Primary | ICD-10-CM

## 2023-08-04 PROCEDURE — 99203 OFFICE O/P NEW LOW 30 MIN: CPT | Performed by: COLON & RECTAL SURGERY

## 2023-08-04 PROCEDURE — 46600 DIAGNOSTIC ANOSCOPY SPX: CPT | Performed by: COLON & RECTAL SURGERY

## 2023-08-04 NOTE — ASSESSMENT & PLAN NOTE
Patient presents for evaluation of multiple GI complaints. Patient has alternating bowel habits with significant changes from constipation with straining to profuse diarrhea. Patient notes urgency with bowel movements after oral intake. Patient notes difficult time with swallowing and reflux. Patient notes intermittent rectal bleeding with straining. Patient also notes intermittent feeling with straining that tissues prolapsing from her anus. This does not occur on an every day basis. She recently underwent colonoscopy for these evaluations it was noted to have some prolapsing tissue from the anus and has been sent in for evaluation. Examination on straining today shows prolapsing internal hemorrhoids with perianal skin tags. Patient notes that this exam is consistent with what she feels at home. Patient is unable to produce full-thickness rectal prolapse today. Patient's mucosal prolapse can be corrected surgically. This would help with symptoms of intermittent rectal bleeding and prolapsing tissue from the anus. Her other significant symptoms of abdominal pain with alternating bowel habits diarrhea and constipation anorectal pain and discomfort and intermittent urgency with incontinence are unlikely to be improved by any surgical procedures. Excisional hemorrhoidectomy was discussed with patient and patient's family. At this point in time they will hold and seek nonoperative means as this will only assist in a small portion of her overall complaints. This will also have significant risk given her medical comorbidities although I suspect that will be well-tolerated. Patient will call if change in symptoms and requires repeat evaluation.

## 2023-08-07 ENCOUNTER — TELEPHONE (OUTPATIENT)
Dept: NEPHROLOGY | Facility: CLINIC | Age: 78
End: 2023-08-07

## 2023-08-31 DIAGNOSIS — E78.2 MIXED HYPERLIPIDEMIA: ICD-10-CM

## 2023-08-31 RX ORDER — ATORVASTATIN CALCIUM 10 MG/1
10 TABLET, FILM COATED ORAL
Qty: 90 TABLET | Refills: 1 | Status: SHIPPED | OUTPATIENT
Start: 2023-08-31

## 2023-09-23 DIAGNOSIS — R60.0 BILATERAL LEG EDEMA: ICD-10-CM

## 2023-09-23 DIAGNOSIS — I50.32 CHRONIC HEART FAILURE WITH PRESERVED EJECTION FRACTION (HCC): ICD-10-CM

## 2023-09-25 NOTE — TELEPHONE ENCOUNTER
Name from pharmacy: FUROSEMIDE 20 [address]         Will file in chart as: furosemide (LASIX) 20 mg tablet    Sig: take 1 tablet by mouth once daily    Disp:  60 tablet    Refills:  6 (Pharmacy requested: Not specified)    Start: 9/23/2023    Class: Normal    Non-formulary For: Chronic heart failure with preserved ejection fraction (HCC), Bilateral leg edema    Last ordered: 1 year ago (9/20/2022) by Yeimy Benson MD    Last refill: 7/30/2023    Rx #: [id number]    Cardiovascular:  Diuretics - Loop Failed 09/23/2023 09:44 AM   Protocol Details  eGFR is 60 or above and within 360 days    BP completed in the last 6 months    K in normal range and within 360 days    Na in normal range and within 360 days    Valid encounter within last 6 months      To be filled at: [facility], [address] [place] [address]   Please review and refill if possible  Thanks!

## 2023-09-27 RX ORDER — FUROSEMIDE 20 MG/1
20 TABLET ORAL DAILY
Qty: 60 TABLET | Refills: 6 | Status: SHIPPED | OUTPATIENT
Start: 2023-09-27

## 2023-10-11 ENCOUNTER — RA CDI HCC (OUTPATIENT)
Dept: OTHER | Facility: HOSPITAL | Age: 78
End: 2023-10-11

## 2023-10-11 NOTE — PROGRESS NOTES
[facility] coding opportunities       Chart reviewed, no opportunity found: [address]        Patients Insurance     Medicare Insurance: [employer]

## 2023-10-13 ENCOUNTER — APPOINTMENT (OUTPATIENT)
Dept: LAB | Facility: CLINIC | Age: 78
End: 2023-10-13
Payer: COMMERCIAL

## 2023-10-13 DIAGNOSIS — I50.32 CHRONIC HEART FAILURE WITH PRESERVED EJECTION FRACTION (HCC): ICD-10-CM

## 2023-10-13 DIAGNOSIS — E11.40 TYPE 2 DIABETES MELLITUS WITH DIABETIC NEUROPATHY, WITH LONG-TERM CURRENT USE OF INSULIN (HCC): ICD-10-CM

## 2023-10-13 DIAGNOSIS — J41.1 MUCOPURULENT CHRONIC BRONCHITIS (HCC): ICD-10-CM

## 2023-10-13 DIAGNOSIS — E11.69 HYPERLIPIDEMIA ASSOCIATED WITH TYPE 2 DIABETES MELLITUS: ICD-10-CM

## 2023-10-13 DIAGNOSIS — I10 PRIMARY HYPERTENSION: ICD-10-CM

## 2023-10-13 DIAGNOSIS — Z79.899 LONG TERM USE OF DRUG: ICD-10-CM

## 2023-10-13 DIAGNOSIS — I25.10 CORONARY ARTERY DISEASE INVOLVING NATIVE CORONARY ARTERY OF NATIVE HEART WITHOUT ANGINA PECTORIS: ICD-10-CM

## 2023-10-13 DIAGNOSIS — Z79.4 TYPE 2 DIABETES MELLITUS WITH DIABETIC NEUROPATHY, WITH LONG-TERM CURRENT USE OF INSULIN (HCC): ICD-10-CM

## 2023-10-13 DIAGNOSIS — N18.32 STAGE 3B CHRONIC KIDNEY DISEASE (HCC): ICD-10-CM

## 2023-10-13 DIAGNOSIS — E78.5 HYPERLIPIDEMIA ASSOCIATED WITH TYPE 2 DIABETES MELLITUS: ICD-10-CM

## 2023-10-13 DIAGNOSIS — E55.9 VITAMIN D DEFICIENCY: ICD-10-CM

## 2023-10-13 LAB
25(OH)D3 SERPL-MCNC: 50.9 NG/ML (ref 30–100)
ALBUMIN SERPL BCP-MCNC: 4 G/DL (ref 3.5–5)
ALP SERPL-CCNC: 55 U/L (ref 34–104)
ALT SERPL W P-5'-P-CCNC: 16 U/L (ref 7–52)
ANION GAP SERPL CALCULATED.3IONS-SCNC: 8 MMOL/L
AST SERPL W P-5'-P-CCNC: 18 U/L (ref 13–39)
BASOPHILS # BLD AUTO: 0.07 THOUSANDS/ÂΜL (ref 0–0.1)
BASOPHILS NFR BLD AUTO: 1 % (ref 0–1)
BILIRUB SERPL-MCNC: 0.49 MG/DL (ref 0.2–1)
BUN SERPL-MCNC: 27 MG/DL (ref 5–25)
CALCIUM SERPL-MCNC: 9.7 MG/DL (ref 8.4–10.2)
CHLORIDE SERPL-SCNC: 104 MMOL/L (ref 96–108)
CHOLEST SERPL-MCNC: 162 MG/DL
CO2 SERPL-SCNC: 30 MMOL/L (ref 21–32)
CREAT SERPL-MCNC: 1.07 MG/DL (ref 0.6–1.3)
CREAT UR-MCNC: 71.8 MG/DL
EOSINOPHIL # BLD AUTO: 0.21 THOUSAND/ÂΜL (ref 0–0.61)
EOSINOPHIL NFR BLD AUTO: 3 % (ref 0–6)
ERYTHROCYTE [DISTWIDTH] IN BLOOD BY AUTOMATED COUNT: 14.2 % (ref 11.6–15.1)
GFR SERPL CREATININE-BSD FRML MDRD: 49 ML/MIN/1.73SQ M
GLUCOSE P FAST SERPL-MCNC: 110 MG/DL (ref 65–99)
HCT VFR BLD AUTO: 44.5 % (ref 34.8–46.1)
HDLC SERPL-MCNC: 42 MG/DL
HGB BLD-MCNC: 14.3 G/DL (ref 11.5–15.4)
IMM GRANULOCYTES # BLD AUTO: 0.03 THOUSAND/UL (ref 0–0.2)
IMM GRANULOCYTES NFR BLD AUTO: 0 % (ref 0–2)
LDLC SERPL CALC-MCNC: 84 MG/DL (ref 0–100)
LYMPHOCYTES # BLD AUTO: 1.66 THOUSANDS/ÂΜL (ref 0.6–4.47)
LYMPHOCYTES NFR BLD AUTO: 21 % (ref 14–44)
MCH RBC QN AUTO: 28.6 PG (ref 26.8–34.3)
MCHC RBC AUTO-ENTMCNC: 32.1 G/DL (ref 31.4–37.4)
MCV RBC AUTO: 89 FL (ref 82–98)
MICROALBUMIN UR-MCNC: 13.5 MG/L
MICROALBUMIN/CREAT 24H UR: 19 MG/G CREATININE (ref 0–30)
MONOCYTES # BLD AUTO: 0.53 THOUSAND/ÂΜL (ref 0.17–1.22)
MONOCYTES NFR BLD AUTO: 7 % (ref 4–12)
NEUTROPHILS # BLD AUTO: 5.5 THOUSANDS/ÂΜL (ref 1.85–7.62)
NEUTS SEG NFR BLD AUTO: 68 % (ref 43–75)
NRBC BLD AUTO-RTO: 0 /100 WBCS
PLATELET # BLD AUTO: 256 THOUSANDS/UL (ref 149–390)
PMV BLD AUTO: 10.2 FL (ref 8.9–12.7)
POTASSIUM SERPL-SCNC: 4.3 MMOL/L (ref 3.5–5.3)
PROT SERPL-MCNC: 7.8 G/DL (ref 6.4–8.4)
RBC # BLD AUTO: 5 MILLION/UL (ref 3.81–5.12)
SODIUM SERPL-SCNC: 142 MMOL/L (ref 135–147)
TRIGL SERPL-MCNC: 181 MG/DL
TSH SERPL DL<=0.05 MIU/L-ACNC: 4.08 UIU/ML (ref 0.45–4.5)
WBC # BLD AUTO: 8 THOUSAND/UL (ref 4.31–10.16)

## 2023-10-13 PROCEDURE — 82306 VITAMIN D 25 HYDROXY: CPT

## 2023-10-13 PROCEDURE — 80061 LIPID PANEL: CPT

## 2023-10-13 PROCEDURE — 82043 UR ALBUMIN QUANTITATIVE: CPT

## 2023-10-13 PROCEDURE — 82570 ASSAY OF URINE CREATININE: CPT

## 2023-10-13 PROCEDURE — 80053 COMPREHEN METABOLIC PANEL: CPT

## 2023-10-13 PROCEDURE — 85025 COMPLETE CBC W/AUTO DIFF WBC: CPT

## 2023-10-13 PROCEDURE — 84443 ASSAY THYROID STIM HORMONE: CPT

## 2023-10-13 PROCEDURE — 36415 COLL VENOUS BLD VENIPUNCTURE: CPT

## 2023-10-18 NOTE — PROGRESS NOTES
Ata Nessa 1945 female MRN: [id number]      ASSESSMENT/PLAN  Problem List Items Addressed This Visit        Endocrine    Type 2 diabetes mellitus with diabetic neuropathy ([address]) - Primary    Relevant Orders    POCT hemoglobin A1c (Completed)    Hyperlipidemia associated with type 2 diabetes mellitus        Respiratory    COPD (chronic obstructive pulmonary disease) ([address])       Cardiovascular and Mediastinum    Hypertension    Coronary artery disease without angina pectoris    Chronic heart failure with preserved ejection fraction (HCC)       Nervous and Auditory    Seizure disorder (HCC)    Relevant Medications    gabapentin (Neurontin) 100 mg capsule       Genitourinary    Stage 3b chronic kidney disease (HCC)    Chronic kidney disease-mineral and bone disorder       Other    Chronic pain syndrome    Relevant Medications    gabapentin (Neurontin) 100 mg capsule   Other Visit Diagnoses     Encounter for immunization        Relevant Orders    influenza vaccine, high-dose, PF 0.7 mL (FLUZONE HIGH-DOSE) (Completed)        DM: A1c 6.5% (from 7.3) -- well controlled, continue current regimen   HTN: Within goal   HLD: Triglycerides somewhat above goal, though otherwise within range   CAD/HFpEF: No exacerbation on exam, f/u with Cardio as scheduled  Chronic Bronchitis: No exacerbation on exam, continue Trelegy + SRINATH PRN, f/u with Pulm as scheduled   CKD w/ bone disease: Stable, f/u with Pulm as scheduled   Seizure Dx: Asymptomatic     Will trail Gabapentin 100 mg qHS for neuropathy -- reviewed possible ADRs including somnolence       Future Appointments   Date Time Provider [address]   10/23/2023  1:40 PM JOSHUA García PULM E [facility]   10/24/2023  2:00 PM MD PRABHA [facility]   12/18/2023  1:00 PM Fer Bhatti MD [facility]   1/23/2024  1:40 PM DO ELISA Lambert [facility]            SUBJECTIVE  CC: Diabetes and Follow-up      HPI:  Cate Seaman Matthew Salinas is a 78 y.o. female who presents with her son for chronic follow up and lab review. DM: Urine microalbumin/cr ratio WNL; home sugars "sometimes its high and sometimes its low" (has CGM) -- this morning was 108    HTN: Home BPs (M-Th with Caregiver), sometimes its high   HLD: Lipids: Total 162, LDL 84, HDL 42, ; LFTs WNL  CAD/HFpEF: ROS as below   Chronic Bronchitis: Takes Trelegy daily, rare SRINATH   CKD w/ bone disease: Cr 1.07/GFR 49 (stable)   Seizure Dx: No breakthrough symptoms     D 50  TSH WNL   CBC benign     Would like to get a motorized scooter due to her balance and leg pain   Wondering about Gabapentin for her neuropathy         Review of Systems   Eyes:  Positive for discharge (both eyes tear) and visual disturbance. Respiratory:  Positive for shortness of breath (intermittent). Negative for cough and wheezing. Cardiovascular:  Negative for chest pain and palpitations. Gastrointestinal:  Positive for abdominal pain, constipation and diarrhea. (+) hemorrhoids -- is not a candidate for surgery   Has known hernia, has had multiple abdominal surgeries   Musculoskeletal:  Positive for arthralgias and myalgias. Neurological:  Negative for seizures.        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   • Carpal tunnel syndrome    • Chest pain 03/06/2017   • Chronic kidney disease    • Claudication ([address]) 3/15/2019   • Closed fracture of base of middle phalanx of finger 06/22/2017   • Closed fracture of middle phalanx of left little finger 07/08/2017   • Coagulopathy ([address]) 05/15/2019   • Colloid cyst of brain (HCC)    • COPD (chronic obstructive pulmonary disease) (HCC)    • COPD (chronic obstructive pulmonary disease) ([address])    • Coronary artery disease    • Cough    • Diabetes mellitus (HCC)    • GERD (gastroesophageal reflux disease)    • Glaucoma    • Gout    • History of brain disorder 10/07/2020   • Hyperlipidemia    • Hypertension    • Irritable bowel syndrome    • Melena 02/26/2019   • PAD (peripheral artery disease) ([address]) 5/15/2019   • Paronychia of great toe of left foot 10/07/2020   • Post-traumatic seizures ([address]) 07/23/2015   • Renal disorder    • Seizures ([address])     last 2015   • Shortness of breath    • Single seizure ([address]) 05/31/2016   • SOB (shortness of breath)    • Syncope and collapse 11/11/2020   • Urinary tract infection without hematuria 11/26/2016   • Wheezing      Past Surgical History:   Procedure Laterality Date   • BRAIN SURGERY     • CATARACT EXTRACTION     • CHOLECYSTECTOMY     • COLECTOMY RADHA     • COLONOSCOPY     • DECOMPRESSION SPINE LUMBAR POSTERIOR  08/19/2019   • HERNIA REPAIR     • HYSTERECTOMY     • INCISION TENDON SHEATH HAND     • NECK SURGERY  05/24/2018   • NEUROPLASTY / TRANSPOSITION MEDIAN NERVE AT CARPAL TUNNEL     • WY COLONOSCOPY FLX DX W/COLLJ SPEC WHEN PFRMD N/A 03/26/2019    Procedure: COLONOSCOPY;  Surgeon: Tyree Sanchez MD;  Location: [facility]; Service: Gastroenterology   • WY ESOPHAGOGASTRODUODENOSCOPY TRANSORAL DIAGNOSTIC N/A 03/26/2019    Procedure: ESOPHAGOGASTRODUODENOSCOPY (EGD); Surgeon: Tyree Sanchez MD;  Location: [facility];   Service: Gastroenterology   • REPLACEMENT TOTAL KNEE Right    • RETINAL DETACHMENT SURGERY     • TUBAL LIGATION       Family History   Problem Relation Age of Onset   • Asthma Mother    • Heart disease Mother    • Emphysema Father    • Cancer Father    • Prostate cancer Father    • Kidney cancer Sister    • Diabetes Sister    • Kidney disease Sister    • Brain cancer Brother    • Bone cancer Brother    • Heart disease Brother       Social History   Social History     Substance and Sexual Activity   Alcohol Use Never Social History     Substance and Sexual Activity   Drug Use Never     Social History     Tobacco Use   Smoking Status Former   • Packs/day: 0.50   • Years: 40.00   • Total pack years: 20.00   • Types: Cigarettes   • Start date:    • Quit date:    • Years since quittin.8   Smokeless Tobacco Never   Tobacco Comments    stopped many years ago       Medications:     Current Outpatient Medications:   •  gabapentin (Neurontin) 100 mg capsule, Take 1 capsule (100 mg total) by mouth daily at bedtime, Disp: 30 capsule, Rfl: 1  •  acetaminophen (TYLENOL) 650 mg CR tablet, Take 650 mg by mouth 2 (two) times a day, Disp: , Rfl:   •  albuterol (ProAir HFA) 90 mcg/act inhaler, Inhale 2 puffs every 6 (six) hours as needed for wheezing or shortness of breath, Disp: 8.5 g, Rfl: 1  •  atorvastatin (LIPITOR) 10 mg tablet, take 1 tablet by mouth at bedtime, Disp: 90 tablet, Rfl: 1  •  Calcium Carbonate-Vitamin D (CALCIUM-D PO), Take 1 tablet by mouth in the morning, Disp: , Rfl:   •  clotrimazole (LOTRIMIN) 1 % cream, , Disp: , Rfl:   •  Continuous Blood Gluc  (Dexcom G7 ) YOLANDA, Use 1 Application if needed (check sugars), Disp: 1 each, Rfl: 0  •  Continuous Blood Gluc Sensor (Dexcom G7 Sensor), Use 1 Device every 10 days, Disp: 9 each, Rfl: 3  •  dicyclomine (BENTYL) 20 mg tablet, , Disp: , Rfl:   •  DULoxetine (CYMBALTA) 30 mg delayed release capsule, , Disp: , Rfl:   •  fluticasone (FLONASE) 50 mcg/act nasal spray, 1 spray into each nostril daily pt uses as needed, Disp: , Rfl:   •  fluticasone-umeclidinium-vilanterol (Trelegy Ellipta) 200-62.5-25 mcg/actuation AEPB inhaler, Inhale 1 puff daily Rinse mouth after use., Disp: 180 each, Rfl: 1  •  furosemide (LASIX) 20 mg tablet, take 1 tablet by mouth once daily, Disp: 60 tablet, Rfl: 6  •  insulin aspart (NovoLOG FlexPen) 100 UNIT/ML injection pen, INJECT 12 UNITS BEFORE MEALS (3 MEALS), Disp: 15 mL, Rfl: 3  •  Insulin Glargine Solostar (Lantus SoloStar) 100 UNIT/ML SOPN, Inject 0.3 mL (30 Units total) under the skin daily at bedtime, Disp: 15 mL, Rfl: 2  •  Insulin Pen Needle (Pen Needles) 30G X 8 MM MISC, Use 4 (four) times a day (before meals and at bedtime), Disp: 500 each, Rfl: 1  •  ketoconazole (NIZORAL) 2 % cream, , Disp: , Rfl:   •  latanoprost (XALATAN) 0.005 % ophthalmic solution, Administer 1 drop into the left eye daily, Disp: 2.5 mL, Rfl: 2  •  levETIRAcetam (KEPPRA) 250 mg tablet, take 1 tablet by mouth every 12 hours, Disp: 180 tablet, Rfl: 1  •  montelukast (SINGULAIR) 10 mg tablet, take 1 tablet by mouth every evening, Disp: 90 tablet, Rfl: 1  •  Multiple Vitamin (MULTI VITAMIN DAILY PO), Take 1 tablet by mouth daily, Disp: , Rfl:   •  mupirocin (BACTROBAN) 2 % ointment, APPLY A SMALL AMOUNT TO THE AFFECTED FOOT/TOE AREA BY TOPICAL ROUTE 3 TIMES PER DAY, Disp: , Rfl:   •  nystatin (MYCOSTATIN) cream, Apply topically 2 (two) times a day, Disp: 30 g, Rfl: 1  •  nystatin (MYCOSTATIN) powder, Apply topically 3 (three) times a day as needed (rash), Disp: 60 g, Rfl: 1  •  omeprazole (PriLOSEC) 20 mg delayed release capsule, take 1 capsule by mouth twice a day, Disp: 180 capsule, Rfl: 1  •  Polyvinyl Alcohol-Povidone (REFRESH OP), Apply to eye, Disp: , Rfl:   •  Probiotic Product (PROBIOTIC DAILY PO), Take by mouth  , Disp: , Rfl:   •  Spacer/Aero-Holding Chambers (AeroChamber Plus Elian-Vu Large) MISC, Use as directed, Disp: , Rfl:   •  vitamin B-12 (VITAMIN B-12) 500 mcg tablet, Take 1,000 mcg by mouth daily, Disp: , Rfl:   Allergies   Allergen Reactions   • Brimonidine Other (See Comments)   • Sulfa Antibiotics Rash       OBJECTIVE    Vitals:   Vitals:    10/19/23 1333   BP: 138/58   Pulse: 71   Temp: 98.1 °F (36.7 °C)   SpO2: 95%   Weight: 69.9 kg (154 lb)   Height: 4' 7" (1.397 m)           Physical Exam  Vitals and nursing note reviewed. Constitutional:       General: She is not in acute distress. Appearance: Normal appearance.    HENT: Head: Normocephalic and atraumatic. Right Ear: Ear canal and external ear normal.      Left Ear: Ear canal and external ear normal.      Nose: Nose normal.      Mouth/Throat:      Mouth: Mucous membranes are moist.      Pharynx: No oropharyngeal exudate or posterior oropharyngeal erythema. Eyes:      Conjunctiva/sclera: Conjunctivae normal.   Cardiovascular:      Rate and Rhythm: Normal rate and regular rhythm. Pulmonary:      Effort: Pulmonary effort is normal. No respiratory distress. Breath sounds: Normal breath sounds. Abdominal:      General: Bowel sounds are normal. There is no distension. Palpations: Abdomen is soft. Tenderness: There is no abdominal tenderness. Musculoskeletal:      Comments: Ambulating with walker  B/L LE edema, chronic unchanged    Lymphadenopathy:      Cervical: No cervical adenopathy. Skin:     General: Skin is warm and dry. Neurological:      Mental Status: She is alert. Mental status is at baseline.    Psychiatric:         Mood and Affect: Mood normal.                    Clarisa Billingsley DO  [facility]   10/19/2023  2:20 PM

## 2023-10-19 ENCOUNTER — OFFICE VISIT (OUTPATIENT)
Dept: FAMILY MEDICINE CLINIC | Facility: CLINIC | Age: 78
End: 2023-10-19
Payer: COMMERCIAL

## 2023-10-19 VITALS
HEART RATE: 71 BPM | OXYGEN SATURATION: 95 % | TEMPERATURE: 98.1 F | SYSTOLIC BLOOD PRESSURE: 138 MMHG | HEIGHT: 55 IN | WEIGHT: 154 LBS | BODY MASS INDEX: 35.64 KG/M2 | DIASTOLIC BLOOD PRESSURE: 58 MMHG

## 2023-10-19 DIAGNOSIS — I25.10 CORONARY ARTERY DISEASE INVOLVING NATIVE CORONARY ARTERY OF NATIVE HEART WITHOUT ANGINA PECTORIS: ICD-10-CM

## 2023-10-19 DIAGNOSIS — G89.4 CHRONIC PAIN SYNDROME: ICD-10-CM

## 2023-10-19 DIAGNOSIS — J41.1 MUCOPURULENT CHRONIC BRONCHITIS (HCC): ICD-10-CM

## 2023-10-19 DIAGNOSIS — M89.9 CHRONIC KIDNEY DISEASE-MINERAL AND BONE DISORDER: ICD-10-CM

## 2023-10-19 DIAGNOSIS — E78.5 HYPERLIPIDEMIA ASSOCIATED WITH TYPE 2 DIABETES MELLITUS: ICD-10-CM

## 2023-10-19 DIAGNOSIS — I50.32 CHRONIC HEART FAILURE WITH PRESERVED EJECTION FRACTION (HCC): ICD-10-CM

## 2023-10-19 DIAGNOSIS — E11.69 HYPERLIPIDEMIA ASSOCIATED WITH TYPE 2 DIABETES MELLITUS: ICD-10-CM

## 2023-10-19 DIAGNOSIS — N18.9 CHRONIC KIDNEY DISEASE-MINERAL AND BONE DISORDER: ICD-10-CM

## 2023-10-19 DIAGNOSIS — Z79.4 TYPE 2 DIABETES MELLITUS WITH DIABETIC NEUROPATHY, WITH LONG-TERM CURRENT USE OF INSULIN (HCC): Primary | ICD-10-CM

## 2023-10-19 DIAGNOSIS — E83.9 CHRONIC KIDNEY DISEASE-MINERAL AND BONE DISORDER: ICD-10-CM

## 2023-10-19 DIAGNOSIS — I10 PRIMARY HYPERTENSION: ICD-10-CM

## 2023-10-19 DIAGNOSIS — N18.32 STAGE 3B CHRONIC KIDNEY DISEASE (HCC): ICD-10-CM

## 2023-10-19 DIAGNOSIS — Z23 ENCOUNTER FOR IMMUNIZATION: ICD-10-CM

## 2023-10-19 DIAGNOSIS — R56.9 SEIZURE (HCC): ICD-10-CM

## 2023-10-19 DIAGNOSIS — G40.909 SEIZURE DISORDER (HCC): ICD-10-CM

## 2023-10-19 DIAGNOSIS — E11.40 TYPE 2 DIABETES MELLITUS WITH DIABETIC NEUROPATHY, WITH LONG-TERM CURRENT USE OF INSULIN (HCC): Primary | ICD-10-CM

## 2023-10-19 LAB — SL AMB POCT HEMOGLOBIN AIC: 6.5 (ref ?–6.5)

## 2023-10-19 PROCEDURE — 90471 IMMUNIZATION ADMIN: CPT | Performed by: FAMILY MEDICINE

## 2023-10-19 PROCEDURE — 90662 IIV NO PRSV INCREASED AG IM: CPT | Performed by: FAMILY MEDICINE

## 2023-10-19 PROCEDURE — 99214 OFFICE O/P EST MOD 30 MIN: CPT | Performed by: FAMILY MEDICINE

## 2023-10-19 PROCEDURE — 83036 HEMOGLOBIN GLYCOSYLATED A1C: CPT | Performed by: FAMILY MEDICINE

## 2023-10-19 RX ORDER — GABAPENTIN 100 MG/1
100 CAPSULE ORAL
Qty: 30 CAPSULE | Refills: 1 | Status: SHIPPED | OUTPATIENT
Start: 2023-10-19

## 2023-10-19 RX ORDER — ASPIRIN 81 MG/1
81 TABLET ORAL DAILY
COMMUNITY
End: 2023-10-19

## 2023-10-19 RX ORDER — LEVETIRACETAM 250 MG/1
TABLET ORAL
Qty: 180 TABLET | Refills: 1 | Status: SHIPPED | OUTPATIENT
Start: 2023-10-19

## 2023-10-19 RX ORDER — TIMOLOL MALEATE 5 MG/ML
SOLUTION/ DROPS OPHTHALMIC
COMMUNITY
End: 2023-10-19

## 2023-10-20 ENCOUNTER — TELEPHONE (OUTPATIENT)
Dept: ADMINISTRATIVE | Facility: OTHER | Age: 78
End: 2023-10-20

## 2023-10-20 NOTE — TELEPHONE ENCOUNTER
Upon review of the In Basket request and the patient's chart, initial outreach has been made via fax to facility. Please see Contacts section for details.      Thank you  Jerrod Alba MA

## 2023-10-20 NOTE — TELEPHONE ENCOUNTER
----- Message from Darlene Riddle LPN sent at 09/92/8636  1:38 PM EDT -----  Regarding: Diabetic eye Exam  10/19/23 1:38 PM    Rosey, our patient Marlys Goodman has had Diabetic Eye Exam completed/performed. Please assist in updating the patient chart by making an External outreach to [facility] facility located in [place], [place]. The date of service is 20/.     Thank you,  ZULEYMA Delgado PG

## 2023-10-20 NOTE — LETTER
Diabetic Eye Exam Form    Date Requested: 10/20/23  Patient: Des Coe  Patient : 1945   Referring Provider: Tyler Ortez,       DIABETIC Eye Exam Date _______________________________      Type of Exam MUST be documented for Diabetic Eye Exams. Please CHECK ONE. Retinal Exam       Dilated Retinal Exam       OCT       Optomap-Iris Exam      Fundus Photography       Left Eye - Please check Retinopathy or No Retinopathy        Exam did show retinopathy    Exam did not show retinopathy       Right Eye - Please check Retinopathy or No Retinopathy       Exam did show retinopathy    Exam did not show retinopathy       Comments __________________________________________________________    Practice Providing Exam ______________________________________________    Exam Performed By (print name) _______________________________________      Provider Signature ___________________________________________________      These reports are needed for  compliance. Please fax this completed form and a copy of the Diabetic Eye Exam report to our office located at [address] [place] [address] as soon as possible via Fax [phone] leobardo Krueger: Phone [phone]  We thank you for your assistance in treating our mutual patient.

## 2023-10-23 ENCOUNTER — OFFICE VISIT (OUTPATIENT)
Age: 78
End: 2023-10-23
Payer: COMMERCIAL

## 2023-10-23 VITALS
WEIGHT: 154 LBS | HEART RATE: 88 BPM | HEIGHT: 55 IN | RESPIRATION RATE: 16 BRPM | OXYGEN SATURATION: 94 % | DIASTOLIC BLOOD PRESSURE: 78 MMHG | BODY MASS INDEX: 35.64 KG/M2 | SYSTOLIC BLOOD PRESSURE: 122 MMHG

## 2023-10-23 DIAGNOSIS — E66.09 CLASS 2 OBESITY DUE TO EXCESS CALORIES WITHOUT SERIOUS COMORBIDITY WITH BODY MASS INDEX (BMI) OF 35.0 TO 35.9 IN ADULT: ICD-10-CM

## 2023-10-23 DIAGNOSIS — Z87.891 FORMER SMOKER: ICD-10-CM

## 2023-10-23 DIAGNOSIS — J41.0 SIMPLE CHRONIC BRONCHITIS (HCC): Primary | ICD-10-CM

## 2023-10-23 PROBLEM — E66.812 CLASS 2 OBESITY DUE TO EXCESS CALORIES WITHOUT SERIOUS COMORBIDITY IN ADULT: Status: ACTIVE | Noted: 2021-09-29

## 2023-10-23 PROCEDURE — 99214 OFFICE O/P EST MOD 30 MIN: CPT

## 2023-10-23 RX ORDER — FLUTICASONE FUROATE, UMECLIDINIUM BROMIDE AND VILANTEROL TRIFENATATE 100; 62.5; 25 UG/1; UG/1; UG/1
1 POWDER RESPIRATORY (INHALATION) DAILY
Qty: 60 BLISTER | Refills: 5 | Status: SHIPPED | OUTPATIENT
Start: 2023-10-23

## 2023-10-23 NOTE — TELEPHONE ENCOUNTER
Upon review of the In Basket request we were able to locate, review, and update the patient chart as requested for Diabetic Eye Exam.    Any additional questions or concerns should be emailed to the Practice Liaisons via the appropriate education email address, please do not reply via In Basket.     Thank you  Adrian Greco MA

## 2023-10-23 NOTE — PROGRESS NOTES
Pulmonary Follow Up Note  Skylar Conner 78 y.o. female MRN: [id number]  10/23/2023    Assessment:    Simple chronic bronchitis ([address])  PFTs in 1/2022 with normal spirometry. No BD response. Patient does have history significant eosinophilia on CBC w/ diff in 2021. Most likely has reactive airway disease component as well. She also gets significant improvement of symptoms with bronchodilators. Given the stability of her symptoms without any recent exacerbations, recommend decreasing Trelegy to 100. She may continue to use albuterol every 6 hours as needed for shortness of breath or wheezing. Continue Singulair 10 mg at bedtime. Flonase nasal spray daily. UTD on vaccinations. We will follow-up in 6 months or sooner if needed. Former smoker  Remains in remission x20 years. No longer CT lung cancer screening candidate. Class 2 obesity due to excess calories without serious comorbidity in adult  Encouraged patient to perform regular physical activity as tolerated. Would benefit from weight loss. Plan:    Diagnoses and all orders for this visit:    Simple chronic bronchitis ([address])  -     fluticasone-umeclidinium-vilanterol (Trelegy Ellipta) 100-62.5-25 mcg/actuation inhaler; Inhale 1 puff daily Rinse mouth after use. Former smoker    Class 2 obesity due to excess calories without serious comorbidity with body mass index (BMI) of 35.0 to 35.9 in adult          Return in about 6 months (around 4/23/2024).         History of Present Illness     Chief Complaint:   Chief Complaint   Patient presents with    Follow-up       Patient ID: Luverne Babinski is a 78 y.o. y.o. 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), Carpal tunnel syndrome, Chest pain (03/06/2017), Chronic kidney disease, Claudication ([address]) (3/15/2019), Closed fracture of base of middle phalanx of finger (06/22/2017), Closed fracture of middle phalanx of left little finger (07/08/2017), Coagulopathy ([address]) (05/15/2019), Colloid cyst of brain ([address]), COPD (chronic obstructive pulmonary disease) ([address]), COPD (chronic obstructive pulmonary disease) ([address]), Coronary artery disease, Cough, Diabetes mellitus ([address]), GERD (gastroesophageal reflux disease), Glaucoma, Gout, History of brain disorder (10/07/2020), Hyperlipidemia, Hypertension, Irritable bowel syndrome, Melena (02/26/2019), PAD (peripheral artery disease) ([address]) (5/15/2019), Paronychia of great toe of left foot (10/07/2020), Post-traumatic seizures ([address]) (07/23/2015), Renal disorder, Seizures ([address]), Shortness of breath, Single seizure ([address]) (05/31/2016), SOB (shortness of breath), Syncope and collapse (11/11/2020), Urinary tract infection without hematuria (11/26/2016), and Wheezing. 10/23/2023  HPI: John Galindo is a 78 y.o. female who presents to the office today for a follow-up visit. She has a past medical history including but not limited to  chronic bronchitis, reactive airway disease, hypertension, diabetes, CHF, and CKD. She has approximately 40-pack-year smoking history, quit 20 years ago. She was previously seen in the office 6 months ago. Maintained on Trelegy 200 and albuterol as needed. Also takes Singulair and Flonase nasal spray. Patient states she is doing well with her breathing. Has had no exacerbations, hospitalizations/ED visits since her last office visit. She is using her Trelegy every morning and feels like it is working well for her. She has some wheezing in the evenings for which she will use her albuterol nebulizer. Occasional cough with clear productive mucus. She denies any nighttime symptoms of difficulty breathing, coughing, or wheezing that awaken her. No limitations of her activity due to her breathing. She denies any chest pain, or lower extremity swelling.         Review of Systems Constitutional:  Negative for activity change, chills, diaphoresis, fever and unexpected weight change. HENT:  Positive for postnasal drip. Negative for congestion, rhinorrhea, sore throat, trouble swallowing and voice change. Respiratory:  Positive for cough and wheezing. Negative for chest tightness and shortness of breath. Cardiovascular:  Negative for chest pain, palpitations and leg swelling. Allergic/Immunologic: Negative.         Historical Information   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    Carpal tunnel syndrome     Chest pain 03/06/2017    Chronic kidney disease     Claudication ([address]) 3/15/2019    Closed fracture of base of middle phalanx of finger 06/22/2017    Closed fracture of middle phalanx of left little finger 07/08/2017    Coagulopathy ([address]) 05/15/2019    Colloid cyst of brain (HCC)     COPD (chronic obstructive pulmonary disease) (HCC)     COPD (chronic obstructive pulmonary disease) (HCC)     Coronary artery disease     Cough     Diabetes mellitus (HCC)     GERD (gastroesophageal reflux disease)     Glaucoma     Gout     History of brain disorder 10/07/2020    Hyperlipidemia     Hypertension     Irritable bowel syndrome     Melena 02/26/2019    PAD (peripheral artery disease) ([address]) 5/15/2019    Paronychia of great toe of left foot 10/07/2020    Post-traumatic seizures ([address]) 07/23/2015    Renal disorder     Seizures ([address])     last 2015    Shortness of breath     Single seizure ([address]) 05/31/2016    SOB (shortness of breath)     Syncope and collapse 11/11/2020    Urinary tract infection without hematuria 11/26/2016    Wheezing      Past Surgical History:   Procedure Laterality Date    BRAIN SURGERY      CATARACT EXTRACTION      CHOLECYSTECTOMY      COLECTOMY RADHA      COLONOSCOPY DECOMPRESSION SPINE LUMBAR POSTERIOR  08/19/2019    HERNIA REPAIR      HYSTERECTOMY      INCISION TENDON SHEATH HAND      NECK SURGERY  05/24/2018    NEUROPLASTY / TRANSPOSITION MEDIAN NERVE AT CARPAL TUNNEL      [place] COLONOSCOPY FLX DX W/COLLJ SPEC WHEN PFRMD N/A 03/26/2019    Procedure: COLONOSCOPY;  Surgeon: Rona Dance, MD;  Location: [facility]; Service: Gastroenterology    [place] ESOPHAGOGASTRODUODENOSCOPY TRANSORAL DIAGNOSTIC N/A 03/26/2019    Procedure: ESOPHAGOGASTRODUODENOSCOPY (EGD); Surgeon: Rona Dance, MD;  Location: [facility]; Service: Gastroenterology    REPLACEMENT TOTAL KNEE Right     RETINAL DETACHMENT SURGERY      TUBAL LIGATION       Family History   Problem Relation Age of Onset    Asthma Mother     Heart disease Mother     Emphysema Father     Cancer Father     Prostate cancer Father     Kidney cancer Sister     Diabetes Sister     Kidney disease Sister     Brain cancer Brother     Bone cancer Brother     Heart disease Brother        Smoking history: She reports that she quit smoking about 22 years ago. Her smoking use included cigarettes. She started smoking about 62 years ago. She has a 20.00 pack-year smoking history.  She has never used smokeless tobacco.    Occupational History:     Immunization History   Administered Date(s) Administered    COVID-19 J&J ([employer]fferbox[employer]) vaccine 0.5 mL 06/03/2021    Influenza, high dose seasonal 0.7 mL 10/19/2023    Pneumococcal Conjugate 13-Valent 11/27/2017, 10/07/2020    Pneumococcal Polysaccharide PPV23 09/11/2019       Meds/Allergies     Current Outpatient Medications:     acetaminophen (TYLENOL) 650 mg CR tablet, Take 650 mg by mouth 2 (two) times a day, Disp: , Rfl:     albuterol (ProAir HFA) 90 mcg/act inhaler, Inhale 2 puffs every 6 (six) hours as needed for wheezing or shortness of breath, Disp: 8.5 g, Rfl: 1    atorvastatin (LIPITOR) 10 mg tablet, take 1 tablet by mouth at bedtime, Disp: 90 tablet, Rfl: 1    Calcium Carbonate-Vitamin D (CALCIUM-D PO), Take 1 tablet by mouth in the morning, Disp: , Rfl:     clotrimazole (LOTRIMIN) 1 % cream, , Disp: , Rfl:     Continuous Blood Gluc  (Dexcom G7 ) YOLANDA, Use 1 Application if needed (check sugars), Disp: 1 each, Rfl: 0    Continuous Blood Gluc Sensor (Dexcom G7 Sensor), Use 1 Device every 10 days, Disp: 9 each, Rfl: 3    dicyclomine (BENTYL) 20 mg tablet, , Disp: , Rfl:     DULoxetine (CYMBALTA) 30 mg delayed release capsule, , Disp: , Rfl:     fluticasone (FLONASE) 50 mcg/act nasal spray, 1 spray into each nostril daily pt uses as needed, Disp: , Rfl:     fluticasone-umeclidinium-vilanterol (Trelegy Ellipta) 100-62.5-25 mcg/actuation inhaler, Inhale 1 puff daily Rinse mouth after use., Disp: 60 blister, Rfl: 5    furosemide (LASIX) 20 mg tablet, take 1 tablet by mouth once daily, Disp: 60 tablet, Rfl: 6    gabapentin (Neurontin) 100 mg capsule, Take 1 capsule (100 mg total) by mouth daily at bedtime, Disp: 30 capsule, Rfl: 1    insulin aspart (NovoLOG FlexPen) 100 UNIT/ML injection pen, INJECT 12 UNITS BEFORE MEALS (3 MEALS), Disp: 15 mL, Rfl: 3    Insulin Glargine Solostar (Lantus SoloStar) 100 UNIT/ML SOPN, Inject 0.3 mL (30 Units total) under the skin daily at bedtime, Disp: 15 mL, Rfl: 2    Insulin Pen Needle (Pen Needles) 30G X 8 MM MISC, Use 4 (four) times a day (before meals and at bedtime), Disp: 500 each, Rfl: 1    ketoconazole (NIZORAL) 2 % cream, , Disp: , Rfl:     latanoprost (XALATAN) 0.005 % ophthalmic solution, Administer 1 drop into the left eye daily, Disp: 2.5 mL, Rfl: 2    levETIRAcetam (KEPPRA) 250 mg tablet, take 1 tablet by mouth every 12 hours, Disp: 180 tablet, Rfl: 1    Multiple Vitamin (MULTI VITAMIN DAILY PO), Take 1 tablet by mouth daily, Disp: , Rfl:     mupirocin (BACTROBAN) 2 % ointment, APPLY A SMALL AMOUNT TO THE AFFECTED FOOT/TOE AREA BY TOPICAL ROUTE 3 TIMES PER DAY, Disp: , Rfl:     nystatin (MYCOSTATIN) cream, Apply topically 2 (two) times a day, Disp: 30 g, Rfl: 1    nystatin (MYCOSTATIN) powder, Apply topically 3 (three) times a day as needed (rash), Disp: 60 g, Rfl: 1    omeprazole (PriLOSEC) 20 mg delayed release capsule, take 1 capsule by mouth twice a day, Disp: 180 capsule, Rfl: 1    Polyvinyl Alcohol-Povidone (REFRESH OP), Apply to eye, Disp: , Rfl:     Probiotic Product (PROBIOTIC DAILY PO), Take by mouth  , Disp: , Rfl:     Spacer/Aero-Holding Chambers (AeroChamber Plus Elian-Vu Large) MISC, Use as directed, Disp: , Rfl:     vitamin B-12 (VITAMIN B-12) 500 mcg tablet, Take 1,000 mcg by mouth daily, Disp: , Rfl:     montelukast (SINGULAIR) 10 mg tablet, take 1 tablet by mouth every evening, Disp: 90 tablet, Rfl: 1  Allergies: Allergies   Allergen Reactions    Brimonidine Other (See Comments)    Sulfa Antibiotics Rash         Vitals:  Vitals:    10/23/23 1348 10/23/23 1356   BP: 122/78    BP Location: Right arm    Patient Position: Sitting    Cuff Size: Large    Pulse: 88    Resp: 16    SpO2: 94% 94%   Weight: 69.9 kg (154 lb)    Height: 4' 7" (1.397 m)    Oxygen Therapy  SpO2: 94 %  Oxygen Therapy: None (Room air)  . Wt Readings from Last 3 Encounters:   10/23/23 69.9 kg (154 lb)   10/19/23 69.9 kg (154 lb)   08/04/23 68.9 kg (152 lb)     Body mass index is 35.79 kg/m². Physical Exam  Vitals and nursing note reviewed. Constitutional:       General: She is not in acute distress. Appearance: Normal appearance. She is well-developed. Cardiovascular:      Rate and Rhythm: Normal rate and regular rhythm. Heart sounds: Normal heart sounds, S1 normal and S2 normal. No murmur heard. Pulmonary:      Effort: Pulmonary effort is normal.      Breath sounds: Normal breath sounds. No decreased breath sounds, wheezing, rhonchi or rales. Musculoskeletal:         General: No swelling. Right lower leg: No edema. Left lower leg: No edema. Neurological:      Mental Status: She is alert.    Psychiatric:         Mood and Affect: Mood and affect normal.         Behavior: Behavior normal. Behavior is cooperative. Labs: I have personally reviewed pertinent lab results. Lab Results   Component Value Date    WBC 8.00 10/13/2023    HGB 14.3 10/13/2023    HCT 44.5 10/13/2023    MCV 89 10/13/2023     10/13/2023     Lab Results   Component Value Date    CALCIUM 9.7 10/13/2023    K 4.3 10/13/2023    CO2 30 10/13/2023     10/13/2023    BUN 27 (H) 10/13/2023    CREATININE 1.07 10/13/2023     No results found for: "IGE"  Lab Results   Component Value Date    ALT 16 10/13/2023    AST 18 10/13/2023    ALKPHOS 55 10/13/2023       Imaging and other studies: I have personally reviewed pertinent reports and I have personally reviewed pertinent films in PACS     CXR 11/30/22  The lungs are clear. Mild emphysematous changes are seen. No pneumothorax or pleural effusion. Pulmonary function testing:      Pulmonary Functions Testing Results: 1/12/2022           FEV1/FVC ratio 77%    FEV1 95% predicted  FVC 95% predicted  (-) response to bronchodilators  TLC 88 % predicted  RV 76 % predicted  DLCO corrected for hemoglobin 72 % predicted     Impression: The flow volume curve is normal.    The spirometry is normal.    The lung volumes are normal.    The DLCO is mildly decreased. Patient could walk a total distance of 122 m in 6 minutes without the need for supplemental oxygen.

## 2023-10-23 NOTE — ASSESSMENT & PLAN NOTE
PFTs in 1/2022 with normal spirometry. No BD response. Patient does have history significant eosinophilia on CBC w/ diff in 2021. Most likely has reactive airway disease component as well. She also gets significant improvement of symptoms with bronchodilators. Given the stability of her symptoms without any recent exacerbations, recommend decreasing Trelegy to 100. She may continue to use albuterol every 6 hours as needed for shortness of breath or wheezing. Continue Singulair 10 mg at bedtime. Flonase nasal spray daily. UTD on vaccinations. We will follow-up in 6 months or sooner if needed.

## 2023-10-23 NOTE — ASSESSMENT & PLAN NOTE
Encouraged patient to perform regular physical activity as tolerated. Would benefit from weight loss.

## 2023-10-24 ENCOUNTER — OFFICE VISIT (OUTPATIENT)
Dept: CARDIOLOGY CLINIC | Facility: CLINIC | Age: 78
End: 2023-10-24
Payer: COMMERCIAL

## 2023-10-24 VITALS
DIASTOLIC BLOOD PRESSURE: 68 MMHG | HEART RATE: 65 BPM | OXYGEN SATURATION: 95 % | BODY MASS INDEX: 35.64 KG/M2 | HEIGHT: 55 IN | SYSTOLIC BLOOD PRESSURE: 122 MMHG | WEIGHT: 154 LBS | RESPIRATION RATE: 16 BRPM

## 2023-10-24 DIAGNOSIS — I50.32 CHRONIC HEART FAILURE WITH PRESERVED EJECTION FRACTION (HCC): Primary | ICD-10-CM

## 2023-10-24 PROCEDURE — 99213 OFFICE O/P EST LOW 20 MIN: CPT | Performed by: INTERNAL MEDICINE

## 2023-10-24 NOTE — PROGRESS NOTES
[facility]   Office Consultation    Skylar Conner 78 y.o. female MRN: [id number]    10/24/23          Assessment:  Chronic HFpEF  Ambulatory dysfunction/frequent falls   IDDM   Hyperlipidemia  CKD3    Plan:  She is euvolemic. Cont lasix 20mg/day  Cont atorvastatin   Ambulatory blood pressure monitoring and maintaining a low sodium diet was advised. She was advised to notify us with the onset of cardiac symptoms. Follow up: 1 year or sooner as needed    1. Chronic heart failure with preserved ejection fraction (HCC)            HPI: Skylar Conner is a 78y.o. year old female with history of IDDM, and COPD who presents for routine follow-up. Past cardiac evaluation:  Pharmacologic MPI 11/2020: negative for ischemia  TTE 11/2020: EF 55%, mild mitral stenosis  Lower extremity arterial duplex 11/2022: no evidence of PAD. TTE 11/2022: EF: 55%, G1dd, mild AI      She has been doing well from a cardiac standpoint. She is currently euvolemic on her current diuretic dose. She has multilevel degenerative disc disease limiting her ambulation/activity  She has resultant gait dysfunction with frequent falls. She denies anginal symptoms or any other cardiac concerns at this time.         Family History: brother and mother with history of cardiac disease    Social history: former smoker; quit in 2003      Allergies   Allergen Reactions    Brimonidine Other (See Comments)    Sulfa Antibiotics Rash         Current Outpatient Medications:     acetaminophen (TYLENOL) 650 mg CR tablet, Take 650 mg by mouth 2 (two) times a day, Disp: , Rfl:     albuterol (ProAir HFA) 90 mcg/act inhaler, Inhale 2 puffs every 6 (six) hours as needed for wheezing or shortness of breath, Disp: 8.5 g, Rfl: 1    atorvastatin (LIPITOR) 10 mg tablet, take 1 tablet by mouth at bedtime, Disp: 90 tablet, Rfl: 1    Calcium Carbonate-Vitamin D (CALCIUM-D PO), Take 1 tablet by mouth in the morning, Disp: , Rfl:     clotrimazole (LOTRIMIN) 1 % cream, , Disp: , Rfl:     Continuous Blood Gluc  (Dexcom G7 ) YOLANDA, Use 1 Application if needed (check sugars), Disp: 1 each, Rfl: 0    Continuous Blood Gluc Sensor (Dexcom G7 Sensor), Use 1 Device every 10 days, Disp: 9 each, Rfl: 3    dicyclomine (BENTYL) 20 mg tablet, , Disp: , Rfl:     DULoxetine (CYMBALTA) 30 mg delayed release capsule, , Disp: , Rfl:     fluticasone (FLONASE) 50 mcg/act nasal spray, 1 spray into each nostril daily pt uses as needed, Disp: , Rfl:     fluticasone-umeclidinium-vilanterol (Trelegy Ellipta) 100-62.5-25 mcg/actuation inhaler, Inhale 1 puff daily Rinse mouth after use., Disp: 60 blister, Rfl: 5    furosemide (LASIX) 20 mg tablet, take 1 tablet by mouth once daily, Disp: 60 tablet, Rfl: 6    gabapentin (Neurontin) 100 mg capsule, Take 1 capsule (100 mg total) by mouth daily at bedtime, Disp: 30 capsule, Rfl: 1    insulin aspart (NovoLOG FlexPen) 100 UNIT/ML injection pen, INJECT 12 UNITS BEFORE MEALS (3 MEALS), Disp: 15 mL, Rfl: 3    Insulin Glargine Solostar (Lantus SoloStar) 100 UNIT/ML SOPN, Inject 0.3 mL (30 Units total) under the skin daily at bedtime, Disp: 15 mL, Rfl: 2    Insulin Pen Needle (Pen Needles) 30G X 8 MM MISC, Use 4 (four) times a day (before meals and at bedtime), Disp: 500 each, Rfl: 1    ketoconazole (NIZORAL) 2 % cream, , Disp: , Rfl:     latanoprost (XALATAN) 0.005 % ophthalmic solution, Administer 1 drop into the left eye daily, Disp: 2.5 mL, Rfl: 2    levETIRAcetam (KEPPRA) 250 mg tablet, take 1 tablet by mouth every 12 hours, Disp: 180 tablet, Rfl: 1    montelukast (SINGULAIR) 10 mg tablet, take 1 tablet by mouth every evening, Disp: 90 tablet, Rfl: 1    Multiple Vitamin (MULTI VITAMIN DAILY PO), Take 1 tablet by mouth daily, Disp: , Rfl:     mupirocin (BACTROBAN) 2 % ointment, APPLY A SMALL AMOUNT TO THE AFFECTED FOOT/TOE AREA BY TOPICAL ROUTE 3 TIMES PER DAY, Disp: , Rfl:     nystatin (MYCOSTATIN) cream, Apply topically 2 (two) times a day, Disp: 30 g, Rfl: 1    nystatin (MYCOSTATIN) powder, Apply topically 3 (three) times a day as needed (rash), Disp: 60 g, Rfl: 1    omeprazole (PriLOSEC) 20 mg delayed release capsule, take 1 capsule by mouth twice a day, Disp: 180 capsule, Rfl: 1    Polyvinyl Alcohol-Povidone (REFRESH OP), Apply to eye, Disp: , Rfl:     Probiotic Product (PROBIOTIC DAILY PO), Take by mouth  , Disp: , Rfl:     Spacer/Aero-Holding Chambers (AeroChamber Plus Elian-Vu Large) MISC, Use as directed, Disp: , Rfl:     vitamin B-12 (VITAMIN B-12) 500 mcg tablet, Take 1,000 mcg by mouth daily, Disp: , Rfl:     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    Carpal tunnel syndrome     Chest pain 03/06/2017    Chronic kidney disease     Claudication ([address]) 3/15/2019    Closed fracture of base of middle phalanx of finger 06/22/2017    Closed fracture of middle phalanx of left little finger 07/08/2017    Coagulopathy ([address]) 05/15/2019    Colloid cyst of brain (HCC)     COPD (chronic obstructive pulmonary disease) (HCC)     COPD (chronic obstructive pulmonary disease) ([address])     Coronary artery disease     Cough     Diabetes mellitus (HCC)     GERD (gastroesophageal reflux disease)     Glaucoma     Gout     History of brain disorder 10/07/2020    Hyperlipidemia     Hypertension     Irritable bowel syndrome     Melena 02/26/2019    PAD (peripheral artery disease) ([address]) 5/15/2019    Paronychia of great toe of left foot 10/07/2020    Post-traumatic seizures ([address]) 07/23/2015    Renal disorder     Seizures ([address])     last 2015    Shortness of breath     Single seizure ([address]) 05/31/2016    SOB (shortness of breath)     Syncope and collapse 11/11/2020    Urinary tract infection without hematuria 11/26/2016    Wheezing        Family History   Problem Relation Age of Onset Asthma Mother     Heart disease Mother     Emphysema Father     Cancer Father     Prostate cancer Father     Kidney cancer Sister     Diabetes Sister     Kidney disease Sister     Brain cancer Brother     Bone cancer Brother     Heart disease Brother        Past Surgical History:   Procedure Laterality Date    BRAIN SURGERY      CATARACT EXTRACTION      CHOLECYSTECTOMY      COLECTOMY RADHA      COLONOSCOPY      DECOMPRESSION SPINE LUMBAR POSTERIOR  2019    HERNIA REPAIR      HYSTERECTOMY      INCISION TENDON SHEATH HAND      NECK SURGERY  2018    NEUROPLASTY / TRANSPOSITION MEDIAN NERVE AT CARPAL TUNNEL      [place] COLONOSCOPY FLX DX W/COLLJ SPEC WHEN PFRMD N/A 2019    Procedure: COLONOSCOPY;  Surgeon: Teresa Mojica MD;  Location: [facility]; Service: Gastroenterology    [place] ESOPHAGOGASTRODUODENOSCOPY TRANSORAL DIAGNOSTIC N/A 2019    Procedure: ESOPHAGOGASTRODUODENOSCOPY (EGD); Surgeon: Teresa Mojica MD;  Location: [facility];   Service: Gastroenterology    REPLACEMENT TOTAL KNEE Right     RETINAL DETACHMENT SURGERY      TUBAL LIGATION         Social History     Socioeconomic History    Marital status:      Spouse name: Not on file    Number of children: Not on file    Years of education: Not on file    Highest education level: Not on file   Occupational History    Occupation: retired   Tobacco Use    Smoking status: Former     Packs/day: 0.50     Years: 40.00     Total pack years: 20.00     Types: Cigarettes     Start date:      Quit date:      Years since quittin.8    Smokeless tobacco: Never    Tobacco comments:     stopped many years ago   Vaping Use    Vaping Use: Never used   Substance and Sexual Activity    Alcohol use: Never    Drug use: Never    Sexual activity: Not Currently     Partners: Male   Other Topics Concern    Not on file   Social History Narrative    Not on file     Social Determinants of Health     Financial Resource Strain: Unknown (2022) Overall Financial Resource Strain (CARDIA)     Difficulty of Paying Living Expenses: Patient refused   Food Insecurity: Not on file   Transportation Needs: Unknown (12/27/2022)    PRAPARE - Transportation     Lack of Transportation (Medical): Patient refused     Lack of Transportation (Non-Medical): Patient refused   Physical Activity: Not on file   Stress: Not on file   Social Connections: Not on file   Intimate Partner Violence: Not on file   Housing Stability: Not on file       Review of Systems   Constitutional: Negative for diaphoresis, weight gain and weight loss. HENT:  Negative for congestion. Cardiovascular:  Negative for chest pain, dyspnea on exertion, irregular heartbeat, leg swelling, near-syncope, orthopnea, palpitations, paroxysmal nocturnal dyspnea and syncope. Respiratory:  Negative for shortness of breath, sleep disturbances due to breathing and snoring. Hematologic/Lymphatic: Does not bruise/bleed easily. Skin:  Negative for rash. Musculoskeletal:  Negative for myalgias. Gastrointestinal:  Negative for nausea and vomiting. Neurological:  Negative for excessive daytime sleepiness and light-headedness. Psychiatric/Behavioral:  The patient is not nervous/anxious. Vitals: /68 (BP Location: Right arm, Patient Position: Sitting, Cuff Size: Large)   Pulse 65   Resp 16   Ht 4' 7" (1.397 m)   Wt 69.9 kg (154 lb)   SpO2 95%   BMI 35.79 kg/m²       Physical Exam:     GEN: Alert and oriented x 3, in no acute distress. Well appearing and well nourished. HEENT: Sclera anicteric, conjunctivae pink, mucous membranes moist. Oropharynx clear. NECK: Supple, no carotid bruits, no significant JVD. Trachea midline, no thyromegaly. HEART: Regular rhythm, normal S1 and S2, no murmurs, clicks, gallops or rubs. PMI nondisplaced, no thrills. LUNGS: Clear to auscultation bilaterally; no wheezes, rales, or rhonchi. No increased work of breathing or signs of respiratory distress. ABDOMEN: Soft, nontender, nondistended, normoactive bowel sounds. EXTREMITIES: Skin warm and well perfused, no clubbing, cyanosis, or edema. NEURO: No focal findings. Normal speech. Mood and affect normal.   SKIN: Normal without suspicious lesions on exposed skin.

## 2023-10-26 ENCOUNTER — TELEPHONE (OUTPATIENT)
Dept: FAMILY MEDICINE CLINIC | Facility: CLINIC | Age: 78
End: 2023-10-26

## 2023-10-26 DIAGNOSIS — R26.9 NEUROLOGIC GAIT DYSFUNCTION: Primary | ICD-10-CM

## 2023-10-26 NOTE — TELEPHONE ENCOUNTER
We send to the PT in [facility] for this. They will do all of the paper work and testing necessary.  Please place referral and give info to patient

## 2023-10-26 NOTE — TELEPHONE ENCOUNTER
Pt needs letter of medical necessity for a stair glide and a scooter. . Needs to be faxed to [employer]     -Fax to [phone]

## 2023-10-27 NOTE — TELEPHONE ENCOUNTER
Left message on machine informing the patient PT will help with the chair lift and provided the phone number to call.

## 2023-11-29 ENCOUNTER — OFFICE VISIT (OUTPATIENT)
Dept: URGENT CARE | Facility: CLINIC | Age: 78
End: 2023-11-29
Payer: COMMERCIAL

## 2023-11-29 ENCOUNTER — APPOINTMENT (OUTPATIENT)
Dept: RADIOLOGY | Facility: CLINIC | Age: 78
End: 2023-11-29
Payer: COMMERCIAL

## 2023-11-29 VITALS
SYSTOLIC BLOOD PRESSURE: 147 MMHG | DIASTOLIC BLOOD PRESSURE: 64 MMHG | HEART RATE: 82 BPM | OXYGEN SATURATION: 96 % | RESPIRATION RATE: 20 BRPM | TEMPERATURE: 97.4 F

## 2023-11-29 DIAGNOSIS — R05.1 ACUTE COUGH: ICD-10-CM

## 2023-11-29 DIAGNOSIS — J20.9 ACUTE BRONCHITIS, UNSPECIFIED ORGANISM: ICD-10-CM

## 2023-11-29 DIAGNOSIS — R05.1 ACUTE COUGH: Primary | ICD-10-CM

## 2023-11-29 LAB
SARS-COV-2 AG UPPER RESP QL IA: NEGATIVE
VALID CONTROL: NORMAL

## 2023-11-29 PROCEDURE — 87811 SARS-COV-2 COVID19 W/OPTIC: CPT

## 2023-11-29 PROCEDURE — 71046 X-RAY EXAM CHEST 2 VIEWS: CPT

## 2023-11-29 PROCEDURE — 99213 OFFICE O/P EST LOW 20 MIN: CPT

## 2023-11-29 RX ORDER — AZITHROMYCIN 250 MG/1
TABLET, FILM COATED ORAL
Qty: 6 TABLET | Refills: 0 | Status: SHIPPED | OUTPATIENT
Start: 2023-11-29 | End: 2023-12-03

## 2023-11-29 RX ORDER — ALBUTEROL SULFATE 2.5 MG/3ML
2.5 SOLUTION RESPIRATORY (INHALATION) EVERY 6 HOURS PRN
Qty: 90 ML | Refills: 0 | Status: SHIPPED | OUTPATIENT
Start: 2023-11-29

## 2023-11-29 NOTE — PROGRESS NOTES
[facility]        NAME: Lucas Robbins is a 78 y.o. female  : 1945    MRN: [id number]  DATE: 2023  TIME: 12:59 PM    Assessment and Plan   Acute cough [R05.1]  1. Acute cough  Poct Covid 19 Rapid Antigen Test    XR chest pa & lateral      2. Acute bronchitis, unspecified organism  albuterol (2.5 mg/3 mL) 0.083 % nebulizer solution    azithromycin (ZITHROMAX) 250 mg tablet        Rapid COVID negative. CXR shows no acute cardiopulmonary disease per my read. Awaiting final radiology read. Patient Instructions     Start azithromycin. Albuterol as needed. Mucinex over-the-counter for cough and congestion. Tylenol and Motrin over-the-counter for pain and fever. Follow up with PCP. Go to the nearest emergency department if you have difficulty breathing, worsening symptoms. Chief Complaint     Chief Complaint   Patient presents with    Cough     Cough and sore throat x6 days. Feels SOB. Has been using inhalers. Has hx of COPD          History of Present Illness       The patient presents today with complaints of a wet cough and sore throat x 6 days. She has been taking OTC robitussin with minimal relief. She also noticed slight increase in SOB. Denies fever/chills. She has a history of COPD and is using her inhalers. Review of Systems   Review of Systems   Constitutional:  Negative for chills and fever. HENT:  Positive for postnasal drip and sore throat. Negative for congestion, ear pain, rhinorrhea, sinus pressure and sinus pain. Respiratory:  Positive for cough, shortness of breath and wheezing. Negative for chest tightness. Musculoskeletal:  Negative for myalgias. Skin:  Negative for rash.          Current Medications       Current Outpatient Medications:     acetaminophen (TYLENOL) 650 mg CR tablet, Take 650 mg by mouth 2 (two) times a day, Disp: , Rfl:     albuterol (ProAir HFA) 90 mcg/act inhaler, Inhale 2 puffs every 6 (six) hours as needed for wheezing or shortness of breath, Disp: 8.5 g, Rfl: 1    atorvastatin (LIPITOR) 10 mg tablet, take 1 tablet by mouth at bedtime, Disp: 90 tablet, Rfl: 1    Calcium Carbonate-Vitamin D (CALCIUM-D PO), Take 1 tablet by mouth in the morning, Disp: , Rfl:     clotrimazole (LOTRIMIN) 1 % cream, , Disp: , Rfl:     Continuous Blood Gluc  (Dexcom G7 ) YOLANDA, Use 1 Application if needed (check sugars), Disp: 1 each, Rfl: 0    Continuous Blood Gluc Sensor (Dexcom G7 Sensor), Use 1 Device every 10 days, Disp: 9 each, Rfl: 3    dicyclomine (BENTYL) 20 mg tablet, , Disp: , Rfl:     DULoxetine (CYMBALTA) 30 mg delayed release capsule, , Disp: , Rfl:     fluticasone (FLONASE) 50 mcg/act nasal spray, 1 spray into each nostril daily pt uses as needed, Disp: , Rfl:     fluticasone-umeclidinium-vilanterol (Trelegy Ellipta) 100-62.5-25 mcg/actuation inhaler, Inhale 1 puff daily Rinse mouth after use., Disp: 60 blister, Rfl: 5    furosemide (LASIX) 20 mg tablet, take 1 tablet by mouth once daily, Disp: 60 tablet, Rfl: 6    gabapentin (Neurontin) 100 mg capsule, Take 1 capsule (100 mg total) by mouth daily at bedtime, Disp: 30 capsule, Rfl: 1    insulin aspart (NovoLOG FlexPen) 100 UNIT/ML injection pen, INJECT 12 UNITS BEFORE MEALS (3 MEALS), Disp: 15 mL, Rfl: 3    Insulin Glargine Solostar (Lantus SoloStar) 100 UNIT/ML SOPN, Inject 0.3 mL (30 Units total) under the skin daily at bedtime, Disp: 15 mL, Rfl: 2    Insulin Pen Needle (Pen Needles) 30G X 8 MM MISC, Use 4 (four) times a day (before meals and at bedtime), Disp: 500 each, Rfl: 1    ketoconazole (NIZORAL) 2 % cream, , Disp: , Rfl:     latanoprost (XALATAN) 0.005 % ophthalmic solution, Administer 1 drop into the left eye daily, Disp: 2.5 mL, Rfl: 2    levETIRAcetam (KEPPRA) 250 mg tablet, take 1 tablet by mouth every 12 hours, Disp: 180 tablet, Rfl: 1    montelukast (SINGULAIR) 10 mg tablet, take 1 tablet by mouth every evening, Disp: 90 tablet, Rfl: 1    Multiple Vitamin (MULTI VITAMIN DAILY PO), Take 1 tablet by mouth daily, Disp: , Rfl:     mupirocin (BACTROBAN) 2 % ointment, APPLY A SMALL AMOUNT TO THE AFFECTED FOOT/TOE AREA BY TOPICAL ROUTE 3 TIMES PER DAY, Disp: , Rfl:     nystatin (MYCOSTATIN) cream, Apply topically 2 (two) times a day, Disp: 30 g, Rfl: 1    nystatin (MYCOSTATIN) powder, Apply topically 3 (three) times a day as needed (rash), Disp: 60 g, Rfl: 1    omeprazole (PriLOSEC) 20 mg delayed release capsule, take 1 capsule by mouth twice a day, Disp: 180 capsule, Rfl: 1    Polyvinyl Alcohol-Povidone (REFRESH OP), Apply to eye, Disp: , Rfl:     Probiotic Product (PROBIOTIC DAILY PO), Take by mouth  , Disp: , Rfl:     Spacer/Aero-Holding Chambers (AeroChamber Plus Elian-Vu Large) MISC, Use as directed, Disp: , Rfl:     vitamin B-12 (VITAMIN B-12) 500 mcg tablet, Take 1,000 mcg by mouth daily, Disp: , Rfl:     albuterol (2.5 mg/3 mL) 0.083 % nebulizer solution, Take 3 mL (2.5 mg total) by nebulization every 6 (six) hours as needed for wheezing or shortness of breath, Disp: 90 mL, Rfl: 0    azithromycin (ZITHROMAX) 250 mg tablet, Take 2 tablets today then 1 tablet daily x 4 days, Disp: 6 tablet, Rfl: 0    Current Allergies     Allergies as of 11/29/2023 - Reviewed 11/29/2023   Allergen Reaction Noted    Brimonidine Other (See Comments) 12/08/2020    Sulfa antibiotics Rash 11/26/2016            The following portions of the patient's history were reviewed and updated as appropriate: allergies, current medications, past family history, past medical history, past social history, past surgical history and problem list.     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    Carpal tunnel syndrome     Chest pain 03/06/2017    Chronic kidney disease     Claudication ([address]) 3/15/2019    Closed fracture of base of middle phalanx of finger 06/22/2017    Closed fracture of middle phalanx of left little finger 07/08/2017    Coagulopathy ([address]) 05/15/2019    Colloid cyst of brain (HCC)     COPD (chronic obstructive pulmonary disease) (HCC)     COPD (chronic obstructive pulmonary disease) (HCC)     Coronary artery disease     Cough     Diabetes mellitus (HCC)     GERD (gastroesophageal reflux disease)     Glaucoma     Gout     History of brain disorder 10/07/2020    Hyperlipidemia     Hypertension     Irritable bowel syndrome     Melena 02/26/2019    PAD (peripheral artery disease) ([address]) 5/15/2019    Paronychia of great toe of left foot 10/07/2020    Post-traumatic seizures ([address]) 07/23/2015    Renal disorder     Seizures ([address])     last 2015    Shortness of breath     Single seizure ([address]) 05/31/2016    SOB (shortness of breath)     Syncope and collapse 11/11/2020    Urinary tract infection without hematuria 11/26/2016    Wheezing        Past Surgical History:   Procedure Laterality Date    BRAIN SURGERY      CATARACT EXTRACTION      CHOLECYSTECTOMY      COLECTOMY RADHA      COLONOSCOPY      DECOMPRESSION SPINE LUMBAR POSTERIOR  08/19/2019    HERNIA REPAIR      HYSTERECTOMY      INCISION TENDON SHEATH HAND      NECK SURGERY  05/24/2018    NEUROPLASTY / TRANSPOSITION MEDIAN NERVE AT CARPAL TUNNEL      OH COLONOSCOPY FLX DX W/COLLJ SPEC WHEN PFRMD N/A 03/26/2019    Procedure: COLONOSCOPY;  Surgeon: Paloma Galvez MD;  Location: [facility]; Service: Gastroenterology    OH ESOPHAGOGASTRODUODENOSCOPY TRANSORAL DIAGNOSTIC N/A 03/26/2019    Procedure: ESOPHAGOGASTRODUODENOSCOPY (EGD); Surgeon: Paloma Galvez MD;  Location: [facility];   Service: Gastroenterology    REPLACEMENT TOTAL KNEE Right     RETINAL DETACHMENT SURGERY      TUBAL LIGATION         Family History   Problem Relation Age of Onset    Asthma Mother     Heart disease Mother     Emphysema Father     Cancer Father     Prostate cancer Father     Kidney cancer Sister     Diabetes Sister     Kidney disease Sister     Brain cancer Brother     Bone cancer Brother     Heart disease Brother          Medications have been verified. Objective   /64   Pulse 82   Temp (!) 97.4 °F (36.3 °C)   Resp 20   SpO2 96%        Physical Exam     Physical Exam  Vitals and nursing note reviewed. Constitutional:       General: She is not in acute distress. Appearance: Normal appearance. She is not ill-appearing. HENT:      Head: Normocephalic and atraumatic. Right Ear: Tympanic membrane, ear canal and external ear normal.      Left Ear: Tympanic membrane, ear canal and external ear normal.      Nose: Congestion present. No rhinorrhea. Mouth/Throat:      Lips: Pink. Mouth: Mucous membranes are moist.      Pharynx: No oropharyngeal exudate or posterior oropharyngeal erythema. Tonsils: No tonsillar exudate. Eyes:      General: Vision grossly intact. Extraocular Movements: Extraocular movements intact. Pupils: Pupils are equal, round, and reactive to light. Cardiovascular:      Rate and Rhythm: Normal rate and regular rhythm. Heart sounds: Normal heart sounds. No murmur heard. Pulmonary:      Effort: Pulmonary effort is normal. No respiratory distress. Breath sounds: Normal breath sounds. No decreased air movement. No decreased breath sounds, wheezing, rhonchi or rales. Musculoskeletal:         General: Normal range of motion. Cervical back: Normal range of motion. Skin:     General: Skin is warm. Findings: No rash. Neurological:      Mental Status: She is alert and oriented to person, place, and time. Motor: Motor function is intact. Gait: Gait is intact.    Psychiatric:         Attention and Perception: Attention normal.         Mood and Affect: Mood normal.

## 2023-11-29 NOTE — PATIENT INSTRUCTIONS
Start azithromycin. Albuterol as needed. Mucinex over-the-counter for cough and congestion. Tylenol and Motrin over-the-counter for pain and fever. Follow up with PCP. Go to the nearest emergency department if you have difficulty breathing, worsening symptoms. Acute Bronchitis   WHAT YOU NEED TO KNOW:   Acute bronchitis is swelling and irritation in your lungs. It is usually caused by a virus and most often happens in the winter. Bronchitis may also be caused by bacteria or by a chemical irritant, such as smoke. DISCHARGE INSTRUCTIONS:   Return to the emergency department if:   You cough up blood. Your lips or fingernails turn blue. You feel like you are not getting enough air when you breathe. Call your doctor if:   Your symptoms do not go away or get worse, even after treatment. Your cough does not get better within 4 weeks. You have questions or concerns about your condition or care. Medicines: You may  need any of the following:  Cough suppressants  decrease your urge to cough. Decongestants  help loosen mucus in your lungs and make it easier to cough up. This can help you breathe easier. Inhalers  may be given. Your healthcare provider may give you one or more inhalers to help you breathe easier and cough less. An inhaler gives your medicine to open your airways. Ask your healthcare provider to show you how to use your inhaler correctly. Antibiotics  may be given for up to 5 days if your bronchitis is caused by bacteria. Acetaminophen  decreases pain and fever. It is available without a doctor's order. Ask how much to take and how often to take it. Follow directions. Read the labels of all other medicines you are using to see if they also contain acetaminophen, or ask your doctor or pharmacist. Acetaminophen can cause liver damage if not taken correctly. Do not use more than 4 grams (4,000 milligrams) total of acetaminophen in one day.       NSAIDs help decrease swelling and pain or fever. This medicine is available with or without a doctor's order. NSAIDs can cause stomach bleeding or kidney problems in certain people. If you take blood thinner medicine, always ask your healthcare provider if NSAIDs are safe for you. Always read the medicine label and follow directions. Take your medicine as directed. Contact your healthcare provider if you think your medicine is not helping or if you have side effects. Tell him of her if you are allergic to any medicine. Keep a list of the medicines, vitamins, and herbs you take. Include the amounts, and when and why you take them. Bring the list or the pill bottles to follow-up visits. Carry your medicine list with you in case of an emergency. Self-care:   Drink liquids as directed. You may need to drink more liquids than usual to stay hydrated. Ask how much liquid to drink each day and which liquids are best for you. Use a cool mist humidifier  to increase air moisture in your home. This may make it easier for you to breathe and help decrease your cough. Get more rest.  Rest helps your body to heal. Slowly start to do more each day. Rest when you feel it is needed. Avoid irritants in the air. Avoid chemicals, fumes, and dust. Wear a face mask if you must work around dust or fumes. Stay inside on days when air pollution levels are high. If you have allergies, stay inside when pollen counts are high. Do not use aerosol products, such as spray-on deodorant, bug spray, and hair spray. Do not smoke or be around others who are smoking. Nicotine and other chemicals in cigarettes and cigars can cause lung damage. Ask your healthcare provider for information if you currently smoke and need help to quit. E-cigarettes or smokeless tobacco still contain nicotine. Talk to your healthcare provider before you use these products. Prevent acute bronchitis:       Ask about vaccines you may need.   Get a flu vaccine each year as soon as recommended, usually in September or October. Ask your healthcare provider if you should also get a pneumonia or COVID-19 vaccine. Your healthcare provider can tell you if you should also get other vaccines, and when to get them. Prevent the spread of germs. You can decrease your risk for acute bronchitis and other illnesses by doing the following:      Wash your hands often with soap and water. Carry germ-killing hand lotion or gel with you. You can use the lotion or gel to clean your hands when soap and water are not available. Do not touch your eyes, nose, or mouth unless you have washed your hands first.     Always cover your mouth when you cough to prevent the spread of germs. It is best to cough into a tissue or your shirt sleeve instead of into your hand. Ask those around you to cover their mouths when they cough. Try to avoid people who have a cold or the flu. If you are sick, stay away from others as much as possible. Follow up with your doctor as directed:  Write down questions you have so you will remember to ask them during your follow-up visits. © Copyright [employer] 2021 Information is for End User's use only and may not be sold, redistributed or otherwise used for commercial purposes. All illustrations and images included in CareNotes® are the copyrighted property of [employer] or [address] [place] [address]  The above information is an  only. It is not intended as medical advice for individual conditions or treatments. Talk to your doctor, nurse or pharmacist before following any medical regimen to see if it is safe and effective for you.

## 2023-12-15 ENCOUNTER — TELEPHONE (OUTPATIENT)
Dept: NEPHROLOGY | Facility: CLINIC | Age: 78
End: 2023-12-15

## 2023-12-15 NOTE — TELEPHONE ENCOUNTER
I called and spoke to the patient son Red Mcallister about confirming patient appointment for Monday 12/18/2023 nephrology follow up with Dr. Rajinder Morales, but the patient son Red Mcallister stated that the patient did not have a ride so patient appointment was reschedule to Tuesday 12/19/2023. Patient ayana Mcallister stated that if anything changes that he will give us a call back.

## 2023-12-18 ENCOUNTER — TELEPHONE (OUTPATIENT)
Dept: NEPHROLOGY | Facility: CLINIC | Age: 78
End: 2023-12-18

## 2023-12-29 ENCOUNTER — TELEPHONE (OUTPATIENT)
Dept: NEPHROLOGY | Facility: CLINIC | Age: 78
End: 2023-12-29

## 2024-01-01 DIAGNOSIS — G89.4 CHRONIC PAIN SYNDROME: ICD-10-CM

## 2024-01-02 RX ORDER — GABAPENTIN 100 MG/1
100 CAPSULE ORAL
Qty: 30 CAPSULE | Refills: 1 | Status: SHIPPED | OUTPATIENT
Start: 2024-01-02

## 2024-01-03 DIAGNOSIS — K21.9 GASTROESOPHAGEAL REFLUX DISEASE WITHOUT ESOPHAGITIS: ICD-10-CM

## 2024-01-03 RX ORDER — OMEPRAZOLE 20 MG/1
CAPSULE, DELAYED RELEASE ORAL
Qty: 180 CAPSULE | Refills: 1 | Status: SHIPPED | OUTPATIENT
Start: 2024-01-03

## 2024-01-04 ENCOUNTER — VBI (OUTPATIENT)
Dept: ADMINISTRATIVE | Facility: OTHER | Age: 79
End: 2024-01-04

## 2024-01-17 ENCOUNTER — TELEPHONE (OUTPATIENT)
Age: 79
End: 2024-01-17

## 2024-01-22 RX ORDER — BIMATOPROST 0.1 MG/ML
SOLUTION/ DROPS OPHTHALMIC
COMMUNITY

## 2024-01-23 ENCOUNTER — OFFICE VISIT (OUTPATIENT)
Dept: FAMILY MEDICINE CLINIC | Facility: CLINIC | Age: 79
End: 2024-01-23
Payer: COMMERCIAL

## 2024-01-23 VITALS
BODY MASS INDEX: 35.41 KG/M2 | SYSTOLIC BLOOD PRESSURE: 121 MMHG | HEART RATE: 73 BPM | OXYGEN SATURATION: 100 % | TEMPERATURE: 97.7 F | WEIGHT: 153 LBS | DIASTOLIC BLOOD PRESSURE: 74 MMHG | HEIGHT: 55 IN

## 2024-01-23 DIAGNOSIS — E11.69 HYPERLIPIDEMIA ASSOCIATED WITH TYPE 2 DIABETES MELLITUS: ICD-10-CM

## 2024-01-23 DIAGNOSIS — I10 PRIMARY HYPERTENSION: ICD-10-CM

## 2024-01-23 DIAGNOSIS — Z79.4 TYPE 2 DIABETES MELLITUS WITH DIABETIC NEUROPATHY, WITH LONG-TERM CURRENT USE OF INSULIN (HCC): Primary | ICD-10-CM

## 2024-01-23 DIAGNOSIS — G40.909 SEIZURE DISORDER (HCC): ICD-10-CM

## 2024-01-23 DIAGNOSIS — E78.5 HYPERLIPIDEMIA ASSOCIATED WITH TYPE 2 DIABETES MELLITUS: ICD-10-CM

## 2024-01-23 DIAGNOSIS — I50.32 CHRONIC HEART FAILURE WITH PRESERVED EJECTION FRACTION (HCC): ICD-10-CM

## 2024-01-23 DIAGNOSIS — Z00.00 MEDICARE ANNUAL WELLNESS VISIT, SUBSEQUENT: ICD-10-CM

## 2024-01-23 DIAGNOSIS — J41.0 SIMPLE CHRONIC BRONCHITIS (HCC): ICD-10-CM

## 2024-01-23 DIAGNOSIS — N18.32 STAGE 3B CHRONIC KIDNEY DISEASE (HCC): ICD-10-CM

## 2024-01-23 DIAGNOSIS — U07.1 COVID-19: ICD-10-CM

## 2024-01-23 DIAGNOSIS — I25.10 CORONARY ARTERY DISEASE INVOLVING NATIVE CORONARY ARTERY OF NATIVE HEART WITHOUT ANGINA PECTORIS: ICD-10-CM

## 2024-01-23 DIAGNOSIS — Z11.59 SCREENING FOR VIRAL DISEASE: ICD-10-CM

## 2024-01-23 DIAGNOSIS — E11.40 TYPE 2 DIABETES MELLITUS WITH DIABETIC NEUROPATHY, WITH LONG-TERM CURRENT USE OF INSULIN (HCC): Primary | ICD-10-CM

## 2024-01-23 LAB
SARS-COV-2 AG UPPER RESP QL IA: POSITIVE
SL AMB POCT HEMOGLOBIN AIC: 7.1 (ref ?–6.5)
SL AMB POCT RAPID FLU A: NEGATIVE
SL AMB POCT RAPID FLU B: NEGATIVE
VALID CONTROL: ABNORMAL

## 2024-01-23 PROCEDURE — 87804 INFLUENZA ASSAY W/OPTIC: CPT | Performed by: FAMILY MEDICINE

## 2024-01-23 PROCEDURE — 99214 OFFICE O/P EST MOD 30 MIN: CPT | Performed by: FAMILY MEDICINE

## 2024-01-23 PROCEDURE — 87811 SARS-COV-2 COVID19 W/OPTIC: CPT | Performed by: FAMILY MEDICINE

## 2024-01-23 PROCEDURE — 83036 HEMOGLOBIN GLYCOSYLATED A1C: CPT | Performed by: FAMILY MEDICINE

## 2024-01-23 PROCEDURE — G0439 PPPS, SUBSEQ VISIT: HCPCS | Performed by: FAMILY MEDICINE

## 2024-01-23 RX ORDER — NIRMATRELVIR AND RITONAVIR 150-100 MG
2 KIT ORAL 2 TIMES DAILY
Qty: 20 TABLET | Refills: 0 | Status: SHIPPED | OUTPATIENT
Start: 2024-01-23 | End: 2024-01-28

## 2024-01-23 NOTE — PROGRESS NOTES
Assessment and Plan:     Problem List Items Addressed This Visit        Endocrine    Type 2 diabetes mellitus with diabetic neuropathy (HCC) - Primary    Relevant Orders    POCT hemoglobin A1c (Completed)    Hyperlipidemia associated with type 2 diabetes mellitus        Respiratory    Simple chronic bronchitis (HCC)       Cardiovascular and Mediastinum    Hypertension    Coronary artery disease without angina pectoris    Chronic heart failure with preserved ejection fraction (HCC)       Nervous and Auditory    Seizure disorder (HCC)       Genitourinary    Stage 3b chronic kidney disease (HCC)   Other Visit Diagnoses     COVID-19        Relevant Medications    nirmatrelvir & ritonavir (Paxlovid, 150/100,) tablet therapy pack    Screening for viral disease        Relevant Orders    POCT Rapid Covid Ag (Completed)    POCT rapid flu A and B (Completed)    Medicare annual wellness visit, subsequent            DM: A1c 7.1% (from 6.5) -- somewhat increased, but still in good range; continue current regimen   HTN: Within goal   HLD: Update lipids prior to next visit   CAD/HFpEF: No exacerbation on exam, f/u with Cardio as scheduled   CKD: Continue good hydration, f/u with Nephro as scheduled   Chronic Bronchitis: Pt is COVID(+). She has good oxygenation and lung exam is benign, though she does have wet cough. Pt is agreeable to Paxlovid -- reviewed possible ADRs including GI upset, bad taste, rebound. Reviewed to hold statin while taking. Can continue OTC care (Mucinex, Robitussin). Encouraged to monitor home O2 and go to ED if persistently <90% or respiratory status worsens.   Seizure Dx: Asymptomatic, letter provided to staff to evaluate insurance issue        Preventive health issues were discussed with patient, and age appropriate screening tests were ordered as noted in patient's After Visit Summary.  Personalized health advice and appropriate referrals for health education or preventive services given if needed, as  noted in patient's After Visit Summary.     History of Present Illness:     Patient presents with her son and caregiver for chronic follow up and a Medicare Wellness Visit    DM: Home sugars low 100s; adjusts her insulin herself   HTN: Home BPs aid checks it daily    HLD: On statin   CAD/HFpEF: ROS as below   CKD: Drinking plenty of water, follows with Nephro   Chronic Bronchitis: Pt states she has been sick since 1/20; has been taking Trelegy, using SRINATH nights; follows with Pulm   Seizure Dx: Having an issue with her medication -- brought paper from insurance with her           Diabetes  Pertinent negatives for hypoglycemia include no dizziness or headaches. Associated symptoms include weakness. Pertinent negatives for diabetes include no chest pain and no polyuria.      Patient Care Team:  Clarisa Billingsley DO as PCP - General (Family Medicine)  MD Fredrick Hernandez MD Stephen Strohlein, MD as Endoscopist  Cm Cabral MD (Nephrology)     Review of Systems:     Review of Systems   Constitutional:  Negative for unexpected weight change.   HENT:  Positive for congestion, rhinorrhea and sore throat. Negative for ear pain (clogged).    Eyes:  Positive for visual disturbance (chronic).   Respiratory:  Positive for cough (productive), shortness of breath and wheezing.    Cardiovascular:  Negative for chest pain, palpitations and leg swelling.   Gastrointestinal:  Positive for constipation. Negative for abdominal pain, blood in stool and diarrhea.   Endocrine: Negative for polyuria.   Genitourinary:  Positive for frequency (drinks a lot of water). Negative for dysuria, hematuria and vaginal bleeding.   Neurological:  Positive for weakness. Negative for dizziness and headaches.   Psychiatric/Behavioral:  Positive for sleep disturbance (with her leg pain).         Problem List:     Patient Active Problem List   Diagnosis   • Simple chronic bronchitis (HCC)   • Hypertension   • Alternating constipation  and diarrhea   • Vitamin D deficiency   • Stage 3b chronic kidney disease ([facility])   • Bilateral leg edema   • Chronic pain syndrome   • Coronary artery disease without angina pectoris   • Chronic heart failure with preserved ejection fraction ([facility])   • Gastroesophageal reflux disease without esophagitis   • Hyperlipidemia associated with type 2 diabetes mellitus    • Left ventricular hypertrophy   • Lung nodule   • Macular pucker, left   • Memory loss   • Osteoarthritis   • Seizure disorder ([facility])   • Strabismic amblyopia of right eye   • Neurologic gait dysfunction   • Type 2 diabetes mellitus with diabetic neuropathy ([facility])   • Class 2 obesity due to excess calories without serious comorbidity in adult   • Chronic kidney disease-mineral and bone disorder   • Osteopenia of multiple sites   • Urinary incontinence   • Former smoker      Past Medical and Surgical History:     Past Medical History:   Diagnosis Date   • Acute kidney injury superimposed on chronic kidney disease  11/26/2016   • ADHD (attention deficit hyperactivity disorder) 03/17/2019   • Arthritis     BL HIPS   • 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   • Carpal tunnel syndrome    • Chest pain 03/06/2017   • Chronic kidney disease    • Claudication ([facility]) 3/15/2019   • Closed fracture of base of middle phalanx of finger 06/22/2017   • Closed fracture of middle phalanx of left little finger 07/08/2017   • Coagulopathy ([facility]) 05/15/2019   • Colloid cyst of brain ([facility])    • COPD (chronic obstructive pulmonary disease) ([facility])    • COPD (chronic obstructive pulmonary disease) ([facility])    • Coronary artery disease    • Cough    • Diabetes mellitus ([facility])    • GERD (gastroesophageal reflux disease)    • Glaucoma    • Gout    • History of brain disorder 10/07/2020   • Hyperlipidemia    • Hypertension    • Irritable bowel syndrome    • Melena 02/26/2019   • PAD (peripheral artery disease) ([facility]) 5/15/2019   • Paronychia of great toe of left foot 10/07/2020   • Post-traumatic  seizures (HCC) 2015   • Renal disorder    • Seizures (HCC)     last    • Shortness of breath    • Single seizure (HCC) 2016   • SOB (shortness of breath)    • Syncope and collapse 2020   • Urinary tract infection without hematuria 2016   • Wheezing      Past Surgical History:   Procedure Laterality Date   • BRAIN SURGERY     • CATARACT EXTRACTION     • CHOLECYSTECTOMY     • COLECTOMY RADHA     • COLONOSCOPY     • DECOMPRESSION SPINE LUMBAR POSTERIOR  2019   • HERNIA REPAIR     • HYSTERECTOMY     • INCISION TENDON SHEATH HAND     • NECK SURGERY  2018   • NEUROPLASTY / TRANSPOSITION MEDIAN NERVE AT CARPAL TUNNEL     • WI COLONOSCOPY FLX DX W/COLLJ SPEC WHEN PFRMD N/A 2019    Procedure: COLONOSCOPY;  Surgeon: Yaniv Hawley MD;  Location: [facility];  Service: Gastroenterology   • WI ESOPHAGOGASTRODUODENOSCOPY TRANSORAL DIAGNOSTIC N/A 2019    Procedure: ESOPHAGOGASTRODUODENOSCOPY (EGD);  Surgeon: Yaniv Hawley MD;  Location: [facility];  Service: Gastroenterology   • REPLACEMENT TOTAL KNEE Right    • RETINAL DETACHMENT SURGERY     • TUBAL LIGATION        Family History:     Family History   Problem Relation Age of Onset   • Asthma Mother    • Heart disease Mother    • Emphysema Father    • Cancer Father    • Prostate cancer Father    • Kidney cancer Sister    • Diabetes Sister    • Kidney disease Sister    • Brain cancer Brother    • Bone cancer Brother    • Heart disease Brother       Social History:     Social History     Socioeconomic History   • Marital status:      Spouse name: None   • Number of children: None   • Years of education: None   • Highest education level: None   Occupational History   • Occupation: retired   Tobacco Use   • Smoking status: Former     Current packs/day: 0.00     Average packs/day: 0.5 packs/day for 40.0 years (20.0 ttl pk-yrs)     Types: Cigarettes     Start date:      Quit date:      Years since quittin.0    • Smokeless tobacco: Never   • Tobacco comments:     stopped many years ago   Vaping Use   • Vaping status: Never Used   Substance and Sexual Activity   • Alcohol use: Never   • Drug use: Never   • Sexual activity: Not Currently     Partners: Male   Other Topics Concern   • None   Social History Narrative   • None     Social Determinants of Health     Financial Resource Strain: Low Risk  (1/23/2024)    Overall Financial Resource Strain (CARDIA)    • Difficulty of Paying Living Expenses: Not hard at all   Food Insecurity: Not on file   Transportation Needs: No Transportation Needs (1/23/2024)    PRAPARE - Transportation    • Lack of Transportation (Medical): No    • Lack of Transportation (Non-Medical): No   Physical Activity: Not on file   Stress: Not on file   Social Connections: Not on file   Intimate Partner Violence: Not on file   Housing Stability: Not on file      Medications and Allergies:     Current Outpatient Medications   Medication Sig Dispense Refill   • nirmatrelvir & ritonavir (Paxlovid, 150/100,) tablet therapy pack Take 2 tablets by mouth 2 (two) times a day for 5 days Take 1 nirmatrelvir tablet + 1 ritonavir tablet together per dose 20 tablet 0   • acetaminophen (TYLENOL) 650 mg CR tablet Take 650 mg by mouth 2 (two) times a day     • albuterol (2.5 mg/3 mL) 0.083 % nebulizer solution Take 3 mL (2.5 mg total) by nebulization every 6 (six) hours as needed for wheezing or shortness of breath 90 mL 0   • albuterol (ProAir HFA) 90 mcg/act inhaler Inhale 2 puffs every 6 (six) hours as needed for wheezing or shortness of breath 8.5 g 1   • atorvastatin (LIPITOR) 10 mg tablet take 1 tablet by mouth at bedtime 90 tablet 1   • Calcium Carbonate-Vitamin D (CALCIUM-D PO) Take 1 tablet by mouth in the morning     • clotrimazole (LOTRIMIN) 1 % cream      • Continuous Blood Gluc  (Dexcom G7 ) YOLANDA Use 1 Application if needed (check sugars) 1 each 0   • Continuous Blood Gluc Sensor (Dexcom G7  Sensor) Use 1 Device every 10 days 9 each 3   • dicyclomine (BENTYL) 20 mg tablet      • DULoxetine (CYMBALTA) 30 mg delayed release capsule      • fluticasone (FLONASE) 50 mcg/act nasal spray 1 spray into each nostril daily pt uses as needed     • fluticasone-umeclidinium-vilanterol (Trelegy Ellipta) 100-62.5-25 mcg/actuation inhaler Inhale 1 puff daily Rinse mouth after use. 60 blister 5   • furosemide (LASIX) 20 mg tablet take 1 tablet by mouth once daily 60 tablet 6   • gabapentin (NEURONTIN) 100 mg capsule take 1 capsule by mouth at bedtime 30 capsule 1   • insulin aspart (NovoLOG FlexPen) 100 UNIT/ML injection pen INJECT 12 UNITS BEFORE MEALS (3 MEALS) 15 mL 3   • Insulin Glargine Solostar (Lantus SoloStar) 100 UNIT/ML SOPN Inject 0.3 mL (30 Units total) under the skin daily at bedtime 15 mL 2   • Insulin Pen Needle (Pen Needles) 30G X 8 MM MISC Use 4 (four) times a day (before meals and at bedtime) 500 each 1   • ketoconazole (NIZORAL) 2 % cream      • latanoprost (XALATAN) 0.005 % ophthalmic solution Administer 1 drop into the left eye daily 2.5 mL 2   • levETIRAcetam (KEPPRA) 250 mg tablet take 1 tablet by mouth every 12 hours 180 tablet 1   • Lumigan 0.01 % ophthalmic drops INSTILL 1 DROP into both eyes at bedtime     • montelukast (SINGULAIR) 10 mg tablet take 1 tablet by mouth every evening 90 tablet 1   • Multiple Vitamin (MULTI VITAMIN DAILY PO) Take 1 tablet by mouth daily     • mupirocin (BACTROBAN) 2 % ointment APPLY A SMALL AMOUNT TO THE AFFECTED FOOT/TOE AREA BY TOPICAL ROUTE 3 TIMES PER DAY     • nystatin (MYCOSTATIN) cream Apply topically 2 (two) times a day 30 g 1   • nystatin (MYCOSTATIN) powder Apply topically 3 (three) times a day as needed (rash) 60 g 1   • omeprazole (PriLOSEC) 20 mg delayed release capsule take 1 capsule by mouth twice a day 180 capsule 1   • Polyvinyl Alcohol-Povidone (REFRESH OP) Apply to eye     • Probiotic Product (PROBIOTIC DAILY PO) Take by mouth       •  Spacer/Aero-Holding Chambers (AeroChamber Plus Elian-Vu Large) MISC Use as directed     • vitamin B-12 (VITAMIN B-12) 500 mcg tablet Take 1,000 mcg by mouth daily       No current facility-administered medications for this visit.     Allergies   Allergen Reactions   • Brimonidine Other (See Comments)   • Salicylic Acid-Sulfur Other (See Comments)   • Sulfa Antibiotics Rash and Other (See Comments)      Immunizations:     Immunization History   Administered Date(s) Administered   • COVID-19 J&J (Alida) vaccine 0.5 mL 06/03/2021   • Influenza, high dose seasonal 0.7 mL 10/19/2023   • Pneumococcal Conjugate 13-Valent 11/27/2017, 10/07/2020   • Pneumococcal Polysaccharide PPV23 09/11/2019      Health Maintenance:         Topic Date Due   • Colorectal Cancer Screening  03/13/2028         Topic Date Due   • COVID-19 Vaccine (2 - 2023-24 season) 09/01/2023      Medicare Screening Tests and Risk Assessments:     Shantelle is here for her Subsequent Wellness visit.     Health Risk Assessment:   Patient rates overall health as fair. Patient feels that their physical health rating is slightly worse. Patient is satisfied with their life. Eyesight was rated as slightly worse. Hearing was rated as same. Patient feels that their emotional and mental health rating is same. Patients states they are never, rarely angry. Patient states they are sometimes unusually tired/fatigued. Pain experienced in the last 7 days has been none.     Depression Screening:   PHQ-2 Score: 2      Fall Risk Screening:   In the past year, patient has experienced: no history of falling in past year      Urinary Incontinence Screening:   Patient has leaked urine accidently in the last six months.     Home Safety:  Patient has trouble with stairs inside or outside of their home. Patient has working smoke alarms and has working carbon monoxide detector. Home safety hazards include: none.     Nutrition:   Current diet is Regular.     Medications:   Patient is  currently taking over-the-counter supplements. OTC medications include: see medication list. Patient is able to manage medications.     Activities of Daily Living (ADLs)/Instrumental Activities of Daily Living (IADLs):   Walk and transfer into and out of bed and chair?: Yes  Dress and groom yourself?: No    Bathe or shower yourself?: No    Feed yourself? Yes  Do your laundry/housekeeping?: No  Manage your money, pay your bills and track your expenses?: No  Make your own meals?: No    Do your own shopping?: No    Durable Medical Equipment Suppliers  N/A    Previous Hospitalizations:   Any hospitalizations or ED visits within the last 12 months?: No      Advance Care Planning:   Living will: No      Cognitive Screening:   Provider or family/friend/caregiver concerned regarding cognition?: No    PREVENTIVE SCREENINGS      Cardiovascular Screening:    General: Screening Not Indicated and History Lipid Disorder      Diabetes Screening:     General: Screening Not Indicated and History Diabetes      Colorectal Cancer Screening:     General: Screening Current      Breast Cancer Screening:     General: Screening Not Indicated      Cervical Cancer Screening:    General: Screening Not Indicated      Osteoporosis Screening:    General: Screening Current      Abdominal Aortic Aneurysm (AAA) Screening:        General: Screening Not Indicated      Lung Cancer Screening:     General: Screening Not Indicated      Hepatitis C Screening:    General: Screening Not Indicated    Screening, Brief Intervention, and Referral to Treatment (SBIRT)    Screening  Typical number of drinks in a day: 0  Typical number of drinks in a week: 0  Interpretation: Low risk drinking behavior.    Single Item Drug Screening:  How often have you used an illegal drug (including marijuana) or a prescription medication for non-medical reasons in the past year? never    Single Item Drug Screen Score: 0  Interpretation: Negative screen for possible drug use  "disorder    No results found.     Physical Exam:     /74   Pulse 73   Temp 97.7 °F (36.5 °C)   Ht 4' 7\" (1.397 m)   Wt 69.4 kg (153 lb)   SpO2 100%   BMI 35.56 kg/m²     Physical Exam  Vitals and nursing note reviewed.   Constitutional:       General: She is not in acute distress.     Appearance: She is well-developed.   HENT:      Head: Normocephalic and atraumatic.      Right Ear: Tympanic membrane, ear canal and external ear normal.      Left Ear: Tympanic membrane, ear canal and external ear normal.      Nose: Rhinorrhea present.      Right Sinus: Maxillary sinus tenderness present. No frontal sinus tenderness.      Left Sinus: Maxillary sinus tenderness present. No frontal sinus tenderness.      Mouth/Throat:      Mouth: Mucous membranes are moist.      Pharynx: No oropharyngeal exudate or posterior oropharyngeal erythema.   Eyes:      Conjunctiva/sclera: Conjunctivae normal.   Neck:      Thyroid: No thyromegaly.   Cardiovascular:      Rate and Rhythm: Normal rate and regular rhythm.      Pulses: Pulses are weak.           Dorsalis pedis pulses are 0 on the right side and 0 on the left side.   Pulmonary:      Effort: Pulmonary effort is normal. No respiratory distress.      Breath sounds: Normal breath sounds. No wheezing or rhonchi.      Comments: Wet cough  Abdominal:      General: Bowel sounds are normal. There is no distension.      Palpations: Abdomen is soft.      Tenderness: There is no abdominal tenderness.   Musculoskeletal:      Comments: Ambulating with walker   Feet:      Right foot:      Skin integrity: Callus and dry skin present.      Left foot:      Skin integrity: Callus and dry skin present.   Lymphadenopathy:      Cervical: No cervical adenopathy.   Skin:     General: Skin is warm and dry.   Neurological:      Mental Status: She is alert. Mental status is at baseline.   Psychiatric:         Mood and Affect: Mood normal.        Patient's shoes and socks removed.    Right Foot/Ankle "   Right Foot Inspection  Skin Exam: dry skin, callus and callus.     Toe Exam:  no right toe deformity    Sensory   Vibration: intact  Monofilament testing: absent    Vascular  Capillary refills: elevated  The right DP pulse is 0.     Left Foot/Ankle  Left Foot Inspection  Skin Exam: dry skin and callus.     Toe Exam: No left toe deformity.     Sensory   Vibration: intact  Monofilament testing: absent    Vascular  Capillary refills: elevated  The left DP pulse is 0.     Assign Risk Category  No deformity present  Loss of protective sensation  Weak pulses  Risk: 2    COVID completed primary, Flu UTD, Pneumo UTD, Tdap deferred, Shingles deferred     Clarisa Billingsley DO

## 2024-01-23 NOTE — PATIENT INSTRUCTIONS
Medicare Preventive Visit Patient Instructions  Thank you for completing your Welcome to Medicare Visit or Medicare Annual Wellness Visit today. Your next wellness visit will be due in one year (1/23/2025).  The screening/preventive services that you may require over the next 5-10 years are detailed below. Some tests may not apply to you based off risk factors and/or age. Screening tests ordered at today's visit but not completed yet may show as past due. Also, please note that scanned in results may not display below.  Preventive Screenings:  Service Recommendations Previous Testing/Comments   Colorectal Cancer Screening  * Colonoscopy    * Fecal Occult Blood Test (FOBT)/Fecal Immunochemical Test (FIT)  * Fecal DNA/Cologuard Test  * Flexible Sigmoidoscopy Age: 45-75 years old   Colonoscopy: every 10 years (may be performed more frequently if at higher risk)  OR  FOBT/FIT: every 1 year  OR  Cologuard: every 3 years  OR  Sigmoidoscopy: every 5 years  Screening may be recommended earlier than age 45 if at higher risk for colorectal cancer. Also, an individualized decision between you and your healthcare provider will decide whether screening between the ages of 76-85 would be appropriate. Colonoscopy: 03/15/2023  FOBT/FIT: Not on file  Cologuard: Not on file  Sigmoidoscopy: Not on file    Screening Current     Breast Cancer Screening Age: 40+ years old  Frequency: every 1-2 years  Not required if history of left and right mastectomy Mammogram: Not on file        Cervical Cancer Screening Between the ages of 21-29, pap smear recommended once every 3 years.   Between the ages of 30-65, can perform pap smear with HPV co-testing every 5 years.   Recommendations may differ for women with a history of total hysterectomy, cervical cancer, or abnormal pap smears in past. Pap Smear: Not on file    Screening Not Indicated   Hepatitis C Screening Once for adults born between 1945 and 1965  More frequently in patients at high  risk for Hepatitis C Hep C Antibody: Not on file        Diabetes Screening 1-2 times per year if you're at risk for diabetes or have pre-diabetes Fasting glucose: 110 mg/dL (10/13/2023)  A1C: 6.5 (10/19/2023)  Screening Not Indicated  History Diabetes   Cholesterol Screening Once every 5 years if you don't have a lipid disorder. May order more often based on risk factors. Lipid panel: 10/13/2023    Screening Not Indicated  History Lipid Disorder     Other Preventive Screenings Covered by Medicare:  Abdominal Aortic Aneurysm (AAA) Screening: covered once if your at risk. You're considered to be at risk if you have a family history of AAA.  Lung Cancer Screening: covers low dose CT scan once per year if you meet all of the following conditions: (1) Age 55-77; (2) No signs or symptoms of lung cancer; (3) Current smoker or have quit smoking within the last 15 years; (4) You have a tobacco smoking history of at least 20 pack years (packs per day multiplied by number of years you smoked); (5) You get a written order from a healthcare provider.  Glaucoma Screening: covered annually if you're considered high risk: (1) You have diabetes OR (2) Family history of glaucoma OR (3)  aged 50 and older OR (4)  American aged 65 and older  Osteoporosis Screening: covered every 2 years if you meet one of the following conditions: (1) You're estrogen deficient and at risk for osteoporosis based off medical history and other findings; (2) Have a vertebral abnormality; (3) On glucocorticoid therapy for more than 3 months; (4) Have primary hyperparathyroidism; (5) On osteoporosis medications and need to assess response to drug therapy.   Last bone density test (DXA Scan): 02/24/2022.  HIV Screening: covered annually if you're between the age of 15-65. Also covered annually if you are younger than 15 and older than 65 with risk factors for HIV infection. For pregnant patients, it is covered up to 3 times per  pregnancy.    Immunizations:  Immunization Recommendations   Influenza Vaccine Annual influenza vaccination during flu season is recommended for all persons aged >= 6 months who do not have contraindications   Pneumococcal Vaccine   * Pneumococcal conjugate vaccine = PCV13 (Prevnar 13), PCV15 (Vaxneuvance), PCV20 (Prevnar 20)  * Pneumococcal polysaccharide vaccine = PPSV23 (Pneumovax) Adults 19-63 yo with certain risk factors or if 65+ yo  If never received any pneumonia vaccine: recommend Prevnar 20 (PCV20)  Give PCV20 if previously received 1 dose of PCV13 or PPSV23   Hepatitis B Vaccine 3 dose series if at intermediate or high risk (ex: diabetes, end stage renal disease, liver disease)   Respiratory syncytial virus (RSV) Vaccine - COVERED BY MEDICARE PART D  * RSVPreF3 (Arexvy) CDC recommends that adults 60 years of age and older may receive a single dose of RSV vaccine using shared clinical decision-making (SCDM)   Tetanus (Td) Vaccine - COST NOT COVERED BY MEDICARE PART B Following completion of primary series, a booster dose should be given every 10 years to maintain immunity against tetanus. Td may also be given as tetanus wound prophylaxis.   Tdap Vaccine - COST NOT COVERED BY MEDICARE PART B Recommended at least once for all adults. For pregnant patients, recommended with each pregnancy.   Shingles Vaccine (Shingrix) - COST NOT COVERED BY MEDICARE PART B  2 shot series recommended in those 19 years and older who have or will have weakened immune systems or those 50 years and older     Health Maintenance Due:      Topic Date Due   • Colorectal Cancer Screening  03/13/2028     Immunizations Due:      Topic Date Due   • COVID-19 Vaccine (2 - 2023-24 season) 09/01/2023     Advance Directives   What are advance directives?  Advance directives are legal documents that state your wishes and plans for medical care. These plans are made ahead of time in case you lose your ability to make decisions for yourself.  Advance directives can apply to any medical decision, such as the treatments you want, and if you want to donate organs.   What are the types of advance directives?  There are many types of advance directives, and each state has rules about how to use them. You may choose a combination of any of the following:  Living will:  This is a written record of the treatment you want. You can also choose which treatments you do not want, which to limit, and which to stop at a certain time. This includes surgery, medicine, IV fluid, and tube feedings.   Durable power of  for healthcare (DPAHC):  This is a written record that states who you want to make healthcare choices for you when you are unable to make them for yourself. This person, called a proxy, is usually a family member or a friend. You may choose more than 1 proxy.  Do not resuscitate (DNR) order:  A DNR order is used in case your heart stops beating or you stop breathing. It is a request not to have certain forms of treatment, such as CPR. A DNR order may be included in other types of advance directives.  Medical directive:  This covers the care that you want if you are in a coma, near death, or unable to make decisions for yourself. You can list the treatments you want for each condition. Treatment may include pain medicine, surgery, blood transfusions, dialysis, IV or tube feedings, and a ventilator (breathing machine).  Values history:  This document has questions about your views, beliefs, and how you feel and think about life. This information can help others choose the care that you would choose.  Why are advance directives important?  An advance directive helps you control your care. Although spoken wishes may be used, it is better to have your wishes written down. Spoken wishes can be misunderstood, or not followed. Treatments may be given even if you do not want them. An advance directive may make it easier for your family to make difficult  choices about your care.   Urinary Incontinence   Urinary incontinence (UI)  is when you lose control of your bladder. UI develops because your bladder cannot store or empty urine properly. The 3 most common types of UI are stress incontinence, urge incontinence, or both.  Medicines:   May be given to help strengthen your bladder control. Report any side effects of medication to your healthcare provider.  Do pelvic muscle exercises often:  Your pelvic muscles help you stop urinating. Squeeze these muscles tight for 5 seconds, then relax for 5 seconds. Gradually work up to squeezing for 10 seconds. Do 3 sets of 15 repetitions a day, or as directed. This will help strengthen your pelvic muscles and improve bladder control.  Train your bladder:  Go to the bathroom at set times, such as every 2 hours, even if you do not feel the urge to go. You can also try to hold your urine when you feel the urge to go. For example, hold your urine for 5 minutes when you feel the urge to go. As that becomes easier, hold your urine for 10 minutes.   Self-care:   Keep a UI record.  Write down how often you leak urine and how much you leak. Make a note of what you were doing when you leaked urine.  Drink liquids as directed. You may need to limit the amount of liquid you drink to help control your urine leakage. Do not drink any liquid right before you go to bed. Limit or do not have drinks that contain caffeine or alcohol.   Prevent constipation.  Eat a variety of high-fiber foods. Good examples are high-fiber cereals, beans, vegetables, and whole-grain breads. Walking is the best way to trigger your intestines to have a bowel movement.  Exercise regularly and maintain a healthy weight.  Weight loss and exercise will decrease pressure on your bladder and help you control your leakage.   Use a catheter as directed  to help empty your bladder. A catheter is a tiny, plastic tube that is put into your bladder to drain your urine.   Go to  behavior therapy as directed.  Behavior therapy may be used to help you learn to control your urge to urinate.    Weight Management   Why it is important to manage your weight:  Being overweight increases your risk of health conditions such as heart disease, high blood pressure, type 2 diabetes, and certain types of cancer. It can also increase your risk for osteoarthritis, sleep apnea, and other respiratory problems. Aim for a slow, steady weight loss. Even a small amount of weight loss can lower your risk of health problems.  How to lose weight safely:  A safe and healthy way to lose weight is to eat fewer calories and get regular exercise. You can lose up about 1 pound a week by decreasing the number of calories you eat by 500 calories each day.   Healthy meal plan for weight management:  A healthy meal plan includes a variety of foods, contains fewer calories, and helps you stay healthy. A healthy meal plan includes the following:  Eat whole-grain foods more often.  A healthy meal plan should contain fiber. Fiber is the part of grains, fruits, and vegetables that is not broken down by your body. Whole-grain foods are healthy and provide extra fiber in your diet. Some examples of whole-grain foods are whole-wheat breads and pastas, oatmeal, brown rice, and bulgur.  Eat a variety of vegetables every day.  Include dark, leafy greens such as spinach, kale, estefany greens, and mustard greens. Eat yellow and orange vegetables such as carrots, sweet potatoes, and winter squash.   Eat a variety of fruits every day.  Choose fresh or canned fruit (canned in its own juice or light syrup) instead of juice. Fruit juice has very little or no fiber.  Eat low-fat dairy foods.  Drink fat-free (skim) milk or 1% milk. Eat fat-free yogurt and low-fat cottage cheese. Try low-fat cheeses such as mozzarella and other reduced-fat cheeses.  Choose meat and other protein foods that are low in fat.  Choose beans or other legumes such as  split peas or lentils. Choose fish, skinless poultry (chicken or turkey), or lean cuts of red meat (beef or pork). Before you cook meat or poultry, cut off any visible fat.   Use less fat and oil.  Try baking foods instead of frying them. Add less fat, such as margarine, sour cream, regular salad dressing and mayonnaise to foods. Eat fewer high-fat foods. Some examples of high-fat foods include french fries, doughnuts, ice cream, and cakes.  Eat fewer sweets.  Limit foods and drinks that are high in sugar. This includes candy, cookies, regular soda, and sweetened drinks.  Exercise:  Exercise at least 30 minutes per day on most days of the week. Some examples of exercise include walking, biking, dancing, and swimming. You can also fit in more physical activity by taking the stairs instead of the elevator or parking farther away from stores. Ask your healthcare provider about the best exercise plan for you.      © Copyright [employer] 2018 Information is for End User's use only and may not be sold, redistributed or otherwise used for commercial purposes. All illustrations and images included in CareNotes® are the copyrighted property of [employer] or [employer]

## 2024-01-27 DIAGNOSIS — J41.1 MUCOPURULENT CHRONIC BRONCHITIS (HCC): ICD-10-CM

## 2024-01-29 RX ORDER — MONTELUKAST SODIUM 10 MG/1
TABLET ORAL
Qty: 90 TABLET | Refills: 1 | Status: SHIPPED | OUTPATIENT
Start: 2024-01-29 | End: 2024-04-28

## 2024-02-18 DIAGNOSIS — E78.2 MIXED HYPERLIPIDEMIA: ICD-10-CM

## 2024-02-19 DIAGNOSIS — E11.40 TYPE 2 DIABETES MELLITUS WITH DIABETIC NEUROPATHY, WITH LONG-TERM CURRENT USE OF INSULIN (HCC): ICD-10-CM

## 2024-02-19 DIAGNOSIS — Z79.4 TYPE 2 DIABETES MELLITUS WITH DIABETIC NEUROPATHY, WITH LONG-TERM CURRENT USE OF INSULIN (HCC): ICD-10-CM

## 2024-02-19 RX ORDER — ATORVASTATIN CALCIUM 10 MG/1
10 TABLET, FILM COATED ORAL
Qty: 90 TABLET | Refills: 1 | Status: SHIPPED | OUTPATIENT
Start: 2024-02-19

## 2024-02-19 RX ORDER — INSULIN GLARGINE 100 [IU]/ML
INJECTION, SOLUTION SUBCUTANEOUS
Qty: 15 ML | Refills: 2 | Status: SHIPPED | OUTPATIENT
Start: 2024-02-19

## 2024-02-28 DIAGNOSIS — L30.4 INTERTRIGO: ICD-10-CM

## 2024-02-28 DIAGNOSIS — E11.42 DIABETIC PERIPHERAL NEUROPATHY (HCC): ICD-10-CM

## 2024-02-28 RX ORDER — NYSTATIN 100000 U/G
CREAM TOPICAL 2 TIMES DAILY
Qty: 30 G | Refills: 1 | Status: SHIPPED | OUTPATIENT
Start: 2024-02-28

## 2024-02-28 RX ORDER — NYSTATIN 100000 [USP'U]/G
POWDER TOPICAL 3 TIMES DAILY PRN
Qty: 60 G | Refills: 1 | Status: SHIPPED | OUTPATIENT
Start: 2024-02-28

## 2024-02-29 RX ORDER — INSULIN ASPART 100 [IU]/ML
INJECTION, SOLUTION INTRAVENOUS; SUBCUTANEOUS
Qty: 15 ML | Refills: 3 | Status: SHIPPED | OUTPATIENT
Start: 2024-02-29

## 2024-03-10 DIAGNOSIS — G89.4 CHRONIC PAIN SYNDROME: ICD-10-CM

## 2024-03-11 RX ORDER — GABAPENTIN 100 MG/1
100 CAPSULE ORAL
Qty: 30 CAPSULE | Refills: 1 | Status: SHIPPED | OUTPATIENT
Start: 2024-03-11

## 2024-03-25 ENCOUNTER — TELEPHONE (OUTPATIENT)
Dept: NEPHROLOGY | Facility: CLINIC | Age: 79
End: 2024-03-25

## 2024-03-25 DIAGNOSIS — N18.32 STAGE 3B CHRONIC KIDNEY DISEASE (HCC): Primary | ICD-10-CM

## 2024-03-28 ENCOUNTER — APPOINTMENT (OUTPATIENT)
Dept: LAB | Facility: CLINIC | Age: 79
End: 2024-03-28
Payer: COMMERCIAL

## 2024-03-28 DIAGNOSIS — N18.32 STAGE 3B CHRONIC KIDNEY DISEASE (HCC): ICD-10-CM

## 2024-03-28 LAB
25(OH)D3 SERPL-MCNC: 53.5 NG/ML (ref 30–100)
ANION GAP SERPL CALCULATED.3IONS-SCNC: 9 MMOL/L (ref 4–13)
BACTERIA UR QL AUTO: ABNORMAL /HPF
BASOPHILS # BLD AUTO: 0.04 THOUSANDS/ÂΜL (ref 0–0.1)
BASOPHILS NFR BLD AUTO: 1 % (ref 0–1)
BILIRUB UR QL STRIP: NEGATIVE
BUN SERPL-MCNC: 20 MG/DL (ref 5–25)
CALCIUM SERPL-MCNC: 9.4 MG/DL (ref 8.4–10.2)
CAOX CRY URNS QL MICRO: ABNORMAL /HPF
CHLORIDE SERPL-SCNC: 104 MMOL/L (ref 96–108)
CLARITY UR: CLEAR
CO2 SERPL-SCNC: 29 MMOL/L (ref 21–32)
COLOR UR: ABNORMAL
CREAT SERPL-MCNC: 1.06 MG/DL (ref 0.6–1.3)
CREAT UR-MCNC: 90.9 MG/DL
EOSINOPHIL # BLD AUTO: 0.22 THOUSAND/ÂΜL (ref 0–0.61)
EOSINOPHIL NFR BLD AUTO: 3 % (ref 0–6)
ERYTHROCYTE [DISTWIDTH] IN BLOOD BY AUTOMATED COUNT: 14.6 % (ref 11.6–15.1)
GFR SERPL CREATININE-BSD FRML MDRD: 49 ML/MIN/1.73SQ M
GLUCOSE P FAST SERPL-MCNC: 138 MG/DL (ref 65–99)
GLUCOSE UR STRIP-MCNC: NEGATIVE MG/DL
HCT VFR BLD AUTO: 46.2 % (ref 34.8–46.1)
HGB BLD-MCNC: 14.3 G/DL (ref 11.5–15.4)
HGB UR QL STRIP.AUTO: NEGATIVE
IMM GRANULOCYTES # BLD AUTO: 0.03 THOUSAND/UL (ref 0–0.2)
IMM GRANULOCYTES NFR BLD AUTO: 0 % (ref 0–2)
KETONES UR STRIP-MCNC: NEGATIVE MG/DL
LEUKOCYTE ESTERASE UR QL STRIP: ABNORMAL
LYMPHOCYTES # BLD AUTO: 1.57 THOUSANDS/ÂΜL (ref 0.6–4.47)
LYMPHOCYTES NFR BLD AUTO: 21 % (ref 14–44)
MCH RBC QN AUTO: 28 PG (ref 26.8–34.3)
MCHC RBC AUTO-ENTMCNC: 31 G/DL (ref 31.4–37.4)
MCV RBC AUTO: 90 FL (ref 82–98)
MONOCYTES # BLD AUTO: 0.43 THOUSAND/ÂΜL (ref 0.17–1.22)
MONOCYTES NFR BLD AUTO: 6 % (ref 4–12)
NEUTROPHILS # BLD AUTO: 5.24 THOUSANDS/ÂΜL (ref 1.85–7.62)
NEUTS SEG NFR BLD AUTO: 69 % (ref 43–75)
NITRITE UR QL STRIP: NEGATIVE
NON-SQ EPI CELLS URNS QL MICRO: ABNORMAL /HPF
NRBC BLD AUTO-RTO: 0 /100 WBCS
PH UR STRIP.AUTO: 6 [PH]
PHOSPHATE SERPL-MCNC: 3.1 MG/DL (ref 2.3–4.1)
PLATELET # BLD AUTO: 270 THOUSANDS/UL (ref 149–390)
PMV BLD AUTO: 10.2 FL (ref 8.9–12.7)
POTASSIUM SERPL-SCNC: 4 MMOL/L (ref 3.5–5.3)
PROT UR STRIP-MCNC: ABNORMAL MG/DL
PROT UR-MCNC: 35 MG/DL
PROT/CREAT UR: 0.39 MG/G{CREAT} (ref 0–0.1)
PTH-INTACT SERPL-MCNC: 59 PG/ML (ref 12–88)
RBC # BLD AUTO: 5.11 MILLION/UL (ref 3.81–5.12)
RBC #/AREA URNS AUTO: ABNORMAL /HPF
SODIUM SERPL-SCNC: 142 MMOL/L (ref 135–147)
SP GR UR STRIP.AUTO: 1.02 (ref 1–1.03)
UROBILINOGEN UR STRIP-ACNC: <2 MG/DL
WBC # BLD AUTO: 7.53 THOUSAND/UL (ref 4.31–10.16)
WBC #/AREA URNS AUTO: ABNORMAL /HPF

## 2024-03-28 PROCEDURE — 85025 COMPLETE CBC W/AUTO DIFF WBC: CPT

## 2024-03-28 PROCEDURE — 81001 URINALYSIS AUTO W/SCOPE: CPT

## 2024-03-28 PROCEDURE — 82306 VITAMIN D 25 HYDROXY: CPT

## 2024-03-28 PROCEDURE — 80048 BASIC METABOLIC PNL TOTAL CA: CPT

## 2024-03-28 PROCEDURE — 82570 ASSAY OF URINE CREATININE: CPT

## 2024-03-28 PROCEDURE — 83970 ASSAY OF PARATHORMONE: CPT

## 2024-03-28 PROCEDURE — 84100 ASSAY OF PHOSPHORUS: CPT

## 2024-03-28 PROCEDURE — 84156 ASSAY OF PROTEIN URINE: CPT

## 2024-03-28 PROCEDURE — 36415 COLL VENOUS BLD VENIPUNCTURE: CPT

## 2024-04-02 ENCOUNTER — TELEPHONE (OUTPATIENT)
Dept: NEPHROLOGY | Facility: CLINIC | Age: 79
End: 2024-04-02

## 2024-04-03 ENCOUNTER — OFFICE VISIT (OUTPATIENT)
Dept: NEPHROLOGY | Facility: CLINIC | Age: 79
End: 2024-04-03

## 2024-04-03 VITALS
OXYGEN SATURATION: 94 % | DIASTOLIC BLOOD PRESSURE: 70 MMHG | TEMPERATURE: 96.9 F | RESPIRATION RATE: 16 BRPM | WEIGHT: 155 LBS | SYSTOLIC BLOOD PRESSURE: 120 MMHG | HEIGHT: 55 IN | BODY MASS INDEX: 35.87 KG/M2 | HEART RATE: 84 BPM

## 2024-04-03 DIAGNOSIS — N18.9 CHRONIC KIDNEY DISEASE-MINERAL AND BONE DISORDER: ICD-10-CM

## 2024-04-03 DIAGNOSIS — I10 PRIMARY HYPERTENSION: ICD-10-CM

## 2024-04-03 DIAGNOSIS — E66.01 OBESITY, MORBID (HCC): ICD-10-CM

## 2024-04-03 DIAGNOSIS — Z79.4 TYPE 2 DIABETES MELLITUS WITH DIABETIC NEUROPATHY, WITH LONG-TERM CURRENT USE OF INSULIN (HCC): ICD-10-CM

## 2024-04-03 DIAGNOSIS — G89.4 CHRONIC PAIN SYNDROME: Primary | ICD-10-CM

## 2024-04-03 DIAGNOSIS — M89.9 CHRONIC KIDNEY DISEASE-MINERAL AND BONE DISORDER: ICD-10-CM

## 2024-04-03 DIAGNOSIS — E11.40 TYPE 2 DIABETES MELLITUS WITH DIABETIC NEUROPATHY, WITH LONG-TERM CURRENT USE OF INSULIN (HCC): ICD-10-CM

## 2024-04-03 DIAGNOSIS — E83.9 CHRONIC KIDNEY DISEASE-MINERAL AND BONE DISORDER: ICD-10-CM

## 2024-04-03 DIAGNOSIS — N18.32 STAGE 3B CHRONIC KIDNEY DISEASE (HCC): ICD-10-CM

## 2024-04-03 NOTE — ASSESSMENT & PLAN NOTE
Lab Results   Component Value Date    EGFR 49 03/28/2024    EGFR 49 10/13/2023    EGFR 46 02/02/2023    CREATININE 1.06 03/28/2024    CREATININE 1.07 10/13/2023    CREATININE 1.13 02/02/2023   PTH and phosphorus along with vitamin D are within acceptable range and will continue to monitor

## 2024-04-03 NOTE — ASSESSMENT & PLAN NOTE
Lab Results   Component Value Date    HGBA1C 7.1 (A) 01/23/2024   Reasonable well-controlled.  Importance of diabetic control and effect on kidney disease discussed with the patient

## 2024-04-03 NOTE — PROGRESS NOTES
NEPHROLOGY OFFICE FOLLOW UP  Shantelle Romano 78 y.o. female MRN: [id number]    Encounter: [id number] 4/3/2024    REASON FOR VISIT: Shantelle Romano is a 78 y.o. female who is here on 4/3/2024 for Chronic Kidney Disease (Stage 3) and Follow-up  .    HPI:    Shantelle came in today for follow-up of CKD.  Patient is 78-year-old who is doing well overall    Denies any acute complaint except visual problem for which she is being closely monitored by ophthalmologist    No chest pain no palpitation or shortness of breath    No nausea no vomiting    Denies any urinary complaint        REVIEW OF SYSTEMS:    Review of Systems   Constitutional:  Negative for fatigue.   HENT:  Negative for congestion.    Eyes:  Positive for visual disturbance. Negative for photophobia and pain.   Respiratory:  Negative for chest tightness and shortness of breath.    Cardiovascular:  Negative for chest pain and palpitations.   Gastrointestinal:  Negative for abdominal distention, abdominal pain and blood in stool.   Endocrine: Negative for polydipsia.   Genitourinary:  Negative for difficulty urinating, dysuria, flank pain, hematuria and urgency.   Musculoskeletal:  Negative for arthralgias and back pain.   Skin:  Negative for rash.   Neurological:  Negative for dizziness, light-headedness and headaches.   Hematological:  Does not bruise/bleed easily.   Psychiatric/Behavioral:  Negative for behavioral problems. The patient is not nervous/anxious.          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    Carpal tunnel syndrome     Chest pain 03/06/2017    Chronic kidney disease     Claudication (HCC) 3/15/2019    Closed fracture of base of middle phalanx of finger 06/22/2017    Closed fracture of middle phalanx of left little finger  07/08/2017    Coagulopathy (HCC) 05/15/2019    Colloid cyst of brain (HCC)     COPD (chronic obstructive pulmonary disease) (HCC)     COPD (chronic obstructive pulmonary disease) (HCC)     Coronary artery disease     Cough     Diabetes mellitus (HCC)     GERD (gastroesophageal reflux disease)     Glaucoma     Gout     History of brain disorder 10/07/2020    Hyperlipidemia     Hypertension     Irritable bowel syndrome     Melena 02/26/2019    PAD (peripheral artery disease) (HCC) 5/15/2019    Paronychia of great toe of left foot 10/07/2020    Post-traumatic seizures (HCC) 07/23/2015    Renal disorder     Seizures (HCC)     last 2015    Shortness of breath     Single seizure (HCC) 05/31/2016    SOB (shortness of breath)     Syncope and collapse 11/11/2020    Urinary tract infection without hematuria 11/26/2016    Wheezing        PAST SURGICAL HISTORY:  Past Surgical History:   Procedure Laterality Date    BRAIN SURGERY      CATARACT EXTRACTION      CHOLECYSTECTOMY      COLECTOMY RADHA      COLONOSCOPY      DECOMPRESSION SPINE LUMBAR POSTERIOR  08/19/2019    HERNIA REPAIR      HYSTERECTOMY      INCISION TENDON SHEATH HAND      NECK SURGERY  05/24/2018    NEUROPLASTY / TRANSPOSITION MEDIAN NERVE AT CARPAL TUNNEL      AR COLONOSCOPY FLX DX W/COLLJ SPEC WHEN PFRMD N/A 03/26/2019    Procedure: COLONOSCOPY;  Surgeon: Yaniv Hawley MD;  Location: [facility];  Service: Gastroenterology    AR ESOPHAGOGASTRODUODENOSCOPY TRANSORAL DIAGNOSTIC N/A 03/26/2019    Procedure: ESOPHAGOGASTRODUODENOSCOPY (EGD);  Surgeon: Yaniv Hawley MD;  Location: [facility];  Service: Gastroenterology    REPLACEMENT TOTAL KNEE Right     RETINAL DETACHMENT SURGERY      TUBAL LIGATION         SOCIAL HISTORY:  Social History     Substance and Sexual Activity   Alcohol Use Never     Social History     Substance and Sexual Activity   Drug Use Never     Social History     Tobacco Use   Smoking Status Former    Current packs/day: 0.00    Average  packs/day: 0.5 packs/day for 40.0 years (20.0 ttl pk-yrs)    Types: Cigarettes    Start date:     Quit date:     Years since quittin.2   Smokeless Tobacco Never   Tobacco Comments    stopped many years ago       FAMILY HISTORY:  Family History   Problem Relation Age of Onset    Asthma Mother     Heart disease Mother     Emphysema Father     Cancer Father     Prostate cancer Father     Kidney cancer Sister     Diabetes Sister     Kidney disease Sister     Brain cancer Brother     Bone cancer Brother     Heart disease Brother        MEDICATIONS:    Current Outpatient Medications:     acetaminophen (TYLENOL) 650 mg CR tablet, Take 650 mg by mouth 2 (two) times a day, Disp: , Rfl:     albuterol (2.5 mg/3 mL) 0.083 % nebulizer solution, Take 3 mL (2.5 mg total) by nebulization every 6 (six) hours as needed for wheezing or shortness of breath, Disp: 90 mL, Rfl: 0    albuterol (ProAir HFA) 90 mcg/act inhaler, Inhale 2 puffs every 6 (six) hours as needed for wheezing or shortness of breath, Disp: 8.5 g, Rfl: 1    atorvastatin (LIPITOR) 10 mg tablet, take 1 tablet by mouth at bedtime, Disp: 90 tablet, Rfl: 1    Calcium Carbonate-Vitamin D (CALCIUM-D PO), Take 1 tablet by mouth in the morning, Disp: , Rfl:     clotrimazole (LOTRIMIN) 1 % cream, , Disp: , Rfl:     Continuous Blood Gluc  (Dexcom G7 ) YOLANDA, Use 1 Application if needed (check sugars), Disp: 1 each, Rfl: 0    Continuous Blood Gluc Sensor (Dexcom G7 Sensor), Use 1 Device every 10 days, Disp: 9 each, Rfl: 3    dicyclomine (BENTYL) 20 mg tablet, , Disp: , Rfl:     DULoxetine (CYMBALTA) 30 mg delayed release capsule, , Disp: , Rfl:     fluticasone (FLONASE) 50 mcg/act nasal spray, 1 spray into each nostril daily pt uses as needed, Disp: , Rfl:     fluticasone-umeclidinium-vilanterol (Trelegy Ellipta) 100-62.5-25 mcg/actuation inhaler, Inhale 1 puff daily Rinse mouth after use., Disp: 60 blister, Rfl: 5    furosemide (LASIX) 20 mg tablet,  take 1 tablet by mouth once daily, Disp: 60 tablet, Rfl: 6    gabapentin (NEURONTIN) 100 mg capsule, take 1 capsule by mouth at bedtime, Disp: 30 capsule, Rfl: 1    insulin aspart (NovoLOG FlexPen) 100 UNIT/ML injection pen, inject 12 units subcutaneously before meals (3 MEALS A DAY), Disp: 15 mL, Rfl: 3    Insulin Pen Needle (Pen Needles) 30G X 8 MM MISC, Use 4 (four) times a day (before meals and at bedtime), Disp: 500 each, Rfl: 1    ketoconazole (NIZORAL) 2 % cream, , Disp: , Rfl:     Lantus SoloStar 100 units/mL SOPN, inject 0.3 milliliters (30 UNITS) subcutaneously at bedtime, Disp: 15 mL, Rfl: 2    latanoprost (XALATAN) 0.005 % ophthalmic solution, Administer 1 drop into the left eye daily, Disp: 2.5 mL, Rfl: 2    levETIRAcetam (KEPPRA) 250 mg tablet, take 1 tablet by mouth every 12 hours, Disp: 180 tablet, Rfl: 1    Lumigan 0.01 % ophthalmic drops, INSTILL 1 DROP into both eyes at bedtime, Disp: , Rfl:     montelukast (SINGULAIR) 10 mg tablet, take 1 tablet by mouth every evening, Disp: 90 tablet, Rfl: 1    Multiple Vitamin (MULTI VITAMIN DAILY PO), Take 1 tablet by mouth daily, Disp: , Rfl:     mupirocin (BACTROBAN) 2 % ointment, APPLY A SMALL AMOUNT TO THE AFFECTED FOOT/TOE AREA BY TOPICAL ROUTE 3 TIMES PER DAY, Disp: , Rfl:     nystatin (MYCOSTATIN) cream, Apply topically 2 (two) times a day, Disp: 30 g, Rfl: 1    nystatin (MYCOSTATIN) powder, Apply topically 3 (three) times a day as needed (rash), Disp: 60 g, Rfl: 1    omeprazole (PriLOSEC) 20 mg delayed release capsule, take 1 capsule by mouth twice a day, Disp: 180 capsule, Rfl: 1    Polyvinyl Alcohol-Povidone (REFRESH OP), Apply to eye, Disp: , Rfl:     Spacer/Aero-Holding Chambers (AeroChamber Plus Elian-Vu Large) MISC, Use as directed, Disp: , Rfl:     vitamin B-12 (VITAMIN B-12) 500 mcg tablet, Take 1,000 mcg by mouth daily, Disp: , Rfl:     Probiotic Product (PROBIOTIC DAILY PO), Take by mouth   (Patient not taking: Reported on 4/3/2024), Disp: ,  "Rfl:     PHYSICAL EXAM:  Vitals:    04/03/24 1348   BP: 120/70   BP Location: Right arm   Patient Position: Sitting   Pulse: 84   Resp: 16   Temp: (!) 96.9 °F (36.1 °C)   TempSrc: Temporal   SpO2: 94%   Weight: 70.3 kg (155 lb)   Height: 4' 7\" (1.397 m)     Body mass index is 36.03 kg/m².    Physical Exam  Constitutional:       General: She is not in acute distress.     Appearance: She is well-developed.   HENT:      Head: Normocephalic.      Mouth/Throat:      Mouth: Mucous membranes are moist.   Eyes:      General: No scleral icterus.     Conjunctiva/sclera: Conjunctivae normal.   Neck:      Vascular: No JVD.   Cardiovascular:      Rate and Rhythm: Normal rate.      Heart sounds: Normal heart sounds.   Pulmonary:      Effort: Pulmonary effort is normal.      Breath sounds: No wheezing.   Abdominal:      Palpations: Abdomen is soft.      Tenderness: There is no abdominal tenderness.   Musculoskeletal:         General: Normal range of motion.      Cervical back: Neck supple.   Skin:     General: Skin is warm.      Findings: No rash.   Neurological:      Mental Status: She is alert and oriented to person, place, and time.   Psychiatric:         Behavior: Behavior normal.         LAB RESULTS:  Results for orders placed or performed in visit on 03/28/24   Basic metabolic panel   Result Value Ref Range    Sodium 142 135 - 147 mmol/L    Potassium 4.0 3.5 - 5.3 mmol/L    Chloride 104 96 - 108 mmol/L    CO2 29 21 - 32 mmol/L    ANION GAP 9 4 - 13 mmol/L    BUN 20 5 - 25 mg/dL    Creatinine 1.06 0.60 - 1.30 mg/dL    Glucose, Fasting 138 (H) 65 - 99 mg/dL    Calcium 9.4 8.4 - 10.2 mg/dL    eGFR 49 ml/min/1.73sq m   CBC and differential   Result Value Ref Range    WBC 7.53 4.31 - 10.16 Thousand/uL    RBC 5.11 3.81 - 5.12 Million/uL    Hemoglobin 14.3 11.5 - 15.4 g/dL    Hematocrit 46.2 (H) 34.8 - 46.1 %    MCV 90 82 - 98 fL    MCH 28.0 26.8 - 34.3 pg    MCHC 31.0 (L) 31.4 - 37.4 g/dL    RDW 14.6 11.6 - 15.1 %    MPV 10.2 8.9 " - 12.7 fL    Platelets 270 149 - 390 Thousands/uL    nRBC 0 /100 WBCs    Neutrophils Relative 69 43 - 75 %    Immature Grans % 0 0 - 2 %    Lymphocytes Relative 21 14 - 44 %    Monocytes Relative 6 4 - 12 %    Eosinophils Relative 3 0 - 6 %    Basophils Relative 1 0 - 1 %    Neutrophils Absolute 5.24 1.85 - 7.62 Thousands/µL    Absolute Immature Grans 0.03 0.00 - 0.20 Thousand/uL    Absolute Lymphocytes 1.57 0.60 - 4.47 Thousands/µL    Absolute Monocytes 0.43 0.17 - 1.22 Thousand/µL    Eosinophils Absolute 0.22 0.00 - 0.61 Thousand/µL    Basophils Absolute 0.04 0.00 - 0.10 Thousands/µL   Phosphorus   Result Value Ref Range    Phosphorus 3.1 2.3 - 4.1 mg/dL   Protein / creatinine ratio, urine   Result Value Ref Range    Creatinine, Ur 90.9 Reference range not established. mg/dL    Protein Urine Random 35 Reference range not established. mg/dL    Prot/Creat Ratio, Ur 0.39 (H) 0.00 - 0.10   PTH, intact   Result Value Ref Range    PTH 59.0 12.0 - 88.0 pg/mL   Urinalysis with microscopic   Result Value Ref Range    Color, UA Light Yellow     Clarity, UA Clear     Specific Gravity, UA 1.018 1.003 - 1.030    pH, UA 6.0 4.5, 5.0, 5.5, 6.0, 6.5, 7.0, 7.5, 8.0    Leukocytes, UA Moderate (A) Negative    Nitrite, UA Negative Negative    Protein, UA Trace (A) Negative mg/dl    Glucose, UA Negative Negative mg/dl    Ketones, UA Negative Negative mg/dl    Urobilinogen, UA <2.0 <2.0 mg/dl mg/dl    Bilirubin, UA Negative Negative    Occult Blood, UA Negative Negative    RBC, UA None Seen None Seen, 1-2 /hpf    WBC, UA 30-50 (A) None Seen, 1-2 /hpf    Epithelial Cells None Seen None Seen, Occasional /hpf    Bacteria, UA None Seen None Seen, Occasional /hpf    Ca Oxalate Melissa, UA Occasional (A) None Seen /hpf   Vitamin D 25 hydroxy   Result Value Ref Range    Vit D, 25-Hydroxy 53.5 30.0 - 100.0 ng/mL       ASSESSMENT and PLAN:      Stage 3b chronic kidney disease (HCC)  Lab Results   Component Value Date    EGFR 49 03/28/2024    EGFR  "49 10/13/2023    EGFR 46 02/02/2023    CREATININE 1.06 03/28/2024    CREATININE 1.07 10/13/2023    CREATININE 1.13 02/02/2023   Renal function is quite stable.  Advise hydration and avoiding nephrotoxic medication    Chronic kidney disease-mineral and bone disorder  Lab Results   Component Value Date    EGFR 49 03/28/2024    EGFR 49 10/13/2023    EGFR 46 02/02/2023    CREATININE 1.06 03/28/2024    CREATININE 1.07 10/13/2023    CREATININE 1.13 02/02/2023   PTH and phosphorus along with vitamin D are within acceptable range and will continue to monitor    Type 2 diabetes mellitus with diabetic neuropathy (HCC)    Lab Results   Component Value Date    HGBA1C 7.1 (A) 01/23/2024   Reasonable well-controlled.  Importance of diabetic control and effect on kidney disease discussed with the patient    Hypertension  Very well-controlled      Everything discussed the patient at length.  I will see her back in 1 year again.  Will get blood and urine test before that visit        Portions of the record may have been created with voice recognition software. Occasional wrong word or \"sound a like\" substitutions may have occurred due to the inherent limitations of voice recognition software. Read the chart carefully and recognize, using context, where substitutions have occurred.If you have any questions, please contact the dictating provider.   "

## 2024-04-03 NOTE — ASSESSMENT & PLAN NOTE
Lab Results   Component Value Date    EGFR 49 03/28/2024    EGFR 49 10/13/2023    EGFR 46 02/02/2023    CREATININE 1.06 03/28/2024    CREATININE 1.07 10/13/2023    CREATININE 1.13 02/02/2023   Renal function is quite stable.  Advise hydration and avoiding nephrotoxic medication

## 2024-04-05 DIAGNOSIS — E11.42 DIABETIC PERIPHERAL NEUROPATHY (HCC): ICD-10-CM

## 2024-04-05 RX ORDER — INSULIN ASPART 100 [IU]/ML
INJECTION, SOLUTION INTRAVENOUS; SUBCUTANEOUS
Qty: 45 ML | Refills: 1 | Status: SHIPPED | OUTPATIENT
Start: 2024-04-05

## 2024-04-07 DIAGNOSIS — R56.9 SEIZURE (HCC): ICD-10-CM

## 2024-04-07 DIAGNOSIS — Z79.4 TYPE 2 DIABETES MELLITUS WITH DIABETIC NEUROPATHY, WITH LONG-TERM CURRENT USE OF INSULIN (HCC): ICD-10-CM

## 2024-04-07 DIAGNOSIS — E11.40 TYPE 2 DIABETES MELLITUS WITH DIABETIC NEUROPATHY, WITH LONG-TERM CURRENT USE OF INSULIN (HCC): ICD-10-CM

## 2024-04-08 RX ORDER — PEN NEEDLE, DIABETIC 31 GX5/16"
NEEDLE, DISPOSABLE MISCELLANEOUS
Qty: 100 EACH | Refills: 1 | Status: SHIPPED | OUTPATIENT
Start: 2024-04-08

## 2024-04-08 RX ORDER — LEVETIRACETAM 250 MG/1
TABLET ORAL
Qty: 180 TABLET | Refills: 1 | Status: SHIPPED | OUTPATIENT
Start: 2024-04-08

## 2024-04-10 ENCOUNTER — TELEPHONE (OUTPATIENT)
Dept: PULMONOLOGY | Facility: CLINIC | Age: 79
End: 2024-04-10

## 2024-04-10 NOTE — TELEPHONE ENCOUNTER
PRESCRIPTION CHANGE REQUEST FOR TRELEGY ELLIPTA NEEDS SIGNATURE AND FAXED BACK [phone]. UPLOADED IN PATIENT CHART MEDIA

## 2024-04-22 ENCOUNTER — OFFICE VISIT (OUTPATIENT)
Age: 79
End: 2024-04-22
Payer: COMMERCIAL

## 2024-04-22 ENCOUNTER — TELEPHONE (OUTPATIENT)
Age: 79
End: 2024-04-22

## 2024-04-22 VITALS
HEIGHT: 55 IN | WEIGHT: 155 LBS | OXYGEN SATURATION: 95 % | BODY MASS INDEX: 35.87 KG/M2 | SYSTOLIC BLOOD PRESSURE: 122 MMHG | DIASTOLIC BLOOD PRESSURE: 71 MMHG | HEART RATE: 70 BPM | RESPIRATION RATE: 18 BRPM

## 2024-04-22 DIAGNOSIS — E66.09 CLASS 2 OBESITY DUE TO EXCESS CALORIES WITHOUT SERIOUS COMORBIDITY WITH BODY MASS INDEX (BMI) OF 35.0 TO 35.9 IN ADULT: ICD-10-CM

## 2024-04-22 DIAGNOSIS — Z87.891 FORMER SMOKER: ICD-10-CM

## 2024-04-22 DIAGNOSIS — J41.0 SIMPLE CHRONIC BRONCHITIS (HCC): Primary | ICD-10-CM

## 2024-04-22 PROCEDURE — G2211 COMPLEX E/M VISIT ADD ON: HCPCS

## 2024-04-22 PROCEDURE — 99214 OFFICE O/P EST MOD 30 MIN: CPT

## 2024-04-22 RX ORDER — ALBUTEROL SULFATE 2.5 MG/3ML
2.5 SOLUTION RESPIRATORY (INHALATION) EVERY 6 HOURS PRN
Qty: 90 ML | Refills: 0 | Status: SHIPPED | OUTPATIENT
Start: 2024-04-22

## 2024-04-22 NOTE — TELEPHONE ENCOUNTER
Son called and said that [facility]       Please contact patient at [phone] if any issues

## 2024-04-22 NOTE — TELEPHONE ENCOUNTER
Patients son called the RX refill line because the pharmacy they use does not have nebulizers in stock and they told him they need to use a medical device company. Advised to contact insurance company to get the name of which medical device company they must go through.

## 2024-04-22 NOTE — PROGRESS NOTES
Pulmonary Follow Up Note  Shantelle Romano 78 y.o. female MRN: [id number]  4/22/2024    Assessment:    Simple chronic bronchitis (HCC)  -No exacerbations/respiratory infections in the past 6 months. Doing well on lower dose Trelegy.  -Discussed with patient about decreasing inhaler therapy to ICS/LABA given her stable symptoms and normal prior PFTs, however, patient would prefer to stay on Trelegy  -She will continue Trelegy 100 1 puff daily, albuterol hfa/nebs every 6 hours PRN. Order placed for new nebulizer machine and albuterol solution as requested. Continue Singulair nightly and flonase nasal spray  -Follow up in 6 months or sooner if needed    Former smoker  -Quit 23 years ago. Not a candidate for CT lung cancer screening    Class 2 obesity due to excess calories without serious comorbidity in adult  -Encouraged increase activity to improve stamina and conditioning as well as promote weight loss        Plan:    Diagnoses and all orders for this visit:    Simple chronic bronchitis (HCC)  -     albuterol (2.5 mg/3 mL) 0.083 % nebulizer solution; Take 3 mL (2.5 mg total) by nebulization every 6 (six) hours as needed for wheezing or shortness of breath  -     Nebulizer    Class 2 obesity due to excess calories without serious comorbidity with body mass index (BMI) of 35.0 to 35.9 in adult    Former smoker            Return in about 6 months (around 10/22/2024).        History of Present Illness     Chief Complaint:   Chief Complaint   Patient presents with    Follow-up       Patient ID: Shantelle is a 78 y.o. y.o. 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), Carpal tunnel syndrome, Chest pain (03/06/2017), Chronic kidney disease, Claudication ([facility]) (3/15/2019), Closed fracture of base of middle phalanx of finger (06/22/2017), Closed fracture  of middle phalanx of left little finger (07/08/2017), Coagulopathy ([facility]) (05/15/2019), Colloid cyst of brain ([facility]), COPD (chronic obstructive pulmonary disease) ([facility]), COPD (chronic obstructive pulmonary disease) ([facility]), Coronary artery disease, Cough, Diabetes mellitus ([facility]), GERD (gastroesophageal reflux disease), Glaucoma, Gout, History of brain disorder (10/07/2020), Hyperlipidemia, Hypertension, Irritable bowel syndrome, Melena (02/26/2019), PAD (peripheral artery disease) ([facility]) (5/15/2019), Paronychia of great toe of left foot (10/07/2020), Post-traumatic seizures ([facility]) (07/23/2015), Renal disorder, Seizures ([facility]), Shortness of breath, Single seizure ([facility]) (05/31/2016), SOB (shortness of breath), Syncope and collapse (11/11/2020), Urinary tract infection without hematuria (11/26/2016), and Wheezing.      4/22/2024  HPI: Shantelle Romano is a 78 y.o. female who presents to the office today for a follow-up visit.  She has a past medical history including but not limited to  chronic bronchitis, reactive airway disease, hypertension, diabetes, CHF, and CKD.  She has approximately 40-pack-year smoking history, quit 20+ years ago.  She was previously seen in the office 6 months ago.  Her inhaler therapy was decreased from Trelegy 200 to Trelegy 100.   Also maintained on Singulair and Flonase nasal spray.    Patient returns today stating she is doing well with her breathing. She has chronic shortness of breath with activity, but is not worsening. She states she does not get out and walk much due to where she lives. Has occasional wheezing. She is using albuterol nebs infrequently. She is requesting a new nebulizer machine as hers has something broken on it. She denies any exacerbations or respiratory infections requiring prednisone/antibiotics or ED visits in the past 6 months.       Review of Systems   Constitutional:  Negative for activity change, chills, diaphoresis, fever and unexpected weight change.   HENT:   Negative for congestion, postnasal drip, rhinorrhea, sore throat, trouble swallowing and voice change.    Respiratory:  Positive for shortness of breath. Negative for cough, chest tightness and wheezing.    Cardiovascular:  Negative for chest pain, palpitations and leg swelling.   Allergic/Immunologic: Negative.        Historical Information   Past Medical History:   Diagnosis Date    Acute kidney injury superimposed on chronic kidney disease  ([facility]) 11/26/2016    ADHD (attention deficit hyperactivity disorder) 03/17/2019    Arthritis     BL HIPS    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    Carpal tunnel syndrome     Chest pain 03/06/2017    Chronic kidney disease     Claudication ([facility]) 3/15/2019    Closed fracture of base of middle phalanx of finger 06/22/2017    Closed fracture of middle phalanx of left little finger 07/08/2017    Coagulopathy ([facility]) 05/15/2019    Colloid cyst of brain ([facility])     COPD (chronic obstructive pulmonary disease) ([facility])     COPD (chronic obstructive pulmonary disease) ([facility])     Coronary artery disease     Cough     Diabetes mellitus ([facility])     GERD (gastroesophageal reflux disease)     Glaucoma     Gout     History of brain disorder 10/07/2020    Hyperlipidemia     Hypertension     Irritable bowel syndrome     Melena 02/26/2019    PAD (peripheral artery disease) ([facility]) 5/15/2019    Paronychia of great toe of left foot 10/07/2020    Post-traumatic seizures ([facility]) 07/23/2015    Renal disorder     Seizures ([facility])     last 2015    Shortness of breath     Single seizure ([facility]) 05/31/2016    SOB (shortness of breath)     Syncope and collapse 11/11/2020    Urinary tract infection without hematuria 11/26/2016    Wheezing      Past Surgical History:   Procedure Laterality Date    BRAIN SURGERY      CATARACT EXTRACTION      CHOLECYSTECTOMY      COLECTOMY RADHA      COLONOSCOPY      DECOMPRESSION SPINE LUMBAR POSTERIOR  08/19/2019    HERNIA REPAIR      HYSTERECTOMY      INCISION TENDON SHEATH HAND      NECK  SURGERY  05/24/2018    NEUROPLASTY / TRANSPOSITION MEDIAN NERVE AT CARPAL TUNNEL      NM COLONOSCOPY FLX DX W/COLLJ SPEC WHEN PFRMD N/A 03/26/2019    Procedure: COLONOSCOPY;  Surgeon: Yaniv Hawley MD;  Location: [facility];  Service: Gastroenterology    NM ESOPHAGOGASTRODUODENOSCOPY TRANSORAL DIAGNOSTIC N/A 03/26/2019    Procedure: ESOPHAGOGASTRODUODENOSCOPY (EGD);  Surgeon: Yaniv Hawley MD;  Location: [facility];  Service: Gastroenterology    REPLACEMENT TOTAL KNEE Right     RETINAL DETACHMENT SURGERY      TUBAL LIGATION       Family History   Problem Relation Age of Onset    Asthma Mother     Heart disease Mother     Emphysema Father     Cancer Father     Prostate cancer Father     Kidney cancer Sister     Diabetes Sister     Kidney disease Sister     Brain cancer Brother     Bone cancer Brother     Heart disease Brother        Smoking history: She reports that she quit smoking about 23 years ago. Her smoking use included cigarettes. She started smoking about 63 years ago. She has a 20 pack-year smoking history. She has never used smokeless tobacco.    Occupational History:     Immunization History   Administered Date(s) Administered    COVID-19 J&J (GlobeImmune) vaccine 0.5 mL 06/03/2021    Influenza, high dose seasonal 0.7 mL 10/19/2023    Pneumococcal Conjugate 13-Valent 11/27/2017, 10/07/2020    Pneumococcal Polysaccharide PPV23 09/11/2019       Meds/Allergies     Current Outpatient Medications:     acetaminophen (TYLENOL) 650 mg CR tablet, Take 650 mg by mouth 2 (two) times a day, Disp: , Rfl:     albuterol (2.5 mg/3 mL) 0.083 % nebulizer solution, Take 3 mL (2.5 mg total) by nebulization every 6 (six) hours as needed for wheezing or shortness of breath, Disp: 90 mL, Rfl: 0    albuterol (ProAir HFA) 90 mcg/act inhaler, Inhale 2 puffs every 6 (six) hours as needed for wheezing or shortness of breath, Disp: 8.5 g, Rfl: 1    atorvastatin (LIPITOR) 10 mg tablet, take 1 tablet by mouth at bedtime, Disp: 90  tablet, Rfl: 1    Calcium Carbonate-Vitamin D (CALCIUM-D PO), Take 1 tablet by mouth in the morning, Disp: , Rfl:     clotrimazole (LOTRIMIN) 1 % cream, , Disp: , Rfl:     Continuous Blood Gluc  (Dexcom G7 ) YOLANDA, Use 1 Application if needed (check sugars), Disp: 1 each, Rfl: 0    Continuous Blood Gluc Sensor (Dexcom G7 Sensor), Use 1 Device every 10 days, Disp: 9 each, Rfl: 3    dicyclomine (BENTYL) 20 mg tablet, , Disp: , Rfl:     DULoxetine (CYMBALTA) 30 mg delayed release capsule, , Disp: , Rfl:     fluticasone (FLONASE) 50 mcg/act nasal spray, 1 spray into each nostril daily pt uses as needed, Disp: , Rfl:     fluticasone-umeclidinium-vilanterol (Trelegy Ellipta) 100-62.5-25 mcg/actuation inhaler, Inhale 1 puff daily Rinse mouth after use., Disp: 60 blister, Rfl: 5    furosemide (LASIX) 20 mg tablet, take 1 tablet by mouth once daily, Disp: 60 tablet, Rfl: 6    gabapentin (NEURONTIN) 100 mg capsule, take 1 capsule by mouth at bedtime, Disp: 30 capsule, Rfl: 1    insulin aspart (NovoLOG FlexPen) 100 UNIT/ML injection pen, inject 12 units subcutaneously before meals (3 MEALS A DAY), Disp: 45 mL, Rfl: 1    Insulin Pen Needle (B-D ULTRAFINE III SHORT PEN) 31G X 8 MM MISC, use four times a day before meals and at bedtime, Disp: 100 each, Rfl: 1    ketoconazole (NIZORAL) 2 % cream, , Disp: , Rfl:     Lantus SoloStar 100 units/mL SOPN, inject 0.3 milliliters (30 UNITS) subcutaneously at bedtime, Disp: 15 mL, Rfl: 2    latanoprost (XALATAN) 0.005 % ophthalmic solution, Administer 1 drop into the left eye daily, Disp: 2.5 mL, Rfl: 2    levETIRAcetam (KEPPRA) 250 mg tablet, take 1 tablet by mouth every 12 hours, Disp: 180 tablet, Rfl: 1    Lumigan 0.01 % ophthalmic drops, INSTILL 1 DROP into both eyes at bedtime, Disp: , Rfl:     montelukast (SINGULAIR) 10 mg tablet, take 1 tablet by mouth every evening, Disp: 90 tablet, Rfl: 1    Multiple Vitamin (MULTI VITAMIN DAILY PO), Take 1 tablet by mouth daily,  "Disp: , Rfl:     mupirocin (BACTROBAN) 2 % ointment, APPLY A SMALL AMOUNT TO THE AFFECTED FOOT/TOE AREA BY TOPICAL ROUTE 3 TIMES PER DAY, Disp: , Rfl:     nystatin (MYCOSTATIN) cream, Apply topically 2 (two) times a day, Disp: 30 g, Rfl: 1    nystatin (MYCOSTATIN) powder, Apply topically 3 (three) times a day as needed (rash), Disp: 60 g, Rfl: 1    omeprazole (PriLOSEC) 20 mg delayed release capsule, take 1 capsule by mouth twice a day, Disp: 180 capsule, Rfl: 1    Polyvinyl Alcohol-Povidone (REFRESH OP), Apply to eye, Disp: , Rfl:     Spacer/Aero-Holding Chambers (AeroChamber Plus Elian-Vu Large) MISC, Use as directed, Disp: , Rfl:     vitamin B-12 (VITAMIN B-12) 500 mcg tablet, Take 1,000 mcg by mouth daily, Disp: , Rfl:     Probiotic Product (PROBIOTIC DAILY PO), Take by mouth   (Patient not taking: Reported on 4/3/2024), Disp: , Rfl:   Allergies:   Allergies   Allergen Reactions    Brimonidine Other (See Comments)    Salicylic Acid-Sulfur Other (See Comments)    Sulfa Antibiotics Rash and Other (See Comments)         Vitals:  Vitals:    04/22/24 1354 04/22/24 1356   BP: 122/71    BP Location: Right arm    Patient Position: Sitting    Cuff Size: Large    Pulse: 70    Resp: 18    SpO2: 95% 95%   Weight: 70.3 kg (155 lb)    Height: 4' 7\" (1.397 m)      Oxygen Therapy  SpO2: 95 %  Oxygen Therapy: None (Room air)  .  Wt Readings from Last 3 Encounters:   04/22/24 70.3 kg (155 lb)   04/03/24 70.3 kg (155 lb)   01/23/24 69.4 kg (153 lb)     Body mass index is 36.03 kg/m².    Physical Exam  Vitals and nursing note reviewed.   Constitutional:       General: She is not in acute distress.     Appearance: Normal appearance. She is well-developed. She is obese.   Cardiovascular:      Rate and Rhythm: Normal rate and regular rhythm.      Heart sounds: Normal heart sounds, S1 normal and S2 normal. No murmur heard.  Pulmonary:      Effort: Pulmonary effort is normal.      Breath sounds: Normal breath sounds. No decreased breath " "sounds, wheezing, rhonchi or rales.   Musculoskeletal:         General: No swelling.      Right lower leg: No edema.      Left lower leg: No edema.   Neurological:      Mental Status: She is alert.   Psychiatric:         Mood and Affect: Mood and affect normal.         Behavior: Behavior normal. Behavior is cooperative.           Labs: I have personally reviewed pertinent lab results.  Lab Results   Component Value Date    WBC 7.53 03/28/2024    HGB 14.3 03/28/2024    HCT 46.2 (H) 03/28/2024    MCV 90 03/28/2024     03/28/2024     Lab Results   Component Value Date    CALCIUM 9.4 03/28/2024    K 4.0 03/28/2024    CO2 29 03/28/2024     03/28/2024    BUN 20 03/28/2024    CREATININE 1.06 03/28/2024     No results found for: \"IGE\"  Lab Results   Component Value Date    ALT 16 10/13/2023    AST 18 10/13/2023    ALKPHOS 55 10/13/2023       Imaging and other studies: I have personally reviewed pertinent reports and I have personally reviewed pertinent films in PACS     CXR 11/29/23  The lungs are clear. No pneumothorax or pleural effusion.     Pulmonary function testing:      Pulmonary Functions Testing Results: 1/12/2022           FEV1/FVC ratio 77%    FEV1 95% predicted  FVC 95% predicted  (-) response to bronchodilators  TLC 88 % predicted  RV 76 % predicted  DLCO corrected for hemoglobin 72 % predicted     Impression:      The flow volume curve is normal.    The spirometry is normal.    The lung volumes are normal.    The DLCO is mildly decreased.    Patient could walk a total distance of 122 m in 6 minutes without the need for supplemental oxygen.       "

## 2024-04-22 NOTE — ASSESSMENT & PLAN NOTE
-No exacerbations/respiratory infections in the past 6 months. Doing well on lower dose Trelegy.  -Discussed with patient about decreasing inhaler therapy to ICS/LABA given her stable symptoms and normal prior PFTs, however, patient would prefer to stay on Trelegy  -She will continue Trelegy 100 1 puff daily, albuterol hfa/nebs every 6 hours PRN. Order placed for new nebulizer machine and albuterol solution as requested. Continue Singulair nightly and flonase nasal spray  -Follow up in 6 months or sooner if needed

## 2024-04-23 LAB
DME PARACHUTE DELIVERY DATE ACTUAL: NORMAL
DME PARACHUTE DELIVERY DATE REQUESTED: NORMAL
DME PARACHUTE ITEM DESCRIPTION: NORMAL
DME PARACHUTE ORDER STATUS: NORMAL
DME PARACHUTE SUPPLIER NAME: NORMAL
DME PARACHUTE SUPPLIER PHONE: NORMAL

## 2024-05-01 PROBLEM — E66.09 CLASS 2 OBESITY DUE TO EXCESS CALORIES WITHOUT SERIOUS COMORBIDITY IN ADULT: Status: RESOLVED | Noted: 2021-09-29 | Resolved: 2024-05-01

## 2024-05-01 PROBLEM — E66.812 CLASS 2 OBESITY DUE TO EXCESS CALORIES WITHOUT SERIOUS COMORBIDITY IN ADULT: Status: RESOLVED | Noted: 2021-09-29 | Resolved: 2024-05-01

## 2024-05-01 NOTE — PROGRESS NOTES
Shantelle Romano 1945 female MRN: [id number]      ASSESSMENT/PLAN  Problem List Items Addressed This Visit        Cardiovascular and Mediastinum    Hypertension    Relevant Orders    Comprehensive metabolic panel    Lipid Panel with Direct LDL reflex    Coronary artery disease without angina pectoris    Relevant Orders    Comprehensive metabolic panel    Lipid Panel with Direct LDL reflex    Chronic heart failure with preserved ejection fraction (HCC)    Relevant Orders    Comprehensive metabolic panel       Respiratory    Simple chronic bronchitis (HCC)    Relevant Medications    albuterol (ProAir HFA) 90 mcg/act inhaler    guaiFENesin (MUCINEX) 600 mg 12 hr tablet    Other Relevant Orders    CBC and differential       Endocrine    Type 2 diabetes mellitus with diabetic neuropathy (HCC) - Primary    Relevant Orders    POCT hemoglobin A1c (Completed)    Hemoglobin A1C    Comprehensive metabolic panel    CBC and differential    Albumin / creatinine urine ratio    TSH, 3rd generation with Free T4 reflex    Lipid Panel with Direct LDL reflex    Hyperlipidemia associated with type 2 diabetes mellitus  (HCC)    Relevant Orders    Comprehensive metabolic panel    Lipid Panel with Direct LDL reflex       Nervous and Auditory    Seizure disorder (HCC)       Genitourinary    Stage 3b chronic kidney disease (HCC)    Relevant Orders    Comprehensive metabolic panel       Other    Obesity, morbid (HCC)   Other Visit Diagnoses     Acute cough        Relevant Medications    guaiFENesin (MUCINEX) 600 mg 12 hr tablet        DM: A1c 6.9% (from 7.1) -- well controlled with current regimen, continue   HTN: Within goal in office and at home (reasonable to have a bit of permissive HTN due to CKD)   HLD: Update lipids prior to next visit   CAD/HFpEF: Overall stable, f/u with Cardio as scheduled   CKD: Continue good hydration, f/u with Nephro   COPD: No evidence of exacerbation today, but pt does report increased mucus burden, likely  secondary to post-nasal drip -- encouraged to trial Mucinex, continue inhaler/allergy regimen   Seizure Dx: Asymptomatic with AED   BMI 36: Continue lifestyle modifications -- pt's activity level is limited due to chronic pain/ambulatory dysfunction    Update labs prior to next visit       Future Appointments   Date Time Provider Department Center   9/6/2024 11:00 AM DO ELISA Lambert [facility]   9/30/2024 10:40 AM Isabel Borja MD [facility]   4/8/2025  1:00 PM Cm Cabral MD [facility]          SUBJECTIVE  CC: Diabetes (Patient seen in office today for a check up - ) and Blood Pressure Check      HPI:  Shantelle Romano is a 78 y.o. female who presents with her son and caretaker for chronic follow up as below.     DM: Home sugars 112 today   HTN: Noted her BP was 103 systolic yesterday, but usually 120-130s  HLD: On statin   CAD/HFpEF: ROS as below   CKD: Staying hydrated   COPD: Has a productive cough -- robitussin is not helping; needs refill on SRNIATH inhaler  Seizure Dx: No breakthrough symptoms   BMI 36: Weight stable as scheduled     Following with Optho   Would like to change prescriptions to mail order     Review of Systems   HENT:  Positive for congestion and postnasal drip. Negative for rhinorrhea.    Eyes:  Positive for itching and visual disturbance.   Respiratory:  Positive for cough (bringing up phlegm) and shortness of breath.    Cardiovascular:  Positive for leg swelling. Negative for chest pain and palpitations.   Gastrointestinal:  Positive for diarrhea (chronic). Negative for abdominal pain, blood in stool and constipation.   Genitourinary:  Positive for frequency. Negative for hematuria.       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   • Carpal tunnel syndrome    • Chest pain 03/06/2017   • Chronic kidney disease    • Claudication ([facility]) 3/15/2019   • Closed fracture of base of middle phalanx of finger 06/22/2017   • Closed fracture of middle phalanx of left little finger 07/08/2017   • Coagulopathy ([facility]) 05/15/2019   • Colloid cyst of brain ([facility])    • COPD (chronic obstructive pulmonary disease) ([facility])    • COPD (chronic obstructive pulmonary disease) ([facility])    • Coronary artery disease    • Cough    • Diabetes mellitus ([facility])    • GERD (gastroesophageal reflux disease)    • Glaucoma    • Gout    • History of brain disorder 10/07/2020   • Hyperlipidemia    • Hypertension    • Irritable bowel syndrome    • Melena 02/26/2019   • PAD (peripheral artery disease) ([facility]) 5/15/2019   • Paronychia of great toe of left foot 10/07/2020   • Post-traumatic seizures ([facility]) 07/23/2015   • Renal disorder    • Seizures ([facility])     last 2015   • Shortness of breath    • Single seizure ([facility]) 05/31/2016   • SOB (shortness of breath)    • Syncope and collapse 11/11/2020   • Urinary tract infection without hematuria 11/26/2016   • Wheezing      Past Surgical History:   Procedure Laterality Date   • BRAIN SURGERY     • CATARACT EXTRACTION     • CHOLECYSTECTOMY     • COLECTOMY RADHA     • COLONOSCOPY     • DECOMPRESSION SPINE LUMBAR POSTERIOR  08/19/2019   • HERNIA REPAIR     • HYSTERECTOMY     • INCISION TENDON SHEATH HAND     • NECK SURGERY  05/24/2018   • NEUROPLASTY / TRANSPOSITION MEDIAN NERVE AT CARPAL TUNNEL     • DC COLONOSCOPY FLX DX W/COLLJ SPEC WHEN PFRMD N/A 03/26/2019    Procedure: COLONOSCOPY;  Surgeon: Yaniv Hawley MD;  Location: [facility];  Service: Gastroenterology   • DC ESOPHAGOGASTRODUODENOSCOPY TRANSORAL DIAGNOSTIC N/A 03/26/2019    Procedure: ESOPHAGOGASTRODUODENOSCOPY (EGD);  Surgeon: Yaniv Hawley MD;  Location: [facility];  Service: Gastroenterology   • REPLACEMENT TOTAL KNEE Right     • RETINAL DETACHMENT SURGERY     • TUBAL LIGATION       Family History   Problem Relation Age of Onset   • Asthma Mother    • Heart disease Mother    • Emphysema Father    • Cancer Father    • Prostate cancer Father    • Kidney cancer Sister    • Diabetes Sister    • Kidney disease Sister    • Brain cancer Brother    • Bone cancer Brother    • Heart disease Brother       Social History   Social History     Substance and Sexual Activity   Alcohol Use Never     Social History     Substance and Sexual Activity   Drug Use Never     Social History     Tobacco Use   Smoking Status Former   • Current packs/day: 0.00   • Average packs/day: 0.5 packs/day for 40.0 years (20.0 ttl pk-yrs)   • Types: Cigarettes   • Start date:    • Quit date:    • Years since quittin.3   Smokeless Tobacco Never   Tobacco Comments    stopped many years ago       Medications:     Current Outpatient Medications:   •  albuterol (ProAir HFA) 90 mcg/act inhaler, Inhale 2 puffs every 6 (six) hours as needed for wheezing or shortness of breath (cough, chest tightness), Disp: 18 g, Rfl: 0  •  guaiFENesin (MUCINEX) 600 mg 12 hr tablet, Take 1 tablet (600 mg total) by mouth every 12 (twelve) hours, Disp: 60 tablet, Rfl: 1  •  acetaminophen (TYLENOL) 650 mg CR tablet, Take 650 mg by mouth 2 (two) times a day, Disp: , Rfl:   •  albuterol (2.5 mg/3 mL) 0.083 % nebulizer solution, Take 3 mL (2.5 mg total) by nebulization every 6 (six) hours as needed for wheezing or shortness of breath, Disp: 90 mL, Rfl: 0  •  atorvastatin (LIPITOR) 10 mg tablet, take 1 tablet by mouth at bedtime, Disp: 90 tablet, Rfl: 1  •  Calcium Carbonate-Vitamin D (CALCIUM-D PO), Take 1 tablet by mouth in the morning, Disp: , Rfl:   •  clotrimazole (LOTRIMIN) 1 % cream, , Disp: , Rfl:   •  Continuous Blood Gluc  (Dexcom G7 ) YOLANDA, Use 1 Application if needed (check sugars), Disp: 1 each, Rfl: 0  •  Continuous Blood Gluc Sensor (Dexcom G7 Sensor), Use 1  Device every 10 days, Disp: 9 each, Rfl: 3  •  dicyclomine (BENTYL) 20 mg tablet, , Disp: , Rfl:   •  DULoxetine (CYMBALTA) 30 mg delayed release capsule, , Disp: , Rfl:   •  fluticasone (FLONASE) 50 mcg/act nasal spray, 1 spray into each nostril daily pt uses as needed, Disp: , Rfl:   •  fluticasone-umeclidinium-vilanterol (Trelegy Ellipta) 100-62.5-25 mcg/actuation inhaler, Inhale 1 puff daily Rinse mouth after use., Disp: 60 blister, Rfl: 5  •  furosemide (LASIX) 20 mg tablet, take 1 tablet by mouth once daily, Disp: 60 tablet, Rfl: 6  •  gabapentin (NEURONTIN) 100 mg capsule, take 1 capsule by mouth at bedtime, Disp: 30 capsule, Rfl: 1  •  insulin aspart (NovoLOG FlexPen) 100 UNIT/ML injection pen, inject 12 units subcutaneously before meals (3 MEALS A DAY), Disp: 45 mL, Rfl: 1  •  Insulin Pen Needle (B-D ULTRAFINE III SHORT PEN) 31G X 8 MM MISC, use four times a day before meals and at bedtime, Disp: 100 each, Rfl: 1  •  ketoconazole (NIZORAL) 2 % cream, , Disp: , Rfl:   •  Lantus SoloStar 100 units/mL SOPN, inject 0.3 milliliters (30 UNITS) subcutaneously at bedtime, Disp: 15 mL, Rfl: 2  •  latanoprost (XALATAN) 0.005 % ophthalmic solution, Administer 1 drop into the left eye daily, Disp: 2.5 mL, Rfl: 2  •  levETIRAcetam (KEPPRA) 250 mg tablet, take 1 tablet by mouth every 12 hours, Disp: 180 tablet, Rfl: 1  •  Lumigan 0.01 % ophthalmic drops, INSTILL 1 DROP into both eyes at bedtime, Disp: , Rfl:   •  montelukast (SINGULAIR) 10 mg tablet, take 1 tablet by mouth every evening, Disp: 90 tablet, Rfl: 1  •  Multiple Vitamin (MULTI VITAMIN DAILY PO), Take 1 tablet by mouth daily, Disp: , Rfl:   •  mupirocin (BACTROBAN) 2 % ointment, APPLY A SMALL AMOUNT TO THE AFFECTED FOOT/TOE AREA BY TOPICAL ROUTE 3 TIMES PER DAY, Disp: , Rfl:   •  nystatin (MYCOSTATIN) cream, Apply topically 2 (two) times a day, Disp: 30 g, Rfl: 1  •  nystatin (MYCOSTATIN) powder, Apply topically 3 (three) times a day as needed (rash), Disp: 60  "g, Rfl: 1  •  omeprazole (PriLOSEC) 20 mg delayed release capsule, take 1 capsule by mouth twice a day, Disp: 180 capsule, Rfl: 1  •  Polyvinyl Alcohol-Povidone (REFRESH OP), Apply to eye, Disp: , Rfl:   •  Spacer/Aero-Holding Chambers (AeroChamber Plus Elian-Vu Large) MISC, Use as directed, Disp: , Rfl:   •  vitamin B-12 (VITAMIN B-12) 500 mcg tablet, Take 1,000 mcg by mouth daily, Disp: , Rfl:   Allergies   Allergen Reactions   • Brimonidine Other (See Comments)   • Salicylic Acid-Sulfur Other (See Comments)   • Sulfa Antibiotics Rash and Other (See Comments)       OBJECTIVE    Vitals:   Vitals:    05/02/24 1311   BP: 146/84   BP Location: Left arm   Patient Position: Sitting   Cuff Size: Standard   Pulse: 67   Temp: 98.2 °F (36.8 °C)   SpO2: 95%   Weight: 70.3 kg (155 lb)   Height: 4' 7\" (1.397 m)           Physical Exam  Vitals and nursing note reviewed.   Constitutional:       General: She is not in acute distress.     Appearance: Normal appearance.   HENT:      Head: Normocephalic and atraumatic.      Right Ear: Tympanic membrane, ear canal and external ear normal.      Left Ear: Tympanic membrane, ear canal and external ear normal.      Nose: Nose normal.      Mouth/Throat:      Mouth: Mucous membranes are moist.      Pharynx: No oropharyngeal exudate or posterior oropharyngeal erythema.   Eyes:      Conjunctiva/sclera: Conjunctivae normal.   Cardiovascular:      Rate and Rhythm: Normal rate and regular rhythm.   Pulmonary:      Effort: Pulmonary effort is normal. No respiratory distress.      Breath sounds: Normal breath sounds. No wheezing or rhonchi.      Comments: Intermittent wet cough  Abdominal:      General: Bowel sounds are normal. There is no distension.      Palpations: Abdomen is soft.      Tenderness: There is no abdominal tenderness.   Musculoskeletal:      Comments: B/L pitting edema   Ambulating with rolling walker   Lymphadenopathy:      Cervical: No cervical adenopathy.   Skin:     General: " Skin is warm and dry.   Neurological:      Mental Status: She is alert. Mental status is at baseline.   Psychiatric:         Mood and Affect: Mood normal.                    Clarisa Billingsley DO  [facility]   5/2/2024  1:55 PM

## 2024-05-02 ENCOUNTER — OFFICE VISIT (OUTPATIENT)
Dept: FAMILY MEDICINE CLINIC | Facility: CLINIC | Age: 79
End: 2024-05-02
Payer: COMMERCIAL

## 2024-05-02 VITALS
HEART RATE: 67 BPM | SYSTOLIC BLOOD PRESSURE: 146 MMHG | TEMPERATURE: 98.2 F | WEIGHT: 155 LBS | HEIGHT: 55 IN | DIASTOLIC BLOOD PRESSURE: 84 MMHG | OXYGEN SATURATION: 95 % | BODY MASS INDEX: 35.87 KG/M2

## 2024-05-02 DIAGNOSIS — E11.69 HYPERLIPIDEMIA ASSOCIATED WITH TYPE 2 DIABETES MELLITUS  (HCC): ICD-10-CM

## 2024-05-02 DIAGNOSIS — G40.909 SEIZURE DISORDER (HCC): ICD-10-CM

## 2024-05-02 DIAGNOSIS — R05.1 ACUTE COUGH: ICD-10-CM

## 2024-05-02 DIAGNOSIS — E11.40 TYPE 2 DIABETES MELLITUS WITH DIABETIC NEUROPATHY, WITH LONG-TERM CURRENT USE OF INSULIN (HCC): Primary | ICD-10-CM

## 2024-05-02 DIAGNOSIS — Z79.4 TYPE 2 DIABETES MELLITUS WITH DIABETIC NEUROPATHY, WITH LONG-TERM CURRENT USE OF INSULIN (HCC): Primary | ICD-10-CM

## 2024-05-02 DIAGNOSIS — K21.9 GASTROESOPHAGEAL REFLUX DISEASE WITHOUT ESOPHAGITIS: ICD-10-CM

## 2024-05-02 DIAGNOSIS — E78.5 HYPERLIPIDEMIA ASSOCIATED WITH TYPE 2 DIABETES MELLITUS  (HCC): ICD-10-CM

## 2024-05-02 DIAGNOSIS — J41.0 SIMPLE CHRONIC BRONCHITIS (HCC): ICD-10-CM

## 2024-05-02 DIAGNOSIS — N18.32 STAGE 3B CHRONIC KIDNEY DISEASE (HCC): ICD-10-CM

## 2024-05-02 DIAGNOSIS — I25.10 CORONARY ARTERY DISEASE INVOLVING NATIVE CORONARY ARTERY OF NATIVE HEART WITHOUT ANGINA PECTORIS: ICD-10-CM

## 2024-05-02 DIAGNOSIS — E66.01 OBESITY, MORBID (HCC): ICD-10-CM

## 2024-05-02 DIAGNOSIS — I50.32 CHRONIC HEART FAILURE WITH PRESERVED EJECTION FRACTION (HCC): ICD-10-CM

## 2024-05-02 DIAGNOSIS — I10 PRIMARY HYPERTENSION: ICD-10-CM

## 2024-05-02 LAB — SL AMB POCT HEMOGLOBIN AIC: 6.9 (ref ?–6.5)

## 2024-05-02 PROCEDURE — 1159F MED LIST DOCD IN RCRD: CPT | Performed by: FAMILY MEDICINE

## 2024-05-02 PROCEDURE — 1160F RVW MEDS BY RX/DR IN RCRD: CPT | Performed by: FAMILY MEDICINE

## 2024-05-02 PROCEDURE — 83036 HEMOGLOBIN GLYCOSYLATED A1C: CPT | Performed by: FAMILY MEDICINE

## 2024-05-02 PROCEDURE — 99214 OFFICE O/P EST MOD 30 MIN: CPT | Performed by: FAMILY MEDICINE

## 2024-05-02 RX ORDER — GUAIFENESIN 600 MG/1
600 TABLET, EXTENDED RELEASE ORAL EVERY 12 HOURS SCHEDULED
Qty: 60 TABLET | Refills: 1 | Status: SHIPPED | OUTPATIENT
Start: 2024-05-02

## 2024-05-02 RX ORDER — GABAPENTIN 100 MG/1
100 CAPSULE ORAL
Qty: 90 CAPSULE | Refills: 1 | Status: SHIPPED | OUTPATIENT
Start: 2024-05-02

## 2024-05-02 RX ORDER — INSULIN GLARGINE 100 [IU]/ML
30 INJECTION, SOLUTION SUBCUTANEOUS DAILY
Qty: 15 ML | Refills: 2 | Status: SHIPPED | OUTPATIENT
Start: 2024-05-02

## 2024-05-02 RX ORDER — LEVETIRACETAM 250 MG/1
250 TABLET ORAL EVERY 12 HOURS
Qty: 180 TABLET | Refills: 1 | Status: SHIPPED | OUTPATIENT
Start: 2024-05-02

## 2024-05-02 RX ORDER — ATORVASTATIN CALCIUM 10 MG/1
10 TABLET, FILM COATED ORAL
Qty: 90 TABLET | Refills: 1 | Status: SHIPPED | OUTPATIENT
Start: 2024-05-02

## 2024-05-02 RX ORDER — ALBUTEROL SULFATE 90 UG/1
2 AEROSOL, METERED RESPIRATORY (INHALATION) EVERY 6 HOURS PRN
Qty: 18 G | Refills: 0 | Status: SHIPPED | OUTPATIENT
Start: 2024-05-02

## 2024-05-02 RX ORDER — MONTELUKAST SODIUM 10 MG/1
10 TABLET ORAL EVERY EVENING
Qty: 90 TABLET | Refills: 1 | Status: SHIPPED | OUTPATIENT
Start: 2024-05-02

## 2024-05-02 RX ORDER — INSULIN ASPART 100 [IU]/ML
INJECTION, SOLUTION INTRAVENOUS; SUBCUTANEOUS
Qty: 45 ML | Refills: 1 | Status: SHIPPED | OUTPATIENT
Start: 2024-05-02

## 2024-05-02 RX ORDER — OMEPRAZOLE 20 MG/1
20 CAPSULE, DELAYED RELEASE ORAL 2 TIMES DAILY
Qty: 180 CAPSULE | Refills: 1 | Status: SHIPPED | OUTPATIENT
Start: 2024-05-02

## 2024-05-02 RX ORDER — FLUTICASONE PROPIONATE 50 MCG
1 SPRAY, SUSPENSION (ML) NASAL DAILY
Qty: 54 G | Refills: 1 | Status: SHIPPED | OUTPATIENT
Start: 2024-05-02

## 2024-05-23 ENCOUNTER — TELEPHONE (OUTPATIENT)
Age: 79
End: 2024-05-23

## 2024-05-23 DIAGNOSIS — J41.0 SIMPLE CHRONIC BRONCHITIS (HCC): ICD-10-CM

## 2024-05-23 RX ORDER — FLUTICASONE FUROATE, UMECLIDINIUM BROMIDE AND VILANTEROL TRIFENATATE 100; 62.5; 25 UG/1; UG/1; UG/1
1 POWDER RESPIRATORY (INHALATION) DAILY
Qty: 60 BLISTER | Refills: 5 | Status: SHIPPED | OUTPATIENT
Start: 2024-05-23

## 2024-06-14 DIAGNOSIS — Z79.4 TYPE 2 DIABETES MELLITUS WITH DIABETIC NEUROPATHY, WITH LONG-TERM CURRENT USE OF INSULIN (HCC): ICD-10-CM

## 2024-06-14 DIAGNOSIS — E11.40 TYPE 2 DIABETES MELLITUS WITH DIABETIC NEUROPATHY, WITH LONG-TERM CURRENT USE OF INSULIN (HCC): ICD-10-CM

## 2024-06-14 RX ORDER — PEN NEEDLE, DIABETIC 31 GX5/16"
NEEDLE, DISPOSABLE MISCELLANEOUS
Qty: 400 EACH | Refills: 1 | Status: SHIPPED | OUTPATIENT
Start: 2024-06-14

## 2024-07-02 ENCOUNTER — NURSE TRIAGE (OUTPATIENT)
Age: 79
End: 2024-07-02

## 2024-07-02 ENCOUNTER — APPOINTMENT (EMERGENCY)
Dept: RADIOLOGY | Facility: HOSPITAL | Age: 79
DRG: 078 | End: 2024-07-02
Payer: COMMERCIAL

## 2024-07-02 ENCOUNTER — HOSPITAL ENCOUNTER (INPATIENT)
Facility: HOSPITAL | Age: 79
LOS: 1 days | Discharge: HOME HEALTH | DRG: 078 | End: 2024-07-04
Admitting: STUDENT IN AN ORGANIZED HEALTH CARE EDUCATION/TRAINING PROGRAM
Payer: COMMERCIAL

## 2024-07-02 ENCOUNTER — APPOINTMENT (EMERGENCY)
Dept: CT IMAGING | Facility: HOSPITAL | Age: 79
DRG: 078 | End: 2024-07-02
Payer: COMMERCIAL

## 2024-07-02 DIAGNOSIS — G40.909 SEIZURE DISORDER (HCC): ICD-10-CM

## 2024-07-02 DIAGNOSIS — R29.90 STROKE-LIKE SYMPTOMS: ICD-10-CM

## 2024-07-02 DIAGNOSIS — I50.32 CHRONIC HEART FAILURE WITH PRESERVED EJECTION FRACTION (HCC): ICD-10-CM

## 2024-07-02 DIAGNOSIS — R91.1 PULMONARY NODULE: ICD-10-CM

## 2024-07-02 DIAGNOSIS — N18.32 STAGE 3B CHRONIC KIDNEY DISEASE (HCC): ICD-10-CM

## 2024-07-02 DIAGNOSIS — Z79.4 TYPE 2 DIABETES MELLITUS WITH DIABETIC NEUROPATHY, WITH LONG-TERM CURRENT USE OF INSULIN (HCC): ICD-10-CM

## 2024-07-02 DIAGNOSIS — E04.1 THYROID NODULE: ICD-10-CM

## 2024-07-02 DIAGNOSIS — R47.01 APHASIA: ICD-10-CM

## 2024-07-02 DIAGNOSIS — E11.40 TYPE 2 DIABETES MELLITUS WITH DIABETIC NEUROPATHY, WITH LONG-TERM CURRENT USE OF INSULIN (HCC): ICD-10-CM

## 2024-07-02 DIAGNOSIS — R41.82 ALTERED MENTAL STATUS: Primary | ICD-10-CM

## 2024-07-02 PROBLEM — R93.89 ABNORMAL CT SCAN: Status: ACTIVE | Noted: 2024-07-02

## 2024-07-02 LAB
2HR DELTA HS TROPONIN: 4 NG/L
ANION GAP SERPL CALCULATED.3IONS-SCNC: 8 MMOL/L (ref 4–13)
BACTERIA UR QL AUTO: NORMAL /HPF
BASE EX.OXY STD BLDV CALC-SCNC: 73 % (ref 60–80)
BASE EXCESS BLDV CALC-SCNC: -2.1 MMOL/L
BASOPHILS # BLD AUTO: 0.05 THOUSANDS/ÂΜL (ref 0–0.1)
BASOPHILS NFR BLD AUTO: 1 % (ref 0–1)
BILIRUB UR QL STRIP: NEGATIVE
BNP SERPL-MCNC: 122 PG/ML (ref 0–100)
BUN SERPL-MCNC: 23 MG/DL (ref 5–25)
CALCIUM SERPL-MCNC: 9.5 MG/DL (ref 8.4–10.2)
CARDIAC TROPONIN I PNL SERPL HS: 10 NG/L
CARDIAC TROPONIN I PNL SERPL HS: 6 NG/L
CHLORIDE SERPL-SCNC: 103 MMOL/L (ref 96–108)
CLARITY UR: CLEAR
CO2 SERPL-SCNC: 27 MMOL/L (ref 21–32)
COLOR UR: COLORLESS
CREAT SERPL-MCNC: 0.98 MG/DL (ref 0.6–1.3)
EOSINOPHIL # BLD AUTO: 0.24 THOUSAND/ÂΜL (ref 0–0.61)
EOSINOPHIL NFR BLD AUTO: 3 % (ref 0–6)
ERYTHROCYTE [DISTWIDTH] IN BLOOD BY AUTOMATED COUNT: 14.4 % (ref 11.6–15.1)
GFR SERPL CREATININE-BSD FRML MDRD: 54 ML/MIN/1.73SQ M
GLUCOSE SERPL-MCNC: 114 MG/DL (ref 65–140)
GLUCOSE SERPL-MCNC: 114 MG/DL (ref 65–140)
GLUCOSE SERPL-MCNC: 138 MG/DL (ref 65–140)
GLUCOSE SERPL-MCNC: 162 MG/DL (ref 65–140)
GLUCOSE UR STRIP-MCNC: NEGATIVE MG/DL
HCO3 BLDV-SCNC: 23.1 MMOL/L (ref 24–30)
HCT VFR BLD AUTO: 43 % (ref 34.8–46.1)
HGB BLD-MCNC: 13.8 G/DL (ref 11.5–15.4)
HGB UR QL STRIP.AUTO: NEGATIVE
IMM GRANULOCYTES # BLD AUTO: 0.04 THOUSAND/UL (ref 0–0.2)
IMM GRANULOCYTES NFR BLD AUTO: 1 % (ref 0–2)
KETONES UR STRIP-MCNC: NEGATIVE MG/DL
LEUKOCYTE ESTERASE UR QL STRIP: NEGATIVE
LYMPHOCYTES # BLD AUTO: 2.01 THOUSANDS/ÂΜL (ref 0.6–4.47)
LYMPHOCYTES NFR BLD AUTO: 24 % (ref 14–44)
MCH RBC QN AUTO: 27.7 PG (ref 26.8–34.3)
MCHC RBC AUTO-ENTMCNC: 32.1 G/DL (ref 31.4–37.4)
MCV RBC AUTO: 86 FL (ref 82–98)
MONOCYTES # BLD AUTO: 0.61 THOUSAND/ÂΜL (ref 0.17–1.22)
MONOCYTES NFR BLD AUTO: 7 % (ref 4–12)
NEUTROPHILS # BLD AUTO: 5.34 THOUSANDS/ÂΜL (ref 1.85–7.62)
NEUTS SEG NFR BLD AUTO: 64 % (ref 43–75)
NITRITE UR QL STRIP: NEGATIVE
NON-SQ EPI CELLS URNS QL MICRO: NORMAL /HPF
NRBC BLD AUTO-RTO: 0 /100 WBCS
O2 CT BLDV-SCNC: 15.3 ML/DL
PCO2 BLDV: 41.2 MM HG (ref 42–50)
PH BLDV: 7.37 [PH] (ref 7.3–7.4)
PH UR STRIP.AUTO: 7.5 [PH]
PLATELET # BLD AUTO: 205 THOUSANDS/UL (ref 149–390)
PMV BLD AUTO: 10.4 FL (ref 8.9–12.7)
PO2 BLDV: 37.9 MM HG (ref 35–45)
POTASSIUM SERPL-SCNC: 4.3 MMOL/L (ref 3.5–5.3)
PROT UR STRIP-MCNC: NEGATIVE MG/DL
RBC # BLD AUTO: 4.98 MILLION/UL (ref 3.81–5.12)
RBC #/AREA URNS AUTO: NORMAL /HPF
SODIUM SERPL-SCNC: 138 MMOL/L (ref 135–147)
SP GR UR STRIP.AUTO: 1.02 (ref 1–1.03)
UROBILINOGEN UR STRIP-ACNC: <2 MG/DL
WBC # BLD AUTO: 8.29 THOUSAND/UL (ref 4.31–10.16)
WBC #/AREA URNS AUTO: NORMAL /HPF

## 2024-07-02 PROCEDURE — 36415 COLL VENOUS BLD VENIPUNCTURE: CPT

## 2024-07-02 PROCEDURE — 82805 BLOOD GASES W/O2 SATURATION: CPT

## 2024-07-02 PROCEDURE — 85025 COMPLETE CBC W/AUTO DIFF WBC: CPT

## 2024-07-02 PROCEDURE — 94664 DEMO&/EVAL PT USE INHALER: CPT

## 2024-07-02 PROCEDURE — 99223 1ST HOSP IP/OBS HIGH 75: CPT | Performed by: INTERNAL MEDICINE

## 2024-07-02 PROCEDURE — 82948 REAGENT STRIP/BLOOD GLUCOSE: CPT

## 2024-07-02 PROCEDURE — 84484 ASSAY OF TROPONIN QUANT: CPT | Performed by: INTERNAL MEDICINE

## 2024-07-02 PROCEDURE — 99285 EMERGENCY DEPT VISIT HI MDM: CPT

## 2024-07-02 PROCEDURE — 80048 BASIC METABOLIC PNL TOTAL CA: CPT

## 2024-07-02 PROCEDURE — 99291 CRITICAL CARE FIRST HOUR: CPT | Performed by: NURSE PRACTITIONER

## 2024-07-02 PROCEDURE — 83880 ASSAY OF NATRIURETIC PEPTIDE: CPT

## 2024-07-02 PROCEDURE — 93005 ELECTROCARDIOGRAM TRACING: CPT

## 2024-07-02 PROCEDURE — 84484 ASSAY OF TROPONIN QUANT: CPT

## 2024-07-02 PROCEDURE — 71046 X-RAY EXAM CHEST 2 VIEWS: CPT

## 2024-07-02 PROCEDURE — 70496 CT ANGIOGRAPHY HEAD: CPT

## 2024-07-02 PROCEDURE — 70498 CT ANGIOGRAPHY NECK: CPT

## 2024-07-02 PROCEDURE — 81001 URINALYSIS AUTO W/SCOPE: CPT | Performed by: INTERNAL MEDICINE

## 2024-07-02 PROCEDURE — 94760 N-INVAS EAR/PLS OXIMETRY 1: CPT

## 2024-07-02 RX ORDER — FLUTICASONE FUROATE AND VILANTEROL 100; 25 UG/1; UG/1
1 POWDER RESPIRATORY (INHALATION) DAILY
Status: DISCONTINUED | OUTPATIENT
Start: 2024-07-03 | End: 2024-07-04 | Stop reason: HOSPADM

## 2024-07-02 RX ORDER — PANTOPRAZOLE SODIUM 40 MG/1
40 TABLET, DELAYED RELEASE ORAL
Status: DISCONTINUED | OUTPATIENT
Start: 2024-07-03 | End: 2024-07-04 | Stop reason: HOSPADM

## 2024-07-02 RX ORDER — LABETALOL HYDROCHLORIDE 5 MG/ML
10 INJECTION, SOLUTION INTRAVENOUS EVERY 6 HOURS PRN
Status: DISCONTINUED | OUTPATIENT
Start: 2024-07-02 | End: 2024-07-04 | Stop reason: HOSPADM

## 2024-07-02 RX ORDER — LATANOPROST 50 UG/ML
1 SOLUTION/ DROPS OPHTHALMIC DAILY
Status: DISCONTINUED | OUTPATIENT
Start: 2024-07-03 | End: 2024-07-04 | Stop reason: HOSPADM

## 2024-07-02 RX ORDER — ASPIRIN 81 MG/1
81 TABLET, CHEWABLE ORAL ONCE
Status: COMPLETED | OUTPATIENT
Start: 2024-07-02 | End: 2024-07-02

## 2024-07-02 RX ORDER — LEVETIRACETAM 500 MG/1
250 TABLET ORAL EVERY 12 HOURS
Status: DISCONTINUED | OUTPATIENT
Start: 2024-07-02 | End: 2024-07-04 | Stop reason: HOSPADM

## 2024-07-02 RX ORDER — INSULIN LISPRO 100 [IU]/ML
1-5 INJECTION, SOLUTION INTRAVENOUS; SUBCUTANEOUS
Status: DISCONTINUED | OUTPATIENT
Start: 2024-07-02 | End: 2024-07-04 | Stop reason: HOSPADM

## 2024-07-02 RX ORDER — ACETAMINOPHEN 325 MG/1
650 TABLET ORAL EVERY 6 HOURS PRN
Status: DISCONTINUED | OUTPATIENT
Start: 2024-07-02 | End: 2024-07-04 | Stop reason: HOSPADM

## 2024-07-02 RX ORDER — MONTELUKAST SODIUM 10 MG/1
10 TABLET ORAL EVERY EVENING
Status: DISCONTINUED | OUTPATIENT
Start: 2024-07-02 | End: 2024-07-04 | Stop reason: HOSPADM

## 2024-07-02 RX ORDER — ALBUTEROL SULFATE 90 UG/1
2 AEROSOL, METERED RESPIRATORY (INHALATION) EVERY 6 HOURS PRN
Status: DISCONTINUED | OUTPATIENT
Start: 2024-07-02 | End: 2024-07-04 | Stop reason: HOSPADM

## 2024-07-02 RX ORDER — FUROSEMIDE 20 MG/1
20 TABLET ORAL DAILY
Status: DISCONTINUED | OUTPATIENT
Start: 2024-07-03 | End: 2024-07-04 | Stop reason: HOSPADM

## 2024-07-02 RX ORDER — ATORVASTATIN CALCIUM 40 MG/1
40 TABLET, FILM COATED ORAL
Status: DISCONTINUED | OUTPATIENT
Start: 2024-07-02 | End: 2024-07-04 | Stop reason: HOSPADM

## 2024-07-02 RX ORDER — DULOXETIN HYDROCHLORIDE 30 MG/1
30 CAPSULE, DELAYED RELEASE ORAL DAILY
Status: DISCONTINUED | OUTPATIENT
Start: 2024-07-03 | End: 2024-07-04 | Stop reason: HOSPADM

## 2024-07-02 RX ORDER — INSULIN GLARGINE 100 [IU]/ML
20 INJECTION, SOLUTION SUBCUTANEOUS EVERY MORNING
Status: DISCONTINUED | OUTPATIENT
Start: 2024-07-02 | End: 2024-07-04 | Stop reason: HOSPADM

## 2024-07-02 RX ORDER — DROPERIDOL 2.5 MG/ML
1 INJECTION, SOLUTION INTRAMUSCULAR; INTRAVENOUS ONCE
Status: COMPLETED | OUTPATIENT
Start: 2024-07-02 | End: 2024-07-02

## 2024-07-02 RX ORDER — HEPARIN SODIUM 5000 [USP'U]/ML
5000 INJECTION, SOLUTION INTRAVENOUS; SUBCUTANEOUS EVERY 8 HOURS SCHEDULED
Status: DISCONTINUED | OUTPATIENT
Start: 2024-07-02 | End: 2024-07-04 | Stop reason: HOSPADM

## 2024-07-02 RX ORDER — FLUTICASONE PROPIONATE 50 MCG
1 SPRAY, SUSPENSION (ML) NASAL DAILY
Status: DISCONTINUED | OUTPATIENT
Start: 2024-07-03 | End: 2024-07-04 | Stop reason: HOSPADM

## 2024-07-02 RX ORDER — NYSTATIN 100000 [USP'U]/G
POWDER TOPICAL 3 TIMES DAILY PRN
Status: DISCONTINUED | OUTPATIENT
Start: 2024-07-02 | End: 2024-07-04 | Stop reason: HOSPADM

## 2024-07-02 RX ORDER — INSULIN LISPRO 100 [IU]/ML
6 INJECTION, SOLUTION INTRAVENOUS; SUBCUTANEOUS
Status: DISCONTINUED | OUTPATIENT
Start: 2024-07-02 | End: 2024-07-04 | Stop reason: HOSPADM

## 2024-07-02 RX ADMIN — INSULIN GLARGINE 20 UNITS: 100 INJECTION, SOLUTION SUBCUTANEOUS at 22:10

## 2024-07-02 RX ADMIN — MONTELUKAST 10 MG: 10 TABLET, FILM COATED ORAL at 18:25

## 2024-07-02 RX ADMIN — LEVETIRACETAM 250 MG: 500 TABLET, FILM COATED ORAL at 22:09

## 2024-07-02 RX ADMIN — IOHEXOL 85 ML: 350 INJECTION, SOLUTION INTRAVENOUS at 15:41

## 2024-07-02 RX ADMIN — ACETAMINOPHEN 650 MG: 325 TABLET, FILM COATED ORAL at 22:09

## 2024-07-02 RX ADMIN — INSULIN LISPRO 6 UNITS: 100 INJECTION, SOLUTION INTRAVENOUS; SUBCUTANEOUS at 19:20

## 2024-07-02 RX ADMIN — HEPARIN SODIUM 5000 UNITS: 5000 INJECTION INTRAVENOUS; SUBCUTANEOUS at 18:25

## 2024-07-02 RX ADMIN — ASPIRIN 81 MG: 81 TABLET, CHEWABLE ORAL at 17:02

## 2024-07-02 RX ADMIN — ATORVASTATIN CALCIUM 40 MG: 40 TABLET, FILM COATED ORAL at 22:10

## 2024-07-02 NOTE — TELEPHONE ENCOUNTER
"Reason for Disposition   Systolic BP >= 160 OR Diastolic >= 100, and any cardiac or neurologic symptoms (e.g., chest pain, difficulty breathing, unsteady gait, blurred vision)    Answer Assessment - Initial Assessment Questions  1. BLOOD PRESSURE: \"What is the blood pressure?\" \"Did you take at least two measurements 5 minutes apart?\"      169/82, 161/69  2. ONSET: \"When did you take your blood pressure?\"      12:48pm, 1:00pm   3. HOW: \"How did you obtain the blood pressure?\" (e.g., visiting nurse, automatic home BP monitor)      Portable automatic BP monitor  4. HISTORY: \"Do you have a history of high blood pressure?\"      States she was diagnosed with elevated BP \"a long time ago,\" at least 2-3 years ago  5. MEDICATIONS: \"Are you taking any medications for blood pressure?\" \"Have you missed any doses recently?\"      Not currently, used to take medication but it conflicted with another medication  6. OTHER SYMPTOMS: \"Do you have any symptoms?\" (e.g., headache, chest pain, blurred vision, difficulty breathing, weakness)      Headache, feelings of being faint, dizzy and lightheaded    Protocols used: High Blood Pressure-ADULT-[place]    "

## 2024-07-02 NOTE — ED PROVIDER NOTES
History  Chief Complaint   Patient presents with    Headache    Dizziness     Pt presents to ER from home for reports of not feeling well, dizziness, headache. Family reports to ems that her bp was initially 190s. EMS treats with 20g R hand, 1.25mg IV droperidol, stable vitals including a most recent blood pressure in the 130s.      Patient is an 78y F w/ pmhx DL, HTN, DM, prior 15 pyh smoking history BIBEMS for evaluation of multiple symptoms. Initially patient reports an episode of dizziness earlier that self-resolved with associated shortness of breath. She does have pmhx COPD and has had some shortness of breath secondary to the same. She states her family called out of concern for her blood pressure. Patient adamantly reports her symptoms have resolved.    Speaking with the family patient was noted around 12-1p to have an event where she was dizzy with difficulty ambulating and minor speech difficulty. This was transient and family took BP at home and noted SBP around 199 per EMS personnel. Out of concern for patient BP they called 911. They note she has had intermittent dizziness over the past few days that self-resolve.    Upon reevaluation of patient she reports her symptoms have not completely resolved but is unable to state which symptoms in particular are ongoing.         Prior to Admission Medications   Prescriptions Last Dose Informant Patient Reported? Taking?   Calcium Carbonate-Vitamin D (CALCIUM-D PO)  Self Yes No   Sig: Take 1 tablet by mouth in the morning   Continuous Blood Gluc  (Dexcom G7 ) YOLANDA  Self No No   Sig: Use 1 Application if needed (check sugars)   Continuous Blood Gluc Sensor (Dexcom G7 Sensor)  Self No No   Sig: Use 1 Device every 10 days   DULoxetine (CYMBALTA) 30 mg delayed release capsule  Self Yes No   Insulin Glargine Solostar (Lantus SoloStar) 100 UNIT/ML SOPN   No No   Sig: Inject 0.3 mL (30 Units total) under the skin daily   Insulin Pen Needle (B-D ULTRAFINE  III SHORT PEN) 31G X 8 MM MISC   No No   Sig: Use 4 (four) times a day (with meals and at bedtime)   Lumigan 0.01 % ophthalmic drops  Self Yes No   Sig: INSTILL 1 DROP into both eyes at bedtime   Multiple Vitamin (MULTI VITAMIN DAILY PO)  Self Yes No   Sig: Take 1 tablet by mouth daily   Polyvinyl Alcohol-Povidone (REFRESH OP)  Self Yes No   Sig: Apply to eye   Spacer/Aero-Holding Chambers (AeroChamber Plus Elian-Vu Large) MISC  Self Yes No   Sig: Use as directed   acetaminophen (TYLENOL) 650 mg CR tablet  Self Yes No   Sig: Take 650 mg by mouth 2 (two) times a day   albuterol (2.5 mg/3 mL) 0.083 % nebulizer solution   No No   Sig: Take 3 mL (2.5 mg total) by nebulization every 6 (six) hours as needed for wheezing or shortness of breath   albuterol (ProAir HFA) 90 mcg/act inhaler   No No   Sig: Inhale 2 puffs every 6 (six) hours as needed for wheezing or shortness of breath (cough, chest tightness)   atorvastatin (LIPITOR) 10 mg tablet   No No   Sig: Take 1 tablet (10 mg total) by mouth daily at bedtime   clotrimazole (LOTRIMIN) 1 % cream  Self Yes No   dicyclomine (BENTYL) 20 mg tablet  Self Yes No   fluticasone (FLONASE) 50 mcg/act nasal spray   No No   Si spray into each nostril daily pt uses as needed   fluticasone-umeclidinium-vilanterol (Trelegy Ellipta) 100-62.5-25 mcg/actuation inhaler   No No   Sig: Inhale 1 puff daily Rinse mouth after use.   furosemide (LASIX) 20 mg tablet  Self No No   Sig: take 1 tablet by mouth once daily   gabapentin (NEURONTIN) 100 mg capsule   No No   Sig: Take 1 capsule (100 mg total) by mouth daily at bedtime   guaiFENesin (MUCINEX) 600 mg 12 hr tablet   No No   Sig: Take 1 tablet (600 mg total) by mouth every 12 (twelve) hours   insulin aspart (NovoLOG FlexPen) 100 UNIT/ML injection pen   No No   Sig: inject 12 units subcutaneously before meals (3 MEALS A DAY)   ketoconazole (NIZORAL) 2 % cream  Self Yes No   latanoprost (XALATAN) 0.005 % ophthalmic solution  Self No No   Sig:  Administer 1 drop into the left eye daily   levETIRAcetam (KEPPRA) 250 mg tablet   No No   Sig: Take 1 tablet (250 mg total) by mouth every 12 (twelve) hours   montelukast (SINGULAIR) 10 mg tablet   No No   Sig: Take 1 tablet (10 mg total) by mouth every evening   mupirocin (BACTROBAN) 2 % ointment  Self Yes No   Sig: APPLY A SMALL AMOUNT TO THE AFFECTED FOOT/TOE AREA BY TOPICAL ROUTE 3 TIMES PER DAY   nystatin (MYCOSTATIN) cream  Self No No   Sig: Apply topically 2 (two) times a day   nystatin (MYCOSTATIN) powder  Self No No   Sig: Apply topically 3 (three) times a day as needed (rash)   omeprazole (PriLOSEC) 20 mg delayed release capsule   No No   Sig: Take 1 capsule (20 mg total) by mouth 2 (two) times a day   vitamin B-12 (VITAMIN B-12) 500 mcg tablet  Self Yes No   Sig: Take 1,000 mcg by mouth daily      Facility-Administered Medications: None       Past Medical History:   Diagnosis Date    Acute kidney injury superimposed on chronic kidney disease  ([facility]) 11/26/2016    ADHD (attention deficit hyperactivity disorder) 03/17/2019    Arthritis     BL HIPS    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    Carpal tunnel syndrome     Chest pain 03/06/2017    Chronic kidney disease     Claudication ([facility]) 3/15/2019    Closed fracture of base of middle phalanx of finger 06/22/2017    Closed fracture of middle phalanx of left little finger 07/08/2017    Coagulopathy ([facility]) 05/15/2019    Colloid cyst of brain ([facility])     COPD (chronic obstructive pulmonary disease) ([facility])     COPD (chronic obstructive pulmonary disease) ([facility])     Coronary artery disease     Cough     Diabetes mellitus ([facility])     GERD (gastroesophageal reflux disease)     Glaucoma     Gout     History of brain disorder 10/07/2020    Hyperlipidemia     Hypertension     Irritable bowel syndrome     Melena 02/26/2019    PAD (peripheral artery disease) ([facility]) 5/15/2019    Paronychia of great toe of left foot 10/07/2020    Post-traumatic seizures ([facility]) 07/23/2015    Renal  disorder     Seizures (HCC)     last     Shortness of breath     Single seizure (HCC) 2016    SOB (shortness of breath)     Syncope and collapse 2020    Urinary tract infection without hematuria 2016    Wheezing        Past Surgical History:   Procedure Laterality Date    BRAIN SURGERY      CATARACT EXTRACTION      CHOLECYSTECTOMY      COLECTOMY RADHA      COLONOSCOPY      DECOMPRESSION SPINE LUMBAR POSTERIOR  2019    HERNIA REPAIR      HYSTERECTOMY      INCISION TENDON SHEATH HAND      NECK SURGERY  2018    NEUROPLASTY / TRANSPOSITION MEDIAN NERVE AT CARPAL TUNNEL      WA COLONOSCOPY FLX DX W/COLLJ SPEC WHEN PFRMD N/A 2019    Procedure: COLONOSCOPY;  Surgeon: Yaniv Hawley MD;  Location: [facility];  Service: Gastroenterology    WA ESOPHAGOGASTRODUODENOSCOPY TRANSORAL DIAGNOSTIC N/A 2019    Procedure: ESOPHAGOGASTRODUODENOSCOPY (EGD);  Surgeon: Yaniv Hawely MD;  Location: [facility];  Service: Gastroenterology    REPLACEMENT TOTAL KNEE Right     RETINAL DETACHMENT SURGERY      TUBAL LIGATION         Family History   Problem Relation Age of Onset    Asthma Mother     Heart disease Mother     Emphysema Father     Cancer Father     Prostate cancer Father     Kidney cancer Sister     Diabetes Sister     Kidney disease Sister     Brain cancer Brother     Bone cancer Brother     Heart disease Brother      I have reviewed and agree with the history as documented.    E-Cigarette/Vaping    E-Cigarette Use Never User      E-Cigarette/Vaping Substances    Nicotine No     THC No     CBD No     Flavoring No     Other No     Unknown No      Social History     Tobacco Use    Smoking status: Former     Current packs/day: 0.00     Average packs/day: 0.5 packs/day for 40.0 years (20.0 ttl pk-yrs)     Types: Cigarettes     Start date:      Quit date:      Years since quittin.5    Smokeless tobacco: Never    Tobacco comments:     stopped many years ago   Vaping Use     Vaping status: Never Used   Substance Use Topics    Alcohol use: Never    Drug use: Never       Review of Systems   Constitutional:  Negative for chills and fever.   HENT:  Negative for ear pain and sore throat.    Eyes:  Negative for pain and visual disturbance.   Respiratory:  Negative for cough and shortness of breath.    Cardiovascular:  Negative for chest pain and palpitations.   Gastrointestinal:  Negative for abdominal pain and vomiting.   Genitourinary:  Negative for dysuria and hematuria.   Musculoskeletal:  Negative for arthralgias and back pain.   Skin:  Negative for color change and rash.   Neurological:  Positive for dizziness, speech difficulty and weakness. Negative for seizures and syncope.   All other systems reviewed and are negative.      Physical Exam  Physical Exam  Vitals and nursing note reviewed.   Constitutional:       General: She is not in acute distress.     Appearance: Normal appearance. She is not ill-appearing, toxic-appearing or diaphoretic.      Comments: Patient sitting in bed in NAD, has difficulty answer questions and following commands   HENT:      Head: Normocephalic and atraumatic.   Eyes:      General: No scleral icterus.        Right eye: No discharge.         Left eye: No discharge.      Extraocular Movements: Extraocular movements intact.      Conjunctiva/sclera: Conjunctivae normal.   Cardiovascular:      Rate and Rhythm: Normal rate.      Pulses: Normal pulses.      Heart sounds: Normal heart sounds. No murmur heard.     No friction rub. No gallop.      Comments: 2+ BL radial, DP  Pulmonary:      Effort: Pulmonary effort is normal. No respiratory distress.      Breath sounds: Normal breath sounds. No stridor. No wheezing, rhonchi or rales.      Comments: Diminished expiratory effort  Abdominal:      General: Abdomen is flat. Bowel sounds are normal. There is no distension.      Palpations: Abdomen is soft.      Tenderness: There is no abdominal tenderness. There is no  guarding or rebound.      Comments: Soft, NTTP, no distension   Musculoskeletal:         General: No swelling. Normal range of motion.      Cervical back: Normal range of motion. No rigidity.      Right lower leg: Edema present.      Left lower leg: Edema present.      Comments: 1+ BL LE edema   Skin:     General: Skin is warm and dry.      Capillary Refill: Capillary refill takes less than 2 seconds.      Coloration: Skin is not jaundiced.      Findings: No bruising or lesion.   Neurological:      General: No focal deficit present.      Mental Status: She is alert and oriented to person, place, and time. Mental status is at baseline.      Comments: Patient having difficulty following commands. Unable to perform HINTS exam 2/2 the same. NIHSS 3; 1 pt for 1A, 4, 11 (personal inattention). 5/5 BL UE strength. 5/5 BL LE strength. Unable to perform heel-to-shin secondary to difficulty understanding commands.   Psychiatric:         Mood and Affect: Mood normal.         Behavior: Behavior normal.         Thought Content: Thought content normal.         Judgment: Judgment normal.         Vital Signs  ED Triage Vitals [07/02/24 1443]   Temperature Pulse Respirations Blood Pressure SpO2   97.5 °F (36.4 °C) 80 20 141/63 94 %      Temp Source Heart Rate Source Patient Position - Orthostatic VS BP Location FiO2 (%)   Oral Monitor Sitting Right arm --      Pain Score       No Pain           Vitals:    07/02/24 1801 07/02/24 1900 07/02/24 2000 07/02/24 2100   BP: 145/83 133/65 128/63 (!) 110/49   Pulse: 79 83 79 65   Patient Position - Orthostatic VS:             Visual Acuity  Visual Acuity      Flowsheet Row Most Recent Value   L Pupil Size (mm) 2   R Pupil Size (mm) 5   L Pupil Shape Round   R Pupil Shape Round            ED Medications  Medications   labetalol (NORMODYNE) injection 10 mg (has no administration in time range)   albuterol (PROVENTIL HFA,VENTOLIN HFA) inhaler 2 puff (has no administration in time range)    atorvastatin (LIPITOR) tablet 40 mg (has no administration in time range)   pantoprazole (PROTONIX) EC tablet 40 mg (has no administration in time range)   montelukast (SINGULAIR) tablet 10 mg (10 mg Oral Given 7/2/24 1825)   levETIRAcetam (KEPPRA) tablet 250 mg (has no administration in time range)   latanoprost (XALATAN) 0.005 % ophthalmic solution 1 drop (has no administration in time range)   insulin lispro (HumALOG/ADMELOG) 100 units/mL subcutaneous injection 6 Units (6 Units Subcutaneous Given 7/2/24 1920)   insulin glargine (LANTUS) subcutaneous injection 20 Units 0.2 mL (has no administration in time range)   furosemide (LASIX) tablet 20 mg (has no administration in time range)   Fluticasone Furoate-Vilanterol 100-25 mcg/actuation 1 puff (has no administration in time range)     And   umeclidinium 62.5 mcg/actuation inhaler AEPB 1 puff (has no administration in time range)   fluticasone (FLONASE) 50 mcg/act nasal spray 1 spray (has no administration in time range)   DULoxetine (CYMBALTA) delayed release capsule 30 mg (has no administration in time range)   acetaminophen (TYLENOL) tablet 650 mg (has no administration in time range)   heparin (porcine) subcutaneous injection 5,000 Units (5,000 Units Subcutaneous Given 7/2/24 1825)   insulin lispro (HumALOG/ADMELOG) 100 units/mL subcutaneous injection 1-5 Units ( Subcutaneous Not Given 7/2/24 1857)   nystatin (MYCOSTATIN) powder (has no administration in time range)   droperidol (FOR EMS ONLY) (INAPSINE) 2.5 mg/mL injection 2.5 mg (0 mg Does not apply Given to EMS 7/2/24 1452)   iohexol (OMNIPAQUE) 350 MG/ML injection (MULTI-DOSE) 85 mL (85 mL Intravenous Given 7/2/24 1541)   aspirin chewable tablet 81 mg (81 mg Oral Given 7/2/24 1702)       Diagnostic Studies  Results Reviewed       Procedure Component Value Units Date/Time    B-Type Natriuretic Peptide(BNP) [[id number]]  (Abnormal) Collected: 07/02/24 1600    Lab Status: Final result Specimen: Blood from  Arm, Right Updated: 07/02/24 1629      pg/mL     Blood gas, venous [[id number]]  (Abnormal) Collected: 07/02/24 1600    Lab Status: Final result Specimen: Blood from Arm, Right Updated: 07/02/24 1612     pH, Natalio 7.367     pCO2, Natalio 41.2 mm Hg      pO2, Natalio 37.9 mm Hg      HCO3, Natalio 23.1 mmol/L      Base Excess, Natalio -2.1 mmol/L      O2 Content, Natalio 15.3 ml/dL      O2 HGB, VENOUS 73.0 %     CBC and differential [[id number]] Collected: 07/02/24 1600    Lab Status: Final result Specimen: Blood from Arm, Right Updated: 07/02/24 1605     WBC 8.29 Thousand/uL      RBC 4.98 Million/uL      Hemoglobin 13.8 g/dL      Hematocrit 43.0 %      MCV 86 fL      MCH 27.7 pg      MCHC 32.1 g/dL      RDW 14.4 %      MPV 10.4 fL      Platelets 205 Thousands/uL      nRBC 0 /100 WBCs      Segmented % 64 %      Immature Grans % 1 %      Lymphocytes % 24 %      Monocytes % 7 %      Eosinophils Relative 3 %      Basophils Relative 1 %      Absolute Neutrophils 5.34 Thousands/µL      Absolute Immature Grans 0.04 Thousand/uL      Absolute Lymphocytes 2.01 Thousands/µL      Absolute Monocytes 0.61 Thousand/µL      Eosinophils Absolute 0.24 Thousand/µL      Basophils Absolute 0.05 Thousands/µL     HS Troponin I 4hr [[id number]]     Lab Status: No result Specimen: Blood     HS Troponin I 2hr [[id number]]     Lab Status: No result Specimen: Blood     HS Troponin 0hr (reflex protocol) [[id number]]  (Normal) Collected: 07/02/24 1517    Lab Status: Final result Specimen: Blood from Hand, Right Updated: 07/02/24 1546     hs TnI 0hr 6 ng/L     Basic metabolic panel [[id number]] Collected: 07/02/24 1517    Lab Status: Final result Specimen: Blood from Hand, Right Updated: 07/02/24 1539     Sodium 138 mmol/L      Potassium 4.3 mmol/L      Chloride 103 mmol/L      CO2 27 mmol/L      ANION GAP 8 mmol/L      BUN 23 mg/dL      Creatinine 0.98 mg/dL      Glucose 114 mg/dL      Calcium 9.5 mg/dL      eGFR 54 ml/min/1.73sq m     Narrative:      National Kidney  Disease Foundation guidelines for Chronic Kidney Disease (CKD):     Stage 1 with normal or high GFR (GFR > 90 mL/min/1.73 square meters)    Stage 2 Mild CKD (GFR = 60-89 mL/min/1.73 square meters)    Stage 3A Moderate CKD (GFR = 45-59 mL/min/1.73 square meters)    Stage 3B Moderate CKD (GFR = 30-44 mL/min/1.73 square meters)    Stage 4 Severe CKD (GFR = 15-29 mL/min/1.73 square meters)    Stage 5 End Stage CKD (GFR <15 mL/min/1.73 square meters)  Note: GFR calculation is accurate only with a steady state creatinine    Fingerstick Glucose (POCT) [[id number]]  (Normal) Collected: 07/02/24 1446    Lab Status: Final result Specimen: Blood Updated: 07/02/24 1447     POC Glucose 114 mg/dl                    XR chest 2 views   ED Interpretation by Jada Alves DO (07/02 1654)   No apparent infiltrate, effusion, pneumothorax      CTA stroke alert (head/neck)   Final Result by Norberto Purcell MD (07/02 1608)      1.  Patent major vessels of the [place] of [place] without high-grade stenosis.  No aneurysm.   2.  No hemodynamically significant stenosis in the cervical carotid or vertebral arteries.   3.  5 mm right middle lobe pulmonary nodule versus juxta fissural lymph node. Based on current Fleischner Society 2017 Guidelines on incidental pulmonary nodule, no routine follow-up is needed if the patient is low risk. If the patient is high risk,    optional follow-up chest CT at 12 months can be considered.   4.  3 cm right thyroid lobe nodule. According to guidelines published in the February 2015 white paper on incidental thyroid nodules in the Journal of the American College of Radiology (JACR), Because the nodule(s) are greater than 1.5 cm in size,    further characterization with thyroid ultrasound is recommended.    Considerations related to the patient's age and/or comorbidities may be used to alter these recommendations.               Findings were directly discussed with Trista Lakhani  at 3:56 p.m.           "                 Workstation performed: [id number]         CT stroke alert brain   Final Result by Norberto Purcell MD (07/02 1607)      No acute intracranial CT abnormality.                     Findings were directly discussed with Trista Lakhani  at 3:56 p.m.         Workstation performed: [id number]         MRI Inpatient Order    (Results Pending)              Procedures  ECG 12 Lead Documentation Only    Date/Time: 7/2/2024 3:36 PM    Performed by: Jada Alves DO  Authorized by: Jada Alves DO    ECG reviewed by me, the ED Provider: yes    Patient location:  ED  Previous ECG:     Previous ECG:  Compared to current    Comparison ECG info:  No change from prior    Similarity:  No change  Interpretation:     Interpretation: abnormal    Rate:     ECG rate assessment: normal    Rhythm:     Rhythm: sinus rhythm    Ectopy:     Ectopy: none    QRS:     QRS axis:  Normal  Conduction:     Conduction: normal    ST segments:     ST segments:  Normal  T waves:     T waves: inverted      Inverted:  III, V2 and V3           ED Course  ED Course as of 07/02/24 2135 Tue Jul 02, 2024   1510 Spoke with patient at length. Initially patient reported she had no residual symptoms. Stroke alert was not called. Spoke to family on the phone who report during episode around 12-1p patient had difficulty ambulating, difficulty speaking, truncal instability. Returned to patient who states her symptoms, in fact, have resolved \"somewhat\" though she cannot say what that means. Unsure regarding timeline at this time, will initiate stroke alert.   1542 Basic metabolic panel   1549 HS Troponin 0hr (reflex protocol)   1549 Fingerstick Glucose (POCT)   1606 CBC and differential             HEART Risk Score      Flowsheet Row Most Recent Value   Heart Score Risk Calculator    History 1 Filed at: 07/02/2024 1710   ECG 1 Filed at: 07/02/2024 1710   Age 2 Filed at: 07/02/2024 1710   Risk Factors 2 Filed at: 07/02/2024 1710 "   Troponin 0 Filed at: 07/02/2024 1710   HEART Score 6 Filed at: 07/02/2024 1710                          SBIRT 20yo+      Flowsheet Row Most Recent Value   Initial Alcohol Screen: US AUDIT-C     1. How often do you have a drink containing alcohol? 0 Filed at: 07/02/2024 1445   2. How many drinks containing alcohol do you have on a typical day you are drinking?  0 Filed at: 07/02/2024 1445   3b. FEMALE Any Age, or MALE 65+: How often do you have 4 or more drinks on one occassion? 0 Filed at: 07/02/2024 1445   Audit-C Score 0 Filed at: 07/02/2024 1445   LEV: How many times in the past year have you...    Used an illegal drug or used a prescription medication for non-medical reasons? Never Filed at: 07/02/2024 1445                      Medical Decision Making  78y F p/w dizziness, weakness, truncal instability    Ddx is broad and includes stroke, TIA, ICH, hypoglycemia, hypertensive encephalopathy, ACS, MI, PTX, pna, pleural effusion, dysrhythmia, metabolic derangement, TGA, vertigo, CHF    Plan: bloodwork, stroke alert, CXR    Overall patient work-up returned WNL. Neurology saw patient and feel it appropriate to admit patient on stroke pathway. Agree with recommendations. Unsure what to make of SOB at this time, patient story sounds like her dizziness was accompanied by significant anxiety so this could be a component. She does have a history of COPD though CXR doesn't appear to have acute infiltrate on my read. ECG without significant changes concerning for ACS. At time of evaluation patient adamantly denies SOB, CP, back pain, abdominal pain. Incidental findings noted and placed as clinical impressions. Patient admitted to internal medicine for further work-up.    Amount and/or Complexity of Data Reviewed  Labs: ordered. Decision-making details documented in ED Course.  Radiology: ordered and independent interpretation performed.    Risk  OTC drugs.  Decision regarding hospitalization.              Disposition  Final diagnoses:   Altered mental status   Thyroid nodule   Pulmonary nodule     Time reflects when diagnosis was documented in both MDM as applicable and the Disposition within this note       Time User Action Codes Description Comment    7/2/2024  3:11 PM Jada Alves Add [R41.82] Altered mental status     7/2/2024  4:54 PM AlvesOmerdemetria Add [E04.1] Thyroid nodule     7/2/2024  4:55 PM Long Omerdemetria Add [R91.1] Pulmonary nodule     7/2/2024  5:03 PM Glenn Black Add [R29.90] Stroke-like symptoms           ED Disposition       ED Disposition   Admit    Condition   Stable    Date/Time   Tue Jul 2, 2024 1658    Comment   Case was discussed with analy and the patient's admission status was agreed to be Admission Status: observation status to the service of Dr. Kaur .               Follow-up Information    None         Current Discharge Medication List        CONTINUE these medications which have NOT CHANGED    Details   acetaminophen (TYLENOL) 650 mg CR tablet Take 650 mg by mouth 2 (two) times a day      albuterol (2.5 mg/3 mL) 0.083 % nebulizer solution Take 3 mL (2.5 mg total) by nebulization every 6 (six) hours as needed for wheezing or shortness of breath  Qty: 90 mL, Refills: 0    Associated Diagnoses: Simple chronic bronchitis (HCC)      albuterol (ProAir HFA) 90 mcg/act inhaler Inhale 2 puffs every 6 (six) hours as needed for wheezing or shortness of breath (cough, chest tightness)  Qty: 18 g, Refills: 0    Comments: Substitution to a formulary equivalent within the same pharmaceutical class is authorized.  Associated Diagnoses: Simple chronic bronchitis (HCC)      atorvastatin (LIPITOR) 10 mg tablet Take 1 tablet (10 mg total) by mouth daily at bedtime  Qty: 90 tablet, Refills: 1    Associated Diagnoses: Hyperlipidemia associated with type 2 diabetes mellitus  (HCC)      Calcium Carbonate-Vitamin D (CALCIUM-D PO) Take 1 tablet by mouth in the morning      clotrimazole  (LOTRIMIN) 1 % cream       Continuous Blood Gluc  (Dexcom G7 ) YOLANDA Use 1 Application if needed (check sugars)  Qty: 1 each, Refills: 0    Associated Diagnoses: Type 2 diabetes mellitus with diabetic neuropathy, with long-term current use of insulin ([facility])      Continuous Blood Gluc Sensor (Dexcom G7 Sensor) Use 1 Device every 10 days  Qty: 9 each, Refills: 3    Associated Diagnoses: Type 2 diabetes mellitus with diabetic neuropathy, with long-term current use of insulin ([facility])      dicyclomine (BENTYL) 20 mg tablet       DULoxetine (CYMBALTA) 30 mg delayed release capsule       fluticasone (FLONASE) 50 mcg/act nasal spray 1 spray into each nostril daily pt uses as needed  Qty: 54 g, Refills: 1    Associated Diagnoses: Simple chronic bronchitis ([facility])      fluticasone-umeclidinium-vilanterol (Trelegy Ellipta) 100-62.5-25 mcg/actuation inhaler Inhale 1 puff daily Rinse mouth after use.  Qty: 60 blister, Refills: 5    Associated Diagnoses: Simple chronic bronchitis ([facility])      furosemide (LASIX) 20 mg tablet take 1 tablet by mouth once daily  Qty: 60 tablet, Refills: 6    Associated Diagnoses: Chronic heart failure with preserved ejection fraction ([facility]); Bilateral leg edema      gabapentin (NEURONTIN) 100 mg capsule Take 1 capsule (100 mg total) by mouth daily at bedtime  Qty: 90 capsule, Refills: 1    Associated Diagnoses: Type 2 diabetes mellitus with diabetic neuropathy, with long-term current use of insulin ([facility])      guaiFENesin (MUCINEX) 600 mg 12 hr tablet Take 1 tablet (600 mg total) by mouth every 12 (twelve) hours  Qty: 60 tablet, Refills: 1    Associated Diagnoses: Acute cough      insulin aspart (NovoLOG FlexPen) 100 UNIT/ML injection pen inject 12 units subcutaneously before meals (3 MEALS A DAY)  Qty: 45 mL, Refills: 1    Associated Diagnoses: Type 2 diabetes mellitus with diabetic neuropathy, with long-term current use of insulin ([facility])      Insulin Glargine Solostar (Lantus SoloStar) 100  UNIT/ML SOPN Inject 0.3 mL (30 Units total) under the skin daily  Qty: 15 mL, Refills: 2    Associated Diagnoses: Type 2 diabetes mellitus with diabetic neuropathy, with long-term current use of insulin ([facility])      Insulin Pen Needle (B-D ULTRAFINE III SHORT PEN) 31G X 8 MM MISC Use 4 (four) times a day (with meals and at bedtime)  Qty: 400 each, Refills: 1    Associated Diagnoses: Type 2 diabetes mellitus with diabetic neuropathy, with long-term current use of insulin ([facility])      ketoconazole (NIZORAL) 2 % cream       latanoprost (XALATAN) 0.005 % ophthalmic solution Administer 1 drop into the left eye daily  Qty: 2.5 mL, Refills: 2    Comments: Discounted Cash Price: $31.75. Adjudicate with:  BIN:[id number] PCN:[id number] Group:EMR ID:[id number].  Phone:[phone]  Associated Diagnoses: Macular pucker, left      levETIRAcetam (KEPPRA) 250 mg tablet Take 1 tablet (250 mg total) by mouth every 12 (twelve) hours  Qty: 180 tablet, Refills: 1    Associated Diagnoses: Seizure disorder ([facility])      Lumigan 0.01 % ophthalmic drops INSTILL 1 DROP into both eyes at bedtime      montelukast (SINGULAIR) 10 mg tablet Take 1 tablet (10 mg total) by mouth every evening  Qty: 90 tablet, Refills: 1    Associated Diagnoses: Simple chronic bronchitis (HCC)      Multiple Vitamin (MULTI VITAMIN DAILY PO) Take 1 tablet by mouth daily      mupirocin (BACTROBAN) 2 % ointment APPLY A SMALL AMOUNT TO THE AFFECTED FOOT/TOE AREA BY TOPICAL ROUTE 3 TIMES PER DAY      nystatin (MYCOSTATIN) cream Apply topically 2 (two) times a day  Qty: 30 g, Refills: 1    Associated Diagnoses: Intertrigo      nystatin (MYCOSTATIN) powder Apply topically 3 (three) times a day as needed (rash)  Qty: 60 g, Refills: 1    Associated Diagnoses: Intertrigo      omeprazole (PriLOSEC) 20 mg delayed release capsule Take 1 capsule (20 mg total) by mouth 2 (two) times a day  Qty: 180 capsule, Refills: 1    Associated Diagnoses: Gastroesophageal reflux disease without esophagitis       Polyvinyl Alcohol-Povidone (REFRESH OP) Apply to eye      Spacer/Aero-Holding Chambers (AeroChamber Plus Elian-Vu Large) MISC Use as directed      vitamin B-12 (VITAMIN B-12) 500 mcg tablet Take 1,000 mcg by mouth daily             No discharge procedures on file.    PDMP Review         Value Time User    PDMP Reviewed  Yes 11/14/2020  9:48 AM Tam Torres MD            ED Provider  Electronically Signed by             Jada Alves DO  07/02/24 [id number]

## 2024-07-02 NOTE — TELEPHONE ENCOUNTER
Landy-patient's caretaker called, states patient feels faint and her BP is elevated. Patient was present on call and confirmed identifiers, handed phone back to Landy. States BP this morning was 115/66, just took it and it was 169/82. States patient has a headache that came on suddenly.     RN advised patient should be evaluated in the nearest ER or care now location. Advised on when to call back.     Landy states patient has life alert button, they will use that and have the EMS transport patient to the ER for evaluation. No further questions/concerns at this time.

## 2024-07-02 NOTE — RESPIRATORY THERAPY NOTE
RT Protocol Note  Shantelle Romano 78 y.o. 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    Carpal tunnel syndrome     Chest pain 03/06/2017    Chronic kidney disease     Claudication ([facility]) 3/15/2019    Closed fracture of base of middle phalanx of finger 06/22/2017    Closed fracture of middle phalanx of left little finger 07/08/2017    Coagulopathy ([facility]) 05/15/2019    Colloid cyst of brain ([facility])     COPD (chronic obstructive pulmonary disease) ([facility])     COPD (chronic obstructive pulmonary disease) ([facility])     Coronary artery disease     Cough     Diabetes mellitus ([facility])     GERD (gastroesophageal reflux disease)     Glaucoma     Gout     History of brain disorder 10/07/2020    Hyperlipidemia     Hypertension     Irritable bowel syndrome     Melena 02/26/2019    PAD (peripheral artery disease) ([facility]) 5/15/2019    Paronychia of great toe of left foot 10/07/2020    Post-traumatic seizures ([facility]) 07/23/2015    Renal disorder     Seizures ([facility])     last 2015    Shortness of breath     Single seizure ([facility]) 05/31/2016    SOB (shortness of breath)     Syncope and collapse 11/11/2020    Urinary tract infection without hematuria 11/26/2016    Wheezing      Social History     Socioeconomic History    Marital status:      Spouse name: None    Number of children: None    Years of education: None    Highest education level: None   Occupational History    Occupation: retired   Tobacco Use    Smoking status: Former      Current packs/day: 0.00     Average packs/day: 0.5 packs/day for 40.0 years (20.0 ttl pk-yrs)     Types: Cigarettes     Start date:      Quit date:      Years since quittin.5    Smokeless tobacco: Never    Tobacco comments:     stopped many years ago   Vaping Use    Vaping status: Never Used   Substance and Sexual Activity    Alcohol use: Never    Drug use: Never    Sexual activity: Not Currently     Partners: Male   Other Topics Concern    None   Social History Narrative    None     Social Determinants of Health     Financial Resource Strain: Low Risk  (2024)    Overall Financial Resource Strain (CARDIA)     Difficulty of Paying Living Expenses: Not hard at all   Food Insecurity: Not on file   Transportation Needs: No Transportation Needs (2024)    PRAPARE - Transportation     Lack of Transportation (Medical): No     Lack of Transportation (Non-Medical): No   Physical Activity: Not on file   Stress: Not on file   Social Connections: Unknown (2024)    Received from [employer]    Social Connections     How often do you feel lonely or isolated from those around you? (Adult - for ages 18 years and over): Not on file   Intimate Partner Violence: Not on file   Housing Stability: Not on file       Subjective         Objective    Physical Exam:   Assessment Type: (P) Assess only  General Appearance: (P) Awake, Alert  Respiratory Pattern: (P) Normal  Chest Assessment: (P) Chest expansion symmetrical  Bilateral Breath Sounds: (P) Diminished  Cough: (P) None  O2 Device: (P) RA    Vitals:  Blood pressure 165/85, pulse (P) 82, temperature 97.5 °F (36.4 °C), temperature source Oral, resp. rate (P) 18, weight 73.6 kg (162 lb 4.1 oz), SpO2 (P) 94%, not currently breastfeeding.          Imaging and other studies: I have personally reviewed pertinent reports.      O2 Device: (P) RA     Plan    Respiratory Plan: (P) Home Bronchodilator Patient pathway        Resp Comments: (P) Protocol completed.  Pt w/  PMH of simple chronic bonchitis.  Pt uses Trelegy daily and Albuterol PRN.   Will cont w/ home regimen.

## 2024-07-02 NOTE — CONSULTS
Consultation - Stroke   Shantelle Romano 78 y.o. female MRN: [id number]  Unit/Bed#: ED 09 Encounter: [id number]      Assessment & Plan     * Stroke-like symptoms  Assessment & Plan  78 y.o.  female with HTN, CAD, CHF, diabetes, HLD, CKD, seizure disorder, who presents to [facility] on 7/2/24 as a stroke alert for headache and dizziness. BP on arrival 141/63, . NIHSS 1 including chronic gaze preference. CTH revealed no acute intracranial abnormality. CTA H/N wwo contrast revealed no IR target. Pt was not a thrombolytic candidate due to unclear LKW.   Neuroimaging:  CTH:No acute intracranial CT abnormality.   CTA:Patent major vessels of the [place] without high-grade stenosis.  No aneurysm.No hemodynamically significant stenosis in the cervical carotid or vertebral arteries.    Recommendations:  Unclear etiology of presenting symptoms, stroke vs htn encephalopathy vs infectious  - Stroke pathway  MRI brain  Echo  Lipid Panel  Hemoglobin A1c  TSH  Aspirin 81 mg   Atorvastatin 40 mg  Normotension; avoid hypotension  Euglycemic  Continue telemetry  PT/OT/ST  Stroke education if indicated  Continue to monitor and notify neurology with any changes.  STAT CT head for any acute change in neuro exam  - Medical management and correction of any metabolic or infectious disturbances per primary service.     Thrombolytic Decision: Patient not a candidate. Unclear time of onset outside appropriate time window.    Case and plan discussed with attending neurologist, Dr. Lakhani, throughout entire duration of stroke alert.  Please see attending attestation for any further recommendations and/or changes to plan.      Recommendations for outpatient neurological follow up have yet to be determined.    Reason for Consult / Principal Problem: stroke alert  Hx and PE limited by: na  Patient last known well: unclear  Stroke alert called: 1511 PM 7/2/24  Neurology time of arrival: 1513 PM 7/02/24  HPI: Shantelle Romano is a 78 y.o.   female with HTN, CAD, CHF, diabetes, HLD, CKD, seizure disorder, who presents to [facility] on 7/2/24 as a stroke alert for headache and dizziness. BP on arrival 141/63. NIHSS 1 including chronic gaze preference. CTH revealed no acute intracranial abnormality. CTA H/N wwo contrast revealed no IR target. Pt was not a thrombolytic candidate due to unclear LKW.     Per patient and family at bedside, patient has been experiencing intermittent dizziness, gait instability and worsening confusion for the past few days. They came to the ED today for elevated BP andtransient episode of word finding difficulty lasting a few minutes. Denies headache, focal motor/sensory deficit, visio or speech change. She does report intermittent SOB and CP as well     Previous Neurologic History  Patient last evaluated by outpatient neurology team with Dr. Bah on 5/31/2016.  At that time, patient was at the office for evaluation of memory difficulties and history of single seizure.  Per patient, patient with history with collates the surgery of the brain in 2007 and had a single seizure after.  Patient was seen by neurologist at [facility] and was initially started on 1 g of Keppra a day but was reduced to 250 mg twice daily due to side effects.  Patient reports that she had been doing well on Keppra 250 mg twice daily.  Patient with some memory issues, especially short-term, for at least 1 year.  At that time, patient with a caretaker at home 3 times a week.  Mini-Mental status time 29/30.  Patient more recently evaluated by inpatient neurology team at [facility] in November 2020.  At that time, patient was evaluated by Dr. Figueroa for confusion and brief episode of loss of consciousness.  Patient was recommended to undergo cardiac workup at the time and if cardiac workup unremarkable, recommend increasing dose of Keppra to 500 mg twice daily and obtaining EEG.  EEG during that hospitalization was normal.    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    Carpal tunnel syndrome     Chest pain 03/06/2017    Chronic kidney disease     Claudication ([facility]) 3/15/2019    Closed fracture of base of middle phalanx of finger 06/22/2017    Closed fracture of middle phalanx of left little finger 07/08/2017    Coagulopathy ([facility]) 05/15/2019    Colloid cyst of brain ([facility])     COPD (chronic obstructive pulmonary disease) (HCC)     COPD (chronic obstructive pulmonary disease) ([facility])     Coronary artery disease     Cough     Diabetes mellitus ([facility])     GERD (gastroesophageal reflux disease)     Glaucoma     Gout     History of brain disorder 10/07/2020    Hyperlipidemia     Hypertension     Irritable bowel syndrome     Melena 02/26/2019    PAD (peripheral artery disease) ([facility]) 5/15/2019    Paronychia of great toe of left foot 10/07/2020    Post-traumatic seizures ([facility]) 07/23/2015    Renal disorder     Seizures ([facility])     last 2015    Shortness of breath     Single seizure ([facility]) 05/31/2016    SOB (shortness of breath)     Syncope and collapse 11/11/2020    Urinary tract infection without hematuria 11/26/2016    Wheezing      Past Surgical History:   Procedure Laterality Date    BRAIN SURGERY      CATARACT EXTRACTION      CHOLECYSTECTOMY      COLECTOMY RADHA      COLONOSCOPY      DECOMPRESSION SPINE LUMBAR POSTERIOR  08/19/2019    HERNIA REPAIR      HYSTERECTOMY      INCISION TENDON SHEATH HAND      NECK SURGERY  05/24/2018    NEUROPLASTY / TRANSPOSITION MEDIAN NERVE AT CARPAL TUNNEL      IL COLONOSCOPY FLX DX W/COLLJ SPEC WHEN PFRMD N/A 03/26/2019    Procedure: COLONOSCOPY;  Surgeon: Yaniv Hawley MD;  Location: [facility];  Service: Gastroenterology    IL ESOPHAGOGASTRODUODENOSCOPY TRANSORAL  DIAGNOSTIC N/A 2019    Procedure: ESOPHAGOGASTRODUODENOSCOPY (EGD);  Surgeon: Yaniv Hawley MD;  Location: [facility];  Service: Gastroenterology    REPLACEMENT TOTAL KNEE Right     RETINAL DETACHMENT SURGERY      TUBAL LIGATION       Social History   Social History     Substance and Sexual Activity   Alcohol Use Never     Social History     Substance and Sexual Activity   Drug Use Never     E-Cigarette/Vaping    E-Cigarette Use Never User      E-Cigarette/Vaping Substances    Nicotine No     THC No     CBD No     Flavoring No     Other No     Unknown No      Social History     Tobacco Use   Smoking Status Former    Current packs/day: 0.00    Average packs/day: 0.5 packs/day for 40.0 years (20.0 ttl pk-yrs)    Types: Cigarettes    Start date:     Quit date:     Years since quittin.5   Smokeless Tobacco Never   Tobacco Comments    stopped many years ago     Family History:   Family History   Problem Relation Age of Onset    Asthma Mother     Heart disease Mother     Emphysema Father     Cancer Father     Prostate cancer Father     Kidney cancer Sister     Diabetes Sister     Kidney disease Sister     Brain cancer Brother     Bone cancer Brother     Heart disease Brother        Review of previous medical records was  completed.     Meds/Allergies   all current active meds have been reviewed, current meds:   Current Facility-Administered Medications   Medication Dose Route Frequency    acetaminophen (TYLENOL) tablet 650 mg  650 mg Oral Q6H PRN    albuterol (PROVENTIL HFA,VENTOLIN HFA) inhaler 2 puff  2 puff Inhalation Q6H PRN    atorvastatin (LIPITOR) tablet 40 mg  40 mg Oral HS    [START ON 7/3/2024] DULoxetine (CYMBALTA) delayed release capsule 30 mg  30 mg Oral Daily    [START ON 7/3/2024] fluticasone (FLONASE) 50 mcg/act nasal spray 1 spray  1 spray Nasal Daily    [START ON 7/3/2024] Fluticasone Furoate-Vilanterol 100-25 mcg/actuation 1 puff  1 puff Inhalation Daily    And    [START ON  7/3/2024] umeclidinium 62.5 mcg/actuation inhaler AEPB 1 puff  1 puff Inhalation Daily    [START ON 7/3/2024] furosemide (LASIX) tablet 20 mg  20 mg Oral Daily    heparin (porcine) subcutaneous injection 5,000 Units  5,000 Units Subcutaneous Q8H CHICA    [START ON 7/3/2024] insulin glargine (LANTUS) subcutaneous injection 20 Units 0.2 mL  20 Units Subcutaneous QAM    insulin lispro (HumALOG/ADMELOG) 100 units/mL subcutaneous injection 1-5 Units  1-5 Units Subcutaneous TID AC    insulin lispro (HumALOG/ADMELOG) 100 units/mL subcutaneous injection 6 Units  6 Units Subcutaneous TID With Meals    labetalol (NORMODYNE) injection 10 mg  10 mg Intravenous Q6H PRN    [START ON 7/3/2024] latanoprost (XALATAN) 0.005 % ophthalmic solution 1 drop  1 drop Left Eye Daily    levETIRAcetam (KEPPRA) tablet 250 mg  250 mg Oral Q12H    montelukast (SINGULAIR) tablet 10 mg  10 mg Oral QPM    [START ON 7/3/2024] pantoprazole (PROTONIX) EC tablet 40 mg  40 mg Oral Early Morning   , and PTA meds:   Prior to Admission Medications   Prescriptions Last Dose Informant Patient Reported? Taking?   Calcium Carbonate-Vitamin D (CALCIUM-D PO)  Self Yes No   Sig: Take 1 tablet by mouth in the morning   Continuous Blood Gluc  (Dexcom G7 ) YOLANDA  Self No No   Sig: Use 1 Application if needed (check sugars)   Continuous Blood Gluc Sensor (Dexcom G7 Sensor)  Self No No   Sig: Use 1 Device every 10 days   DULoxetine (CYMBALTA) 30 mg delayed release capsule  Self Yes No   Insulin Glargine Solostar (Lantus SoloStar) 100 UNIT/ML SOPN   No No   Sig: Inject 0.3 mL (30 Units total) under the skin daily   Insulin Pen Needle (B-D ULTRAFINE III SHORT PEN) 31G X 8 MM MISC   No No   Sig: Use 4 (four) times a day (with meals and at bedtime)   Lumigan 0.01 % ophthalmic drops  Self Yes No   Sig: INSTILL 1 DROP into both eyes at bedtime   Multiple Vitamin (MULTI VITAMIN DAILY PO)  Self Yes No   Sig: Take 1 tablet by mouth daily   Polyvinyl  Alcohol-Povidone (REFRESH OP)  Self Yes No   Sig: Apply to eye   Spacer/Aero-Holding Chambers (AeroChamber Plus Elian-Vu Large) MISC  Self Yes No   Sig: Use as directed   acetaminophen (TYLENOL) 650 mg CR tablet  Self Yes No   Sig: Take 650 mg by mouth 2 (two) times a day   albuterol (2.5 mg/3 mL) 0.083 % nebulizer solution   No No   Sig: Take 3 mL (2.5 mg total) by nebulization every 6 (six) hours as needed for wheezing or shortness of breath   albuterol (ProAir HFA) 90 mcg/act inhaler   No No   Sig: Inhale 2 puffs every 6 (six) hours as needed for wheezing or shortness of breath (cough, chest tightness)   atorvastatin (LIPITOR) 10 mg tablet   No No   Sig: Take 1 tablet (10 mg total) by mouth daily at bedtime   clotrimazole (LOTRIMIN) 1 % cream  Self Yes No   dicyclomine (BENTYL) 20 mg tablet  Self Yes No   fluticasone (FLONASE) 50 mcg/act nasal spray   No No   Si spray into each nostril daily pt uses as needed   fluticasone-umeclidinium-vilanterol (Trelegy Ellipta) 100-62.5-25 mcg/actuation inhaler   No No   Sig: Inhale 1 puff daily Rinse mouth after use.   furosemide (LASIX) 20 mg tablet  Self No No   Sig: take 1 tablet by mouth once daily   gabapentin (NEURONTIN) 100 mg capsule   No No   Sig: Take 1 capsule (100 mg total) by mouth daily at bedtime   guaiFENesin (MUCINEX) 600 mg 12 hr tablet   No No   Sig: Take 1 tablet (600 mg total) by mouth every 12 (twelve) hours   insulin aspart (NovoLOG FlexPen) 100 UNIT/ML injection pen   No No   Sig: inject 12 units subcutaneously before meals (3 MEALS A DAY)   ketoconazole (NIZORAL) 2 % cream  Self Yes No   latanoprost (XALATAN) 0.005 % ophthalmic solution  Self No No   Sig: Administer 1 drop into the left eye daily   levETIRAcetam (KEPPRA) 250 mg tablet   No No   Sig: Take 1 tablet (250 mg total) by mouth every 12 (twelve) hours   montelukast (SINGULAIR) 10 mg tablet   No No   Sig: Take 1 tablet (10 mg total) by mouth every evening   mupirocin (BACTROBAN) 2 % ointment   Self Yes No   Sig: APPLY A SMALL AMOUNT TO THE AFFECTED FOOT/TOE AREA BY TOPICAL ROUTE 3 TIMES PER DAY   nystatin (MYCOSTATIN) cream  Self No No   Sig: Apply topically 2 (two) times a day   nystatin (MYCOSTATIN) powder  Self No No   Sig: Apply topically 3 (three) times a day as needed (rash)   omeprazole (PriLOSEC) 20 mg delayed release capsule   No No   Sig: Take 1 capsule (20 mg total) by mouth 2 (two) times a day   vitamin B-12 (VITAMIN B-12) 500 mcg tablet  Self Yes No   Sig: Take 1,000 mcg by mouth daily      Facility-Administered Medications: None       Allergies   Allergen Reactions    Brimonidine Other (See Comments)    Salicylic Acid-Sulfur Other (See Comments)    Sulfa Antibiotics Rash and Other (See Comments)       Objective   Vitals:Blood pressure 165/85, pulse 79, temperature 97.5 °F (36.4 °C), temperature source Oral, resp. rate 17, weight 73.6 kg (162 lb 4.1 oz), SpO2 95%, not currently breastfeeding.,Body mass index is 37.71 kg/m².  No intake or output data in the 24 hours ending 07/02/24 1733    Invasive Devices:   Invasive Devices       Peripheral Intravenous Line  Duration             Peripheral IV 07/02/24 Dorsal (posterior);Right Hand <1 day    Peripheral IV 07/02/24 Right Antecubital <1 day                    Physical Exam  Vitals reviewed.   Constitutional:       General: She is not in acute distress.  HENT:      Head: Normocephalic and atraumatic.   Pulmonary:      Effort: Pulmonary effort is normal.   Musculoskeletal:      Right lower leg: Edema present.      Left lower leg: Edema present.   Skin:     General: Skin is warm and dry.   Neurological:      Mental Status: She is alert and oriented to person, place, and time.      Coordination: Finger-Nose-Finger Test and Heel to Shin Test normal.   Psychiatric:         Speech: Speech normal.       Neurologic Exam     Mental Status   Oriented to person, place, and time.   Follows 1 step commands.   Attention: normal. Concentration: normal.    Speech: speech is normal   Level of consciousness: alert  Able to name object. Able to read. Able to repeat.     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 right pupil > left and NR with medial gaze and limited abduction     Motor Exam     Strength   Strength 5/5 except as noted. BLE 5-/5     Sensory Exam   Light touch normal.   Pinprick normal.     Gait, Coordination, and Reflexes     Coordination   Finger to nose coordination: normal  Heel to shin coordination: normal    Tremor   Resting tremor: absent    Reflexes   Right plantar: normal  Left plantar: normal      NIHSS:  1a.Level of Consciousness: 0 = Alert   1b. LOC Questions: 0 = Answers both correctly   1c. LOC Commands: 0 = Obeys both correctly   2. Best Gaze: 1 = Partial Gaze Palsy   3. Visual: 0 = No visual field loss   4. Facial Palsy: 0=Normal symmetric movement   5a. Motor Right Arm: 0=No drift, limb holds 90 (or 45) degrees for full 10 seconds   5b. Motor Left Arm: 0=No drift, limb holds 90 (or 45) degrees for full 10 seconds   6a. Motor Right Le=No drift, limb holds 90 (or 45) degrees for full 10 seconds   6b. Motor Left Le=No drift, limb holds 90 (or 45) degrees for full 10 seconds   7. Limb Ataxia:  0=Absent   8. Sensory: 0=Normal; no sensory loss   9. Best Language:  0=No aphasia, normal   10. Dysarthria: 0=Normal articulation   11. Extinction and Inattention (formerly Neglect): 0=No abnormality   Total Score: 1     Time NIHSS was completed: 1530    Modified [place] Score:  Unable to determine currently, will gather additional data    Lab Results: CBC:   Results from last 7 days   Lab Units 24  1600   WBC Thousand/uL 8.29   RBC Million/uL  "4.98   HEMOGLOBIN g/dL 13.8   HEMATOCRIT % 43.0   MCV fL 86   PLATELETS Thousands/uL 205   , BMP/CMP:   Results from last 7 days   Lab Units 07/02/24  1517   SODIUM mmol/L 138   POTASSIUM mmol/L 4.3   CHLORIDE mmol/L 103   CO2 mmol/L 27   BUN mg/dL 23   CREATININE mg/dL 0.98   CALCIUM mg/dL 9.5   EGFR ml/min/1.73sq m 54   , Vitamin B12:   , HgBA1C:   , TSH:   , Coagulation:   , Lipid Profile:   , Ammonia:   , Urinalysis:       Invalid input(s): \"URIBILINOGEN\", Drug Screen:   , Medication Drug Levels:       Invalid input(s): \"CARBAMAZEPINE\", \"OXCARBAZEPINE\"    Imaging Studies: I have personally reviewed pertinent reports.   and I have personally reviewed pertinent films in PACS     EKG, Pathology, and Other Studies: I have personally reviewed pertinent reports.      Code Status: Level 1 - Full Code    Counseling / Coordination of Care  Assessment, images and plan reviewed with Dr. Lakhani. Plan discussed with patient, family at bedside and ED attending. Please refer to attending attestation for additional recommendations    Total Critical Care time spent 35 minutes excluding procedures, teaching and family updates.      This note was completed in part utilizing Dragon Software.  Grammatical errors, random word insertions, spelling mistakes, and incomplete sentences may be an occasional consequence of this system secondary to software limitations, ambient noise, and hardware issues.  If you have any questions or concerns about the content, text, or information contained within the body of this dictation, please contact the provider for clarification.           "

## 2024-07-02 NOTE — ASSESSMENT & PLAN NOTE
78 y.o.  female with HTN, CAD, CHF, diabetes, HLD, CKD, seizure disorder, who presents to [facility] on 7/2/24 as a stroke alert for headache and dizziness. BP on arrival 141/63, . NIHSS 1 including chronic gaze preference. CTH revealed no acute intracranial abnormality. CTA H/N wwo contrast revealed no IR target. Pt was not a thrombolytic candidate due to unclear LKW.     Neuroimaging:  CTH:No acute intracranial CT abnormality.   CTA:Patent major vessels of the [place] of [place] without high-grade stenosis.  No aneurysm.No hemodynamically significant stenosis in the cervical carotid or vertebral arteries.    Unclear etiology for symptoms at this time, differential includes small ischemic stroke vs TIA vs hypertensive urgency.    Plan:  - Stroke pathway  MRI brain without contrast pending.  Recommend echocardiogram.  Lipid Panel reviewed, total cholesterol 173, triglycerides 233, HDL 39, LDL 87  Hemoglobin A1c pending  TSH 7.726, T4 pending.  Recommend Aspirin 81 mg.   Atorvastatin 40 mg  Goal normotension, normothermia and euglycemia.  Continue telemetry  PT/OT/ST  Stroke education if indicated  Continue to monitor and notify neurology with any changes.  STAT CT head for any acute change in neuro exam  - Medical management and correction of any metabolic or infectious disturbances per primary service.

## 2024-07-03 LAB
4HR DELTA HS TROPONIN: 3 NG/L
ALBUMIN SERPL BCG-MCNC: 3.7 G/DL (ref 3.5–5)
ALP SERPL-CCNC: 47 U/L (ref 34–104)
ALT SERPL W P-5'-P-CCNC: 14 U/L (ref 7–52)
ANION GAP SERPL CALCULATED.3IONS-SCNC: 7 MMOL/L (ref 4–13)
AST SERPL W P-5'-P-CCNC: 16 U/L (ref 13–39)
ATRIAL RATE: 65 BPM
BASOPHILS # BLD AUTO: 0.05 THOUSANDS/ÂΜL (ref 0–0.1)
BASOPHILS NFR BLD AUTO: 1 % (ref 0–1)
BILIRUB SERPL-MCNC: 0.64 MG/DL (ref 0.2–1)
BUN SERPL-MCNC: 30 MG/DL (ref 5–25)
CALCIUM SERPL-MCNC: 9.3 MG/DL (ref 8.4–10.2)
CARDIAC TROPONIN I PNL SERPL HS: 9 NG/L
CHLORIDE SERPL-SCNC: 104 MMOL/L (ref 96–108)
CHOLEST SERPL-MCNC: 173 MG/DL
CO2 SERPL-SCNC: 27 MMOL/L (ref 21–32)
CREAT SERPL-MCNC: 1.36 MG/DL (ref 0.6–1.3)
EOSINOPHIL # BLD AUTO: 0.27 THOUSAND/ÂΜL (ref 0–0.61)
EOSINOPHIL NFR BLD AUTO: 4 % (ref 0–6)
ERYTHROCYTE [DISTWIDTH] IN BLOOD BY AUTOMATED COUNT: 14.6 % (ref 11.6–15.1)
EST. AVERAGE GLUCOSE BLD GHB EST-MCNC: 171 MG/DL
GFR SERPL CREATININE-BSD FRML MDRD: 36 ML/MIN/1.73SQ M
GLUCOSE P FAST SERPL-MCNC: 150 MG/DL (ref 65–99)
GLUCOSE SERPL-MCNC: 150 MG/DL (ref 65–140)
GLUCOSE SERPL-MCNC: 156 MG/DL (ref 65–140)
GLUCOSE SERPL-MCNC: 175 MG/DL (ref 65–140)
GLUCOSE SERPL-MCNC: 189 MG/DL (ref 65–140)
GLUCOSE SERPL-MCNC: 78 MG/DL (ref 65–140)
HBA1C MFR BLD: 7.6 %
HCT VFR BLD AUTO: 42.9 % (ref 34.8–46.1)
HDLC SERPL-MCNC: 39 MG/DL
HGB BLD-MCNC: 13.7 G/DL (ref 11.5–15.4)
IMM GRANULOCYTES # BLD AUTO: 0.01 THOUSAND/UL (ref 0–0.2)
IMM GRANULOCYTES NFR BLD AUTO: 0 % (ref 0–2)
LDLC SERPL CALC-MCNC: 87 MG/DL (ref 0–100)
LYMPHOCYTES # BLD AUTO: 1.91 THOUSANDS/ÂΜL (ref 0.6–4.47)
LYMPHOCYTES NFR BLD AUTO: 26 % (ref 14–44)
MCH RBC QN AUTO: 27.9 PG (ref 26.8–34.3)
MCHC RBC AUTO-ENTMCNC: 31.9 G/DL (ref 31.4–37.4)
MCV RBC AUTO: 87 FL (ref 82–98)
MONOCYTES # BLD AUTO: 0.55 THOUSAND/ÂΜL (ref 0.17–1.22)
MONOCYTES NFR BLD AUTO: 8 % (ref 4–12)
NEUTROPHILS # BLD AUTO: 4.59 THOUSANDS/ÂΜL (ref 1.85–7.62)
NEUTS SEG NFR BLD AUTO: 61 % (ref 43–75)
NRBC BLD AUTO-RTO: 0 /100 WBCS
P AXIS: 58 DEGREES
PLATELET # BLD AUTO: 232 THOUSANDS/UL (ref 149–390)
PMV BLD AUTO: 9.6 FL (ref 8.9–12.7)
POTASSIUM SERPL-SCNC: 4.4 MMOL/L (ref 3.5–5.3)
PR INTERVAL: 184 MS
PROT SERPL-MCNC: 7.2 G/DL (ref 6.4–8.4)
QRS AXIS: 15 DEGREES
QRSD INTERVAL: 106 MS
QT INTERVAL: 430 MS
QTC INTERVAL: 447 MS
RBC # BLD AUTO: 4.91 MILLION/UL (ref 3.81–5.12)
SODIUM SERPL-SCNC: 138 MMOL/L (ref 135–147)
T WAVE AXIS: 29 DEGREES
T4 FREE SERPL-MCNC: 0.56 NG/DL (ref 0.61–1.12)
TRIGL SERPL-MCNC: 233 MG/DL
TSH SERPL DL<=0.05 MIU/L-ACNC: 7.73 UIU/ML (ref 0.45–4.5)
VENTRICULAR RATE: 65 BPM
WBC # BLD AUTO: 7.38 THOUSAND/UL (ref 4.31–10.16)

## 2024-07-03 PROCEDURE — 93010 ELECTROCARDIOGRAM REPORT: CPT | Performed by: INTERNAL MEDICINE

## 2024-07-03 PROCEDURE — 80061 LIPID PANEL: CPT | Performed by: INTERNAL MEDICINE

## 2024-07-03 PROCEDURE — 80053 COMPREHEN METABOLIC PANEL: CPT | Performed by: INTERNAL MEDICINE

## 2024-07-03 PROCEDURE — 82948 REAGENT STRIP/BLOOD GLUCOSE: CPT

## 2024-07-03 PROCEDURE — 94664 DEMO&/EVAL PT USE INHALER: CPT

## 2024-07-03 PROCEDURE — 83036 HEMOGLOBIN GLYCOSYLATED A1C: CPT | Performed by: INTERNAL MEDICINE

## 2024-07-03 PROCEDURE — 99233 SBSQ HOSP IP/OBS HIGH 50: CPT | Performed by: PSYCHIATRY & NEUROLOGY

## 2024-07-03 PROCEDURE — 84439 ASSAY OF FREE THYROXINE: CPT | Performed by: PHYSICIAN ASSISTANT

## 2024-07-03 PROCEDURE — 85025 COMPLETE CBC W/AUTO DIFF WBC: CPT | Performed by: INTERNAL MEDICINE

## 2024-07-03 PROCEDURE — 84443 ASSAY THYROID STIM HORMONE: CPT | Performed by: PHYSICIAN ASSISTANT

## 2024-07-03 PROCEDURE — 97167 OT EVAL HIGH COMPLEX 60 MIN: CPT

## 2024-07-03 PROCEDURE — NC001 PR NO CHARGE: Performed by: PSYCHIATRY & NEUROLOGY

## 2024-07-03 PROCEDURE — 99232 SBSQ HOSP IP/OBS MODERATE 35: CPT | Performed by: STUDENT IN AN ORGANIZED HEALTH CARE EDUCATION/TRAINING PROGRAM

## 2024-07-03 PROCEDURE — 84484 ASSAY OF TROPONIN QUANT: CPT | Performed by: INTERNAL MEDICINE

## 2024-07-03 PROCEDURE — 97163 PT EVAL HIGH COMPLEX 45 MIN: CPT

## 2024-07-03 RX ORDER — SODIUM CHLORIDE 9 MG/ML
75 INJECTION, SOLUTION INTRAVENOUS CONTINUOUS
Status: DISPENSED | OUTPATIENT
Start: 2024-07-03 | End: 2024-07-04

## 2024-07-03 RX ADMIN — HEPARIN SODIUM 5000 UNITS: 5000 INJECTION INTRAVENOUS; SUBCUTANEOUS at 15:01

## 2024-07-03 RX ADMIN — HEPARIN SODIUM 5000 UNITS: 5000 INJECTION INTRAVENOUS; SUBCUTANEOUS at 05:38

## 2024-07-03 RX ADMIN — FLUTICASONE PROPIONATE 1 SPRAY: 50 SPRAY, METERED NASAL at 09:19

## 2024-07-03 RX ADMIN — PANTOPRAZOLE SODIUM 40 MG: 40 TABLET, DELAYED RELEASE ORAL at 05:38

## 2024-07-03 RX ADMIN — HEPARIN SODIUM 5000 UNITS: 5000 INJECTION INTRAVENOUS; SUBCUTANEOUS at 21:46

## 2024-07-03 RX ADMIN — MONTELUKAST 10 MG: 10 TABLET, FILM COATED ORAL at 17:13

## 2024-07-03 RX ADMIN — ATORVASTATIN CALCIUM 40 MG: 40 TABLET, FILM COATED ORAL at 21:46

## 2024-07-03 RX ADMIN — SODIUM CHLORIDE 75 ML/HR: 0.9 INJECTION, SOLUTION INTRAVENOUS at 13:14

## 2024-07-03 RX ADMIN — ACETAMINOPHEN 650 MG: 325 TABLET, FILM COATED ORAL at 07:27

## 2024-07-03 RX ADMIN — UMECLIDINIUM 1 PUFF: 62.5 AEROSOL, POWDER ORAL at 09:18

## 2024-07-03 RX ADMIN — INSULIN LISPRO 6 UNITS: 100 INJECTION, SOLUTION INTRAVENOUS; SUBCUTANEOUS at 12:34

## 2024-07-03 RX ADMIN — INSULIN LISPRO 1 UNITS: 100 INJECTION, SOLUTION INTRAVENOUS; SUBCUTANEOUS at 12:33

## 2024-07-03 RX ADMIN — DULOXETINE HYDROCHLORIDE 30 MG: 30 CAPSULE, DELAYED RELEASE ORAL at 09:18

## 2024-07-03 RX ADMIN — LEVETIRACETAM 250 MG: 500 TABLET, FILM COATED ORAL at 09:16

## 2024-07-03 RX ADMIN — INSULIN GLARGINE 20 UNITS: 100 INJECTION, SOLUTION SUBCUTANEOUS at 21:45

## 2024-07-03 RX ADMIN — INSULIN LISPRO 6 UNITS: 100 INJECTION, SOLUTION INTRAVENOUS; SUBCUTANEOUS at 09:19

## 2024-07-03 RX ADMIN — INSULIN LISPRO 1 UNITS: 100 INJECTION, SOLUTION INTRAVENOUS; SUBCUTANEOUS at 09:20

## 2024-07-03 RX ADMIN — FUROSEMIDE 20 MG: 20 TABLET ORAL at 09:16

## 2024-07-03 RX ADMIN — FLUTICASONE FUROATE AND VILANTEROL TRIFENATATE 1 PUFF: 100; 25 POWDER RESPIRATORY (INHALATION) at 09:18

## 2024-07-03 RX ADMIN — LEVETIRACETAM 250 MG: 500 TABLET, FILM COATED ORAL at 21:45

## 2024-07-03 RX ADMIN — LATANOPROST 1 DROP: 50 SOLUTION OPHTHALMIC at 09:19

## 2024-07-03 NOTE — ASSESSMENT & PLAN NOTE
Lab Results   Component Value Date    EGFR 54 07/02/2024    EGFR 49 03/28/2024    EGFR 49 10/13/2023    CREATININE 0.98 07/02/2024    CREATININE 1.06 03/28/2024    CREATININE 1.07 10/13/2023       Baseline creatinine seems to be between 0.9 and 1.1.  Currently at baseline.  Monitor

## 2024-07-03 NOTE — ASSESSMENT & PLAN NOTE
79yo F presented with dizziness, generalized weakness for several days  Markedly hypertensive on arrival.  CTA head neck -   1.  Patent major vessels of the [place] of [place] without high-grade stenosis.  No aneurysm.   2.  No hemodynamically significant stenosis in the cervical carotid or vertebral arteries.   Unclear etiology of symptoms, suspect mostly related to uncontrolled BP but ED wanted to rule out stroke.      MRI of the brain  If positive MRI echo  UA  Formal neurology consult  PT  OT  Aspirin  Statin  Tele  Monitor neurological exam

## 2024-07-03 NOTE — ASSESSMENT & PLAN NOTE
Seems to be overall poorly controlled.  As per neurology no need for permissive hypertension given duration of symptoms  Continue Lasix, as needed labetalol  Monitor blood pressure

## 2024-07-03 NOTE — ASSESSMENT & PLAN NOTE
Lab Results   Component Value Date    HGBA1C 7.6 (H) 07/03/2024       Recent Labs     07/02/24  1844 07/02/24  2105 07/03/24  0714 07/03/24  1104   POCGLU 138 162* 156* 175*         Blood Sugar Average: Last 72 hrs:  (P) 149    Will continue insulin but at lower dose while in the hospital diet.  Sliding-scale insulin for corrections  Monitor blood glucose  Statin for hyperlipidemia

## 2024-07-03 NOTE — ASSESSMENT & PLAN NOTE
CTA with incidental findings of:  3.  5 mm right middle lobe pulmonary nodule versus juxta fissural lymph node. Based on current Fleischner Society 2017 Guidelines on incidental pulmonary nodule, no routine follow-up is needed if the patient is low risk. If the patient is high risk,   optional follow-up chest CT at 12 months can be considered.   4.  3 cm right thyroid lobe nodule. According to guidelines published in the February 2015 white paper on incidental thyroid nodules in the Journal of the American College of Radiology (JACR), Because the nodule(s) are greater than 1.5 cm in size,   further characterization with thyroid ultrasound is recommended.    Considerations related to the patient's age and/or comorbidities may be used to alter these recommendations.     Did personally make patient aware of these findings on the evening of 7/2  OP follow up

## 2024-07-03 NOTE — OCCUPATIONAL THERAPY NOTE
Occupational Therapy Evaluation      Shantelle Boyerer    7/3/2024    Principal Problem:    Stroke-like symptoms  Active Problems:    Hypertension    Hyperlipidemia associated with type 2 diabetes mellitus  (HCC)    Seizure disorder ([facility])    Chronic kidney disease-mineral and bone disorder    Abnormal CT scan      Past Medical History:   Diagnosis Date    Acute kidney injury superimposed on chronic kidney disease  (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) 3/15/2019    Closed fracture of base of middle phalanx of finger 06/22/2017    Closed fracture of middle phalanx of left little finger 07/08/2017    Coagulopathy ([facility]) 05/15/2019    Colloid cyst of brain ([facility])     COPD (chronic obstructive pulmonary disease) ([facility])     COPD (chronic obstructive pulmonary disease) ([facility])     Coronary artery disease     Cough     Diabetes mellitus ([facility])     GERD (gastroesophageal reflux disease)     Glaucoma     Gout     History of brain disorder 10/07/2020    Hyperlipidemia     Hypertension     Irritable bowel syndrome     Melena 02/26/2019    PAD (peripheral artery disease) ([facility]) 5/15/2019    Paronychia of great toe of left foot 10/07/2020    Post-traumatic seizures ([facility]) 07/23/2015    Renal disorder     Seizures ([facility])     last 2015    Shortness of breath     Single seizure ([facility]) 05/31/2016    SOB (shortness of breath)     Syncope and collapse 11/11/2020    Urinary tract infection without hematuria 11/26/2016    Wheezing        Past Surgical History:   Procedure Laterality Date    BRAIN SURGERY      CATARACT EXTRACTION      CHOLECYSTECTOMY      COLECTOMY RADHA      COLONOSCOPY      DECOMPRESSION SPINE LUMBAR POSTERIOR  08/19/2019    HERNIA REPAIR      HYSTERECTOMY      INCISION TENDON SHEATH HAND      NECK SURGERY  05/24/2018    NEUROPLASTY /  TRANSPOSITION MEDIAN NERVE AT CARPAL TUNNEL      OR COLONOSCOPY FLX DX W/COLLJ SPEC WHEN PFRMD N/A 03/26/2019    Procedure: COLONOSCOPY;  Surgeon: Yaniv Hawley MD;  Location: [facility];  Service: Gastroenterology    OR ESOPHAGOGASTRODUODENOSCOPY TRANSORAL DIAGNOSTIC N/A 03/26/2019    Procedure: ESOPHAGOGASTRODUODENOSCOPY (EGD);  Surgeon: Yaniv Hawley MD;  Location: [facility];  Service: Gastroenterology    REPLACEMENT TOTAL KNEE Right     RETINAL DETACHMENT SURGERY      TUBAL LIGATION         07/03/24 0847   OT Last Visit   OT Visit Date 07/03/24   Note Type   Note type Evaluation   Pain Assessment   Pain Assessment Tool 0-10   Pain Score 7   Pain Location/Orientation Orientation: Bilateral;Location: Knee   Pain Onset/Description Descriptor: Burning   Patient's Stated Pain Goal No pain   Hospital Pain Intervention(s) Repositioned;Ambulation/increased activity   Restrictions/Precautions   Weight Bearing Precautions Per Order No   Braces or Orthoses Other (Comment)  (none reported)   Other Precautions Chair Alarm;Bed Alarm;Fall Risk;Pain;Visual impairment   Home Living   Type of Home House   Home Layout One level;Stairs to enter with rails;Able to live on main level with bedroom/bathroom;Performs ADLs on one level  (7 ELVI)   Bathroom Shower/Tub Walk-in shower   Bathroom Toilet Standard   Bathroom Equipment Shower chair;Grab bars in shower;Hand-held shower;Commode   Bathroom Accessibility Accessible   Home Equipment Walker  (rollator at baseline)   Prior Function   Level of [place] Needs assistance with IADLS;Needs assistance with ADLs;Independent with functional mobility  (caregiver come everyday 8:30-2)   Lives With Son;Daughter  (at work during the day)   Receives Help From Family;Personal care attendant   IADLs Family/Friend/Other provides transportation;Family/Friend/Other provides meals  (aid does laundry and cleaning)   Falls in the last 6 months 1 to 4  (pt reports 4 falls)   Vocational  Retired  (nurse, caregiver, )   Comments likes to have flower and crochets   Lifestyle   Autonomy Pt reports PLOF was A with ADLs, IADLs, and (-) driving   Reciprocal Relationships son & dtr   ADL   Eating Assistance 5  Supervision/Setup   Eating Deficit Increased time to complete;Supervision/safety;Setup   Grooming Assistance 4  Minimal Assistance   Grooming Deficit Increased time to complete;Supervision/safety;Verbal cueing;Steadying;Setup   UB Bathing Assistance 4  Minimal Assistance   UB Bathing Deficit Increased time to complete;Supervision/safety;Verbal cueing;Steadying;Setup   LB Bathing Assistance 2  Maximal Assistance   LB Bathing Deficit Increased time to complete;Supervision/safety;Verbal cueing;Steadying;Setup   UB Dressing Assistance 4  Minimal Assistance   UB Dressing Deficit Increased time to complete;Supervision/safety;Verbal cueing;Steadying;Setup   LB Dressing Assistance 2  Maximal Assistance   LB Dressing Deficit Increased time to complete;Supervision/safety;Verbal cueing;Steadying;Setup   Toileting Assistance  4  Minimal Assistance   Toileting Deficit Increased time to complete;Supervison/safety;Verbal cueing;Steadying;Setup   Functional Assistance 2  Maximal Assistance   Functional Deficit Increased time to complete;Supervision/safety;Verbal cueing;Steadying;Setup   Bed Mobility   Supine to Sit 3  Moderate assistance   Additional items Assist x 1;Increased time required;Verbal cues;LE management;Bedrails   Transfers   Sit to Stand 4  Minimal assistance   Additional items Assist x 1;Increased time required;Verbal cues;Armrests   Stand to Sit 4  Minimal assistance   Additional items Assist x 1;Increased time required;Verbal cues;Armrests   Toilet transfer 4  Minimal assistance   Additional items Assist x 1;Increased time required;Standard toilet  (grab bars)   Functional Mobility   Functional Mobility 4  Minimal assistance   Additional items Rolling walker   Balance   Static Sitting  Fair   Dynamic Sitting Fair   Static Standing Fair -   Dynamic Standing Poor +   Ambulatory Poor   Activity Tolerance   Activity Tolerance Patient limited by fatigue   Medical Staff Made Aware Co-evaluation performed with PT Miguel secondary to complex medical condition of patient, possible A of 2 required to achieve and maintain transitional movements, and pt's regression of functional status from baseline. PT/OT goals were addressed separately.   Nurse Made Aware spoke to RN   RUE Assessment   RUE Assessment   (limited overhead ROM/ functionally: +3/5)   LUE Assessment   LUE Assessment   (limited overhead ROM/ functionally: +3/5)   Hand Function   Gross Motor Coordination Functional   Fine Motor Coordination Functional   Sensation   Light Touch No apparent deficits   Sharp/Dull No apparent deficits   Stereognosis No apparent deficits   Vision-Basic Assessment   Current Vision Other (Comment)  (blind in R eye, group of the blind meetings 2-3x a week)   Psychosocial   Psychosocial (WDL) WDL   Cognition   Overall Cognitive Status   (questionable)   Arousal/Participation Alert;Cooperative   Attention Difficulty attending to directions  (easily distracted)   Orientation Level Oriented to person;Oriented to place;Oriented to situation;Disoriented to time  (1924 when asked the year able to correct to 2024 when reasked)   Memory Decreased recall of recent events   Following Commands Follows one step commands with increased time or repetition   Comments Pt was agreeable to OT eval   Assessment   Limitation Decreased ADL status;Decreased UE strength;Decreased Safe judgement during ADL;Decreased endurance;Decreased self-care trans;Decreased high-level ADLs;Decreased cognition   Prognosis Good   Assessment Pt is a 78 y.o. female seen for OT evaluation s/p admit to [facility] on 7/2/2024 w/ Stroke-like symptoms. Comorbidities affecting pt's functional performance at time of assessment include:CKD, HTN, CAD, CHF, memory  loss, GERD, former smoker, obesity, DM, neurologic gait dysfunction, and chronic pain . Orders placed for OT evaluation and treatment.  Performed at least two patient identifiers during session including name and wristband. Personal factors affecting pt at time of IE include:steps to enter environment, behavioral pattern, difficulty performing ADLS, difficulty performing IADLS , limited insight into deficits, decreased initiation and engagement , financial barriers, health management , and environment. Prior to admission, pt reports A with ADLs, A with IADLs, and (-) driving.  Upon evaluation: Pt requires min A with UB ADLs, max A with LB ADLs, min A with xfers and min A with functional mobility 2* the following deficits impacting occupational performance: decreased strength, decreased dynamic sit/ stand balance, decreased activity tolerance, decreased standing tolerance time for self care and functional mobility, increased pain, impaired interpersonal skills, environmental deficits, decreased coping skills, decreased mobilty, and requiring external assistance to complete transitional movements. Pt to benefit from continued skilled OT tx while in the hospital to address deficits as defined above and maximize level of functional independence w ADL's and functional mobility. Occupational Performance areas to address include: bathing/shower, toilet hygiene, dressing, medication management, health maintenance, functional mobility, clothing management, meal prep, money management, and household maintenance. From OT standpoint, recommendation at time of d/c would be Level 2 (Mod Resource Intensity).   Plan   Treatment Interventions ADL retraining;Functional transfer training;UE strengthening/ROM;Endurance training;Patient/family training;Equipment evaluation/education;Compensatory technique education;Continued evaluation;Cardiac education;Energy conservation;Activityengagement   Goal Expiration Date 07/16/24   OT  Frequency 3-5x/wk   Discharge Recommendation   Rehab Resource Intensity Level, OT II (Moderate Resource Intensity)   AM-PAC Daily Activity Inpatient   Lower Body Dressing 2   Bathing 2   Toileting 3   Upper Body Dressing 3   Grooming 3   Eating 4   Daily Activity Raw Score 17   Daily Activity Standardized Score (Calc for Raw Score >=11) 37.26   AM-PAC Applied Cognition Inpatient   Following a Speech/Presentation 3   Understanding Ordinary Conversation 4   Taking Medications 3   Remembering Where Things Are Placed or Put Away 3   Remembering List of 4-5 Errands 3   Taking Care of Complicated Tasks 2   Applied Cognition Raw Score 18   Applied Cognition Standardized Score 38.07     Occupational Therapy goals: In 7-14 days:    Patient will verbalize and demonstrate use of energy conservation/ deep breathing technique and work simplification skills during functional activity with no verbal cues.    Patient will verbalize and demonstrate good body mechanics and joint protection techniques during  ADLs/ IADLs with no verbal cues.    Patient will increase OOB/ sitting tolerance to 2-4 hours per day for increased participation in self care and leisure tasks with no s/s of exertion.    Patient will increase standing tolerance time to 5  minutes with unilateral UE support to complete sink level ADLs@ mod I level.    Patient will increase sitting tolerance at edge of bed to 20 minutes to complete UB ADLs @ set up assist level.    Patient will transfer bed to Chair / toilet at Set up assist level with AD as indicated.    Patient will complete UB ADLs with set up assist.    Patient will complete LB ADLs with min assist with the use of adaptive equipment.    Patient will complete toileting hygiene with set up assist/ supervision for thoroughness.    Patient/ Family  will demonstrate competency with UE Home Exercise Program.     Suellen Hurst MS OTR/L

## 2024-07-03 NOTE — PROGRESS NOTES
Progress Note - Neurology   Shantelle Romano 78 y.o. female MRN: [id number]  Unit/Bed#: 2 E 254-01 Encounter: [id number]      Assessment & Plan     * Stroke-like symptoms  Assessment & Plan  78 y.o.  female with HTN, CAD, CHF, diabetes, HLD, CKD, seizure disorder, who presents to [facility] on 7/2/24 as a stroke alert for headache and dizziness. BP on arrival 141/63, . NIHSS 1 including chronic gaze preference. CTH revealed no acute intracranial abnormality. CTA H/N wwo contrast revealed no IR target. Pt was not a thrombolytic candidate due to unclear LKW.     Neuroimaging:  CTH:No acute intracranial CT abnormality.   CTA:Patent major vessels of the [place] of [place] without high-grade stenosis.  No aneurysm.No hemodynamically significant stenosis in the cervical carotid or vertebral arteries.    Unclear etiology for symptoms at this time, differential includes small ischemic stroke vs TIA vs hypertensive urgency.    Plan:  - Stroke pathway  MRI brain without contrast pending.  Recommend echocardiogram.  Lipid Panel reviewed, total cholesterol 173, triglycerides 233, HDL 39, LDL 87  Hemoglobin A1c pending  TSH 7.726, T4 pending.  Recommend Aspirin 81 mg.   Atorvastatin 40 mg  Goal normotension, normothermia and euglycemia.  Continue telemetry  PT/OT/ST  Stroke education if indicated  Continue to monitor and notify neurology with any changes.  STAT CT head for any acute change in neuro exam  - Medical management and correction of any metabolic or infectious disturbances per primary service.     Hypertension  Assessment & Plan  - Goal normotension.   - Management as per primary team.    Hyperlipidemia associated with type 2 diabetes mellitus  (HCC)  Assessment & Plan  Lab Results   Component Value Date    HGBA1C 6.9 (A) 05/02/2024       Recent Labs     07/02/24  1446 07/02/24  1844 07/02/24  2105 07/03/24  0714   POCGLU 114 138 162* 156*       Blood Sugar Average: Last 72 hrs:  (P) 142.5    - Goal euglycemia.   -  Hemoglobin A1c pending.   - Lipid panel reviewed, total cholesterol 173, triglycerides 233, HDL 39, LDL 87.    Seizure disorder (HCC)  Assessment & Plan  - Continue on home dose of Keppra 250 mg BID.             Recommendations for outpatient neurological follow up have yet to be determined.    Subjective:   Patient reports she is feeling much better today. She denies any further issues with dizziness, speech changes or ambulatory dysfunction. She reports she was up and able to ambulate to the bathroom this morning and ate breakfast.    ROS:  History obtained from the patient  General ROS: negative for - chills, fatigue, or fever  Ophthalmic ROS: negative for - blurry vision, decreased vision, or double vision  Respiratory ROS: negative for - cough or shortness of breath  Cardiovascular ROS: negative for - chest pain  Gastrointestinal ROS: negative for - abdominal pain or nausea/vomiting  Genito-Urinary ROS: negative for - dysuria  Musculoskeletal ROS: negative for - gait disturbance or muscular weakness  Neurological ROS: negative for - dizziness, headaches, numbness/tingling, speech problems, visual changes, or weakness    Medications  Scheduled Meds:  Current Facility-Administered Medications   Medication Dose Route Frequency Provider Last Rate    acetaminophen  650 mg Oral Q6H PRN Glenn Piper MD      albuterol  2 puff Inhalation Q6H PRN Glenn Piper MD      atorvastatin  40 mg Oral HS Glenn Piper MD      DULoxetine  30 mg Oral Daily Glenn Piper MD      fluticasone  1 spray Nasal Daily Glenn Piper MD      Fluticasone Furoate-Vilanterol  1 puff Inhalation Daily Glenn Piper MD      And    umeclidinium  1 puff Inhalation Daily Glenn Piper MD      furosemide  20 mg Oral Daily Glenn Piper MD      heparin (porcine)  5,000 Units Subcutaneous Q8H [facility] Glenn Piper MD      insulin glargine  20 Units  "Subcutaneous QAM Glenn Piper MD      insulin lispro  1-5 Units Subcutaneous TID AC Glenn Piper MD      insulin lispro  6 Units Subcutaneous TID With Meals Glenn Piper MD      labetalol  10 mg Intravenous Q6H PRN Glenn Piper MD      latanoprost  1 drop Left Eye Daily Glenn Piper MD      levETIRAcetam  250 mg Oral Q12H Glenn Piper MD      montelukast  10 mg Oral QPM Glenn Piper MD      nystatin   Topical TID PRN JOSHUA Boothe      pantoprazole  40 mg Oral Early Morning Glenn Piper MD       Continuous Infusions:   PRN Meds:.  acetaminophen    albuterol    labetalol    nystatin    Vitals: Blood pressure 100/63, pulse 63, temperature 97.8 °F (36.6 °C), temperature source Oral, resp. rate 16, height 4' 7\" (1.397 m), weight 73 kg (160 lb 15 oz), SpO2 90%, not currently breastfeeding.,Body mass index is 37.4 kg/m².    Physical Exam: /63   Pulse 63   Temp 97.8 °F (36.6 °C) (Oral)   Resp 16   Ht 4' 7\" (1.397 m)   Wt 73 kg (160 lb 15 oz)   SpO2 90%   BMI 37.40 kg/m²   General appearance: alert and oriented, in no acute distress  Head: Normocephalic, without obvious abnormality, atraumatic  Eyes: negative findings: lids and lashes normal, conjunctivae and sclerae normal, and dysconjugate gaze, R pupil irregular and nonreactive, L pupil 3 mm and reactive  Lungs: clear to auscultation bilaterally  Heart: regular rate and rhythm  Abdomen: soft, non-tender; bowel sounds normal; no masses,  no organomegaly  Extremities:  Edema noted B/L LE  Skin: Skin color, texture, turgor normal. No rashes or lesions  Neurologic: Mental status: Alert, oriented, thought content appropriate  Cranial nerves: II: pupils R pupil irregular and nonreactive, L pupil 3 mm and reactive, III,VII: ptosis no ptosis noted, III,IV,VI: extraocular muscles extra-ocular motions intact, VII: upper facial muscle function normal " "bilaterally, VII: lower facial muscle function normal bilaterally, XII: tongue strength normal  Sensory: Grossly intact to crude touch  Motor: Motor strength B/L UE 5/5, B/L LE 5-/5  Coordination: finger to nose normal bilaterally    Labs  Recent Results (from the past 24 hour(s))   ECG 12 lead    Collection Time: 07/02/24  2:45 PM   Result Value Ref Range    Ventricular Rate 65 BPM    Atrial Rate 65 BPM    AR Interval 184 ms    QRSD Interval 106 ms    QT Interval 430 ms    QTC Interval 447 ms    P Axis 58 degrees    QRS Axis 15 degrees    T Wave Axis 29 degrees   Fingerstick Glucose (POCT)    Collection Time: 07/02/24  2:46 PM   Result Value Ref Range    POC Glucose 114 65 - 140 mg/dl   Basic metabolic panel    Collection Time: 07/02/24  3:17 PM   Result Value Ref Range    Sodium 138 135 - 147 mmol/L    Potassium 4.3 3.5 - 5.3 mmol/L    Chloride 103 96 - 108 mmol/L    CO2 27 21 - 32 mmol/L    ANION GAP 8 4 - 13 mmol/L    BUN 23 5 - 25 mg/dL    Creatinine 0.98 0.60 - 1.30 mg/dL    Glucose 114 65 - 140 mg/dL    Calcium 9.5 8.4 - 10.2 mg/dL    eGFR 54 ml/min/1.73sq m   HS Troponin 0hr (reflex protocol)    Collection Time: 07/02/24  3:17 PM   Result Value Ref Range    hs TnI 0hr 6 \"Refer to ACS Flowchart\"- see link ng/L   CBC and differential    Collection Time: 07/02/24  4:00 PM   Result Value Ref Range    WBC 8.29 4.31 - 10.16 Thousand/uL    RBC 4.98 3.81 - 5.12 Million/uL    Hemoglobin 13.8 11.5 - 15.4 g/dL    Hematocrit 43.0 34.8 - 46.1 %    MCV 86 82 - 98 fL    MCH 27.7 26.8 - 34.3 pg    MCHC 32.1 31.4 - 37.4 g/dL    RDW 14.4 11.6 - 15.1 %    MPV 10.4 8.9 - 12.7 fL    Platelets 205 149 - 390 Thousands/uL    nRBC 0 /100 WBCs    Segmented % 64 43 - 75 %    Immature Grans % 1 0 - 2 %    Lymphocytes % 24 14 - 44 %    Monocytes % 7 4 - 12 %    Eosinophils Relative 3 0 - 6 %    Basophils Relative 1 0 - 1 %    Absolute Neutrophils 5.34 1.85 - 7.62 Thousands/µL    Absolute Immature Grans 0.04 0.00 - 0.20 Thousand/uL    " "Absolute Lymphocytes 2.01 0.60 - 4.47 Thousands/µL    Absolute Monocytes 0.61 0.17 - 1.22 Thousand/µL    Eosinophils Absolute 0.24 0.00 - 0.61 Thousand/µL    Basophils Absolute 0.05 0.00 - 0.10 Thousands/µL   Blood gas, venous    Collection Time: 07/02/24  4:00 PM   Result Value Ref Range    pH, Natalio 7.367 7.300 - 7.400    pCO2, Natalio 41.2 (L) 42.0 - 50.0 mm Hg    pO2, Natalio 37.9 35.0 - 45.0 mm Hg    HCO3, Natalio 23.1 (L) 24 - 30 mmol/L    Base Excess, Natalio -2.1 mmol/L    O2 Content, Natalio 15.3 ml/dL    O2 HGB, VENOUS 73.0 60.0 - 80.0 %   B-Type Natriuretic Peptide(BNP)    Collection Time: 07/02/24  4:00 PM   Result Value Ref Range     (H) 0 - 100 pg/mL   Fingerstick Glucose (POCT)    Collection Time: 07/02/24  6:44 PM   Result Value Ref Range    POC Glucose 138 65 - 140 mg/dl   Fingerstick Glucose (POCT)    Collection Time: 07/02/24  9:05 PM   Result Value Ref Range    POC Glucose 162 (H) 65 - 140 mg/dl   HS Troponin I 2hr    Collection Time: 07/02/24  9:50 PM   Result Value Ref Range    hs TnI 2hr 10 \"Refer to ACS Flowchart\"- see link ng/L    Delta 2hr hsTnI 4 <20 ng/L   Urinalysis with microscopic    Collection Time: 07/02/24 11:07 PM   Result Value Ref Range    Color, UA Colorless     Clarity, UA Clear     Specific Gravity, UA 1.019 1.003 - 1.030    pH, UA 7.5 4.5, 5.0, 5.5, 6.0, 6.5, 7.0, 7.5, 8.0    Leukocytes, UA Negative Negative    Nitrite, UA Negative Negative    Protein, UA Negative Negative mg/dl    Glucose, UA Negative Negative mg/dl    Ketones, UA Negative Negative mg/dl    Urobilinogen, UA <2.0 <2.0 mg/dl mg/dl    Bilirubin, UA Negative Negative    Occult Blood, UA Negative Negative    RBC, UA None Seen None Seen, 1-2 /hpf    WBC, UA 1-2 None Seen, 1-2 /hpf    Epithelial Cells None Seen None Seen, Occasional /hpf    Bacteria, UA None Seen None Seen, Occasional /hpf   HS Troponin I 4hr    Collection Time: 07/03/24  6:24 AM   Result Value Ref Range    hs TnI 4hr 9 \"Refer to ACS Flowchart\"- see link ng/L    " Delta 4hr hsTnI 3 <20 ng/L   Lipid Panel with Direct LDL reflex    Collection Time: 07/03/24  6:24 AM   Result Value Ref Range    Cholesterol 173 See Comment mg/dL    Triglycerides 233 (H) See Comment mg/dL    HDL, Direct 39 (L) >=50 mg/dL    LDL Calculated 87 0 - 100 mg/dL   CBC and differential    Collection Time: 07/03/24  6:24 AM   Result Value Ref Range    WBC 7.38 4.31 - 10.16 Thousand/uL    RBC 4.91 3.81 - 5.12 Million/uL    Hemoglobin 13.7 11.5 - 15.4 g/dL    Hematocrit 42.9 34.8 - 46.1 %    MCV 87 82 - 98 fL    MCH 27.9 26.8 - 34.3 pg    MCHC 31.9 31.4 - 37.4 g/dL    RDW 14.6 11.6 - 15.1 %    MPV 9.6 8.9 - 12.7 fL    Platelets 232 149 - 390 Thousands/uL    nRBC 0 /100 WBCs    Segmented % 61 43 - 75 %    Immature Grans % 0 0 - 2 %    Lymphocytes % 26 14 - 44 %    Monocytes % 8 4 - 12 %    Eosinophils Relative 4 0 - 6 %    Basophils Relative 1 0 - 1 %    Absolute Neutrophils 4.59 1.85 - 7.62 Thousands/µL    Absolute Immature Grans 0.01 0.00 - 0.20 Thousand/uL    Absolute Lymphocytes 1.91 0.60 - 4.47 Thousands/µL    Absolute Monocytes 0.55 0.17 - 1.22 Thousand/µL    Eosinophils Absolute 0.27 0.00 - 0.61 Thousand/µL    Basophils Absolute 0.05 0.00 - 0.10 Thousands/µL   Comprehensive metabolic panel    Collection Time: 07/03/24  6:24 AM   Result Value Ref Range    Sodium 138 135 - 147 mmol/L    Potassium 4.4 3.5 - 5.3 mmol/L    Chloride 104 96 - 108 mmol/L    CO2 27 21 - 32 mmol/L    ANION GAP 7 4 - 13 mmol/L    BUN 30 (H) 5 - 25 mg/dL    Creatinine 1.36 (H) 0.60 - 1.30 mg/dL    Glucose 150 (H) 65 - 140 mg/dL    Glucose, Fasting 150 (H) 65 - 99 mg/dL    Calcium 9.3 8.4 - 10.2 mg/dL    AST 16 13 - 39 U/L    ALT 14 7 - 52 U/L    Alkaline Phosphatase 47 34 - 104 U/L    Total Protein 7.2 6.4 - 8.4 g/dL    Albumin 3.7 3.5 - 5.0 g/dL    Total Bilirubin 0.64 0.20 - 1.00 mg/dL    eGFR 36 ml/min/1.73sq m   TSH, 3rd generation with Free T4 reflex    Collection Time: 07/03/24  6:24 AM   Result Value Ref Range    TSH 3RD  LEXY 7.726 (H) 0.450 - 4.500 uIU/mL   Fingerstick Glucose (POCT)    Collection Time: 07/03/24  7:14 AM   Result Value Ref Range    POC Glucose 156 (H) 65 - 140 mg/dl     Imaging   No new neuro imaging available for review.     VTE Prophylaxis: Heparin

## 2024-07-03 NOTE — CASE MANAGEMENT
Case Management Assessment & Discharge Planning Note    Patient name Shantelle Romano  Location 2 EAST 254/2 E 254-01 MRN [id number]  : 1945 Date 7/3/2024       Current Admission Date: 2024  Current Admission Diagnosis:Stroke-like symptoms   Patient Active Problem List    Diagnosis Date Noted Date Diagnosed    Stroke-like symptoms 2024     Abnormal CT scan 2024     Obesity, morbid (HCC) 2024     Former smoker 10/23/2023     Urinary incontinence 2023     Osteopenia of multiple sites 2022     Chronic kidney disease-mineral and bone disorder 2021     Neurologic gait dysfunction 2020     Type 2 diabetes mellitus with diabetic neuropathy (HCC) 10/21/2020     Macular pucker, left 10/07/2020     Bilateral leg edema 2020     Chronic pain syndrome 2020     Stage 3b chronic kidney disease ([facility]) 2019     Vitamin D deficiency 05/15/2019     Alternating constipation and diarrhea 2016     Simple chronic bronchitis ([facility])      Hypertension      Chronic heart failure with preserved ejection fraction ([facility]) 2016     Left ventricular hypertrophy 2016     Lung nodule 2016     Memory loss 2016     Osteoarthritis 2016     Coronary artery disease without angina pectoris 2015     Gastroesophageal reflux disease without esophagitis 2015     Hyperlipidemia associated with type 2 diabetes mellitus  ([facility]) 2015     Seizure disorder ([facility]) 2015     Strabismic amblyopia of right eye 2015       LOS (days): 0  Geometric Mean LOS (GMLOS) (days):   Days to GMLOS:     OBJECTIVE:     Current admission status: Inpatient  Referral Reason: Stroke    Preferred Pharmacy:   [id number] - [place] [facility] - [address] [place], [place] [address] [address]  Phone: [phone] Fax: [phone]    [facility] - [place] [facility] - [facility]  [place] [address]  Phone: [phone] Fax: [phone]    Primary Care Provider: Clarisa Billingsley DO  Primary Insurance: DARREL ALVARES  [facility]    ASSESSMENT:  Active Health Care Proxies       Mary Mcdonald [facility] Care Representative - Daughter   Primary Phone: [phone] (Mobile)  Home Phone: [phone]                 Advance Directives  Does patient have a Health Care POA?: No  Does patient currently have a Health Care decision maker?: Yes, please see Health Care Proxy section  Does patient have Advance Directives?: No  Primary Contact: Shantelle Lira (Daughter) [phone]    Readmission Root Cause  30 Day Readmission: No    Patient Information  Admitted from:: Home  Mental Status: Alert  During Assessment patient was accompanied by: Not accompanied during assessment  Assessment information provided by:: Patient  Primary Caregiver: Self  Support Systems: Self, Children, Private Caregivers (CG through [facility], M-F)  County of Residence: [place]  What city do you live in?: [place]    Activities of Daily Living Prior to Admission  Functional Status: Independent  Completes ADLs independently?: No  Level of ADL dependence: Assistance  Ambulates independently?: Yes  Does patient use assisted devices?: Yes  Assisted Devices (DME) used: Rollator  Does patient currently own DME?: Yes  What DME does the patient currently own?: Rollator  Does patient have a history of HHC?: Yes  Does patient currently have HHC?: Yes    Current Home Health Care  Type of Current Home Care Services: Attendant  Current Home Health Agency:: Other (please enter name in comment) ([facility])  Current Home Health Follow-Up Provider:: PCP (Clarisa Billingsley DO)    Patient Information Continued  Income Source: SSI/SSD  Does patient have prescription coverage?: Yes  Does patient receive dialysis treatments?: No  Does patient have a history of substance abuse?: No  Does  patient have a history of Mental Health Diagnosis?: No    PHQ 2/9 Screening   Reviewed PHQ 2/9 Depression Screening Score?: No    Means of Transportation  Means of Transport to Appts:: Family transport (Also has [facility].)      Social Determinants of Health (SDOH)      Flowsheet Row Most Recent Value   Housing Stability    In the last 12 months, was there a time when you were not able to pay the mortgage or rent on time? N   In the past 12 months, how many times have you moved where you were living? 0   At any time in the past 12 months, were you homeless or living in a shelter (including now)? N   Transportation Needs    In the past 12 months, has lack of transportation kept you from medical appointments or from getting medications? no   In the past 12 months, has lack of transportation kept you from meetings, work, or from getting things needed for daily living? No   Food Insecurity    Within the past 12 months, you worried that your food would run out before you got the money to buy more. Never true   Within the past 12 months, the food you bought just didn't last and you didn't have money to get more. Never true   Utilities    In the past 12 months has the electric, gas, oil, or water company threatened to shut off services in your home? No       DISCHARGE DETAILS:    Discharge planning discussed with:: Patient at bedside.  Freedom of Choice: Yes  Comments - Freedom of Choice: FOC maintained - CM introduced self and role.  Reviewed Level II rec from PT/OT.  Patient preference is therapy at home.  CM to send blanket referral.  Uses [facility] for [facility] currently.  Patient is also established with [facility] and receives [employer] at home. No additional needs identified.  CM contacted family/caregiver?: No- see comments (Independent)  Were Treatment Team discharge recommendations reviewed with patient/caregiver?: Yes (As it pertains to d/c planning and CM role to this point.)  Did patient/caregiver  verbalize understanding of patient care needs?: Yes (As it pertains to d/c planning and CM role to this point.)  Were patient/caregiver advised of the risks associated with not following Treatment Team discharge recommendations?: Yes (As it pertains to d/c planning and CM role to this point.)    Requested Home Health Care         Is the patient interested in HHC at discharge?: Yes  Home Health Discipline requested:: Nursing, Physical Therapy, Occupational Therapy  Home Health Follow-Up Provider:: PCP (Clarisa Billingsley DO)  Home Health Services Needed:: Evaluate Functional Status and Safety, Gait/ADL Training, Strengthening/Theraputic Exercises to Improve Function  Homebound Criteria Met:: Requires the Assistance of Another Person for Safe Ambulation or to Leave the Home, Uses an Assist Device (i.e. cane, walker, etc)  Supporting Clincal Findings:: Limited Endurance    DME Referral Provided  Referral made for DME?: No    Other Referral/Resources/Interventions Provided:  Interventions: HHC, Outpatient   Referral Comments: [place] referral for HHC opened in [facility].  OP CM referral via In Basket due to CHF dx.  Programs:: CHF    Treatment Team Recommendation: Short Term Rehab, Home with Home Health Care  Discharge Destination Plan:: Home with Home Health Care  Transport at Discharge : Family

## 2024-07-03 NOTE — PLAN OF CARE
Problem: Neurological Deficit  Goal: Neurological status is stable or improving  Description: Interventions:  - Monitor and assess patient's level of consciousness, motor function, sensory function, and level of assistance needed for ADLs.   - Monitor and report changes from baseline. Collaborate with interdisciplinary team to initiate plan and implement interventions as ordered.   - Provide and maintain a safe environment.  - Consider seizure precautions.  - Consider fall precautions.  - Consider aspiration precautions.  - Consider bleeding precautions.  Outcome: Progressing     Problem: DISCHARGE PLANNING  Goal: Discharge to home or other facility with appropriate resources  Description: INTERVENTIONS:  - Identify barriers to discharge w/patient and caregiver  - Arrange for needed discharge resources and transportation as appropriate  - Identify discharge learning needs (meds, wound care, etc.)  - Arrange for interpretive services to assist at discharge as needed  - Refer to Case Management Department for coordinating discharge planning if the patient needs post-hospital services based on physician/advanced practitioner order or complex needs related to functional status, cognitive ability, or social support system  Outcome: Progressing     Problem: Knowledge Deficit  Goal: Patient/family/caregiver demonstrates understanding of disease process, treatment plan, medications, and discharge instructions  Description: Complete learning assessment and assess knowledge base.  Interventions:  - Provide teaching at level of understanding  - Provide teaching via preferred learning methods  Outcome: Progressing

## 2024-07-03 NOTE — ASSESSMENT & PLAN NOTE
CTA with incidental findings of:  3.  5 mm right middle lobe pulmonary nodule versus juxta fissural lymph node. Based on current Fleischner Society 2017 Guidelines on incidental pulmonary nodule, no routine follow-up is needed if the patient is low risk. If the patient is high risk,   optional follow-up chest CT at 12 months can be considered.   4.  3 cm right thyroid lobe nodule. According to guidelines published in the February 2015 white paper on incidental thyroid nodules in the Journal of the American College of Radiology (JACR), Because the nodule(s) are greater than 1.5 cm in size,   further characterization with thyroid ultrasound is recommended.    Considerations related to the patient's age and/or comorbidities may be used to alter these recommendations.     Previous provider did make patient aware of these findings on the evening of 7/2  OP follow up

## 2024-07-03 NOTE — PHYSICAL THERAPY NOTE
Physical Therapy Evaluation    Patient's Name: Shantelle Romano    Admitting Diagnosis  Thyroid nodule [E04.1]  Dizziness [R42]  Altered mental status [R41.82]  Pulmonary nodule [R91.1]  Stroke-like symptoms [R29.90]    Problem List  Patient Active Problem List   Diagnosis    Simple chronic bronchitis (HCC)    Hypertension    Alternating constipation and diarrhea    Vitamin D deficiency    Stage 3b chronic kidney disease ([facility])    Bilateral leg edema    Chronic pain syndrome    Coronary artery disease without angina pectoris    Chronic heart failure with preserved ejection fraction (HCC)    Gastroesophageal reflux disease without esophagitis    Hyperlipidemia associated with type 2 diabetes mellitus  ([facility])    Left ventricular hypertrophy    Lung nodule    Macular pucker, left    Memory loss    Osteoarthritis    Seizure disorder ([facility])    Strabismic amblyopia of right eye    Neurologic gait dysfunction    Type 2 diabetes mellitus with diabetic neuropathy ([facility])    Chronic kidney disease-mineral and bone disorder    Osteopenia of multiple sites    Urinary incontinence    Former smoker    Obesity, morbid ([facility])    Stroke-like symptoms    Abnormal CT scan       Past Medical History  Past Medical History:   Diagnosis Date    Acute kidney injury superimposed on chronic kidney disease  (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) 3/15/2019    Closed fracture of base of middle phalanx of finger 06/22/2017    Closed fracture of middle phalanx of left little finger 07/08/2017    Coagulopathy ([facility]) 05/15/2019    Colloid cyst of brain ([facility])     COPD (chronic obstructive pulmonary disease) (HCC)     COPD (chronic obstructive pulmonary disease) (HCC)     Coronary artery disease     Cough     Diabetes mellitus (HCC)     GERD (gastroesophageal  reflux disease)     Glaucoma     Gout     History of brain disorder 10/07/2020    Hyperlipidemia     Hypertension     Irritable bowel syndrome     Melena 02/26/2019    PAD (peripheral artery disease) ([facility]) 5/15/2019    Paronychia of great toe of left foot 10/07/2020    Post-traumatic seizures ([facility]) 07/23/2015    Renal disorder     Seizures ([facility])     last 2015    Shortness of breath     Single seizure ([facility]) 05/31/2016    SOB (shortness of breath)     Syncope and collapse 11/11/2020    Urinary tract infection without hematuria 11/26/2016    Wheezing        Past Surgical History  Past Surgical History:   Procedure Laterality Date    BRAIN SURGERY      CATARACT EXTRACTION      CHOLECYSTECTOMY      COLECTOMY RADHA      COLONOSCOPY      DECOMPRESSION SPINE LUMBAR POSTERIOR  08/19/2019    HERNIA REPAIR      HYSTERECTOMY      INCISION TENDON SHEATH HAND      NECK SURGERY  05/24/2018    NEUROPLASTY / TRANSPOSITION MEDIAN NERVE AT CARPAL TUNNEL      SD COLONOSCOPY FLX DX W/COLLJ SPEC WHEN PFRMD N/A 03/26/2019    Procedure: COLONOSCOPY;  Surgeon: Yaniv Hawley MD;  Location: [facility];  Service: Gastroenterology    SD ESOPHAGOGASTRODUODENOSCOPY TRANSORAL DIAGNOSTIC N/A 03/26/2019    Procedure: ESOPHAGOGASTRODUODENOSCOPY (EGD);  Surgeon: Yaniv Hawley MD;  Location: [facility];  Service: Gastroenterology    REPLACEMENT TOTAL KNEE Right     RETINAL DETACHMENT SURGERY      TUBAL LIGATION         Recent Imaging  XR chest 2 views   ED Interpretation by Jada Alves DO (07/02 1654)   No apparent infiltrate, effusion, pneumothorax      CTA stroke alert (head/neck)   Final Result by Norberto Purcell MD (07/02 1608)      1.  Patent major vessels of the [place] of [place] without high-grade stenosis.  No aneurysm.   2.  No hemodynamically significant stenosis in the cervical carotid or vertebral arteries.   3.  5 mm right middle lobe pulmonary nodule versus juxta fissural lymph node. Based on current Fleischner Society  2017 Guidelines on incidental pulmonary nodule, no routine follow-up is needed if the patient is low risk. If the patient is high risk,    optional follow-up chest CT at 12 months can be considered.   4.  3 cm right thyroid lobe nodule. According to guidelines published in the February 2015 white paper on incidental thyroid nodules in the Journal of the American College of Radiology (JACR), Because the nodule(s) are greater than 1.5 cm in size,    further characterization with thyroid ultrasound is recommended.    Considerations related to the patient's age and/or comorbidities may be used to alter these recommendations.               Findings were directly discussed with Trista Lakhani  at 3:56 p.m.                           Workstation performed: [id number]         CT stroke alert brain   Final Result by Norberto Purcell MD (07/02 1607)      No acute intracranial CT abnormality.                     Findings were directly discussed with Trista Lakhani  at 3:56 p.m.         Workstation performed: [id number]         MRI Inpatient Order    (Results Pending)       Recent Vital Signs  Vitals:    07/03/24 0500 07/03/24 0700 07/03/24 0717 07/03/24 1100   BP: 115/51 100/63 100/63 154/65   BP Location:  Left arm  Left arm   Pulse: 60  63 77   Resp: 16 16  18   Temp:  97.8 °F (36.6 °C)  97.5 °F (36.4 °C)   TempSrc:  Oral  Oral   SpO2: 94% 92% 90%    Weight:       Height:              07/03/24 0848   PT Last Visit   PT Visit Date 07/03/24   Note Type   Note type Evaluation   Pain Assessment   Pain Assessment Tool 0-10   Pain Score 7   Pain Location/Orientation Orientation: Bilateral;Location: Knee   Pain Onset/Description Descriptor: Burning   Patient's Stated Pain Goal No pain   Hospital Pain Intervention(s) Repositioned;Ambulation/increased activity   Restrictions/Precautions   Weight Bearing Precautions Per Order No   Braces or Orthoses Other (Comment)  (none reported)   Other Precautions Chair Alarm;Bed Alarm;Fall  Risk;Pain;Visual impairment   Home Living   Type of Home House   Home Layout One level;Stairs to enter with rails;Able to live on main level with bedroom/bathroom;Performs ADLs on one level  (7 ELVI)   Bathroom Shower/Tub Walk-in shower   Bathroom Toilet Standard   Bathroom Equipment Shower chair;Grab bars in shower;Hand-held shower;Commode   Bathroom Accessibility Accessible   Home Equipment Walker  (rollator at baseline)   Prior Function   Level of [place] Needs assistance with IADLS  (caregiver come everyday 8:30-2)   Lives With Son;Daughter  (at work during the day)   Receives Help From Family;Personal care attendant   IADLs Family/Friend/Other provides transportation;Family/Friend/Other provides meals  (aid does laundry and cleaning)   Falls in the last 6 months 1 to 4  (pt reports 4 falls)   Vocational Retired  (nurse, caregiver, )   Comments likes to have flower and crochets   General   Family/Caregiver Present No   Cognition   Overall Cognitive Status   (questionable)   Arousal/Participation Alert   Orientation Level Oriented to person;Oriented to place;Oriented to situation;Disoriented to time  (1924 when asked the year able to correct to 2024 when reasked)   Memory Decreased recall of recent events   Following Commands Follows one step commands with increased time or repetition   Comments Pt gets very distracted and needs redirection   RLE Assessment   RLE Assessment WFL  (3+/5)   LLE Assessment   LLE Assessment WFL  (3+/5)   Vision-Basic Assessment   Current Vision Other (Comment)  (blind in R eye, group of the blind meetings 2-3x a week)   Coordination   Sensation WFL   Bed Mobility   Supine to Sit 3  Moderate assistance   Additional items Assist x 1;Increased time required;Verbal cues;Bedrails;LE management   Transfers   Sit to Stand 4  Minimal assistance   Additional items Assist x 1;Bedrails;Increased time required;Verbal cues   Stand to Sit 4  Minimal assistance   Additional items  Assist x 1;Armrests;Increased time required;Verbal cues   Toilet transfer 4  Minimal assistance   Additional items Assist x 1;Increased time required;Standard toilet  (grab bars)   Ambulation/Elevation   Gait pattern Decreased foot clearance;Decreased hip extension;Decreased heel strike;Decreased toe off;Short stride;Excessively slow   Gait Assistance 4  Minimal assist   Additional items Assist x 1;Verbal cues   Assistive Device Rolling walker   Distance 20'   Balance   Static Sitting Fair   Dynamic Sitting Fair   Static Standing Fair -   Dynamic Standing Poor +   Ambulatory Poor   Activity Tolerance   Activity Tolerance Patient limited by fatigue   Medical Staff Made Aware OT [place]  (Pt seen as co-evaluation with OT due to pt's co-morbidities, clinically unstable presentation, and present impairments)   Nurse Made Aware Spoke to RN   Assessment   Prognosis Good   Problem List Decreased strength;Impaired balance;Decreased endurance;Decreased mobility;Impaired judgement;Decreased cognition;Pain;Impaired vision   Assessment Shantelle Romano is a 78 y.o. female admitted to [facility] on 7/2/2024 for Stroke-like symptoms, hypertension, type 2 diabetes mellitus, seizure disorder. Pt has a past medical history of acute kidney injury, ADHD, arthritis, boutonniere deformity, COPD, diabetes mellitus, hypertension, SOB. Order placed for PT eval and tx. PT was consulted and pt was seen on 7/3/2024 for mobility assessment and d/c planning. Chart review and two person identifiers were completed.   Currently pt presents with decreased strength , decreased static sitting balance , decreased dynamic sitting balance , decreased static standing balance, decreased dynamic standing balance , decreased gait speed, decreased step length , and decreased muscular endurance . Due to these impairments, they will require assistance to perform bed mobility, sit to stand , ambulation, stair negotiation, and transfers. Pt is currently functioning at a  moderate assistance x1 level for bed mobility, minimum assistance x1 level for transfers, minimum assistance x1 level for ambulation with Rolling Walker.  Pt greeted in bed with reports of 7/10 pain in bilateral knees. Pt completed supine to sit with above mentioned assist. Pt completed STS with above mentioned assist and ambulated to bathroom with above mentioned assist. Pt completed toiler transfer with min A x1. Pt maintained standing for ~2 minutes while completing hand hygiene with no LOB. Pt ambulated to recliner with min A x1 and RW. Pt completed stand to sit with min A x1. Pt ended session seated in bedside chair with all needs within reach and no complaints of increased pain. These activity limitations significantly impact their ability to participate in previous home and community roles and responsibilities  and ambulation in home. The patient's AM-PAC Basic Mobility Inpatient Short Form Raw Score is 15. PT recommends level II moderate resource intensity. They will benefit from skilled therapy to to reduce the risk of falls and to maximize functional potential.   Barriers to Discharge Inaccessible home environment   Goals   Patient Goals none stated   [facility] Expiration Date 07/13/24   Short Term Goal #1 Within 10 days patient will complete: 1) Bed mobility skills with modified independent assistance to facilitate safe return to previous living environment 2) Functional transfers with modified independent assistance to facilitate safe return to previous living environment  3) Ambulation with least restrictive AD modified independent assistance without LOB and stable vitals for safe ambulation home/ community distances. 4) Stair training up/down flight 7 step/s with appropriate rail/s and modified independent assistance for safe access to previous living environment. 5) Improve balance grades to fair + to reduce risk of falls. 6)Improve LE strength grades by 1 to increase independence w/ transfers and gait.  7)  PT for ongoing pt and family education; DME needs and D/C planning to promote highest level of function in least restrictive environment.   Plan   Treatment/Interventions Functional transfer training;LE strengthening/ROM;Elevations;Therapeutic exercise;Endurance training;Patient/family training;Equipment eval/education;Bed mobility;Gait training   PT Frequency 3-5x/wk   Discharge Recommendation   Rehab Resource Intensity Level, PT II (Moderate Resource Intensity)   Equipment Recommended Walker   AM-PAC Basic Mobility Inpatient   Turning in Flat Bed Without Bedrails 2   Lying on Back to Sitting on Edge of Flat Bed Without Bedrails 2   Moving Bed to Chair 3   Standing Up From Chair Using Arms 3   Walk in Room 3   Climb 3-5 Stairs With Railing 2   Basic Mobility Inpatient Raw Score 15   Basic Mobility Standardized Score 36.97   [facility] Highest Level Of Mobility   -[facility] Goal 4: Move to chair/commode   -[facility] Achieved 6: Walk 10 steps or more   End of Consult   Patient Position at End of Consult Bedside chair;Bed/Chair alarm activated;All needs within reach       Recommendations                                                                                                              Pt will benefit from continued skilled IP PT to address the above mentioned impairments in order to maximize recovery and increase functional independence when completing mobility and ADLs. See flow sheet for goals and POC.     PT Evaluation Time: 0848-0912    Miguel Zamudio, PT, DPT

## 2024-07-03 NOTE — ASSESSMENT & PLAN NOTE
Lab Results   Component Value Date    HGBA1C 6.9 (A) 05/02/2024       Recent Labs     07/02/24  1446 07/02/24  1844 07/02/24  2105 07/03/24  0714   POCGLU 114 138 162* 156*       Blood Sugar Average: Last 72 hrs:  (P) 142.5    - Goal euglycemia.   - Hemoglobin A1c pending.   - Lipid panel reviewed, total cholesterol 173, triglycerides 233, HDL 39, LDL 87.

## 2024-07-03 NOTE — ASSESSMENT & PLAN NOTE
Lab Results   Component Value Date    HGBA1C 6.9 (A) 05/02/2024       Recent Labs     07/02/24  1446 07/02/24  1844   POCGLU 114 138       Blood Sugar Average: Last 72 hrs:  (P) 126    Will continue insulin but at lower dose while in the hospital diet.  Sliding-scale insulin for corrections  Monitor blood glucose  Statin for hyperlipidemia

## 2024-07-03 NOTE — PLAN OF CARE
Problem: PHYSICAL THERAPY ADULT  Goal: Performs mobility at highest level of function for planned discharge setting.  See evaluation for individualized goals.  Description: Treatment/Interventions: Functional transfer training, LE strengthening/ROM, Elevations, Therapeutic exercise, Endurance training, Patient/family training, Equipment eval/education, Bed mobility, Gait training  Equipment Recommended: Walker       See flowsheet documentation for full assessment, interventions and recommendations.  Note: Prognosis: Good  Problem List: Decreased strength, Impaired balance, Decreased endurance, Decreased mobility, Impaired judgement, Decreased cognition, Pain, Impaired vision  Assessment: Shantelle Romano is a 78 y.o. female admitted to [facility] on 7/2/2024 for Stroke-like symptoms, hypertension, type 2 diabetes mellitus, seizure disorder. Pt has a past medical history of acute kidney injury, ADHD, arthritis, boutonniere deformity, COPD, diabetes mellitus, hypertension, SOB. Order placed for PT eval and tx. PT was consulted and pt was seen on 7/3/2024 for mobility assessment and d/c planning. Chart review and two person identifiers were completed.   Currently pt presents with decreased strength , decreased static sitting balance , decreased dynamic sitting balance , decreased static standing balance, decreased dynamic standing balance , decreased gait speed, decreased step length , and decreased muscular endurance . Due to these impairments, they will require assistance to perform bed mobility, sit to stand , ambulation, stair negotiation, and transfers. Pt is currently functioning at a moderate assistance x1 level for bed mobility, minimum assistance x1 level for transfers, minimum assistance x1 level for ambulation with Rolling Walker.  Pt greeted in bed with reports of 7/10 pain in bilateral knees. Pt completed supine to sit with above mentioned assist. Pt completed STS with above mentioned assist and ambulated to  bathroom with above mentioned assist. Pt completed toiler transfer with min A x1. Pt maintained standing for ~2 minutes while completing hand hygiene with no LOB. Pt ambulated to recliner with min A x1 and RW. Pt completed stand to sit with min A x1. Pt ended session seated in bedside chair with all needs within reach and no complaints of increased pain. These activity limitations significantly impact their ability to participate in previous home and community roles and responsibilities  and ambulation in home. The patient's AM-PAC Basic Mobility Inpatient Short Form Raw Score is 15. PT recommends level II moderate resource intensity. They will benefit from skilled therapy to to reduce the risk of falls and to maximize functional potential.  Barriers to Discharge: Inaccessible home environment     Rehab Resource Intensity Level, PT: II (Moderate Resource Intensity)    See flowsheet documentation for full assessment.

## 2024-07-03 NOTE — INCIDENTAL FINDINGS
The following findings require follow up:  Radiographic finding   Finding:   3.  5 mm right middle lobe pulmonary nodule versus juxta fissural lymph node. Based on current Fleischner Society 2017 Guidelines on incidental pulmonary nodule, no routine follow-up is needed if the patient is low risk. If the patient is high risk,   optional follow-up chest CT at 12 months can be considered.   4.  3 cm right thyroid lobe nodule. According to guidelines published in the February 2015 white paper on incidental thyroid nodules in the Journal of the American College of Radiology (JACR), Because the nodule(s) are greater than 1.5 cm in size,   further characterization with thyroid ultrasound is recommended.    Considerations related to the patient's age and/or comorbidities may be used to alter these recommendations.       Follow up required: PCP   Follow up should be done within PCP 1 month(s)    Please notify the following clinician to assist with the follow up:   Dr. Clarisa Billingsley, DO      Incidental finding results were discussed with the Patient by Glenn Black MD on 07/02/24.   They expressed understanding and all questions answered.

## 2024-07-03 NOTE — H&P
[facility]  H&P  Name: Shantelle Romano 78 y.o. female I MRN: [id number]  Unit/Bed#: 2 E 254-01 I Date of Admission: 7/2/2024   Date of Service: 7/2/2024 I Hospital Day: 0      Assessment & Plan   * Stroke-like symptoms  Assessment & Plan  79yo F presented with dizziness, generalized weakness for several days  Markedly hypertensive on arrival.  CTA head neck -   1.  Patent major vessels of the [place] of [place] without high-grade stenosis.  No aneurysm.   2.  No hemodynamically significant stenosis in the cervical carotid or vertebral arteries.   Unclear etiology of symptoms, suspect mostly related to uncontrolled BP but ED wanted to rule out stroke.      MRI of the brain  If positive MRI echo  UA  Formal neurology consult  PT  OT  Aspirin  Statin  Tele  Monitor neurological exam    Abnormal CT scan  Assessment & Plan  CTA with incidental findings of:  3.  5 mm right middle lobe pulmonary nodule versus juxta fissural lymph node. Based on current Fleischner Society 2017 Guidelines on incidental pulmonary nodule, no routine follow-up is needed if the patient is low risk. If the patient is high risk,   optional follow-up chest CT at 12 months can be considered.   4.  3 cm right thyroid lobe nodule. According to guidelines published in the February 2015 white paper on incidental thyroid nodules in the Journal of the American College of Radiology (JACR), Because the nodule(s) are greater than 1.5 cm in size,   further characterization with thyroid ultrasound is recommended.    Considerations related to the patient's age and/or comorbidities may be used to alter these recommendations.     Did personally make patient aware of these findings on the evening of 7/2  OP follow up            Chronic kidney disease-mineral and bone disorder  Assessment & Plan  Lab Results   Component Value Date    EGFR 54 07/02/2024    EGFR 49 03/28/2024    EGFR 49 10/13/2023    CREATININE 0.98 07/02/2024    CREATININE 1.06  03/28/2024    CREATININE 1.07 10/13/2023       Baseline creatinine seems to be between 0.9 and 1.1.  Currently at baseline.  Monitor    Seizure disorder (HCC)  Assessment & Plan  Appears overall stable from seizure perspective.  Continue Keppra    Hyperlipidemia associated with type 2 diabetes mellitus  (HCC)  Assessment & Plan  Lab Results   Component Value Date    HGBA1C 6.9 (A) 05/02/2024       Recent Labs     07/02/24  1446 07/02/24  1844   POCGLU 114 138       Blood Sugar Average: Last 72 hrs:  (P) 126    Will continue insulin but at lower dose while in the hospital diet.  Sliding-scale insulin for corrections  Monitor blood glucose  Statin for hyperlipidemia    Hypertension  Assessment & Plan  Seems to be overall poorly controlled.  As per neurology no need for permissive hypertension given duration of symptoms  Continue Lasix, as needed labetalol  Monitor blood pressure           VTE Prophylaxis: Heparin  / sequential compression device   Code Status: FC  POLST: POLST form is not discussed and not completed at this time.  Discussion with family: Patient    Anticipated Length of Stay:  Patient will be admitted on an Observation basis with an anticipated length of stay of  less than 2 midnights.   Justification for Hospital Stay: Stroke RO    Total Time for Visit, including Counseling / Coordination of Care: 90 minutes.  Greater than 50% of this total time spent on direct patient counseling and coordination of care.    Chief Complaint:     Pt presents to ER from home for reports of not feeling well, dizziness, headache. Family reports to ems that her bp was initially 190s. EMS treats with 20g R hand, 1.25mg IV droperidol, stable vitals including a most recent blood pressure in the 130s.     History of Present Illness:    Shantelle Romano is a 78 y.o. female who presents with dizziness, generalized weakness, intermittent headache.  The symptoms including the dizziness have been present for several days.  Patient has  a history of seizures but denies any recent seizure or loss of consciousness.  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  Denies any urinary symptoms        Review of Systems:    Review of Systems   Constitutional:  Positive for fatigue.   Neurological:  Positive for weakness.   All other systems reviewed and are negative.      Past Medical and Surgical History:     Past Medical History:   Diagnosis Date    Acute kidney injury superimposed on chronic kidney disease  ([facility]) 11/26/2016    ADHD (attention deficit hyperactivity disorder) 03/17/2019    Arthritis     BL HIPS    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    Carpal tunnel syndrome     Chest pain 03/06/2017    Chronic kidney disease     Claudication ([facility]) 3/15/2019    Closed fracture of base of middle phalanx of finger 06/22/2017    Closed fracture of middle phalanx of left little finger 07/08/2017    Coagulopathy ([facility]) 05/15/2019    Colloid cyst of brain ([facility])     COPD (chronic obstructive pulmonary disease) ([facility])     COPD (chronic obstructive pulmonary disease) ([facility])     Coronary artery disease     Cough     Diabetes mellitus ([facility])     GERD (gastroesophageal reflux disease)     Glaucoma     Gout     History of brain disorder 10/07/2020    Hyperlipidemia     Hypertension     Irritable bowel syndrome     Melena 02/26/2019    PAD (peripheral artery disease) ([facility]) 5/15/2019    Paronychia of great toe of left foot 10/07/2020    Post-traumatic seizures ([facility]) 07/23/2015    Renal disorder     Seizures ([facility])     last 2015    Shortness of breath     Single seizure ([facility]) 05/31/2016    SOB (shortness of breath)     Syncope and collapse 11/11/2020    Urinary tract infection without hematuria 11/26/2016    Wheezing        Past Surgical History:   Procedure Laterality Date    BRAIN SURGERY      CATARACT EXTRACTION      CHOLECYSTECTOMY      COLECTOMY RADHA      COLONOSCOPY      DECOMPRESSION SPINE LUMBAR POSTERIOR  08/19/2019    HERNIA REPAIR       HYSTERECTOMY      INCISION TENDON SHEATH HAND      NECK SURGERY  05/24/2018    NEUROPLASTY / TRANSPOSITION MEDIAN NERVE AT CARPAL TUNNEL      IL COLONOSCOPY FLX DX W/COLLJ SPEC WHEN PFRMD N/A 03/26/2019    Procedure: COLONOSCOPY;  Surgeon: Yaniv Hawley MD;  Location: [facility];  Service: Gastroenterology    IL ESOPHAGOGASTRODUODENOSCOPY TRANSORAL DIAGNOSTIC N/A 03/26/2019    Procedure: ESOPHAGOGASTRODUODENOSCOPY (EGD);  Surgeon: Yaniv Hawley MD;  Location: [facility];  Service: Gastroenterology    REPLACEMENT TOTAL KNEE Right     RETINAL DETACHMENT SURGERY      TUBAL LIGATION         Meds/Allergies:    Prior to Admission medications    Medication Sig Start Date End Date Taking? Authorizing Provider   acetaminophen (TYLENOL) 650 mg CR tablet Take 650 mg by mouth 2 (two) times a day    Historical Provider, MD   albuterol (2.5 mg/3 mL) 0.083 % nebulizer solution Take 3 mL (2.5 mg total) by nebulization every 6 (six) hours as needed for wheezing or shortness of breath 4/22/24   JOSHUA Elizabeth   albuterol (ProAir HFA) 90 mcg/act inhaler Inhale 2 puffs every 6 (six) hours as needed for wheezing or shortness of breath (cough, chest tightness) 5/2/24   Clarisa Billingsley DO   atorvastatin (LIPITOR) 10 mg tablet Take 1 tablet (10 mg total) by mouth daily at bedtime 5/2/24   Clarisa Billingsley DO   Calcium Carbonate-Vitamin D (CALCIUM-D PO) Take 1 tablet by mouth in the morning    Historical Provider, MD   clotrimazole (LOTRIMIN) 1 % cream     Historical Provider, MD   Continuous Blood Gluc  (Dexcom G7 ) YOLANDA Use 1 Application if needed (check sugars) 7/26/23   Clarisa Billingsley DO   Continuous Blood Gluc Sensor (Dexcom G7 Sensor) Use 1 Device every 10 days 7/26/23   Clarisa Billingsley DO   dicyclomine (BENTYL) 20 mg tablet     Historical Provider, MD   DULoxetine (CYMBALTA) 30 mg delayed release capsule     Historical Provider, MD   fluticasone (FLONASE) 50 mcg/act nasal spray 1 spray into  each nostril daily pt uses as needed 5/2/24   Clarisa Billingsley, DO   fluticasone-umeclidinium-vilanterol (Trelegy Ellipta) 100-62.5-25 mcg/actuation inhaler Inhale 1 puff daily Rinse mouth after use. 5/23/24   JOSHUA Grier   furosemide (LASIX) 20 mg tablet take 1 tablet by mouth once daily 9/27/23   Ras Richards MD   gabapentin (NEURONTIN) 100 mg capsule Take 1 capsule (100 mg total) by mouth daily at bedtime 5/2/24   Clarisa Billingsley DO   guaiFENesin (MUCINEX) 600 mg 12 hr tablet Take 1 tablet (600 mg total) by mouth every 12 (twelve) hours 5/2/24   Clarisa Billingsley DO   insulin aspart (NovoLOG FlexPen) 100 UNIT/ML injection pen inject 12 units subcutaneously before meals (3 MEALS A DAY) 5/2/24   Clarisa Billingsley, DO   Insulin Glargine Solostar (Lantus SoloStar) 100 UNIT/ML SOPN Inject 0.3 mL (30 Units total) under the skin daily 5/2/24   Clarisa Billingsley, DO   Insulin Pen Needle (B-D ULTRAFINE III SHORT PEN) 31G X 8 MM MISC Use 4 (four) times a day (with meals and at bedtime) 6/14/24   Clarisa Billingsley DO   ketoconazole (NIZORAL) 2 % cream  5/9/23   Historical Provider, MD   latanoprost (XALATAN) 0.005 % ophthalmic solution Administer 1 drop into the left eye daily 7/19/23   Clarisa Billingsley DO   levETIRAcetam (KEPPRA) 250 mg tablet Take 1 tablet (250 mg total) by mouth every 12 (twelve) hours 5/2/24   Clarisa Billingsley DO   Lumigan 0.01 % ophthalmic drops INSTILL 1 DROP into both eyes at bedtime    Historical Provider, MD   montelukast (SINGULAIR) 10 mg tablet Take 1 tablet (10 mg total) by mouth every evening 5/2/24   Clarisa Billingsley DO   Multiple Vitamin (MULTI VITAMIN DAILY PO) Take 1 tablet by mouth daily    Historical Provider, MD   mupirocin (BACTROBAN) 2 % ointment APPLY A SMALL AMOUNT TO THE AFFECTED FOOT/TOE AREA BY TOPICAL ROUTE 3 TIMES PER DAY    Historical Provider, MD   nystatin (MYCOSTATIN) cream Apply topically 2 (two) times a day 2/28/24   Clarisa Billingsley,    nystatin (MYCOSTATIN)  "powder Apply topically 3 (three) times a day as needed (rash) 24   Clarisa Billingsley, DO   omeprazole (PriLOSEC) 20 mg delayed release capsule Take 1 capsule (20 mg total) by mouth 2 (two) times a day 24   Clarisa Billingsley, DO   Polyvinyl Alcohol-Povidone (REFRESH OP) Apply to eye    Historical Provider, MD   Spacer/Aero-Holding Chambers (AeroChamber Plus Elian-Vu Large) MISC Use as directed 23   Historical Provider, MD   vitamin B-12 (VITAMIN B-12) 500 mcg tablet Take 1,000 mcg by mouth daily    Historical Provider, MD     I have reviewed home medications with patient personally.    Allergies:   Allergies   Allergen Reactions    Brimonidine Other (See Comments)    Salicylic Acid-Sulfur Other (See Comments)    Sulfa Antibiotics Rash and Other (See Comments)       Social History:     Marital Status:    Substance Use History:   Social History     Substance and Sexual Activity   Alcohol Use Never     Social History     Tobacco Use   Smoking Status Former    Current packs/day: 0.00    Average packs/day: 0.5 packs/day for 40.0 years (20.0 ttl pk-yrs)    Types: Cigarettes    Start date:     Quit date:     Years since quittin.5   Smokeless Tobacco Never   Tobacco Comments    stopped many years ago     Social History     Substance and Sexual Activity   Drug Use Never       Family History:    non-contributory    Physical Exam:     Vitals:   Blood Pressure: 128/63 (24)  Pulse: 90 (24)  Temperature: 97.6 °F (36.4 °C) (24)  Temp Source: Oral (24 1800)  Respirations: 20 (24)  Height: 4' 7\" (139.7 cm) (24)  Weight - Scale: 73 kg (160 lb 15 oz) (24)  SpO2: 92 % (24)    Physical Exam  Vitals and nursing note reviewed.   Constitutional:       Appearance: Normal appearance. She is normal weight.   HENT:      Head: Normocephalic and atraumatic.      Right Ear: External ear normal.      Left Ear: External ear normal.      " Nose: Nose normal. No congestion.      Mouth/Throat:      Mouth: Mucous membranes are moist.      Pharynx: Oropharynx is clear. No oropharyngeal exudate or posterior oropharyngeal erythema.   Eyes:      General: No scleral icterus.        Right eye: No discharge.         Left eye: No discharge.      Extraocular Movements: Extraocular movements intact.      Conjunctiva/sclera: Conjunctivae normal.      Pupils: Pupils are equal, round, and reactive to light.   Cardiovascular:      Rate and Rhythm: Normal rate and regular rhythm.      Pulses: Normal pulses.      Heart sounds: Normal heart sounds. No murmur heard.     No friction rub. No gallop.   Pulmonary:      Effort: Pulmonary effort is normal. No respiratory distress.      Breath sounds: Normal breath sounds. No stridor. No wheezing, rhonchi or rales.   Chest:      Chest wall: No tenderness.   Abdominal:      General: Abdomen is flat. Bowel sounds are normal. There is no distension.      Palpations: Abdomen is soft. There is no mass.      Tenderness: There is no abdominal tenderness. There is no guarding or rebound.   Musculoskeletal:         General: No swelling, tenderness, deformity or signs of injury. Normal range of motion.      Cervical back: Normal range of motion and neck supple. No rigidity. No muscular tenderness.   Skin:     General: Skin is warm and dry.      Capillary Refill: Capillary refill takes less than 2 seconds.      Coloration: Skin is not jaundiced or pale.      Findings: No bruising, erythema, lesion or rash.   Neurological:      General: No focal deficit present.      Mental Status: She is alert and oriented to person, place, and time. Mental status is at baseline.      Cranial Nerves: No cranial nerve deficit.      Sensory: No sensory deficit.      Motor: No weakness.      Coordination: Coordination normal.      Comments: Blind right eye gaze abnormality  Otherwise exam overall nonfocal   Psychiatric:         Mood and Affect: Mood normal.          Behavior: Behavior normal.         Thought Content: Thought content normal.         Judgment: Judgment normal.             Additional Data:     Lab Results: I have personally reviewed pertinent reports.      Results from last 7 days   Lab Units 07/02/24  1600   WBC Thousand/uL 8.29   HEMOGLOBIN g/dL 13.8   HEMATOCRIT % 43.0   PLATELETS Thousands/uL 205   SEGS PCT % 64   LYMPHO PCT % 24   MONO PCT % 7   EOS PCT % 3     Results from last 7 days   Lab Units 07/02/24  1517   SODIUM mmol/L 138   POTASSIUM mmol/L 4.3   CHLORIDE mmol/L 103   CO2 mmol/L 27   BUN mg/dL 23   CREATININE mg/dL 0.98   ANION GAP mmol/L 8   CALCIUM mg/dL 9.5   GLUCOSE RANDOM mg/dL 114         Results from last 7 days   Lab Units 07/02/24  1844 07/02/24  1446   POC GLUCOSE mg/dl 138 114               Imaging: I have personally reviewed pertinent reports.      XR chest 2 views   ED Interpretation by Jada Alves DO (07/02 1654)   No apparent infiltrate, effusion, pneumothorax      CTA stroke alert (head/neck)   Final Result by Norberto Purcell MD (07/02 1608)      1.  Patent major vessels of the [place] of hull without high-grade stenosis.  No aneurysm.   2.  No hemodynamically significant stenosis in the cervical carotid or vertebral arteries.   3.  5 mm right middle lobe pulmonary nodule versus juxta fissural lymph node. Based on current Fleischner Society 2017 Guidelines on incidental pulmonary nodule, no routine follow-up is needed if the patient is low risk. If the patient is high risk,    optional follow-up chest CT at 12 months can be considered.   4.  3 cm right thyroid lobe nodule. According to guidelines published in the February 2015 white paper on incidental thyroid nodules in the Journal of the American College of Radiology (JACR), Because the nodule(s) are greater than 1.5 cm in size,    further characterization with thyroid ultrasound is recommended.    Considerations related to the patient's age and/or comorbidities  may be used to alter these recommendations.               Findings were directly discussed with Trista Lakhani  at 3:56 p.m.                           Workstation performed: [id number]         CT stroke alert brain   Final Result by Norberto Purcell MD (07/02 1607)      No acute intracranial CT abnormality.                     Findings were directly discussed with Trista Lakhani  at 3:56 p.m.         Workstation performed: [id number]         MRI Inpatient Order    (Results Pending)           Allscripts / Epic Records Reviewed: Yes     ** Please Note: This note has been constructed using a voice recognition system. **

## 2024-07-03 NOTE — ASSESSMENT & PLAN NOTE
Lab Results   Component Value Date    EGFR 36 07/03/2024    EGFR 54 07/02/2024    EGFR 49 03/28/2024    CREATININE 1.36 (H) 07/03/2024    CREATININE 0.98 07/02/2024    CREATININE 1.06 03/28/2024       Baseline creatinine seems to be between 0.9 and 1.1.  Presented at baseline, but creatinine elevated to 1.36 this morning.  Borderline for CHIN, but does not meet KDIGO criteria based on high end of her normal.  She has been drinking well, so do not suspect prerenal etiology.  Given that she was administered contrast, this may be contributing.  Will give gentle IV fluids today and recheck in AM.

## 2024-07-03 NOTE — ASSESSMENT & PLAN NOTE
77yo F presented with dizziness, generalized weakness for several days  Markedly hypertensive on arrival.  CTA head neck -   1.  Patent major vessels of the [place] of hull without high-grade stenosis.  No aneurysm.   2.  No hemodynamically significant stenosis in the cervical carotid or vertebral arteries.   Unclear etiology of symptoms, suspect mostly related to uncontrolled BP but ED wanted to rule out stroke.      Stroke pathway  MRI brain ordered   Echocardiogram ordered   PT/OT-level 2   Continue aspirin, statin   Telemetry for now  Neurology following

## 2024-07-03 NOTE — UTILIZATION REVIEW
Initial Clinical Review  Observation on 07/02 @ 1656 upgraded to Inpatient on 07/03 @ 1312. Pt requiring continued stay d/t continued stroke w/u, IVF therapy, elevated creatinine.    Admission: Date/Time/Statement:   Admission Orders (From admission, onward)       Ordered        07/03/24 1312  INPATIENT ADMISSION  Once            07/02/24 1656  Place in Observation  Once                          Orders Placed This Encounter   Procedures    INPATIENT ADMISSION     Standing Status:   Standing     Number of Occurrences:   1     Order Specific Question:   Level of Care     Answer:   Med Surg [16]     Order Specific Question:   Estimated length of stay     Answer:   More than 2 Midnights     Order Specific Question:   Certification     Answer:   I certify that inpatient services are medically necessary for this patient for a duration of greater than two midnights. See H&P and MD Progress Notes for additional information about the patient's course of treatment.     ED Arrival Information       Expected   -    Arrival   7/2/2024 14:41    Acuity   Urgent              Means of arrival   Ambulance    Escorted by   [facility]    Service   Hospitalist    Admission type   Emergency              Arrival complaint   DIZZINESS             Chief Complaint   Patient presents with    Headache    Dizziness     Pt presents to ER from home for reports of not feeling well, dizziness, headache. Family reports to ems that her bp was initially 190s. EMS treats with 20g R hand, 1.25mg IV droperidol, stable vitals including a most recent blood pressure in the 130s.        Initial Presentation: 78 y.o. female w/ PMH of HTN, HLD, T2DM, CKD, seizure disorder presented to the ED form home via EMS w/ dizziness, headache, generalized weakness for several days. Per EMS, initial SBP in the 190s, given IV droperidol.  In the ED,   CTA head neck: 1.  Patent major vessels of the [place] of [place] without high-grade stenosis.  No aneurysm.    2.  No hemodynamically significant stenosis in the cervical carotid or vertebral arteries.    3.  5 mm right middle lobe pulmonary nodule versus juxta fissural lymph node. Based on current Fleischner Society 2017 Guidelines on incidental pulmonary nodule, no routine follow-up is needed if the patient is low risk. If the patient is high risk,   optional follow-up chest CT at 12 months can be considered.   4.  3 cm right thyroid lobe nodule. According to guidelines published in the February 2015 white paper on incidental thyroid nodules in the Journal of the American College of Radiology (JACR), Because the nodule(s) are greater than 1.5 cm in size,   further characterization with thyroid ultrasound is recommended.    Considerations related to the patient's age and/or comorbidities may be used to alter these recommendations.      Admit as observation level of care for stroke like symptoms, abnormal CT scan.  Plan: MRI of the brain. If positive MRI echo. UA. neurology consult. PT. OT. Aspirin. Statin. Tele. Monitor neurological exam    Neurology Consult: stroke like symptome:   BP on arrival 141/63, . NIHSS 1 including chronic gaze preference. Pt was not a thrombolytic candidate due to unclear LKW.    CTH:No acute intracranial CT abnormality.   CTA:Patent major vessels of the [place] of [place] without high-grade stenosis.  No aneurysm.No hemodynamically significant stenosis in the cervical carotid or vertebral arteries.  Unclear etiology of presenting symptoms, stroke vs htn encephalopathy vs infectious.  Plan: stroke pathway: MRI brain. Echo. Lipid Panel. Hemoglobin A1c. TSH. Aspirin 81 mg. Atorvastatin 40 mg. Normotension; avoid hypotension. Euglycemic. Continue telemetry. PT/OT/ST    Anticipated Length of Stay/Certification Statement: The patient, admitted on an observation basis, will now require > 2 midnight hospital stay due to stroke pathway, elevated creatinine, IV fluids     Date: 07/03   Day 2:   IM  Notes: Pt reports feeling better today.   No new symptoms of weakness or sensory issues. Creatinine elevated to 1.36 this am. Given that she was administered contrast, this may be contributing. Will give gentle IV fluids today and recheck in AM. Cont Stroke pathway, MRI brain ordered. Echocardiogram ordered. PT/OT-level 2. Continue aspirin, statin. Telemetry for now. Neurology ff.     ED Triage Vitals [07/02/24 1443]   Temperature Pulse Respirations Blood Pressure SpO2 Pain Score   97.5 °F (36.4 °C) 80 20 141/63 94 % No Pain     Weight (last 2 days)       Date/Time Weight    07/02/24 1747 73 (160.94)    07/02/24 1617 73.6 (162.26)    07/02/24 1443 74.7 (164.68)            Vital Signs (last 3 days)       Date/Time Temp Pulse Resp BP MAP (mmHg) SpO2 Calculated FIO2 (%) - Nasal Cannula Nasal Cannula O2 Flow Rate (L/min) O2 Device Patient Position - Orthostatic VS [place] Coma Scale Score Pain    07/03/24 15:08:18 -- -- 16 -- -- -- -- -- -- Lying -- --    07/03/24 1500 97.7 °F (36.5 °C) -- 16 142/62 89 -- -- -- None (Room air) Lying 15 --    07/03/24 1100 97.5 °F (36.4 °C) 77 18 154/65 95 -- -- -- None (Room air) Sitting 15 --    07/03/24 0848 -- -- -- -- -- -- -- -- -- -- -- 7 07/03/24 0847 -- -- -- -- -- -- -- -- -- -- -- 7 07/03/24 0800 -- -- -- -- -- -- -- -- -- -- 15 --    07/03/24 0727 -- -- -- -- -- -- -- -- -- -- -- 7 07/03/24 07:17:18 -- 63 -- 100/63 75 90 % -- -- -- -- -- --    07/03/24 0700 97.8 °F (36.6 °C) -- 16 100/63 75 92 % -- -- None (Room air) Lying 15 --    07/03/24 0500 -- 60 16 115/51 72 94 % -- -- None (Room air) -- 15 --    07/03/24 03:16:52 -- 63 -- -- -- 94 % -- -- -- -- -- --    07/03/24 0300 -- 59 18 118/54 75 95 % -- -- -- Lying 15 --    07/03/24 0100 -- 65 18 127/60 82 93 % -- -- -- Lying 15 --    07/03/24 00:56:56 -- 63 -- -- -- 94 % -- -- -- -- -- --    07/02/24 2300 97.8 °F (36.6 °C) 62 18 130/52 78 93 % -- -- -- Lying 15 --    07/02/24 2209 -- -- -- -- -- -- -- -- -- -- -- 3     07/02/24 2100 -- 65 14 110/49 69 92 % 24 1 L/min Nasal cannula -- 15 --    07/02/24 2000 -- 79 14 128/63 85 92 % -- -- -- -- 15 --    07/02/24 1936 -- -- -- -- -- -- -- -- -- -- -- No Pain    07/02/24 1900 97.6 °F (36.4 °C) 83 20 133/65 88 91 % -- -- -- -- 15 No Pain    07/02/24 18:01:30 97.5 °F (36.4 °C) 79 -- 145/83 104 93 % -- -- -- -- -- --    07/02/24 1800 97.5 °F (36.4 °C) 65 18 145/83 104 -- -- -- -- Sitting 15 --    07/02/24 1747 97.5 °F (36.4 °C) 82 18 145/83 104 94 % -- -- None (Room air) Sitting 15 --    07/02/24 1700 -- 79 17 165/85 -- 95 % -- -- None (Room air) Sitting 15 --    07/02/24 1645 -- 80 16 150/85 -- 93 % -- -- None (Room air) -- 15 --    07/02/24 1630 -- 88 18 198/86 -- 95 % -- -- None (Room air) -- 15 --    07/02/24 1615 -- 87 20 203/94 -- 95 % -- -- None (Room air) Lying 15 --    07/02/24 1600 -- 86 18 169/73 -- 96 % -- -- None (Room air) -- 15 --    07/02/24 1545 -- 90 18 169/73 -- 94 % -- -- None (Room air) Lying 15 --    07/02/24 1530 -- 79 18 195/77 -- 96 % -- -- None (Room air) Lying 15 --    07/02/24 1515 -- 79 18 141/63 91 95 % -- -- None (Room air) Lying 15 --    07/02/24 1443 97.5 °F (36.4 °C) 80 20 141/63 -- 94 % -- -- None (Room air) Sitting -- No Pain              Pertinent Labs/Diagnostic Test Results:   Radiology:  XR chest 2 views   ED Interpretation by Jada Alves DO (07/02 1654)   No apparent infiltrate, effusion, pneumothorax      CTA stroke alert (head/neck)   Final Interpretation by Norberto Purcell MD (07/02 1608)      1.  Patent major vessels of the [place] of [place] without high-grade stenosis.  No aneurysm.   2.  No hemodynamically significant stenosis in the cervical carotid or vertebral arteries.   3.  5 mm right middle lobe pulmonary nodule versus juxta fissural lymph node. Based on current Fleischner Society 2017 Guidelines on incidental pulmonary nodule, no routine follow-up is needed if the patient is low risk. If the patient is high risk,    optional  follow-up chest CT at 12 months can be considered.   4.  3 cm right thyroid lobe nodule. According to guidelines published in the February 2015 white paper on incidental thyroid nodules in the Journal of the American College of Radiology (JACR), Because the nodule(s) are greater than 1.5 cm in size,    further characterization with thyroid ultrasound is recommended.    Considerations related to the patient's age and/or comorbidities may be used to alter these recommendations.               Findings were directly discussed with Trista Lakhani  at 3:56 p.m.                           Workstation performed: [id number]         CT stroke alert brain   Final Interpretation by Norberto Purcell MD (07/02 1607)      No acute intracranial CT abnormality.                     Findings were directly discussed with Trista Lakhani  at 3:56 p.m.         Workstation performed: [id number]         MRI Inpatient Order    (Results Pending)     Cardiology:  ECG 12 lead   Final Result by Soraya Verduzco MD (07/03 8531)   Normal sinus rhythm with sinus arrhythmia   Incomplete right bundle branch block   Borderline ECG   Confirmed by Soraya Verduzco ([id number]) on 7/3/2024 2:10:58 PM        GI:  No orders to display           Results from last 7 days   Lab Units 07/03/24  0624 07/02/24  1600   WBC Thousand/uL 7.38 8.29   HEMOGLOBIN g/dL 13.7 13.8   HEMATOCRIT % 42.9 43.0   PLATELETS Thousands/uL 232 205   TOTAL NEUT ABS Thousands/µL 4.59 5.34         Results from last 7 days   Lab Units 07/03/24  0624 07/02/24  1517   SODIUM mmol/L 138 138   POTASSIUM mmol/L 4.4 4.3   CHLORIDE mmol/L 104 103   CO2 mmol/L 27 27   ANION GAP mmol/L 7 8   BUN mg/dL 30* 23   CREATININE mg/dL 1.36* 0.98   EGFR ml/min/1.73sq m 36 54   CALCIUM mg/dL 9.3 9.5     Results from last 7 days   Lab Units 07/03/24  0624   AST U/L 16   ALT U/L 14   ALK PHOS U/L 47   TOTAL PROTEIN g/dL 7.2   ALBUMIN g/dL 3.7   TOTAL BILIRUBIN mg/dL 0.64     Results from last 7 days   Lab Units  "07/03/24  1617 07/03/24  1104 07/03/24  0714 07/02/24  2105 07/02/24  1844 07/02/24  1446   POC GLUCOSE mg/dl 78 175* 156* 162* 138 114     Results from last 7 days   Lab Units 07/03/24  0624 07/02/24  1517   GLUCOSE RANDOM mg/dL 150* 114         Results from last 7 days   Lab Units 07/03/24  0624   HEMOGLOBIN A1C % 7.6*   EAG mg/dl 171     No results found for: \"BETA-HYDROXYBUTYRATE\"       Results from last 7 days   Lab Units 07/02/24  1600   PH LISSETT  7.367   PCO2 LISSETT mm Hg 41.2*   PO2 LISSETT mm Hg 37.9   HCO3 LISSETT mmol/L 23.1*   BASE EXC LISSETT mmol/L -2.1   O2 CONTENT LISSETT ml/dL 15.3   O2 HGB, VENOUS % 73.0             Results from last 7 days   Lab Units 07/03/24  0624 07/02/24  2150 07/02/24  1517   HS TNI 0HR ng/L  --   --  6   HS TNI 2HR ng/L  --  10  --    HSTNI D2 ng/L  --  4  --    HS TNI 4HR ng/L 9  --   --    HSTNI D4 ng/L 3  --   --              Results from last 7 days   Lab Units 07/03/24  0624   TSH 3RD GENERATON uIU/mL 7.726*                     Results from last 7 days   Lab Units 07/02/24  1600   BNP pg/mL 122*                                     Results from last 7 days   Lab Units 07/02/24  2307   CLARITY UA  Clear   COLOR UA  Colorless   SPEC GRAV UA  1.019   PH UA  7.5   GLUCOSE UA mg/dl Negative   KETONES UA mg/dl Negative   BLOOD UA  Negative   PROTEIN UA mg/dl Negative   NITRITE UA  Negative   BILIRUBIN UA  Negative   UROBILINOGEN UA (BE) mg/dl <2.0   LEUKOCYTES UA  Negative   WBC UA /hpf 1-2   RBC UA /hpf None Seen   BACTERIA UA /hpf None Seen   EPITHELIAL CELLS WET PREP /hpf None Seen                   ED Treatment-Medication Administration from 07/02/2024 1441 to 07/02/2024 1752         Date/Time Order Dose Route Action     07/02/2024 1452 droperidol (FOR EMS ONLY) (INAPSINE) 2.5 mg/mL injection 2.5 mg 0 mg Does not apply Given to EMS     07/02/2024 1541 iohexol (OMNIPAQUE) 350 MG/ML injection (MULTI-DOSE) 85 mL 85 mL Intravenous Given     07/02/2024 1702 aspirin chewable tablet 81 mg 81 mg Oral " Given            Past Medical History:   Diagnosis Date    Acute kidney injury superimposed on chronic kidney disease  (formerly [facility]) 11/26/2016    ADHD (attention deficit hyperactivity disorder) 03/17/2019    Arthritis     BL HIPS    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    Carpal tunnel syndrome     Chest pain 03/06/2017    Chronic kidney disease     Claudication (formerly [facility]) 3/15/2019    Closed fracture of base of middle phalanx of finger 06/22/2017    Closed fracture of middle phalanx of left little finger 07/08/2017    Coagulopathy (formerly [facility]) 05/15/2019    Colloid cyst of brain (formerly [facility])     COPD (chronic obstructive pulmonary disease) (formerly [facility])     COPD (chronic obstructive pulmonary disease) (formerly [facility])     Coronary artery disease     Cough     Diabetes mellitus (formerly [facility])     GERD (gastroesophageal reflux disease)     Glaucoma     Gout     History of brain disorder 10/07/2020    Hyperlipidemia     Hypertension     Irritable bowel syndrome     Melena 02/26/2019    PAD (peripheral artery disease) (formerly [facility]) 5/15/2019    Paronychia of great toe of left foot 10/07/2020    Post-traumatic seizures (formerly [facility]) 07/23/2015    Renal disorder     Seizures (formerly [facility])     last 2015    Shortness of breath     Single seizure (formerly [facility]) 05/31/2016    SOB (shortness of breath)     Syncope and collapse 11/11/2020    Urinary tract infection without hematuria 11/26/2016    Wheezing      Present on Admission:   Hypertension   Hyperlipidemia associated with type 2 diabetes mellitus  (formerly [facility])   Chronic kidney disease-mineral and bone disorder   Seizure disorder (formerly [facility])      Admitting Diagnosis: Thyroid nodule [E04.1]  Dizziness [R42]  Altered mental status [R41.82]  Pulmonary nodule [R91.1]  Stroke-like symptoms [R29.90]  Age/Sex: 78 y.o. female  Admission Orders:  SCD  PT/OT  Baseline NIH stroke scale on admission, reassess Q24H x 2 D, Nursing dysphagia screen prior to staring diet, neuro checks.   Tele      Scheduled Medications:  atorvastatin, 40 mg, Oral, HS  DULoxetine, 30 mg, Oral,  Daily  fluticasone, 1 spray, Nasal, Daily  Fluticasone Furoate-Vilanterol, 1 puff, Inhalation, Daily   And  umeclidinium, 1 puff, Inhalation, Daily  furosemide, 20 mg, Oral, Daily  heparin (porcine), 5,000 Units, Subcutaneous, Q8H CHICA  insulin glargine, 20 Units, Subcutaneous, QAM  insulin lispro, 1-5 Units, Subcutaneous, TID AC  insulin lispro, 6 Units, Subcutaneous, TID With Meals  latanoprost, 1 drop, Left Eye, Daily  levETIRAcetam, 250 mg, Oral, Q12H  montelukast, 10 mg, Oral, QPM  pantoprazole, 40 mg, Oral, Early Morning      Continuous IV Infusions:  sodium chloride, 75 mL/hr, Intravenous, Continuous      PRN Meds:  acetaminophen, 650 mg, Oral, Q6H PRN 07/02 x 1, 07/03 x 1  albuterol, 2 puff, Inhalation, Q6H PRN  labetalol, 10 mg, Intravenous, Q6H PRN  nystatin, , Topical, TID PRN        IP CONSULT TO NEUROLOGY  IP CONSULT TO CASE MANAGEMENT  IP CONSULT TO NUTRITION SERVICES    Network Utilization Review Department  ATTENTION: Please call with any questions or concerns to [phone] and carefully listen to the prompts so that you are directed to the right person. All voicemails are confidential.   For Discharge needs, contact Care Management DC Support Team at [phone] opt. 2  Send all requests for admission clinical reviews, approved or denied determinations and any other requests to dedicated fax number below belonging to the [facility] where the patient is receiving treatment. List of dedicated fax numbers for the Facilities:  FACILITY NAME UR FAX NUMBER   ADMISSION DENIALS (Administrative/Medical Necessity) [phone]   DISCHARGE SUPPORT TEAM (NETWORK) [phone]   PARENT CHILD HEALTH (Maternity/NICU/Pediatrics) [phone]   [facility] [phone]   [facility] [phone]   [facility] [phone]   [facility] [phone]   [facility] [phone]   [facility]  [facility] [phone]   [facility] [phone]   [facility] [phone]   [facility] [phone]   [facility] [phone]   [facility] [phone]   [facility] [phone]

## 2024-07-03 NOTE — SPEECH THERAPY NOTE
ST consult received and chart reviewed. Per chart review, and discussion with RN, pt passed RN dysphagia screen and is tolerating regular diet with thin liquids with no concerns. RN reports no difficulties with speech or language upon observation/interaction with pt. Therefore, formal speech/swallow evaluation not indicated at this time. If new concerns arise, please reconsult.

## 2024-07-03 NOTE — PROGRESS NOTES
[facility]  Progress Note  Name: Shantelle Romano I  MRN: [id number]  Unit/Bed#: 2 E 254-01 I Date of Admission: 7/2/2024   Date of Service: 7/3/2024 I Hospital Day: 0    Assessment & Plan   Abnormal CT scan  Assessment & Plan  CTA with incidental findings of:  3.  5 mm right middle lobe pulmonary nodule versus juxta fissural lymph node. Based on current Fleischner Society 2017 Guidelines on incidental pulmonary nodule, no routine follow-up is needed if the patient is low risk. If the patient is high risk,   optional follow-up chest CT at 12 months can be considered.   4.  3 cm right thyroid lobe nodule. According to guidelines published in the February 2015 white paper on incidental thyroid nodules in the Journal of the American College of Radiology (JACR), Because the nodule(s) are greater than 1.5 cm in size,   further characterization with thyroid ultrasound is recommended.    Considerations related to the patient's age and/or comorbidities may be used to alter these recommendations.     Previous provider did make patient aware of these findings on the evening of 7/2  OP follow up            Chronic kidney disease-mineral and bone disorder  Assessment & Plan  Lab Results   Component Value Date    EGFR 36 07/03/2024    EGFR 54 07/02/2024    EGFR 49 03/28/2024    CREATININE 1.36 (H) 07/03/2024    CREATININE 0.98 07/02/2024    CREATININE 1.06 03/28/2024       Baseline creatinine seems to be between 0.9 and 1.1.  Presented at baseline, but creatinine elevated to 1.36 this morning.  Borderline for CHIN, but does not meet KDIGO criteria based on high end of her normal.  She has been drinking well, so do not suspect prerenal etiology.  Given that she was administered contrast, this may be contributing.  Will give gentle IV fluids today and recheck in AM.    Seizure disorder (HCC)  Assessment & Plan  Appears overall stable from seizure perspective.  Continue Keppra    Hyperlipidemia associated with  type 2 diabetes mellitus  (HCC)  Assessment & Plan  Lab Results   Component Value Date    HGBA1C 7.6 (H) 07/03/2024       Recent Labs     07/02/24  1844 07/02/24  2105 07/03/24  0714 07/03/24  1104   POCGLU 138 162* 156* 175*         Blood Sugar Average: Last 72 hrs:  (P) 149    Will continue insulin but at lower dose while in the hospital diet.  Sliding-scale insulin for corrections  Monitor blood glucose  Statin for hyperlipidemia    Hypertension  Assessment & Plan  Seems to be overall poorly controlled.  As per neurology no need for permissive hypertension given duration of symptoms  Continue Lasix, as needed labetalol  Monitor blood pressure    * Stroke-like symptoms  Assessment & Plan  77yo F presented with dizziness, generalized weakness for several days  Markedly hypertensive on arrival.  CTA head neck -   1.  Patent major vessels of the [place] of [place] without high-grade stenosis.  No aneurysm.   2.  No hemodynamically significant stenosis in the cervical carotid or vertebral arteries.   Unclear etiology of symptoms, suspect mostly related to uncontrolled BP but ED wanted to rule out stroke.      Stroke pathway  MRI brain ordered   Echocardiogram ordered   PT/OT-level 2   Continue aspirin, statin   Telemetry for now  Neurology following           VTE Pharmacologic Prophylaxis: VTE Score: 3 Moderate Risk (Score 3-4) - Pharmacological DVT Prophylaxis Ordered: heparin.    Mobility:   Basic Mobility Inpatient Raw Score: 15  JH-HLM Goal: 4: Move to chair/commode  JH-HLM Achieved: 6: Walk 10 steps or more  JH-HLM Goal achieved. Continue to encourage appropriate mobility.    Patient Centered Rounds: I performed bedside rounds with nursing staff today.   Discussions with Specialists or Other Care Team Provider: Neurology    Education and Discussions with Family / Patient: Patient declined call to .     Total Time Spent on Date of Encounter in care of patient: 40 mins. This time was spent on one or  more of the following: performing physical exam; counseling and coordination of care; obtaining or reviewing history; documenting in the medical record; reviewing/ordering tests, medications or procedures; communicating with other healthcare professionals and discussing with patient's family/caregivers.    Current Length of Stay: 0 day(s)  Current Patient Status: Inpatient   Certification Statement: The patient, admitted on an observation basis, will now require > 2 midnight hospital stay due to stroke pathway, elevated creatinine, IV fluids  Discharge Plan: Anticipate discharge tomorrow to discharge location to be determined pending rehab evaluations.    Code Status: Level 1 - Full Code    Subjective:   Patient reports that she feels better today from a neurologic standpoint.  Denies any dizziness, lightheadedness, difficulty ambulating.  No new symptoms of weakness or sensory issues.    Objective:     Vitals:   Temp (24hrs), Av.6 °F (36.4 °C), Min:97.5 °F (36.4 °C), Max:97.8 °F (36.6 °C)    Temp:  [97.5 °F (36.4 °C)-97.8 °F (36.6 °C)] 97.5 °F (36.4 °C)  HR:  [59-90] 77  Resp:  [14-20] 18  BP: (100-203)/(49-94) 154/65  SpO2:  [90 %-96 %] 90 %  Body mass index is 37.4 kg/m².     Input and Output Summary (last 24 hours):     Intake/Output Summary (Last 24 hours) at 7/3/2024 1315  Last data filed at 7/3/2024 0501  Gross per 24 hour   Intake 300 ml   Output 500 ml   Net -200 ml       Physical Exam:   Physical Exam  Vitals and nursing note reviewed.   Constitutional:       Appearance: Normal appearance.   HENT:      Head: Normocephalic and atraumatic.      Mouth/Throat:      Mouth: Mucous membranes are moist.   Eyes:      Conjunctiva/sclera: Conjunctivae normal.      Pupils: Pupils are equal, round, and reactive to light.   Cardiovascular:      Rate and Rhythm: Normal rate and regular rhythm.      Pulses: Normal pulses.      Heart sounds: Normal heart sounds. No murmur heard.     No gallop.   Pulmonary:      Effort:  Pulmonary effort is normal. No respiratory distress.      Breath sounds: Normal breath sounds. No wheezing or rales.   Abdominal:      General: Abdomen is flat. Bowel sounds are normal. There is no distension.      Palpations: Abdomen is soft.      Tenderness: There is no abdominal tenderness. There is no guarding or rebound.   Musculoskeletal:         General: No swelling. Normal range of motion.   Skin:     General: Skin is warm and dry.      Capillary Refill: Capillary refill takes less than 2 seconds.   Neurological:      General: No focal deficit present.      Mental Status: She is alert. Mental status is at baseline.      Cranial Nerves: Cranial nerves 2-12 are intact. No cranial nerve deficit.      Sensory: Sensation is intact.      Motor: Motor function is intact. No weakness.      Comments: Chronic right eye EOM limitations   Psychiatric:         Mood and Affect: Mood normal.         Additional Data:     Labs:  Results from last 7 days   Lab Units 07/03/24  0624   WBC Thousand/uL 7.38   HEMOGLOBIN g/dL 13.7   HEMATOCRIT % 42.9   PLATELETS Thousands/uL 232   SEGS PCT % 61   LYMPHO PCT % 26   MONO PCT % 8   EOS PCT % 4     Results from last 7 days   Lab Units 07/03/24  0624   SODIUM mmol/L 138   POTASSIUM mmol/L 4.4   CHLORIDE mmol/L 104   CO2 mmol/L 27   BUN mg/dL 30*   CREATININE mg/dL 1.36*   ANION GAP mmol/L 7   CALCIUM mg/dL 9.3   ALBUMIN g/dL 3.7   TOTAL BILIRUBIN mg/dL 0.64   ALK PHOS U/L 47   ALT U/L 14   AST U/L 16   GLUCOSE RANDOM mg/dL 150*         Results from last 7 days   Lab Units 07/03/24  1104 07/03/24  0714 07/02/24  2105 07/02/24  1844 07/02/24  1446   POC GLUCOSE mg/dl 175* 156* 162* 138 114     Results from last 7 days   Lab Units 07/03/24  0624   HEMOGLOBIN A1C % 7.6*           Lines/Drains:  Invasive Devices       Peripheral Intravenous Line  Duration             Peripheral IV 07/02/24 Dorsal (posterior);Right Hand <1 day    Peripheral IV 07/02/24 Right Antecubital <1 day                       Telemetry:  Telemetry Orders (From admission, onward)               24 Hour Telemetry Monitoring  Continuous x 24 Hours (Telem)        Expiring   Question:  Reason for 24 Hour Telemetry  Answer:  TIA/Suspected CVA/ Confirmed CVA                     Telemetry Reviewed: Normal Sinus Rhythm  Indication for Continued Telemetry Use: Acute CVA             Imaging: Reviewed radiology reports from this admission including: CT head    Recent Cultures (last 7 days):         Last 24 Hours Medication List:   Current Facility-Administered Medications   Medication Dose Route Frequency Provider Last Rate    acetaminophen  650 mg Oral Q6H PRN Glenn Piper MD      albuterol  2 puff Inhalation Q6H PRN Glenn Piper MD      atorvastatin  40 mg Oral HS Glenn Piper MD      DULoxetine  30 mg Oral Daily Glenn Piper MD      fluticasone  1 spray Nasal Daily Glenn Piper MD      Fluticasone Furoate-Vilanterol  1 puff Inhalation Daily Glenn Piper MD      And    umeclidinium  1 puff Inhalation Daily Glenn Piper MD      furosemide  20 mg Oral Daily Glenn Piper MD      heparin (porcine)  5,000 Units Subcutaneous Q8H [facility] Glenn Piper MD      insulin glargine  20 Units Subcutaneous QAM Glenn Piper MD      insulin lispro  1-5 Units Subcutaneous TID AC Glenn Piper MD      insulin lispro  6 Units Subcutaneous TID With Meals Glenn Piper MD      labetalol  10 mg Intravenous Q6H PRN Glenn Piper MD      latanoprost  1 drop Left Eye Daily Glenn Piper MD      levETIRAcetam  250 mg Oral Q12H Glenn Piper MD      montelukast  10 mg Oral QPM Glenn Piper MD      nystatin   Topical TID PRN JOSHUA Boothe      pantoprazole  40 mg Oral Early Morning Glenn Piper MD      sodium chloride  75 mL/hr Intravenous Continuous Dutch CISNEROS  MD David 75 mL/hr (07/03/24 1314)        Today, Patient Was Seen By: Dutch Hernandez MD    **Please Note: This note may have been constructed using a voice recognition system.**

## 2024-07-03 NOTE — PLAN OF CARE
Problem: OCCUPATIONAL THERAPY ADULT  Goal: Performs self-care activities at highest level of function for planned discharge setting.  See evaluation for individualized goals.  Description: Treatment Interventions: ADL retraining, Functional transfer training, UE strengthening/ROM, Endurance training, Patient/family training, Equipment evaluation/education, Compensatory technique education, Continued evaluation, Cardiac education, Energy conservation, Activityengagement          See flowsheet documentation for full assessment, interventions and recommendations.   Note: Limitation: Decreased ADL status, Decreased UE strength, Decreased Safe judgement during ADL, Decreased endurance, Decreased self-care trans, Decreased high-level ADLs, Decreased cognition  Prognosis: Good  Assessment: Pt is a 78 y.o. female seen for OT evaluation s/p admit to [facility] on 7/2/2024 w/ Stroke-like symptoms. Comorbidities affecting pt's functional performance at time of assessment include:CKD, HTN, CAD, CHF, memory loss, GERD, former smoker, obesity, DM, neurologic gait dysfunction, and chronic pain . Orders placed for OT evaluation and treatment.  Performed at least two patient identifiers during session including name and wristband. Personal factors affecting pt at time of IE include:steps to enter environment, behavioral pattern, difficulty performing ADLS, difficulty performing IADLS , limited insight into deficits, decreased initiation and engagement , financial barriers, health management , and environment. Prior to admission, pt reports A with ADLs, A with IADLs, and (-) driving.  Upon evaluation: Pt requires min A with UB ADLs, max A with LB ADLs, min A with xfers and min A with functional mobility 2* the following deficits impacting occupational performance: decreased strength, decreased dynamic sit/ stand balance, decreased activity tolerance, decreased standing tolerance time for self care and functional mobility,  increased pain, impaired interpersonal skills, environmental deficits, decreased coping skills, decreased mobilty, and requiring external assistance to complete transitional movements. Pt to benefit from continued skilled OT tx while in the hospital to address deficits as defined above and maximize level of functional independence w ADL's and functional mobility. Occupational Performance areas to address include: bathing/shower, toilet hygiene, dressing, medication management, health maintenance, functional mobility, clothing management, meal prep, money management, and household maintenance. From OT standpoint, recommendation at time of d/c would be Level 2 (Mod Resource Intensity).     Rehab Resource Intensity Level, OT: II (Moderate Resource Intensity)

## 2024-07-04 ENCOUNTER — APPOINTMENT (INPATIENT)
Dept: CT IMAGING | Facility: HOSPITAL | Age: 79
DRG: 078 | End: 2024-07-04
Payer: COMMERCIAL

## 2024-07-04 VITALS
RESPIRATION RATE: 19 BRPM | TEMPERATURE: 98 F | HEART RATE: 73 BPM | WEIGHT: 160.94 LBS | OXYGEN SATURATION: 91 % | HEIGHT: 55 IN | SYSTOLIC BLOOD PRESSURE: 150 MMHG | DIASTOLIC BLOOD PRESSURE: 73 MMHG | BODY MASS INDEX: 37.24 KG/M2

## 2024-07-04 PROBLEM — R94.6 ABNORMAL THYROID FUNCTION TEST: Status: ACTIVE | Noted: 2024-07-04

## 2024-07-04 LAB
ANION GAP SERPL CALCULATED.3IONS-SCNC: 7 MMOL/L (ref 4–13)
BUN SERPL-MCNC: 28 MG/DL (ref 5–25)
CALCIUM SERPL-MCNC: 8.5 MG/DL (ref 8.4–10.2)
CHLORIDE SERPL-SCNC: 106 MMOL/L (ref 96–108)
CO2 SERPL-SCNC: 24 MMOL/L (ref 21–32)
CREAT SERPL-MCNC: 1.14 MG/DL (ref 0.6–1.3)
ERYTHROCYTE [DISTWIDTH] IN BLOOD BY AUTOMATED COUNT: 14.4 % (ref 11.6–15.1)
GFR SERPL CREATININE-BSD FRML MDRD: 45 ML/MIN/1.73SQ M
GLUCOSE SERPL-MCNC: 169 MG/DL (ref 65–140)
GLUCOSE SERPL-MCNC: 177 MG/DL (ref 65–140)
GLUCOSE SERPL-MCNC: 206 MG/DL (ref 65–140)
HCT VFR BLD AUTO: 41.2 % (ref 34.8–46.1)
HGB BLD-MCNC: 13 G/DL (ref 11.5–15.4)
MCH RBC QN AUTO: 27.7 PG (ref 26.8–34.3)
MCHC RBC AUTO-ENTMCNC: 31.6 G/DL (ref 31.4–37.4)
MCV RBC AUTO: 88 FL (ref 82–98)
PLATELET # BLD AUTO: 203 THOUSANDS/UL (ref 149–390)
PMV BLD AUTO: 9.7 FL (ref 8.9–12.7)
POTASSIUM SERPL-SCNC: 4.3 MMOL/L (ref 3.5–5.3)
RBC # BLD AUTO: 4.7 MILLION/UL (ref 3.81–5.12)
SODIUM SERPL-SCNC: 137 MMOL/L (ref 135–147)
WBC # BLD AUTO: 7.21 THOUSAND/UL (ref 4.31–10.16)

## 2024-07-04 PROCEDURE — 70450 CT HEAD/BRAIN W/O DYE: CPT

## 2024-07-04 PROCEDURE — 94664 DEMO&/EVAL PT USE INHALER: CPT

## 2024-07-04 PROCEDURE — 82948 REAGENT STRIP/BLOOD GLUCOSE: CPT

## 2024-07-04 PROCEDURE — 80048 BASIC METABOLIC PNL TOTAL CA: CPT | Performed by: STUDENT IN AN ORGANIZED HEALTH CARE EDUCATION/TRAINING PROGRAM

## 2024-07-04 PROCEDURE — 99239 HOSP IP/OBS DSCHRG MGMT >30: CPT | Performed by: STUDENT IN AN ORGANIZED HEALTH CARE EDUCATION/TRAINING PROGRAM

## 2024-07-04 PROCEDURE — 85027 COMPLETE CBC AUTOMATED: CPT | Performed by: STUDENT IN AN ORGANIZED HEALTH CARE EDUCATION/TRAINING PROGRAM

## 2024-07-04 RX ORDER — MUSCLE RUB CREAM 100; 150 MG/G; MG/G
CREAM TOPICAL 4 TIMES DAILY PRN
Status: DISCONTINUED | OUTPATIENT
Start: 2024-07-04 | End: 2024-07-04 | Stop reason: HOSPADM

## 2024-07-04 RX ADMIN — INSULIN LISPRO 6 UNITS: 100 INJECTION, SOLUTION INTRAVENOUS; SUBCUTANEOUS at 09:14

## 2024-07-04 RX ADMIN — FLUTICASONE FUROATE AND VILANTEROL TRIFENATATE 1 PUFF: 100; 25 POWDER RESPIRATORY (INHALATION) at 09:13

## 2024-07-04 RX ADMIN — PANTOPRAZOLE SODIUM 40 MG: 40 TABLET, DELAYED RELEASE ORAL at 05:37

## 2024-07-04 RX ADMIN — UMECLIDINIUM 1 PUFF: 62.5 AEROSOL, POWDER ORAL at 09:13

## 2024-07-04 RX ADMIN — ACETAMINOPHEN 650 MG: 325 TABLET, FILM COATED ORAL at 00:48

## 2024-07-04 RX ADMIN — FLUTICASONE PROPIONATE 1 SPRAY: 50 SPRAY, METERED NASAL at 09:13

## 2024-07-04 RX ADMIN — FUROSEMIDE 20 MG: 20 TABLET ORAL at 09:12

## 2024-07-04 RX ADMIN — INSULIN LISPRO 1 UNITS: 100 INJECTION, SOLUTION INTRAVENOUS; SUBCUTANEOUS at 09:14

## 2024-07-04 RX ADMIN — HEPARIN SODIUM 5000 UNITS: 5000 INJECTION INTRAVENOUS; SUBCUTANEOUS at 16:08

## 2024-07-04 RX ADMIN — LATANOPROST 1 DROP: 50 SOLUTION OPHTHALMIC at 09:13

## 2024-07-04 RX ADMIN — SODIUM CHLORIDE 75 ML/HR: 0.9 INJECTION, SOLUTION INTRAVENOUS at 00:45

## 2024-07-04 RX ADMIN — DULOXETINE HYDROCHLORIDE 30 MG: 30 CAPSULE, DELAYED RELEASE ORAL at 09:12

## 2024-07-04 RX ADMIN — HEPARIN SODIUM 5000 UNITS: 5000 INJECTION INTRAVENOUS; SUBCUTANEOUS at 05:38

## 2024-07-04 RX ADMIN — INSULIN LISPRO 1 UNITS: 100 INJECTION, SOLUTION INTRAVENOUS; SUBCUTANEOUS at 12:38

## 2024-07-04 RX ADMIN — INSULIN LISPRO 6 UNITS: 100 INJECTION, SOLUTION INTRAVENOUS; SUBCUTANEOUS at 12:38

## 2024-07-04 RX ADMIN — LEVETIRACETAM 250 MG: 500 TABLET, FILM COATED ORAL at 09:12

## 2024-07-04 NOTE — UTILIZATION REVIEW
Continued Stay Review    SEE INITIAL REVIEW AT BOTTOM      Date: 7/4/24                       Current Patient Class: Inpatient   Current Level of Care: Med Surg    HPI:78 y.o. female initially admitted as OBS 7/2/24, converted to Inpatient 7/3/24.     7/4  Day 3: Has surpassed a 2nd midnight with active treatments and services. 7/4 Internal Medicine Discharge Summary:  78 y.o. female patient who originally presented to the hospital on 7/2/2024 due to strokelike symptoms including headache and dizziness.  Symptoms resolved shortly after admission.  She was admitted under the stroke pathway.  CT head showed no acute intracranial abnormality.  She was ordered to have MRI and echocardiogram done, however these have not been done at the time of discharge.  Serial CT head was ordered instead which showed no acute change from previous.  She was discharged home with plan for outpatient MRI and echocardiogram as well as neurology follow-up.     7/4/24 1808 Discharged to home with home health care.     Vital Signs (last 3 days)       Date/Time Temp Pulse Resp BP MAP (mmHg) SpO2 Calculated FIO2 (%) - Nasal Cannula Nasal Cannula O2 Flow Rate (L/min) O2 Device [place] Coma Scale Score Pain    07/04/24 1247 -- 63 -- -- -- 86 % -- -- -- -- --    07/04/24 1100 98 °F (36.7 °C) 69 16 165/73 104 -- -- -- -- 15 --    07/04/24 10:56:21 98 °F (36.7 °C) 68 -- 165/73 104 92 % -- -- -- 15 --    07/04/24 0800 -- -- -- -- -- -- -- -- -- 15 --    07/04/24 07:57:28 97.8 °F (36.6 °C) 73 -- 135/61 86 89 % -- -- -- -- No Pain    07/04/24 0700 97.8 °F (36.6 °C) -- 16 135/61 86 -- -- -- -- 15 --    07/04/24 0300 -- 71 16 142/70 94 93 % -- -- -- 15 --    07/04/24 0048 -- -- -- -- -- -- -- -- -- -- 7    07/03/24 2300 -- 84 16 131/66 88 92 % -- -- -- 15 --    07/03/24 2154 -- -- -- -- -- -- -- -- -- 15 No Pain    07/03/24 1900 97.6 °F (36.4 °C) 79 20 113/69 84 90 % -- -- -- 15 --    07/03/24 15:08:18 -- -- 16 -- -- -- -- -- -- -- --    07/03/24 1500  97.7 °F (36.5 °C) -- 16 142/62 89 -- -- -- None (Room air) 15 --    07/03/24 1100 97.5 °F (36.4 °C) 77 18 154/65 95 -- -- -- None (Room air) 15 --    07/03/24 0848 -- -- -- -- -- -- -- -- -- -- 7 07/03/24 0847 -- -- -- -- -- -- -- -- -- -- 7 07/03/24 0800 -- -- -- -- -- -- -- -- -- 15 --    07/03/24 0727 -- -- -- -- -- -- -- -- -- -- 7 07/03/24 07:17:18 -- 63 -- 100/63 75 90 % -- -- -- -- --    07/03/24 0700 97.8 °F (36.6 °C) -- 16 100/63 75 92 % -- -- None (Room air) 15 --    07/03/24 0500 -- 60 16 115/51 72 94 % -- -- None (Room air) 15 --    07/03/24 03:16:52 -- 63 -- -- -- 94 % -- -- -- -- --    07/03/24 0300 -- 59 18 118/54 75 95 % -- -- -- 15 --    07/03/24 0100 -- 65 18 127/60 82 93 % -- -- -- 15 --    07/03/24 00:56:56 -- 63 -- -- -- 94 % -- -- -- -- --    07/02/24 2300 97.8 °F (36.6 °C) 62 18 130/52 78 93 % -- -- -- 15 --    07/02/24 2209 -- -- -- -- -- -- -- -- -- -- 3    07/02/24 2100 -- 65 14 110/49 69 92 % 24 1 L/min Nasal cannula 15 --    07/02/24 2000 -- 79 14 128/63 85 92 % -- -- -- 15 --    07/02/24 1936 -- -- -- -- -- -- -- -- -- -- No Pain    07/02/24 1900 97.6 °F (36.4 °C) 83 20 133/65 88 91 % -- -- -- 15 No Pain    07/02/24 18:01:30 97.5 °F (36.4 °C) 79 -- 145/83 104 93 % -- -- -- -- --    07/02/24 1800 97.5 °F (36.4 °C) 65 18 145/83 104 -- -- -- -- 15 --    07/02/24 1747 97.5 °F (36.4 °C) 82 18 145/83 104 94 % -- -- None (Room air) 15 --    07/02/24 1700 -- 79 17 165/85 -- 95 % -- -- None (Room air) 15 --    07/02/24 1645 -- 80 16 150/85 -- 93 % -- -- None (Room air) 15 --    07/02/24 1630 -- 88 18 198/86 -- 95 % -- -- None (Room air) 15 --    07/02/24 1615 -- 87 20 203/94 -- 95 % -- -- None (Room air) 15 --    07/02/24 1600 -- 86 18 169/73 -- 96 % -- -- None (Room air) 15 --    07/02/24 1545 -- 90 18 169/73 -- 94 % -- -- None (Room air) 15 --    07/02/24 1530 -- 79 18 195/77 -- 96 % -- -- None (Room air) 15 --    07/02/24 1515 -- 79 18 141/63 91 95 % -- -- None (Room air) 15 --     07/02/24 1443 97.5 °F (36.4 °C) 80 20 141/63 -- 94 % -- -- None (Room air) -- No Pain          Weight (last 2 days)       Date/Time Weight    07/02/24 1747 73 (160.94)    07/02/24 1617 73.6 (162.26)    07/02/24 1443 74.7 (164.68)              Pertinent Labs/Diagnostic Results:     Radiology:      CT head wo contrast   Final Interpretation by Selvin Chew MD (07/04 1601)      No acute intracranial abnormality.      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      Unchanged mild chronic microangiopathy.                  Workstation performed: [id number]         XR chest 2 views   ED Interpretation by Jada Alves DO (07/02 1654)   No apparent infiltrate, effusion, pneumothorax      Final Interpretation by Nora Meza MD (07/03 1948)      Increased opacity over the left atrium on the lateral projection which may be due to atelectasis but pneumonia not excluded in the appropriate clinical setting.            Workstation performed: [id number]         CTA stroke alert (head/neck)   Final Interpretation by Norberto Purcell MD (07/02 1608)      1.  Patent major vessels of the [place] of [place] without high-grade stenosis.  No aneurysm.   2.  No hemodynamically significant stenosis in the cervical carotid or vertebral arteries.   3.  5 mm right middle lobe pulmonary nodule versus juxta fissural lymph node. Based on current Fleischner Society 2017 Guidelines on incidental pulmonary nodule, no routine follow-up is needed if the patient is low risk. If the patient is high risk,    optional follow-up chest CT at 12 months can be considered.   4.  3 cm right thyroid lobe nodule. According to guidelines published in the February 2015 white paper on incidental thyroid nodules in the Journal of the American College of Radiology (JACR), Because the nodule(s) are greater than 1.5 cm in size,    further characterization with  thyroid ultrasound is recommended.    Considerations related to the patient's age and/or comorbidities may be used to alter these recommendations.            Workstation performed: [id number]         CT stroke alert brain   Final Interpretation by Norberto Purcell MD (07/02 1607)      No acute intracranial CT abnormality.         Workstation performed: [id number]                   Results from last 7 days   Lab Units 07/04/24  0556 07/03/24  0624 07/02/24  1600   WBC Thousand/uL 7.21 7.38 8.29   HEMOGLOBIN g/dL 13.0 13.7 13.8   HEMATOCRIT % 41.2 42.9 43.0   PLATELETS Thousands/uL 203 232 205   TOTAL NEUT ABS Thousands/µL  --  4.59 5.34         Results from last 7 days   Lab Units 07/04/24  0556 07/03/24  0624 07/02/24  1517   SODIUM mmol/L 137 138 138   POTASSIUM mmol/L 4.3 4.4 4.3   CHLORIDE mmol/L 106 104 103   CO2 mmol/L 24 27 27   ANION GAP mmol/L 7 7 8   BUN mg/dL 28* 30* 23   CREATININE mg/dL 1.14 1.36* 0.98   EGFR ml/min/1.73sq m 45 36 54   CALCIUM mg/dL 8.5 9.3 9.5     Results from last 7 days   Lab Units 07/03/24  0624   AST U/L 16   ALT U/L 14   ALK PHOS U/L 47   TOTAL PROTEIN g/dL 7.2   ALBUMIN g/dL 3.7   TOTAL BILIRUBIN mg/dL 0.64     Results from last 7 days   Lab Units 07/04/24  1059 07/04/24  0757 07/03/24  2123 07/03/24  1617 07/03/24  1104 07/03/24  0714 07/02/24  2105 07/02/24  1844 07/02/24  1446   POC GLUCOSE mg/dl 206* 177* 189* 78 175* 156* 162* 138 114     Results from last 7 days   Lab Units 07/04/24  0556 07/03/24  0624 07/02/24  1517   GLUCOSE RANDOM mg/dL 169* 150* 114         Results from last 7 days   Lab Units 07/03/24  0624   HEMOGLOBIN A1C % 7.6*   EAG mg/dl 171           Results from last 7 days   Lab Units 07/02/24  1600   PH LISSETT  7.367   PCO2 LISSETT mm Hg 41.2*   PO2 LISSETT mm Hg 37.9   HCO3 LISSETT mmol/L 23.1*   BASE EXC LISSETT mmol/L -2.1   O2 CONTENT LISSETT ml/dL 15.3   O2 HGB, VENOUS % 73.0             Results from last 7 days   Lab Units 07/03/24  0624 07/02/24  2150 07/02/24  1517   HS TNI 0HR  ng/L  --   --  6   HS TNI 2HR ng/L  --  10  --    HSTNI D2 ng/L  --  4  --    HS TNI 4HR ng/L 9  --   --    HSTNI D4 ng/L 3  --   --              Results from last 7 days   Lab Units 07/03/24  0624   TSH 3RD GENERATON uIU/mL 7.726*               Results from last 7 days   Lab Units 07/02/24  1600   BNP pg/mL 122*             Results from last 7 days   Lab Units 07/02/24  2307   CLARITY UA  Clear   COLOR UA  Colorless   SPEC GRAV UA  1.019   PH UA  7.5   GLUCOSE UA mg/dl Negative   KETONES UA mg/dl Negative   BLOOD UA  Negative   PROTEIN UA mg/dl Negative   NITRITE UA  Negative   BILIRUBIN UA  Negative   UROBILINOGEN UA (BE) mg/dl <2.0   LEUKOCYTES UA  Negative   WBC UA /hpf 1-2   RBC UA /hpf None Seen   BACTERIA UA /hpf None Seen   EPITHELIAL CELLS WET PREP /hpf None Seen           Medications:   Scheduled Medications:      atorvastatin, 40 mg, Oral, HS  DULoxetine, 30 mg, Oral, Daily  fluticasone, 1 spray, Nasal, Daily  Fluticasone Furoate-Vilanterol, 1 puff, Inhalation, Daily   And  umeclidinium, 1 puff, Inhalation, Daily  furosemide, 20 mg, Oral, Daily  heparin (porcine), 5,000 Units, Subcutaneous, Q8H [facility]  insulin glargine, 20 Units, Subcutaneous, QAM  insulin lispro, 1-5 Units, Subcutaneous, TID AC  insulin lispro, 6 Units, Subcutaneous, TID With Meals  latanoprost, 1 drop, Left Eye, Daily  levETIRAcetam, 250 mg, Oral, Q12H  montelukast, 10 mg, Oral, QPM  pantoprazole, 40 mg, Oral, Early Morning      Continuous IV Infusions:    sodium chloride 0.9 % infusion  Rate: 75 mL/hr Dose: 75 mL/hr  Freq: Continuous Route: IV  Indications of Use: IV Hydration  Start: 07/03/24 1300 End: 07/04/24 0113         PRN Meds:  acetaminophen, 650 mg, Oral, Q6H PRN  albuterol, 2 puff, Inhalation, Q6H PRN  labetalol, 10 mg, Intravenous, Q6H PRN  menthol-methyl salicylate, , Apply externally, 4x Daily PRN  nystatin, , Topical, TID PRN        Discharge Plan: TBD      Network Utilization Review Department  ATTENTION: Please call with  any questions or concerns to [phone] and carefully listen to the prompts so that you are directed to the right person. All voicemails are confidential.   For Discharge needs, contact Care Management DC Support Team at [phone] opt. [phone]  Send all requests for admission clinical reviews, approved or denied determinations and any other requests to dedicated fax number below belonging to the campus where the patient is receiving treatment. List of dedicated fax numbers for the Facilities:  FACILITY NAME UR FAX NUMBER   ADMISSION DENIALS (Administrative/Medical Necessity) [phone]   DISCHARGE SUPPORT TEAM (NETWORK) [phone]   PARENT CHILD HEALTH (Maternity/NICU/Pediatrics) [phone]   [facility] [phone]   [facility] [phone]   Critical access hospital [phone]   [facility] [phone]   [facility] [phone]   [facility] [phone]   [facility] [phone]   [facility] [phone]   [facility] [phone]   Critical access hospital [phone]   [facility] [phone]   [facility] [phone]

## 2024-07-04 NOTE — ASSESSMENT & PLAN NOTE
77yo F presented with dizziness, generalized weakness for several days  Markedly hypertensive on arrival.  CTA head neck -   1.  Patent major vessels of the [place] of hull without high-grade stenosis.  No aneurysm.   2.  No hemodynamically significant stenosis in the cervical carotid or vertebral arteries.   Unclear etiology of symptoms, suspect mostly related to uncontrolled BP but ED wanted to rule out stroke.      Stroke pathway  Echocardiogram ordered   PT/OT-level 2 . Pt not interested in STR but open to [facility]  Continue statin.  Patient refusing aspirin.  Telemetry for now  Neurology following  MRI brain ordered, but not yet completed still.  Discussed with neurology, given that her symptoms have resolved will obtain repeat CT head today and hopefully discharge later today.  Will need outpatient MRI and echocardiogram which have been ordered.

## 2024-07-04 NOTE — ASSESSMENT & PLAN NOTE
79yo F presented with dizziness, generalized weakness for several days  Markedly hypertensive on arrival.  CTA head neck -   1.  Patent major vessels of the [place] of [place] without high-grade stenosis.  No aneurysm.   2.  No hemodynamically significant stenosis in the cervical carotid or vertebral arteries.   Unclear etiology of symptoms, suspect mostly related to uncontrolled BP but ED wanted to rule out stroke.      Stroke pathway  PT/OT-level 2 . Pt not interested in STR but open to [place]  Continue statin.  Patient refusing aspirin.  Neurology following  Repeat CT head showed no acute change  MRI brain ordered for outpatient.  Echocardiogram also ordered for outpatient, should be done within 1 week.  Discussed this with family as well.

## 2024-07-04 NOTE — CASE MANAGEMENT
Case Management Discharge Planning Note    Patient name Shantelle Romano  Location 2 [facility] 254/2 E 254-01 MRN [id number]  : 1945 Date 2024       Current Admission Date: 2024  Current Admission Diagnosis:Stroke-like symptoms   Patient Active Problem List    Diagnosis Date Noted Date Diagnosed    Stroke-like symptoms 2024     Abnormal CT scan 2024     Obesity, morbid (HCC) 2024     Former smoker 10/23/2023     Urinary incontinence 2023     Osteopenia of multiple sites 2022     Chronic kidney disease-mineral and bone disorder 2021     Neurologic gait dysfunction 2020     Type 2 diabetes mellitus with diabetic neuropathy (HCC) 10/21/2020     Macular pucker, left 10/07/2020     Bilateral leg edema 2020     Chronic pain syndrome 2020     Stage 3b chronic kidney disease ([facility]) 2019     Vitamin D deficiency 05/15/2019     Alternating constipation and diarrhea 2016     Simple chronic bronchitis ([facility])      Hypertension      Chronic heart failure with preserved ejection fraction ([facility]) 2016     Left ventricular hypertrophy 2016     Lung nodule 2016     Memory loss 2016     Osteoarthritis 2016     Coronary artery disease without angina pectoris 2015     Gastroesophageal reflux disease without esophagitis 2015     Hyperlipidemia associated with type 2 diabetes mellitus  ([facility]) 2015     Seizure disorder ([facility]) 2015     Strabismic amblyopia of right eye 2015       LOS (days): 1  Geometric Mean LOS (GMLOS) (days):   Days to GMLOS:     OBJECTIVE:  Risk of Unplanned Readmission Score: 13.64      Current admission status: Inpatient   Preferred Pharmacy:   [id number] - [place] - [address] [place], [place] [address] [address]  Phone: [phone] Fax: [phone]    [facility] - [place] - [facility]  [place] [address]  Phone: [phone] Fax: [phone]    Primary Care Provider: Clarisa Billingsley DO  Primary Insurance: DARREL ALVARES  Secondary Insurance: [facility]    DISCHARGE DETAILS:    Requested Home Health Care         Is the patient interested in HHC at discharge?: Yes  Home Health Discipline requested:: Nursing, Occupational Therapy, Physical Therapy  Home Health Agency Name:: [facility] External Referral Reason (only applicable if external HHA name selected): Services not provided in network or near patient location    Other Referral/Resources/Interventions Provided:  Interventions: HHC  Referral Comments: [facility] reserved in [facility] for HHC.  AVS updated.

## 2024-07-04 NOTE — PROGRESS NOTES
Patient:    MRN:  [id number]    Aidin Request ID:  [id number]    Level of care reserved:  Home Health Agency    Partner Reserved:  [facility] [place] [address] [phone]    Clinical needs requested:    Geography searched:  [id number]    Start of Service:    Request sent:  3:22pm EDT on 7/3/2024 by Marysol Coombs    Partner reserved:  2:50pm EDT on 7/4/2024 by Marysol Coombs    Choice list shared:  2:50pm EDT on 7/4/2024 by Marysol Coombs

## 2024-07-04 NOTE — ASSESSMENT & PLAN NOTE
TSH 7.7, free T4 0.56.  Patient has no overt symptoms of hypothyroidism.  She would prefer to follow-up with her primary care and have repeat testing done rather than start levothyroxine at this point.

## 2024-07-04 NOTE — ASSESSMENT & PLAN NOTE
Lab Results   Component Value Date    HGBA1C 7.6 (H) 07/03/2024       Recent Labs     07/03/24  1617 07/03/24  2123 07/04/24  0757 07/04/24  1059   POCGLU 78 189* 177* 206*         Blood Sugar Average: Last 72 hrs:  (P) 155    Will continue insulin but at lower dose while in the hospital diet.  Sliding-scale insulin for corrections  Monitor blood glucose  Statin for hyperlipidemia

## 2024-07-04 NOTE — PROGRESS NOTES
[facility]  Progress Note  Name: Shantelle Romano I  MRN: [id number]  Unit/Bed#: 2 E 254-01 I Date of Admission: 7/2/2024   Date of Service: 7/4/2024 I Hospital Day: 1    Assessment & Plan   Abnormal CT scan  Assessment & Plan  CTA with incidental findings of:  3.  5 mm right middle lobe pulmonary nodule versus juxta fissural lymph node. Based on current Fleischner Society 2017 Guidelines on incidental pulmonary nodule, no routine follow-up is needed if the patient is low risk. If the patient is high risk,   optional follow-up chest CT at 12 months can be considered.   4.  3 cm right thyroid lobe nodule. According to guidelines published in the February 2015 white paper on incidental thyroid nodules in the Journal of the American College of Radiology (JACR), Because the nodule(s) are greater than 1.5 cm in size,   further characterization with thyroid ultrasound is recommended.    Considerations related to the patient's age and/or comorbidities may be used to alter these recommendations.     Previous provider did make patient aware of these findings on the evening of 7/2  OP follow up            Chronic kidney disease-mineral and bone disorder  Assessment & Plan  Lab Results   Component Value Date    EGFR 45 07/04/2024    EGFR 36 07/03/2024    EGFR 54 07/02/2024    CREATININE 1.14 07/04/2024    CREATININE 1.36 (H) 07/03/2024    CREATININE 0.98 07/02/2024       Baseline creatinine seems to be between 0.9 and 1.1.  Presented at baseline, but creatinine elevated to 1.36.  Now back to baseline.     Seizure disorder (HCC)  Assessment & Plan  Appears overall stable from seizure perspective.  Continue Keppra    Hyperlipidemia associated with type 2 diabetes mellitus  (HCC)  Assessment & Plan  Lab Results   Component Value Date    HGBA1C 7.6 (H) 07/03/2024       Recent Labs     07/03/24  1617 07/03/24  2123 07/04/24  0757 07/04/24  1059   POCGLU 78 189* 177* 206*         Blood Sugar Average: Last 72  hrs:  (P) 155    Will continue insulin but at lower dose while in the hospital diet.  Sliding-scale insulin for corrections  Monitor blood glucose  Statin for hyperlipidemia    Hypertension  Assessment & Plan  Seems to be overall poorly controlled.  As per neurology no need for permissive hypertension given duration of symptoms  Continue Lasix, as needed labetalol  Monitor blood pressure    * Stroke-like symptoms  Assessment & Plan  79yo F presented with dizziness, generalized weakness for several days  Markedly hypertensive on arrival.  CTA head neck -   1.  Patent major vessels of the [place] of [place] without high-grade stenosis.  No aneurysm.   2.  No hemodynamically significant stenosis in the cervical carotid or vertebral arteries.   Unclear etiology of symptoms, suspect mostly related to uncontrolled BP but ED wanted to rule out stroke.      Stroke pathway  Echocardiogram ordered   PT/OT-level 2 . Pt not interested in STR but open to [facility]  Continue statin.  Patient refusing aspirin.  Telemetry for now  Neurology following  MRI brain ordered, but not yet completed still.  Discussed with neurology, given that her symptoms have resolved will obtain repeat CT head today and hopefully discharge later today.  Will need outpatient MRI and echocardiogram which have been ordered.           VTE Pharmacologic Prophylaxis: VTE Score: 3 Moderate Risk (Score 3-4) - Pharmacological DVT Prophylaxis Ordered: heparin.    Mobility:   Basic Mobility Inpatient Raw Score: 17  JH-HLM Goal: 5: Stand one or more mins  JH-HLM Achieved: 1: Laying in bed  JH-HLM Goal NOT achieved. Continue with multidisciplinary rounding and encourage appropriate mobility to improve upon JH-HLM goals.    Patient Centered Rounds: I performed bedside rounds with nursing staff today.   Discussions with Specialists or Other Care Team Provider: Neuro    Education and Discussions with Family / Patient: Updated  (son) via phone.    Total Time Spent  on Date of Encounter in care of patient: 35 mins. This time was spent on one or more of the following: performing physical exam; counseling and coordination of care; obtaining or reviewing history; documenting in the medical record; reviewing/ordering tests, medications or procedures; communicating with other healthcare professionals and discussing with patient's family/caregivers.    Current Length of Stay: 1 day(s)  Current Patient Status: Inpatient   Certification Statement: The patient will continue to require additional inpatient hospital stay due to CT head, stroke pathway  Discharge Plan: Anticipate discharge later today or tomorrow to home with home services.    Code Status: Level 1 - Full Code    Subjective:   Patient reports he feels well today.  Denies any headache, dizziness, or new neurologic symptoms.  Still awaiting MRI.    Objective:     Vitals:   Temp (24hrs), Av.9 °F (36.6 °C), Min:97.6 °F (36.4 °C), Max:98 °F (36.7 °C)    Temp:  [97.6 °F (36.4 °C)-98 °F (36.7 °C)] 98 °F (36.7 °C)  HR:  [63-84] 73  Resp:  [16-20] 16  BP: (113-165)/(61-73) 150/73  SpO2:  [86 %-93 %] 91 %  Body mass index is 37.4 kg/m².     Input and Output Summary (last 24 hours):     Intake/Output Summary (Last 24 hours) at 2024 1528  Last data filed at 2024 0535  Gross per 24 hour   Intake 1083.75 ml   Output --   Net 1083.75 ml       Physical Exam:   Physical Exam  Vitals and nursing note reviewed.   Constitutional:       Appearance: Normal appearance.   HENT:      Head: Normocephalic and atraumatic.      Mouth/Throat:      Mouth: Mucous membranes are moist.   Eyes:      Conjunctiva/sclera: Conjunctivae normal.      Pupils: Pupils are equal, round, and reactive to light.   Cardiovascular:      Rate and Rhythm: Normal rate and regular rhythm.      Pulses: Normal pulses.      Heart sounds: Normal heart sounds. No murmur heard.     No gallop.   Pulmonary:      Effort: Pulmonary effort is normal. No respiratory distress.       Breath sounds: Normal breath sounds. No wheezing or rales.   Abdominal:      General: Abdomen is flat. Bowel sounds are normal. There is no distension.      Palpations: Abdomen is soft.      Tenderness: There is no abdominal tenderness. There is no guarding or rebound.   Musculoskeletal:         General: No swelling. Normal range of motion.   Skin:     General: Skin is warm and dry.      Capillary Refill: Capillary refill takes less than 2 seconds.   Neurological:      General: No focal deficit present.      Mental Status: She is alert. Mental status is at baseline.      Cranial Nerves: Cranial nerves 2-12 are intact. No cranial nerve deficit.      Sensory: Sensation is intact.      Motor: Motor function is intact. No weakness.      Comments: Chronic right eye EOM limitations   Psychiatric:         Mood and Affect: Mood normal.          Additional Data:     Labs:  Results from last 7 days   Lab Units 07/04/24  0556 07/03/24  0624   WBC Thousand/uL 7.21 7.38   HEMOGLOBIN g/dL 13.0 13.7   HEMATOCRIT % 41.2 42.9   PLATELETS Thousands/uL 203 232   SEGS PCT %  --  61   LYMPHO PCT %  --  26   MONO PCT %  --  8   EOS PCT %  --  4     Results from last 7 days   Lab Units 07/04/24  0556 07/03/24  0624   SODIUM mmol/L 137 138   POTASSIUM mmol/L 4.3 4.4   CHLORIDE mmol/L 106 104   CO2 mmol/L 24 27   BUN mg/dL 28* 30*   CREATININE mg/dL 1.14 1.36*   ANION GAP mmol/L 7 7   CALCIUM mg/dL 8.5 9.3   ALBUMIN g/dL  --  3.7   TOTAL BILIRUBIN mg/dL  --  0.64   ALK PHOS U/L  --  47   ALT U/L  --  14   AST U/L  --  16   GLUCOSE RANDOM mg/dL 169* 150*         Results from last 7 days   Lab Units 07/04/24  1059 07/04/24  0757 07/03/24  2123 07/03/24  1617 07/03/24  1104 07/03/24  0714 07/02/24  2105 07/02/24  1844 07/02/24  1446   POC GLUCOSE mg/dl 206* 177* 189* 78 175* 156* 162* 138 114     Results from last 7 days   Lab Units 07/03/24  0624   HEMOGLOBIN A1C % 7.6*           Lines/Drains:  Invasive Devices       Peripheral  Intravenous Line  Duration             Peripheral IV 07/02/24 Dorsal (posterior);Right Hand 2 days    Peripheral IV 07/02/24 Right Antecubital 2 days                      Telemetry:  Telemetry Orders (From admission, onward)               24 Hour Telemetry Monitoring  Continuous x 24 Hours (Telem)        Question:  Reason for 24 Hour Telemetry  Answer:  TIA/Suspected CVA/ Confirmed CVA                     Telemetry Reviewed: Normal Sinus Rhythm  Indication for Continued Telemetry Use: Acute CVA             Imaging: Reviewed radiology reports from this admission including: CT head    Recent Cultures (last 7 days):         Last 24 Hours Medication List:   Current Facility-Administered Medications   Medication Dose Route Frequency Provider Last Rate    acetaminophen  650 mg Oral Q6H PRN Glenn Piper MD      albuterol  2 puff Inhalation Q6H PRN Glenn Piper MD      atorvastatin  40 mg Oral HS Glenn Piper MD      DULoxetine  30 mg Oral Daily Glenn Piper MD      fluticasone  1 spray Nasal Daily Glenn Piper MD      Fluticasone Furoate-Vilanterol  1 puff Inhalation Daily Glenn Piper MD      And    umeclidinium  1 puff Inhalation Daily Glenn Piper MD      furosemide  20 mg Oral Daily Glenn Piper MD      heparin (porcine)  5,000 Units Subcutaneous Q8H [facility] Glenn Piper MD      insulin glargine  20 Units Subcutaneous QAM Glenn Piper MD      insulin lispro  1-5 Units Subcutaneous TID AC Glenn Piper MD      insulin lispro  6 Units Subcutaneous TID With Meals Glenn Piper MD      labetalol  10 mg Intravenous Q6H PRN Glenn Piper MD      latanoprost  1 drop Left Eye Daily Glenn Piper MD      levETIRAcetam  250 mg Oral Q12H Glenn Piper MD      menthol-methyl salicylate   Apply externally 4x Daily PRN JOSHUA Meadows      montelukast  10 mg  Oral QPM Glenn Piper MD      nystatin   Topical TID PRN JOSHUA Boothe      pantoprazole  40 mg Oral Early Morning Glenn Piper MD          Today, Patient Was Seen By: Dutch Hernandez MD    **Please Note: This note may have been constructed using a voice recognition system.**

## 2024-07-04 NOTE — ASSESSMENT & PLAN NOTE
Lab Results   Component Value Date    EGFR 45 07/04/2024    EGFR 36 07/03/2024    EGFR 54 07/02/2024    CREATININE 1.14 07/04/2024    CREATININE 1.36 (H) 07/03/2024    CREATININE 0.98 07/02/2024       Baseline creatinine seems to be between 0.9 and 1.1.  Presented at baseline, but creatinine elevated to 1.36.  Now back to baseline.

## 2024-07-04 NOTE — ASSESSMENT & PLAN NOTE
Lab Results   Component Value Date    HGBA1C 7.6 (H) 07/03/2024       Recent Labs     07/03/24  1617 07/03/24  2123 07/04/24  0757 07/04/24  1059   POCGLU 78 189* 177* 206*         Blood Sugar Average: Last 72 hrs:  (P) 155    Resume home medications on discharge

## 2024-07-04 NOTE — DISCHARGE INSTR - AVS FIRST PAGE
Follow up outpatient with Neurology. You will need an outpatient MRI brain and echocardiogram which have both been ordered. You should receive a call to schedule them but if not please contact our radiology department.     Your thyroid function tests were abnormal here.  Please follow-up with your primary care provider for repeat testing in a few weeks to see whether you may need to start medication for this.

## 2024-07-04 NOTE — RESPIRATORY THERAPY NOTE
RT Protocol Note  Shantelle Romano 78 y.o. female MRN: [id number]  Unit/Bed#: 2 E 254-01 Encounter: [id number]    Assessment    Principal Problem:    Stroke-like symptoms  Active Problems:    Hypertension    Hyperlipidemia associated with type 2 diabetes mellitus  (HCC)    Seizure disorder (HCC)    Chronic kidney disease-mineral and bone disorder    Abnormal CT scan      Home Pulmonary Medications:     07/04/24 1247   Respiratory Protocol   Protocol Initiated? No   Protocol Selection Respiratory   Language Barrier? No   Medical & Social History Reviewed? Yes   Diagnostic Studies Reviewed? Yes   Physical Assessment Performed? 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    Carpal tunnel syndrome     Chest pain 03/06/2017    Chronic kidney disease     Claudication (formerly [facility]) 3/15/2019    Closed fracture of base of middle phalanx of finger 06/22/2017    Closed fracture of middle phalanx of left little finger 07/08/2017    Coagulopathy (formerly [facility]) 05/15/2019    Colloid cyst of brain (HCC)     COPD (chronic obstructive pulmonary disease) (HCC)     COPD (chronic obstructive pulmonary disease) (HCC)     Coronary artery disease     Cough     Diabetes mellitus (HCC)     GERD (gastroesophageal reflux disease)     Glaucoma     Gout     History of brain disorder 10/07/2020    Hyperlipidemia     Hypertension     Irritable bowel syndrome     Melena 02/26/2019    PAD (peripheral artery disease) (formerly [facility]) 5/15/2019    Paronychia of great toe of left foot 10/07/2020    Post-traumatic seizures (formerly [facility]) 07/23/2015    Renal disorder     Seizures (formerly [facility])     last 2015     Shortness of breath     Single seizure (HCC) 2016    SOB (shortness of breath)     Syncope and collapse 2020    Urinary tract infection without hematuria 2016    Wheezing      Social History     Socioeconomic History    Marital status:      Spouse name: None    Number of children: None    Years of education: None    Highest education level: None   Occupational History    Occupation: retired   Tobacco Use    Smoking status: Former     Current packs/day: 0.00     Average packs/day: 0.5 packs/day for 40.0 years (20.0 ttl pk-yrs)     Types: Cigarettes     Start date:      Quit date:      Years since quittin.5    Smokeless tobacco: Never    Tobacco comments:     stopped many years ago   Vaping Use    Vaping status: Never Used   Substance and Sexual Activity    Alcohol use: Never    Drug use: Never    Sexual activity: Not Currently     Partners: Male   Other Topics Concern    None   Social History Narrative    None     Social Determinants of Health     Financial Resource Strain: Low Risk  (2024)    Overall Financial Resource Strain (CARDIA)     Difficulty of Paying Living Expenses: Not hard at all   Food Insecurity: No Food Insecurity (7/3/2024)    Hunger Vital Sign     Worried About Running Out of Food in the Last Year: Never true     Ran Out of Food in the Last Year: Never true   Transportation Needs: No Transportation Needs (7/3/2024)    PRAPARE - Transportation     Lack of Transportation (Medical): No     Lack of Transportation (Non-Medical): No   Physical Activity: Not on file   Stress: Not on file   Social Connections: Unknown (2024)    Received from [facility]    Social Connections     How often do you feel lonely or isolated from those around you? (Adult - for ages 18 years and over): Not on file   Intimate Partner Violence: Not on file   Housing Stability: Low Risk  (7/3/2024)    Housing Stability Vital Sign     Unable to Pay for Housing in the Last Year: No      "Number of Times Moved in the Last Year: 0     Homeless in the Last Year: No       Subjective         Objective    Physical Exam:        Vitals:  Blood pressure 165/73, pulse 63, temperature 98 °F (36.7 °C), temperature source Oral, resp. rate 16, height 4' 7\" (1.397 m), weight 73 kg (160 lb 15 oz), SpO2 (!) 86%, not currently breastfeeding.          Imaging and other studies: I have personally reviewed pertinent reports.      O2 Device: RA     Plan    Respiratory Plan: Home Bronchodilator Patient pathway        Resp Comments: continue prn as per home regimen   "

## 2024-07-04 NOTE — DISCHARGE SUMMARY
[facility]  Discharge- Shantelle Romano 1945, 78 y.o. female MRN: [id number]  Unit/Bed#: 2 E 254-01 Encounter: [id number]  Primary Care Provider: Clarisa Billignsley DO   Date and time admitted to hospital: 7/2/2024  2:42 PM    Abnormal thyroid function test  Assessment & Plan  TSH 7.7, free T4 0.56.  Patient has no overt symptoms of hypothyroidism.  She would prefer to follow-up with her primary care and have repeat testing done rather than start levothyroxine at this point.    Abnormal CT scan  Assessment & Plan  CTA with incidental findings of:  3.  5 mm right middle lobe pulmonary nodule versus juxta fissural lymph node. Based on current Fleischner Society 2017 Guidelines on incidental pulmonary nodule, no routine follow-up is needed if the patient is low risk. If the patient is high risk,   optional follow-up chest CT at 12 months can be considered.   4.  3 cm right thyroid lobe nodule. According to guidelines published in the February 2015 white paper on incidental thyroid nodules in the Journal of the American College of Radiology (JACR), Because the nodule(s) are greater than 1.5 cm in size,   further characterization with thyroid ultrasound is recommended.    Considerations related to the patient's age and/or comorbidities may be used to alter these recommendations.     Previous provider did make patient aware of these findings on the evening of 7/2  OP follow up            Chronic kidney disease-mineral and bone disorder  Assessment & Plan  Lab Results   Component Value Date    EGFR 45 07/04/2024    EGFR 36 07/03/2024    EGFR 54 07/02/2024    CREATININE 1.14 07/04/2024    CREATININE 1.36 (H) 07/03/2024    CREATININE 0.98 07/02/2024       Baseline creatinine seems to be between 0.9 and 1.1.  Presented at baseline, but creatinine elevated to 1.36.  Now back to baseline.     Seizure disorder (HCC)  Assessment & Plan  Appears overall stable from seizure perspective.  Continue  Keppra    Hyperlipidemia associated with type 2 diabetes mellitus  (HCC)  Assessment & Plan  Lab Results   Component Value Date    HGBA1C 7.6 (H) 07/03/2024       Recent Labs     07/03/24  1617 07/03/24  2123 07/04/24  0757 07/04/24  1059   POCGLU 78 189* 177* 206*         Blood Sugar Average: Last 72 hrs:  (P) 155    Resume home medications on discharge    Hypertension  Assessment & Plan  Seems to be overall poorly controlled.  As per neurology no need for permissive hypertension given duration of symptoms  Continue Lasix, as needed labetalol  Monitor blood pressure    * Stroke-like symptoms  Assessment & Plan  79yo F presented with dizziness, generalized weakness for several days  Markedly hypertensive on arrival.  CTA head neck -   1.  Patent major vessels of the [place] of hull without high-grade stenosis.  No aneurysm.   2.  No hemodynamically significant stenosis in the cervical carotid or vertebral arteries.   Unclear etiology of symptoms, suspect mostly related to uncontrolled BP but ED wanted to rule out stroke.      Stroke pathway  PT/OT-level 2 . Pt not interested in STR but open to [facility]  Continue statin.  Patient refusing aspirin.  Neurology following  Repeat CT head showed no acute change  MRI brain ordered for outpatient.  Echocardiogram also ordered for outpatient, should be done within 1 week.  Discussed this with family as well.        Medical Problems       Resolved Problems  Date Reviewed: 5/2/2024   None       Discharging Physician / Practitioner: Dutch Hernandez MD  PCP: Clarisa Billingsley DO  Admission Date:   Admission Orders (From admission, onward)       Ordered        07/03/24 1312  INPATIENT ADMISSION  Once            07/02/24 1656  Place in Observation  Once                          Discharge Date: 07/04/24    Consultations During Hospital Stay:  Neurology  PT/OT    Procedures Performed:   None    Significant Findings / Test Results:   CT head wo contrast   Final Result by Selvin Ni  MD Jeevan (07/04 1601)      No acute intracranial abnormality.      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      Unchanged mild chronic microangiopathy.                  Workstation performed: [id number]         XR chest 2 views   ED Interpretation by Jada Alves DO (07/02 1654)   No apparent infiltrate, effusion, pneumothorax      Final Result by Nora Meza MD (07/03 1948)      Increased opacity over the left atrium on the lateral projection which may be due to atelectasis but pneumonia not excluded in the appropriate clinical setting.            Workstation performed: [id number]         CTA stroke alert (head/neck)   Final Result by Norberto Purcell MD (07/02 1608)      1.  Patent major vessels of the [place] of hull without high-grade stenosis.  No aneurysm.   2.  No hemodynamically significant stenosis in the cervical carotid or vertebral arteries.   3.  5 mm right middle lobe pulmonary nodule versus juxta fissural lymph node. Based on current Fleischner Society 2017 Guidelines on incidental pulmonary nodule, no routine follow-up is needed if the patient is low risk. If the patient is high risk,    optional follow-up chest CT at 12 months can be considered.   4.  3 cm right thyroid lobe nodule. According to guidelines published in the February 2015 white paper on incidental thyroid nodules in the Journal of the American College of Radiology (JACR), Because the nodule(s) are greater than 1.5 cm in size,    further characterization with thyroid ultrasound is recommended.    Considerations related to the patient's age and/or comorbidities may be used to alter these recommendations.               Findings were directly discussed with Trista Lakhani  at 3:56 p.m.                           Workstation performed: [id number]         CT stroke alert brain   Final Result by Norberto Purcell MD (07/02 1607)      No  acute intracranial CT abnormality.                     Findings were directly discussed with Trista Lakhani  at 3:56 p.m.         Workstation performed: [id number]         MRI Inpatient Order    (Results Pending)   MRI brain wo contrast    (Results Pending)       Incidental Findings:   As above  I reviewed the above mentioned incidental findings with the patient and/or family and they expressed understanding.    Test Results Pending at Discharge (will require follow up):   None     Outpatient Tests Requested:  MRI brain (ordered by neurology)  Echocardiogram within 1 week (ordered by myself)    Complications: None    Reason for Admission: Strokelike symptoms    Hospital Course:   Shantelle Romano is a 78 y.o. female patient who originally presented to the hospital on 7/2/2024 due to strokelike symptoms including headache and dizziness.  Symptoms resolved shortly after admission.  She was admitted under the stroke pathway.  CT head showed no acute intracranial abnormality.  She was ordered to have MRI and echocardiogram done, however these still have not been done at the time of discharge.  Serial CT head was ordered instead which showed no acute change from previous.  She was discharged home with plan for outpatient MRI and echocardiogram as well as neurology follow-up.    She was noted to have abnormal thyroid function test which she will follow-up with her PCP regarding.    Please see above list of diagnoses and related plan for additional information.     Condition at Discharge: stable    Discharge Day Visit / Exam:   * Please refer to separate progress note for these details *    Discussion with Family: Updated  (son) via phone.    Discharge instructions/Information to patient and family:   See after visit summary for information provided to patient and family.      Provisions for Follow-Up Care:  See after visit summary for information related to follow-up care and any pertinent home health orders.       Mobility at time of Discharge:   Basic Mobility Inpatient Raw Score: 17  JH-HLM Goal: 5: Stand one or more mins  JH-HLM Achieved: 1: Laying in bed  HLM Goal NOT achieved. Continue to encourage mobility in post discharge setting.     Disposition:   Home with VNA Services (Reminder: Complete face to face encounter)    Planned Readmission: None     Discharge Statement:  I spent 45 minutes discharging the patient. This time was spent on the day of discharge. I had direct contact with the patient on the day of discharge. Greater than 50% of the total time was spent examining patient, answering all patient questions, arranging and discussing plan of care with patient as well as directly providing post-discharge instructions.  Additional time then spent on discharge activities.    Discharge Medications:  See after visit summary for reconciled discharge medications provided to patient and/or family.      **Please Note: This note may have been constructed using a voice recognition system**

## 2024-07-05 ENCOUNTER — TELEPHONE (OUTPATIENT)
Age: 79
End: 2024-07-05

## 2024-07-05 ENCOUNTER — TELEPHONE (OUTPATIENT)
Dept: FAMILY MEDICINE CLINIC | Facility: CLINIC | Age: 79
End: 2024-07-05

## 2024-07-05 ENCOUNTER — PATIENT OUTREACH (OUTPATIENT)
Dept: FAMILY MEDICINE CLINIC | Facility: CLINIC | Age: 79
End: 2024-07-05

## 2024-07-05 ENCOUNTER — TRANSITIONAL CARE MANAGEMENT (OUTPATIENT)
Dept: FAMILY MEDICINE CLINIC | Facility: CLINIC | Age: 79
End: 2024-07-05

## 2024-07-05 DIAGNOSIS — Z71.89 COMPLEX CARE COORDINATION: Primary | ICD-10-CM

## 2024-07-05 NOTE — TELEPHONE ENCOUNTER
Teresa, from [facility]. Called stating that the patient was recently discharged from [facility] and was told by the DrAbdiel At the hospital that it is recommended that she have an MRI done. She has an order pending for MRI and is scheduled for July 11th He suggested the patient reach out to PCP so that the office can submit a Prior Authorization for MRI.     Teresa can be reached for further questions at [phone]

## 2024-07-05 NOTE — PROGRESS NOTES
In basket message received with a patient referral from the HRR report.  Chart reviewed.  Patient was admitted to [facility] 7/2-7/4 with stroke like symptoms.She has a history of HTN, HLD,Type 2 Diabetes, seizure disorder,CKD, abnormal CT scan and abnormal thyroid function test.  She discharged home with [facility].  I left a message on patient's voicemail with my contact information.

## 2024-07-05 NOTE — UTILIZATION REVIEW
NOTIFICATION OF INPATIENT ADMISSION   AUTHORIZATION REQUEST   SERVICING FACILITY:   [place] [address]  Tax ID: [id number]  NPI: [id number] ATTENDING PROVIDER:  Attending Name and NPI#: Dutch Hernandez Md [[id number]]  Address: [address] [place] [address]  Phone: [phone]     ADMISSION INFORMATION:  Place of Service: Inpatient [facility]  Place of Service Code: 21  Inpatient Admission Date/Time: 7/3/24  1:12 PM  Discharge Date/Time: 7/4/2024  6:08 PM  Admitting Diagnosis Code/Description:  Thyroid nodule [E04.1]  Dizziness [R42]  Altered mental status [R41.82]  Pulmonary nodule [R91.1]  Stroke-like symptoms [R29.90]     UTILIZATION REVIEW CONTACT:  Constance Fonseca Utilization   Network Utilization Review Department  Phone: [phone]  Fax [phone]  Email: [email or web address]  Contact for approvals/pending authorizations, clinical reviews, and discharge.     PHYSICIAN ADVISORY SERVICES:  Medical Necessity Denial & Wsie-tw-Ykcw Review  Phone: [phone]  Fax: [phone]  Email: [email or web address]     DISCHARGE SUPPORT TEAM:  For Patients Discharge Needs & Updates  Phone: [phone] opt. 2 Fax: [phone]  Email: [email or web address]

## 2024-07-05 NOTE — TELEPHONE ENCOUNTER
Pt was Dc'ed from [facility] [place] 7/4.. she is scheduled for her TCM next Friday.. daughter sd she is also scheduled for an MRI next Thursday @ [place] and is asking if Dr Billingsley can possibly get her a sooner appt..  It could be at [place], which is a lot closer for her to travel.  Daughter is concerned that her BP is high, it fluctuates now is 153/59 and she is off her BP med.  Pls. advise.

## 2024-07-08 ENCOUNTER — TELEPHONE (OUTPATIENT)
Dept: NEUROLOGY | Facility: CLINIC | Age: 79
End: 2024-07-08

## 2024-07-08 ENCOUNTER — TELEPHONE (OUTPATIENT)
Dept: FAMILY MEDICINE CLINIC | Facility: CLINIC | Age: 79
End: 2024-07-08

## 2024-07-08 NOTE — TELEPHONE ENCOUNTER
Hello,     Can you please advise which Speciality/Team and if patient can be seen by an Resident, AP and or Attending  only?    Thank you for your time,     Stephie

## 2024-07-08 NOTE — TELEPHONE ENCOUNTER
They were looking for a sooner MRI, I called nothing available sooner. Advised that they can call central scheduling daily to ask about any cancellations

## 2024-07-08 NOTE — TELEPHONE ENCOUNTER
Lars / Son in Law returned call and sd the MRI can stay as scheduled 7/11.. Also sd she is scheduled for an echo.. He just wants to be sure these tests don't require prior auth's..  Please advise.

## 2024-07-08 NOTE — TELEPHONE ENCOUNTER
HFU/ [facility]/ Stroke-like symptoms      DC- Home- 7/4/24      Please provide HFU instructions for pt when to schedule, with who and the time frame.

## 2024-07-09 ENCOUNTER — PATIENT OUTREACH (OUTPATIENT)
Dept: FAMILY MEDICINE CLINIC | Facility: CLINIC | Age: 79
End: 2024-07-09

## 2024-07-09 NOTE — PROGRESS NOTES
Left message with my contact information for patient to return my call  Unable to reach letter sent

## 2024-07-09 NOTE — LETTER
Date: 07/09/24    Dear Shantelle Romano,   My name is Theodora John; I am a registered nurse care manager working with [facility]  [address]  [facility] [address].   I have not been able to reach you and would like to set a time that I can talk with you over the phone.  My work is to help patients that have complex medical conditions get the care they need. This includes patients who may have been in the hospital or emergency room.    Please call me with any questions you may have. I look forward to speaking with you.  Sincerely,  Theodora John  [phone]  Outpatient Care Manager

## 2024-07-11 ENCOUNTER — HOSPITAL ENCOUNTER (OUTPATIENT)
Dept: RADIOLOGY | Facility: IMAGING CENTER | Age: 79
End: 2024-07-11
Payer: COMMERCIAL

## 2024-07-11 DIAGNOSIS — R47.01 APHASIA: ICD-10-CM

## 2024-07-11 DIAGNOSIS — R41.82 ALTERED MENTAL STATUS: ICD-10-CM

## 2024-07-11 PROBLEM — R93.89 ABNORMAL CT SCAN: Status: RESOLVED | Noted: 2024-07-02 | Resolved: 2024-07-11

## 2024-07-11 PROCEDURE — 70551 MRI BRAIN STEM W/O DYE: CPT

## 2024-07-12 ENCOUNTER — OFFICE VISIT (OUTPATIENT)
Dept: FAMILY MEDICINE CLINIC | Facility: CLINIC | Age: 79
End: 2024-07-12
Payer: COMMERCIAL

## 2024-07-12 ENCOUNTER — TELEPHONE (OUTPATIENT)
Age: 79
End: 2024-07-12

## 2024-07-12 ENCOUNTER — TELEPHONE (OUTPATIENT)
Dept: FAMILY MEDICINE CLINIC | Facility: CLINIC | Age: 79
End: 2024-07-12

## 2024-07-12 VITALS
TEMPERATURE: 96.8 F | HEART RATE: 74 BPM | SYSTOLIC BLOOD PRESSURE: 146 MMHG | BODY MASS INDEX: 35.64 KG/M2 | DIASTOLIC BLOOD PRESSURE: 86 MMHG | WEIGHT: 154 LBS | HEIGHT: 55 IN | OXYGEN SATURATION: 96 %

## 2024-07-12 DIAGNOSIS — E11.40 TYPE 2 DIABETES MELLITUS WITH DIABETIC NEUROPATHY, WITH LONG-TERM CURRENT USE OF INSULIN (HCC): ICD-10-CM

## 2024-07-12 DIAGNOSIS — R07.89 CHEST WALL PAIN: ICD-10-CM

## 2024-07-12 DIAGNOSIS — J41.0 SIMPLE CHRONIC BRONCHITIS (HCC): ICD-10-CM

## 2024-07-12 DIAGNOSIS — R91.1 LUNG NODULE: ICD-10-CM

## 2024-07-12 DIAGNOSIS — Z12.83 SKIN CANCER SCREENING: ICD-10-CM

## 2024-07-12 DIAGNOSIS — R94.6 ABNORMAL THYROID FUNCTION TEST: ICD-10-CM

## 2024-07-12 DIAGNOSIS — Z76.89 ENCOUNTER FOR SUPPORT AND COORDINATION OF TRANSITION OF CARE: ICD-10-CM

## 2024-07-12 DIAGNOSIS — E04.1 THYROID NODULE: ICD-10-CM

## 2024-07-12 DIAGNOSIS — R29.90 STROKE-LIKE SYMPTOMS: Primary | ICD-10-CM

## 2024-07-12 DIAGNOSIS — Z79.4 TYPE 2 DIABETES MELLITUS WITH DIABETIC NEUROPATHY, WITH LONG-TERM CURRENT USE OF INSULIN (HCC): ICD-10-CM

## 2024-07-12 PROCEDURE — 99495 TRANSJ CARE MGMT MOD F2F 14D: CPT | Performed by: FAMILY MEDICINE

## 2024-07-12 RX ORDER — LIDOCAINE 50 MG/G
1 PATCH TOPICAL DAILY PRN
Qty: 30 PATCH | Refills: 1 | Status: SHIPPED | OUTPATIENT
Start: 2024-07-12

## 2024-07-12 RX ORDER — GUAIFENESIN 600 MG/1
600 TABLET, EXTENDED RELEASE ORAL 2 TIMES DAILY PRN
Qty: 180 TABLET | Refills: 1 | Status: SHIPPED | OUTPATIENT
Start: 2024-07-12

## 2024-07-12 RX ORDER — LIDOCAINE 50 MG/G
1 PATCH TOPICAL DAILY PRN
Qty: 30 PATCH | Refills: 1 | Status: SHIPPED | OUTPATIENT
Start: 2024-07-12 | End: 2024-07-12 | Stop reason: SDUPTHER

## 2024-07-12 RX ORDER — GABAPENTIN 100 MG/1
200 CAPSULE ORAL
Qty: 180 CAPSULE | Refills: 1 | Status: SHIPPED | OUTPATIENT
Start: 2024-07-12

## 2024-07-12 NOTE — ASSESSMENT & PLAN NOTE
Pt states it started hurting during transfers in the hospital, feels like she pulled muscles along her ribs on the L lower side -- trial Lidocaine patches, reviewed it can take time for these symptoms to improve

## 2024-07-12 NOTE — PROGRESS NOTES
Transition of Care Visit  Name: Shantelle Romano      : 1945      MRN: [id number]  Encounter Provider: Clarisa Billingsley DO  Encounter Date: 2024   Encounter department: [facility]    Assessment & Plan   1. Stroke-like symptoms  Assessment & Plan:  Await MRI, f/u with Neuro as scheduled   2. Lung nodule  Assessment & Plan:  Will repeat imaging in 1 year to monitor   Orders:  -     CT lung nodule follow-up; Future; Expected date: 2025  3. Thyroid nodule  Assessment & Plan:  US to further evaluate, will plan to repeat TSH in 2-4 weeks to re-evaluate  Orders:  -     US thyroid; Future; Expected date: 2024  -     TSH, 3rd generation with Free T4 reflex; Future  4. Abnormal thyroid function test  -     US thyroid; Future; Expected date: 2024  -     TSH, 3rd generation with Free T4 reflex; Future  5. Chest wall pain  Assessment & Plan:  Pt states it started hurting during transfers in the hospital, feels like she pulled muscles along her ribs on the L lower side -- trial Lidocaine patches, reviewed it can take time for these symptoms to improve  Orders:  -     lidocaine (Lidoderm) 5 %; Apply 1 patch topically over 12 hours daily as needed (pain) Remove & Discard patch within 12 hours or as directed by MD  6. Simple chronic bronchitis (HCC)  Assessment & Plan:  Refill Mucinex sent, continue regimen. No acute exacerbation on exam.   Orders:  -     guaiFENesin (MUCINEX) 600 mg 12 hr tablet; Take 1 tablet (600 mg total) by mouth 2 (two) times a day as needed for cough or congestion  7. Skin cancer screening  -     Ambulatory referral to Dermatology; Future  8. Type 2 diabetes mellitus with diabetic neuropathy, with long-term current use of insulin (HCC)  Assessment & Plan:  Trial increase in Gabapentin to 200 mg qHS   Orders:  -     gabapentin (NEURONTIN) 100 mg capsule; Take 2 capsules (200 mg total) by mouth daily at bedtime  9. Encounter for support and coordination of  transition of care         History of Present Illness     Transitional Care Management Review:   Shantelle Romano is a 79 y.o. female here for TCM follow up.     During the TCM phone call patient stated:  TCM Call     Date and time call was made  7/8/2024  9:10 AM    Hospital care reviewed  Records reviewed    Patient was hospitialized at  [facility]    Date of Admission  07/02/24    Date of discharge  07/04/24    Diagnosis  Stroke-like symptoms    Disposition  Home    Were the patients medications reviewed and updated  No    Current Symptoms  Weakness      TCM Call     Post hospital issues  Reduced activity; Poor ADL (Activities of Daily Living) performance    Should patient be enrolled in anticoag monitoring?  No    Scheduled for follow up?  Yes    Did you obtain your prescribed medications  Yes    Do you need help managing your prescriptions or medications  No    Is transportation to your appointment needed  No    I have advised the patient to call PCP with any new or worsening symptoms  Laisha HOOPER        HPI    Pt presents for hospital follow up s/p admission to [facility] from 7/2-7/4. Pt initially presented due to dizziness, weakness was found to be hypertensive.     CT Head: No acute process; microangiopathy  CTA Head/Neck: No significant stenosis; incidental 5 mm RML lung nodule (CT in 12 months), 2 cm T thyroid nodule (recommend US)  CXR: Opacity over L atrium -- possible atelectasis vs PNA  Repeat CT Head: No acute process     Lipids: Total 173, LDL 87, HDL 39,   A1c 7.6% (up from 6.9)   TSH 7.7/T4 0.56   Cr 1.36/GFR 36 (peak) --> 1.14/45 at d/c  CBC benign     MRI and ECHO ordered for outpatient. No medication changes.   MRI completed, pending read.   ECHO scheduled 7/22.   Cardio f/u 7/26, Neuro f/u 8/8    Today:   ROS as below   Balance is not good   Home BPs 120-130s the past few days (was 150s prior to that)   Needs refill of Mucinex   Would like referral to Derm   Would like to increase dose  "of Gabapentin     Pt's daughter wondering about symptom she had when she went to the ED -- she had full bladder incontinence without sensation. This resolved within 24 hours and she no longer has symptoms, but daughter unsure what caused this.     Review of Systems   Respiratory:  Positive for shortness of breath. Negative for cough.    Cardiovascular:  Positive for chest pain (\"sometimes pinching\"). Negative for palpitations and leg swelling.   Gastrointestinal:  Positive for abdominal pain, constipation and diarrhea.        Chronic   Endocrine: Negative for polyuria.   Genitourinary:  Negative for dysuria and hematuria.   Musculoskeletal:  Positive for gait problem (off balance).   Neurological:  Negative for dizziness and headaches.     Objective     /86 (BP Location: Left arm, Patient Position: Sitting, Cuff Size: Adult)   Pulse 74   Temp (!) 96.8 °F (36 °C)   Ht 4' 7\" (1.397 m)   Wt 69.9 kg (154 lb)   SpO2 96%   BMI 35.79 kg/m²     Physical Exam  Vitals and nursing note reviewed.   Constitutional:       General: She is not in acute distress.     Appearance: She is well-developed.   HENT:      Head: Normocephalic and atraumatic.      Right Ear: Ear canal and external ear normal. There is impacted cerumen.      Left Ear: Ear canal and external ear normal. There is impacted cerumen.      Nose: Nose normal. No rhinorrhea.      Mouth/Throat:      Mouth: Mucous membranes are moist.      Pharynx: No oropharyngeal exudate or posterior oropharyngeal erythema.   Eyes:      Conjunctiva/sclera: Conjunctivae normal.   Neck:      Thyroid: No thyromegaly.   Cardiovascular:      Rate and Rhythm: Normal rate and regular rhythm.   Pulmonary:      Effort: Pulmonary effort is normal. No respiratory distress.      Breath sounds: Normal breath sounds.   Abdominal:      General: Bowel sounds are normal. There is no distension.      Palpations: Abdomen is soft.      Tenderness: There is no abdominal tenderness. "   Musculoskeletal:      Comments: Ambulating with walker   Lymphadenopathy:      Cervical: No cervical adenopathy.   Skin:     General: Skin is warm and dry.   Neurological:      Mental Status: She is alert. Mental status is at baseline.   Psychiatric:         Mood and Affect: Mood normal.       Medications have been reviewed by provider in current encounter    Administrative Statements

## 2024-07-12 NOTE — TELEPHONE ENCOUNTER
7/12/2024 spoke with Rach and she said that she will get the order OT going and will fax a form over next week.

## 2024-07-12 NOTE — TELEPHONE ENCOUNTER
Rach, [facility], called in regards to pt. She wants pt to have OT for bathroom transfer training and self care, 1x a week over the next 4 weeks to start on the week of 7/15. They can send a fax over (Fax [phone]) or if provider is ok with this plan, a verbal permission from provider will be quicker so there is no delay in care. Please advise and give Rach a call back [phone] at your convenience.

## 2024-07-16 ENCOUNTER — TELEPHONE (OUTPATIENT)
Age: 79
End: 2024-07-16

## 2024-07-16 NOTE — TELEPHONE ENCOUNTER
Pt has Lasix on her list -- is she taking? Any worsening swelling in her legs or shortness of breath compared to normal?

## 2024-07-16 NOTE — TELEPHONE ENCOUNTER
[facility] wants provider to be aware that pt has Weigh gain 152 lb yesterday 155.3 lb today. Nurse states she doesn't know if pt is taking medication.

## 2024-07-19 NOTE — TELEPHONE ENCOUNTER
PA for lidocaine (Lidoderm) 5 %   Denied    Reason:(Screenshot if applicable)        Message sent to office clinical pool Yes    Denial letter scanned into Media Yes    Appeal started No (Provider will need to decide if appeal is warranted and send clinical documentation to Prior Authorization Team for initiation.)    **Please follow up with your patient regarding denial and next steps**

## 2024-07-19 NOTE — TELEPHONE ENCOUNTER
PA for lidocaine (Lidoderm) 5 %     Submitted via    []CMM-KEY   [x]ACE*COMM-Case ID # [id number]   []Faxed to plan   []Other website   []Phone call Case ID #     Office notes sent, clinical questions answered. Awaiting determination    Turnaround time for your insurance to make a decision on your Prior Authorization can take 7-21 business days.

## 2024-07-22 ENCOUNTER — TELEPHONE (OUTPATIENT)
Dept: FAMILY MEDICINE CLINIC | Facility: CLINIC | Age: 79
End: 2024-07-22

## 2024-07-22 ENCOUNTER — HOSPITAL ENCOUNTER (OUTPATIENT)
Dept: NON INVASIVE DIAGNOSTICS | Facility: CLINIC | Age: 79
Discharge: HOME | End: 2024-07-22
Payer: COMMERCIAL

## 2024-07-22 VITALS
SYSTOLIC BLOOD PRESSURE: 146 MMHG | DIASTOLIC BLOOD PRESSURE: 86 MMHG | BODY MASS INDEX: 35.64 KG/M2 | WEIGHT: 154 LBS | HEIGHT: 55 IN | HEART RATE: 62 BPM

## 2024-07-22 DIAGNOSIS — R29.90 STROKE-LIKE SYMPTOMS: ICD-10-CM

## 2024-07-22 LAB
AORTIC ROOT: 2.9 CM
APICAL FOUR CHAMBER EJECTION FRACTION: 51 %
AV REGURGITATION PRESSURE HALF TIME: 459 MS
BSA FOR ECHO PROCEDURE: 1.57 M2
E WAVE DECELERATION TIME: 208 MS
E/A RATIO: 0.61
FRACTIONAL SHORTENING: 36 (ref 28–44)
INTERVENTRICULAR SEPTUM IN DIASTOLE (PARASTERNAL SHORT AXIS VIEW): 1 CM
INTERVENTRICULAR SEPTUM: 1 CM (ref 0.6–1.1)
LAAS-AP2: 12.2 CM2
LAAS-AP4: 15.2 CM2
LEFT ATRIUM AREA SYSTOLE SINGLE PLANE A4C: 15.2 CM2
LEFT ATRIUM SIZE: 3.8 CM
LEFT ATRIUM VOLUME (MOD BIPLANE): 32 ML
LEFT ATRIUM VOLUME INDEX (MOD BIPLANE): 20.4 ML/M2
LEFT INTERNAL DIMENSION IN SYSTOLE: 3.2 CM (ref 2.1–4)
LEFT VENTRICULAR INTERNAL DIMENSION IN DIASTOLE: 5 CM (ref 3.5–6)
LEFT VENTRICULAR POSTERIOR WALL IN END DIASTOLE: 0.9 CM
LEFT VENTRICULAR STROKE VOLUME: 78 ML
LVSV (TEICH): 78 ML
MV E'TISSUE VEL-SEP: 6 CM/S
MV PEAK A VEL: 1.09 M/S
MV PEAK E VEL: 67 CM/S
MV STENOSIS PRESSURE HALF TIME: 60 MS
MV VALVE AREA P 1/2 METHOD: 3.67
RIGHT ATRIUM AREA SYSTOLE A4C: 11.7 CM2
RIGHT VENTRICLE ID DIMENSION: 2.2 CM
SL CV AV DECELERATION TIME RETROGRADE: 1583 MS
SL CV AV PEAK GRADIENT RETROGRADE: 73 MMHG
SL CV LEFT ATRIUM LENGTH A2C: 4.4 CM
SL CV LV EF: 55
SL CV PED ECHO LEFT VENTRICLE DIASTOLIC VOLUME (MOD BIPLANE) 2D: 118 ML
SL CV PED ECHO LEFT VENTRICLE SYSTOLIC VOLUME (MOD BIPLANE) 2D: 40 ML
TR MAX PG: 7 MMHG
TR PEAK VELOCITY: 1.3 M/S
TRICUSPID ANNULAR PLANE SYSTOLIC EXCURSION: 1.6 CM
TRICUSPID VALVE PEAK REGURGITATION VELOCITY: 1.29 M/S

## 2024-07-22 PROCEDURE — 93306 TTE W/DOPPLER COMPLETE: CPT

## 2024-07-22 PROCEDURE — 93306 TTE W/DOPPLER COMPLETE: CPT | Performed by: INTERNAL MEDICINE

## 2024-07-22 NOTE — TELEPHONE ENCOUNTER
Please let pt know her MRI did not show any acute stroke. She has chronic changes generally seen with a long-term history of DM/HTN/HLD, etc and from prior surgery.

## 2024-07-23 ENCOUNTER — TELEPHONE (OUTPATIENT)
Age: 79
End: 2024-07-23

## 2024-07-23 ENCOUNTER — PATIENT OUTREACH (OUTPATIENT)
Dept: CASE MANAGEMENT | Facility: OTHER | Age: 79
End: 2024-07-23

## 2024-07-23 NOTE — TELEPHONE ENCOUNTER
Patient son called and stated the insurance company will be calling for more information regarding  the lidocaine patch for pt

## 2024-07-23 NOTE — TELEPHONE ENCOUNTER
Son called about Shantelle and testing she had yesterday was an echo, son is advising Shantelle was told they could  not able to find her vein so they could not to the testing,  Explain had a echo, unsure what she referring too.     Pt has an upcoming appt on Friday, but would like more details of what happened at yesterday test.     Please advise

## 2024-07-24 ENCOUNTER — TELEPHONE (OUTPATIENT)
Age: 79
End: 2024-07-24

## 2024-07-24 NOTE — TELEPHONE ENCOUNTER
Kamilla from [facility] called pt had 4 pound gain weight in 1 day lower extremity edema diminished lung sounds pt is taking Furosemide 20 mg 3 times daily.

## 2024-07-24 NOTE — TELEPHONE ENCOUNTER
Left message on machine for the patient to call back. No number provided for Kamilla from [facility] to get information.

## 2024-07-26 ENCOUNTER — OFFICE VISIT (OUTPATIENT)
Dept: CARDIOLOGY CLINIC | Facility: CLINIC | Age: 79
End: 2024-07-26
Payer: COMMERCIAL

## 2024-07-26 ENCOUNTER — TELEPHONE (OUTPATIENT)
Dept: CARDIOLOGY CLINIC | Facility: CLINIC | Age: 79
End: 2024-07-26

## 2024-07-26 VITALS
OXYGEN SATURATION: 95 % | HEART RATE: 73 BPM | WEIGHT: 159 LBS | DIASTOLIC BLOOD PRESSURE: 74 MMHG | HEIGHT: 55 IN | SYSTOLIC BLOOD PRESSURE: 130 MMHG | BODY MASS INDEX: 36.8 KG/M2 | RESPIRATION RATE: 16 BRPM

## 2024-07-26 DIAGNOSIS — I10 PRIMARY HYPERTENSION: ICD-10-CM

## 2024-07-26 DIAGNOSIS — I20.89 ANGINAL EQUIVALENT: ICD-10-CM

## 2024-07-26 DIAGNOSIS — I50.33 ACUTE ON CHRONIC HEART FAILURE WITH PRESERVED EJECTION FRACTION (HFPEF) (HCC): Primary | ICD-10-CM

## 2024-07-26 DIAGNOSIS — R42 POSITIONAL LIGHTHEADEDNESS: ICD-10-CM

## 2024-07-26 PROCEDURE — 99214 OFFICE O/P EST MOD 30 MIN: CPT

## 2024-07-26 RX ORDER — POTASSIUM CHLORIDE 750 MG/1
10 CAPSULE, EXTENDED RELEASE ORAL DAILY
Qty: 5 CAPSULE | Refills: 0 | Status: SHIPPED | OUTPATIENT
Start: 2024-07-26 | End: 2024-08-02

## 2024-07-26 NOTE — PROGRESS NOTES
CARDIOLOGY OFFICE VISIT  [facility]  [address] [place] [address], [place] [id number]  Tel: [phone]      NAME: Shantelle Romano  AGE: 79 y.o.  SEX: female  : 1945  MRN: [id number]      Chief Complaint:  Chief Complaint   Patient presents with    Follow-up       Assessment and Plan:    1.  Acute on chronic HFpEF-EF 55%  Appears hypervolemic on exam  Uptitrate Lasix to 40 mg q a.m., 20 mg q p.m x 3-5 days until she reaches previous dry weight of approximately 152-153 pounds and notes improved shortness of breath and lower extremity edema. Daily 10mEq K supplement prescribed to be taken with the increased dose of lasix.  After this, resume previous dosing of Lasix 20 mg daily without potassium supplement.  BMP ordered to be completed in 1 week  Daily weights and low-sodium diet advised. She was advised to notify our office if she experiences continued weight gain and worsening lower extremity edema, or if her symptoms persist after 5 days of therapy.  She was advised to proceed to the ER if she develops worsening shortness of breath.    2.  Chest pain  Pharmacologic MPI ordered for further evaluation.  Given recent strokelike symptoms and current acute CHF exacerbation, patient was advised that this should be completed in a few weeks.  She was advised to proceed to the ER for significant chest pain or shortness of breath.     3. Hypertension  Continue Lasix per above  Ambulatory BP monitoring and low-sodium diet advised    4.  Ambulatory dysfunction  Per patient's son-in-law, she has not been experiencing frequent falls in the recent past    5.  IDDM (A1c 7.6)    6.  Hyperlipidemia  Continue statin    7.  CKD 3  Follows with nephrology    8.  Positional lightheadedness  She was advised to take positional changes slowly, wear compression stockings, adequate p.o. hydration.  She was advised to continue to monitor her symptoms and inform our  office of any changing or worsening symptoms.    9.  Recent strokelike symptoms  Continue to follow with neurology      Follow-up: 1 week or sooner as needed.   All questions and concerned addressed.   Patient was advised to notify our office with onset of cardiac symptoms.    1. Acute on chronic heart failure with preserved ejection fraction (HFpEF) ([facility])  Basic metabolic panel      2. Anginal equivalent  potassium chloride (MICRO-K) 10 MEQ CR capsule    NM myocardial perfusion spect (rx stress and/or rest)    CANCELED: NM myocardial perfusion spect (rx stress and/or rest)      3. Primary hypertension        4. Positional lightheadedness            History of Present Illness:     Shantelle Romano is a 79 y.o. female with PMHx of hypertension, chronic HFpEF, ambulatory dysfunction/frequent falls, IDDM, hyperlipidemia, CKD, COPD (follows with pulmonology), history of seizure, who presents for hospital follow-up.  This patient previously followed with Dr. Richards.      She presented to [facility] ED with dizziness, headache, gait difficulty, difficulty speaking, fatigue.  Family reported to EMS that her BP was initially in the 190s.  She was noted to be hypertensive during her hospitalization.  Troponins negative x 3.  EKG without acute ischemic changes.  Neurology was consulted for stroke alert.  She did reportedly return back to her neurologic baseline during hospitalization.  CT stroke alert with no acute intracranial abnormality.  Her CTA head and neck revealed patent major vessels of [place] without high-grade stenosis, no aneurysm, no hemodynamically significant stenosis of the cervical carotid or vertebral arteries.  Repeat CT head revealed no acute intracranial abnormality.  MRI brain was completed in the outpatient setting which revealed no acute infarct.  Her TSH was noted to be abnormal.  TTE was completed in the outpatient setting.  TTE 7/2024: EF 55%, mild aortic regurgitation, mild mitral vegetation,  mild tricuspid regurgitation.      History obtained from patient and son-in-law.  She states she did not take her Lasix the day she went to the hospital.  She noted her blood pressure was fluctuating in the days preceding her hospitalizations.  She states she has been compliant with low-salt diet.  She does state that she feels like she has been retaining fluid over the past couple of weeks.  She has noted some shortness of breath with wheezing that is improved with her inhaler, as well as increased phlegm that is improved with Mucinex, although she has a difficult time stating when the symptoms began.  She has noticed worsening lower extremity edema over the past couple of weeks, as well as significant weight fluctuations.  She states her weight is typically around 150-152, but has increased.  Per chart review of recent telephone encounters,, her home health aide has noted recently gain of 4 pounds in a day as well as recent worsening lower extremity edema and diminished lung sounds.  Although this telephone encounter states that patient has been taking Lasix 20 mg 3 times daily, patient reports she was taking Lasix 20 mg twice daily over the past week, although yesterday only took Lasix once and has not taken it today due to concern over her kidneys.  She has not noticed any significant change in her symptoms or weight with the increased dose of Lasix.  She additionally endorses orthopnea which started a few weeks ago, denies PND.  She states she has been compliant with low-sodium diet with no recent dietary indiscretions.    She also endorses chest discomfort.  She states she has been experiencing a chest heaviness with walking that started recently.  She states sometimes it happens when she stands up, however sometimes it is brought on with walking.  She states it feels better after she sits down and rest.  She states she has been experiencing lightheadedness which only occurs if she stands up too quickly.  She  "was advised to take positional changes slowly, and to continue to monitor her symptoms and notify our office of any changing or worsening symptoms.  She denies palpitations or syncopal episodes.    She denies history of GI bleeds, hematochezia, hematuria, melena.  States she does have infrequent hematochezia if she experiences significant hemorrhoids.  Per patient's son-in-law, patient does not fall frequently at home.  Denies history of brain bleeds.      Family History: Brother and mother with history of cardiac disease  Social History: Former smoker, quit in 2003    Recent Cardiac Work-Up:  TTE 7/2024: EF 55%, mild aortic regurgitation, mild mitral vegetation, mild tricuspid regurgitation.  TTE 11/2022: EF 55%, grade 1 DD, mild aortic insufficiency  Lower extremity arterial duplex 11/2020: No evidence of PAD  TTE 11/2020: EF 55%, mild mitral stenosis  Pharmacologic MPI 11/2020: Negative for ischemia      Review of Systems:   Review of Systems   Constitutional:  Positive for unexpected weight change. Negative for chills, diaphoresis and fever.   HENT:  Negative for ear pain and sore throat.    Eyes:  Negative for pain and redness.   Respiratory:  Positive for shortness of breath. Negative for cough.    Cardiovascular:  Positive for chest pain and leg swelling. Negative for palpitations.   Gastrointestinal:  Negative for abdominal pain, nausea and vomiting.   Genitourinary:  Negative for dysuria.   Skin:  Negative for color change and rash.   Neurological:  Positive for light-headedness. Negative for syncope.   Hematological:  Does not bruise/bleed easily.   Psychiatric/Behavioral:  Negative for agitation.    All other systems reviewed and are negative.        Vitals:  Vitals:    07/26/24 1309   BP: 130/74   BP Location: Right arm   Patient Position: Sitting   Cuff Size: Standard   Pulse: 73   Resp: 16   SpO2: 95%   Weight: 72.1 kg (159 lb)   Height: 4' 7\" (1.397 m)        Body mass index is 36.96 kg/m².    Weight " (last 2 days)       Date/Time Weight    07/26/24 1309 72.1 (159)              Physical Exam:   GEN: Alert and aware, in no acute distress.  Well appearing and well nourished.   HEENT: Sclera anicteric, conjunctivae pink, mucous membranes moist. Oropharynx clear.   NECK: Supple, no carotid bruits, no significant JVD. Trachea midline, no thyromegaly.   HEART: Regular rhythm, normal S1 and S2, no murmurs, clicks, gallops or rubs. PMI nondisplaced, no thrills.   LUNGS: Bibasilar crackles to auscultation.  No wheezes or rhonchi appreciated. No increased work of breathing or signs of respiratory distress.   ABDOMEN: Soft, nontender, nondistended.  She does note increased shortness of breath with palpation of the RUQ.  EXTREMITIES: 1+ pitting edema to bilateral lower extremities.  Skin warm and well perfused, no clubbing, cyanosis.  SKIN: Normal without suspicious lesions on exposed skin.      EKG Reviewed Personally: 7/2/2024: NSR with sinus arrhythmia, incomplete RBBB, anteroseptal T wave abnormalities which have been noted on previous EKGs        Active Problems:  Patient Active Problem List   Diagnosis    Simple chronic bronchitis (HCC)    Hypertension    Alternating constipation and diarrhea    Chest wall pain    Vitamin D deficiency    Stage 3b chronic kidney disease (HCC)    Bilateral leg edema    Chronic pain syndrome    Coronary artery disease without angina pectoris    Chronic heart failure with preserved ejection fraction (HCC)    Gastroesophageal reflux disease without esophagitis    Hyperlipidemia associated with type 2 diabetes mellitus  (HCC)    Left ventricular hypertrophy    Lung nodule    Macular pucker, left    Memory loss    Osteoarthritis    Seizure disorder (HCC)    Strabismic amblyopia of right eye    Neurologic gait dysfunction    Type 2 diabetes mellitus with diabetic neuropathy (HCC)    Chronic kidney disease-mineral and bone disorder    Osteopenia of multiple sites    Urinary incontinence     Former smoker    Obesity, morbid (HCC)    Stroke-like symptoms    Abnormal thyroid function test    Thyroid nodule       Past Medical History:  Past Medical History:   Diagnosis Date    Acute kidney injury superimposed on chronic kidney disease  ([facility]) 11/26/2016    ADHD (attention deficit hyperactivity disorder) 03/17/2019    Arthritis     BL HIPS    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    Carpal tunnel syndrome     Chest pain 03/06/2017    Chronic kidney disease     Claudication ([facility]) 3/15/2019    Closed fracture of base of middle phalanx of finger 06/22/2017    Closed fracture of middle phalanx of left little finger 07/08/2017    Coagulopathy ([facility]) 05/15/2019    Colloid cyst of brain ([facility])     COPD (chronic obstructive pulmonary disease) (HCC)     COPD (chronic obstructive pulmonary disease) ([facility])     Coronary artery disease     Cough     Diabetes mellitus ([facility])     GERD (gastroesophageal reflux disease)     Glaucoma     Gout     History of brain disorder 10/07/2020    Hyperlipidemia     Hypertension     Irritable bowel syndrome     Melena 02/26/2019    PAD (peripheral artery disease) ([facility]) 5/15/2019    Paronychia of great toe of left foot 10/07/2020    Post-traumatic seizures ([facility]) 07/23/2015    Renal disorder     Seizures ([facility])     last 2015    Shortness of breath     Single seizure ([facility]) 05/31/2016    SOB (shortness of breath)     Syncope and collapse 11/11/2020    Urinary tract infection without hematuria 11/26/2016    Wheezing          Past Surgical History:  Past Surgical History:   Procedure Laterality Date    BRAIN SURGERY      CATARACT EXTRACTION      CHOLECYSTECTOMY      COLECTOMY RADHA      COLONOSCOPY      DECOMPRESSION SPINE LUMBAR POSTERIOR  08/19/2019    HERNIA REPAIR      HYSTERECTOMY      INCISION TENDON SHEATH HAND      NECK SURGERY  05/24/2018    NEUROPLASTY / TRANSPOSITION MEDIAN NERVE AT CARPAL TUNNEL      SD COLONOSCOPY FLX DX W/COLLJ SPEC WHEN PFRMD N/A 03/26/2019    Procedure: COLONOSCOPY;   Surgeon: Yaniv Hawley MD;  Location: [facility];  Service: Gastroenterology    MS ESOPHAGOGASTRODUODENOSCOPY TRANSORAL DIAGNOSTIC N/A 2019    Procedure: ESOPHAGOGASTRODUODENOSCOPY (EGD);  Surgeon: Yaniv Hawley MD;  Location: [facility];  Service: Gastroenterology    REPLACEMENT TOTAL KNEE Right     RETINAL DETACHMENT SURGERY      TUBAL LIGATION           Family History:  Family History   Problem Relation Age of Onset    Asthma Mother     Heart disease Mother     Emphysema Father     Cancer Father     Prostate cancer Father     Kidney cancer Sister     Diabetes Sister     Kidney disease Sister     Brain cancer Brother     Bone cancer Brother     Heart disease Brother          Social History:  Social History     Socioeconomic History    Marital status:      Spouse name: None    Number of children: None    Years of education: None    Highest education level: None   Occupational History    Occupation: retired   Tobacco Use    Smoking status: Former     Current packs/day: 0.00     Average packs/day: 0.5 packs/day for 40.0 years (20.0 ttl pk-yrs)     Types: Cigarettes     Start date:      Quit date:      Years since quittin.5    Smokeless tobacco: Never    Tobacco comments:     stopped many years ago   Vaping Use    Vaping status: Never Used   Substance and Sexual Activity    Alcohol use: Never    Drug use: Never    Sexual activity: Not Currently     Partners: Male   Other Topics Concern    None   Social History Narrative    None     Social Determinants of Health     Financial Resource Strain: Low Risk  (2024)    Overall Financial Resource Strain (CARDIA)     Difficulty of Paying Living Expenses: Not hard at all   Food Insecurity: No Food Insecurity (7/3/2024)    Hunger Vital Sign     Worried About Running Out of Food in the Last Year: Never true     Ran Out of Food in the Last Year: Never true   Transportation Needs: No Transportation Needs (7/3/2024)    PRAPARE - Transportation  "    Lack of Transportation (Medical): No     Lack of Transportation (Non-Medical): No   Physical Activity: Not on file   Stress: Not on file   Social Connections: Unknown (6/18/2024)    Received from [employer]     How often do you feel lonely or isolated from those around you? (Adult - for ages 18 years and over): Not on file   Intimate Partner Violence: Not on file   Housing Stability: Low Risk  (7/3/2024)    Housing Stability Vital Sign     Unable to Pay for Housing in the Last Year: No     Number of Times Moved in the Last Year: 0     Homeless in the Last Year: No           The following portions of the patient's history were reviewed and updated as appropriate: past medical history, past surgical history, past family history,  past social history, current medications, allergies and problem list.        Laboratory Results:  CBC with diff:   Lab Results   Component Value Date    WBC 7.21 07/04/2024    RBC 4.70 07/04/2024    HGB 13.0 07/04/2024    HCT 41.2 07/04/2024    MCV 88 07/04/2024    MCH 27.7 07/04/2024    RDW 14.4 07/04/2024     07/04/2024       CMP:  Lab Results   Component Value Date    CREATININE 1.14 07/04/2024    CREATININE 1.15 (H) 05/21/2019    BUN 28 (H) 07/04/2024    BUN 21 05/21/2019    K 4.3 07/04/2024    K 3.9 05/21/2019     07/04/2024     05/21/2019    CO2 24 07/04/2024    CO2 26 05/21/2019    ALKPHOS 47 07/03/2024    ALKPHOS 66 01/15/2019    ALT 14 07/03/2024    ALT 22 01/15/2019    AST 16 07/03/2024    AST 13 01/15/2019       Lab Results   Component Value Date    HGBA1C 7.6 (H) 07/03/2024    HGBA1C 7.9 (H) 01/15/2019    PHOS 3.1 03/28/2024    PHOS 2.9 05/21/2019       Lab Results   Component Value Date    TROPONINI <0.02 11/14/2020    TROPONINI <0.02 11/13/2020    TROPONINI <0.02 11/13/2020    CKTOTAL 25 (L) 11/13/2020    CKTOTAL 28 02/04/2020       Lipid Profile:   No results found for: \"CHOL\"  Lab Results   Component Value Date    HDL 39 (L) " 2024    HDL 42 (L) 10/13/2023    HDL 46 (L) 2022     Lab Results   Component Value Date    LDLCALC 87 2024    LDLCALC 84 10/13/2023    LDLCALC 58 2022     Lab Results   Component Value Date    TRIG 233 (H) 2024    TRIG 181 (H) 10/13/2023    TRIG 159 (H) 2022       Cardiac testing:   Results for orders placed during the hospital encounter of 24    Echo complete w/ contrast if indicated    Interpretation Summary    Left Ventricle: Left ventricular cavity size is normal. Wall thickness is normal. The left ventricular ejection fraction is 55%. Systolic function is normal. Wall motion is normal. Diastolic function is mildly abnormal, consistent with grade I (abnormal) relaxation.    Right Ventricle: Right ventricular cavity size is normal. Systolic function is normal.    Aortic Valve: There is mild regurgitation.    Mitral Valve: There is mild regurgitation.    Tricuspid Valve: There is mild regurgitation. The right ventricular systolic pressure is normal.    No results found for this or any previous visit.    No results found for this or any previous visit.    No results found for this or any previous visit.    Results for orders placed during the hospital encounter of 20    NM myocardial perfusion spect (rx stress and/or rest)    [address] [address] [phone]    Rest/Stress SPECT Myocardial Perfusion Imaging After Regadenoson    Patient: GALLITO WEST  MR number: [id number]  Account number: [id number]  : 1945  Age: 75 years  Gender: Female  Status: Inpatient  Location: Stress lab  Height: 56 in  Weight: 143 lb  BP: 145/ 65 mmHg    Allergies: SULFA ANTIBIOTICS    Diagnosis: R06.02 - Shortness of breath, R07.9 - Chest pain, unspecified    Primary Physician:  JOSHUA Jefferson  RN:  Amee Lambert RN  Referring Physician:  Soraya Verduzco MD  Group:  [facility]  Report Prepared By::  Amee Lambert RN  Interpreting Physician:  Bethany Barrera MD    INDICATIONS: Evaluation of known coronary artery disease.    HISTORY: The patient is a 75 year old  female. Chest pain status: recent onset chest pain, associated with dyspnea. Other symptoms: syncope with collapse. Coronary artery disease risk factors: dyslipidemia, hypertension, former  smoker quit in 2001, diabetes mellitus, and post-menopausal state. Cardiovascular history: coronary artery disease, peripheral vascular occlusive disease, and arrhythmia/ incomplete RBBB. Co-morbidity: history of renal disease and obesity.  Seizure Medications: aspirin, a lipid lowering agent, and diabetic medications. Previous test results: abnormal ECG and normal nuclear study in 2016.    PHYSICAL EXAM: Baseline physical exam screening: no wheezes audible, no loud murmur.    REST ECG: Sinus rhythm, incomplete RBBB    PROCEDURE: The study was performed in the the Stress lab. A sitting regadenoson infusion pharmacologic stress test was performed. SPECT myocardial perfusion imaging was performed during stress. Systolic blood pressure was 145 mmHg, at the  start of the study. Diastolic blood pressure was 65 mmHg, at the start of the study. The heart rate was 75 bpm, at the start of the study. IV double checked.  Regadenoson protocol:  HR bpm SBP mmHg DBP mmHg Symptoms  Baseline 75 145 65 none  1 min 90 147 70 moderate dyspnea, dizziness  2 min 95 125 58 subsiding  3 min 95 128 60 none  5 min 96 138 64 none  No medications or fluids given.    STRESS SUMMARY: Duration of pharmacologic stress was 3 min and 0 sec. There was no chest pain during stress. The stress test was terminated due to protocol completion. Pre oxygen saturation: 98 %. Peak oxygen saturation: 97 %. The stress  ECG was negative for ischemia and normal. There were no stress arrhythmias or conduction abnormalities.    ISOTOPE ADMINISTRATION:  Resting isotope  administration Stress isotope administration  Agent Tetrofosmin Tetrofosmin  Dose 11 mCi 31 mCi  Date 11/13/2020 11/13/2020  Injection-image interval 30 min 45 min    MYOCARDIAL PERFUSION IMAGING:  The image quality was good. Rotating projection images reveal moderate subdiaphragmatic activity. Left ventricular size was normal. The TID ratio was 1.07.    PERFUSION DEFECTS:  -  There was small in size and medium in intensity defect in inferolateral wall at rest better on stress suggestive of artifact.  Also there is subdiaphragmatic tracer activity noted cannot completely rule out inferior ischemia.    GATED SPECT:  The calculated left ventricular ejection fraction was 62 %. Left ventricular ejection fraction was within normal limits by visual estimate. There was no left ventricular regional abnormality.    SUMMARY:  -  Stress results: There was no chest pain during stress.  -  ECG conclusions: The stress ECG was negative for ischemia and normal. There were no stress arrhythmias or conduction abnormalities.  -  Perfusion imaging: There was small in size and medium in intensity defect in inferolateral wall at rest better on stress suggestive of artifact.  Also there is subdiaphragmatic tracer activity noted cannot completely rule out inferior ischemia.  -  Gated SPECT: The calculated left ventricular ejection fraction was 62 %. Left ventricular ejection fraction was within normal limits by visual estimate. There was no left ventricular regional abnormality.    IMPRESSIONS: Normal study after pharmacologic vasodilation without reproduction of symptoms.  Stress ekg negative for ischemia  no arrythmia There was image artifact, without diagnostic evidence for perfusion abnormality.  (There was small in size and medium in intensity defect in inferolateral wall at rest better on stress suggestive of artifact. Also there is subdiaphragmatic tracer activity noted cannot completely rule out inferior ischemia. )  Normal LV  systolic function without regional wall motion abnormalities  Clinical correlation is required    Prepared and signed by    Bethany Barrera MD  Signed 11/13/2020 15:26:15    No results found for this or any previous visit.    No results found for this or any previous visit.    No results found for this or any previous visit.        Medications:    Current Outpatient Medications:     acetaminophen (TYLENOL) 650 mg CR tablet, Take 650 mg by mouth 2 (two) times a day, Disp: , Rfl:     albuterol (2.5 mg/3 mL) 0.083 % nebulizer solution, Take 3 mL (2.5 mg total) by nebulization every 6 (six) hours as needed for wheezing or shortness of breath, Disp: 90 mL, Rfl: 0    albuterol (ProAir HFA) 90 mcg/act inhaler, Inhale 2 puffs every 6 (six) hours as needed for wheezing or shortness of breath (cough, chest tightness), Disp: 18 g, Rfl: 0    atorvastatin (LIPITOR) 10 mg tablet, Take 1 tablet (10 mg total) by mouth daily at bedtime, Disp: 90 tablet, Rfl: 1    Calcium Carbonate-Vitamin D (CALCIUM-D PO), Take 1 tablet by mouth in the morning, Disp: , Rfl:     clotrimazole (LOTRIMIN) 1 % cream, , Disp: , Rfl:     Continuous Blood Gluc  (Dexcom G7 ) YOLANDA, Use 1 Application if needed (check sugars), Disp: 1 each, Rfl: 0    Continuous Blood Gluc Sensor (Dexcom G7 Sensor), Use 1 Device every 10 days, Disp: 9 each, Rfl: 3    dicyclomine (BENTYL) 20 mg tablet, Take 20 mg by mouth 2 (two) times a day, Disp: , Rfl:     DULoxetine (CYMBALTA) 30 mg delayed release capsule, Take 30 mg by mouth 2 (two) times a day, Disp: , Rfl:     fluticasone (FLONASE) 50 mcg/act nasal spray, 1 spray into each nostril daily pt uses as needed, Disp: 54 g, Rfl: 1    fluticasone-umeclidinium-vilanterol (Trelegy Ellipta) 100-62.5-25 mcg/actuation inhaler, Inhale 1 puff daily Rinse mouth after use., Disp: 60 blister, Rfl: 5    furosemide (LASIX) 20 mg tablet, take 1 tablet by mouth once daily, Disp: 60 tablet, Rfl: 6    gabapentin (NEURONTIN) 100  mg capsule, Take 2 capsules (200 mg total) by mouth daily at bedtime, Disp: 180 capsule, Rfl: 1    guaiFENesin (MUCINEX) 600 mg 12 hr tablet, Take 1 tablet (600 mg total) by mouth 2 (two) times a day as needed for cough or congestion, Disp: 180 tablet, Rfl: 1    insulin aspart (NovoLOG FlexPen) 100 UNIT/ML injection pen, inject 12 units subcutaneously before meals (3 MEALS A DAY), Disp: 45 mL, Rfl: 1    Insulin Glargine Solostar (Lantus SoloStar) 100 UNIT/ML SOPN, Inject 0.3 mL (30 Units total) under the skin daily, Disp: 15 mL, Rfl: 2    Insulin Pen Needle (B-D ULTRAFINE III SHORT PEN) 31G X 8 MM MISC, Use 4 (four) times a day (with meals and at bedtime), Disp: 400 each, Rfl: 1    ketoconazole (NIZORAL) 2 % cream, , Disp: , Rfl:     latanoprost (XALATAN) 0.005 % ophthalmic solution, Administer 1 drop into the left eye daily, Disp: 2.5 mL, Rfl: 2    levETIRAcetam (KEPPRA) 250 mg tablet, Take 1 tablet (250 mg total) by mouth every 12 (twelve) hours, Disp: 180 tablet, Rfl: 1    Lumigan 0.01 % ophthalmic drops, INSTILL 1 DROP into both eyes at bedtime, Disp: , Rfl:     montelukast (SINGULAIR) 10 mg tablet, Take 1 tablet (10 mg total) by mouth every evening, Disp: 90 tablet, Rfl: 1    Multiple Vitamin (MULTI VITAMIN DAILY PO), Take 1 tablet by mouth daily, Disp: , Rfl:     mupirocin (BACTROBAN) 2 % ointment, APPLY A SMALL AMOUNT TO THE AFFECTED FOOT/TOE AREA BY TOPICAL ROUTE 3 TIMES PER DAY, Disp: , Rfl:     nystatin (MYCOSTATIN) cream, Apply topically 2 (two) times a day, Disp: 30 g, Rfl: 1    nystatin (MYCOSTATIN) powder, Apply topically 3 (three) times a day as needed (rash), Disp: 60 g, Rfl: 1    omeprazole (PriLOSEC) 20 mg delayed release capsule, Take 1 capsule (20 mg total) by mouth 2 (two) times a day, Disp: 180 capsule, Rfl: 1    potassium chloride (MICRO-K) 10 MEQ CR capsule, Take 1 capsule (10 mEq total) by mouth daily Take with the higher dose of Lasix, Disp: 5 capsule, Rfl: 0    Spacer/Aero-Holding Chambers  (AeroChamber Plus Elian-Vu Large) MISC, Use as directed, Disp: , Rfl:     vitamin B-12 (VITAMIN B-12) 500 mcg tablet, Take 1,000 mcg by mouth daily, Disp: , Rfl:     lidocaine (Lidoderm) 5 %, Apply 1 patch topically over 12 hours daily as needed (pain) Remove & Discard patch within 12 hours or as directed by MD (Patient not taking: Reported on 7/26/2024), Disp: 30 patch, Rfl: 1      Allergies:  Allergies   Allergen Reactions    Brimonidine Other (See Comments)    Salicylic Acid-Sulfur Other (See Comments)    Sulfa Antibiotics Rash and Other (See Comments)           Recommend aggressive risk factor modification and therapeutic lifestyle changes.  Low-salt, low-calorie, low-fat, low-cholesterol diet with regular exercise and to optimize weight.    Discussed concepts of cardiovascular disease, including signs and symptoms of heart disease.    Previous studies were reviewed.    Safety measures were reviewed.  Questions were entertained and answered.  Patient was advised to report any problems requiring medical attention.    Follow-up with PCP and appropriate specialist and lab work/testing as discussed.    Return for follow up visit as scheduled or earlier, if needed.  Thank you for allowing me to participate in the care and evaluation of your patient.  Should you have any questions, please feel free to contact me.    Lalo Damon PA-C  7/26/2024,5:34 PM

## 2024-07-26 NOTE — TELEPHONE ENCOUNTER
----- Message from Lalo Damon PA-C sent at 7/26/2024  3:34 PM EDT -----  Please advise this patient I spoke with her PCP and her PCP is not aware of need for bubble study.  She should ask if this is a needed part of her workup at her upcoming neurology appointment.  Thank you!

## 2024-07-26 NOTE — PATIENT INSTRUCTIONS
Increase Lasix (due to acute on chronic Heart failure): 2 tablets every morning (40mg), 1 tablet every evening (20mg)  - We will do this for 3-5 days (depending on when your weight and leg swelling gets back to normal). After 5 days, go back to taking lasix 20mg daily (unless you're weight is still up, leg swelling still up, or still feeling unwell or having trouble breathing - then call our office).  -Take Potassium supplement once daily with the higher dose of lasix.  -Nonfasting bloodwork in 1 week to check kidneys    -If weight goes up or leg swelling worsens, call our office  -If shortness of breath worsens or you feel unwell, go to ER    Stress test ordered to evaluate the chest heaviness. Recommend holding off until after you are back to baseline volume status.     Discuss wheezing with your pulmonologist    Take positional changes slowly, try compression stockings

## 2024-07-28 DIAGNOSIS — Z79.4 TYPE 2 DIABETES MELLITUS WITH DIABETIC NEUROPATHY, WITH LONG-TERM CURRENT USE OF INSULIN (HCC): ICD-10-CM

## 2024-07-28 DIAGNOSIS — E11.40 TYPE 2 DIABETES MELLITUS WITH DIABETIC NEUROPATHY, WITH LONG-TERM CURRENT USE OF INSULIN (HCC): ICD-10-CM

## 2024-07-29 ENCOUNTER — APPOINTMENT (OUTPATIENT)
Dept: LAB | Facility: CLINIC | Age: 79
End: 2024-07-29
Payer: COMMERCIAL

## 2024-07-29 DIAGNOSIS — I50.33 ACUTE ON CHRONIC HEART FAILURE WITH PRESERVED EJECTION FRACTION (HFPEF) (HCC): ICD-10-CM

## 2024-07-29 DIAGNOSIS — R94.6 ABNORMAL THYROID FUNCTION TEST: ICD-10-CM

## 2024-07-29 DIAGNOSIS — E04.1 THYROID NODULE: ICD-10-CM

## 2024-07-29 LAB
ANION GAP SERPL CALCULATED.3IONS-SCNC: 9 MMOL/L (ref 4–13)
BUN SERPL-MCNC: 26 MG/DL (ref 5–25)
CALCIUM SERPL-MCNC: 9.5 MG/DL (ref 8.4–10.2)
CHLORIDE SERPL-SCNC: 102 MMOL/L (ref 96–108)
CO2 SERPL-SCNC: 31 MMOL/L (ref 21–32)
CREAT SERPL-MCNC: 1.32 MG/DL (ref 0.6–1.3)
GFR SERPL CREATININE-BSD FRML MDRD: 37 ML/MIN/1.73SQ M
GLUCOSE SERPL-MCNC: 184 MG/DL (ref 65–140)
POTASSIUM SERPL-SCNC: 4.1 MMOL/L (ref 3.5–5.3)
SODIUM SERPL-SCNC: 142 MMOL/L (ref 135–147)
TSH SERPL DL<=0.05 MIU/L-ACNC: 1.71 UIU/ML (ref 0.45–4.5)

## 2024-07-29 PROCEDURE — 36415 COLL VENOUS BLD VENIPUNCTURE: CPT

## 2024-07-29 PROCEDURE — 84443 ASSAY THYROID STIM HORMONE: CPT

## 2024-07-29 PROCEDURE — 80048 BASIC METABOLIC PNL TOTAL CA: CPT

## 2024-07-29 RX ORDER — INSULIN ASPART 100 [IU]/ML
INJECTION, SOLUTION INTRAVENOUS; SUBCUTANEOUS
Qty: 15 ML | Refills: 5 | Status: SHIPPED | OUTPATIENT
Start: 2024-07-29

## 2024-07-30 ENCOUNTER — TELEPHONE (OUTPATIENT)
Dept: FAMILY MEDICINE CLINIC | Facility: CLINIC | Age: 79
End: 2024-07-30

## 2024-07-30 DIAGNOSIS — I50.32 CHRONIC HEART FAILURE WITH PRESERVED EJECTION FRACTION (HCC): Primary | ICD-10-CM

## 2024-07-30 NOTE — TELEPHONE ENCOUNTER
[id number]    ----- Message from Clarisa Billingsley DO sent at 7/30/2024  7:39 AM EDT -----  Please let pt know her thyroid function is back in normal range

## 2024-08-01 ENCOUNTER — TELEPHONE (OUTPATIENT)
Dept: CARDIOLOGY CLINIC | Facility: CLINIC | Age: 79
End: 2024-08-01

## 2024-08-01 NOTE — TELEPHONE ENCOUNTER
----- Message from Lalo Damon PA-C sent at 7/30/2024  6:19 PM EDT -----  Please advise patient her kidney numbers did slightly worsen.  She should resume her previous dosing of Lasix 20 mg daily without potassium supplement and stay well-hydrated over the next several days.  I have ordered repeat blood work to be completed at the end of this week or early next week to reassess her kidney function.  If she feels unwell, she should proceed to the ER for further evaluation.  If she is still experiencing shortness of breath with significant leg swelling and weight gain, she should proceed to the ER for further evaluation.

## 2024-08-02 ENCOUNTER — OFFICE VISIT (OUTPATIENT)
Dept: CARDIOLOGY CLINIC | Facility: CLINIC | Age: 79
End: 2024-08-02
Payer: COMMERCIAL

## 2024-08-02 VITALS
HEART RATE: 76 BPM | HEIGHT: 55 IN | RESPIRATION RATE: 16 BRPM | DIASTOLIC BLOOD PRESSURE: 74 MMHG | SYSTOLIC BLOOD PRESSURE: 128 MMHG | WEIGHT: 156 LBS | OXYGEN SATURATION: 90 % | BODY MASS INDEX: 36.1 KG/M2

## 2024-08-02 DIAGNOSIS — E11.69 HYPERLIPIDEMIA ASSOCIATED WITH TYPE 2 DIABETES MELLITUS  (HCC): ICD-10-CM

## 2024-08-02 DIAGNOSIS — R60.0 BILATERAL LEG EDEMA: ICD-10-CM

## 2024-08-02 DIAGNOSIS — I10 PRIMARY HYPERTENSION: ICD-10-CM

## 2024-08-02 DIAGNOSIS — N18.32 STAGE 3B CHRONIC KIDNEY DISEASE (HCC): ICD-10-CM

## 2024-08-02 DIAGNOSIS — E78.5 HYPERLIPIDEMIA ASSOCIATED WITH TYPE 2 DIABETES MELLITUS  (HCC): ICD-10-CM

## 2024-08-02 DIAGNOSIS — E11.40 TYPE 2 DIABETES MELLITUS WITH DIABETIC NEUROPATHY, WITH LONG-TERM CURRENT USE OF INSULIN (HCC): ICD-10-CM

## 2024-08-02 DIAGNOSIS — I20.89 ANGINAL EQUIVALENT: ICD-10-CM

## 2024-08-02 DIAGNOSIS — Z79.4 TYPE 2 DIABETES MELLITUS WITH DIABETIC NEUROPATHY, WITH LONG-TERM CURRENT USE OF INSULIN (HCC): ICD-10-CM

## 2024-08-02 DIAGNOSIS — I50.32 CHRONIC HEART FAILURE WITH PRESERVED EJECTION FRACTION (HCC): Primary | ICD-10-CM

## 2024-08-02 PROCEDURE — 1160F RVW MEDS BY RX/DR IN RCRD: CPT | Performed by: NURSE PRACTITIONER

## 2024-08-02 PROCEDURE — 3078F DIAST BP <80 MM HG: CPT | Performed by: NURSE PRACTITIONER

## 2024-08-02 PROCEDURE — 1159F MED LIST DOCD IN RCRD: CPT | Performed by: NURSE PRACTITIONER

## 2024-08-02 PROCEDURE — 99214 OFFICE O/P EST MOD 30 MIN: CPT | Performed by: NURSE PRACTITIONER

## 2024-08-02 PROCEDURE — 3074F SYST BP LT 130 MM HG: CPT | Performed by: NURSE PRACTITIONER

## 2024-08-02 RX ORDER — POTASSIUM CHLORIDE 750 MG/1
20 CAPSULE, EXTENDED RELEASE ORAL DAILY
Qty: 5 CAPSULE | Refills: 0 | Status: SHIPPED | OUTPATIENT
Start: 2024-08-02 | End: 2024-08-02 | Stop reason: ALTCHOICE

## 2024-08-02 RX ORDER — POTASSIUM CHLORIDE 20 MEQ/1
20 TABLET, EXTENDED RELEASE ORAL DAILY
Qty: 5 TABLET | Refills: 0 | Status: SHIPPED | OUTPATIENT
Start: 2024-08-02

## 2024-08-02 RX ORDER — FUROSEMIDE 20 MG/1
20 TABLET ORAL 2 TIMES DAILY
Qty: 60 TABLET | Refills: 6 | Status: SHIPPED | OUTPATIENT
Start: 2024-08-02

## 2024-08-02 NOTE — PROGRESS NOTES
Cardiology Office Note    Shantelle Romano 79 y.o. female MRN: [id number]    08/02/24          Assessment/Plan:    1.  Acute on chronic HFpEF  - Patient continues with lower extremity swelling  - Increase Lasix to already milligrams twice daily x 3 days and add 20 mEq of potassium  - Patient advised multiple times to follow 2000 mg sodium restricted diet and to weigh yourself daily  - BMP to be drawn on Tuesday    2.  Chest pain  - Pharmacological stress testing ordered and pending  - Patient advised to report to the emergency room for significant chest pain or other cardiac symptoms    3.  Hypertension  - Blood pressure overall well-controlled  - Continue furosemide    5.  Hyperlipidemia  - Continue atorvastatin    6.  CKD stage III  - Recent BMP showed creatinine of 1.3  - BMP to be performed on Tuesday    Follow up: 2 weeks or sooner as needed    1. Chronic heart failure with preserved ejection fraction (HCC)  furosemide (LASIX) 20 mg tablet    potassium chloride (Klor-Con M20) 20 mEq tablet      2. Bilateral leg edema  furosemide (LASIX) 20 mg tablet      3. Anginal equivalent  DISCONTINUED: potassium chloride (MICRO-K) 10 MEQ CR capsule      4. Primary hypertension        5. Type 2 diabetes mellitus with diabetic neuropathy, with long-term current use of insulin (HCC)        6. Hyperlipidemia associated with type 2 diabetes mellitus  (HCC)        7. Stage 3b chronic kidney disease (HCC)            HPI: Shantelle Romano is a 79 y.o. year old female with a past medical history of  hypertension, chronic HFpEF, ambulatory dysfunction/frequent falls, IDDM, hyperlipidemia, CKD, COPD (follows with pulmonology), history of seizure and memory loss who presents today for an office follow-up.  She was seen 1 week ago in the office and noted to have volume overload with up titration of her furosemide to 40 mg in the a.m. and 20 mg in the p.m. for 3 to 5 days with also an addition of potassium to her medication regimen.  She was  also ordered to have a BMP completed in 1 week but unfortunately patient got it done 3 days after her appointment which showed an elevated creatinine.    Today she continues to appear volume overloaded, but  compared to last week her weight is down 3 pounds. Will increase her furosemide to 40 mg twice daily for the next 3 days then addition of 20 mEq of potassium and she will get a BMP on Tuesday to monitor her renal function potassium level.  This was explained to her and her son-in-law multiple times and both verbalized understanding.  It is likely she does not follow a low-sodium diet and believes that the prepared meals that she gets from her caretaker are low-sodium.  This is questionable and explained to her as likely the reason why she keeps retaining fluid, patient denies that she is eating salty foods and states she does not why she continues to swell.  Was reinforced to follow a 2000 mg sodium restricted diet, to weigh yourself daily and to take her meds as prescribed.  She states she does continue to have intermittent chest pain for which she does have pharmacological stress testing scheduled.  Otherwise, she denies chest pain, dyspnea on exertion or rest, lightheadedness, dizziness, syncopal episodes,or palpitations and was instructed to call  the office or seek medical attention if any such symptoms develop.    All medications reviewed and patient is tolerating medications without side effects.       Patient Active Problem List   Diagnosis    Simple chronic bronchitis (HCC)    Hypertension    Alternating constipation and diarrhea    Chest wall pain    Vitamin D deficiency    Stage 3b chronic kidney disease (HCC)    Bilateral leg edema    Chronic pain syndrome    Coronary artery disease without angina pectoris    Chronic heart failure with preserved ejection fraction (HCC)    Gastroesophageal reflux disease without esophagitis    Hyperlipidemia associated with type 2 diabetes mellitus  (HCC)    Left  ventricular hypertrophy    Lung nodule    Macular pucker, left    Memory loss    Osteoarthritis    Seizure disorder (HCC)    Strabismic amblyopia of right eye    Neurologic gait dysfunction    Type 2 diabetes mellitus with diabetic neuropathy (HCC)    Chronic kidney disease-mineral and bone disorder    Osteopenia of multiple sites    Urinary incontinence    Former smoker    Obesity, morbid (HCC)    Stroke-like symptoms    Abnormal thyroid function test    Thyroid nodule       Allergies   Allergen Reactions    Brimonidine Other (See Comments)    Salicylic Acid-Sulfur Other (See Comments)    Sulfa Antibiotics Rash and Other (See Comments)         Current Outpatient Medications:     acetaminophen (TYLENOL) 650 mg CR tablet, Take 650 mg by mouth 2 (two) times a day, Disp: , Rfl:     albuterol (2.5 mg/3 mL) 0.083 % nebulizer solution, Take 3 mL (2.5 mg total) by nebulization every 6 (six) hours as needed for wheezing or shortness of breath, Disp: 90 mL, Rfl: 0    albuterol (ProAir HFA) 90 mcg/act inhaler, Inhale 2 puffs every 6 (six) hours as needed for wheezing or shortness of breath (cough, chest tightness), Disp: 18 g, Rfl: 0    atorvastatin (LIPITOR) 10 mg tablet, Take 1 tablet (10 mg total) by mouth daily at bedtime, Disp: 90 tablet, Rfl: 1    Calcium Carbonate-Vitamin D (CALCIUM-D PO), Take 1 tablet by mouth in the morning, Disp: , Rfl:     clotrimazole (LOTRIMIN) 1 % cream, , Disp: , Rfl:     Continuous Blood Gluc  (Dexcom G7 ) YOLANDA, Use 1 Application if needed (check sugars), Disp: 1 each, Rfl: 0    Continuous Blood Gluc Sensor (Dexcom G7 Sensor), Use 1 Device every 10 days, Disp: 9 each, Rfl: 3    dicyclomine (BENTYL) 20 mg tablet, Take 20 mg by mouth 2 (two) times a day, Disp: , Rfl:     DULoxetine (CYMBALTA) 30 mg delayed release capsule, Take 30 mg by mouth 2 (two) times a day, Disp: , Rfl:     fluticasone (FLONASE) 50 mcg/act nasal spray, 1 spray into each nostril daily pt uses as needed,  Disp: 54 g, Rfl: 1    fluticasone-umeclidinium-vilanterol (Trelegy Ellipta) 100-62.5-25 mcg/actuation inhaler, Inhale 1 puff daily Rinse mouth after use., Disp: 60 blister, Rfl: 5    furosemide (LASIX) 20 mg tablet, Take 1 tablet (20 mg total) by mouth 2 (two) times a day, Disp: 60 tablet, Rfl: 6    gabapentin (NEURONTIN) 100 mg capsule, Take 2 capsules (200 mg total) by mouth daily at bedtime, Disp: 180 capsule, Rfl: 1    guaiFENesin (MUCINEX) 600 mg 12 hr tablet, Take 1 tablet (600 mg total) by mouth 2 (two) times a day as needed for cough or congestion, Disp: 180 tablet, Rfl: 1    insulin aspart (NovoLOG FlexPen) 100 UNIT/ML injection pen, inject 12 units subcutaneously before meals (3 MEALS A DAY), Disp: 15 mL, Rfl: 5    Insulin Glargine Solostar (Lantus SoloStar) 100 UNIT/ML SOPN, Inject 0.3 mL (30 Units total) under the skin daily, Disp: 15 mL, Rfl: 2    Insulin Pen Needle (B-D ULTRAFINE III SHORT PEN) 31G X 8 MM MISC, Use 4 (four) times a day (with meals and at bedtime), Disp: 400 each, Rfl: 1    ketoconazole (NIZORAL) 2 % cream, , Disp: , Rfl:     latanoprost (XALATAN) 0.005 % ophthalmic solution, Administer 1 drop into the left eye daily, Disp: 2.5 mL, Rfl: 2    levETIRAcetam (KEPPRA) 250 mg tablet, Take 1 tablet (250 mg total) by mouth every 12 (twelve) hours, Disp: 180 tablet, Rfl: 1    lidocaine (Lidoderm) 5 %, Apply 1 patch topically over 12 hours daily as needed (pain) Remove & Discard patch within 12 hours or as directed by MD, Disp: 30 patch, Rfl: 1    Lumigan 0.01 % ophthalmic drops, INSTILL 1 DROP into both eyes at bedtime, Disp: , Rfl:     montelukast (SINGULAIR) 10 mg tablet, Take 1 tablet (10 mg total) by mouth every evening, Disp: 90 tablet, Rfl: 1    Multiple Vitamin (MULTI VITAMIN DAILY PO), Take 1 tablet by mouth daily, Disp: , Rfl:     mupirocin (BACTROBAN) 2 % ointment, APPLY A SMALL AMOUNT TO THE AFFECTED FOOT/TOE AREA BY TOPICAL ROUTE 3 TIMES PER DAY, Disp: , Rfl:     nystatin  (MYCOSTATIN) cream, Apply topically 2 (two) times a day, Disp: 30 g, Rfl: 1    nystatin (MYCOSTATIN) powder, Apply topically 3 (three) times a day as needed (rash), Disp: 60 g, Rfl: 1    omeprazole (PriLOSEC) 20 mg delayed release capsule, Take 1 capsule (20 mg total) by mouth 2 (two) times a day, Disp: 180 capsule, Rfl: 1    potassium chloride (Klor-Con M20) 20 mEq tablet, Take 1 tablet (20 mEq total) by mouth daily, Disp: 5 tablet, Rfl: 0    Spacer/Aero-Holding Chambers (AeroChamber Plus Elian-Vu Large) MISC, Use as directed, Disp: , Rfl:     vitamin B-12 (VITAMIN B-12) 500 mcg tablet, Take 1,000 mcg by mouth daily, Disp: , Rfl:     Past Medical History:   Diagnosis Date    Acute kidney injury superimposed on chronic kidney disease  ([facility]) 11/26/2016    ADHD (attention deficit hyperactivity disorder) 03/17/2019    Arthritis     BL HIPS    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    Carpal tunnel syndrome     Chest pain 03/06/2017    Chronic kidney disease     Claudication ([facility]) 3/15/2019    Closed fracture of base of middle phalanx of finger 06/22/2017    Closed fracture of middle phalanx of left little finger 07/08/2017    Coagulopathy ([facility]) 05/15/2019    Colloid cyst of brain ([facility])     COPD (chronic obstructive pulmonary disease) ([facility])     COPD (chronic obstructive pulmonary disease) ([facility])     Coronary artery disease     Cough     Diabetes mellitus ([facility])     GERD (gastroesophageal reflux disease)     Glaucoma     Gout     History of brain disorder 10/07/2020    Hyperlipidemia     Hypertension     Irritable bowel syndrome     Melena 02/26/2019    PAD (peripheral artery disease) ([facility]) 5/15/2019    Paronychia of great toe of left foot 10/07/2020    Post-traumatic seizures ([facility]) 07/23/2015    Renal disorder     Seizures ([facility])     last 2015    Shortness of breath     Single seizure ([facility]) 05/31/2016    SOB (shortness of breath)     Syncope and collapse 11/11/2020    Urinary tract infection without hematuria 11/26/2016     Wheezing        Family History   Problem Relation Age of Onset    Asthma Mother     Heart disease Mother     Emphysema Father     Cancer Father     Prostate cancer Father     Kidney cancer Sister     Diabetes Sister     Kidney disease Sister     Brain cancer Brother     Bone cancer Brother     Heart disease Brother        Past Surgical History:   Procedure Laterality Date    BRAIN SURGERY      CATARACT EXTRACTION      CHOLECYSTECTOMY      COLECTOMY RADHA      COLONOSCOPY      DECOMPRESSION SPINE LUMBAR POSTERIOR  2019    HERNIA REPAIR      HYSTERECTOMY      INCISION TENDON SHEATH HAND      NECK SURGERY  2018    NEUROPLASTY / TRANSPOSITION MEDIAN NERVE AT CARPAL TUNNEL      MO COLONOSCOPY FLX DX W/COLLJ SPEC WHEN PFRMD N/A 2019    Procedure: COLONOSCOPY;  Surgeon: Yaniv Hawley MD;  Location: [facility];  Service: Gastroenterology    MO ESOPHAGOGASTRODUODENOSCOPY TRANSORAL DIAGNOSTIC N/A 2019    Procedure: ESOPHAGOGASTRODUODENOSCOPY (EGD);  Surgeon: Yaniv Hawley MD;  Location: [facility];  Service: Gastroenterology    REPLACEMENT TOTAL KNEE Right     RETINAL DETACHMENT SURGERY      TUBAL LIGATION         Social History     Socioeconomic History    Marital status:      Spouse name: Not on file    Number of children: Not on file    Years of education: Not on file    Highest education level: Not on file   Occupational History    Occupation: retired   Tobacco Use    Smoking status: Former     Current packs/day: 0.00     Average packs/day: 0.5 packs/day for 40.0 years (20.0 ttl pk-yrs)     Types: Cigarettes     Start date:      Quit date:      Years since quittin.6    Smokeless tobacco: Never    Tobacco comments:     stopped many years ago   Vaping Use    Vaping status: Never Used   Substance and Sexual Activity    Alcohol use: Never    Drug use: Never    Sexual activity: Not Currently     Partners: Male   Other Topics Concern    Not on file   Social History Narrative  "   Not on file     Social Determinants of Health     Financial Resource Strain: Low Risk  (1/23/2024)    Overall Financial Resource Strain (CARDIA)     Difficulty of Paying Living Expenses: Not hard at all   Food Insecurity: No Food Insecurity (7/3/2024)    Hunger Vital Sign     Worried About Running Out of Food in the Last Year: Never true     Ran Out of Food in the Last Year: Never true   Transportation Needs: No Transportation Needs (7/3/2024)    PRAPARE - Transportation     Lack of Transportation (Medical): No     Lack of Transportation (Non-Medical): No   Physical Activity: Not on file   Stress: Not on file   Social Connections: Unknown (6/18/2024)    Received from [employer]     How often do you feel lonely or isolated from those around you? (Adult - for ages 18 years and over): Not on file   Intimate Partner Violence: Not on file   Housing Stability: Low Risk  (7/3/2024)    Housing Stability Vital Sign     Unable to Pay for Housing in the Last Year: No     Number of Times Moved in the Last Year: 0     Homeless in the Last Year: No       Review of symptoms:   Constitution:  Negative  HEENT:  Negative  Cardiovascular: + Lower extremity swelling  Respiratory:  Negative  Skin:  Negative  Gastrointestinal:  Negative  Genitourinary:  Negative  Musculoskeletal:  Negative  Neurological:  Negative  Endocrine:  Negative  Psychological:  Negative    Vitals: /74 (BP Location: Left arm, Patient Position: Sitting, Cuff Size: Standard)   Pulse 76   Resp 16   Ht 4' 7\" (1.397 m)   Wt 70.8 kg (156 lb)   SpO2 90%   BMI 36.26 kg/m²         Physical Exam:     GEN: Alert and oriented x 3, in no acute distress.  Well appearing and well nourished.   HEENT: Sclera anicteric, conjunctivae pink, mucous membranes moist.   NECK: Supple, no carotid bruits, no significant JVD. Trachea midline.  HEART: Regular rhythm, normal S1 and S2, no murmurs, clicks, gallops or rubs.   LUNGS: Clear to auscultation " bilaterally; no wheezes, rales, or rhonchi. No increased work of breathing or signs of respiratory distress.   ABDOMEN: Soft, nontender, nondistended, normoactive bowel sounds.   EXTREMITIES: Skin warm and well perfused, no clubbing, cyanosis, + bilateral lower extremity moderate edema.  NEURO: No focal findings. Normal speech. Mood and affect normal.   SKIN: Normal without suspicious lesions on exposed skin.

## 2024-08-02 NOTE — PATIENT INSTRUCTIONS
Increase furosemide to 40 mg two times a day for 2-3 days with 20 meq of potassium once a day. Check blood work on Tuesday.     Follow 2000 mg sodium restricted diet     Weigh yourself daily

## 2024-08-03 DIAGNOSIS — I50.32 CHRONIC HEART FAILURE WITH PRESERVED EJECTION FRACTION (HCC): ICD-10-CM

## 2024-08-05 ENCOUNTER — HOSPITAL ENCOUNTER (OUTPATIENT)
Dept: MRI IMAGING | Facility: CLINIC | Age: 79
Discharge: HOME | End: 2024-08-05
Payer: COMMERCIAL

## 2024-08-05 DIAGNOSIS — H46.9 OPTIC NEUROPATHY: ICD-10-CM

## 2024-08-05 PROCEDURE — A9585 GADOBUTROL INJECTION: HCPCS | Performed by: RADIOLOGY

## 2024-08-05 PROCEDURE — 70543 MRI ORBT/FAC/NCK W/O &W/DYE: CPT

## 2024-08-05 RX ORDER — GADOBUTROL 604.72 MG/ML
7 INJECTION INTRAVENOUS
Status: COMPLETED | OUTPATIENT
Start: 2024-08-05 | End: 2024-08-05

## 2024-08-05 RX ADMIN — GADOBUTROL 7 ML: 604.72 INJECTION INTRAVENOUS at 13:44

## 2024-08-06 ENCOUNTER — TELEPHONE (OUTPATIENT)
Age: 79
End: 2024-08-06

## 2024-08-06 RX ORDER — POTASSIUM CHLORIDE 20 MEQ/1
20 TABLET, EXTENDED RELEASE ORAL DAILY
Qty: 5 TABLET | Refills: 0 | Status: SHIPPED | OUTPATIENT
Start: 2024-08-06

## 2024-08-06 NOTE — TELEPHONE ENCOUNTER
Patients Son in  phoned in this morning stating that he was informing patients nurse of the patients need to have blood drawn this week prior to patients next appt. Home nurse from [facility] stated she can draw blood and can forward results to the office but would need orders faxed over since it is difficult to ambulate patient. Please send order to [facility] [phone] fax [phone] phone. Son in law would also like a call at [phone] when the order has been sent.

## 2024-08-07 ENCOUNTER — TELEPHONE (OUTPATIENT)
Dept: CARDIOLOGY CLINIC | Facility: CLINIC | Age: 79
End: 2024-08-07

## 2024-08-07 ENCOUNTER — TELEPHONE (OUTPATIENT)
Dept: FAMILY MEDICINE CLINIC | Facility: CLINIC | Age: 79
End: 2024-08-07

## 2024-08-07 ENCOUNTER — TELEPHONE (OUTPATIENT)
Dept: NEUROLOGY | Facility: CLINIC | Age: 79
End: 2024-08-07

## 2024-08-07 ENCOUNTER — TELEPHONE (OUTPATIENT)
Age: 79
End: 2024-08-07

## 2024-08-07 NOTE — TELEPHONE ENCOUNTER
Son in law Ron called the nurse line. He said the pharmacy notified patient that an additional script for potassium is ready to be picked up.  There is an order in the chart for 5 tabs sent yesterday.    Son in law said he thought the potassium was only to be taken for 5 days as discussed at ov on 82.  A script was sent that day for 5 tablets. She has finished the five tabs prescribed on 8/2. Son in law asked if she is to take an additional five days of potassium given the additional script sent to the pharmacy yesterday.    Freedom in law would like a call back with instructions.    Please advise. Next visit is on 8/13.

## 2024-08-07 NOTE — TELEPHONE ENCOUNTER
[facility], called to give an update on pt. Pt was discharged today 8/7 from OT services. Pt will be continuing PT and nursing services with them.

## 2024-08-07 NOTE — TELEPHONE ENCOUNTER
Please let pt know her MRI that was ordered by her eye doctor shows increased signal on the right optic nerve, which suggests optic neuritis, and increased enhancement of the tissue around both optic nerves, suggesting increased vascular (blood flow) in the area    I would recommend she reach out to her eye doctor to discuss further and determine next steps

## 2024-08-07 NOTE — TELEPHONE ENCOUNTER
Patients son in law called back to follow up on this lab order being faxed. He would like for it to be sent as soon as possible. The home health aide will be seeing his mother on Friday and he fears if the order is not received before then he will have a hard time finding a way to get her out to get the blood work done. Please contact him once the order is sent . Fax number is [phone]

## 2024-08-07 NOTE — TELEPHONE ENCOUNTER
Spoke with patient care giver and confirmed patient appointment with Dr. Shin 8/8 at [facility]. Verified appointment details with care giver.

## 2024-08-07 NOTE — TELEPHONE ENCOUNTER
Spoke with patient son in law ,patient was requesting lab to be fax to [facility] so patient can have lab done with visit nurse .      Lab order from 7- faxed and patient son in law is aware

## 2024-08-07 NOTE — TELEPHONE ENCOUNTER
----- Message from Victorina ELIZABETH sent at 8/7/2024 12:15 PM EDT -----  Son-in-law is calling again in regards to the status of the blood work order for the people at [facility].  Please call son-in-law when this is accomplished. Please call @ [phone]

## 2024-08-08 ENCOUNTER — OFFICE VISIT (OUTPATIENT)
Dept: NEUROLOGY | Facility: CLINIC | Age: 79
End: 2024-08-08
Payer: COMMERCIAL

## 2024-08-08 VITALS
SYSTOLIC BLOOD PRESSURE: 120 MMHG | HEIGHT: 55 IN | BODY MASS INDEX: 36.33 KG/M2 | HEART RATE: 80 BPM | DIASTOLIC BLOOD PRESSURE: 78 MMHG | WEIGHT: 157 LBS | OXYGEN SATURATION: 90 %

## 2024-08-08 DIAGNOSIS — R56.9 SEIZURE (HCC): Primary | ICD-10-CM

## 2024-08-08 DIAGNOSIS — I10 PRIMARY HYPERTENSION: ICD-10-CM

## 2024-08-08 DIAGNOSIS — G40.909 SEIZURE DISORDER (HCC): ICD-10-CM

## 2024-08-08 PROCEDURE — 99215 OFFICE O/P EST HI 40 MIN: CPT | Performed by: PSYCHIATRY & NEUROLOGY

## 2024-08-08 NOTE — PATIENT INSTRUCTIONS
Stop Keppra, contact us if you have any concerns for seizure  Schedule routine EEG  Follow up in 3 months

## 2024-08-08 NOTE — PROGRESS NOTES
Patient ID: Shantelle Romano is a 79 y.o. female.    Assessment/Plan:    Hypertension  Symptoms last month consistent with hypertensive encephalopathy. Blood pressure has been well managed since discharge with no additional symptoms. Continue management per pcp.    Seizure disorder (HCC)  While patient did have one seizure in 2014, there have been none since. Keppra 250 mg BID is a low dose and not likely providing seizure protection, but may be contributing to irritability, memory concerns, and fatigue. Thyroid issues and sleep apnea have already been ruled out. It is possible that with recent CKD this low dose is building up in patients system. AED not indicated at this time, and patient would still have some seizure protection from home gabapentin dose.    Stop Keppra all together  rEEG ordered to assess for any seizure foci  If symptoms do not improve, will get a MOCA and further cognition workup at next visit  Follow up in 3 months       Diagnoses and all orders for this visit:    Seizure (HCC)  -     EEG awake or drowsy routine; Future    Primary hypertension    Seizure disorder (HCC)           Subjective:    80 yo female here to establish care with a hospital follow up. Patient was seen at [facility] for stroke like symptoms and discharged on July 4th. She had dizziness, generalized weakness, and dysarthria for a few days with elevated blood pressure. CTA was clear, and with blood pressure control, symptoms were resolving. Stroke pathway was recommended, but patient wished to complete this outpatient. She continued her statin, declined aspirin, and obtained MRI brain and echo outpatient which were unremarkable. Symptoms were more consistent with hypertensive encephalopathy than TIA.    Today patient is here with her son. She feels fine and confirms no additional symptoms since discharge last month. Home health aide records her blood pressure daily, which is normally around systolic 120. She is back to her  "baseline. Her son expresses concern for poor memory and brain fog. She is able to recall her son's birthday and what they did for it this past year, and she knows what she had for dinner last night. They report issues with irritability, fatigue, and poor memory. Thyroid and sleep apnea testing have previously been done.     After a colloid cyst removal in 2010, patient had a seizure in 2014 and was started on Keppra 250 BID at that time. No seizures reported since then. Patient developed CKD in the past few years, but confirms there has been no dose adjustment. Patient is open to stopping Keppra if it may help her cognition.             The following portions of the patient's history were reviewed and updated as appropriate: allergies, current medications, past family history, past medical history, past social history, past surgical history, and problem list.         Objective:    Blood pressure 120/78, pulse 80, height 4' 7\" (1.397 m), weight 71.2 kg (157 lb), SpO2 90%, not currently breastfeeding.    Physical Exam  Neurological:      Deep Tendon Reflexes:      Reflex Scores:       Bicep reflexes are 1+ on the right side and 1+ on the left side.       Brachioradialis reflexes are 1+ on the right side and 1+ on the left side.       Patellar reflexes are 1+ on the right side and 1+ on the left side.  Psychiatric:         Speech: Speech normal.         Neurological Exam  Mental Status  Awake, alert and oriented to person, place and time. Recalls 3 of 3 objects immediately. At 3 minutes recalls 2 of 3 objects. Speech is normal. Follows one-step commands.    Cranial Nerves  CN V: Facial sensation is normal.  CN VII: Full and symmetric facial movement.  CN XI: Shoulder shrug strength is normal.  CN XII: Tongue midline without atrophy or fasciculations.  Right eye blind, right eye medial deviated, pupil less reactive all chronic.    Motor   Strength is 5/5 in all four extremities except as noted.  BLE limited by knee " replacements.    Sensory  Light touch is normal in upper and lower extremities.     Reflexes                                            Right                      Left  Brachioradialis                    1+                         1+  Biceps                                 1+                         1+  Patellar                                1+                         1+    Coordination  Right: Finger-to-nose normal.Left: Finger-to-nose normal.  Unable to complete heel to shin due to knee replacements.        ROS:    Review of Systems   Neurological:  Positive for dizziness. Negative for tremors, seizures, syncope, facial asymmetry, speech difficulty, weakness, light-headedness, numbness and headaches.

## 2024-08-09 DIAGNOSIS — Z79.4 TYPE 2 DIABETES MELLITUS WITH DIABETIC NEUROPATHY, WITH LONG-TERM CURRENT USE OF INSULIN (HCC): ICD-10-CM

## 2024-08-09 DIAGNOSIS — E11.40 TYPE 2 DIABETES MELLITUS WITH DIABETIC NEUROPATHY, WITH LONG-TERM CURRENT USE OF INSULIN (HCC): ICD-10-CM

## 2024-08-09 LAB
ANION GAP SERPL CALCULATED.3IONS-SCNC: 14 MMOL/L (ref 3–11)
BUN SERPL-MCNC: 31 MG/DL (ref 7–25)
CALCIUM SERPL-MCNC: 9.4 MG/DL (ref 8.5–10.1)
CHLORIDE SERPL-SCNC: 101 MMOL/L (ref 100–109)
CO2 SERPL-SCNC: 24 MMOL/L (ref 21–31)
CREAT SERPL-MCNC: 1.15 MG/DL (ref 0.4–1.1)
CYTOLOGY CMNT CVX/VAG CYTO-IMP: ABNORMAL
GFR/BSA.PRED SERPLBLD CYS-BASED-ARV: 48 ML/MIN/{1.73_M2}
GLUCOSE SERPL-MCNC: 120 MG/DL (ref 65–99)
POTASSIUM SERPL-SCNC: 4.5 MMOL/L (ref 3.5–5.2)
SODIUM SERPL-SCNC: 139 MMOL/L (ref 135–145)

## 2024-08-09 RX ORDER — PEN NEEDLE, DIABETIC 31 GX5/16"
NEEDLE, DISPOSABLE MISCELLANEOUS
Qty: 400 EACH | Refills: 1 | Status: SHIPPED | OUTPATIENT
Start: 2024-08-09

## 2024-08-09 NOTE — TELEPHONE ENCOUNTER
Reason for call:   [x] Refill   [] Prior Auth  [] Other:     Office:   [x] PCP/Provider -Clarisa Billingsley/ [facility]       [] Specialty/Provider -     Medication: B-D Ultafine III Short Pen needle    Dose/Frequency: 31 G x 8 MM    Quantity: #[id number]    Pharmacy: [facility] [id number]    Does the patient have enough for 3 days?   [] Yes   [x] No - Send as HP to POD

## 2024-08-09 NOTE — ASSESSMENT & PLAN NOTE
While patient did have one seizure in 2014, there have been none since. Keppra 250 mg BID is a low dose and not likely providing seizure protection, but may be contributing to irritability, memory concerns, and fatigue. Thyroid issues and sleep apnea have already been ruled out. It is possible that with recent CKD this low dose is building up in patients system. AED not indicated at this time, and patient would still have some seizure protection from home gabapentin dose.    Stop Keppra all together  rEEG ordered to assess for any seizure foci  If symptoms do not improve, will get a MOCA and further cognition workup at next visit  Follow up in 3 months

## 2024-08-09 NOTE — ASSESSMENT & PLAN NOTE
Symptoms last month consistent with hypertensive encephalopathy. Blood pressure has been well managed since discharge with no additional symptoms. Continue management per pcp.

## 2024-08-12 ENCOUNTER — TELEPHONE (OUTPATIENT)
Age: 79
End: 2024-08-12

## 2024-08-12 NOTE — TELEPHONE ENCOUNTER
Caller: Solomon Harry,son    Doctor: Karen Haq    Reason for call: Patient has an appointment tomorrow 8/13/2024 at 2:30pm.  Her son asked that if by any chance you get an opening for 10:00 am or after, in the morning, could you please call him.  He's looking to bring his mother in earlier since he has an appointment in the same building at 10:00am.  Thank you    Call back#: [phone]

## 2024-08-12 NOTE — TELEPHONE ENCOUNTER
Informed patient's son that we do not have earlier appt. He will keep the appt he has scheduled for his mom

## 2024-08-13 ENCOUNTER — OFFICE VISIT (OUTPATIENT)
Dept: CARDIOLOGY CLINIC | Facility: CLINIC | Age: 79
End: 2024-08-13
Payer: COMMERCIAL

## 2024-08-13 VITALS
OXYGEN SATURATION: 98 % | HEART RATE: 80 BPM | WEIGHT: 155 LBS | DIASTOLIC BLOOD PRESSURE: 68 MMHG | RESPIRATION RATE: 16 BRPM | SYSTOLIC BLOOD PRESSURE: 110 MMHG | BODY MASS INDEX: 35.87 KG/M2 | HEIGHT: 55 IN

## 2024-08-13 DIAGNOSIS — Z79.4 TYPE 2 DIABETES MELLITUS WITH DIABETIC NEUROPATHY, WITH LONG-TERM CURRENT USE OF INSULIN (HCC): ICD-10-CM

## 2024-08-13 DIAGNOSIS — N18.32 STAGE 3B CHRONIC KIDNEY DISEASE (HCC): ICD-10-CM

## 2024-08-13 DIAGNOSIS — E11.40 TYPE 2 DIABETES MELLITUS WITH DIABETIC NEUROPATHY, WITH LONG-TERM CURRENT USE OF INSULIN (HCC): ICD-10-CM

## 2024-08-13 DIAGNOSIS — I50.33 ACUTE ON CHRONIC HEART FAILURE WITH PRESERVED EJECTION FRACTION (HCC): Primary | ICD-10-CM

## 2024-08-13 DIAGNOSIS — R07.89 OTHER CHEST PAIN: ICD-10-CM

## 2024-08-13 DIAGNOSIS — E11.69 HYPERLIPIDEMIA ASSOCIATED WITH TYPE 2 DIABETES MELLITUS  (HCC): ICD-10-CM

## 2024-08-13 DIAGNOSIS — I10 PRIMARY HYPERTENSION: ICD-10-CM

## 2024-08-13 DIAGNOSIS — E78.5 HYPERLIPIDEMIA ASSOCIATED WITH TYPE 2 DIABETES MELLITUS  (HCC): ICD-10-CM

## 2024-08-13 PROCEDURE — 99214 OFFICE O/P EST MOD 30 MIN: CPT

## 2024-08-13 NOTE — ASSESSMENT & PLAN NOTE
Patient appears mildly hypervolemic  Dry weight 150lbs  Maintain daily weights and low sodium diet.   Continue Lasix 20 mg twice daily and KClor 20mEq daily.  Advised to elevate legs and use ACE wraps starting from the toes up to the shins.      Wt Readings from Last 3 Encounters:   08/13/24 70.3 kg (155 lb)   08/08/24 71.2 kg (157 lb)   08/02/24 70.8 kg (156 lb)

## 2024-08-13 NOTE — ASSESSMENT & PLAN NOTE
Lab Results   Component Value Date    EGFR 48 (L) 08/09/2024    EGFR 37 07/29/2024    EGFR 45 07/04/2024    CREATININE 1.15 (H) 08/09/2024    CREATININE 1.32 (H) 07/29/2024    CREATININE 1.14 07/04/2024

## 2024-08-13 NOTE — PROGRESS NOTES
[facility] [place]  [address]  [place] [address]  Cardiology Office Note    Shantelle Romano 79 y.o. female MRN: [id number]    08/13/24          Assessment/Plan:  1. Acute on chronic heart failure with preserved ejection fraction (HCC)  Assessment & Plan:  Patient appears mildly hypervolemic  Dry weight 150  Maintain daily weights and low sodium diet.   Continue Lasix 20 mg twice daily and KClor 20mEq daily.  Advised to elevate legs and use ACE wraps starting from the toes up to the shins.      Wt Readings from Last 3 Encounters:   08/13/24 70.3 kg (155 lb)   08/08/24 71.2 kg (157 lb)   08/02/24 70.8 kg (156 lb)     2. Hyperlipidemia associated with type 2 diabetes mellitus  ([facility])  Assessment & Plan:    Lab Results   Component Value Date    HGBA1C 7.6 (H) 07/03/2024     3. Type 2 diabetes mellitus with diabetic neuropathy, with long-term current use of insulin ([facility])  Assessment & Plan:    Lab Results   Component Value Date    HGBA1C 7.6 (H) 07/03/2024     4. Other chest pain  Assessment & Plan:  Patient scheduled for pharmacologic MPI next week  5. Primary hypertension  Assessment & Plan:  Not on antihypertensives at this time    6. Stage 3b chronic kidney disease (HCC)  Assessment & Plan:  Lab Results   Component Value Date    EGFR 48 (L) 08/09/2024    EGFR 37 07/29/2024    EGFR 45 07/04/2024    CREATININE 1.15 (H) 08/09/2024    CREATININE 1.32 (H) 07/29/2024    CREATININE 1.14 07/04/2024           Follow up: 2 weeks or sooner as needed  All questions and concerns addressed.  Patient was advised to report any problems requiring medical attention.          1. Acute on chronic heart failure with preserved ejection fraction (HCC)        2. Hyperlipidemia associated with type 2 diabetes mellitus  ([facility])        3. Type 2 diabetes mellitus with diabetic neuropathy, with long-term current use of insulin ([facility])        4. Other chest pain        5. Primary hypertension        6. Stage 3b  chronic kidney disease (HCC)            HPI: Shantelle Romano is a 79 y.o. year old female with PMH of chronic HFpEF, hypertension, hyperlipidemia, CKD stage III who presents for follow-up.     Patient notes that her weight is improving on the increase of Lasix. Renal function is improving as well. Potassium okay on increased dose. Patient notes that she still has some swelling.    Notes intermittent chest pain. She is scheduled for pharmacologic MPI next week.     Patient notes that her weight when she doesn't have swelling is about 148-150lbs.    Labs reviewed.  TTE done 7/22- EF 55%, G1DD, mild AI, MR, TR     Patient was instructed to call the office or seek medical attention if any significant chest pain, shortness of breath, palpitations, or lower extremity swelling develop. All medications reviewed and patient is tolerating medications without side effects.     Social history:   Patient denies tobacco, significant alcohol, or recreational drug use.            Patient Active Problem List   Diagnosis    Simple chronic bronchitis (HCC)    Hypertension    Alternating constipation and diarrhea    Other chest pain    Vitamin D deficiency    Stage 3b chronic kidney disease (HCC)    Bilateral leg edema    Chronic pain syndrome    Coronary artery disease without angina pectoris    Acute on chronic heart failure with preserved ejection fraction (HCC)    Gastroesophageal reflux disease without esophagitis    Hyperlipidemia associated with type 2 diabetes mellitus  (HCC)    Left ventricular hypertrophy    Lung nodule    Macular pucker, left    Memory loss    Osteoarthritis    Seizure disorder (HCC)    Strabismic amblyopia of right eye    Neurologic gait dysfunction    Type 2 diabetes mellitus with diabetic neuropathy (HCC)    Chronic kidney disease-mineral and bone disorder    Osteopenia of multiple sites    Urinary incontinence    Former smoker    Obesity, morbid (HCC)    Stroke-like symptoms    Abnormal thyroid function  test    Thyroid nodule       Allergies   Allergen Reactions    Brimonidine Other (See Comments)    Salicylic Acid-Sulfur Other (See Comments)    Sulfa Antibiotics Rash and Other (See Comments)         Current Outpatient Medications:     acetaminophen (TYLENOL) 650 mg CR tablet, Take 500 mg by mouth 2 (two) times a day, Disp: , Rfl:     albuterol (2.5 mg/3 mL) 0.083 % nebulizer solution, Take 3 mL (2.5 mg total) by nebulization every 6 (six) hours as needed for wheezing or shortness of breath, Disp: 90 mL, Rfl: 0    albuterol (ProAir HFA) 90 mcg/act inhaler, Inhale 2 puffs every 6 (six) hours as needed for wheezing or shortness of breath (cough, chest tightness), Disp: 18 g, Rfl: 0    atorvastatin (LIPITOR) 10 mg tablet, Take 1 tablet (10 mg total) by mouth daily at bedtime, Disp: 90 tablet, Rfl: 1    Calcium Carbonate-Vitamin D (CALCIUM-D PO), Take 1 tablet by mouth in the morning, Disp: , Rfl:     clotrimazole (LOTRIMIN) 1 % cream, , Disp: , Rfl:     Continuous Blood Gluc  (Dexcom G7 ) YOLANDA, Use 1 Application if needed (check sugars), Disp: 1 each, Rfl: 0    Continuous Blood Gluc Sensor (Dexcom G7 Sensor), Use 1 Device every 10 days, Disp: 9 each, Rfl: 3    dicyclomine (BENTYL) 20 mg tablet, Take 20 mg by mouth 2 (two) times a day, Disp: , Rfl:     DULoxetine (CYMBALTA) 30 mg delayed release capsule, Take 30 mg by mouth 2 (two) times a day, Disp: , Rfl:     fluticasone (FLONASE) 50 mcg/act nasal spray, 1 spray into each nostril daily pt uses as needed, Disp: 54 g, Rfl: 1    fluticasone-umeclidinium-vilanterol (Trelegy Ellipta) 100-62.5-25 mcg/actuation inhaler, Inhale 1 puff daily Rinse mouth after use., Disp: 60 blister, Rfl: 5    furosemide (LASIX) 20 mg tablet, Take 1 tablet (20 mg total) by mouth 2 (two) times a day, Disp: 60 tablet, Rfl: 6    gabapentin (NEURONTIN) 100 mg capsule, Take 2 capsules (200 mg total) by mouth daily at bedtime, Disp: 180 capsule, Rfl: 1    guaiFENesin (MUCINEX) 600 mg  12 hr tablet, Take 1 tablet (600 mg total) by mouth 2 (two) times a day as needed for cough or congestion, Disp: 180 tablet, Rfl: 1    insulin aspart (NovoLOG FlexPen) 100 UNIT/ML injection pen, inject 12 units subcutaneously before meals (3 MEALS A DAY), Disp: 15 mL, Rfl: 5    Insulin Glargine Solostar (Lantus SoloStar) 100 UNIT/ML SOPN, Inject 0.3 mL (30 Units total) under the skin daily, Disp: 15 mL, Rfl: 2    Insulin Pen Needle (B-D ULTRAFINE III SHORT PEN) 31G X 8 MM MISC, Use 4 (four) times a day (with meals and at bedtime), Disp: 400 each, Rfl: 1    ketoconazole (NIZORAL) 2 % cream, , Disp: , Rfl:     latanoprost (XALATAN) 0.005 % ophthalmic solution, Administer 1 drop into the left eye daily, Disp: 2.5 mL, Rfl: 2    lidocaine (Lidoderm) 5 %, Apply 1 patch topically over 12 hours daily as needed (pain) Remove & Discard patch within 12 hours or as directed by MD, Disp: 30 patch, Rfl: 1    Lumigan 0.01 % ophthalmic drops, INSTILL 1 DROP into both eyes at bedtime, Disp: , Rfl:     montelukast (SINGULAIR) 10 mg tablet, Take 1 tablet (10 mg total) by mouth every evening, Disp: 90 tablet, Rfl: 1    Multiple Vitamin (MULTI VITAMIN DAILY PO), Take 1 tablet by mouth daily, Disp: , Rfl:     mupirocin (BACTROBAN) 2 % ointment, APPLY A SMALL AMOUNT TO THE AFFECTED FOOT/TOE AREA BY TOPICAL ROUTE 3 TIMES PER DAY, Disp: , Rfl:     nystatin (MYCOSTATIN) cream, Apply topically 2 (two) times a day, Disp: 30 g, Rfl: 1    nystatin (MYCOSTATIN) powder, Apply topically 3 (three) times a day as needed (rash), Disp: 60 g, Rfl: 1    omeprazole (PriLOSEC) 20 mg delayed release capsule, Take 1 capsule (20 mg total) by mouth 2 (two) times a day, Disp: 180 capsule, Rfl: 1    potassium chloride (Klor-Con M20) 20 mEq tablet, take 1 tablet by mouth once daily, Disp: 5 tablet, Rfl: 0    Spacer/Aero-Holding Chambers (AeroChamber Plus Elian-Vu Large) MISC, Use as directed, Disp: , Rfl:     vitamin B-12 (VITAMIN B-12) 500 mcg tablet, Take 1,000  mcg by mouth daily, Disp: , Rfl:     Past Medical History:   Diagnosis Date    Acute kidney injury superimposed on chronic kidney disease  ([facility]) 11/26/2016    ADHD (attention deficit hyperactivity disorder) 03/17/2019    Arthritis     BL HIPS    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    Carpal tunnel syndrome     Chest pain 03/06/2017    Chronic kidney disease     Claudication ([facility]) 3/15/2019    Closed fracture of base of middle phalanx of finger 06/22/2017    Closed fracture of middle phalanx of left little finger 07/08/2017    Coagulopathy ([facility]) 05/15/2019    Colloid cyst of brain ([facility])     COPD (chronic obstructive pulmonary disease) ([facility])     COPD (chronic obstructive pulmonary disease) ([facility])     Coronary artery disease     Cough     Diabetes mellitus ([facility])     GERD (gastroesophageal reflux disease)     Glaucoma     Gout     History of brain disorder 10/07/2020    Hyperlipidemia     Hypertension     Irritable bowel syndrome     Melena 02/26/2019    PAD (peripheral artery disease) ([facility]) 5/15/2019    Paronychia of great toe of left foot 10/07/2020    Post-traumatic seizures ([facility]) 07/23/2015    Renal disorder     Seizures ([facility])     last 2015    Shortness of breath     Single seizure ([facility]) 05/31/2016    SOB (shortness of breath)     Syncope and collapse 11/11/2020    Urinary tract infection without hematuria 11/26/2016    Wheezing        Family History   Problem Relation Age of Onset    Asthma Mother     Heart disease Mother     Emphysema Father     Cancer Father     Prostate cancer Father     Kidney cancer Sister     Diabetes Sister     Kidney disease Sister     Brain cancer Brother     Bone cancer Brother     Heart disease Brother        Past Surgical History:   Procedure Laterality Date    BRAIN SURGERY      CATARACT EXTRACTION      CHOLECYSTECTOMY      COLECTOMY RADHA      COLONOSCOPY      DECOMPRESSION SPINE LUMBAR POSTERIOR  08/19/2019    HERNIA REPAIR      HYSTERECTOMY      INCISION TENDON SHEATH HAND      NECK  SURGERY  2018    NEUROPLASTY / TRANSPOSITION MEDIAN NERVE AT CARPAL TUNNEL      AR COLONOSCOPY FLX DX W/COLLJ SPEC WHEN PFRMD N/A 2019    Procedure: COLONOSCOPY;  Surgeon: Yaniv Hawley MD;  Location: [facility];  Service: Gastroenterology    AR ESOPHAGOGASTRODUODENOSCOPY TRANSORAL DIAGNOSTIC N/A 2019    Procedure: ESOPHAGOGASTRODUODENOSCOPY (EGD);  Surgeon: Yaniv Hawley MD;  Location: [facility];  Service: Gastroenterology    REPLACEMENT TOTAL KNEE Right     RETINAL DETACHMENT SURGERY      TUBAL LIGATION         Social History     Socioeconomic History    Marital status:      Spouse name: Not on file    Number of children: Not on file    Years of education: Not on file    Highest education level: Not on file   Occupational History    Occupation: retired   Tobacco Use    Smoking status: Former     Current packs/day: 0.00     Average packs/day: 0.5 packs/day for 40.0 years (20.0 ttl pk-yrs)     Types: Cigarettes     Start date:      Quit date:      Years since quittin.6    Smokeless tobacco: Never    Tobacco comments:     stopped many years ago   Vaping Use    Vaping status: Never Used   Substance and Sexual Activity    Alcohol use: Never    Drug use: Never    Sexual activity: Not Currently     Partners: Male   Other Topics Concern    Not on file   Social History Narrative    Not on file     Social Determinants of Health     Financial Resource Strain: Low Risk  (2024)    Overall Financial Resource Strain (CARDIA)     Difficulty of Paying Living Expenses: Not hard at all   Food Insecurity: No Food Insecurity (7/3/2024)    Hunger Vital Sign     Worried About Running Out of Food in the Last Year: Never true     Ran Out of Food in the Last Year: Never true   Transportation Needs: No Transportation Needs (7/3/2024)    PRAPARE - Transportation     Lack of Transportation (Medical): No     Lack of Transportation (Non-Medical): No   Physical Activity: Not on file   Stress:  "Not on file   Social Connections: Unknown (6/18/2024)    Received from [facility]     How often do you feel lonely or isolated from those around you? (Adult - for ages 18 years and over): Not on file   Intimate Partner Violence: Not on file   Housing Stability: Low Risk  (7/3/2024)    Housing Stability Vital Sign     Unable to Pay for Housing in the Last Year: No     Number of Times Moved in the Last Year: 0     Homeless in the Last Year: No       Review of symptoms:   Review of Systems   Constitutional:  Negative for chills, diaphoresis and fever.   Respiratory:  Negative for cough, chest tightness and shortness of breath.    Cardiovascular:  Positive for chest pain and leg swelling. Negative for palpitations.   Gastrointestinal:  Negative for abdominal distention, blood in stool, nausea and vomiting.   Genitourinary:  Negative for difficulty urinating.   Musculoskeletal:  Negative for arthralgias and back pain.   Neurological:  Negative for dizziness, syncope, light-headedness and headaches.   Psychiatric/Behavioral:  Negative for agitation and confusion. The patient is not nervous/anxious.         Vitals: /68 (BP Location: Left arm, Patient Position: Sitting, Cuff Size: Standard)   Pulse 80   Resp 16   Ht 4' 7\" (1.397 m)   Wt 70.3 kg (155 lb)   SpO2 98%   BMI 36.03 kg/m²         Physical Exam:     Physical Exam  Vitals and nursing note reviewed.   Constitutional:       General: She is not in acute distress.     Appearance: She is well-developed.   HENT:      Head: Normocephalic and atraumatic.   Eyes:      Conjunctiva/sclera: Conjunctivae normal.   Neck:      Vascular: No carotid bruit.   Cardiovascular:      Rate and Rhythm: Normal rate and regular rhythm.      Heart sounds: Normal heart sounds. No murmur heard.  Pulmonary:      Effort: Pulmonary effort is normal. No respiratory distress.      Breath sounds: Normal breath sounds.   Abdominal:      Palpations: Abdomen is soft.     "  Tenderness: There is no abdominal tenderness.   Musculoskeletal:         General: No swelling.      Cervical back: Neck supple.      Right lower le+ Pitting Edema (pedal) present.      Left lower le+ Pitting Edema (pedal) present.   Skin:     General: Skin is warm and dry.      Capillary Refill: Capillary refill takes less than 2 seconds.   Neurological:      Mental Status: She is alert and oriented to person, place, and time.   Psychiatric:         Mood and Affect: Mood normal.            Thank you for allowing me to participate in the care and evaluation of your patient.  Should you have any questions, please feel free to contact me.

## 2024-08-19 ENCOUNTER — HOSPITAL ENCOUNTER (OUTPATIENT)
Dept: NON INVASIVE DIAGNOSTICS | Facility: CLINIC | Age: 79
Discharge: HOME | End: 2024-08-19
Payer: COMMERCIAL

## 2024-08-19 VITALS
BODY MASS INDEX: 35.87 KG/M2 | SYSTOLIC BLOOD PRESSURE: 182 MMHG | DIASTOLIC BLOOD PRESSURE: 78 MMHG | WEIGHT: 155 LBS | HEIGHT: 55 IN | OXYGEN SATURATION: 96 % | HEART RATE: 66 BPM

## 2024-08-19 DIAGNOSIS — I20.89 ANGINAL EQUIVALENT: ICD-10-CM

## 2024-08-19 LAB
CHEST PAIN STATEMENT: NORMAL
MAX DIASTOLIC BP: 78 MMHG
MAX PREDICTED HEART RATE: 139 BPM
PROTOCOL NAME: NORMAL
REASON FOR TERMINATION: NORMAL
STRESS POST EXERCISE DUR MIN: 3 MIN
STRESS POST EXERCISE DUR SEC: 0 SEC
STRESS POST PEAK HR: 93 BPM
STRESS POST PEAK SYSTOLIC BP: 182 MMHG
TARGET HR FORMULA: NORMAL
TEST INDICATION: NORMAL

## 2024-08-19 PROCEDURE — 93017 CV STRESS TEST TRACING ONLY: CPT

## 2024-08-19 PROCEDURE — 93016 CV STRESS TEST SUPVJ ONLY: CPT | Performed by: STUDENT IN AN ORGANIZED HEALTH CARE EDUCATION/TRAINING PROGRAM

## 2024-08-19 PROCEDURE — 78452 HT MUSCLE IMAGE SPECT MULT: CPT

## 2024-08-19 PROCEDURE — 78452 HT MUSCLE IMAGE SPECT MULT: CPT | Performed by: STUDENT IN AN ORGANIZED HEALTH CARE EDUCATION/TRAINING PROGRAM

## 2024-08-19 PROCEDURE — 93018 CV STRESS TEST I&R ONLY: CPT | Performed by: STUDENT IN AN ORGANIZED HEALTH CARE EDUCATION/TRAINING PROGRAM

## 2024-08-19 PROCEDURE — A9502 TC99M TETROFOSMIN: HCPCS

## 2024-08-19 RX ORDER — REGADENOSON 0.08 MG/ML
0.4 INJECTION, SOLUTION INTRAVENOUS ONCE
Status: COMPLETED | OUTPATIENT
Start: 2024-08-19 | End: 2024-08-19

## 2024-08-19 RX ADMIN — REGADENOSON 0.4 MG: 0.08 INJECTION, SOLUTION INTRAVENOUS at 13:40

## 2024-08-20 LAB
ATRIAL RATE: 64 BPM
CHEST PAIN STATEMENT: NORMAL
MAX DIASTOLIC BP: 78 MMHG
MAX PREDICTED HEART RATE: 139 BPM
NUC STRESS DIASTOLIC VOLUME INDEX: 63 ML/M2
NUC STRESS EJECTION FRACTION: 66 %
P AXIS: 64 DEGREES
PR INTERVAL: 182 MS
PROTOCOL NAME: NORMAL
QRS AXIS: 10 DEGREES
QRSD INTERVAL: 114 MS
QT INTERVAL: 440 MS
QTC INTERVAL: 453 MS
RATE PRESSURE PRODUCT: NORMAL
REASON FOR TERMINATION: NORMAL
SL CV REST NUCLEAR ISOTOPE DOSE: 10.31 MCI
SL CV STRESS NUCLEAR ISOTOPE DOSE: 30.9 MCI
SL CV STRESS RECOVERY BP: NORMAL MMHG
SL CV STRESS RECOVERY HR: 83 BPM
SL CV STRESS RECOVERY O2 SAT: 96 %
STRESS ANGINA INDEX: 0
STRESS BASELINE BP: NORMAL MMHG
STRESS BASELINE HR: 66 BPM
STRESS O2 SAT REST: 96 %
STRESS PEAK HR: 92 BPM
STRESS POST EXERCISE DUR MIN: 3 MIN
STRESS POST EXERCISE DUR SEC: 0 SEC
STRESS POST O2 SAT PEAK: 96 %
STRESS POST PEAK BP: 178 MMHG
STRESS POST PEAK HR: 93 BPM
STRESS POST PEAK SYSTOLIC BP: 182 MMHG
T WAVE AXIS: 22 DEGREES
TARGET HR FORMULA: NORMAL
TEST INDICATION: NORMAL
VENTRICULAR RATE: 64 BPM

## 2024-08-20 PROCEDURE — 93010 ELECTROCARDIOGRAM REPORT: CPT | Performed by: INTERNAL MEDICINE

## 2024-08-21 ENCOUNTER — TELEPHONE (OUTPATIENT)
Dept: CARDIOLOGY CLINIC | Facility: CLINIC | Age: 79
End: 2024-08-21

## 2024-08-21 NOTE — TELEPHONE ENCOUNTER
Spoke with pt and her care giver Landy. They verbally understood the result of her NM myocardial perfusion spect (rx stress and/or rest) / Lalo's message

## 2024-08-21 NOTE — TELEPHONE ENCOUNTER
----- Message from Lalo Damon PA-C sent at 8/20/2024 12:44 PM EDT -----  Please advise patient her stress test is abnormal. She should attend her appointment next week to further discuss testing results and next steps. If she experiences any significant chest pain or SOB, she should proceed to the ER for further evaluation.

## 2024-08-22 ENCOUNTER — APPOINTMENT (OUTPATIENT)
Dept: LAB | Facility: CLINIC | Age: 79
End: 2024-08-22
Payer: COMMERCIAL

## 2024-08-22 DIAGNOSIS — H46.9 OPTIC NEURITIS: ICD-10-CM

## 2024-08-22 LAB
ANA SER QL IA: NEGATIVE
FOLATE SERPL-MCNC: 16.1 NG/ML
VIT B12 SERPL-MCNC: 1361 PG/ML (ref 180–914)

## 2024-08-22 PROCEDURE — 85549 MURAMIDASE: CPT

## 2024-08-22 PROCEDURE — 82607 VITAMIN B-12: CPT

## 2024-08-22 PROCEDURE — 86618 LYME DISEASE ANTIBODY: CPT

## 2024-08-22 PROCEDURE — 83520 IMMUNOASSAY QUANT NOS NONAB: CPT

## 2024-08-22 PROCEDURE — 86037 ANCA TITER EACH ANTIBODY: CPT

## 2024-08-22 PROCEDURE — 36415 COLL VENOUS BLD VENIPUNCTURE: CPT

## 2024-08-22 PROCEDURE — 86038 ANTINUCLEAR ANTIBODIES: CPT

## 2024-08-22 PROCEDURE — 82164 ANGIOTENSIN I ENZYME TEST: CPT

## 2024-08-22 PROCEDURE — 82746 ASSAY OF FOLIC ACID SERUM: CPT

## 2024-08-22 PROCEDURE — 86780 TREPONEMA PALLIDUM: CPT

## 2024-08-22 PROCEDURE — 84425 ASSAY OF VITAMIN B-1: CPT

## 2024-08-23 LAB
B BURGDOR IGG+IGM SER QL IA: NEGATIVE
TREPONEMA PALLIDUM IGG+IGM AB [PRESENCE] IN SERUM OR PLASMA BY IMMUNOASSAY: NORMAL

## 2024-08-24 LAB — SL AMB TREPONEMA PALLIDUM ANTIBODIES: NON REACTIVE

## 2024-08-26 ENCOUNTER — PREP FOR PROCEDURE (OUTPATIENT)
Dept: CARDIOLOGY CLINIC | Facility: CLINIC | Age: 79
End: 2024-08-26

## 2024-08-26 ENCOUNTER — OFFICE VISIT (OUTPATIENT)
Dept: CARDIOLOGY CLINIC | Facility: CLINIC | Age: 79
End: 2024-08-26
Payer: COMMERCIAL

## 2024-08-26 VITALS
SYSTOLIC BLOOD PRESSURE: 122 MMHG | BODY MASS INDEX: 36.57 KG/M2 | HEIGHT: 55 IN | RESPIRATION RATE: 16 BRPM | WEIGHT: 158 LBS | OXYGEN SATURATION: 94 % | DIASTOLIC BLOOD PRESSURE: 80 MMHG | HEART RATE: 60 BPM

## 2024-08-26 DIAGNOSIS — N18.32 STAGE 3B CHRONIC KIDNEY DISEASE (HCC): ICD-10-CM

## 2024-08-26 DIAGNOSIS — R07.9 CHEST PAIN, UNSPECIFIED TYPE: ICD-10-CM

## 2024-08-26 DIAGNOSIS — E11.69 HYPERLIPIDEMIA ASSOCIATED WITH TYPE 2 DIABETES MELLITUS  (HCC): ICD-10-CM

## 2024-08-26 DIAGNOSIS — I10 PRIMARY HYPERTENSION: ICD-10-CM

## 2024-08-26 DIAGNOSIS — E78.5 HYPERLIPIDEMIA ASSOCIATED WITH TYPE 2 DIABETES MELLITUS  (HCC): ICD-10-CM

## 2024-08-26 DIAGNOSIS — R94.39 ABNORMAL CARDIOVASCULAR STRESS TEST: Primary | ICD-10-CM

## 2024-08-26 DIAGNOSIS — I50.32 CHRONIC HEART FAILURE WITH PRESERVED EJECTION FRACTION (HCC): ICD-10-CM

## 2024-08-26 LAB
ACE SERPL-CCNC: 27 U/L (ref 14–82)
LYSOZYME SERPL-MCNC: 11.5 UG/ML (ref 4.2–9.5)

## 2024-08-26 PROCEDURE — 99214 OFFICE O/P EST MOD 30 MIN: CPT

## 2024-08-26 RX ORDER — ASPIRIN 81 MG/1
81 TABLET, CHEWABLE ORAL DAILY
Start: 2024-08-26 | End: 2024-08-28

## 2024-08-26 RX ORDER — PREDNISONE 20 MG/1
20 TABLET ORAL DAILY
COMMUNITY
Start: 2024-08-15

## 2024-08-26 NOTE — PROGRESS NOTES
[facility] Cardiology   Office Visit    Shantelle Romano 79 y.o. female MRN: [id number]    08/26/24        Assessment/Plan:  1.  Chest pain, MALIK, abnormal stress test  Pharmacological MPI stress test revealed a partially reversible apical LV perfusion defect consistent with ischemia.  Patient endorses midsternal chest pain exacerbated with exertion with associated dyspnea.  Discussed consideration for further ischemic evaluation.  Patient wishes to proceed with cardiac catheterization.  Task sent to have arranged at [facility] within the next 1 to 2 weeks.  Task sent to nephrology for renal optimization recommendations.  Labs ordered.  Start ASA 81 mg daily.  Continue Lipitor.  Will hold off BB at this time due to borderline bradycardia.  Discussed the indications, alternatives, risks and benefit of cardiac catheterization and possible PCI. The procedure risks, benefits, and complications (including but not limited to bleeding, infection, arrhythmia, nephrotoxicity, vessel injury, myocardial infarction, CVA, and death) were reviewed.  Patient is alert and oriented x3 and wishes to proceed. All questions answered.     2.  Chronic HFpEF  Nearly euvolemic on exam.  Continue Lasix 20 mg bid and lower extremity wrapping.  Encourage low-sodium diet, daily weights, and LE elevation.  Advised to take extra 20 mg tablet of Lasix for 3 pound weight gain over a day or 5 pound weight gain over a week.  Advised to notify our office for any new or worsening symptoms.    3.  Hypertension  BP is well-controlled.  Continue Lasix.  Encourage ambulatory monitoring and low-sodium diet.    4.  Hyperlipidemia  LDL 87.  Continue Lipitor and dietary control.    5.  CKD 3 - GFR 48, follows with nephrology  6.  T2DM, A1c 7.6  7.  COPD          Interval history: Shantelle Romano is a 79 y.o. year old female with history of chronic HFpEF, hypertension, hyperlipidemia, and CKD 3 who presents office visit.  During previous visit with  "cardiology patient was found to be mildly hypervolemic.  She also complained of chest pain.  Pharmacological MPI stress test has since been completed and found to be abnormal with results as per above.  Since time of last office visit patient has been experiencing chest pain described as a midsternal pulling sensation exacerbated with exertion with associated dyspnea.  Patient has noticed improvement in lower extremity edema with lower extremity wrapping.  Weight has increased from prior visit, however patient recently started a prednisone taper per her ophthalmologist.  She has been completing physical therapy and ambulates with assistance of walker due to history of falls.  Endorses strict compliance to all medications.  Denies adverse effects to current medications or any additional complaints at this time.  Patient follows with nephrology for management of CKD.  Previously followed with Dr. Ras Richards as primary cardiologist.      Review of Systems:  Review of Systems   Constitutional:  Negative for chills and fever.   HENT:  Negative for ear pain and sore throat.    Eyes:  Negative for pain and visual disturbance.   Respiratory:  Negative for cough and shortness of breath.    Cardiovascular:  Negative for chest pain, palpitations and leg swelling.   Gastrointestinal:  Negative for abdominal pain and vomiting.   Genitourinary:  Negative for dysuria and hematuria.   Musculoskeletal:  Negative for arthralgias and back pain.   Skin:  Negative for color change and rash.   Neurological:  Negative for seizures and syncope.   All other systems reviewed and are negative.      PHYSICAL EXAM:  Vitals:   Vitals:    08/26/24 1137   BP: 122/80   BP Location: Right arm   Patient Position: Sitting   Cuff Size: Standard   Pulse: 60   Resp: 16   SpO2: 94%   Weight: 71.7 kg (158 lb)   Height: 4' 7\" (1.397 m)        Physical Exam:  GEN: Alert and oriented x 3, in no acute distress.  Well appearing, obese, and well nourished. "  Ambulates with assistance of walker.  HEENT: Sclera anicteric, conjunctivae pink, mucous membranes moist. Oropharynx clear.   NECK: Supple, no carotid bruits, no significant JVD. Trachea midline, no thyromegaly.   HEART: Regular rhythm, normal S1 and S2, no murmurs, clicks, gallops or rubs. PMI nondisplaced, no thrills.   LUNGS: Clear to auscultation bilaterally; no wheezes, rales, or rhonchi. No increased work of breathing or signs of respiratory distress.   ABDOMEN: Soft, nontender, nondistended, normoactive bowel sounds.   EXTREMITIES: Skin warm and well perfused, no clubbing or cyanosis, trace LE edema.  NEURO: No focal findings. Normal speech. Mood and affect normal.   SKIN: LE wrapping in place.  Normal without suspicious lesions on exposed skin.    Follow up: 2 months or sooner as needed    Allergies   Allergen Reactions    Brimonidine Other (See Comments)    Salicylic Acid-Sulfur Other (See Comments)    Sulfa Antibiotics Rash and Other (See Comments)         Current Outpatient Medications:     acetaminophen (TYLENOL) 650 mg CR tablet, Take 500 mg by mouth 2 (two) times a day, Disp: , Rfl:     albuterol (2.5 mg/3 mL) 0.083 % nebulizer solution, Take 3 mL (2.5 mg total) by nebulization every 6 (six) hours as needed for wheezing or shortness of breath, Disp: 90 mL, Rfl: 0    albuterol (ProAir HFA) 90 mcg/act inhaler, Inhale 2 puffs every 6 (six) hours as needed for wheezing or shortness of breath (cough, chest tightness), Disp: 18 g, Rfl: 0    aspirin 81 mg chewable tablet, Chew 1 tablet (81 mg total) daily, Disp: , Rfl:     atorvastatin (LIPITOR) 10 mg tablet, Take 1 tablet (10 mg total) by mouth daily at bedtime, Disp: 90 tablet, Rfl: 1    Calcium Carbonate-Vitamin D (CALCIUM-D PO), Take 1 tablet by mouth in the morning, Disp: , Rfl:     clotrimazole (LOTRIMIN) 1 % cream, , Disp: , Rfl:     Continuous Blood Gluc  (Dexcom G7 ) YOLANDA, Use 1 Application if needed (check sugars), Disp: 1 each,  Rfl: 0    Continuous Blood Gluc Sensor (Dexcom G7 Sensor), Use 1 Device every 10 days, Disp: 9 each, Rfl: 3    dicyclomine (BENTYL) 20 mg tablet, Take 20 mg by mouth 2 (two) times a day, Disp: , Rfl:     DULoxetine (CYMBALTA) 30 mg delayed release capsule, Take 30 mg by mouth 2 (two) times a day, Disp: , Rfl:     fluticasone (FLONASE) 50 mcg/act nasal spray, 1 spray into each nostril daily pt uses as needed, Disp: 54 g, Rfl: 1    fluticasone-umeclidinium-vilanterol (Trelegy Ellipta) 100-62.5-25 mcg/actuation inhaler, Inhale 1 puff daily Rinse mouth after use., Disp: 60 blister, Rfl: 5    furosemide (LASIX) 20 mg tablet, Take 1 tablet (20 mg total) by mouth 2 (two) times a day, Disp: 60 tablet, Rfl: 6    gabapentin (NEURONTIN) 100 mg capsule, Take 2 capsules (200 mg total) by mouth daily at bedtime, Disp: 180 capsule, Rfl: 1    guaiFENesin (MUCINEX) 600 mg 12 hr tablet, Take 1 tablet (600 mg total) by mouth 2 (two) times a day as needed for cough or congestion, Disp: 180 tablet, Rfl: 1    insulin aspart (NovoLOG FlexPen) 100 UNIT/ML injection pen, inject 12 units subcutaneously before meals (3 MEALS A DAY), Disp: 15 mL, Rfl: 5    Insulin Glargine Solostar (Lantus SoloStar) 100 UNIT/ML SOPN, Inject 0.3 mL (30 Units total) under the skin daily, Disp: 15 mL, Rfl: 2    Insulin Pen Needle (B-D ULTRAFINE III SHORT PEN) 31G X 8 MM MISC, Use 4 (four) times a day (with meals and at bedtime), Disp: 400 each, Rfl: 1    ketoconazole (NIZORAL) 2 % cream, , Disp: , Rfl:     latanoprost (XALATAN) 0.005 % ophthalmic solution, Administer 1 drop into the left eye daily, Disp: 2.5 mL, Rfl: 2    lidocaine (Lidoderm) 5 %, Apply 1 patch topically over 12 hours daily as needed (pain) Remove & Discard patch within 12 hours or as directed by MD, Disp: 30 patch, Rfl: 1    Lumigan 0.01 % ophthalmic drops, INSTILL 1 DROP into both eyes at bedtime, Disp: , Rfl:     montelukast (SINGULAIR) 10 mg tablet, Take 1 tablet (10 mg total) by mouth every  evening, Disp: 90 tablet, Rfl: 1    Multiple Vitamin (MULTI VITAMIN DAILY PO), Take 1 tablet by mouth daily, Disp: , Rfl:     mupirocin (BACTROBAN) 2 % ointment, APPLY A SMALL AMOUNT TO THE AFFECTED FOOT/TOE AREA BY TOPICAL ROUTE 3 TIMES PER DAY, Disp: , Rfl:     nystatin (MYCOSTATIN) cream, Apply topically 2 (two) times a day, Disp: 30 g, Rfl: 1    nystatin (MYCOSTATIN) powder, Apply topically 3 (three) times a day as needed (rash), Disp: 60 g, Rfl: 1    omeprazole (PriLOSEC) 20 mg delayed release capsule, Take 1 capsule (20 mg total) by mouth 2 (two) times a day, Disp: 180 capsule, Rfl: 1    predniSONE 20 mg tablet, Take 20 mg by mouth daily, Disp: , Rfl:     Spacer/Aero-Holding Chambers (AeroChamber Plus Elian-Vu Large) MISC, Use as directed, Disp: , Rfl:     vitamin B-12 (VITAMIN B-12) 500 mcg tablet, Take 1,000 mcg by mouth daily, Disp: , Rfl:     potassium chloride (Klor-Con M20) 20 mEq tablet, take 1 tablet by mouth once daily (Patient not taking: Reported on 8/26/2024), Disp: 5 tablet, Rfl: 0    Past Medical History:   Diagnosis Date    Acute kidney injury superimposed on chronic kidney disease  (HCC) 11/26/2016    ADHD (attention deficit hyperactivity disorder) 03/17/2019    Arthritis     BL HIPS    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    Carpal tunnel syndrome     Chest pain 03/06/2017    Chronic kidney disease     Claudication (HCC) 3/15/2019    Closed fracture of base of middle phalanx of finger 06/22/2017    Closed fracture of middle phalanx of left little finger 07/08/2017    Coagulopathy (HCC) 05/15/2019    Colloid cyst of brain (HCC)     COPD (chronic obstructive pulmonary disease) (HCC)     COPD (chronic obstructive pulmonary disease) (HCC)     Coronary artery disease     Cough     Diabetes mellitus (HCC)     GERD (gastroesophageal reflux disease)     Glaucoma     Gout     History of brain disorder 10/07/2020    Hyperlipidemia     Hypertension     Irritable bowel syndrome     Melena 02/26/2019    PAD  (peripheral artery disease) ([facility]) 5/15/2019    Paronychia of great toe of left foot 10/07/2020    Post-traumatic seizures ([facility]) 07/23/2015    Renal disorder     Seizures ([facility])     last 2015    Shortness of breath     Single seizure ([facility]) 05/31/2016    SOB (shortness of breath)     Syncope and collapse 11/11/2020    Urinary tract infection without hematuria 11/26/2016    Wheezing        Family History   Problem Relation Age of Onset    Asthma Mother     Heart disease Mother     Emphysema Father     Cancer Father     Prostate cancer Father     Kidney cancer Sister     Diabetes Sister     Kidney disease Sister     Brain cancer Brother     Bone cancer Brother     Heart disease Brother        Past Medical History:   Diagnosis Date    Acute kidney injury superimposed on chronic kidney disease  ([facility]) 11/26/2016    ADHD (attention deficit hyperactivity disorder) 03/17/2019    Arthritis     BL HIPS    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    Carpal tunnel syndrome     Chest pain 03/06/2017    Chronic kidney disease     Claudication ([facility]) 3/15/2019    Closed fracture of base of middle phalanx of finger 06/22/2017    Closed fracture of middle phalanx of left little finger 07/08/2017    Coagulopathy ([facility]) 05/15/2019    Colloid cyst of brain ([facility])     COPD (chronic obstructive pulmonary disease) (HCC)     COPD (chronic obstructive pulmonary disease) (HCC)     Coronary artery disease     Cough     Diabetes mellitus (HCC)     GERD (gastroesophageal reflux disease)     Glaucoma     Gout     History of brain disorder 10/07/2020    Hyperlipidemia     Hypertension     Irritable bowel syndrome     Melena 02/26/2019    PAD (peripheral artery disease) ([facility]) 5/15/2019    Paronychia of great toe of left foot 10/07/2020    Post-traumatic seizures ([facility]) 07/23/2015    Renal disorder     Seizures ([facility])     last 2015    Shortness of breath     Single seizure ([facility]) 05/31/2016    SOB (shortness of breath)     Syncope and collapse 11/11/2020    Urinary  tract infection without hematuria 2016    Wheezing        Past Surgical History:   Procedure Laterality Date    BRAIN SURGERY      CATARACT EXTRACTION      CHOLECYSTECTOMY      COLECTOMY RADHA      COLONOSCOPY      DECOMPRESSION SPINE LUMBAR POSTERIOR  2019    HERNIA REPAIR      HYSTERECTOMY      INCISION TENDON SHEATH HAND      NECK SURGERY  2018    NEUROPLASTY / TRANSPOSITION MEDIAN NERVE AT CARPAL TUNNEL      [place] COLONOSCOPY FLX DX W/COLLJ SPEC WHEN PFRMD N/A 2019    Procedure: COLONOSCOPY;  Surgeon: Yaniv Hawley MD;  Location: [facility];  Service: Gastroenterology    NY ESOPHAGOGASTRODUODENOSCOPY TRANSORAL DIAGNOSTIC N/A 2019    Procedure: ESOPHAGOGASTRODUODENOSCOPY (EGD);  Surgeon: Yaniv Hawley MD;  Location: [facility];  Service: Gastroenterology    REPLACEMENT TOTAL KNEE Right     RETINAL DETACHMENT SURGERY      TUBAL LIGATION         Social History     Socioeconomic History    Marital status:      Spouse name: Not on file    Number of children: Not on file    Years of education: Not on file    Highest education level: Not on file   Occupational History    Occupation: retired   Tobacco Use    Smoking status: Former     Current packs/day: 0.00     Average packs/day: 0.5 packs/day for 40.0 years (20.0 ttl pk-yrs)     Types: Cigarettes     Start date:      Quit date:      Years since quittin.6    Smokeless tobacco: Never    Tobacco comments:     stopped many years ago   Vaping Use    Vaping status: Never Used   Substance and Sexual Activity    Alcohol use: Never    Drug use: Never    Sexual activity: Not Currently     Partners: Male   Other Topics Concern    Not on file   Social History Narrative    Not on file     Social Determinants of Health     Financial Resource Strain: Low Risk  (2024)    Overall Financial Resource Strain (CARDIA)     Difficulty of Paying Living Expenses: Not hard at all   Food Insecurity: No Food Insecurity (7/3/2024)     "Hunger Vital Sign     Worried About Running Out of Food in the Last Year: Never true     Ran Out of Food in the Last Year: Never true   Transportation Needs: No Transportation Needs (7/3/2024)    PRAPARE - Transportation     Lack of Transportation (Medical): No     Lack of Transportation (Non-Medical): No   Physical Activity: Not on file   Stress: Not on file   Social Connections: Unknown (6/18/2024)    Received from [employer]    Social UnboundID     How often do you feel lonely or isolated from those around you? (Adult - for ages 18 years and over): Not on file   Intimate Partner Violence: Not on file   Housing Stability: Low Risk  (7/3/2024)    Housing Stability Vital Sign     Unable to Pay for Housing in the Last Year: No     Number of Times Moved in the Last Year: 0     Homeless in the Last Year: No         LABORATORY RESULTS:    Lab Results   Component Value Date    WBC 7.21 07/04/2024    HGB 13.0 07/04/2024    HCT 41.2 07/04/2024    MCV 88 07/04/2024     07/04/2024     Lab Results   Component Value Date    CALCIUM 9.4 08/09/2024    K 4.5 08/09/2024    CO2 24 08/09/2024     08/09/2024    BUN 31 (H) 08/09/2024    CREATININE 1.15 (H) 08/09/2024     Lab Results   Component Value Date    HGBA1C 7.6 (H) 07/03/2024       Lipid Profile:   No results found for: \"CHOL\"  Lab Results   Component Value Date    HDL 39 (L) 07/03/2024    HDL 42 (L) 10/13/2023    HDL 46 (L) 07/12/2022     Lab Results   Component Value Date    LDLCALC 87 07/03/2024    LDLCALC 84 10/13/2023    LDLCALC 58 07/12/2022     Lab Results   Component Value Date    TRIG 233 (H) 07/03/2024    TRIG 181 (H) 10/13/2023    TRIG 159 (H) 07/12/2022       The ASCVD Risk score (Ally DK, et al., 2019) failed to calculate for the following reasons:    The 2019 ASCVD risk score is only valid for ages 40 to 79    1. Abnormal cardiovascular stress test  aspirin 81 mg chewable tablet    CBC and differential    Basic metabolic panel      2. Chest pain, " unspecified type  aspirin 81 mg chewable tablet      3. Chronic heart failure with preserved ejection fraction (HCC)  CBC and differential    Basic metabolic panel      4. Primary hypertension        5. Hyperlipidemia associated with type 2 diabetes mellitus  (HCC)        6. Stage 3b chronic kidney disease (HCC)  CBC and differential    Basic metabolic panel          Imaging: I have personally reviewed pertinent reports.        Recommend aggressive risk factor modification and therapeutic lifestyle changes.  Low-salt, low-calorie, low-fat, low-cholesterol diet with regular exercise and to optimize weight.    Discussed concepts of atherosclerosis, including signs and symptoms of cardiac disease.    Medications reviewed and possible side effects discussed.  Previous studies were reviewed.    Safety measures were reviewed.  All questions and concerns addressed.  Patient was advised to report any problems requiring medical attention.    Follow-up with PCP and appropriate specialist and lab work as discussed.    Return for follow up visit as scheduled or earlier, if needed.  Thank you for allowing me to participate in the care and evaluation of your patient.  Should you have any questions, please feel free to contact me.    Lambert Rico PA-C  8/26/2024,12:49 PM

## 2024-08-27 ENCOUNTER — HOSPITAL ENCOUNTER (OUTPATIENT)
Facility: HOSPITAL | Age: 79
Setting detail: OBSERVATION
Discharge: HOME | End: 2024-08-28
Attending: EMERGENCY MEDICINE | Admitting: INTERNAL MEDICINE
Payer: COMMERCIAL

## 2024-08-27 ENCOUNTER — TELEPHONE (OUTPATIENT)
Age: 79
End: 2024-08-27

## 2024-08-27 ENCOUNTER — TELEPHONE (OUTPATIENT)
Dept: CARDIOLOGY CLINIC | Facility: CLINIC | Age: 79
End: 2024-08-27

## 2024-08-27 ENCOUNTER — APPOINTMENT (EMERGENCY)
Dept: RADIOLOGY | Facility: HOSPITAL | Age: 79
End: 2024-08-27
Payer: COMMERCIAL

## 2024-08-27 DIAGNOSIS — K21.9 GASTROESOPHAGEAL REFLUX DISEASE WITHOUT ESOPHAGITIS: ICD-10-CM

## 2024-08-27 DIAGNOSIS — R07.9 CHEST PAIN: Primary | ICD-10-CM

## 2024-08-27 DIAGNOSIS — R94.39 ABNORMAL CARDIOVASCULAR STRESS TEST: ICD-10-CM

## 2024-08-27 PROBLEM — H46.9 OPTIC NEURITIS: Status: ACTIVE | Noted: 2024-08-27

## 2024-08-27 LAB
2HR DELTA HS TROPONIN: 0 NG/L
4HR DELTA HS TROPONIN: 0 NG/L
ALBUMIN SERPL BCG-MCNC: 4 G/DL (ref 3.5–5)
ALP SERPL-CCNC: 44 U/L (ref 34–104)
ALT SERPL W P-5'-P-CCNC: 19 U/L (ref 7–52)
ANION GAP SERPL CALCULATED.3IONS-SCNC: 9 MMOL/L (ref 4–13)
APTT PPP: 23 SECONDS (ref 23–34)
APTT PPP: 59 SECONDS (ref 23–34)
AST SERPL W P-5'-P-CCNC: 12 U/L (ref 13–39)
ATRIAL RATE: 62 BPM
BASOPHILS # BLD AUTO: 0.03 THOUSANDS/ÂΜL (ref 0–0.1)
BASOPHILS NFR BLD AUTO: 0 % (ref 0–1)
BILIRUB DIRECT SERPL-MCNC: 0.15 MG/DL (ref 0–0.2)
BILIRUB SERPL-MCNC: 0.6 MG/DL (ref 0.2–1)
BNP SERPL-MCNC: 95 PG/ML (ref 0–100)
BUN SERPL-MCNC: 36 MG/DL (ref 5–25)
C-ANCA TITR SER IF: NORMAL TITER
CALCIUM SERPL-MCNC: 9.5 MG/DL (ref 8.4–10.2)
CARDIAC TROPONIN I PNL SERPL HS: 12 NG/L
CHLORIDE SERPL-SCNC: 97 MMOL/L (ref 96–108)
CO2 SERPL-SCNC: 31 MMOL/L (ref 21–32)
CREAT SERPL-MCNC: 1.14 MG/DL (ref 0.6–1.3)
EOSINOPHIL # BLD AUTO: 0.16 THOUSAND/ÂΜL (ref 0–0.61)
EOSINOPHIL NFR BLD AUTO: 1 % (ref 0–6)
ERYTHROCYTE [DISTWIDTH] IN BLOOD BY AUTOMATED COUNT: 15 % (ref 11.6–15.1)
ERYTHROCYTE [DISTWIDTH] IN BLOOD BY AUTOMATED COUNT: 15 % (ref 11.6–15.1)
GFR SERPL CREATININE-BSD FRML MDRD: 45 ML/MIN/1.73SQ M
GLUCOSE SERPL-MCNC: 236 MG/DL (ref 65–140)
GLUCOSE SERPL-MCNC: 237 MG/DL (ref 65–140)
GLUCOSE SERPL-MCNC: 294 MG/DL (ref 65–140)
HCT VFR BLD AUTO: 44.6 % (ref 34.8–46.1)
HCT VFR BLD AUTO: 46 % (ref 34.8–46.1)
HGB BLD-MCNC: 14.3 G/DL (ref 11.5–15.4)
HGB BLD-MCNC: 14.9 G/DL (ref 11.5–15.4)
IMM GRANULOCYTES # BLD AUTO: 0.14 THOUSAND/UL (ref 0–0.2)
IMM GRANULOCYTES NFR BLD AUTO: 1 % (ref 0–2)
INR PPP: 0.91 (ref 0.85–1.19)
INR PPP: 0.92 (ref 0.85–1.19)
LYMPHOCYTES # BLD AUTO: 2.66 THOUSANDS/ÂΜL (ref 0.6–4.47)
LYMPHOCYTES NFR BLD AUTO: 15 % (ref 14–44)
MCH RBC QN AUTO: 28 PG (ref 26.8–34.3)
MCH RBC QN AUTO: 28.1 PG (ref 26.8–34.3)
MCHC RBC AUTO-ENTMCNC: 32.1 G/DL (ref 31.4–37.4)
MCHC RBC AUTO-ENTMCNC: 32.4 G/DL (ref 31.4–37.4)
MCV RBC AUTO: 87 FL (ref 82–98)
MCV RBC AUTO: 88 FL (ref 82–98)
MONOCYTES # BLD AUTO: 1.23 THOUSAND/ÂΜL (ref 0.17–1.22)
MONOCYTES NFR BLD AUTO: 7 % (ref 4–12)
MYELOPEROXIDASE AB SER IA-ACNC: <0.2 UNITS (ref 0–0.9)
NEUTROPHILS # BLD AUTO: 13.95 THOUSANDS/ÂΜL (ref 1.85–7.62)
NEUTS SEG NFR BLD AUTO: 76 % (ref 43–75)
NRBC BLD AUTO-RTO: 0 /100 WBCS
P AXIS: 60 DEGREES
P-ANCA ATYPICAL TITR SER IF: NORMAL TITER
P-ANCA TITR SER IF: NORMAL TITER
PLATELET # BLD AUTO: 233 THOUSANDS/UL (ref 149–390)
PLATELET # BLD AUTO: 247 THOUSANDS/UL (ref 149–390)
PMV BLD AUTO: 9.4 FL (ref 8.9–12.7)
PMV BLD AUTO: 9.7 FL (ref 8.9–12.7)
POTASSIUM SERPL-SCNC: 3.5 MMOL/L (ref 3.5–5.3)
PR INTERVAL: 152 MS
PROT SERPL-MCNC: 7.3 G/DL (ref 6.4–8.4)
PROTEINASE3 AB SER IA-ACNC: <0.2 UNITS (ref 0–0.9)
PROTHROMBIN TIME: 12.9 SECONDS (ref 12.3–15)
PROTHROMBIN TIME: 13 SECONDS (ref 12.3–15)
QRS AXIS: 8 DEGREES
QRSD INTERVAL: 112 MS
QT INTERVAL: 484 MS
QTC INTERVAL: 491 MS
RBC # BLD AUTO: 5.1 MILLION/UL (ref 3.81–5.12)
RBC # BLD AUTO: 5.31 MILLION/UL (ref 3.81–5.12)
SODIUM SERPL-SCNC: 137 MMOL/L (ref 135–147)
T WAVE AXIS: 36 DEGREES
VENTRICULAR RATE: 62 BPM
WBC # BLD AUTO: 15.79 THOUSAND/UL (ref 4.31–10.16)
WBC # BLD AUTO: 18.17 THOUSAND/UL (ref 4.31–10.16)

## 2024-08-27 PROCEDURE — 93010 ELECTROCARDIOGRAM REPORT: CPT | Performed by: INTERNAL MEDICINE

## 2024-08-27 PROCEDURE — 36415 COLL VENOUS BLD VENIPUNCTURE: CPT | Performed by: EMERGENCY MEDICINE

## 2024-08-27 PROCEDURE — 84484 ASSAY OF TROPONIN QUANT: CPT | Performed by: EMERGENCY MEDICINE

## 2024-08-27 PROCEDURE — 85025 COMPLETE CBC W/AUTO DIFF WBC: CPT | Performed by: EMERGENCY MEDICINE

## 2024-08-27 PROCEDURE — 80048 BASIC METABOLIC PNL TOTAL CA: CPT | Performed by: EMERGENCY MEDICINE

## 2024-08-27 PROCEDURE — 93005 ELECTROCARDIOGRAM TRACING: CPT

## 2024-08-27 PROCEDURE — 83880 ASSAY OF NATRIURETIC PEPTIDE: CPT | Performed by: EMERGENCY MEDICINE

## 2024-08-27 PROCEDURE — 85610 PROTHROMBIN TIME: CPT

## 2024-08-27 PROCEDURE — 99285 EMERGENCY DEPT VISIT HI MDM: CPT

## 2024-08-27 PROCEDURE — 85610 PROTHROMBIN TIME: CPT | Performed by: EMERGENCY MEDICINE

## 2024-08-27 PROCEDURE — 71045 X-RAY EXAM CHEST 1 VIEW: CPT

## 2024-08-27 PROCEDURE — 99285 EMERGENCY DEPT VISIT HI MDM: CPT | Performed by: EMERGENCY MEDICINE

## 2024-08-27 PROCEDURE — 99222 1ST HOSP IP/OBS MODERATE 55: CPT | Performed by: INTERNAL MEDICINE

## 2024-08-27 PROCEDURE — 85730 THROMBOPLASTIN TIME PARTIAL: CPT

## 2024-08-27 PROCEDURE — 85730 THROMBOPLASTIN TIME PARTIAL: CPT | Performed by: INTERNAL MEDICINE

## 2024-08-27 PROCEDURE — 82948 REAGENT STRIP/BLOOD GLUCOSE: CPT

## 2024-08-27 PROCEDURE — 84484 ASSAY OF TROPONIN QUANT: CPT

## 2024-08-27 PROCEDURE — 80076 HEPATIC FUNCTION PANEL: CPT | Performed by: EMERGENCY MEDICINE

## 2024-08-27 PROCEDURE — 85027 COMPLETE CBC AUTOMATED: CPT

## 2024-08-27 RX ORDER — HEPARIN SODIUM 10000 [USP'U]/100ML
3-20 INJECTION, SOLUTION INTRAVENOUS
Status: DISCONTINUED | OUTPATIENT
Start: 2024-08-27 | End: 2024-08-28

## 2024-08-27 RX ORDER — INSULIN GLARGINE 100 [IU]/ML
30 INJECTION, SOLUTION SUBCUTANEOUS
Status: DISCONTINUED | OUTPATIENT
Start: 2024-08-27 | End: 2024-08-28 | Stop reason: HOSPADM

## 2024-08-27 RX ORDER — ALBUTEROL SULFATE 0.83 MG/ML
2.5 SOLUTION RESPIRATORY (INHALATION) EVERY 6 HOURS PRN
Status: DISCONTINUED | OUTPATIENT
Start: 2024-08-27 | End: 2024-08-28 | Stop reason: HOSPADM

## 2024-08-27 RX ORDER — HEPARIN SODIUM 1000 [USP'U]/ML
2000 INJECTION, SOLUTION INTRAVENOUS; SUBCUTANEOUS EVERY 6 HOURS PRN
Status: DISCONTINUED | OUTPATIENT
Start: 2024-08-27 | End: 2024-08-28

## 2024-08-27 RX ORDER — INSULIN LISPRO 100 [IU]/ML
1-5 INJECTION, SOLUTION INTRAVENOUS; SUBCUTANEOUS
Status: DISCONTINUED | OUTPATIENT
Start: 2024-08-27 | End: 2024-08-28 | Stop reason: HOSPADM

## 2024-08-27 RX ORDER — LATANOPROST 50 UG/ML
1 SOLUTION/ DROPS OPHTHALMIC DAILY
Status: DISCONTINUED | OUTPATIENT
Start: 2024-08-27 | End: 2024-08-28 | Stop reason: HOSPADM

## 2024-08-27 RX ORDER — ATORVASTATIN CALCIUM 10 MG/1
10 TABLET, FILM COATED ORAL
Status: DISCONTINUED | OUTPATIENT
Start: 2024-08-27 | End: 2024-08-28 | Stop reason: HOSPADM

## 2024-08-27 RX ORDER — HEPARIN SODIUM 1000 [USP'U]/ML
4000 INJECTION, SOLUTION INTRAVENOUS; SUBCUTANEOUS EVERY 6 HOURS PRN
Status: DISCONTINUED | OUTPATIENT
Start: 2024-08-27 | End: 2024-08-28

## 2024-08-27 RX ORDER — INSULIN LISPRO 100 [IU]/ML
12 INJECTION, SOLUTION INTRAVENOUS; SUBCUTANEOUS
Status: DISCONTINUED | OUTPATIENT
Start: 2024-08-27 | End: 2024-08-28 | Stop reason: HOSPADM

## 2024-08-27 RX ORDER — DULOXETIN HYDROCHLORIDE 30 MG/1
30 CAPSULE, DELAYED RELEASE ORAL 2 TIMES DAILY
Status: DISCONTINUED | OUTPATIENT
Start: 2024-08-27 | End: 2024-08-28 | Stop reason: HOSPADM

## 2024-08-27 RX ORDER — ACETAMINOPHEN 325 MG/1
650 TABLET ORAL EVERY 6 HOURS PRN
Status: DISCONTINUED | OUTPATIENT
Start: 2024-08-27 | End: 2024-08-28 | Stop reason: HOSPADM

## 2024-08-27 RX ORDER — FUROSEMIDE 20 MG
20 TABLET ORAL 2 TIMES DAILY
Status: DISCONTINUED | OUTPATIENT
Start: 2024-08-27 | End: 2024-08-28 | Stop reason: HOSPADM

## 2024-08-27 RX ORDER — ASPIRIN 81 MG/1
81 TABLET, CHEWABLE ORAL DAILY
Status: DISCONTINUED | OUTPATIENT
Start: 2024-08-28 | End: 2024-08-28 | Stop reason: HOSPADM

## 2024-08-27 RX ORDER — SODIUM CHLORIDE 9 MG/ML
3 INJECTION INTRAVENOUS
Status: DISCONTINUED | OUTPATIENT
Start: 2024-08-27 | End: 2024-08-28 | Stop reason: HOSPADM

## 2024-08-27 RX ORDER — GABAPENTIN 100 MG/1
200 CAPSULE ORAL
Status: DISCONTINUED | OUTPATIENT
Start: 2024-08-27 | End: 2024-08-28 | Stop reason: HOSPADM

## 2024-08-27 RX ORDER — FLUTICASONE FUROATE AND VILANTEROL 100; 25 UG/1; UG/1
1 POWDER RESPIRATORY (INHALATION) DAILY
Status: DISCONTINUED | OUTPATIENT
Start: 2024-08-27 | End: 2024-08-28 | Stop reason: HOSPADM

## 2024-08-27 RX ORDER — PANTOPRAZOLE SODIUM 20 MG/1
20 TABLET, DELAYED RELEASE ORAL
Status: DISCONTINUED | OUTPATIENT
Start: 2024-08-28 | End: 2024-08-28 | Stop reason: HOSPADM

## 2024-08-27 RX ORDER — BIMATOPROST 0.3 MG/ML
1 SOLUTION/ DROPS OPHTHALMIC DAILY
Status: DISCONTINUED | OUTPATIENT
Start: 2024-08-27 | End: 2024-08-27

## 2024-08-27 RX ORDER — HEPARIN SODIUM 1000 [USP'U]/ML
4000 INJECTION, SOLUTION INTRAVENOUS; SUBCUTANEOUS ONCE
Status: COMPLETED | OUTPATIENT
Start: 2024-08-27 | End: 2024-08-27

## 2024-08-27 RX ADMIN — FUROSEMIDE 20 MG: 20 TABLET ORAL at 17:24

## 2024-08-27 RX ADMIN — HEPARIN SODIUM 4000 UNITS: 1000 INJECTION INTRAVENOUS; SUBCUTANEOUS at 16:21

## 2024-08-27 RX ADMIN — ATORVASTATIN CALCIUM 10 MG: 10 TABLET, FILM COATED ORAL at 21:55

## 2024-08-27 RX ADMIN — DULOXETINE HYDROCHLORIDE 30 MG: 30 CAPSULE, DELAYED RELEASE ORAL at 17:24

## 2024-08-27 RX ADMIN — INSULIN LISPRO 3 UNITS: 100 INJECTION, SOLUTION INTRAVENOUS; SUBCUTANEOUS at 17:03

## 2024-08-27 RX ADMIN — FLUTICASONE FUROATE AND VILANTEROL TRIFENATATE 1 PUFF: 100; 25 POWDER RESPIRATORY (INHALATION) at 16:38

## 2024-08-27 RX ADMIN — HEPARIN SODIUM 12 UNITS/KG/HR: 10000 INJECTION, SOLUTION INTRAVENOUS at 16:15

## 2024-08-27 RX ADMIN — INSULIN GLARGINE 30 UNITS: 100 INJECTION, SOLUTION SUBCUTANEOUS at 21:55

## 2024-08-27 RX ADMIN — UMECLIDINIUM 1 PUFF: 62.5 AEROSOL, POWDER ORAL at 16:38

## 2024-08-27 RX ADMIN — HEPARIN SODIUM 2000 UNITS: 1000 INJECTION INTRAVENOUS; SUBCUTANEOUS at 23:16

## 2024-08-27 RX ADMIN — GABAPENTIN 200 MG: 100 CAPSULE ORAL at 21:55

## 2024-08-27 RX ADMIN — LATANOPROST 1 DROP: 50 SOLUTION OPHTHALMIC at 17:05

## 2024-08-27 RX ADMIN — INSULIN LISPRO 12 UNITS: 100 INJECTION, SOLUTION INTRAVENOUS; SUBCUTANEOUS at 17:04

## 2024-08-27 NOTE — ASSESSMENT & PLAN NOTE
Currently hypertensive in ED, likely in setting of anxiety/pain response  We will continue with home dose of Lasix  Trend BP once patient is more relaxed, if still remains elevated, we will further assist with BP

## 2024-08-27 NOTE — ASSESSMENT & PLAN NOTE
Wt Readings from Last 3 Encounters:   08/27/24 70.3 kg (154 lb 15.7 oz)   08/26/24 71.7 kg (158 lb)   08/19/24 70.3 kg (155 lb)     Does not appear to be in current exacerbation  Continue with home Lasix 20 mg twice daily  Chest x-ray shows no acute cardiopulmonary findings per my read, await official radiology read

## 2024-08-27 NOTE — ASSESSMENT & PLAN NOTE
Reportedly sees outpatient ophthalmologist  Continue with eyedrops  Currently on prednisone taper, currently on 30 mg prednisone, will continue to taper down

## 2024-08-27 NOTE — H&P
"[facility]  H&P  Name: Shantelle Romano 79 y.o. female I MRN: [id number]  Unit/Bed#: 2 E 260-01 I Date of Admission: 8/27/2024   Date of Service: 8/27/2024 I Hospital Day: 0      Assessment & Plan   * Chest pain  Assessment & Plan  Patient has a several month history of having intermittent chest pain.  She was recently seen 9/26 in the outpatient setting by [facility] cardiology and had a nuclear medicine stress test.  Ultimately was referred for cardiac catheterization which was scheduled 9/4.  This morning she woke up with increased chest pain and shortness of breath which lasted approximately 2 hours and was self-limiting.  This prompted her to be evaluated in the ED.    Outpatient NM stress test-LVEF 66% with small left ventricular perfusion deficit with partially reversible apical deficits  Takes daily aspirin and statin, outpatient held beta-blocker due to borderline bradycardia, will continue to hold  EKG incomplete right bundle block with normal sinus rhythm  Troponin 12, continue to trend  Lipid panel total cholesterol 173, triglyceride 233, HDL 39, LDL 87, hemoglobin A1c 7.6  LAMONT score 4  Consult cardiology  Placed on ACS heparin drip, n.p.o. at midnight    Optic neuritis  Assessment & Plan  Reportedly sees outpatient ophthalmologist  Continue with eyedrops  Currently on prednisone taper, currently on 30 mg prednisone, will continue to taper down    Obesity, morbid (HCC)  Assessment & Plan  Body mass index is 36.02 kg/m².  Recommend incorporating a more whole foods plant-predominant diet along with decreasing consumption of red meats and processed foods  Per AHA guidelines, recommend moderate-vigorous intensity exercise for 30 minutes a day for 5 days a week or a total of 150 min/week      Hyperlipidemia associated with type 2 diabetes mellitus  (HCC)  Assessment & Plan  Lab Results   Component Value Date    HGBA1C 7.6 (H) 07/03/2024       No results for input(s): \"POCGLU\" in the " last 72 hours.    Blood Sugar Average: Last 72 hrs:    Continue statin  Continue with 12 units mealtime insulin and 30 units long-acting, continue sliding scale  Trend blood glucose      Chronic heart failure with preserved ejection fraction (HCC)  Assessment & Plan  Wt Readings from Last 3 Encounters:   08/27/24 70.3 kg (154 lb 15.7 oz)   08/26/24 71.7 kg (158 lb)   08/19/24 70.3 kg (155 lb)     Does not appear to be in current exacerbation  Continue with home Lasix 20 mg twice daily  Chest x-ray shows no acute cardiopulmonary findings per my read, await official radiology read            Stage 3b chronic kidney disease (HCC)  Assessment & Plan  Lab Results   Component Value Date    EGFR 45 08/27/2024    EGFR 48 (L) 08/09/2024    EGFR 37 07/29/2024    CREATININE 1.14 08/27/2024    CREATININE 1.15 (H) 08/09/2024    CREATININE 1.32 (H) 07/29/2024     Baseline appears to be between 1.1 and 1.3, currently 1.14  Continue to trend BMP    Hypertension  Assessment & Plan  Currently hypertensive in ED, likely in setting of anxiety/pain response  We will continue with home dose of Lasix  Trend BP once patient is more relaxed, if still remains elevated, we will further assist with BP       VTE Pharmacologic Prophylaxis: VTE Score: 4 Moderate Risk (Score 3-4) - Pharmacological DVT Prophylaxis Ordered: heparin drip.  Code Status: Level 1 - Full Code   Discussion with family: Updated  (son) at bedside.    Anticipated Length of Stay: Patient will be admitted on an observation basis with an anticipated length of stay of less than 2 midnights secondary to chest pain.    Total Time Spent on Date of Encounter in care of patient: 65 mins. This time was spent on one or more of the following: performing physical exam; counseling and coordination of care; obtaining or reviewing history; documenting in the medical record; reviewing/ordering tests, medications or procedures; communicating with other healthcare professionals  and discussing with patient's family/caregivers.    Chief Complaint: Chest pain    History of Present Illness:  Shantelle Romano is a 79 y.o. female with a PMH of optic neuritis, hyperlipidemia, DM, obesity, CAD who presents with chest pain. Patient has a several month history of having intermittent chest pain.  She was recently seen 9/26 in the outpatient setting by [facility] cardiology and had a nuclear medicine stress test.  Ultimately was referred for cardiac catheterization which was scheduled 9/4.  This morning she woke up with increased chest pain and shortness of breath which lasted approximately 2 hours and was self-limiting.  This prompted her to be evaluated in the ED.    Review of Systems:  Review of Systems   Constitutional:  Negative for chills, fatigue and fever.   HENT:  Negative for ear pain and sore throat.    Eyes:  Positive for photophobia. Negative for pain and visual disturbance.   Respiratory:  Positive for chest tightness and shortness of breath. Negative for cough.    Cardiovascular:  Negative for chest pain and palpitations.   Gastrointestinal:  Negative for abdominal pain and vomiting.   Genitourinary:  Negative for dysuria and hematuria.   Musculoskeletal:  Negative for arthralgias and back pain.   Skin:  Negative for color change and rash.   Neurological:  Negative for seizures, syncope and weakness.   All other systems reviewed and are negative.      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    Carpal tunnel syndrome     Chest pain 03/06/2017    Chronic kidney disease     Claudication (Formerly [facility]) 3/15/2019    Closed fracture of base of middle phalanx of finger 06/22/2017    Closed fracture of middle phalanx of left little finger 07/08/2017    Coagulopathy (Formerly [facility])  05/15/2019    Colloid cyst of brain (HCC)     COPD (chronic obstructive pulmonary disease) (HCC)     COPD (chronic obstructive pulmonary disease) (HCC)     Coronary artery disease     Cough     Diabetes mellitus (HCC)     GERD (gastroesophageal reflux disease)     Glaucoma     Gout     History of brain disorder 10/07/2020    Hyperlipidemia     Hypertension     Irritable bowel syndrome     Melena 02/26/2019    PAD (peripheral artery disease) ([facility]) 5/15/2019    Paronychia of great toe of left foot 10/07/2020    Post-traumatic seizures ([facility]) 07/23/2015    Renal disorder     Seizures ([facility])     last 2015    Shortness of breath     Single seizure ([facility]) 05/31/2016    SOB (shortness of breath)     Syncope and collapse 11/11/2020    Urinary tract infection without hematuria 11/26/2016    Wheezing        Past Surgical History:   Procedure Laterality Date    BRAIN SURGERY      CATARACT EXTRACTION      CHOLECYSTECTOMY      COLECTOMY RADHA      COLONOSCOPY      DECOMPRESSION SPINE LUMBAR POSTERIOR  08/19/2019    HERNIA REPAIR      HYSTERECTOMY      INCISION TENDON SHEATH HAND      NECK SURGERY  05/24/2018    NEUROPLASTY / TRANSPOSITION MEDIAN NERVE AT CARPAL TUNNEL      WI COLONOSCOPY FLX DX W/COLLJ SPEC WHEN PFRMD N/A 03/26/2019    Procedure: COLONOSCOPY;  Surgeon: Yaniv Hawley MD;  Location: [facility];  Service: Gastroenterology    WI ESOPHAGOGASTRODUODENOSCOPY TRANSORAL DIAGNOSTIC N/A 03/26/2019    Procedure: ESOPHAGOGASTRODUODENOSCOPY (EGD);  Surgeon: Yaniv Hawley MD;  Location: [facility];  Service: Gastroenterology    REPLACEMENT TOTAL KNEE Right     RETINAL DETACHMENT SURGERY      TUBAL LIGATION         Meds/Allergies:  Prior to Admission medications    Medication Sig Start Date End Date Taking? Authorizing Provider   acetaminophen (TYLENOL) 650 mg CR tablet Take 500 mg by mouth 2 (two) times a day    Historical Provider, MD   albuterol (2.5 mg/3 mL) 0.083 % nebulizer solution Take 3 mL (2.5 mg total) by  English nebulization every 6 (six) hours as needed for wheezing or shortness of breath 4/22/24   JOSHUA Elizabeth   albuterol (ProAir HFA) 90 mcg/act inhaler Inhale 2 puffs every 6 (six) hours as needed for wheezing or shortness of breath (cough, chest tightness) 5/2/24   Clarisa Billingsley DO   aspirin 81 mg chewable tablet Chew 1 tablet (81 mg total) daily 8/26/24   Lambert Rico PA-C   atorvastatin (LIPITOR) 10 mg tablet Take 1 tablet (10 mg total) by mouth daily at bedtime 5/2/24   Clarisa Billingsley DO   Calcium Carbonate-Vitamin D (CALCIUM-D PO) Take 1 tablet by mouth in the morning    Historical Provider, MD   clotrimazole (LOTRIMIN) 1 % cream     Historical Provider, MD   Continuous Blood Gluc  (Dexcom G7 ) YOLANDA Use 1 Application if needed (check sugars) 7/26/23   Clarisa Billingsley DO   Continuous Blood Gluc Sensor (Dexcom G7 Sensor) Use 1 Device every 10 days 7/26/23   Clarisa Billingsley DO   dicyclomine (BENTYL) 20 mg tablet Take 20 mg by mouth 2 (two) times a day    Historical Provider, MD   DULoxetine (CYMBALTA) 30 mg delayed release capsule Take 30 mg by mouth 2 (two) times a day    Historical Provider, MD   fluticasone (FLONASE) 50 mcg/act nasal spray 1 spray into each nostril daily pt uses as needed 5/2/24   Clarisa Billingsley DO   fluticasone-umeclidinium-vilanterol (Trelegy Ellipta) 100-62.5-25 mcg/actuation inhaler Inhale 1 puff daily Rinse mouth after use. 5/23/24   JOSHUA Grier   furosemide (LASIX) 20 mg tablet Take 1 tablet (20 mg total) by mouth 2 (two) times a day 8/2/24   JOSHUA Zavala   gabapentin (NEURONTIN) 100 mg capsule Take 2 capsules (200 mg total) by mouth daily at bedtime 7/12/24   Clarisa Billingsley DO   guaiFENesin (MUCINEX) 600 mg 12 hr tablet Take 1 tablet (600 mg total) by mouth 2 (two) times a day as needed for cough or congestion 7/12/24   Clarisa Billingsley DO   insulin aspart (NovoLOG FlexPen) 100 UNIT/ML injection pen inject 12 units  subcutaneously before meals (3 MEALS A DAY) 7/29/24   Clarisa Billingsley, DO   Insulin Glargine Solostar (Lantus SoloStar) 100 UNIT/ML SOPN Inject 0.3 mL (30 Units total) under the skin daily 5/2/24   Clarisa Billingsley, DO   Insulin Pen Needle (B-D ULTRAFINE III SHORT PEN) 31G X 8 MM MISC Use 4 (four) times a day (with meals and at bedtime) 8/9/24   Clarisa Billingsley, DO   ketoconazole (NIZORAL) 2 % cream  5/9/23   Historical Provider, MD   latanoprost (XALATAN) 0.005 % ophthalmic solution Administer 1 drop into the left eye daily 7/19/23   Clarisa Billingsley, DO   lidocaine (Lidoderm) 5 % Apply 1 patch topically over 12 hours daily as needed (pain) Remove & Discard patch within 12 hours or as directed by MD 7/12/24   Clarisa Billingsley, DO   Lumigan 0.01 % ophthalmic drops INSTILL 1 DROP into both eyes at bedtime    Historical Provider, MD   montelukast (SINGULAIR) 10 mg tablet Take 1 tablet (10 mg total) by mouth every evening 5/2/24   Clarisa Billingsley, DO   Multiple Vitamin (MULTI VITAMIN DAILY PO) Take 1 tablet by mouth daily    Historical Provider, MD   mupirocin (BACTROBAN) 2 % ointment APPLY A SMALL AMOUNT TO THE AFFECTED FOOT/TOE AREA BY TOPICAL ROUTE 3 TIMES PER DAY    Historical Provider, MD   nystatin (MYCOSTATIN) cream Apply topically 2 (two) times a day 2/28/24   Clarisa Billingsley, DO   nystatin (MYCOSTATIN) powder Apply topically 3 (three) times a day as needed (rash) 2/28/24   Clarisa Billingsley, DO   omeprazole (PriLOSEC) 20 mg delayed release capsule Take 1 capsule (20 mg total) by mouth 2 (two) times a day 5/2/24   Clarisa Billingsley, DO   potassium chloride (Klor-Con M20) 20 mEq tablet take 1 tablet by mouth once daily  Patient not taking: Reported on 8/26/2024 8/6/24   Marita Duran PA-C   predniSONE 20 mg tablet Take 20 mg by mouth daily 8/15/24   Historical Provider, MD   Spacer/Aero-Holding Chambers (AeroChamber Plus Elian-Vu Large) MISC Use as directed 5/5/23   Historical Provider, MD   vitamin B-12 (VITAMIN B-12)  500 mcg tablet Take 1,000 mcg by mouth daily    Historical Provider, MD MACARIO have reviewed home medications with patient personally.    Allergies:   Allergies   Allergen Reactions    Brimonidine Other (See Comments)    Salicylic Acid-Sulfur Other (See Comments)    Sulfa Antibiotics Rash and Other (See Comments)       Social History:  Marital Status:    Occupation: N/A  Patient Pre-hospital Living Situation: Home  Patient Pre-hospital Level of Mobility: walks  Patient Pre-hospital Diet Restrictions: Diabetic  Substance Use History:   Social History     Substance and Sexual Activity   Alcohol Use Never     Social History     Tobacco Use   Smoking Status Former    Current packs/day: 0.00    Average packs/day: 0.5 packs/day for 40.0 years (20.0 ttl pk-yrs)    Types: Cigarettes    Start date:     Quit date:     Years since quittin.6   Smokeless Tobacco Never   Tobacco Comments    stopped many years ago     Social History     Substance and Sexual Activity   Drug Use Never       Family History:  Family History   Problem Relation Age of Onset    Asthma Mother     Heart disease Mother     Emphysema Father     Cancer Father     Prostate cancer Father     Kidney cancer Sister     Diabetes Sister     Kidney disease Sister     Brain cancer Brother     Bone cancer Brother     Heart disease Brother        Physical Exam:     Vitals:   Blood Pressure: 168/65 (24 1451)  Pulse: 64 (24 1451)  Temperature: 97.8 °F (36.6 °C) (24 1451)  Temp Source: Oral (24 1202)  Respirations: 16 (24 1451)  Weight - Scale: 70.3 kg (154 lb 15.7 oz) (24 1202)  SpO2: 91 % (24 1451)    Physical Exam  Vitals and nursing note reviewed.   Constitutional:       Appearance: She is obese.   HENT:      Head: Normocephalic.      Nose: Nose normal.      Mouth/Throat:      Mouth: Mucous membranes are moist.      Pharynx: Oropharynx is clear.   Cardiovascular:      Rate and Rhythm: Normal rate and regular  rhythm.      Heart sounds: No murmur heard.     No friction rub. No gallop.   Pulmonary:      Effort: Pulmonary effort is normal. No respiratory distress.      Breath sounds: Normal breath sounds. No stridor. No wheezing, rhonchi or rales.   Abdominal:      General: Abdomen is flat.      Palpations: Abdomen is soft.   Musculoskeletal:         General: Normal range of motion.      Cervical back: Normal range of motion and neck supple.      Right lower leg: No edema.      Left lower leg: No edema.   Lymphadenopathy:      Cervical: No cervical adenopathy.   Skin:     General: Skin is warm.      Coloration: Skin is not jaundiced or pale.      Findings: No bruising, erythema or lesion.   Neurological:      General: No focal deficit present.      Mental Status: She is alert and oriented to person, place, and time. Mental status is at baseline.      Cranial Nerves: No cranial nerve deficit.      Motor: No weakness.   Psychiatric:         Mood and Affect: Mood normal.         Behavior: Behavior normal.         Thought Content: Thought content normal.          Additional Data:     Lab Results:  Results from last 7 days   Lab Units 08/27/24  1227   WBC Thousand/uL 18.17*   HEMOGLOBIN g/dL 14.9   HEMATOCRIT % 46.0   PLATELETS Thousands/uL 247   SEGS PCT % 76*   LYMPHO PCT % 15   MONO PCT % 7   EOS PCT % 1     Results from last 7 days   Lab Units 08/27/24  1227   SODIUM mmol/L 137   POTASSIUM mmol/L 3.5   CHLORIDE mmol/L 97   CO2 mmol/L 31   BUN mg/dL 36*   CREATININE mg/dL 1.14   ANION GAP mmol/L 9   CALCIUM mg/dL 9.5   ALBUMIN g/dL 4.0   TOTAL BILIRUBIN mg/dL 0.60   ALK PHOS U/L 44   ALT U/L 19   AST U/L 12*   GLUCOSE RANDOM mg/dL 236*     Results from last 7 days   Lab Units 08/27/24  1228   INR  0.91         Lab Results   Component Value Date    HGBA1C 7.6 (H) 07/03/2024    HGBA1C 6.9 (A) 05/02/2024    HGBA1C 7.1 (A) 01/23/2024           Lines/Drains:  Invasive Devices       Peripheral Intravenous Line  Duration              Peripheral IV 08/27/24 Left Antecubital <1 day                        Imaging: Personally reviewed the following imaging: chest xray  X-ray chest 1 view portable    (Results Pending)       EKG and Other Studies Reviewed on Admission:   EKG: NSR. HR 62.    ** Please Note: This note has been constructed using a voice recognition system. **

## 2024-08-27 NOTE — PLAN OF CARE
Problem: Potential for Falls  Goal: Patient will remain free of falls  Description: INTERVENTIONS:  - Educate patient/family on patient safety including physical limitations  - Instruct patient to call for assistance with activity   - Consult OT/PT to assist with strengthening/mobility   - Keep Call bell within reach  - Keep bed low and locked with side rails adjusted as appropriate  - Keep care items and personal belongings within reach  - Initiate and maintain comfort rounds  - Make Fall Risk Sign visible to staff  - Offer Toileting every 2 Hours, in advance of need  - Initiate/Maintain bed alarm  - Obtain necessary fall risk management equipment:   - Apply yellow socks and bracelet for high fall risk patients  - Consider moving patient to room near nurses station  Outcome: Progressing     Problem: PAIN - ADULT  Goal: Verbalizes/displays adequate comfort level or baseline comfort level  Description: Interventions:  - Encourage patient to monitor pain and request assistance  - Assess pain using appropriate pain scale  - Administer analgesics based on type and severity of pain and evaluate response  - Implement non-pharmacological measures as appropriate and evaluate response  - Consider cultural and social influences on pain and pain management  - Notify physician/advanced practitioner if interventions unsuccessful or patient reports new pain  Outcome: Progressing     Problem: INFECTION - ADULT  Goal: Absence or prevention of progression during hospitalization  Description: INTERVENTIONS:  - Assess and monitor for signs and symptoms of infection  - Monitor lab/diagnostic results  - Monitor all insertion sites, i.e. indwelling lines, tubes, and drains  - Monitor endotracheal if appropriate and nasal secretions for changes in amount and color  - [place] appropriate cooling/warming therapies per order  - Administer medications as ordered  - Instruct and encourage patient and family to use good hand hygiene  technique  - Identify and instruct in appropriate isolation precautions for identified infection/condition  Outcome: Progressing     Problem: SAFETY ADULT  Goal: Patient will remain free of falls  Description: INTERVENTIONS:  - Educate patient/family on patient safety including physical limitations  - Instruct patient to call for assistance with activity   - Consult OT/PT to assist with strengthening/mobility   - Keep Call bell within reach  - Keep bed low and locked with side rails adjusted as appropriate  - Keep care items and personal belongings within reach  - Initiate and maintain comfort rounds  - Make Fall Risk Sign visible to staff  - Offer Toileting every 2 Hours, in advance of need  - Initiate/Maintain bed alarm  - Obtain necessary fall risk management equipment:   - Apply yellow socks and bracelet for high fall risk patients  - Consider moving patient to room near nurses station  Outcome: Progressing  Goal: Maintain or return to baseline ADL function  Description: INTERVENTIONS:  -  Assess patient's ability to carry out ADLs; assess patient's baseline for ADL function and identify physical deficits which impact ability to perform ADLs (bathing, care of mouth/teeth, toileting, grooming, dressing, etc.)  - Assess/evaluate cause of self-care deficits   - Assess range of motion  - Assess patient's mobility; develop plan if impaired  - Assess patient's need for assistive devices and provide as appropriate  - Encourage maximum independence but intervene and supervise when necessary  - Involve family in performance of ADLs  - Assess for home care needs following discharge   - Consider OT consult to assist with ADL evaluation and planning for discharge  - Provide patient education as appropriate  Outcome: Progressing  Goal: Maintains/Returns to pre admission functional level  Description: INTERVENTIONS:  - Perform AM-PAC 6 Click Basic Mobility/ Daily Activity assessment daily.  - Set and communicate daily  mobility goal to care team and patient/family/caregiver.   - Collaborate with rehabilitation services on mobility goals if consulted  - Perform Range of Motion 3 times a day.  - Reposition patient every 2 hours.  - Dangle patient 3 times a day  - Stand patient 3 times a day  - Ambulate patient 3 times a day  - Out of bed to chair 3 times a day   - Out of bed for meals 3 times a day  - Out of bed for toileting  - Record patient progress and toleration of activity level   Outcome: Progressing     Problem: DISCHARGE PLANNING  Goal: Discharge to home or other facility with appropriate resources  Description: INTERVENTIONS:  - Identify barriers to discharge w/patient and caregiver  - Arrange for needed discharge resources and transportation as appropriate  - Identify discharge learning needs (meds, wound care, etc.)  - Arrange for interpretive services to assist at discharge as needed  - Refer to Case Management Department for coordinating discharge planning if the patient needs post-hospital services based on physician/advanced practitioner order or complex needs related to functional status, cognitive ability, or social support system  Outcome: Progressing     Problem: Knowledge Deficit  Goal: Patient/family/caregiver demonstrates understanding of disease process, treatment plan, medications, and discharge instructions  Description: Complete learning assessment and assess knowledge base.  Interventions:  - Provide teaching at level of understanding  - Provide teaching via preferred learning methods  Outcome: Progressing     Problem: CARDIOVASCULAR - ADULT  Goal: Maintains optimal cardiac output and hemodynamic stability  Description: INTERVENTIONS:  - Monitor I/O, vital signs and rhythm  - Monitor for S/S and trends of decreased cardiac output  - Administer and titrate ordered vasoactive medications to optimize hemodynamic stability  - Assess quality of pulses, skin color and temperature  - Assess for signs of  decreased coronary artery perfusion  - Instruct patient to report change in severity of symptoms  Outcome: Progressing  Goal: Absence of cardiac dysrhythmias or at baseline rhythm  Description: INTERVENTIONS:  - Continuous cardiac monitoring, vital signs, obtain 12 lead EKG if ordered  - Administer antiarrhythmic and heart rate control medications as ordered  - Monitor electrolytes and administer replacement therapy as ordered  Outcome: Progressing

## 2024-08-27 NOTE — ASSESSMENT & PLAN NOTE
Body mass index is 36.02 kg/m².  Recommend incorporating a more whole foods plant-predominant diet along with decreasing consumption of red meats and processed foods  Per AHA guidelines, recommend moderate-vigorous intensity exercise for 30 minutes a day for 5 days a week or a total of 150 min/week

## 2024-08-27 NOTE — TELEPHONE ENCOUNTER
Patients son in law called and advised she has tightness in her chest and balance was off and she was admitted    Meals and Snack/Medical Food Supplements

## 2024-08-27 NOTE — ASSESSMENT & PLAN NOTE
Lab Results   Component Value Date    EGFR 45 08/27/2024    EGFR 48 (L) 08/09/2024    EGFR 37 07/29/2024    CREATININE 1.14 08/27/2024    CREATININE 1.15 (H) 08/09/2024    CREATININE 1.32 (H) 07/29/2024     Baseline appears to be between 1.1 and 1.3, currently 1.14  Continue to trend BMP

## 2024-08-27 NOTE — ASSESSMENT & PLAN NOTE
"Lab Results   Component Value Date    HGBA1C 7.6 (H) 07/03/2024       No results for input(s): \"POCGLU\" in the last 72 hours.    Blood Sugar Average: Last 72 hrs:    Continue statin  Continue with 12 units mealtime insulin and 30 units long-acting, continue sliding scale  Trend blood glucose    "

## 2024-08-27 NOTE — TELEPHONE ENCOUNTER
S/w pt's son. Advised of card cath scheduled for 9/4. Pt advised to take 1/2 of insulin dose evening prior to, and hold insulin morning of procedure. Pt advised to obtain labs ordered ASAP. Message sent to pt via Z[employer] with instructions provided over the phone. Pt's son verbalized understanding.

## 2024-08-27 NOTE — ED PROVIDER NOTES
History  Chief Complaint   Patient presents with    Shortness of Breath     Patient reports worsening shortness of breath and weakness this morning. Patient reports appt with cardiology yesterday with abnormal stress test and plans for cardiac cath on 9/4.     HPI patient is a 79-year-old female history of an abnormal nuclear stress test recently.  Apparently patient reports she is going to have a coronary angiogram early next month.  Patient reports today she had sudden onset of a anterior chest pressure pain and some shortness of breath.  Patient reports she felt very weak and exhausted with it.  She denies any loss of consciousness.  She reported pain and some shortness of breath.  Patient reports she felt very weak and exhausted with it.  Signy loss of consciousness.  She reported difficulty breathing that resolved spontaneously on its own.  Patient reports the pain has resolved prior to arrival here in the emergency department.  She denies any loss of consciousness.  She denies any trauma to her chest.  Past medical history of insulin-dependent diabetes, COPD, glaucoma gout hyperlipidemia hypertension  Family has noncontributory  Social history, former smoker no history of drug abuse    Prior to Admission Medications   Prescriptions Last Dose Informant Patient Reported? Taking?   Calcium Carbonate-Vitamin D (CALCIUM-D PO)  Self, Child Yes No   Sig: Take 1 tablet by mouth in the morning   Continuous Blood Gluc  (Dexcom G7 ) YOLANDA  Self, Child No No   Sig: Use 1 Application if needed (check sugars)   Continuous Blood Gluc Sensor (Dexcom G7 Sensor)  Self, Child No No   Sig: Use 1 Device every 10 days   DULoxetine (CYMBALTA) 30 mg delayed release capsule  Self, Child Yes No   Sig: Take 30 mg by mouth 2 (two) times a day   Insulin Glargine Solostar (Lantus SoloStar) 100 UNIT/ML SOPN  Self, Child No No   Sig: Inject 0.3 mL (30 Units total) under the skin daily   Insulin Pen Needle (B-D ULTRAFINE III  SHORT PEN) 31G X 8 MM MISC  Self, Child No No   Sig: Use 4 (four) times a day (with meals and at bedtime)   Lumigan 0.01 % ophthalmic drops  Self, Child Yes No   Sig: INSTILL 1 DROP into both eyes at bedtime   Multiple Vitamin (MULTI VITAMIN DAILY PO)  Self, Child Yes No   Sig: Take 1 tablet by mouth daily   Spacer/Aero-Holding Chambers (AeroChamber Plus Elian-Vu Large) MISC  Self, Child Yes No   Sig: Use as directed   acetaminophen (TYLENOL) 650 mg CR tablet  Self, Child Yes No   Sig: Take 500 mg by mouth 2 (two) times a day   albuterol (2.5 mg/3 mL) 0.083 % nebulizer solution  Self, Child No No   Sig: Take 3 mL (2.5 mg total) by nebulization every 6 (six) hours as needed for wheezing or shortness of breath   albuterol (ProAir HFA) 90 mcg/act inhaler  Self, Child No No   Sig: Inhale 2 puffs every 6 (six) hours as needed for wheezing or shortness of breath (cough, chest tightness)   aspirin 81 mg chewable tablet   No No   Sig: Chew 1 tablet (81 mg total) daily   atorvastatin (LIPITOR) 10 mg tablet  Self, Child No No   Sig: Take 1 tablet (10 mg total) by mouth daily at bedtime   clotrimazole (LOTRIMIN) 1 % cream  Self, Child Yes No   dicyclomine (BENTYL) 20 mg tablet  Self, Child Yes No   Sig: Take 20 mg by mouth 2 (two) times a day   fluticasone (FLONASE) 50 mcg/act nasal spray  Self, Child No No   Si spray into each nostril daily pt uses as needed   fluticasone-umeclidinium-vilanterol (Trelegy Ellipta) 100-62.5-25 mcg/actuation inhaler  Self, Child No No   Sig: Inhale 1 puff daily Rinse mouth after use.   furosemide (LASIX) 20 mg tablet  Self, Child No No   Sig: Take 1 tablet (20 mg total) by mouth 2 (two) times a day   gabapentin (NEURONTIN) 100 mg capsule  Self, Child No No   Sig: Take 2 capsules (200 mg total) by mouth daily at bedtime   guaiFENesin (MUCINEX) 600 mg 12 hr tablet  Self, Child No No   Sig: Take 1 tablet (600 mg total) by mouth 2 (two) times a day as needed for cough or congestion   insulin  aspart (NovoLOG FlexPen) 100 UNIT/ML injection pen  Self, Child No No   Sig: inject 12 units subcutaneously before meals (3 MEALS A DAY)   ketoconazole (NIZORAL) 2 % cream  Self, Child Yes No   latanoprost (XALATAN) 0.005 % ophthalmic solution  Self, Child No No   Sig: Administer 1 drop into the left eye daily   lidocaine (Lidoderm) 5 %  Self, Child No No   Sig: Apply 1 patch topically over 12 hours daily as needed (pain) Remove & Discard patch within 12 hours or as directed by MD   montedezst (SINGULAIR) 10 mg tablet  Self, Child No No   Sig: Take 1 tablet (10 mg total) by mouth every evening   mupirocin (BACTROBAN) 2 % ointment  Self, Child Yes No   Sig: APPLY A SMALL AMOUNT TO THE AFFECTED FOOT/TOE AREA BY TOPICAL ROUTE 3 TIMES PER DAY   nystatin (MYCOSTATIN) cream  Self, Child No No   Sig: Apply topically 2 (two) times a day   nystatin (MYCOSTATIN) powder  Self, Child No No   Sig: Apply topically 3 (three) times a day as needed (rash)   omeprazole (PriLOSEC) 20 mg delayed release capsule  Self, Child No No   Sig: Take 1 capsule (20 mg total) by mouth 2 (two) times a day   potassium chloride (Klor-Con M20) 20 mEq tablet  Self, Child No No   Sig: take 1 tablet by mouth once daily   Patient not taking: Reported on 8/26/2024   predniSONE 20 mg tablet  Self, Child Yes No   Sig: Take 20 mg by mouth daily   vitamin B-12 (VITAMIN B-12) 500 mcg tablet  Self, Child Yes No   Sig: Take 1,000 mcg by mouth daily      Facility-Administered Medications: None       Past Medical History:   Diagnosis Date    Acute kidney injury superimposed on chronic kidney disease  ([facility]) 11/26/2016    ADHD (attention deficit hyperactivity disorder) 03/17/2019    Arthritis     BL HIPS    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    Carpal tunnel syndrome     Chest pain 03/06/2017    Chronic kidney disease     Claudication ([facility]) 3/15/2019    Closed fracture of base of middle phalanx of finger 06/22/2017    Closed fracture of middle phalanx of left little  finger 07/08/2017    Coagulopathy (HCC) 05/15/2019    Colloid cyst of brain (HCC)     COPD (chronic obstructive pulmonary disease) (HCC)     COPD (chronic obstructive pulmonary disease) (HCC)     Coronary artery disease     Cough     Diabetes mellitus (HCC)     GERD (gastroesophageal reflux disease)     Glaucoma     Gout     History of brain disorder 10/07/2020    Hyperlipidemia     Hypertension     Irritable bowel syndrome     Melena 02/26/2019    PAD (peripheral artery disease) (HCC) 5/15/2019    Paronychia of great toe of left foot 10/07/2020    Post-traumatic seizures (HCC) 07/23/2015    Renal disorder     Seizures (HCC)     last 2015    Shortness of breath     Single seizure (HCC) 05/31/2016    SOB (shortness of breath)     Syncope and collapse 11/11/2020    Urinary tract infection without hematuria 11/26/2016    Wheezing        Past Surgical History:   Procedure Laterality Date    BRAIN SURGERY      CATARACT EXTRACTION      CHOLECYSTECTOMY      COLECTOMY RADHA      COLONOSCOPY      DECOMPRESSION SPINE LUMBAR POSTERIOR  08/19/2019    HERNIA REPAIR      HYSTERECTOMY      INCISION TENDON SHEATH HAND      NECK SURGERY  05/24/2018    NEUROPLASTY / TRANSPOSITION MEDIAN NERVE AT CARPAL TUNNEL      NJ COLONOSCOPY FLX DX W/COLLJ SPEC WHEN PFRMD N/A 03/26/2019    Procedure: COLONOSCOPY;  Surgeon: Yaniv Hawley MD;  Location: [facility];  Service: Gastroenterology    NJ ESOPHAGOGASTRODUODENOSCOPY TRANSORAL DIAGNOSTIC N/A 03/26/2019    Procedure: ESOPHAGOGASTRODUODENOSCOPY (EGD);  Surgeon: Yaniv Hawley MD;  Location: [facility];  Service: Gastroenterology    REPLACEMENT TOTAL KNEE Right     RETINAL DETACHMENT SURGERY      TUBAL LIGATION         Family History   Problem Relation Age of Onset    Asthma Mother     Heart disease Mother     Emphysema Father     Cancer Father     Prostate cancer Father     Kidney cancer Sister     Diabetes Sister     Kidney disease Sister     Brain cancer Brother     Bone cancer Brother      Heart disease Brother      I have reviewed and agree with the history as documented.    E-Cigarette/Vaping    E-Cigarette Use Never User      E-Cigarette/Vaping Substances    Nicotine No     THC No     CBD No     Flavoring No     Other No     Unknown No      Social History     Tobacco Use    Smoking status: Former     Current packs/day: 0.00     Average packs/day: 0.5 packs/day for 40.0 years (20.0 ttl pk-yrs)     Types: Cigarettes     Start date:      Quit date:      Years since quittin.6    Smokeless tobacco: Never    Tobacco comments:     stopped many years ago   Vaping Use    Vaping status: Never Used   Substance Use Topics    Alcohol use: Never    Drug use: Never       Review of Systems   Constitutional:  Negative for fever.   HENT:  Negative for congestion.    Eyes:  Negative for pain and redness.   Respiratory:  Positive for shortness of breath. Negative for cough.    Cardiovascular:  Positive for chest pain.   Gastrointestinal:  Negative for abdominal pain and vomiting.   Chest pain and shortness of breath currently resolved    Physical Exam  Physical Exam  Vitals and nursing note reviewed.   Constitutional:       Appearance: She is well-developed.   HENT:      Head: Normocephalic.      Right Ear: External ear normal.      Left Ear: External ear normal.      Nose: Nose normal.      Mouth/Throat:      Mouth: Mucous membranes are moist.      Pharynx: Oropharynx is clear.   Eyes:      General: Lids are normal.      Extraocular Movements: Extraocular movements intact.      Pupils: Pupils are equal, round, and reactive to light.   Cardiovascular:      Rate and Rhythm: Normal rate and regular rhythm.      Pulses: Normal pulses.      Heart sounds: Normal heart sounds.   Pulmonary:      Effort: Pulmonary effort is normal. No respiratory distress.      Breath sounds: Normal breath sounds.   Abdominal:      General: Abdomen is flat. Bowel sounds are normal.   Musculoskeletal:         General: No  deformity. Normal range of motion.      Cervical back: Normal range of motion and neck supple.   Skin:     General: Skin is warm and dry.   Neurological:      Mental Status: She is alert and oriented to person, place, and time.   Psychiatric:         Mood and Affect: Mood normal.         Vital Signs  ED Triage Vitals   Temperature Pulse Respirations Blood Pressure SpO2   08/27/24 1202 08/27/24 1208 08/27/24 1208 08/27/24 1208 08/27/24 1208   97.9 °F (36.6 °C) 64 18 (!) 200/87 95 %      Temp Source Heart Rate Source Patient Position - Orthostatic VS BP Location FiO2 (%)   08/27/24 1202 08/27/24 1208 08/27/24 1208 08/27/24 1208 --   Oral Monitor Sitting Right arm       Pain Score       08/27/24 1208       No Pain           Vitals:    08/27/24 1208 08/27/24 1215 08/27/24 1230 08/27/24 1451   BP: (!) 200/87 (!) 200/87 (!) 187/65 168/65   Pulse: 64 64 64 64   Patient Position - Orthostatic VS: Sitting Sitting  Lying         Visual Acuity      ED Medications  Medications   sodium chloride (PF) 0.9 % injection 3 mL (has no administration in time range)   albuterol inhalation solution 2.5 mg (has no administration in time range)   aspirin chewable tablet 81 mg (has no administration in time range)   atorvastatin (LIPITOR) tablet 10 mg (has no administration in time range)   DULoxetine (CYMBALTA) delayed release capsule 30 mg (has no administration in time range)   Fluticasone Furoate-Vilanterol 100-25 mcg/actuation 1 puff (has no administration in time range)     And   umeclidinium 62.5 mcg/actuation inhaler AEPB 1 puff (has no administration in time range)   furosemide (LASIX) tablet 20 mg (has no administration in time range)   gabapentin (NEURONTIN) capsule 200 mg (has no administration in time range)   latanoprost (XALATAN) 0.005 % ophthalmic solution 1 drop (has no administration in time range)   pantoprazole (PROTONIX) EC tablet 20 mg (has no administration in time range)   predniSONE tablet 30 mg (has no  administration in time range)   acetaminophen (TYLENOL) tablet 650 mg (has no administration in time range)   insulin lispro (HumALOG/ADMELOG) 100 units/mL subcutaneous injection 12 Units (has no administration in time range)   insulin glargine (LANTUS) subcutaneous injection 30 Units 0.3 mL (has no administration in time range)   insulin lispro (HumALOG/ADMELOG) 100 units/mL subcutaneous injection 1-5 Units (has no administration in time range)   heparin (porcine) injection 4,000 Units (has no administration in time range)   heparin (porcine) 25,000 units in 0.45% NaCl 250 mL infusion (premix) (has no administration in time range)   heparin (porcine) injection 4,000 Units (has no administration in time range)   heparin (porcine) injection 2,000 Units (has no administration in time range)       Diagnostic Studies  Results Reviewed       Procedure Component Value Units Date/Time    HS Troponin I 2hr [[id number]]  (Normal) Collected: 08/27/24 1418    Lab Status: Final result Specimen: Blood from Arm, Left Updated: 08/27/24 1451     hs TnI 2hr 12 ng/L      Delta 2hr hsTnI 0 ng/L     HS Troponin I 4hr [[id number]]     Lab Status: No result Specimen: Blood     B-Type Natriuretic Peptide(BNP) [[id number]]  (Normal) Collected: 08/27/24 1227    Lab Status: Final result Specimen: Blood from Arm, Left Updated: 08/27/24 1303     BNP 95 pg/mL     HS Troponin 0hr (reflex protocol) [[id number]]  (Normal) Collected: 08/27/24 1227    Lab Status: Final result Specimen: Blood from Arm, Left Updated: 08/27/24 1259     hs TnI 0hr 12 ng/L     Basic metabolic panel [[id number]]  (Abnormal) Collected: 08/27/24 1227    Lab Status: Final result Specimen: Blood from Arm, Left Updated: 08/27/24 1257     Sodium 137 mmol/L      Potassium 3.5 mmol/L      Chloride 97 mmol/L      CO2 31 mmol/L      ANION GAP 9 mmol/L      BUN 36 mg/dL      Creatinine 1.14 mg/dL      Glucose 236 mg/dL      Calcium 9.5 mg/dL      eGFR 45 ml/min/1.73sq m     Narrative:       National Kid[employer] guidelines for Chronic Kidney Disease (CKD):     Stage 1 with normal or high GFR (GFR > 90 mL/min/1.73 square meters)    Stage 2 Mild CKD (GFR = 60-89 mL/min/1.73 square meters)    Stage 3A Moderate CKD (GFR = 45-59 mL/min/1.73 square meters)    Stage 3B Moderate CKD (GFR = 30-44 mL/min/1.73 square meters)    Stage 4 Severe CKD (GFR = 15-29 mL/min/1.73 square meters)    Stage 5 End Stage CKD (GFR <15 mL/min/1.73 square meters)  Note: GFR calculation is accurate only with a steady state creatinine    Hepatic function panel [[id number]]  (Abnormal) Collected: 08/27/24 1227    Lab Status: Final result Specimen: Blood from Arm, Left Updated: 08/27/24 1257     Total Bilirubin 0.60 mg/dL      Bilirubin, Direct 0.15 mg/dL      Alkaline Phosphatase 44 U/L      AST 12 U/L      ALT 19 U/L      Total Protein 7.3 g/dL      Albumin 4.0 g/dL     Protime-INR [[id number]]  (Normal) Collected: 08/27/24 1228    Lab Status: Final result Specimen: Blood from Arm, Left Updated: 08/27/24 1249     Protime 12.9 seconds      INR 0.91    Narrative:      INR Therapeutic Range    Indication                                             INR Range      Atrial Fibrillation                                               2.0-3.0  Hypercoagulable State                                    2.0.2.3  Left Ventricular Asist Device                            2.0-3.0  Mechanical Heart Valve                                  -    Aortic(with afib, MI, embolism, HF, LA enlargement,    and/or coagulopathy)                                     2.0-3.0 (2.5-3.5)     Mitral                                                             2.5-3.5  Prosthetic/Bioprosthetic Heart Valve               2.0-3.0  Venous thromboembolism (VTE: VT, PE        2.0-3.0    CBC and differential [[id number]]  (Abnormal) Collected: 08/27/24 1227    Lab Status: Final result Specimen: Blood from Arm, Left Updated: 08/27/24 1236     WBC 18.17 Thousand/uL       RBC 5.31 Million/uL      Hemoglobin 14.9 g/dL      Hematocrit 46.0 %      MCV 87 fL      MCH 28.1 pg      MCHC 32.4 g/dL      RDW 15.0 %      MPV 9.4 fL      Platelets 247 Thousands/uL      nRBC 0 /100 WBCs      Segmented % 76 %      Immature Grans % 1 %      Lymphocytes % 15 %      Monocytes % 7 %      Eosinophils Relative 1 %      Basophils Relative 0 %      Absolute Neutrophils 13.95 Thousands/µL      Absolute Immature Grans 0.14 Thousand/uL      Absolute Lymphocytes 2.66 Thousands/µL      Absolute Monocytes 1.23 Thousand/µL      Eosinophils Absolute 0.16 Thousand/µL      Basophils Absolute 0.03 Thousands/µL                    X-ray chest 1 view portable    (Results Pending)              Procedures  ECG 12 Lead Documentation Only    Date/Time: 8/27/2024 12:21 PM    Performed by: Milan Euceda MD  Authorized by: Milan Euceda MD    Indications / Diagnosis:  Chest pain  ECG reviewed by me, the ED Provider: yes    Patient location:  ED  Previous ECG:     Previous ECG:  Compared to current    Comparison ECG info:  August 19, 2024    Similarity:  No change  Interpretation:     Interpretation: non-specific    Rate:     ECG rate:  62    ECG rate assessment: normal    Rhythm:     Rhythm: sinus rhythm    Comments:      Normal sinus rhythm incomplete right bundle no acute ST-T wave changes           ED Course     Diagnostic testing EKG did not show an ST elevation MI  BNP was normal no sign of heart failure at 95.  Cardiac troponin was 12 repeat cardiac troponin was 12, no sign of cardiac ischemia but the patient had significant pain.  Liver functions were normal  White count was elevated 18,000 nonspecific finding hemoglobin was normal no sign of anemia.      Chest x-ray: Chest x-ray showed a normal cardiac silhouette, no pneumothorax no infiltrates, No sign of pathology, interpreted by me, I was the primary .   HEART Risk Score      Flowsheet Row Most Recent Value   Heart Score Risk Calculator    History 2  Filed at: 08/27/2024 1614   ECG 1 Filed at: 08/27/2024 1614   Age 2 Filed at: 08/27/2024 1614   Risk Factors 2 Filed at: 08/27/2024 1614   Troponin 0 Filed at: 08/27/2024 1614   HEART Score 7 Filed at: 08/27/2024 1614                          SBIRT 20yo+      Flowsheet Row Most Recent Value   Initial Alcohol Screen: US AUDIT-C     1. How often do you have a drink containing alcohol? 0 Filed at: 08/27/2024 1232   2. How many drinks containing alcohol do you have on a typical day you are drinking?  0 Filed at: 08/27/2024 1232   3b. FEMALE Any Age, or MALE 65+: How often do you have 4 or more drinks on one occassion? 0 Filed at: 08/27/2024 1232   Audit-C Score 0 Filed at: 08/27/2024 1232   LEV: How many times in the past year have you...    Used an illegal drug or used a prescription medication for non-medical reasons? Never Filed at: 08/27/2024 1232          LAMONT Risk Score      Flowsheet Row Most Recent Value   Age >= 65 0 Filed at: 08/27/2024 1435   Known CAD (stenosis >= 50%) 1 Filed at: 08/27/2024 1435   Recent (<=24 hrs) Service Angina 1 Filed at: 08/27/2024 1435   ST Deviation >= 0.5 mm 0 Filed at: 08/27/2024 1435   3+ CAD Risk Factors (FHx, HTN, HLP, DM, Smoker) 1 Filed at: 08/27/2024 1435   Aspirin Use Past 7 Days 1 Filed at: 08/27/2024 1435   Elevated Cardiac Markers 0 Filed at: 08/27/2024 1435   LAMONT Risk Score (Calculated) 4 Filed at: 08/27/2024 1435                    Medical Decision Making  Medical decision making 79-year-old female history of an abnormal stress test recently scheduled for coronary angiography early next month presents emergency department with an episode of chest pain today at rest.  Presentation consistent with unstable angina, heart score is 7 by our criteria requires admission and further observation.    Amount and/or Complexity of Data Reviewed  Labs: ordered.    Risk  Decision regarding hospitalization.                 Disposition  Final diagnoses:   Chest pain     Time reflects  when diagnosis was documented in both MDM as applicable and the Disposition within this note       Time User Action Codes Description Comment    8/27/2024  2:01 PM Milan Euceda Add [R07.9] Chest pain           ED Disposition       ED Disposition   Admit    Condition   Stable    Date/Time   Tue Aug 27, 2024 1401    Comment   Case was discussed with hospitalist service and the patient's admission status was agreed to be Admission Status: observation status to the service of Dr. Overton.               Follow-up Information    None         Current Discharge Medication List        CONTINUE these medications which have NOT CHANGED    Details   acetaminophen (TYLENOL) 650 mg CR tablet Take 500 mg by mouth 2 (two) times a day      albuterol (2.5 mg/3 mL) 0.083 % nebulizer solution Take 3 mL (2.5 mg total) by nebulization every 6 (six) hours as needed for wheezing or shortness of breath  Qty: 90 mL, Refills: 0    Associated Diagnoses: Simple chronic bronchitis (HCC)      albuterol (ProAir HFA) 90 mcg/act inhaler Inhale 2 puffs every 6 (six) hours as needed for wheezing or shortness of breath (cough, chest tightness)  Qty: 18 g, Refills: 0    Comments: Substitution to a formulary equivalent within the same pharmaceutical class is authorized.  Associated Diagnoses: Simple chronic bronchitis (HCC)      aspirin 81 mg chewable tablet Chew 1 tablet (81 mg total) daily    Associated Diagnoses: Abnormal cardiovascular stress test; Chest pain, unspecified type      atorvastatin (LIPITOR) 10 mg tablet Take 1 tablet (10 mg total) by mouth daily at bedtime  Qty: 90 tablet, Refills: 1    Associated Diagnoses: Hyperlipidemia associated with type 2 diabetes mellitus  (HCC)      Calcium Carbonate-Vitamin D (CALCIUM-D PO) Take 1 tablet by mouth in the morning      clotrimazole (LOTRIMIN) 1 % cream       Continuous Blood Gluc  (Dexcom G7 ) YOLANDA Use 1 Application if needed (check sugars)  Qty: 1 each, Refills: 0     Associated Diagnoses: Type 2 diabetes mellitus with diabetic neuropathy, with long-term current use of insulin ([facility])      Continuous Blood Gluc Sensor (Dexcom G7 Sensor) Use 1 Device every 10 days  Qty: 9 each, Refills: 3    Associated Diagnoses: Type 2 diabetes mellitus with diabetic neuropathy, with long-term current use of insulin ([facility])      dicyclomine (BENTYL) 20 mg tablet Take 20 mg by mouth 2 (two) times a day      DULoxetine (CYMBALTA) 30 mg delayed release capsule Take 30 mg by mouth 2 (two) times a day      fluticasone (FLONASE) 50 mcg/act nasal spray 1 spray into each nostril daily pt uses as needed  Qty: 54 g, Refills: 1    Associated Diagnoses: Simple chronic bronchitis ([facility])      fluticasone-umeclidinium-vilanterol (Trelegy Ellipta) 100-62.5-25 mcg/actuation inhaler Inhale 1 puff daily Rinse mouth after use.  Qty: 60 blister, Refills: 5    Associated Diagnoses: Simple chronic bronchitis ([facility])      furosemide (LASIX) 20 mg tablet Take 1 tablet (20 mg total) by mouth 2 (two) times a day  Qty: 60 tablet, Refills: 6    Associated Diagnoses: Chronic heart failure with preserved ejection fraction ([facility]); Bilateral leg edema      gabapentin (NEURONTIN) 100 mg capsule Take 2 capsules (200 mg total) by mouth daily at bedtime  Qty: 180 capsule, Refills: 1    Associated Diagnoses: Type 2 diabetes mellitus with diabetic neuropathy, with long-term current use of insulin ([facility])      guaiFENesin (MUCINEX) 600 mg 12 hr tablet Take 1 tablet (600 mg total) by mouth 2 (two) times a day as needed for cough or congestion  Qty: 180 tablet, Refills: 1    Associated Diagnoses: Simple chronic bronchitis ([facility])      insulin aspart (NovoLOG FlexPen) 100 UNIT/ML injection pen inject 12 units subcutaneously before meals (3 MEALS A DAY)  Qty: 15 mL, Refills: 5    Associated Diagnoses: Type 2 diabetes mellitus with diabetic neuropathy, with long-term current use of insulin ([facility])      Insulin Glargine Solostar (Lantus SoloStar) 100  UNIT/ML SOPN Inject 0.3 mL (30 Units total) under the skin daily  Qty: 15 mL, Refills: 2    Associated Diagnoses: Type 2 diabetes mellitus with diabetic neuropathy, with long-term current use of insulin ([facility])      Insulin Pen Needle (B-D ULTRAFINE III SHORT PEN) 31G X 8 MM MISC Use 4 (four) times a day (with meals and at bedtime)  Qty: 400 each, Refills: 1    Associated Diagnoses: Type 2 diabetes mellitus with diabetic neuropathy, with long-term current use of insulin ([facility])      ketoconazole (NIZORAL) 2 % cream       latanoprost (XALATAN) 0.005 % ophthalmic solution Administer 1 drop into the left eye daily  Qty: 2.5 mL, Refills: 2    Comments: Discounted Cash Price: $31.75. Adjudicate with:  BIN:[id number] PCN:[id number] Group:EMR ID:[id number].  Phone:[phone]  Associated Diagnoses: Macular pucker, left      lidocaine (Lidoderm) 5 % Apply 1 patch topically over 12 hours daily as needed (pain) Remove & Discard patch within 12 hours or as directed by MD  Qty: 30 patch, Refills: 1    Associated Diagnoses: Chest wall pain      Lumigan 0.01 % ophthalmic drops INSTILL 1 DROP into both eyes at bedtime      montelukast (SINGULAIR) 10 mg tablet Take 1 tablet (10 mg total) by mouth every evening  Qty: 90 tablet, Refills: 1    Associated Diagnoses: Simple chronic bronchitis (HCC)      Multiple Vitamin (MULTI VITAMIN DAILY PO) Take 1 tablet by mouth daily      mupirocin (BACTROBAN) 2 % ointment APPLY A SMALL AMOUNT TO THE AFFECTED FOOT/TOE AREA BY TOPICAL ROUTE 3 TIMES PER DAY      nystatin (MYCOSTATIN) cream Apply topically 2 (two) times a day  Qty: 30 g, Refills: 1    Associated Diagnoses: Intertrigo      nystatin (MYCOSTATIN) powder Apply topically 3 (three) times a day as needed (rash)  Qty: 60 g, Refills: 1    Associated Diagnoses: Intertrigo      omeprazole (PriLOSEC) 20 mg delayed release capsule Take 1 capsule (20 mg total) by mouth 2 (two) times a day  Qty: 180 capsule, Refills: 1    Associated Diagnoses: Gastroesophageal  reflux disease without esophagitis      potassium chloride (Klor-Con M20) 20 mEq tablet take 1 tablet by mouth once daily  Qty: 5 tablet, Refills: 0    Associated Diagnoses: Chronic heart failure with preserved ejection fraction (HCC)      predniSONE 20 mg tablet Take 20 mg by mouth daily      Spacer/Aero-Holding Chambers (AeroChamber Plus Elian-Vu Large) MISC Use as directed      vitamin B-12 (VITAMIN B-12) 500 mcg tablet Take 1,000 mcg by mouth daily             No discharge procedures on file.    PDMP Review         Value Time User    PDMP Reviewed  Yes 11/14/2020  9:48 AM Tam Torres MD            ED Provider  Electronically Signed by             Milan Euceda MD  08/27/24 2265

## 2024-08-27 NOTE — TELEPHONE ENCOUNTER
----- Message from Lambert Rico PA-C sent at 8/26/2024 12:52 PM EDT -----  Regarding: Cardiac catheterization  Camila Bruce, please have patient scheduled for cardiac catheterization at [facility] within the next 1 to 2 weeks for chest pain, exertional dyspnea, and abnormal stress test.  Labs ordered.  Thank you!    Dr. Cabral, can you please provide recommendations for renal optimization?          1.Is this for pre-valve or open heart surgery request?  NO    2.Did the pt have a stress test? YES    3.Is pt on maximal antianginal medications?  NO               If No, what is the reason for not being on maximal antianginal medication?  Borderline bradycardia    4.Is pt symptomatic?  YES    5.Does pt having ischemic symptoms?  YES/NO                  If yes, is what type?   Typical

## 2024-08-27 NOTE — ASSESSMENT & PLAN NOTE
Patient has a several month history of having intermittent chest pain.  She was recently seen 9/26 in the outpatient setting by [facility] cardiology and had a nuclear medicine stress test.  Ultimately was referred for cardiac catheterization which was scheduled 9/4.  This morning she woke up with increased chest pain and shortness of breath which lasted approximately 2 hours and was self-limiting.  This prompted her to be evaluated in the ED.    Outpatient NM stress test-LVEF 66% with small left ventricular perfusion deficit with partially reversible apical deficits  Takes daily aspirin and statin, outpatient held beta-blocker due to borderline bradycardia, will continue to hold  EKG incomplete right bundle block with normal sinus rhythm  Troponin 12, continue to trend  Lipid panel total cholesterol 173, triglyceride 233, HDL 39, LDL 87, hemoglobin A1c 7.6  LAMONT score 4  Consult cardiology  Placed on ACS heparin drip, n.p.o. at midnight

## 2024-08-28 VITALS
OXYGEN SATURATION: 88 % | DIASTOLIC BLOOD PRESSURE: 48 MMHG | BODY MASS INDEX: 35.87 KG/M2 | HEART RATE: 67 BPM | TEMPERATURE: 97.8 F | WEIGHT: 154.98 LBS | SYSTOLIC BLOOD PRESSURE: 127 MMHG | RESPIRATION RATE: 18 BRPM | HEIGHT: 55 IN

## 2024-08-28 LAB
ANION GAP SERPL CALCULATED.3IONS-SCNC: 9 MMOL/L (ref 4–13)
APTT PPP: 66 SECONDS (ref 23–34)
ATRIAL RATE: 51 BPM
BUN SERPL-MCNC: 35 MG/DL (ref 5–25)
CALCIUM SERPL-MCNC: 9.5 MG/DL (ref 8.4–10.2)
CHLORIDE SERPL-SCNC: 100 MMOL/L (ref 96–108)
CO2 SERPL-SCNC: 30 MMOL/L (ref 21–32)
CREAT SERPL-MCNC: 1.06 MG/DL (ref 0.6–1.3)
ERYTHROCYTE [DISTWIDTH] IN BLOOD BY AUTOMATED COUNT: 15 % (ref 11.6–15.1)
GFR SERPL CREATININE-BSD FRML MDRD: 49 ML/MIN/1.73SQ M
GLUCOSE P FAST SERPL-MCNC: 105 MG/DL (ref 65–99)
GLUCOSE SERPL-MCNC: 105 MG/DL (ref 65–140)
GLUCOSE SERPL-MCNC: 118 MG/DL (ref 65–140)
GLUCOSE SERPL-MCNC: 152 MG/DL (ref 65–140)
HCT VFR BLD AUTO: 46.1 % (ref 34.8–46.1)
HGB BLD-MCNC: 15 G/DL (ref 11.5–15.4)
MCH RBC QN AUTO: 28.1 PG (ref 26.8–34.3)
MCHC RBC AUTO-ENTMCNC: 32.5 G/DL (ref 31.4–37.4)
MCV RBC AUTO: 87 FL (ref 82–98)
P AXIS: 64 DEGREES
PLATELET # BLD AUTO: 272 THOUSANDS/UL (ref 149–390)
PMV BLD AUTO: 9.4 FL (ref 8.9–12.7)
POTASSIUM SERPL-SCNC: 4 MMOL/L (ref 3.5–5.3)
PR INTERVAL: 164 MS
QRS AXIS: 11 DEGREES
QRSD INTERVAL: 106 MS
QT INTERVAL: 452 MS
QTC INTERVAL: 416 MS
RBC # BLD AUTO: 5.33 MILLION/UL (ref 3.81–5.12)
SODIUM SERPL-SCNC: 139 MMOL/L (ref 135–147)
T WAVE AXIS: 27 DEGREES
VENTRICULAR RATE: 51 BPM
VIT B1 BLD-SCNC: 212.6 NMOL/L (ref 66.5–200)
WBC # BLD AUTO: 19.16 THOUSAND/UL (ref 4.31–10.16)

## 2024-08-28 PROCEDURE — 85027 COMPLETE CBC AUTOMATED: CPT

## 2024-08-28 PROCEDURE — 93458 L HRT ARTERY/VENTRICLE ANGIO: CPT | Performed by: INTERNAL MEDICINE

## 2024-08-28 PROCEDURE — 82948 REAGENT STRIP/BLOOD GLUCOSE: CPT

## 2024-08-28 PROCEDURE — 99215 OFFICE O/P EST HI 40 MIN: CPT | Performed by: INTERNAL MEDICINE

## 2024-08-28 PROCEDURE — 85730 THROMBOPLASTIN TIME PARTIAL: CPT | Performed by: INTERNAL MEDICINE

## 2024-08-28 PROCEDURE — C1769 GUIDE WIRE: HCPCS | Performed by: INTERNAL MEDICINE

## 2024-08-28 PROCEDURE — C1894 INTRO/SHEATH, NON-LASER: HCPCS | Performed by: INTERNAL MEDICINE

## 2024-08-28 PROCEDURE — 80048 BASIC METABOLIC PNL TOTAL CA: CPT

## 2024-08-28 PROCEDURE — 99152 MOD SED SAME PHYS/QHP 5/>YRS: CPT | Performed by: INTERNAL MEDICINE

## 2024-08-28 PROCEDURE — 93010 ELECTROCARDIOGRAM REPORT: CPT | Performed by: STUDENT IN AN ORGANIZED HEALTH CARE EDUCATION/TRAINING PROGRAM

## 2024-08-28 PROCEDURE — 99239 HOSP IP/OBS DSCHRG MGMT >30: CPT

## 2024-08-28 RX ORDER — MIDAZOLAM HYDROCHLORIDE 2 MG/2ML
INJECTION, SOLUTION INTRAMUSCULAR; INTRAVENOUS CODE/TRAUMA/SEDATION MEDICATION
Status: DISCONTINUED | OUTPATIENT
Start: 2024-08-28 | End: 2024-08-28 | Stop reason: HOSPADM

## 2024-08-28 RX ORDER — SODIUM CHLORIDE 9 MG/ML
75 INJECTION, SOLUTION INTRAVENOUS CONTINUOUS
Status: DISCONTINUED | OUTPATIENT
Start: 2024-08-28 | End: 2024-08-28 | Stop reason: HOSPADM

## 2024-08-28 RX ORDER — VERAPAMIL HCL 2.5 MG/ML
AMPUL (ML) INTRAVENOUS CODE/TRAUMA/SEDATION MEDICATION
Status: DISCONTINUED | OUTPATIENT
Start: 2024-08-28 | End: 2024-08-28 | Stop reason: HOSPADM

## 2024-08-28 RX ORDER — SODIUM CHLORIDE 9 MG/ML
75 INJECTION, SOLUTION INTRAVENOUS CONTINUOUS
Status: DISPENSED | OUTPATIENT
Start: 2024-08-28 | End: 2024-08-28

## 2024-08-28 RX ORDER — LIDOCAINE WITH 8.4% SOD BICARB 0.9%(10ML)
SYRINGE (ML) INJECTION CODE/TRAUMA/SEDATION MEDICATION
Status: DISCONTINUED | OUTPATIENT
Start: 2024-08-28 | End: 2024-08-28 | Stop reason: HOSPADM

## 2024-08-28 RX ORDER — IODIXANOL 320 MG/ML
INJECTION, SOLUTION INTRAVASCULAR CODE/TRAUMA/SEDATION MEDICATION
Status: DISCONTINUED | OUTPATIENT
Start: 2024-08-28 | End: 2024-08-28 | Stop reason: HOSPADM

## 2024-08-28 RX ORDER — HEPARIN SODIUM 5000 [USP'U]/ML
5000 INJECTION, SOLUTION INTRAVENOUS; SUBCUTANEOUS EVERY 8 HOURS SCHEDULED
Status: DISCONTINUED | OUTPATIENT
Start: 2024-08-28 | End: 2024-08-28 | Stop reason: HOSPADM

## 2024-08-28 RX ORDER — SODIUM CHLORIDE 9 MG/ML
75 INJECTION, SOLUTION INTRAVENOUS CONTINUOUS
Status: CANCELLED | OUTPATIENT
Start: 2024-08-28 | End: 2024-08-28

## 2024-08-28 RX ORDER — OMEPRAZOLE 40 MG/1
40 CAPSULE, DELAYED RELEASE ORAL 2 TIMES DAILY
Qty: 20 CAPSULE | Refills: 0 | Status: SHIPPED | OUTPATIENT
Start: 2024-08-28 | End: 2024-09-07

## 2024-08-28 RX ORDER — NITROGLYCERIN 20 MG/100ML
INJECTION INTRAVENOUS CODE/TRAUMA/SEDATION MEDICATION
Status: DISCONTINUED | OUTPATIENT
Start: 2024-08-28 | End: 2024-08-28 | Stop reason: HOSPADM

## 2024-08-28 RX ORDER — HEPARIN SODIUM 1000 [USP'U]/ML
INJECTION, SOLUTION INTRAVENOUS; SUBCUTANEOUS CODE/TRAUMA/SEDATION MEDICATION
Status: DISCONTINUED | OUTPATIENT
Start: 2024-08-28 | End: 2024-08-28 | Stop reason: HOSPADM

## 2024-08-28 RX ORDER — FENTANYL CITRATE 50 UG/ML
INJECTION, SOLUTION INTRAMUSCULAR; INTRAVENOUS CODE/TRAUMA/SEDATION MEDICATION
Status: DISCONTINUED | OUTPATIENT
Start: 2024-08-28 | End: 2024-08-28 | Stop reason: HOSPADM

## 2024-08-28 RX ADMIN — FUROSEMIDE 20 MG: 20 TABLET ORAL at 08:55

## 2024-08-28 RX ADMIN — PREDNISONE 30 MG: 20 TABLET ORAL at 09:56

## 2024-08-28 RX ADMIN — DULOXETINE HYDROCHLORIDE 30 MG: 30 CAPSULE, DELAYED RELEASE ORAL at 08:55

## 2024-08-28 RX ADMIN — LATANOPROST 1 DROP: 50 SOLUTION OPHTHALMIC at 09:00

## 2024-08-28 RX ADMIN — SODIUM CHLORIDE 75 ML/HR: 0.9 INJECTION, SOLUTION INTRAVENOUS at 15:08

## 2024-08-28 RX ADMIN — PANTOPRAZOLE SODIUM 20 MG: 20 TABLET, DELAYED RELEASE ORAL at 08:14

## 2024-08-28 RX ADMIN — ASPIRIN 81 MG: 81 TABLET, CHEWABLE ORAL at 08:55

## 2024-08-28 RX ADMIN — SODIUM CHLORIDE 210.9 ML: 0.9 INJECTION, SOLUTION INTRAVENOUS at 09:57

## 2024-08-28 RX ADMIN — UMECLIDINIUM 1 PUFF: 62.5 AEROSOL, POWDER ORAL at 09:00

## 2024-08-28 NOTE — CONSULTS
Consultation - Cardiology   Shantelle Romano 79 y.o. female MRN: [id number]  Unit/Bed#: 2 E 260-01 Encounter: [id number]  08/28/24  9:16 AM    Assessment/ Plan:  1.  Chest pain, MALIK, abnormal stress test  EKG without acute ischemic abnormalities.  Troponins negative x3.  Recent pharmacological MPI stress test revealed a partially reversible apical LV perfusion defect consistent with ischemia.  Continue ASA and Lipitor.  Not on BB due to tendency for bradycardia.  Previously scheduled for outpatient cath.  Plan for cardiac catheterization today.  Discussed the indications, alternatives, risks and benefit of cardiac catheterization and possible PCI. The procedure risks, benefits, and complications (including but not limited to bleeding, infection, arrhythmia, nephrotoxicity, vessel injury, myocardial infarction, CVA, and death) were reviewed.  Patient is alert and oriented x3 and wishes to proceed. All questions answered.     2.  Chronic HFpEF  Euvolemic on exam.  Continue Lasix 20 mg po bid.  Strict I/O's and daily weights; recommend sodium restriction.    3.  Hypertensive urgency  BP improved, continue to monitor.  Continue Lasix.    4.  Hyperlipidemia  Continue Lipitor.    5.  CKD 3  6.  T2DM, A1c 7.6  7.  COPD        History of Present Illness   Physician Requesting Consult: Glenn Piper,*    Reason for Consult / Principal Problem: Chest pain    HPI: Shantelle Romano is a 79 y.o. year old female with history of abnormal stress test, chronic HFpEF, hypertension, hyperlipidemia, CKD 3, T2DM, and COPD who presents with chest pain.  Patient was recently evaluated at cardiology office visit and arranged for outpatient cardiac catheterization due to complaints of midsternal chest pain and dyspnea in setting of abnormal stress test.  Symptoms are exacerbated with exertion.  Patient developed recurrence of chest pain prompting visitation to the ED.  Troponins found to be negative and EKG without acute ischemic  abnormalities.  BP also noted to be significantly elevated upon admission.  Cardiology consulted for further evaluation and management.  Patient endorses compliance to home medications.  Denies any additional complaints at this time.  Previously followed with Dr. Ras Richards as primary cardiologist.      Inpatient consult to Cardiology  Consult performed by: Lambert Rico PA-C  Consult ordered by: Mata Salas PA-C          EKG: Sinus bradycardia with marked sinus arrhythmia; incomplete RBBB      Review of Systems   Constitutional:  Negative for chills and fever.   HENT:  Negative for ear pain and sore throat.    Eyes:  Negative for pain and visual disturbance.   Respiratory:  Positive for shortness of breath. Negative for cough.    Cardiovascular:  Positive for chest pain. Negative for palpitations and leg swelling.   Gastrointestinal:  Negative for abdominal pain and vomiting.   Genitourinary:  Negative for dysuria and hematuria.   Musculoskeletal:  Negative for arthralgias and back pain.   Skin:  Negative for color change and rash.   Neurological:  Negative for seizures and syncope.   All other systems reviewed and are negative.      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    Carpal tunnel syndrome     Chest pain 03/06/2017    Chronic kidney disease     Claudication ([facility]) 3/15/2019    Closed fracture of base of middle phalanx of finger 06/22/2017    Closed fracture of middle phalanx of left little finger 07/08/2017    Coagulopathy ([facility]) 05/15/2019    Colloid cyst of brain ([facility])     COPD (chronic obstructive pulmonary disease) ([facility])     COPD (chronic obstructive pulmonary disease) ([facility])     Coronary artery disease     Cough     Diabetes mellitus ([facility])     GERD  (gastroesophageal reflux disease)     Glaucoma     Gout     History of brain disorder 10/07/2020    Hyperlipidemia     Hypertension     Irritable bowel syndrome     Melena 2019    PAD (peripheral artery disease) ([facility]) 5/15/2019    Paronychia of great toe of left foot 10/07/2020    Post-traumatic seizures ([facility]) 2015    Renal disorder     Seizures ([facility])     last     Shortness of breath     Single seizure ([facility]) 2016    SOB (shortness of breath)     Syncope and collapse 2020    Urinary tract infection without hematuria 2016    Wheezing      Past Surgical History:   Procedure Laterality Date    BRAIN SURGERY      CATARACT EXTRACTION      CHOLECYSTECTOMY      COLECTOMY RADHA      COLONOSCOPY      DECOMPRESSION SPINE LUMBAR POSTERIOR  2019    HERNIA REPAIR      HYSTERECTOMY      INCISION TENDON SHEATH HAND      NECK SURGERY  2018    NEUROPLASTY / TRANSPOSITION MEDIAN NERVE AT CARPAL TUNNEL      VA COLONOSCOPY FLX DX W/COLLJ SPEC WHEN PFRMD N/A 2019    Procedure: COLONOSCOPY;  Surgeon: Yaniv Hawley MD;  Location: [facility];  Service: Gastroenterology    VA ESOPHAGOGASTRODUODENOSCOPY TRANSORAL DIAGNOSTIC N/A 2019    Procedure: ESOPHAGOGASTRODUODENOSCOPY (EGD);  Surgeon: Yaniv Hawley MD;  Location: [facility];  Service: Gastroenterology    REPLACEMENT TOTAL KNEE Right     RETINAL DETACHMENT SURGERY      TUBAL LIGATION       Social History     Substance and Sexual Activity   Alcohol Use Never     Social History     Substance and Sexual Activity   Drug Use Never     Social History     Tobacco Use   Smoking Status Former    Current packs/day: 0.00    Average packs/day: 0.5 packs/day for 40.0 years (20.0 ttl pk-yrs)    Types: Cigarettes    Start date:     Quit date:     Years since quittin.6   Smokeless Tobacco Never   Tobacco Comments    stopped many years ago       Family History:   Family History   Problem Relation Age of Onset    Asthma Mother   "   Heart disease Mother     Emphysema Father     Cancer Father     Prostate cancer Father     Kidney cancer Sister     Diabetes Sister     Kidney disease Sister     Brain cancer Brother     Bone cancer Brother     Heart disease Brother        Meds/Allergies   all current active meds have been reviewed  Allergies   Allergen Reactions    Brimonidine Other (See Comments)    Salicylic Acid-Sulfur Other (See Comments)    Sulfa Antibiotics Rash and Other (See Comments)       Objective   Vitals: Blood pressure 152/58, pulse (!) 44, temperature 97.8 °F (36.6 °C), temperature source Oral, resp. rate 20, height 4' 7\" (1.397 m), weight 70.3 kg (154 lb 15.7 oz), SpO2 92%, not currently breastfeeding., Body mass index is 36.02 kg/m².,   Orthostatic Blood Pressures      Flowsheet Row Most Recent Value   Blood Pressure 152/58 filed at 2024 0805   Patient Position - Orthostatic VS Lying filed at 2024 2300            Systolic (24hrs), Av , Min:121 , Max:200     Diastolic (24hrs), Av, Min:51, Max:87      No intake or output data in the 24 hours ending 24 0916    Invasive Devices       Peripheral Intravenous Line  Duration             Peripheral IV 24 Left Antecubital <1 day                        Physical Exam:  GEN: Alert and oriented x 3, in no acute distress.  Well appearing, obese, and well nourished.   HEENT: Sclera anicteric, conjunctivae pink, mucous membranes moist. Oropharynx clear.   NECK: Supple, no carotid bruits, no significant JVD. Trachea midline, no thyromegaly.   HEART: Regular rhythm, normal S1 and S2, no murmurs, clicks, gallops or rubs. PMI nondisplaced, no thrills.   LUNGS: Clear to auscultation bilaterally; no wheezes, rales, or rhonchi. No increased work of breathing or signs of respiratory distress.   ABDOMEN: Soft, nontender, nondistended, normoactive bowel sounds.   EXTREMITIES: Skin warm and well perfused, no clubbing, cyanosis, or edema.  NEURO: No focal findings. Normal " speech. Mood and affect normal.   SKIN: Normal without suspicious lesions on exposed skin.    Lab Results:     Cardiac Profile:   Results from last 7 days   Lab Units 08/27/24  1700 08/27/24  1418 08/27/24  1227   HS TNI 0HR ng/L  --   --  12   HS TNI 2HR ng/L  --  12  --    HSTNI D2 ng/L  --  0  --    HS TNI 4HR ng/L 12  --   --    HSTNI D4 ng/L 0  --   --        CBC with diff:   Results from last 7 days   Lab Units 08/28/24  0731 08/27/24  1527 08/27/24  1227   WBC Thousand/uL 19.16* 15.79* 18.17*   HEMOGLOBIN g/dL 15.0 14.3 14.9   HEMATOCRIT % 46.1 44.6 46.0   MCV fL 87 88 87   PLATELETS Thousands/uL 272 233 247   RBC Million/uL 5.33* 5.10 5.31*   MCH pg 28.1 28.0 28.1   MCHC g/dL 32.5 32.1 32.4   RDW % 15.0 15.0 15.0   MPV fL 9.4 9.7 9.4   NRBC AUTO /100 WBCs  --   --  0         CMP:   Results from last 7 days   Lab Units 08/28/24  0731 08/27/24  1227   POTASSIUM mmol/L 4.0 3.5   CHLORIDE mmol/L 100 97   CO2 mmol/L 30 31   BUN mg/dL 35* 36*   CREATININE mg/dL 1.06 1.14   CALCIUM mg/dL 9.5 9.5   AST U/L  --  12*   ALT U/L  --  19   ALK PHOS U/L  --  44   EGFR ml/min/1.73sq m 49 45

## 2024-08-28 NOTE — UTILIZATION REVIEW
Initial Clinical Review    Admission: Date/Time/Statement:   Admission Orders (From admission, onward)       Ordered        08/27/24 1402  Place in Observation  Once                          Orders Placed This Encounter   Procedures    Place in Observation     Standing Status:   Standing     Number of Occurrences:   1     Order Specific Question:   Level of Care     Answer:   Med Surg [16]     ED Arrival Information       Expected   -    Arrival   8/27/2024 11:59    Acuity   Urgent              Means of arrival   Ambulance    Escorted by   [facility]    Service   Hospitalist    Admission type   Emergency              Arrival complaint   chest pain             Chief Complaint   Patient presents with    Shortness of Breath     Patient reports worsening shortness of breath and weakness this morning. Patient reports appt with cardiology yesterday with abnormal stress test and plans for cardiac cath on 9/4.       Initial Presentation: 79 y.o. female  with a PMH of optic neuritis, hyperlipidemia, DM, obesity, CAD who presents with chest pain. Patient has a several month history of having intermittent chest pain.  She [facility] and had a nuclear medicine stress test.  Ultimately was referred for cardiac catheterization which was scheduled 9/4.  This morning she woke up with increased chest pain and shortness of breath which lasted approximately 2 hours and was self-limiting.  This prompted her to be evaluated in the ED.     ADMIT OBSERVATION STATUS    Anticipated Length of Stay/Certification Statement:      Date:     Day 2: Date:    Day 3: Has surpassed a 2nd midnight with active treatments and services.        ED Triage Vitals   Temperature Pulse Respirations Blood Pressure SpO2 Pain Score   08/27/24 1202 08/27/24 1208 08/27/24 1208 08/27/24 1208 08/27/24 1208 08/27/24 1208   97.9 °F (36.6 °C) 64 18 (!) 200/87 95 % No Pain     Weight (last 2  days)       Date/Time Weight    08/27/24 14:51:55 70.3 (154.98)    08/27/24 1202 70.3 (154.98)            Vital Signs (last 3 days)       Date/Time Temp Pulse Resp BP MAP (mmHg) SpO2 O2 Device Patient Position - Orthostatic VS Pain    08/28/24 08:05:28 -- 44 -- 152/58 89 92 % None (Room air) -- No Pain    08/28/24 02:38:44 -- 70 -- 140/51 81 89 % -- -- --    08/27/24 2300 97.8 °F (36.6 °C) 53 20 121/60 80 90 % None (Room air) Lying --    08/27/24 1900 97.6 °F (36.4 °C) 54 20 126/59 81 93 % None (Room air) Lying --    08/27/24 14:51:55 97.8 °F (36.6 °C) 64 16 168/65 99 91 % None (Room air) Lying No Pain    08/27/24 1230 -- 64 22 187/65 93 91 % -- -- --    08/27/24 1215 -- 64 18 200/87 125 95 % None (Room air) Sitting --    08/27/24 1208 -- 64 18 200/87 -- 95 % None (Room air) Sitting No Pain    08/27/24 1202 97.9 °F (36.6 °C) -- -- -- -- -- -- -- --              Pertinent Labs/Diagnostic Test Results:   Radiology:  X-ray chest 1 view portable   Final Interpretation by Bre Cueto MD (08/27 1636)      No acute cardiopulmonary disease.            Workstation performed: [id number]           Cardiology:  ECG 12 lead   Final Result by Rosemarie Strickland MD (08/28 0821)   Sinus bradycardia with marked sinus arrhythmia   Incomplete right bundle branch block   Borderline ECG      Confirmed by Rosemarie Strickland ([id number]) on 8/28/2024 8:21:52 AM        GI:  No orders to display           Results from last 7 days   Lab Units 08/28/24  0731 08/27/24  1527 08/27/24  1227   WBC Thousand/uL 19.16* 15.79* 18.17*   HEMOGLOBIN g/dL 15.0 14.3 14.9   HEMATOCRIT % 46.1 44.6 46.0   PLATELETS Thousands/uL 272 233 247   TOTAL NEUT ABS Thousands/µL  --   --  13.95*         Results from last 7 days   Lab Units 08/28/24  0731 08/27/24  1227   SODIUM mmol/L 139 137   POTASSIUM mmol/L 4.0 3.5   CHLORIDE mmol/L 100 97   CO2 mmol/L 30 31   ANION GAP mmol/L 9 9   BUN mg/dL 35* 36*   CREATININE mg/dL 1.06 1.14   EGFR ml/min/1.73sq m 49 45   CALCIUM  "mg/dL 9.5 9.5     Results from last 7 days   Lab Units 08/27/24  1227   AST U/L 12*   ALT U/L 19   ALK PHOS U/L 44   TOTAL PROTEIN g/dL 7.3   ALBUMIN g/dL 4.0   TOTAL BILIRUBIN mg/dL 0.60   BILIRUBIN DIRECT mg/dL 0.15     Results from last 7 days   Lab Units 08/28/24  0803 08/27/24  2056 08/27/24  1626   POC GLUCOSE mg/dl 118 237* 294*     Results from last 7 days   Lab Units 08/28/24  0731 08/27/24  1227   GLUCOSE RANDOM mg/dL 105 236*             No results found for: \"BETA-HYDROXYBUTYRATE\"                   Results from last 7 days   Lab Units 08/27/24  1700 08/27/24  1418 08/27/24  1227   HS TNI 0HR ng/L  --   --  12   HS TNI 2HR ng/L  --  12  --    HSTNI D2 ng/L  --  0  --    HS TNI 4HR ng/L 12  --   --    HSTNI D4 ng/L 0  --   --          Results from last 7 days   Lab Units 08/28/24  0731 08/27/24  2233 08/27/24  1527 08/27/24  1228   PROTIME seconds  --   --  13.0 12.9   INR   --   --  0.92 0.91   PTT seconds 66* 59* 23  --                          Results from last 7 days   Lab Units 08/27/24  1227   BNP pg/mL 95                                                                                       ED Treatment-Medication Administration from 08/27/2024 1159 to 08/27/2024 1446       None            Past Medical History:   Diagnosis Date    Acute kidney injury superimposed on chronic kidney disease  ([facility]) 11/26/2016    ADHD (attention deficit hyperactivity disorder) 03/17/2019    Arthritis     BL HIPS    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    Carpal tunnel syndrome     Chest pain 03/06/2017    Chronic kidney disease     Claudication ([facility]) 3/15/2019    Closed fracture of base of middle phalanx of finger 06/22/2017    Closed fracture of middle phalanx of left little finger 07/08/2017    Coagulopathy ([facility]) 05/15/2019    Colloid cyst of brain ([facility])     COPD (chronic obstructive pulmonary disease) ([facility])     COPD (chronic obstructive pulmonary disease) ([facility])     Coronary artery disease     Cough     Diabetes mellitus " ([facility])     GERD (gastroesophageal reflux disease)     Glaucoma     Gout     History of brain disorder 10/07/2020    Hyperlipidemia     Hypertension     Irritable bowel syndrome     Melena 02/26/2019    PAD (peripheral artery disease) ([facility]) 5/15/2019    Paronychia of great toe of left foot 10/07/2020    Post-traumatic seizures ([facility]) 07/23/2015    Renal disorder     Seizures ([facility])     last 2015    Shortness of breath     Single seizure ([facility]) 05/31/2016    SOB (shortness of breath)     Syncope and collapse 11/11/2020    Urinary tract infection without hematuria 11/26/2016    Wheezing      Present on Admission:   Hypertension   Stage 3b chronic kidney disease ([facility])   Chronic heart failure with preserved ejection fraction ([facility])   Hyperlipidemia associated with type 2 diabetes mellitus  ([facility])   Obesity, morbid ([facility])   Chest pain      Admitting Diagnosis: Chest pain [R07.9]  Age/Sex: 79 y.o. female  Admission Orders:  Scheduled Medications:  aspirin, 81 mg, Oral, Daily  atorvastatin, 10 mg, Oral, HS  DULoxetine, 30 mg, Oral, BID  Fluticasone Furoate-Vilanterol, 1 puff, Inhalation, Daily   And  umeclidinium, 1 puff, Inhalation, Daily  furosemide, 20 mg, Oral, BID  gabapentin, 200 mg, Oral, HS  insulin glargine, 30 Units, Subcutaneous, HS  insulin lispro, 1-5 Units, Subcutaneous, TID AC  insulin lispro, 12 Units, Subcutaneous, TID With Meals  latanoprost, 1 drop, Left Eye, Daily  pantoprazole, 20 mg, Oral, Early Morning  predniSONE, 30 mg, Oral, Daily      Continuous IV Infusions:  heparin (porcine), 3-20 Units/kg/hr (Order-Specific), Intravenous, Titrated      PRN Meds:  acetaminophen, 650 mg, Oral, Q6H PRN  albuterol, 2.5 mg, Nebulization, Q6H PRN  heparin (porcine), 2,000 Units, Intravenous, Q6H PRN  heparin (porcine), 4,000 Units, Intravenous, Q6H PRN  sodium chloride (PF), 3 mL, Intravenous, Q1H PRN        IP CONSULT TO CARDIOLOGY    Network Utilization Review Department  ATTENTION: Please call with any questions or  concerns to [phone] and carefully listen to the prompts so that you are directed to the right person. All voicemails are confidential.   For Discharge needs, contact Care Management DC Support Team at [phone] opt. 2  Send all requests for admission clinical reviews, approved or denied determinations and any other requests to dedicated fax number below belonging to the [place] where the patient is receiving treatment. List of dedicated fax numbers for the Facilities:  FACILITY NAME UR FAX NUMBER   ADMISSION DENIALS (Administrative/Medical Necessity) [phone]   DISCHARGE SUPPORT TEAM (NETWORK) [phone]   PARENT CHILD HEALTH (Maternity/NICU/Pediatrics) [phone]   [facility] [phone]   [facility] [phone]   [facility] [phone]   [facility] [phone]   [facility] [phone]   [facility] [phone]   [facility] [phone]   [facility] [phone]   [facility] [phone]   [facility] [phone]   [facility] [phone]   [facility] [phone]

## 2024-08-28 NOTE — ASSESSMENT & PLAN NOTE
Lab Results   Component Value Date    HGBA1C 7.6 (H) 07/03/2024       Recent Labs     08/27/24  1626 08/27/24  2056 08/28/24  0803 08/28/24  1257   POCGLU 294* 237* 118 152*       Blood Sugar Average: Last 72 hrs:  (P) 200.25  Continue statin  Continue with 12 units mealtime insulin and 30 units long-acting, continue sliding scale  Trend blood glucose

## 2024-08-28 NOTE — ASSESSMENT & PLAN NOTE
Wt Readings from Last 3 Encounters:   08/27/24 70.3 kg (154 lb 15.7 oz)   08/26/24 71.7 kg (158 lb)   08/19/24 70.3 kg (155 lb)     Does not appear to be in current exacerbation  Continue with home Lasix 20 mg twice daily  Chest x-ray shows no acute cardiopulmonary

## 2024-08-28 NOTE — PLAN OF CARE
Problem: Potential for Falls  Goal: Patient will remain free of falls  Description: INTERVENTIONS:  - Educate patient/family on patient safety including physical limitations  - Instruct patient to call for assistance with activity   - Consult OT/PT to assist with strengthening/mobility   - Keep Call bell within reach  - Keep bed low and locked with side rails adjusted as appropriate  - Keep care items and personal belongings within reach  - Initiate and maintain comfort rounds  - Make Fall Risk Sign visible to staff  - Offer Toileting every  Hours, in advance of need  - Initiate/Maintain alarm  - Obtain necessary fall risk management equipment:   - Apply yellow socks and bracelet for high fall risk patients  - Consider moving patient to room near nurses station  Outcome: Progressing     Problem: PAIN - ADULT  Goal: Verbalizes/displays adequate comfort level or baseline comfort level  Description: Interventions:  - Encourage patient to monitor pain and request assistance  - Assess pain using appropriate pain scale  - Administer analgesics based on type and severity of pain and evaluate response  - Implement non-pharmacological measures as appropriate and evaluate response  - Consider cultural and social influences on pain and pain management  - Notify physician/advanced practitioner if interventions unsuccessful or patient reports new pain  Outcome: Progressing     Problem: INFECTION - ADULT  Goal: Absence or prevention of progression during hospitalization  Description: INTERVENTIONS:  - Assess and monitor for signs and symptoms of infection  - Monitor lab/diagnostic results  - Monitor all insertion sites, i.e. indwelling lines, tubes, and drains  - Monitor endotracheal if appropriate and nasal secretions for changes in amount and color  - [place] appropriate cooling/warming therapies per order  - Administer medications as ordered  - Instruct and encourage patient and family to use good hand hygiene technique  -  Identify and instruct in appropriate isolation precautions for identified infection/condition  Outcome: Progressing     Problem: SAFETY ADULT  Goal: Patient will remain free of falls  Description: INTERVENTIONS:  - Educate patient/family on patient safety including physical limitations  - Instruct patient to call for assistance with activity   - Consult OT/PT to assist with strengthening/mobility   - Keep Call bell within reach  - Keep bed low and locked with side rails adjusted as appropriate  - Keep care items and personal belongings within reach  - Initiate and maintain comfort rounds  - Make Fall Risk Sign visible to staff  - Offer Toileting every  Hours, in advance of need  - Initiate/Maintain alarm  - Obtain necessary fall risk management equipment:   - Apply yellow socks and bracelet for high fall risk patients  - Consider moving patient to room near nurses station  Outcome: Progressing  Goal: Maintain or return to baseline ADL function  Description: INTERVENTIONS:  -  Assess patient's ability to carry out ADLs; assess patient's baseline for ADL function and identify physical deficits which impact ability to perform ADLs (bathing, care of mouth/teeth, toileting, grooming, dressing, etc.)  - Assess/evaluate cause of self-care deficits   - Assess range of motion  - Assess patient's mobility; develop plan if impaired  - Assess patient's need for assistive devices and provide as appropriate  - Encourage maximum independence but intervene and supervise when necessary  - Involve family in performance of ADLs  - Assess for home care needs following discharge   - Consider OT consult to assist with ADL evaluation and planning for discharge  - Provide patient education as appropriate  Outcome: Progressing  Goal: Maintains/Returns to pre admission functional level  Description: INTERVENTIONS:  - Perform AM-PAC 6 Click Basic Mobility/ Daily Activity assessment daily.  - Set and communicate daily mobility goal to care  team and patient/family/caregiver.   - Collaborate with rehabilitation services on mobility goals if consulted  - Perform Range of Motion times a day.  - Reposition patient every  hours.  - Dangle patient  times a day  - Stand patient  times a day  - Ambulate patient  times a day  - Out of bed to chair  times a day   - Out of bed for meals  times a day  - Out of bed for toileting  - Record patient progress and toleration of activity level   Outcome: Progressing     Problem: DISCHARGE PLANNING  Goal: Discharge to home or other facility with appropriate resources  Description: INTERVENTIONS:  - Identify barriers to discharge w/patient and caregiver  - Arrange for needed discharge resources and transportation as appropriate  - Identify discharge learning needs (meds, wound care, etc.)  - Arrange for interpretive services to assist at discharge as needed  - Refer to Case Management Department for coordinating discharge planning if the patient needs post-hospital services based on physician/advanced practitioner order or complex needs related to functional status, cognitive ability, or social support system  Outcome: Progressing     Problem: Knowledge Deficit  Goal: Patient/family/caregiver demonstrates understanding of disease process, treatment plan, medications, and discharge instructions  Description: Complete learning assessment and assess knowledge base.  Interventions:  - Provide teaching at level of understanding  - Provide teaching via preferred learning methods  Outcome: Progressing     Problem: CARDIOVASCULAR - ADULT  Goal: Maintains optimal cardiac output and hemodynamic stability  Description: INTERVENTIONS:  - Monitor I/O, vital signs and rhythm  - Monitor for S/S and trends of decreased cardiac output  - Administer and titrate ordered vasoactive medications to optimize hemodynamic stability  - Assess quality of pulses, skin color and temperature  - Assess for signs of decreased coronary artery perfusion  -  Instruct patient to report change in severity of symptoms  Outcome: Progressing  Goal: Absence of cardiac dysrhythmias or at baseline rhythm  Description: INTERVENTIONS:  - Continuous cardiac monitoring, vital signs, obtain 12 lead EKG if ordered  - Administer antiarrhythmic and heart rate control medications as ordered  - Monitor electrolytes and administer replacement therapy as ordered  Outcome: Progressing     Problem: Prexisting or High Potential for Compromised Skin Integrity  Goal: Skin integrity is maintained or improved  Description: INTERVENTIONS:  - Identify patients at risk for skin breakdown  - Assess and monitor skin integrity  - Assess and monitor nutrition and hydration status  - Monitor labs   - Assess for incontinence   - Turn and reposition patient  - Assist with mobility/ambulation  - Relieve pressure over bony prominences  - Avoid friction and shearing  - Provide appropriate hygiene as needed including keeping skin clean and dry  - Evaluate need for skin moisturizer/barrier cream  - Collaborate with interdisciplinary team   - Patient/family teaching  - Consider wound care consult   Outcome: Progressing

## 2024-08-28 NOTE — DISCHARGE INSTR - AVS FIRST PAGE
Please follow-up with the cardiology team.  Please see your PCP within 1 week for continuity care and to determine need for your acid reflux medication increase.  Please no longer take your aspirin.

## 2024-08-28 NOTE — DISCHARGE SUMMARY
[facility]  Discharge- Shantelle Romano 1945, 79 y.o. female MRN: [id number]  Unit/Bed#: 2 E 260-01 Encounter: [id number]  Primary Care Provider: Clarisa Billingsley DO   Date and time admitted to hospital: 8/27/2024 11:59 AM    * Chest pain  Assessment & Plan  Patient has a several month history of having intermittent chest pain.  She was recently seen 9/26 in the outpatient setting by [facility] cardiology and had a nuclear medicine stress test.  Ultimately was referred for cardiac catheterization which was scheduled 9/4.  This morning she woke up with increased chest pain and shortness of breath which lasted approximately 2 hours and was self-limiting.  This prompted her to be evaluated in the ED.    Outpatient NM stress test-LVEF 66% with small left ventricular perfusion deficit with partially reversible apical deficits  Takes daily aspirin and statin, outpatient held beta-blocker due to borderline bradycardia, will continue to hold  EKG incomplete right bundle block with normal sinus rhythm  Troponin 12, continue to trend  Lipid panel total cholesterol 173, triglyceride 233, HDL 39, LDL 87, hemoglobin A1c 7.6  LAMONT score 4  Consult cardiology-completed a cardiac catheterization today which showed mild obstructive coronary atherosclerosis, recommending medical therapy and follow-up outpatient    Optic neuritis  Assessment & Plan  Reportedly sees outpatient ophthalmologist  Continue with eyedrops  Currently on prednisone taper, currently on 30 mg prednisone, will continue to taper down    Obesity, morbid (HCC)  Assessment & Plan  Body mass index is 36.02 kg/m².  Recommend incorporating a more whole foods plant-predominant diet along with decreasing consumption of red meats and processed foods  Per AHA guidelines, recommend moderate-vigorous intensity exercise for 30 minutes a day for 5 days a week or a total of 150 min/week      Hyperlipidemia associated with type 2 diabetes mellitus   (HCC)  Assessment & Plan  Lab Results   Component Value Date    HGBA1C 7.6 (H) 07/03/2024       Recent Labs     08/27/24  1626 08/27/24  2056 08/28/24  0803 08/28/24  1257   POCGLU 294* 237* 118 152*       Blood Sugar Average: Last 72 hrs:  (P) 200.25  Continue statin  Continue with 12 units mealtime insulin and 30 units long-acting, continue sliding scale  Trend blood glucose      Chronic heart failure with preserved ejection fraction (HCC)  Assessment & Plan  Wt Readings from Last 3 Encounters:   08/27/24 70.3 kg (154 lb 15.7 oz)   08/26/24 71.7 kg (158 lb)   08/19/24 70.3 kg (155 lb)     Does not appear to be in current exacerbation  Continue with home Lasix 20 mg twice daily  Chest x-ray shows no acute cardiopulmonary             Stage 3b chronic kidney disease (HCC)  Assessment & Plan  Lab Results   Component Value Date    EGFR 49 08/28/2024    EGFR 45 08/27/2024    EGFR 48 (L) 08/09/2024    CREATININE 1.06 08/28/2024    CREATININE 1.14 08/27/2024    CREATININE 1.15 (H) 08/09/2024     Baseline appears to be between 1.1 and 1.3, currently 1.06  Continue to trend BMP    Hypertension  Assessment & Plan  Currently hypertensive in ED, likely in setting of anxiety/pain response  We will continue with home dose of Lasix  Trend BP once patient is more relaxed, if still remains elevated, we will further assist with BP      Medical Problems       Resolved Problems  Date Reviewed: 7/26/2024   None       Discharging Physician / Practitioner: Mata Salas PA-C  PCP: Clarisa Billingsley DO  Admission Date:   Admission Orders (From admission, onward)       Ordered        08/27/24 1402  Place in Observation  Once                          Discharge Date: 08/28/24    Consultations During Hospital Stay:  Cardiology    Procedures Performed:   Cardiac catheterization  Result Date: 8/28/2024  Narrative:   Mild nonobstructive coronary atherosclerosis.   Systemic hypertension.     X-ray chest 1 view portable  Impression: No acute  cardiopulmonary disease    NM myocardial perfusion spect (rx stress and/or rest)  Result Date: 8/20/2024  Narrative:   Resting ECG: The ECG shows normal sinus rhythm. There are nonspecific ST changes. ECG demonstrates incomplete right bundle branch block.   Stress ECG: The ECG was not diagnostic due to pharmacological (vasodilator) stress.   There is no evidence of transient ischemic dilation (TID). Stress end diastolic index: 63.0 mL/m2.   Perfusion: There is a left ventricular perfusion defect that is small in size with moderate reduction in uptake present in the apical location(s) that is partially reversible.   Stress Combined Conclusion: Findings are consistent with ischemia.     Significant Findings / Test Results:   Troponin 12, 12, 12  Creatinine stable    Incidental Findings:   None      Test Results Pending at Discharge (will require follow up):   None     Outpatient Tests Requested:  None    Complications: None    Reason for Admission: Chest pain    Hospital Course:   Shantelle Romano is a 79 y.o. female patient who originally presented to the hospital on 8/27/2024 due to chest pain prior to admission.  This was short-lived.  Patient had an outpatient nuclear medicine stress test which was positive for reversible deficits.  She had an outpatient scheduled cardiac catheterization, however prior to procedure she continued chest pain which prompted her to be seen in the ED.  Patient then underwent cardiac catheterization after admission on 8/28 which showed mild obstructive coronary artery disease and the cardiology team recommended to continue medical therapy.  At this time patient is medically stable to be discharged and agreeable for plan at discharge.  For further information in regards to course hospitalization, please see notes attached.      Please see above list of diagnoses and related plan for additional information.     Condition at Discharge: good    Discharge Day Visit / Exam:   Subjective: Patient  "reports to be feeling well currently denies any chest pain/pressure, palpitations, lightheadedness, nausea, shortness of breath, or chills.  Vitals: Blood Pressure: (!) 127/48 (08/28/24 1405)  Pulse: 67 (08/28/24 1405)  Temperature: 97.8 °F (36.6 °C) (08/28/24 1330)  Temp Source: Oral (08/28/24 1330)  Respirations: 18 (08/28/24 1330)  Height: 4' 7\" (139.7 cm) (08/27/24 1451)  Weight - Scale: 70.3 kg (154 lb 15.7 oz) (08/27/24 1451)  SpO2: (!) 88 % (08/28/24 1405)  Exam:   Physical Exam  Vitals and nursing note reviewed.   Constitutional:       Appearance: She is obese.   HENT:      Head: Normocephalic.      Nose: Nose normal.      Mouth/Throat:      Mouth: Mucous membranes are moist.      Pharynx: Oropharynx is clear.   Eyes:      General: No scleral icterus.     Conjunctiva/sclera: Conjunctivae normal.      Pupils: Pupils are equal, round, and reactive to light.   Cardiovascular:      Rate and Rhythm: Normal rate and regular rhythm.      Heart sounds: No murmur heard.     No friction rub. No gallop.   Pulmonary:      Effort: Pulmonary effort is normal. No respiratory distress.      Breath sounds: Normal breath sounds. No stridor. No wheezing, rhonchi or rales.   Abdominal:      General: Abdomen is flat.      Palpations: Abdomen is soft.   Musculoskeletal:         General: Normal range of motion.      Cervical back: Normal range of motion and neck supple.      Right lower leg: No edema.      Left lower leg: No edema.   Lymphadenopathy:      Cervical: No cervical adenopathy.   Skin:     General: Skin is warm.      Coloration: Skin is not jaundiced or pale.      Findings: No bruising, erythema or lesion.   Neurological:      General: No focal deficit present.      Mental Status: She is alert and oriented to person, place, and time. Mental status is at baseline.      Cranial Nerves: No cranial nerve deficit.      Motor: No weakness.   Psychiatric:         Mood and Affect: Mood normal.         Behavior: Behavior normal. "         Thought Content: Thought content normal.          Discussion with Family: Updated  (son and daughter) at bedside.    Discharge instructions/Information to patient and family:   See after visit summary for information provided to patient and family.      Provisions for Follow-Up Care:  See after visit summary for information related to follow-up care and any pertinent home health orders.      Mobility at time of Discharge:   Basic Mobility Inpatient Raw Score: 20  JH-HLM Goal: 6: Walk 10 steps or more  JH-HLM Achieved: 6: Walk 10 steps or more  HLM Goal achieved. Continue to encourage appropriate mobility.     Disposition:   Home    Planned Readmission: No     Discharge Statement:  I spent 60 minutes discharging the patient. This time was spent on the day of discharge. I had direct contact with the patient on the day of discharge. Greater than 50% of the total time was spent examining patient, answering all patient questions, arranging and discussing plan of care with patient as well as directly providing post-discharge instructions.  Additional time then spent on discharge activities.    Discharge Medications:  See after visit summary for reconciled discharge medications provided to patient and/or family.      **Please Note: This note may have been constructed using a voice recognition system**

## 2024-08-28 NOTE — ASSESSMENT & PLAN NOTE
Lab Results   Component Value Date    EGFR 49 08/28/2024    EGFR 45 08/27/2024    EGFR 48 (L) 08/09/2024    CREATININE 1.06 08/28/2024    CREATININE 1.14 08/27/2024    CREATININE 1.15 (H) 08/09/2024     Baseline appears to be between 1.1 and 1.3, currently 1.06  Continue to trend BMP

## 2024-08-28 NOTE — ASSESSMENT & PLAN NOTE
Patient has a several month history of having intermittent chest pain.  She was recently seen 9/26 in the outpatient setting by [facility] cardiology and had a nuclear medicine stress test.  Ultimately was referred for cardiac catheterization which was scheduled 9/4.  This morning she woke up with increased chest pain and shortness of breath which lasted approximately 2 hours and was self-limiting.  This prompted her to be evaluated in the ED.    Outpatient NM stress test-LVEF 66% with small left ventricular perfusion deficit with partially reversible apical deficits  Takes daily aspirin and statin, outpatient held beta-blocker due to borderline bradycardia, will continue to hold  EKG incomplete right bundle block with normal sinus rhythm  Troponin 12, continue to trend  Lipid panel total cholesterol 173, triglyceride 233, HDL 39, LDL 87, hemoglobin A1c 7.6  LAMONT score 4  Consult cardiology-completed a cardiac catheterization today which showed mild obstructive coronary atherosclerosis, recommending medical therapy and follow-up outpatient

## 2024-08-29 ENCOUNTER — TRANSITIONAL CARE MANAGEMENT (OUTPATIENT)
Dept: FAMILY MEDICINE CLINIC | Facility: CLINIC | Age: 79
End: 2024-08-29

## 2024-08-29 ENCOUNTER — TELEPHONE (OUTPATIENT)
Age: 79
End: 2024-08-29

## 2024-08-29 NOTE — TELEPHONE ENCOUNTER
The colon health is a probiotic. Bentyl is for abdominal cramps -- she doesn't have to take this if she doesn't want/need to

## 2024-08-29 NOTE — TELEPHONE ENCOUNTER
Landy called asking if the pt needed to get more labs done before her next appt since she just had labs done while she was in the hospital?  Please advise

## 2024-08-29 NOTE — TELEPHONE ENCOUNTER
Landy returned call to office. I relayed the message patient can hold off on labs.     Landy was also inquiring if taking [email or web address] is the same as taking the Bentyl. Landy states patient has been using [employer] colon health instead.     Please advise  Thank you

## 2024-09-04 ENCOUNTER — TELEPHONE (OUTPATIENT)
Age: 79
End: 2024-09-04

## 2024-09-04 NOTE — TELEPHONE ENCOUNTER
Eli a PT with [facility] calling with update on patient. She states they are continuing into re-certification period to improve walking, stairs and balance.    on the discharge service for the patient. I have reviewed and made amendments to the documentation where necessary.

## 2024-09-05 ENCOUNTER — APPOINTMENT (EMERGENCY)
Dept: CT IMAGING | Facility: HOSPITAL | Age: 79
DRG: 481 | End: 2024-09-05
Payer: COMMERCIAL

## 2024-09-05 ENCOUNTER — APPOINTMENT (EMERGENCY)
Dept: RADIOLOGY | Facility: HOSPITAL | Age: 79
DRG: 481 | End: 2024-09-05
Payer: COMMERCIAL

## 2024-09-05 ENCOUNTER — HOSPITAL ENCOUNTER (INPATIENT)
Facility: HOSPITAL | Age: 79
LOS: 5 days | Discharge: NURSING, REHAB OR LONG-TERM CARE | DRG: 481 | End: 2024-09-10
Attending: FAMILY MEDICINE | Admitting: INTERNAL MEDICINE
Payer: COMMERCIAL

## 2024-09-05 DIAGNOSIS — S72.91XA FEMUR FRACTURE, RIGHT (HCC): Primary | ICD-10-CM

## 2024-09-05 DIAGNOSIS — M25.569 KNEE PAIN: ICD-10-CM
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
## 2024-09-05 PROBLEM — S72.009A HIP FRACTURE (HCC): Status: ACTIVE | Noted: 2024-09-05

## 2024-09-05 LAB
ANION GAP SERPL CALCULATED.3IONS-SCNC: 9 MMOL/L (ref 4–13)
BASOPHILS # BLD AUTO: 0.03 THOUSANDS/ÂΜL (ref 0–0.1)
BASOPHILS NFR BLD AUTO: 0 % (ref 0–1)
BUN SERPL-MCNC: 31 MG/DL (ref 5–25)
CALCIUM SERPL-MCNC: 8.7 MG/DL (ref 8.4–10.2)
CHLORIDE SERPL-SCNC: 98 MMOL/L (ref 96–108)
CO2 SERPL-SCNC: 29 MMOL/L (ref 21–32)
CREAT SERPL-MCNC: 1.48 MG/DL (ref 0.6–1.3)
EOSINOPHIL # BLD AUTO: 0.26 THOUSAND/ÂΜL (ref 0–0.61)
EOSINOPHIL NFR BLD AUTO: 2 % (ref 0–6)
ERYTHROCYTE [DISTWIDTH] IN BLOOD BY AUTOMATED COUNT: 15.1 % (ref 11.6–15.1)
GFR SERPL CREATININE-BSD FRML MDRD: 32 ML/MIN/1.73SQ M
GLUCOSE SERPL-MCNC: 194 MG/DL (ref 65–140)
GLUCOSE SERPL-MCNC: 198 MG/DL (ref 65–140)
GLUCOSE SERPL-MCNC: 226 MG/DL (ref 65–140)
GLUCOSE SERPL-MCNC: 227 MG/DL (ref 65–140)
HCT VFR BLD AUTO: 41.9 % (ref 34.8–46.1)
HGB BLD-MCNC: 13 G/DL (ref 11.5–15.4)
IMM GRANULOCYTES # BLD AUTO: 0.08 THOUSAND/UL (ref 0–0.2)
IMM GRANULOCYTES NFR BLD AUTO: 1 % (ref 0–2)
LYMPHOCYTES # BLD AUTO: 2.05 THOUSANDS/ÂΜL (ref 0.6–4.47)
LYMPHOCYTES NFR BLD AUTO: 15 % (ref 14–44)
MCH RBC QN AUTO: 28 PG (ref 26.8–34.3)
MCHC RBC AUTO-ENTMCNC: 31 G/DL (ref 31.4–37.4)
MCV RBC AUTO: 90 FL (ref 82–98)
MONOCYTES # BLD AUTO: 1.01 THOUSAND/ÂΜL (ref 0.17–1.22)
MONOCYTES NFR BLD AUTO: 7 % (ref 4–12)
NEUTROPHILS # BLD AUTO: 10.4 THOUSANDS/ÂΜL (ref 1.85–7.62)
NEUTS SEG NFR BLD AUTO: 75 % (ref 43–75)
NRBC BLD AUTO-RTO: 0 /100 WBCS
PLATELET # BLD AUTO: 163 THOUSANDS/UL (ref 149–390)
PMV BLD AUTO: 10.2 FL (ref 8.9–12.7)
POTASSIUM SERPL-SCNC: 3.6 MMOL/L (ref 3.5–5.3)
RBC # BLD AUTO: 4.64 MILLION/UL (ref 3.81–5.12)
SODIUM SERPL-SCNC: 136 MMOL/L (ref 135–147)
WBC # BLD AUTO: 13.83 THOUSAND/UL (ref 4.31–10.16)

## 2024-09-05 PROCEDURE — 36415 COLL VENOUS BLD VENIPUNCTURE: CPT | Performed by: FAMILY MEDICINE

## 2024-09-05 PROCEDURE — 80048 BASIC METABOLIC PNL TOTAL CA: CPT | Performed by: FAMILY MEDICINE

## 2024-09-05 PROCEDURE — 82948 REAGENT STRIP/BLOOD GLUCOSE: CPT

## 2024-09-05 PROCEDURE — 73560 X-RAY EXAM OF KNEE 1 OR 2: CPT

## 2024-09-05 PROCEDURE — 85025 COMPLETE CBC W/AUTO DIFF WBC: CPT | Performed by: FAMILY MEDICINE

## 2024-09-05 PROCEDURE — 96376 TX/PRO/DX INJ SAME DRUG ADON: CPT

## 2024-09-05 PROCEDURE — 99284 EMERGENCY DEPT VISIT MOD MDM: CPT

## 2024-09-05 PROCEDURE — 99223 1ST HOSP IP/OBS HIGH 75: CPT | Performed by: PHYSICIAN ASSISTANT

## 2024-09-05 PROCEDURE — 73700 CT LOWER EXTREMITY W/O DYE: CPT

## 2024-09-05 PROCEDURE — 73590 X-RAY EXAM OF LOWER LEG: CPT

## 2024-09-05 PROCEDURE — 96374 THER/PROPH/DIAG INJ IV PUSH: CPT

## 2024-09-05 PROCEDURE — 99223 1ST HOSP IP/OBS HIGH 75: CPT | Performed by: INTERNAL MEDICINE

## 2024-09-05 RX ORDER — INSULIN LISPRO 100 [IU]/ML
12 INJECTION, SOLUTION INTRAVENOUS; SUBCUTANEOUS
Status: DISCONTINUED | OUTPATIENT
Start: 2024-09-05 | End: 2024-09-10 | Stop reason: HOSPADM

## 2024-09-05 RX ORDER — INSULIN GLARGINE 100 [IU]/ML
30 INJECTION, SOLUTION SUBCUTANEOUS
Status: DISCONTINUED | OUTPATIENT
Start: 2024-09-05 | End: 2024-09-06

## 2024-09-05 RX ORDER — ACETAMINOPHEN 325 MG/1
650 TABLET ORAL EVERY 4 HOURS PRN
Status: DISCONTINUED | OUTPATIENT
Start: 2024-09-05 | End: 2024-09-10 | Stop reason: HOSPADM

## 2024-09-05 RX ORDER — INSULIN LISPRO 100 [IU]/ML
1-5 INJECTION, SOLUTION INTRAVENOUS; SUBCUTANEOUS
Status: DISCONTINUED | OUTPATIENT
Start: 2024-09-05 | End: 2024-09-10 | Stop reason: HOSPADM

## 2024-09-05 RX ORDER — BIMATOPROST 0.3 MG/ML
1 SOLUTION/ DROPS OPHTHALMIC DAILY
Status: DISCONTINUED | OUTPATIENT
Start: 2024-09-05 | End: 2024-09-05

## 2024-09-05 RX ORDER — PANTOPRAZOLE SODIUM 40 MG/1
40 TABLET, DELAYED RELEASE ORAL
Status: DISCONTINUED | OUTPATIENT
Start: 2024-09-05 | End: 2024-09-10 | Stop reason: HOSPADM

## 2024-09-05 RX ORDER — ONDANSETRON 2 MG/ML
4 INJECTION INTRAMUSCULAR; INTRAVENOUS EVERY 6 HOURS PRN
Status: DISCONTINUED | OUTPATIENT
Start: 2024-09-05 | End: 2024-09-10 | Stop reason: HOSPADM

## 2024-09-05 RX ORDER — SODIUM CHLORIDE 9 MG/ML
100 INJECTION, SOLUTION INTRAVENOUS CONTINUOUS
Status: DISCONTINUED | OUTPATIENT
Start: 2024-09-05 | End: 2024-09-06

## 2024-09-05 RX ORDER — HYDROMORPHONE HCL/PF 1 MG/ML
0.5 SYRINGE (ML) INJECTION ONCE
Status: COMPLETED | OUTPATIENT
Start: 2024-09-05 | End: 2024-09-05

## 2024-09-05 RX ORDER — LATANOPROST 50 UG/ML
1 SOLUTION/ DROPS OPHTHALMIC DAILY
Status: DISCONTINUED | OUTPATIENT
Start: 2024-09-05 | End: 2024-09-10 | Stop reason: HOSPADM

## 2024-09-05 RX ORDER — MONTELUKAST SODIUM 10 MG/1
10 TABLET ORAL EVERY EVENING
Status: DISCONTINUED | OUTPATIENT
Start: 2024-09-05 | End: 2024-09-10 | Stop reason: HOSPADM

## 2024-09-05 RX ORDER — DULOXETIN HYDROCHLORIDE 30 MG/1
30 CAPSULE, DELAYED RELEASE ORAL 2 TIMES DAILY
Status: DISCONTINUED | OUTPATIENT
Start: 2024-09-05 | End: 2024-09-06

## 2024-09-05 RX ORDER — FLUTICASONE FUROATE AND VILANTEROL 200; 25 UG/1; UG/1
1 POWDER RESPIRATORY (INHALATION) DAILY
Status: DISCONTINUED | OUTPATIENT
Start: 2024-09-05 | End: 2024-09-10 | Stop reason: HOSPADM

## 2024-09-05 RX ORDER — ATORVASTATIN CALCIUM 10 MG/1
10 TABLET, FILM COATED ORAL
Status: DISCONTINUED | OUTPATIENT
Start: 2024-09-05 | End: 2024-09-10 | Stop reason: HOSPADM

## 2024-09-05 RX ORDER — GABAPENTIN 100 MG/1
200 CAPSULE ORAL
Status: DISCONTINUED | OUTPATIENT
Start: 2024-09-05 | End: 2024-09-10 | Stop reason: HOSPADM

## 2024-09-05 RX ORDER — FENTANYL CITRATE 50 UG/ML
50 INJECTION, SOLUTION INTRAMUSCULAR; INTRAVENOUS ONCE
Status: DISCONTINUED | OUTPATIENT
Start: 2024-09-05 | End: 2024-09-05

## 2024-09-05 RX ORDER — SACCHAROMYCES BOULARDII 250 MG
250 CAPSULE ORAL DAILY
COMMUNITY

## 2024-09-05 RX ORDER — HEPARIN SODIUM 5000 [USP'U]/ML
5000 INJECTION, SOLUTION INTRAVENOUS; SUBCUTANEOUS EVERY 8 HOURS SCHEDULED
Status: DISCONTINUED | OUTPATIENT
Start: 2024-09-05 | End: 2024-09-06

## 2024-09-05 RX ORDER — ALBUTEROL SULFATE 90 UG/1
2 AEROSOL, METERED RESPIRATORY (INHALATION) EVERY 6 HOURS PRN
Status: DISCONTINUED | OUTPATIENT
Start: 2024-09-05 | End: 2024-09-10 | Stop reason: HOSPADM

## 2024-09-05 RX ORDER — FLUTICASONE PROPIONATE 50 MCG
1 SPRAY, SUSPENSION (ML) NASAL DAILY
Status: DISCONTINUED | OUTPATIENT
Start: 2024-09-05 | End: 2024-09-10 | Stop reason: HOSPADM

## 2024-09-05 RX ORDER — OXYCODONE HYDROCHLORIDE 5 MG/1
5 TABLET ORAL EVERY 6 HOURS PRN
Status: DISCONTINUED | OUTPATIENT
Start: 2024-09-05 | End: 2024-09-10 | Stop reason: HOSPADM

## 2024-09-05 RX ADMIN — HYDROMORPHONE HYDROCHLORIDE 0.5 MG: 1 INJECTION, SOLUTION INTRAMUSCULAR; INTRAVENOUS; SUBCUTANEOUS at 06:32

## 2024-09-05 RX ADMIN — ACETAMINOPHEN 650 MG: 325 TABLET ORAL at 20:43

## 2024-09-05 RX ADMIN — SODIUM CHLORIDE 100 ML/HR: 0.9 INJECTION, SOLUTION INTRAVENOUS at 23:44

## 2024-09-05 RX ADMIN — HEPARIN SODIUM 5000 UNITS: 5000 INJECTION, SOLUTION INTRAVENOUS; SUBCUTANEOUS at 22:20

## 2024-09-05 RX ADMIN — HYDROMORPHONE HYDROCHLORIDE 0.5 MG: 1 INJECTION, SOLUTION INTRAMUSCULAR; INTRAVENOUS; SUBCUTANEOUS at 11:59

## 2024-09-05 RX ADMIN — INSULIN LISPRO 12 UNITS: 100 INJECTION, SOLUTION INTRAVENOUS; SUBCUTANEOUS at 13:37

## 2024-09-05 RX ADMIN — INSULIN LISPRO 1 UNITS: 100 INJECTION, SOLUTION INTRAVENOUS; SUBCUTANEOUS at 22:20

## 2024-09-05 RX ADMIN — HYDROMORPHONE HYDROCHLORIDE 0.5 MG: 1 INJECTION, SOLUTION INTRAMUSCULAR; INTRAVENOUS; SUBCUTANEOUS at 08:27

## 2024-09-05 RX ADMIN — FLUTICASONE FUROATE AND VILANTEROL TRIFENATATE 1 PUFF: 200; 25 POWDER RESPIRATORY (INHALATION) at 13:48

## 2024-09-05 RX ADMIN — DULOXETINE HYDROCHLORIDE 30 MG: 30 CAPSULE, DELAYED RELEASE ORAL at 13:45

## 2024-09-05 RX ADMIN — PANTOPRAZOLE SODIUM 40 MG: 40 TABLET, DELAYED RELEASE ORAL at 13:45

## 2024-09-05 RX ADMIN — SODIUM CHLORIDE 100 ML/HR: 0.9 INJECTION, SOLUTION INTRAVENOUS at 13:34

## 2024-09-05 RX ADMIN — LATANOPROST 1 DROP: 50 SOLUTION OPHTHALMIC at 13:48

## 2024-09-05 RX ADMIN — INSULIN GLARGINE 30 UNITS: 100 INJECTION, SOLUTION SUBCUTANEOUS at 22:20

## 2024-09-05 RX ADMIN — UMECLIDINIUM 1 PUFF: 62.5 AEROSOL, POWDER ORAL at 13:48

## 2024-09-05 RX ADMIN — INSULIN LISPRO 2 UNITS: 100 INJECTION, SOLUTION INTRAVENOUS; SUBCUTANEOUS at 13:39

## 2024-09-05 RX ADMIN — MONTELUKAST 10 MG: 10 TABLET, FILM COATED ORAL at 17:55

## 2024-09-05 RX ADMIN — DULOXETINE HYDROCHLORIDE 30 MG: 30 CAPSULE, DELAYED RELEASE ORAL at 17:55

## 2024-09-05 RX ADMIN — GABAPENTIN 200 MG: 100 CAPSULE ORAL at 22:19

## 2024-09-05 RX ADMIN — HEPARIN SODIUM 5000 UNITS: 5000 INJECTION, SOLUTION INTRAVENOUS; SUBCUTANEOUS at 13:46

## 2024-09-05 RX ADMIN — INSULIN LISPRO 2 UNITS: 100 INJECTION, SOLUTION INTRAVENOUS; SUBCUTANEOUS at 16:29

## 2024-09-05 RX ADMIN — ATORVASTATIN CALCIUM 10 MG: 10 TABLET, FILM COATED ORAL at 22:19

## 2024-09-05 RX ADMIN — INSULIN LISPRO 12 UNITS: 100 INJECTION, SOLUTION INTRAVENOUS; SUBCUTANEOUS at 16:31

## 2024-09-05 NOTE — ASSESSMENT & PLAN NOTE
Patient is currently euvolemic    Hold home loop diuretic in the setting of mild CHIN  Monitor volume status  Reinstitute home Lasix as able

## 2024-09-05 NOTE — ASSESSMENT & PLAN NOTE
Patient presents status post fall  X-ray and CT scan consistent with periprosthetic right femur fracture  Orthopedic surgery consulted in the ED and recommended operative intervention    Orthopedic surgery consultation appreciated  NPO midnight  Nonweightbearing right lower extremity  PT/OT evaluation and treatment  Case management consultation for safe disposition planning

## 2024-09-05 NOTE — ASSESSMENT & PLAN NOTE
Saturating well on room air  Currently not in acute exacerbation    Continue home inhalers and Singulair  Monitor respiratory status

## 2024-09-05 NOTE — H&P
"[facility]  H&P  Name: Shantelle Romano 79 y.o. female I MRN: [id number]  Unit/Bed#: NEISHA I Date of Admission: 9/5/2024   Date of Service: 9/5/2024 I Hospital Day: 0      Assessment & Plan   * Femur fracture, right ([facility])  Assessment & Plan  Patient presents status post fall  X-ray and CT scan consistent with periprosthetic right femur fracture  Orthopedic surgery consulted in the ED and recommended operative intervention    Orthopedic surgery consultation appreciated  NPO midnight  Nonweightbearing right lower extremity  PT/OT evaluation and treatment  Case management consultation for safe disposition planning      Type 2 diabetes mellitus with diabetic neuropathy ([facility])  Assessment & Plan  Lab Results   Component Value Date    HGBA1C 7.6 (H) 07/03/2024       No results for input(s): \"POCGLU\" in the last 72 hours.    Blood Sugar Average: Last 72 hrs:    Well-controlled on current insulin regimen    Lantus 30 units daily at bedtime  Lispro 12 units 3 times daily with meals  Sliding scale insulin with Accu-Cheks      Simple chronic bronchitis (HCC)  Assessment & Plan  Saturating well on room air  Currently not in acute exacerbation    Continue home inhalers and Singulair  Monitor respiratory status    Chronic heart failure with preserved ejection fraction (HCC)  Assessment & Plan  Patient is currently euvolemic    Hold home loop diuretic in the setting of mild CHIN  Monitor volume status  Reinstitute home Lasix as able    Obesity, morbid (HCC)  Assessment & Plan  Present on admission as evidenced by BMI of 36    Encourage lifestyle modifications and weight loss    Gastroesophageal reflux disease without esophagitis  Assessment & Plan  Continue home PPI         VTE Prophylaxis: Heparin  Code Status: Level 1 Full Code    Anticipated Length of Stay:  Patient will be admitted on an Inpatient basis with an anticipated length of stay of greater than 2 midnights.   Justification for Hospital Stay: Right " femur fracture    Total Time for Visit, including Counseling / Coordination of Care: I have spent a total time of 75 minutes on 09/05/24 in caring for this patient including Diagnostic results, Prognosis, Risks and benefits of tx options, Instructions for management, Patient and family education, Importance of tx compliance, Risk factor reductions, Impressions, Counseling / Coordination of care, Documenting in the medical record, Reviewing / ordering tests, medicine, procedures  , Obtaining or reviewing history  , and Communicating with other healthcare professionals .    Chief Complaint: Right hip pain    History of Present Illness:    Shantelle Romano is a 79 y.o. female with a past medical history significant for chronic bronchitis, insulin-dependent type 2 diabetes mellitus, GERD who presents status post fall.  Imaging in the ED revealed a right periprosthetic femur fracture.  Orthopedic surgery was consulted in the ED and recommended operative intervention.  The patient is hemodynamically stable and saturating well on room air.  Laboratory analysis grossly unremarkable.  The patient was admitted to the general medicine service for further evaluation and treatment of right femur fracture for operative intervention with orthopedic surgery.    Review of Systems:    Review of Systems   Constitutional:  Negative for chills and fever.   HENT:  Negative for ear pain and sore throat.    Eyes:  Negative for pain and visual disturbance.   Respiratory:  Negative for cough and shortness of breath.    Cardiovascular:  Negative for chest pain and palpitations.   Gastrointestinal:  Negative for abdominal pain and vomiting.   Genitourinary:  Negative for dysuria and hematuria.   Musculoskeletal:  Negative for arthralgias and back pain.   Skin:  Negative for color change and rash.   Neurological:  Negative for seizures and syncope.   All other systems reviewed and are negative.      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    Carpal tunnel syndrome     Chest pain 03/06/2017    Chronic kidney disease     Claudication ([facility]) 3/15/2019    Closed fracture of base of middle phalanx of finger 06/22/2017    Closed fracture of middle phalanx of left little finger 07/08/2017    Coagulopathy ([facility]) 05/15/2019    Colloid cyst of brain ([facility])     COPD (chronic obstructive pulmonary disease) (HCC)     COPD (chronic obstructive pulmonary disease) ([facility])     Coronary artery disease     Cough     Diabetes mellitus ([facility])     GERD (gastroesophageal reflux disease)     Glaucoma     Gout     History of brain disorder 10/07/2020    Hyperlipidemia     Hypertension     Irritable bowel syndrome     Melena 02/26/2019    PAD (peripheral artery disease) ([facility]) 5/15/2019    Paronychia of great toe of left foot 10/07/2020    Post-traumatic seizures ([facility]) 07/23/2015    Renal disorder     Seizures ([facility])     last 2015    Shortness of breath     Single seizure ([facility]) 05/31/2016    SOB (shortness of breath)     Syncope and collapse 11/11/2020    Urinary tract infection without hematuria 11/26/2016    Wheezing        Past Surgical History:   Procedure Laterality Date    BRAIN SURGERY      CARDIAC CATHETERIZATION N/A 8/28/2024    Procedure: Cardiac catheterization;  Surgeon: Matt Beltran MD;  Location: [facility];  Service: Cardiology    CATARACT EXTRACTION      CHOLECYSTECTOMY      COLECTOMY RADHA      COLONOSCOPY      DECOMPRESSION SPINE LUMBAR POSTERIOR  08/19/2019    HERNIA REPAIR      HYSTERECTOMY      INCISION TENDON SHEATH HAND      NECK SURGERY  05/24/2018    NEUROPLASTY / TRANSPOSITION MEDIAN NERVE AT CARPAL TUNNEL      MN COLONOSCOPY FLX DX W/COLLJ SPEC WHEN PFRMD N/A 03/26/2019    Procedure: COLONOSCOPY;  Surgeon:  Yaniv Hawley MD;  Location: [facility];  Service: Gastroenterology    OH ESOPHAGOGASTRODUODENOSCOPY TRANSORAL DIAGNOSTIC N/A 03/26/2019    Procedure: ESOPHAGOGASTRODUODENOSCOPY (EGD);  Surgeon: Yaniv Hawley MD;  Location: [facility];  Service: Gastroenterology    REPLACEMENT TOTAL KNEE Right     RETINAL DETACHMENT SURGERY      TUBAL LIGATION         Meds/Allergies:    Prior to Admission medications    Medication Sig Start Date End Date Taking? Authorizing Provider   acetaminophen (TYLENOL) 650 mg CR tablet Take 500 mg by mouth 2 (two) times a day    Historical Provider, MD   albuterol (2.5 mg/3 mL) 0.083 % nebulizer solution Take 3 mL (2.5 mg total) by nebulization every 6 (six) hours as needed for wheezing or shortness of breath 4/22/24   JOSHUA Elizabeth   albuterol (ProAir HFA) 90 mcg/act inhaler Inhale 2 puffs every 6 (six) hours as needed for wheezing or shortness of breath (cough, chest tightness) 5/2/24   Clarisa Billingsley DO   atorvastatin (LIPITOR) 10 mg tablet Take 1 tablet (10 mg total) by mouth daily at bedtime 5/2/24   Clarisa Billingsley DO   Calcium Carbonate-Vitamin D (CALCIUM-D PO) Take 1 tablet by mouth in the morning    Historical Provider, MD   clotrimazole (LOTRIMIN) 1 % cream     Historical Provider, MD   Continuous Blood Gluc  (Dexcom G7 ) YOLANDA Use 1 Application if needed (check sugars) 7/26/23   Clarisa Billingsley DO   Continuous Blood Gluc Sensor (Dexcom G7 Sensor) Use 1 Device every 10 days 7/26/23   Clarisa Billingsley DO   dicyclomine (BENTYL) 20 mg tablet Take 20 mg by mouth 2 (two) times a day    Historical Provider, MD   DULoxetine (CYMBALTA) 30 mg delayed release capsule Take 30 mg by mouth 2 (two) times a day    Historical Provider, MD   fluticasone (FLONASE) 50 mcg/act nasal spray 1 spray into each nostril daily pt uses as needed 5/2/24   Clarisa Billingsley DO   fluticasone-umeclidinium-vilanterol (Trelegy Ellipta) 100-62.5-25 mcg/actuation inhaler Inhale 1  puff daily Rinse mouth after use. 5/23/24   JOSHUA Grier   furosemide (LASIX) 20 mg tablet Take 1 tablet (20 mg total) by mouth 2 (two) times a day 8/2/24   JOSHUA Zavala   gabapentin (NEURONTIN) 100 mg capsule Take 2 capsules (200 mg total) by mouth daily at bedtime 7/12/24   Clarisa Billingsley DO   guaiFENesin (MUCINEX) 600 mg 12 hr tablet Take 1 tablet (600 mg total) by mouth 2 (two) times a day as needed for cough or congestion 7/12/24   Clarisa Billingsley, DO   insulin aspart (NovoLOG FlexPen) 100 UNIT/ML injection pen inject 12 units subcutaneously before meals (3 MEALS A DAY) 7/29/24   Clarisa Billingsley DO   Insulin Glargine Solostar (Lantus SoloStar) 100 UNIT/ML SOPN Inject 0.3 mL (30 Units total) under the skin daily 5/2/24   Clarisa Billingsley DO   Insulin Pen Needle (B-D ULTRAFINE III SHORT PEN) 31G X 8 MM MISC Use 4 (four) times a day (with meals and at bedtime) 8/9/24   Clarisa Billingsley, DO   ketoconazole (NIZORAL) 2 % cream  5/9/23   Historical Provider, MD   latanoprost (XALATAN) 0.005 % ophthalmic solution Administer 1 drop into the left eye daily 7/19/23   Clarisa Billingsley DO   lidocaine (Lidoderm) 5 % Apply 1 patch topically over 12 hours daily as needed (pain) Remove & Discard patch within 12 hours or as directed by MD 7/12/24   Clarisa Billingsley, DO   Lumigan 0.01 % ophthalmic drops INSTILL 1 DROP into both eyes at bedtime    Historical Provider, MD   montelukast (SINGULAIR) 10 mg tablet Take 1 tablet (10 mg total) by mouth every evening 5/2/24   Clarisa Billingsley, DO   Multiple Vitamin (MULTI VITAMIN DAILY PO) Take 1 tablet by mouth daily    Historical Provider, MD   mupirocin (BACTROBAN) 2 % ointment APPLY A SMALL AMOUNT TO THE AFFECTED FOOT/TOE AREA BY TOPICAL ROUTE 3 TIMES PER DAY    Historical Provider, MD   nystatin (MYCOSTATIN) cream Apply topically 2 (two) times a day 2/28/24   Clarisa Billingsley DO   nystatin (MYCOSTATIN) powder Apply topically 3 (three) times a day as needed (rash)  24   Clarisa Billingsley DO   omeprazole (PriLOSEC) 40 MG capsule Take 1 capsule (40 mg total) by mouth 2 (two) times a day for 10 days 24  Mata Salas PA-C   potassium chloride (Klor-Con M20) 20 mEq tablet take 1 tablet by mouth once daily  Patient not taking: Reported on 2024   Marita Duran PA-C   predniSONE 20 mg tablet Take 20 mg by mouth daily 8/15/24   Historical Provider, MD   Spacer/Aero-Holding Chambers (AeroChamber Plus Elian-Vu Large) MISC Use as directed 23   Historical Provider, MD   vitamin B-12 (VITAMIN B-12) 500 mcg tablet Take 1,000 mcg by mouth daily    Historical Provider, MD       Allergies:   Allergies   Allergen Reactions    Brimonidine Other (See Comments)    Salicylic Acid-Sulfur Other (See Comments)    Sulfa Antibiotics Rash and Other (See Comments)       Social History:     Marital Status:    Substance Use History:   Social History     Substance and Sexual Activity   Alcohol Use Never     Social History     Tobacco Use   Smoking Status Former    Current packs/day: 0.00    Average packs/day: 0.5 packs/day for 40.0 years (20.0 ttl pk-yrs)    Types: Cigarettes    Start date:     Quit date:     Years since quittin.6   Smokeless Tobacco Never   Tobacco Comments    stopped many years ago     Social History     Substance and Sexual Activity   Drug Use Never       Family History:    Pertinent family history reviewed    Physical Exam:     Vitals:   Blood Pressure: 112/53 (24 1130)  Pulse: 80 (24 1130)  Temperature: 98 °F (36.7 °C) (24 0612)  Temp Source: Temporal (24 0612)  Respirations: 18 (24 0612)  SpO2: 94 % (24 1130)    Physical Exam  Vitals and nursing note reviewed.   Constitutional:       General: She is not in acute distress.     Appearance: She is well-developed.   HENT:      Head: Normocephalic and atraumatic.   Eyes:      Conjunctiva/sclera: Conjunctivae normal.   Cardiovascular:      Rate and  Rhythm: Normal rate and regular rhythm.      Heart sounds: No murmur heard.  Pulmonary:      Effort: Pulmonary effort is normal. No respiratory distress.      Breath sounds: Normal breath sounds.   Abdominal:      Palpations: Abdomen is soft.      Tenderness: There is no abdominal tenderness.   Musculoskeletal:         General: No swelling.      Cervical back: Neck supple.   Skin:     General: Skin is warm and dry.      Capillary Refill: Capillary refill takes less than 2 seconds.   Neurological:      Mental Status: She is alert.   Psychiatric:         Mood and Affect: Mood normal.          Additional Data:     Lab Results: I have reviewed pertinent results     Results from last 7 days   Lab Units 09/05/24  0634   WBC Thousand/uL 13.83*   HEMOGLOBIN g/dL 13.0   HEMATOCRIT % 41.9   PLATELETS Thousands/uL 163   SEGS PCT % 75   LYMPHO PCT % 15   MONO PCT % 7   EOS PCT % 2     Results from last 7 days   Lab Units 09/05/24  0634   SODIUM mmol/L 136   POTASSIUM mmol/L 3.6   CHLORIDE mmol/L 98   CO2 mmol/L 29   BUN mg/dL 31*   CREATININE mg/dL 1.48*   ANION GAP mmol/L 9   CALCIUM mg/dL 8.7   GLUCOSE RANDOM mg/dL 194*                       Imaging: I have reviewed pertinent imaging     CT lower extremity wo contrast right   Final Result by Rajinder Paredes MD (09/05 0753)   Status post total right knee arthroplasty with a comminuted displaced distal right femur periprosthetic fracture as described above.            Workstation performed: [id number]         XR tibia fibula 2 views RIGHT   Final Result by Dion Marroquin MD (09/05 1021)      Partially visualized distal femur periprosthetic fracture.         Computerized Assisted Algorithm (CAA) may have been used to analyze all applicable images.               Resident: Zhang Casper I, the attending radiologist, have reviewed the images and agree with the final report above.      Workstation performed: [id number]         XR knee 1 or 2 vw right   ED Interpretation  by Lalo Escamilla MD (09/05 0702)   fracture      Final Result by Dion Marroquin MD (09/05 1018)      Comminuted periprosthetic distal femur fracture.      This report is concordant with LALO ESCAMILLA's preliminary interpretation that is documented in the electronic medical record (EPIC).         Computerized Assisted Algorithm (CAA) may have been used to analyze all applicable images.         Resident: Zhang Casper I, the attending radiologist, have reviewed the images and agree with the final report above.      Workstation performed: [id number]             EKG, Pathology, and Other Studies Reviewed on Admission:   EKG: NSR    Allscripts / Epic Records Reviewed    ** Please Note: This note has been constructed using a voice recognition system. **

## 2024-09-05 NOTE — ASSESSMENT & PLAN NOTE
"Lab Results   Component Value Date    HGBA1C 7.6 (H) 07/03/2024       No results for input(s): \"POCGLU\" in the last 72 hours.    Blood Sugar Average: Last 72 hrs:    Well-controlled on current insulin regimen    Lantus 30 units daily at bedtime  Lispro 12 units 3 times daily with meals  Sliding scale insulin with Accu-Cheks    "

## 2024-09-05 NOTE — CONSULTS
Consultation - Orthopedics   Shantelle Romano 79 y.o. female MRN: [id number]  Unit/Bed#: -01 Encounter: [id number]      Assessment & Plan     Assessment:  History of right total knee replacement  Periprosthetic fracture of right distal femur  Plan:  X-rays of the patient's right knee are consistent with a displaced and comminuted periprosthetic fracture of the right distal femur.  This type of fracture will require surgical intervention.  The plan is for an ORIF of her right distal femur on 9/7/2024.  Continue knee immobilizer and nonweightbearing status.  Risks and benefits of surgery were discussed with the patient.  She is acceptable to the plan.    History of Present Illness   Physician Requesting Consult: Rex Diamond DO  Reason for Consult / Principal Problem: Right femur fracture  HPI: Shantelle Romano is a 79 y.o. year old female who presented to the emergency department complaining of right knee pain.  The patient has a history of a right total knee replacement performed in [place].  The patient states she was ambulating when her knee gave out and she landed directly on her right knee.  She had immediate pain and was taken to the emergency department.  While in the ED, x-rays were performed which were consistent with a periprosthetic distal femur fracture.  At that point, orthopedic surgery was consulted.  The patient was placed in a knee immobilizer.  She still complains of pain along the fracture site.  She denies any numbness or tingling.  She denies any fever or chills.    Inpatient consult to Orthopedic Surgery  Consult performed by: Dom Lee PA-C  Consult ordered by: Rex Diamond DO          Review of Systems   Constitutional:  Negative for chills, fever and unexpected weight change.   HENT:  Negative for nosebleeds and sore throat.    Eyes:  Negative for pain, redness and visual disturbance.   Respiratory:  Negative for cough, shortness of breath and wheezing.    Cardiovascular:   Negative for chest pain, palpitations and leg swelling.   Gastrointestinal:  Negative for abdominal pain, nausea and vomiting.   Endocrine: Negative for polydipsia and polyuria.   Genitourinary:  Negative for dysuria and hematuria.   Musculoskeletal:  Positive for arthralgias, gait problem and myalgias.        As noted in HPI   Skin:  Negative for rash and wound.   Neurological:  Negative for dizziness, numbness and headaches.   Psychiatric/Behavioral:  Negative for decreased concentration and suicidal ideas. The patient is not nervous/anxious.        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    Carpal tunnel syndrome     Chest pain 03/06/2017    Chronic kidney disease     Claudication ([facility]) 3/15/2019    Closed fracture of base of middle phalanx of finger 06/22/2017    Closed fracture of middle phalanx of left little finger 07/08/2017    Coagulopathy ([facility]) 05/15/2019    Colloid cyst of brain ([facility])     COPD (chronic obstructive pulmonary disease) ([facility])     COPD (chronic obstructive pulmonary disease) ([facility])     Coronary artery disease     Cough     Diabetes mellitus ([facility])     GERD (gastroesophageal reflux disease)     Glaucoma     Gout     History of brain disorder 10/07/2020    Hyperlipidemia     Hypertension     Irritable bowel syndrome     Melena 02/26/2019    PAD (peripheral artery disease) ([facility]) 5/15/2019    Paronychia of great toe of left foot 10/07/2020    Post-traumatic seizures ([facility]) 07/23/2015    Renal disorder     Seizures ([facility])     last 2015    Shortness of breath     Single seizure ([facility]) 05/31/2016    SOB (shortness of breath)     Syncope and collapse 11/11/2020    Urinary tract infection without hematuria 11/26/2016    Wheezing      Past Surgical History:   Procedure Laterality Date     BRAIN SURGERY      CARDIAC CATHETERIZATION N/A 2024    Procedure: Cardiac catheterization;  Surgeon: Matt Beltran MD;  Location: [facility];  Service: Cardiology    CATARACT EXTRACTION      CHOLECYSTECTOMY      COLECTOMY RADHA      COLONOSCOPY      DECOMPRESSION SPINE LUMBAR POSTERIOR  2019    HERNIA REPAIR      HYSTERECTOMY      INCISION TENDON SHEATH HAND      NECK SURGERY  2018    NEUROPLASTY / TRANSPOSITION MEDIAN NERVE AT CARPAL TUNNEL      IN COLONOSCOPY FLX DX W/COLLJ SPEC WHEN PFRMD N/A 2019    Procedure: COLONOSCOPY;  Surgeon: Yaniv Hawley MD;  Location: [facility];  Service: Gastroenterology    IN ESOPHAGOGASTRODUODENOSCOPY TRANSORAL DIAGNOSTIC N/A 2019    Procedure: ESOPHAGOGASTRODUODENOSCOPY (EGD);  Surgeon: Yaniv Hawley MD;  Location: [facility];  Service: Gastroenterology    REPLACEMENT TOTAL KNEE Right     RETINAL DETACHMENT SURGERY      TUBAL LIGATION       Social History   Social History     Substance and Sexual Activity   Alcohol Use Never     Social History     Substance and Sexual Activity   Drug Use Never     E-Cigarette/Vaping    E-Cigarette Use Never User      E-Cigarette/Vaping Substances    Nicotine No     THC No     CBD No     Flavoring No     Other No     Unknown No      Social History     Tobacco Use   Smoking Status Former    Current packs/day: 0.00    Average packs/day: 0.5 packs/day for 40.0 years (20.0 ttl pk-yrs)    Types: Cigarettes    Start date:     Quit date:     Years since quittin.6   Smokeless Tobacco Never   Tobacco Comments    stopped many years ago     Family History: non-contributory    Meds/Allergies   all current active meds have been reviewed  Allergies   Allergen Reactions    Brimonidine Other (See Comments)    Salicylic Acid-Sulfur Other (See Comments)    Sulfa Antibiotics Rash and Other (See Comments)       Objective   Vitals: Blood pressure 112/51, pulse 88, temperature 98.8 °F (37.1 °C), resp.  "rate 18, height 4' 7\" (1.397 m), weight 71 kg (156 lb 8.4 oz), SpO2 92%, not currently breastfeeding.,Body mass index is 36.38 kg/m².    No intake or output data in the 24 hours ending 09/05/24 1606  No intake/output data recorded.    Invasive Devices       Peripheral Intravenous Line  Duration             Peripheral IV 09/05/24 Left Antecubital <1 day                    Physical Exam  Ortho Exam  Right lower extremity is neurovascularly intact  Toes are pink and mobile  Compartments are soft  Knee immobilizer in place  Generalized tenderness palpation along patella  No warmth erythema  Brisk cap refill  Sensation intact  No tenderness to palpation along hip and groin  Physical Exam   Constitutional: Appears well-developed and well-nourished. No distress.   HENT:   Head: Normocephalic.   Eyes: Conjunctivae are normal. Right eye exhibits no discharge. Left eye exhibits no discharge. No scleral icterus.   Cardiovascular: Normal rate.    Pulmonary/Chest: Effort normal.   Neurological: Alert and oriented to person, place, and time.   Skin: Skin is warm and dry. No rash noted. The patient is not diaphoretic. No erythema. No pallor.   Psychiatric: Normal mood and affect. Behavior is normal. Judgment and thought content normal.    Lab Results: I have personally reviewed pertinent lab results.  CBC:   Lab Results   Component Value Date    WBC 13.83 (H) 09/05/2024    HGB 13.0 09/05/2024    HCT 41.9 09/05/2024    MCV 90 09/05/2024     09/05/2024    RBC 4.64 09/05/2024    MCH 28.0 09/05/2024    MCHC 31.0 (L) 09/05/2024    RDW 15.1 09/05/2024    MPV 10.2 09/05/2024    NRBC 0 09/05/2024     CMP:   Lab Results   Component Value Date    SODIUM 136 09/05/2024    CL 98 09/05/2024    CO2 29 09/05/2024    BUN 31 (H) 09/05/2024    CREATININE 1.48 (H) 09/05/2024    CALCIUM 8.7 09/05/2024    EGFR 32 09/05/2024     Imaging Studies: I have personally reviewed pertinent films in PACS  EKG, Pathology, and Other Studies: I have " personally reviewed pertinent films in PACS  VTE Prophylaxis: Sequential compression device (Venodyne)     Code Status: Level 1 - Full Code  Advance Directive and Living Will:      Power of :    POLST:      Counseling / Coordination of Care  Total floor / unit time spent today 30 minutes. Greater than 50% of total time was spent with the patient and / or family counseling and / or coordination of care.

## 2024-09-05 NOTE — ASSESSMENT & PLAN NOTE
Present on admission as evidenced by BMI of 36    Encourage lifestyle modifications and weight loss

## 2024-09-05 NOTE — ED PROVIDER NOTES
History  Chief Complaint   Patient presents with    Fall     Pt reports her knee gave out while walking to the bathroom and is reporting right knee pain. Pt denies head injury       Fall  Associated symptoms: no abdominal pain, no back pain, no chest pain, no headaches, no nausea and no vomiting    79-year-old female presented to ED after a fall at home.  Patient states she was getting out of bed trying to walk to the bathroom when fell states that difficulty bearing weight complaining of right-sided knee pain.  Pain is 10 out of 10.  No numbness or tingling.  Inability to bear weight    Prior to Admission Medications   Prescriptions Last Dose Informant Patient Reported? Taking?   Calcium Carbonate-Vitamin D (CALCIUM-D PO)  Self, Child Yes No   Sig: Take 1 tablet by mouth in the morning   Continuous Blood Gluc  (Dexcom G7 ) YOLANDA  Self, Child No No   Sig: Use 1 Application if needed (check sugars)   Continuous Blood Gluc Sensor (Dexcom G7 Sensor)  Self, Child No No   Sig: Use 1 Device every 10 days   DULoxetine (CYMBALTA) 30 mg delayed release capsule  Self, Child Yes No   Sig: Take 30 mg by mouth 2 (two) times a day   Insulin Glargine Solostar (Lantus SoloStar) 100 UNIT/ML SOPN  Self, Child No No   Sig: Inject 0.3 mL (30 Units total) under the skin daily   Insulin Pen Needle (B-D ULTRAFINE III SHORT PEN) 31G X 8 MM MISC  Self, Child No No   Sig: Use 4 (four) times a day (with meals and at bedtime)   Lumigan 0.01 % ophthalmic drops  Self, Child Yes No   Sig: INSTILL 1 DROP into both eyes at bedtime   Multiple Vitamin (MULTI VITAMIN DAILY PO)  Self, Child Yes No   Sig: Take 1 tablet by mouth daily   Spacer/Aero-Holding Chambers (AeroChamber Plus Elian-Vu Large) MISC  Self, Child Yes No   Sig: Use as directed   acetaminophen (TYLENOL) 650 mg CR tablet  Self, Child Yes No   Sig: Take 500 mg by mouth 2 (two) times a day   albuterol (2.5 mg/3 mL) 0.083 % nebulizer solution  Self, Child No No   Sig: Take 3 mL  (2.5 mg total) by nebulization every 6 (six) hours as needed for wheezing or shortness of breath   albuterol (ProAir HFA) 90 mcg/act inhaler  Self, Child No No   Sig: Inhale 2 puffs every 6 (six) hours as needed for wheezing or shortness of breath (cough, chest tightness)   atorvastatin (LIPITOR) 10 mg tablet  Self, Child No No   Sig: Take 1 tablet (10 mg total) by mouth daily at bedtime   clotrimazole (LOTRIMIN) 1 % cream  Self, Child Yes No   dicyclomine (BENTYL) 20 mg tablet  Self, Child Yes No   Sig: Take 20 mg by mouth 2 (two) times a day   fluticasone (FLONASE) 50 mcg/act nasal spray  Self, Child No No   Si spray into each nostril daily pt uses as needed   fluticasone-umeclidinium-vilanterol (Trelegy Ellipta) 100-62.5-25 mcg/actuation inhaler  Self, Child No No   Sig: Inhale 1 puff daily Rinse mouth after use.   furosemide (LASIX) 20 mg tablet  Self, Child No No   Sig: Take 1 tablet (20 mg total) by mouth 2 (two) times a day   gabapentin (NEURONTIN) 100 mg capsule  Self, Child No No   Sig: Take 2 capsules (200 mg total) by mouth daily at bedtime   guaiFENesin (MUCINEX) 600 mg 12 hr tablet  Self, Child No No   Sig: Take 1 tablet (600 mg total) by mouth 2 (two) times a day as needed for cough or congestion   insulin aspart (NovoLOG FlexPen) 100 UNIT/ML injection pen  Self, Child No No   Sig: inject 12 units subcutaneously before meals (3 MEALS A DAY)   ketoconazole (NIZORAL) 2 % cream  Self, Child Yes No   latanoprost (XALATAN) 0.005 % ophthalmic solution  Self, Child No No   Sig: Administer 1 drop into the left eye daily   lidocaine (Lidoderm) 5 %  Self, Child No No   Sig: Apply 1 patch topically over 12 hours daily as needed (pain) Remove & Discard patch within 12 hours or as directed by MD   montelukast (SINGULAIR) 10 mg tablet  Self, Child No No   Sig: Take 1 tablet (10 mg total) by mouth every evening   mupirocin (BACTROBAN) 2 % ointment  Self, Child Yes No   Sig: APPLY A SMALL AMOUNT TO THE AFFECTED  FOOT/TOE AREA BY TOPICAL ROUTE 3 TIMES PER DAY   nystatin (MYCOSTATIN) cream  Self, Child No No   Sig: Apply topically 2 (two) times a day   nystatin (MYCOSTATIN) powder  Self, Child No No   Sig: Apply topically 3 (three) times a day as needed (rash)   omeprazole (PriLOSEC) 40 MG capsule   No No   Sig: Take 1 capsule (40 mg total) by mouth 2 (two) times a day for 10 days   potassium chloride (Klor-Con M20) 20 mEq tablet  Self, Child No No   Sig: take 1 tablet by mouth once daily   Patient not taking: Reported on 8/26/2024   predniSONE 20 mg tablet  Self, Child Yes No   Sig: Take 20 mg by mouth daily   vitamin B-12 (VITAMIN B-12) 500 mcg tablet  Self, Child Yes No   Sig: Take 1,000 mcg by mouth daily      Facility-Administered Medications: None       Past Medical History:   Diagnosis Date    Acute kidney injury superimposed on chronic kidney disease  ([facility]) 11/26/2016    ADHD (attention deficit hyperactivity disorder) 03/17/2019    Arthritis     BL HIPS    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    Carpal tunnel syndrome     Chest pain 03/06/2017    Chronic kidney disease     Claudication ([facility]) 3/15/2019    Closed fracture of base of middle phalanx of finger 06/22/2017    Closed fracture of middle phalanx of left little finger 07/08/2017    Coagulopathy ([facility]) 05/15/2019    Colloid cyst of brain ([facility])     COPD (chronic obstructive pulmonary disease) ([facility])     COPD (chronic obstructive pulmonary disease) ([facility])     Coronary artery disease     Cough     Diabetes mellitus ([facility])     GERD (gastroesophageal reflux disease)     Glaucoma     Gout     History of brain disorder 10/07/2020    Hyperlipidemia     Hypertension     Irritable bowel syndrome     Melena 02/26/2019    PAD (peripheral artery disease) ([facility]) 5/15/2019    Paronychia of great toe of left foot 10/07/2020    Post-traumatic seizures ([facility]) 07/23/2015    Renal disorder     Seizures ([facility])     last 2015    Shortness of breath     Single seizure ([facility]) 05/31/2016    SOB  (shortness of breath)     Syncope and collapse 2020    Urinary tract infection without hematuria 2016    Wheezing        Past Surgical History:   Procedure Laterality Date    BRAIN SURGERY      CARDIAC CATHETERIZATION N/A 2024    Procedure: Cardiac catheterization;  Surgeon: Matt Beltran MD;  Location: [facility];  Service: Cardiology    CATARACT EXTRACTION      CHOLECYSTECTOMY      COLECTOMY RADHA      COLONOSCOPY      DECOMPRESSION SPINE LUMBAR POSTERIOR  2019    HERNIA REPAIR      HYSTERECTOMY      INCISION TENDON SHEATH HAND      NECK SURGERY  2018    NEUROPLASTY / TRANSPOSITION MEDIAN NERVE AT CARPAL TUNNEL      OH COLONOSCOPY FLX DX W/COLLJ SPEC WHEN PFRMD N/A 2019    Procedure: COLONOSCOPY;  Surgeon: Yaniv Hawley MD;  Location: [facility];  Service: Gastroenterology    OH ESOPHAGOGASTRODUODENOSCOPY TRANSORAL DIAGNOSTIC N/A 2019    Procedure: ESOPHAGOGASTRODUODENOSCOPY (EGD);  Surgeon: Yaniv Hawley MD;  Location: [facility];  Service: Gastroenterology    REPLACEMENT TOTAL KNEE Right     RETINAL DETACHMENT SURGERY      TUBAL LIGATION         Family History   Problem Relation Age of Onset    Asthma Mother     Heart disease Mother     Emphysema Father     Cancer Father     Prostate cancer Father     Kidney cancer Sister     Diabetes Sister     Kidney disease Sister     Brain cancer Brother     Bone cancer Brother     Heart disease Brother      I have reviewed and agree with the history as documented.    E-Cigarette/Vaping    E-Cigarette Use Never User      E-Cigarette/Vaping Substances    Nicotine No     THC No     CBD No     Flavoring No     Other No     Unknown No      Social History     Tobacco Use    Smoking status: Former     Current packs/day: 0.00     Average packs/day: 0.5 packs/day for 40.0 years (20.0 ttl pk-yrs)     Types: Cigarettes     Start date:      Quit date:      Years since quittin.6    Smokeless tobacco: Never     Tobacco comments:     stopped many years ago   Vaping Use    Vaping status: Never Used   Substance Use Topics    Alcohol use: Never    Drug use: Never       Review of Systems   Constitutional:  Negative for chills and fever.   HENT:  Negative for rhinorrhea and sore throat.    Eyes:  Negative for visual disturbance.   Respiratory:  Negative for cough and shortness of breath.    Cardiovascular:  Negative for chest pain and leg swelling.   Gastrointestinal:  Negative for abdominal pain, diarrhea, nausea and vomiting.   Genitourinary:  Negative for dysuria.   Musculoskeletal:  Negative for back pain and myalgias.        Right knee pain   Skin:  Negative for rash.   Neurological:  Negative for dizziness and headaches.   Psychiatric/Behavioral:  Negative for confusion.    All other systems reviewed and are negative.      Physical Exam  Physical Exam  Vitals and nursing note reviewed.   Constitutional:       Appearance: She is well-developed.   HENT:      Head: Normocephalic and atraumatic.      Right Ear: External ear normal.      Left Ear: External ear normal.      Nose: Nose normal.      Mouth/Throat:      Mouth: Mucous membranes are moist.      Pharynx: No oropharyngeal exudate.   Eyes:      General: No scleral icterus.        Right eye: No discharge.         Left eye: No discharge.      Conjunctiva/sclera: Conjunctivae normal.      Pupils: Pupils are equal, round, and reactive to light.   Cardiovascular:      Rate and Rhythm: Normal rate and regular rhythm.      Pulses: Normal pulses.      Heart sounds: Normal heart sounds.   Pulmonary:      Effort: Pulmonary effort is normal. No respiratory distress.      Breath sounds: Normal breath sounds. No wheezing.   Abdominal:      General: Bowel sounds are normal.      Palpations: Abdomen is soft.   Musculoskeletal:         General: Swelling and tenderness present. Normal range of motion.      Cervical back: Normal range of motion and neck supple.      Comments: Right knee  tenderness to palpation decreased range of motion patient is refusing to move her leg secondary to pain swelling noted   Lymphadenopathy:      Cervical: No cervical adenopathy.   Skin:     General: Skin is warm and dry.      Capillary Refill: Capillary refill takes less than 2 seconds.   Neurological:      General: No focal deficit present.      Mental Status: She is alert and oriented to person, place, and time.   Psychiatric:         Mood and Affect: Mood normal.         Behavior: Behavior normal.         Vital Signs  ED Triage Vitals [09/05/24 0612]   Temperature Pulse Respirations Blood Pressure SpO2   98 °F (36.7 °C) 76 18 (!) 201/78 92 %      Temp Source Heart Rate Source Patient Position - Orthostatic VS BP Location FiO2 (%)   Temporal Monitor -- Left arm --      Pain Score       10 - Worst Possible Pain           Vitals:    09/05/24 0612   BP: (!) 201/78   Pulse: 76         Visual Acuity      ED Medications  Medications   HYDROmorphone (DILAUDID) injection 0.5 mg (0.5 mg Intravenous Given 9/5/24 0632)       Diagnostic Studies  Results Reviewed       Procedure Component Value Units Date/Time    CBC and differential [[id number]]  (Abnormal) Collected: 09/05/24 0634    Lab Status: Final result Specimen: Blood from Arm, Left Updated: 09/05/24 0641     WBC 13.83 Thousand/uL      RBC 4.64 Million/uL      Hemoglobin 13.0 g/dL      Hematocrit 41.9 %      MCV 90 fL      MCH 28.0 pg      MCHC 31.0 g/dL      RDW 15.1 %      MPV 10.2 fL      Platelets 163 Thousands/uL      nRBC 0 /100 WBCs      Segmented % 75 %      Immature Grans % 1 %      Lymphocytes % 15 %      Monocytes % 7 %      Eosinophils Relative 2 %      Basophils Relative 0 %      Absolute Neutrophils 10.40 Thousands/µL      Absolute Immature Grans 0.08 Thousand/uL      Absolute Lymphocytes 2.05 Thousands/µL      Absolute Monocytes 1.01 Thousand/µL      Eosinophils Absolute 0.26 Thousand/µL      Basophils Absolute 0.03 Thousands/µL     Basic metabolic panel  [[id number]] Collected: 09/05/24 0634    Lab Status: In process Specimen: Blood from Arm, Left Updated: 09/05/24 0639                   XR knee 4+ vw right injury    (Results Pending)   XR tibia fibula 2 views RIGHT    (Results Pending)              Procedures  Procedures         ED Course                                 SBIRT 20yo+      Flowsheet Row Most Recent Value   Initial Alcohol Screen: US AUDIT-C     1. How often do you have a drink containing alcohol? 0 Filed at: 09/05/2024 0616   2. How many drinks containing alcohol do you have on a typical day you are drinking?  0 Filed at: 09/05/2024 0616   3a. Male UNDER 65: How often do you have five or more drinks on one occasion? 0 Filed at: 09/05/2024 0616   3b. FEMALE Any Age, or MALE 65+: How often do you have 4 or more drinks on one occassion? 0 Filed at: 09/05/2024 0616   Audit-C Score 0 Filed at: 09/05/2024 0616   LEV: How many times in the past year have you...    Used an illegal drug or used a prescription medication for non-medical reasons? Never Filed at: 09/05/2024 0616                      Medical Decision Making  79-year-old female presented to ED after a fall at home.  Patient states she was getting out of bed trying to walk to the bathroom when fell states that difficulty bearing weight complaining of right-sided knee pain.  Pain is 10 out of 10.  No numbness or tingling.  Inability to bear weight.  History taken from patient  and EMS  Differential diagnoses include fracture/dislocation/patellar dislocation/tib-fib fracture  Plan will obtain labs CBC BMP knee x-ray tib-fib x-ray Dilaudid ice  Patient care transferred to Dr. Ruth     Amount and/or Complexity of Data Reviewed  Labs: ordered.  Radiology: ordered.    Risk  Prescription drug management.                 Disposition  Final diagnoses:   Fall, initial encounter   Knee pain     Time reflects when diagnosis was documented in both MDM as applicable and the Disposition within this  note       Time User Action Codes Description Comment    9/5/2024  6:42 AM Lalo Escamilla [W19.XXXA] Fall, initial encounter     9/5/2024  6:42 AM Lalo Escamilla [M25.569] Knee pain           ED Disposition       None          Follow-up Information    None         Patient's Medications   Discharge Prescriptions    No medications on file       No discharge procedures on file.    PDMP Review         Value Time User    PDMP Reviewed  Yes 11/14/2020  9:48 AM Tam Torres MD            ED Provider  Electronically Signed by             Lalo Escamilla MD  09/05/24 0643

## 2024-09-05 NOTE — PLAN OF CARE
Problem: Potential for Falls  Goal: Patient will remain free of falls  Description: INTERVENTIONS:  - Educate patient/family on patient safety including physical limitations  - Instruct patient to call for assistance with activity   - Consult OT/PT to assist with strengthening/mobility   - Keep Call bell within reach  - Keep bed low and locked with side rails adjusted as appropriate  - Keep care items and personal belongings within reach  - Initiate and maintain comfort rounds  - Make Fall Risk Sign visible to staff  - Initiate/Maintain bed alarm  - Obtain necessary fall risk management equipment: yellow socks, fall band  - Apply yellow socks and bracelet for high fall risk patients  - Consider moving patient to room near nurses station  Outcome: Progressing     Problem: Prexisting or High Potential for Compromised Skin Integrity  Goal: Skin integrity is maintained or improved  Description: INTERVENTIONS:  - Identify patients at risk for skin breakdown  - Assess and monitor skin integrity  - Assess and monitor nutrition and hydration status  - Monitor labs   - Assess for incontinence   - Turn and reposition patient  - Assist with mobility/ambulation  - Relieve pressure over bony prominences  - Avoid friction and shearing  - Provide appropriate hygiene as needed including keeping skin clean and dry  - Evaluate need for skin moisturizer/barrier cream  - Collaborate with interdisciplinary team   - Patient/family teaching  - Consider wound care consult   9/5/2024 1542 by Cassie Deshpande RN  Outcome: Progressing  9/5/2024 1542 by Cassie Deshpande RN  Outcome: Progressing     Problem: PAIN - ADULT  Goal: Verbalizes/displays adequate comfort level or baseline comfort level  Description: Interventions:  - Encourage patient to monitor pain and request assistance  - Assess pain using appropriate pain scale  - Administer analgesics based on type and severity of pain and evaluate response  - Implement non-pharmacological  measures as appropriate and evaluate response  - Consider cultural and social influences on pain and pain management  - Notify physician/advanced practitioner if interventions unsuccessful or patient reports new pain  Outcome: Progressing      Problem: Knowledge Deficit  Goal: Patient/family/caregiver demonstrates understanding of disease process, treatment plan, medications, and discharge instructions  Description: Complete learning assessment and assess knowledge base.  Interventions:  - Provide teaching at level of understanding  - Provide teaching via preferred learning methods  Outcome: Progressing

## 2024-09-06 ENCOUNTER — ANESTHESIA EVENT (INPATIENT)
Dept: PERIOP | Facility: HOSPITAL | Age: 79
DRG: 481 | End: 2024-09-06
Payer: COMMERCIAL

## 2024-09-06 LAB
ANION GAP SERPL CALCULATED.3IONS-SCNC: 6 MMOL/L (ref 4–13)
BASOPHILS # BLD AUTO: 0.02 THOUSANDS/ÂΜL (ref 0–0.1)
BASOPHILS NFR BLD AUTO: 0 % (ref 0–1)
BUN SERPL-MCNC: 25 MG/DL (ref 5–25)
CALCIUM SERPL-MCNC: 8.3 MG/DL (ref 8.4–10.2)
CHLORIDE SERPL-SCNC: 107 MMOL/L (ref 96–108)
CO2 SERPL-SCNC: 26 MMOL/L (ref 21–32)
CREAT SERPL-MCNC: 1.11 MG/DL (ref 0.6–1.3)
EOSINOPHIL # BLD AUTO: 0.11 THOUSAND/ÂΜL (ref 0–0.61)
EOSINOPHIL NFR BLD AUTO: 1 % (ref 0–6)
ERYTHROCYTE [DISTWIDTH] IN BLOOD BY AUTOMATED COUNT: 15.1 % (ref 11.6–15.1)
GFR SERPL CREATININE-BSD FRML MDRD: 46 ML/MIN/1.73SQ M
GLUCOSE SERPL-MCNC: 126 MG/DL (ref 65–140)
GLUCOSE SERPL-MCNC: 151 MG/DL (ref 65–140)
GLUCOSE SERPL-MCNC: 152 MG/DL (ref 65–140)
GLUCOSE SERPL-MCNC: 179 MG/DL (ref 65–140)
GLUCOSE SERPL-MCNC: 198 MG/DL (ref 65–140)
HCT VFR BLD AUTO: 34.9 % (ref 34.8–46.1)
HGB BLD-MCNC: 10.6 G/DL (ref 11.5–15.4)
IMM GRANULOCYTES # BLD AUTO: 0.06 THOUSAND/UL (ref 0–0.2)
IMM GRANULOCYTES NFR BLD AUTO: 1 % (ref 0–2)
LYMPHOCYTES # BLD AUTO: 0.55 THOUSANDS/ÂΜL (ref 0.6–4.47)
LYMPHOCYTES NFR BLD AUTO: 6 % (ref 14–44)
MAGNESIUM SERPL-MCNC: 2.1 MG/DL (ref 1.9–2.7)
MCH RBC QN AUTO: 28.2 PG (ref 26.8–34.3)
MCHC RBC AUTO-ENTMCNC: 30.4 G/DL (ref 31.4–37.4)
MCV RBC AUTO: 93 FL (ref 82–98)
MONOCYTES # BLD AUTO: 0.69 THOUSAND/ÂΜL (ref 0.17–1.22)
MONOCYTES NFR BLD AUTO: 8 % (ref 4–12)
NEUTROPHILS # BLD AUTO: 7.24 THOUSANDS/ÂΜL (ref 1.85–7.62)
NEUTS SEG NFR BLD AUTO: 84 % (ref 43–75)
NRBC BLD AUTO-RTO: 0 /100 WBCS
PHOSPHATE SERPL-MCNC: 2.3 MG/DL (ref 2.3–4.1)
PLATELET # BLD AUTO: 141 THOUSANDS/UL (ref 149–390)
PMV BLD AUTO: 9.9 FL (ref 8.9–12.7)
POTASSIUM SERPL-SCNC: 4 MMOL/L (ref 3.5–5.3)
RBC # BLD AUTO: 3.76 MILLION/UL (ref 3.81–5.12)
SODIUM SERPL-SCNC: 139 MMOL/L (ref 135–147)
WBC # BLD AUTO: 8.67 THOUSAND/UL (ref 4.31–10.16)

## 2024-09-06 PROCEDURE — 84100 ASSAY OF PHOSPHORUS: CPT | Performed by: INTERNAL MEDICINE

## 2024-09-06 PROCEDURE — 99232 SBSQ HOSP IP/OBS MODERATE 35: CPT | Performed by: INTERNAL MEDICINE

## 2024-09-06 PROCEDURE — 82948 REAGENT STRIP/BLOOD GLUCOSE: CPT

## 2024-09-06 PROCEDURE — 80048 BASIC METABOLIC PNL TOTAL CA: CPT | Performed by: INTERNAL MEDICINE

## 2024-09-06 PROCEDURE — 83735 ASSAY OF MAGNESIUM: CPT | Performed by: INTERNAL MEDICINE

## 2024-09-06 PROCEDURE — 85025 COMPLETE CBC W/AUTO DIFF WBC: CPT | Performed by: INTERNAL MEDICINE

## 2024-09-06 RX ORDER — INSULIN GLARGINE 100 [IU]/ML
15 INJECTION, SOLUTION SUBCUTANEOUS
Status: DISCONTINUED | OUTPATIENT
Start: 2024-09-06 | End: 2024-09-10 | Stop reason: HOSPADM

## 2024-09-06 RX ADMIN — INSULIN LISPRO 1 UNITS: 100 INJECTION, SOLUTION INTRAVENOUS; SUBCUTANEOUS at 12:11

## 2024-09-06 RX ADMIN — UMECLIDINIUM 1 PUFF: 62.5 AEROSOL, POWDER ORAL at 09:41

## 2024-09-06 RX ADMIN — ATORVASTATIN CALCIUM 10 MG: 10 TABLET, FILM COATED ORAL at 21:35

## 2024-09-06 RX ADMIN — OXYCODONE HYDROCHLORIDE 5 MG: 5 TABLET ORAL at 08:33

## 2024-09-06 RX ADMIN — FLUTICASONE PROPIONATE 1 SPRAY: 50 SPRAY, METERED NASAL at 09:41

## 2024-09-06 RX ADMIN — INSULIN LISPRO 12 UNITS: 100 INJECTION, SOLUTION INTRAVENOUS; SUBCUTANEOUS at 12:12

## 2024-09-06 RX ADMIN — HEPARIN SODIUM 5000 UNITS: 5000 INJECTION, SOLUTION INTRAVENOUS; SUBCUTANEOUS at 05:14

## 2024-09-06 RX ADMIN — INSULIN GLARGINE 15 UNITS: 100 INJECTION, SOLUTION SUBCUTANEOUS at 21:35

## 2024-09-06 RX ADMIN — FLUTICASONE FUROATE AND VILANTEROL TRIFENATATE 1 PUFF: 200; 25 POWDER RESPIRATORY (INHALATION) at 09:41

## 2024-09-06 RX ADMIN — LATANOPROST 1 DROP: 50 SOLUTION OPHTHALMIC at 09:42

## 2024-09-06 RX ADMIN — SODIUM CHLORIDE 100 ML/HR: 0.9 INJECTION, SOLUTION INTRAVENOUS at 09:51

## 2024-09-06 RX ADMIN — PANTOPRAZOLE SODIUM 40 MG: 40 TABLET, DELAYED RELEASE ORAL at 05:12

## 2024-09-06 RX ADMIN — MONTELUKAST 10 MG: 10 TABLET, FILM COATED ORAL at 17:27

## 2024-09-06 RX ADMIN — INSULIN LISPRO 12 UNITS: 100 INJECTION, SOLUTION INTRAVENOUS; SUBCUTANEOUS at 08:06

## 2024-09-06 RX ADMIN — GABAPENTIN 200 MG: 100 CAPSULE ORAL at 21:35

## 2024-09-06 RX ADMIN — DULOXETINE HYDROCHLORIDE 30 MG: 30 CAPSULE, DELAYED RELEASE ORAL at 09:40

## 2024-09-06 RX ADMIN — INSULIN LISPRO 1 UNITS: 100 INJECTION, SOLUTION INTRAVENOUS; SUBCUTANEOUS at 08:05

## 2024-09-06 RX ADMIN — INSULIN LISPRO 1 UNITS: 100 INJECTION, SOLUTION INTRAVENOUS; SUBCUTANEOUS at 17:22

## 2024-09-06 RX ADMIN — ACETAMINOPHEN 650 MG: 325 TABLET ORAL at 12:17

## 2024-09-06 RX ADMIN — INSULIN LISPRO 12 UNITS: 100 INJECTION, SOLUTION INTRAVENOUS; SUBCUTANEOUS at 17:23

## 2024-09-06 NOTE — UTILIZATION REVIEW
Initial Clinical Review    Admission: Date/Time/Statement:   Admission Orders (From admission, onward)       Ordered        09/05/24 1131  INPATIENT ADMISSION  Once                          Orders Placed This Encounter   Procedures    INPATIENT ADMISSION     Standing Status:   Standing     Number of Occurrences:   1     Order Specific Question:   Level of Care     Answer:   Med Surg [16]     Order Specific Question:   Estimated length of stay     Answer:   More than 2 Midnights     Order Specific Question:   Certification     Answer:   I certify that inpatient services are medically necessary for this patient for a duration of greater than two midnights. See H&P and MD Progress Notes for additional information about the patient's course of treatment.     ED Arrival Information       Expected   -    Arrival   9/5/2024 06:11    Acuity   Urgent              Means of arrival   Ambulance    Escorted by   [facility]    Service   Hospitalist    Admission type   Emergency              Arrival complaint   fall             Chief Complaint   Patient presents with    Fall     Pt reports her knee gave out while walking to the bathroom and is reporting right knee pain. Pt denies head injury       Initial Presentation: 79 y.o. female with PMH of chronic bronchitis, insulin-dependent type 2 diabetes mellitus, GERD who presents status post fall. Imaging in the ED revealed a right periprosthetic femur fracture. Plan: Inpatient admission for evaluation and treatment of right femur fracture, DM, chronic bronchitis, CHF, morbid obesity, GERD: Ortho consult, NPO at midnight, NWB to RLE, PT/OT eval, CM consult for discharge dispo, continue home insulin regimen, add SSI, continue home inhalers and Singulair, hold loop diuretic, continue PPI.    Ortho consult: X-rays of the patient's right knee are consistent with a displaced and comminuted periprosthetic fracture of the right distal femur. This type of fracture will require  surgical intervention. The plan is for an ORIF of her right distal femur on 9/7/2024. Continue knee immobilizer and nonweightbearing status.     Anticipated Length of Stay/Certification Statement: Patient will be admitted on an Inpatient basis with an anticipated length of stay of greater than 2 midnights. Justification for Hospital Stay: Right femur fracture     Date: 9/6   Day 2:     Internal medicine: Ortho plans for OR today. NWB to RLE. PT/OT eval. CM on consult, continue home inhalers and Singulair, hold loop diuretic, continue PPI.    ED Triage Vitals [09/05/24 0612]   Temperature Pulse Respirations Blood Pressure SpO2 Pain Score   98 °F (36.7 °C) 76 18 (!) 201/78 92 % 10 - Worst Possible Pain     Weight (last 2 days)       Date/Time Weight    09/06/24 0600 71.3 (157.19)    09/05/24 1159 71 (156.53)            Vital Signs (last 3 days)       Date/Time Temp Pulse Resp BP MAP (mmHg) SpO2 O2 Device Patient Position - Orthostatic VS Percy Coma Scale Score Pain    09/06/24 0938 -- -- -- -- -- -- -- -- -- 7    09/06/24 0833 -- -- -- -- -- -- -- -- -- 8    09/06/24 07:55:21 98.7 °F (37.1 °C) 91 18 144/62 89 94 % -- -- -- --    09/05/24 21:52:56 99.2 °F (37.3 °C) 91 18 107/58 74 96 % -- -- 15 --    09/05/24 2043 -- -- -- -- -- -- -- -- -- 7    09/05/24 14:55:12 98.8 °F (37.1 °C) 88 18 112/51 71 92 % -- -- -- --    09/05/24 1315 -- -- -- -- -- 95 % -- -- 15 No Pain    09/05/24 12:41:23 99.4 °F (37.4 °C) 86 18 90/60 70 95 % -- -- -- --    09/05/24 1159 -- -- -- -- -- -- -- -- -- 7 09/05/24 1130 -- 80 -- 112/53 76 94 % -- -- -- --    09/05/24 1100 -- 84 -- 119/55 79 94 % -- -- -- --    09/05/24 1030 -- 87 -- 131/55 79 94 % -- -- -- --    09/05/24 1000 -- 83 -- 121/56 81 95 % -- -- -- --    09/05/24 0900 -- 82 -- -- -- 95 % -- -- -- --    09/05/24 0827 -- -- -- -- -- -- -- -- -- 8 09/05/24 0800 -- 79 -- 139/63 90 97 % -- -- -- --    09/05/24 0745 -- 88 -- 118/78 -- 95 % Nasal cannula Lying -- 4 09/05/24 0632  -- -- -- -- -- -- -- -- -- 10 - Worst Possible Pain    09/05/24 0615 -- -- -- -- -- -- -- -- 15 --    09/05/24 0612 98 °F (36.7 °C) 76 18 201/78 -- 92 % None (Room air) -- -- 10 - Worst Possible Pain              Pertinent Labs/Diagnostic Test Results:   Radiology:  CT lower extremity wo contrast right   Final Interpretation by Rajinder Paredes MD (09/05 0753)   Status post total right knee arthroplasty with a comminuted displaced distal right femur periprosthetic fracture as described above.            Workstation performed: [id number]         XR tibia fibula 2 views RIGHT   Final Interpretation by Dion Marroquin MD (09/05 1021)      Partially visualized distal femur periprosthetic fracture.         Computerized Assisted Algorithm (CAA) may have been used to analyze all applicable images.               Resident: Zhang Casper I, the attending radiologist, have reviewed the images and agree with the final report above.      Workstation performed: [id number]         XR knee 1 or 2 vw right   ED Interpretation by Lalo Escamilla MD (09/05 0702)   fracture      Final Interpretation by Dion Marroquin MD (09/05 1018)      Comminuted periprosthetic distal femur fracture.      This report is concordant with LALO ESCAMILLA's preliminary interpretation that is documented in the electronic medical record (EPIC).         Computerized Assisted Algorithm (CAA) may have been used to analyze all applicable images.         Resident: Zhang Casper I, the attending radiologist, have reviewed the images and agree with the final report above.      Workstation performed: [id number]           Cardiology:  No orders to display     GI:  No orders to display           Results from last 7 days   Lab Units 09/06/24  0511 09/05/24  0634   WBC Thousand/uL 8.67 13.83*   HEMOGLOBIN g/dL 10.6* 13.0   HEMATOCRIT % 34.9 41.9   PLATELETS Thousands/uL 141* 163   TOTAL NEUT ABS Thousands/µL 7.24 10.40*         Results from last 7  days   Lab Units 09/06/24  0511 09/05/24  0634   SODIUM mmol/L 139 136   POTASSIUM mmol/L 4.0 3.6   CHLORIDE mmol/L 107 98   CO2 mmol/L 26 29   ANION GAP mmol/L 6 9   BUN mg/dL 25 31*   CREATININE mg/dL 1.11 1.48*   EGFR ml/min/1.73sq m 46 32   CALCIUM mg/dL 8.3* 8.7   MAGNESIUM mg/dL 2.1  --    PHOSPHORUS mg/dL 2.3  --          Results from last 7 days   Lab Units 09/06/24  0752 09/05/24  2125 09/05/24  1609 09/05/24  1240   POC GLUCOSE mg/dl 179* 198* 226* 227*     Results from last 7 days   Lab Units 09/06/24  0511 09/05/24  0634   GLUCOSE RANDOM mg/dL 198* 194*         ED Treatment-Medication Administration from 09/05/2024 0610 to 09/05/2024 1233         Date/Time Order Dose Route Action     09/05/2024 0632 HYDROmorphone (DILAUDID) injection 0.5 mg 0.5 mg Intravenous Given     09/05/2024 0827 HYDROmorphone (DILAUDID) injection 0.5 mg 0.5 mg Intravenous Given     09/05/2024 1159 HYDROmorphone (DILAUDID) injection 0.5 mg 0.5 mg Intravenous Given            Past Medical History:   Diagnosis Date    Acute kidney injury superimposed on chronic kidney disease  ([facility]) 11/26/2016    ADHD (attention deficit hyperactivity disorder) 03/17/2019    Arthritis     BL HIPS    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    Carpal tunnel syndrome     Chest pain 03/06/2017    Chronic kidney disease     Claudication ([facility]) 3/15/2019    Closed fracture of base of middle phalanx of finger 06/22/2017    Closed fracture of middle phalanx of left little finger 07/08/2017    Coagulopathy ([facility]) 05/15/2019    Colloid cyst of brain ([facility])     COPD (chronic obstructive pulmonary disease) ([facility])     COPD (chronic obstructive pulmonary disease) ([facility])     Coronary artery disease     Cough     Diabetes mellitus ([facility])     GERD (gastroesophageal reflux disease)     Glaucoma     Gout     History of brain disorder 10/07/2020    Hyperlipidemia     Hypertension     Irritable bowel syndrome     Melena 02/26/2019    PAD (peripheral artery disease) ([facility]) 5/15/2019     Paronychia of great toe of left foot 10/07/2020    Post-traumatic seizures (HCC) 07/23/2015    Renal disorder     Seizures (HCC)     last 2015    Shortness of breath     Single seizure (HCC) 05/31/2016    SOB (shortness of breath)     Syncope and collapse 11/11/2020    Urinary tract infection without hematuria 11/26/2016    Wheezing      Present on Admission:   Femur fracture, right (HCC)   Simple chronic bronchitis (HCC)   Type 2 diabetes mellitus with diabetic neuropathy (HCC)   Obesity, morbid (HCC)   Chronic heart failure with preserved ejection fraction (HCC)   Gastroesophageal reflux disease without esophagitis      Admitting Diagnosis: Hip pain [M25.559]  Knee pain [M25.569]  Femur fracture, right (HCC) [S72.91XA]  Fall, initial encounter [W19.XXXA]  Fall in elderly patient [R29.6]  Age/Sex: 79 y.o. female  Admission Orders:  Scheduled Medications:  atorvastatin, 10 mg, Oral, HS  DULoxetine, 30 mg, Oral, BID  fluticasone, 1 spray, Nasal, Daily  fluticasone-vilanterol, 1 puff, Inhalation, Daily   And  umeclidinium, 1 puff, Inhalation, Daily  gabapentin, 200 mg, Oral, HS  heparin (porcine), 5,000 Units, Subcutaneous, Q8H CHICA  insulin glargine, 30 Units, Subcutaneous, HS  insulin lispro, 1-5 Units, Subcutaneous, TID AC  insulin lispro, 1-5 Units, Subcutaneous, HS  insulin lispro, 12 Units, Subcutaneous, TID With Meals  latanoprost, 1 drop, Left Eye, Daily  montelukast, 10 mg, Oral, QPM  pantoprazole, 40 mg, Oral, Early Morning      Continuous IV Infusions:  sodium chloride, 100 mL/hr, Intravenous, Continuous      PRN Meds:  acetaminophen, 650 mg, Oral, Q4H PRN  albuterol, 2 puff, Inhalation, Q6H PRN  ondansetron, 4 mg, Intravenous, Q6H PRN  oxyCODONE, 5 mg, Oral, Q6H PRN  oxyCODONE, 2.5 mg, Oral, Q6H PRN        IP CONSULT TO CASE MANAGEMENT  IP CONSULT TO ORTHOPEDIC SURGERY    Network Utilization Review Department  ATTENTION: Please call with any questions or concerns to [phone] and carefully listen to the  prompts so that you are directed to the right person. All voicemails are confidential.   For Discharge needs, contact Care Management DC Support Team at [phone] opt. [phone]  Send all requests for admission clinical reviews, approved or denied determinations and any other requests to dedicated fax number below belonging to the campus where the patient is receiving treatment. List of dedicated fax numbers for the Facilities:  FACILITY NAME UR FAX NUMBER   ADMISSION DENIALS (Administrative/Medical Necessity) [phone]   DISCHARGE SUPPORT TEAM (NETWORK) [phone]   PARENT CHILD HEALTH (Maternity/NICU/Pediatrics) [phone]   [facility] [phone]   [facility] [phone]   [facility] [phone]   General acute hospital [phone]   [facility] [phone]   [facility] [phone]   [facility] [phone]   [facility] [phone]   [facility] [phone]   [facility] [phone]   [facility] [phone]   [facility] [phone]

## 2024-09-06 NOTE — PHYSICAL THERAPY NOTE
PT cancel note       09/06/24 1433   PT Last Visit   PT Visit Date 09/06/24   Note Type   Note type Orthotic Management/Training     PT consult received and pertinent information reviewed in EMR. Upon diagnostic confirmation of hip fx, PT assessment and interventions will be held at this time. Will await further orthopedic consult, potential surgical intervention before mobilizing patient and completing functional task assessment.    Sophia Barlow

## 2024-09-06 NOTE — PLAN OF CARE
Problem: Potential for Falls  Goal: Patient will remain free of falls  Description: INTERVENTIONS:  - Educate patient/family on patient safety including physical limitations  - Instruct patient to call for assistance with activity   - Consult OT/PT to assist with strengthening/mobility   - Keep Call bell within reach  - Keep bed low and locked with side rails adjusted as appropriate  - Keep care items and personal belongings within reach  - Initiate and maintain comfort rounds  - Make Fall Risk Sign visible to staff  - Initiate/Maintain bed alarm  Problem: Prexisting or High Potential for Compromised Skin Integrity  Goal: Skin integrity is maintained or improved  Description: INTERVENTIONS:  - Identify patients at risk for skin breakdown  - Assess and monitor skin integrity  - Assess and monitor nutrition and hydration status  - Monitor labs   - Assess for incontinence   - Turn and reposition patient  - Assist with mobility/ambulation  - Relieve pressure over bony prominences  - Avoid friction and shearing  - Provide appropriate hygiene as needed including keeping skin clean and dry  - Evaluate need for skin moisturizer/barrier cream  - Collaborate with interdisciplinary team   - Patient/family teaching  - Consider wound care consult   Outcome: Progressing     Problem: PAIN - ADULT  Goal: Verbalizes/displays adequate comfort level or baseline comfort level  Description: Interventions:  - Encourage patient to monitor pain and request assistance  - Assess pain using appropriate pain scale  - Administer analgesics based on type and severity of pain and evaluate response  - Implement non-pharmacological measures as appropriate and evaluate response  - Consider cultural and social influences on pain and pain management  - Notify physician/advanced practitioner if interventions unsuccessful or patient reports new pain  Outcome: Progressing     Problem: INFECTION - ADULT  Goal: Absence or prevention of progression during  hospitalization  Description: INTERVENTIONS:  - Assess and monitor for signs and symptoms of infection  - Monitor lab/diagnostic results  - Monitor all insertion sites, i.e. indwelling lines, tubes, and drains  - Monitor endotracheal if appropriate and nasal secretions for changes in amount and color  - [place] appropriate cooling/warming therapies per order  - Administer medications as ordered  - Instruct and encourage patient and family to use good hand hygiene technique  - Identify and instruct in appropriate isolation precautions for identified infection/condition  Outcome: Progressing     Problem: SAFETY ADULT  Goal: Maintain or return to baseline ADL function  Description: INTERVENTIONS:  -  Assess patient's ability to carry out ADLs; assess patient's baseline for ADL function and identify physical deficits which impact ability to perform ADLs (bathing, care of mouth/teeth, toileting, grooming, dressing, etc.)  - Assess/evaluate cause of self-care deficits   - Assess range of motion  - Assess patient's mobility; develop plan if impaired  - Assess patient's need for assistive devices and provide as appropriate  - Encourage maximum independence but intervene and supervise when necessary  - Involve family in performance of ADLs  - Assess for home care needs following discharge   - Consider OT consult to assist with ADL evaluation and planning for discharge  - Provide patient education as appropriate  Outcome: Progressing     Problem: Knowledge Deficit  Goal: Patient/family/caregiver demonstrates understanding of disease process, treatment plan, medications, and discharge instructions  Description: Complete learning assessment and assess knowledge base.  Interventions:  - Provide teaching at level of understanding  - Provide teaching via preferred learning methods  Outcome: Progressing     - Apply yellow socks and bracelet for high fall risk patients  - Consider moving patient to room near nurses  station  Outcome: Progressing      No

## 2024-09-06 NOTE — ASSESSMENT & PLAN NOTE
Lab Results   Component Value Date    HGBA1C 7.6 (H) 07/03/2024       Recent Labs     09/05/24  1240 09/05/24  1609 09/05/24  2125 09/06/24  0752   POCGLU 227* 226* 198* 179*       Blood Sugar Average: Last 72 hrs:  (P) 207.5  Well-controlled on current insulin regimen    Lantus 30 units daily at bedtime  Lispro 12 units 3 times daily with meals  Sliding scale insulin with Accu-Cheks

## 2024-09-06 NOTE — CASE MANAGEMENT
Case Management Assessment & Discharge Planning Note    Patient name Shantelle Romano  Location /-01 MRN [id number]  : 1945 Date 2024       Current Admission Date: 2024  Current Admission Diagnosis:Femur fracture, right (HCC)   Patient Active Problem List    Diagnosis Date Noted Date Diagnosed    Femur fracture, right (HCC) 2024     Optic neuritis 2024     Thyroid nodule 2024     Abnormal thyroid function test 2024     Stroke-like symptoms 2024     Obesity, morbid (HCC) 2024     Former smoker 10/23/2023     Urinary incontinence 2023     Osteopenia of multiple sites 2022     Chronic kidney disease-mineral and bone disorder 2021     Neurologic gait dysfunction 2020     Type 2 diabetes mellitus with diabetic neuropathy (HCC) 10/21/2020     Macular pucker, left 10/07/2020     Bilateral leg edema 2020     Chronic pain syndrome 2020     Stage 3b chronic kidney disease (HCC) 2019     Vitamin D deficiency 05/15/2019     Chest pain 2017     Alternating constipation and diarrhea 2016     Simple chronic bronchitis ([facility])      Hypertension      Chronic heart failure with preserved ejection fraction ([facility]) 2016     Left ventricular hypertrophy 2016     Lung nodule 2016     Memory loss 2016     Osteoarthritis 2016     Coronary artery disease without angina pectoris 2015     Gastroesophageal reflux disease without esophagitis 2015     Hyperlipidemia associated with type 2 diabetes mellitus  ([facility]) 2015     Seizure disorder ([facility]) 2015     Strabismic amblyopia of right eye 2015       LOS (days): 1  Geometric Mean LOS (GMLOS) (days): 2.8  Days to GMLOS:1.7     OBJECTIVE:    Risk of Unplanned Readmission Score: 19.89         Current admission status: Inpatient  Referral Reason: Other (D/C planning)    Preferred Pharmacy:   [id number] - [place] -  [address]  [facility] [address]  Phone: [phone] Fax: [phone]    [facility] - [place] - [address] [address]  [address]  [place] [address]  Phone: [phone] Fax: [phone]    Primary Care Provider: Clarisa Billingsley DO    Primary Insurance: [employer]  REP  Secondary Insurance: [employer] [facility]    ASSESSMENT:  Active Health Care Proxies       Mary Mcdonald [facility] Care Representative - Daughter   Primary Phone: [phone] (Mobile)  Home Phone: [phone]                 Advance Directives  Does patient have a Health Care POA?: Yes  Does patient have Advance Directives?: Yes  Primary Contact: Tere Mcdonald         Readmission Root Cause  30 Day Readmission: No    Patient Information  Admitted from:: Home  Mental Status: Alert  During Assessment patient was accompanied by: Daughter, Son  Assessment information provided by:: Patient, Daughter, Son  Primary Caregiver: Child  Caregiver's Name:: Tere Mcdonald  Caregiver's Relationship to Patient:: Family Member  Caregiver's Telephone Number:: [phone]  Support Systems: Children, Family members  County of Residence: [place]  What city do you live in?: [place]  Home entry access options. Select all that apply.: Stairs  Number of steps to enter home.: 7  Do the steps have railings?: Yes  Type of Current Residence: [facility]  Living Arrangements: Lives w/ Spouse/significant other, Lives w/ Daughter, Lives w/ Son  Is patient a ?: No    Activities of Daily Living Prior to Admission  Functional Status: Assistance  Completes ADLs independently?: No  Level of ADL dependence: Assistance  Ambulates independently?: No  Level of ambulatory dependence: Assistance  Does patient use assisted devices?: Yes  Assisted Devices (DME) used: Bedside Commode, Shower Chair, Walker, Nebulizer  DME Company Name (respiratory supplies): [employer]  Does patient currently own  DME?: Yes  What DME does the patient currently own?: Bedside Commode, Shower Chair, Walker, Nebulizer  Does patient have a history of Outpatient Therapy (PT/OT)?: Yes  Does the patient have a history of Short-Term Rehab?: No  Does patient have a history of HHC?: Yes ([facility])  Does patient currently have HHC?: Yes    Current Home Health Care  Type of Current Home Care Services: Home OT, Home PT, Home health aide, Nurse visit  Current Home Health Agency:: Other (please enter name in comment) ([facility])  Current Home Health Follow-Up Provider:: PCP    Patient Information Continued  Income Source: Pension/group home  Does patient have prescription coverage?: Yes  Does patient receive dialysis treatments?: No  Does patient have a history of substance abuse?: No  Does patient have a history of Mental Health Diagnosis?: No         Means of Transportation  Means of Transport to Appts:: Family transport      Social Determinants of Health (SDOH)      Flowsheet Row Most Recent Value   Housing Stability    In the last 12 months, was there a time when you were not able to pay the mortgage or rent on time? N   In the past 12 months, how many times have you moved where you were living? 0   At any time in the past 12 months, were you homeless or living in a shelter (including now)? N   Transportation Needs    In the past 12 months, has lack of transportation kept you from medical appointments or from getting medications? no   In the past 12 months, has lack of transportation kept you from meetings, work, or from getting things needed for daily living? No   Food Insecurity    Within the past 12 months, you worried that your food would run out before you got the money to buy more. Never true   Within the past 12 months, the food you bought just didn't last and you didn't have money to get more. Never true   Utilities    In the past 12 months has the electric, gas, oil, or water company threatened to shut off  services in your home? No            DISCHARGE DETAILS:    Discharge planning discussed with:: Pt, son, daughter  Freedom of Choice: Yes     CM contacted family/caregiver?: Yes  Were Treatment Team discharge recommendations reviewed with patient/caregiver?: Yes  Did patient/caregiver verbalize understanding of patient care needs?: Yes  Were patient/caregiver advised of the risks associated with not following Treatment Team discharge recommendations?: Yes    Contacts  Patient Contacts: Mary Mcdonald  Relationship to Patient:: Family  Contact Method: In Person  Reason/Outcome: Continuity of Care, Discharge Planning    Requested Home Health Care         Is the patient interested in HHC at discharge?: No    DME Referral Provided  Referral made for DME?: No    Other Referral/Resources/Interventions Provided:  Interventions: Short Term Rehab  Referral Comments: CM met with pt, and children at bedside to conduct assessment. Pt is agreeable to referrals being submitted for STR. CM submitted ref for ARU & SNF via [facility]. Pt scheduled for surgery tomorrow. CM will email children choice list at: [email or web address] & [email or web address]. CM will continue to follow.    Would you like to participate in our [facility] service program?  : No - Declined    Treatment Team Recommendation: Short Term Rehab  Discharge Destination Plan:: Short Term Rehab  Transport at Discharge : BLS Ambulance

## 2024-09-06 NOTE — PROGRESS NOTES
[facility]  Progress Note  Name: Shantelle Romano I  MRN: [id number]  Unit/Bed#: -01 I Date of Admission: 9/5/2024   Date of Service: 9/6/2024 I Hospital Day: 1    Assessment & Plan   * Femur fracture, right (HCC)  Assessment & Plan  Patient presents status post fall  X-ray and CT scan consistent with periprosthetic right femur fracture  Orthopedic surgery consulted in the ED and recommended operative intervention    Orthopedic surgery consultation appreciated  NPO midnight  Nonweightbearing right lower extremity  PT/OT evaluation and treatment  Case management consultation for safe disposition planning      Type 2 diabetes mellitus with diabetic neuropathy (HCC)  Assessment & Plan  Lab Results   Component Value Date    HGBA1C 7.6 (H) 07/03/2024       Recent Labs     09/05/24  1240 09/05/24  1609 09/05/24  2125 09/06/24  0752   POCGLU 227* 226* 198* 179*       Blood Sugar Average: Last 72 hrs:  (P) 207.5  Well-controlled on current insulin regimen    Lantus 30 units daily at bedtime  Lispro 12 units 3 times daily with meals  Sliding scale insulin with Accu-Cheks      Simple chronic bronchitis (HCC)  Assessment & Plan  Saturating well on room air  Currently not in acute exacerbation    Continue home inhalers and Singulair  Monitor respiratory status    Chronic heart failure with preserved ejection fraction (HCC)  Assessment & Plan  Patient is currently euvolemic    Hold home loop diuretic in the setting of mild CHIN  Monitor volume status  Reinstitute home Lasix as able    Obesity, morbid (HCC)  Assessment & Plan  Present on admission as evidenced by BMI of 36    Encourage lifestyle modifications and weight loss    Gastroesophageal reflux disease without esophagitis  Assessment & Plan  Continue home PPI               VTE Pharmacologic Prophylaxis: VTE Score: 9 Moderate Risk (Score 3-4) - Pharmacological DVT Prophylaxis Ordered: heparin.    Mobility:   Basic Mobility Inpatient Raw Score:  6  JH-HLM Goal: 2: Bed activities/Dependent transfer  JH-HLM Achieved: 1: Laying in bed  JH-HLM Goal NOT achieved. Continue with multidisciplinary rounding and encourage appropriate mobility to improve upon JH-HLM goals.    Patient Centered Rounds: I performed bedside rounds with nursing staff today.     Discussions with Specialists or Other Care Team Provider: Orthopedic surgery    Education and Discussions with Family / Patient: Updated  (son) at bedside.    Total Time Spent on Date of Encounter in care of patient: 35 mins. This time was spent on one or more of the following: performing physical exam; counseling and coordination of care; obtaining or reviewing history; documenting in the medical record; reviewing/ordering tests, medications or procedures; communicating with other healthcare professionals and discussing with patient's family/caregivers.    Current Length of Stay: 1 day(s)  Current Patient Status: Inpatient   Certification Statement: The patient will continue to require additional inpatient hospital stay due to right femur fracture  Discharge Plan: Anticipate discharge in 24-48 hrs to discharge location to be determined pending rehab evaluations.    Code Status: Level 1 - Full Code    Subjective:   Patient seen and examined at bedside. No acute events overnight. Denies chest pain, SOB, diaphoresis, nausea/vomiting/diarrhea, fevers/chills.     Objective:     Vitals:   Temp (24hrs), Av °F (37.2 °C), Min:98.7 °F (37.1 °C), Max:99.4 °F (37.4 °C)    Temp:  [98.7 °F (37.1 °C)-99.4 °F (37.4 °C)] 98.7 °F (37.1 °C)  HR:  [80-91] 91  Resp:  [18] 18  BP: ()/(51-62) 144/62  SpO2:  [92 %-96 %] 94 %  Body mass index is 36.53 kg/m².     Input and Output Summary (last 24 hours):     Intake/Output Summary (Last 24 hours) at 2024 1115  Last data filed at 2024 0645  Gross per 24 hour   Intake 1838.33 ml   Output 700 ml   Net 1138.33 ml       Physical Exam:   Physical Exam  Vitals and  nursing note reviewed.   Constitutional:       General: She is not in acute distress.     Appearance: She is well-developed.   HENT:      Head: Normocephalic and atraumatic.   Eyes:      Conjunctiva/sclera: Conjunctivae normal.   Cardiovascular:      Rate and Rhythm: Normal rate and regular rhythm.      Heart sounds: No murmur heard.  Pulmonary:      Effort: Pulmonary effort is normal. No respiratory distress.      Breath sounds: Normal breath sounds.   Abdominal:      Palpations: Abdomen is soft.      Tenderness: There is no abdominal tenderness.   Musculoskeletal:         General: No swelling.      Cervical back: Neck supple.   Skin:     General: Skin is warm and dry.      Capillary Refill: Capillary refill takes less than 2 seconds.   Neurological:      Mental Status: She is alert.   Psychiatric:         Mood and Affect: Mood normal.          Additional Data:     Labs:  Results from last 7 days   Lab Units 09/06/24  0511   WBC Thousand/uL 8.67   HEMOGLOBIN g/dL 10.6*   HEMATOCRIT % 34.9   PLATELETS Thousands/uL 141*   SEGS PCT % 84*   LYMPHO PCT % 6*   MONO PCT % 8   EOS PCT % 1     Results from last 7 days   Lab Units 09/06/24  0511   SODIUM mmol/L 139   POTASSIUM mmol/L 4.0   CHLORIDE mmol/L 107   CO2 mmol/L 26   BUN mg/dL 25   CREATININE mg/dL 1.11   ANION GAP mmol/L 6   CALCIUM mg/dL 8.3*   GLUCOSE RANDOM mg/dL 198*         Results from last 7 days   Lab Units 09/06/24  0752 09/05/24  2125 09/05/24  1609 09/05/24  1240   POC GLUCOSE mg/dl 179* 198* 226* 227*               Lines/Drains:  Invasive Devices       Peripheral Intravenous Line  Duration             Peripheral IV 09/05/24 Left Antecubital 1 day              Drain  Duration             External Urinary Catheter <1 day                  Imaging: No pertinent imaging reviewed.    Recent Cultures (last 7 days):         Last 24 Hours Medication List:   Current Facility-Administered Medications   Medication Dose Route Frequency Provider Last Rate     acetaminophen  650 mg Oral Q4H PRN Rex Diamond, DO      albuterol  2 puff Inhalation Q6H PRN Rex Diamond, DO      atorvastatin  10 mg Oral HS Rex Diamond, DO      DULoxetine  30 mg Oral BID Rex Diamond, DO      fluticasone  1 spray Nasal Daily Rex Diamond, DO      fluticasone-vilanterol  1 puff Inhalation Daily Rex Diamond, DO      And    umeclidinium  1 puff Inhalation Daily Rex Diamond, DO      gabapentin  200 mg Oral HS Rex Diamond, DO      insulin glargine  30 Units Subcutaneous HS Rex Diamond, DO      insulin lispro  1-5 Units Subcutaneous TID AC Rex Diamond, DO      insulin lispro  1-5 Units Subcutaneous HS Rex Diamond, DO      insulin lispro  12 Units Subcutaneous TID With Meals Rex Diamond, DO      latanoprost  1 drop Left Eye Daily Rex Diamond, DO      montelukast  10 mg Oral QPM Rex Diamond, DO      ondansetron  4 mg Intravenous Q6H PRN Rex Diamond, DO      oxyCODONE  5 mg Oral Q6H PRN Rex Diamond, DO      oxyCODONE  2.5 mg Oral Q6H PRN Rex Diamond, DO      pantoprazole  40 mg Oral Early Morning Rex Diamond, DO          Today, Patient Was Seen By: Rex Diamond DO    **Please Note: This note may have been constructed using a voice recognition system.**

## 2024-09-06 NOTE — ARC ADMISSION
[facility]  contacted the patient's mobile number: Voicemail was left with the patient and or family with information provided on the availability of [facility]’s Acute Rehab. Left callback number to discuss [facility] program and services offered and acute rehab criteria. Will f/u with campus choice after contact has been made.    Thank you,  Roxana Kearney  [facility] Admissions

## 2024-09-06 NOTE — ASSESSMENT & PLAN NOTE
Present on admission as evidenced by BMI of 36    Encourage lifestyle modifications and weight loss   XRAY at bedside. Attempted to call phlebotomy for patient blood cultures. No answer at this time.

## 2024-09-07 ENCOUNTER — APPOINTMENT (INPATIENT)
Dept: RADIOLOGY | Facility: HOSPITAL | Age: 79
DRG: 481 | End: 2024-09-07
Payer: COMMERCIAL

## 2024-09-07 ENCOUNTER — ANESTHESIA (INPATIENT)
Dept: PERIOP | Facility: HOSPITAL | Age: 79
DRG: 481 | End: 2024-09-07
Payer: COMMERCIAL

## 2024-09-07 LAB
ANION GAP SERPL CALCULATED.3IONS-SCNC: 6 MMOL/L (ref 4–13)
BASOPHILS # BLD AUTO: 0.02 THOUSANDS/ÂΜL (ref 0–0.1)
BASOPHILS NFR BLD AUTO: 0 % (ref 0–1)
BUN SERPL-MCNC: 15 MG/DL (ref 5–25)
CALCIUM SERPL-MCNC: 8.5 MG/DL (ref 8.4–10.2)
CHLORIDE SERPL-SCNC: 105 MMOL/L (ref 96–108)
CO2 SERPL-SCNC: 27 MMOL/L (ref 21–32)
CREAT SERPL-MCNC: 0.95 MG/DL (ref 0.6–1.3)
EOSINOPHIL # BLD AUTO: 0.26 THOUSAND/ÂΜL (ref 0–0.61)
EOSINOPHIL NFR BLD AUTO: 3 % (ref 0–6)
ERYTHROCYTE [DISTWIDTH] IN BLOOD BY AUTOMATED COUNT: 14.9 % (ref 11.6–15.1)
GFR SERPL CREATININE-BSD FRML MDRD: 56 ML/MIN/1.73SQ M
GLUCOSE SERPL-MCNC: 134 MG/DL (ref 65–140)
GLUCOSE SERPL-MCNC: 157 MG/DL (ref 65–140)
GLUCOSE SERPL-MCNC: 188 MG/DL (ref 65–140)
GLUCOSE SERPL-MCNC: 204 MG/DL (ref 65–140)
GLUCOSE SERPL-MCNC: 309 MG/DL (ref 65–140)
GLUCOSE SERPL-MCNC: 313 MG/DL (ref 65–140)
HCT VFR BLD AUTO: 33.5 % (ref 34.8–46.1)
HGB BLD-MCNC: 10.2 G/DL (ref 11.5–15.4)
IMM GRANULOCYTES # BLD AUTO: 0.07 THOUSAND/UL (ref 0–0.2)
IMM GRANULOCYTES NFR BLD AUTO: 1 % (ref 0–2)
LYMPHOCYTES # BLD AUTO: 0.9 THOUSANDS/ÂΜL (ref 0.6–4.47)
LYMPHOCYTES NFR BLD AUTO: 9 % (ref 14–44)
MAGNESIUM SERPL-MCNC: 1.9 MG/DL (ref 1.9–2.7)
MCH RBC QN AUTO: 28 PG (ref 26.8–34.3)
MCHC RBC AUTO-ENTMCNC: 30.4 G/DL (ref 31.4–37.4)
MCV RBC AUTO: 92 FL (ref 82–98)
MONOCYTES # BLD AUTO: 0.89 THOUSAND/ÂΜL (ref 0.17–1.22)
MONOCYTES NFR BLD AUTO: 9 % (ref 4–12)
NEUTROPHILS # BLD AUTO: 7.39 THOUSANDS/ÂΜL (ref 1.85–7.62)
NEUTS SEG NFR BLD AUTO: 78 % (ref 43–75)
NRBC BLD AUTO-RTO: 0 /100 WBCS
PHOSPHATE SERPL-MCNC: 2.4 MG/DL (ref 2.3–4.1)
PLATELET # BLD AUTO: 135 THOUSANDS/UL (ref 149–390)
PMV BLD AUTO: 8.9 FL (ref 8.9–12.7)
POTASSIUM SERPL-SCNC: 4.3 MMOL/L (ref 3.5–5.3)
RBC # BLD AUTO: 3.64 MILLION/UL (ref 3.81–5.12)
SODIUM SERPL-SCNC: 138 MMOL/L (ref 135–147)
WBC # BLD AUTO: 9.53 THOUSAND/UL (ref 4.31–10.16)

## 2024-09-07 PROCEDURE — 99232 SBSQ HOSP IP/OBS MODERATE 35: CPT | Performed by: INTERNAL MEDICINE

## 2024-09-07 PROCEDURE — 27511 TREATMENT OF THIGH FRACTURE: CPT | Performed by: ORTHOPAEDIC SURGERY

## 2024-09-07 PROCEDURE — C1713 ANCHOR/SCREW BN/BN,TIS/BN: HCPCS | Performed by: ORTHOPAEDIC SURGERY

## 2024-09-07 PROCEDURE — 73560 X-RAY EXAM OF KNEE 1 OR 2: CPT

## 2024-09-07 PROCEDURE — 27511 TREATMENT OF THIGH FRACTURE: CPT

## 2024-09-07 PROCEDURE — C1769 GUIDE WIRE: HCPCS | Performed by: ORTHOPAEDIC SURGERY

## 2024-09-07 PROCEDURE — 80048 BASIC METABOLIC PNL TOTAL CA: CPT | Performed by: INTERNAL MEDICINE

## 2024-09-07 PROCEDURE — 83735 ASSAY OF MAGNESIUM: CPT | Performed by: INTERNAL MEDICINE

## 2024-09-07 PROCEDURE — 84100 ASSAY OF PHOSPHORUS: CPT | Performed by: INTERNAL MEDICINE

## 2024-09-07 PROCEDURE — 0QSB04Z REPOSITION RIGHT LOWER FEMUR WITH INTERNAL FIXATION DEVICE, OPEN APPROACH: ICD-10-PCS | Performed by: ORTHOPAEDIC SURGERY

## 2024-09-07 PROCEDURE — NC001 PR NO CHARGE

## 2024-09-07 PROCEDURE — 82948 REAGENT STRIP/BLOOD GLUCOSE: CPT

## 2024-09-07 PROCEDURE — 85025 COMPLETE CBC W/AUTO DIFF WBC: CPT | Performed by: INTERNAL MEDICINE

## 2024-09-07 DEVICE — 5.0MM CANNULATED VA LOCKING SCREW/75MM: Type: IMPLANTABLE DEVICE | Site: LEG | Status: FUNCTIONAL

## 2024-09-07 DEVICE — 3.5MM CORTEX SCREW SELF-TAPPING 34MM: Type: IMPLANTABLE DEVICE | Site: LEG | Status: FUNCTIONAL

## 2024-09-07 DEVICE — 4.5MM CORTEX SCREW SELF-TAPPING 32MM: Type: IMPLANTABLE DEVICE | Site: LEG | Status: FUNCTIONAL

## 2024-09-07 DEVICE — 5.0MM CANNULATED VA LOCKING SCREW/60MM: Type: IMPLANTABLE DEVICE | Site: LEG | Status: FUNCTIONAL

## 2024-09-07 DEVICE — LCP ONE-THIRD TUBULAR PLATE WITH COLLAR 7 HOLES/81MM
Type: IMPLANTABLE DEVICE | Site: LEG | Status: FUNCTIONAL
Brand: LCP

## 2024-09-07 DEVICE — 4.5MM VA-LCP CURVED CONDYLAR PLATE/14 HOLE/301MM/RIGHT
Type: IMPLANTABLE DEVICE | Site: LEG | Status: FUNCTIONAL
Brand: VA-LCP

## 2024-09-07 DEVICE — 5.0MM CANNULATED VA LOCKING SCREW/40MM: Type: IMPLANTABLE DEVICE | Site: LEG | Status: FUNCTIONAL

## 2024-09-07 DEVICE — 4.5MM CORTEX SCREW SELF-TAPPING 34MM: Type: IMPLANTABLE DEVICE | Site: LEG | Status: FUNCTIONAL

## 2024-09-07 DEVICE — 3.5MM CORTEX SCREW SELF-TAPPING 60MM: Type: IMPLANTABLE DEVICE | Site: LEG | Status: FUNCTIONAL

## 2024-09-07 DEVICE — 3.5MM CORTEX SCREW SELF-TAPPING 36MM: Type: IMPLANTABLE DEVICE | Site: LEG | Status: FUNCTIONAL

## 2024-09-07 DEVICE — 5.0MM CANNULATED VA LOCKING SCREW/50MM: Type: IMPLANTABLE DEVICE | Site: LEG | Status: FUNCTIONAL

## 2024-09-07 DEVICE — 3.5MM CORTEX SCREW SELF-TAPPING 30MM: Type: IMPLANTABLE DEVICE | Site: LEG | Status: FUNCTIONAL

## 2024-09-07 DEVICE — 3.5MM CORTEX SCREW SELF-TAPPING 48MM: Type: IMPLANTABLE DEVICE | Site: LEG | Status: FUNCTIONAL

## 2024-09-07 RX ORDER — MAGNESIUM HYDROXIDE 1200 MG/15ML
LIQUID ORAL AS NEEDED
Status: DISCONTINUED | OUTPATIENT
Start: 2024-09-07 | End: 2024-09-07 | Stop reason: HOSPADM

## 2024-09-07 RX ORDER — PROPOFOL 10 MG/ML
INJECTION, EMULSION INTRAVENOUS AS NEEDED
Status: DISCONTINUED | OUTPATIENT
Start: 2024-09-07 | End: 2024-09-07

## 2024-09-07 RX ORDER — TRANEXAMIC ACID 10 MG/ML
1000 INJECTION, SOLUTION INTRAVENOUS
Status: COMPLETED | OUTPATIENT
Start: 2024-09-07 | End: 2024-09-07

## 2024-09-07 RX ORDER — HYDROMORPHONE HCL/PF 1 MG/ML
0.25 SYRINGE (ML) INJECTION
Status: DISCONTINUED | OUTPATIENT
Start: 2024-09-07 | End: 2024-09-07 | Stop reason: HOSPADM

## 2024-09-07 RX ORDER — ROPIVACAINE HYDROCHLORIDE 5 MG/ML
INJECTION, SOLUTION EPIDURAL; INFILTRATION; PERINEURAL
Status: COMPLETED | OUTPATIENT
Start: 2024-09-07 | End: 2024-09-07

## 2024-09-07 RX ORDER — DEXAMETHASONE SODIUM PHOSPHATE 10 MG/ML
INJECTION, SOLUTION INTRAMUSCULAR; INTRAVENOUS AS NEEDED
Status: DISCONTINUED | OUTPATIENT
Start: 2024-09-07 | End: 2024-09-07

## 2024-09-07 RX ORDER — CEFAZOLIN SODIUM 2 G/50ML
2000 SOLUTION INTRAVENOUS
Status: DISCONTINUED | OUTPATIENT
Start: 2024-09-07 | End: 2024-09-07 | Stop reason: HOSPADM

## 2024-09-07 RX ORDER — FENTANYL CITRATE/PF 50 MCG/ML
25 SYRINGE (ML) INJECTION
Status: DISCONTINUED | OUTPATIENT
Start: 2024-09-07 | End: 2024-09-07 | Stop reason: HOSPADM

## 2024-09-07 RX ORDER — SODIUM CHLORIDE, SODIUM LACTATE, POTASSIUM CHLORIDE, CALCIUM CHLORIDE 600; 310; 30; 20 MG/100ML; MG/100ML; MG/100ML; MG/100ML
INJECTION, SOLUTION INTRAVENOUS CONTINUOUS PRN
Status: DISCONTINUED | OUTPATIENT
Start: 2024-09-07 | End: 2024-09-07

## 2024-09-07 RX ORDER — FENTANYL CITRATE 50 UG/ML
INJECTION, SOLUTION INTRAMUSCULAR; INTRAVENOUS AS NEEDED
Status: DISCONTINUED | OUTPATIENT
Start: 2024-09-07 | End: 2024-09-07

## 2024-09-07 RX ORDER — CEFAZOLIN SODIUM 2 G/50ML
SOLUTION INTRAVENOUS AS NEEDED
Status: DISCONTINUED | OUTPATIENT
Start: 2024-09-07 | End: 2024-09-07

## 2024-09-07 RX ORDER — ONDANSETRON 2 MG/ML
4 INJECTION INTRAMUSCULAR; INTRAVENOUS ONCE AS NEEDED
Status: DISCONTINUED | OUTPATIENT
Start: 2024-09-07 | End: 2024-09-07 | Stop reason: HOSPADM

## 2024-09-07 RX ORDER — ROCURONIUM BROMIDE 10 MG/ML
INJECTION, SOLUTION INTRAVENOUS AS NEEDED
Status: DISCONTINUED | OUTPATIENT
Start: 2024-09-07 | End: 2024-09-07

## 2024-09-07 RX ORDER — LIDOCAINE HYDROCHLORIDE 10 MG/ML
INJECTION, SOLUTION EPIDURAL; INFILTRATION; INTRACAUDAL; PERINEURAL AS NEEDED
Status: DISCONTINUED | OUTPATIENT
Start: 2024-09-07 | End: 2024-09-07

## 2024-09-07 RX ORDER — DOCUSATE SODIUM 100 MG/1
100 CAPSULE, LIQUID FILLED ORAL DAILY
Status: DISCONTINUED | OUTPATIENT
Start: 2024-09-07 | End: 2024-09-10 | Stop reason: HOSPADM

## 2024-09-07 RX ORDER — VANCOMYCIN HYDROCHLORIDE 1 G/20ML
INJECTION, POWDER, LYOPHILIZED, FOR SOLUTION INTRAVENOUS AS NEEDED
Status: DISCONTINUED | OUTPATIENT
Start: 2024-09-07 | End: 2024-09-07 | Stop reason: HOSPADM

## 2024-09-07 RX ORDER — CEFAZOLIN SODIUM 1 G/50ML
1000 SOLUTION INTRAVENOUS EVERY 8 HOURS
Status: CANCELLED | OUTPATIENT
Start: 2024-09-07 | End: 2024-09-08

## 2024-09-07 RX ORDER — ENOXAPARIN SODIUM 100 MG/ML
30 INJECTION SUBCUTANEOUS EVERY 12 HOURS SCHEDULED
Status: CANCELLED | OUTPATIENT
Start: 2024-09-07

## 2024-09-07 RX ORDER — PROMETHAZINE HYDROCHLORIDE 25 MG/ML
25 INJECTION, SOLUTION INTRAMUSCULAR; INTRAVENOUS ONCE AS NEEDED
Status: DISCONTINUED | OUTPATIENT
Start: 2024-09-07 | End: 2024-09-07 | Stop reason: HOSPADM

## 2024-09-07 RX ORDER — ACETAMINOPHEN 10 MG/ML
INJECTION, SOLUTION INTRAVENOUS AS NEEDED
Status: DISCONTINUED | OUTPATIENT
Start: 2024-09-07 | End: 2024-09-07

## 2024-09-07 RX ORDER — FUROSEMIDE 20 MG
20 TABLET ORAL 2 TIMES DAILY
Status: DISCONTINUED | OUTPATIENT
Start: 2024-09-07 | End: 2024-09-10 | Stop reason: HOSPADM

## 2024-09-07 RX ORDER — GLYCOPYRROLATE 0.2 MG/ML
INJECTION INTRAMUSCULAR; INTRAVENOUS AS NEEDED
Status: DISCONTINUED | OUTPATIENT
Start: 2024-09-07 | End: 2024-09-07

## 2024-09-07 RX ADMIN — PHENYLEPHRINE HYDROCHLORIDE 20 MCG/MIN: 10 INJECTION INTRAVENOUS at 08:42

## 2024-09-07 RX ADMIN — DEXAMETHASONE SODIUM PHOSPHATE 5 MG: 10 INJECTION, SOLUTION INTRAMUSCULAR; INTRAVENOUS at 08:32

## 2024-09-07 RX ADMIN — TRANEXAMIC ACID 1000 MG: 10 INJECTION, SOLUTION INTRAVENOUS at 08:30

## 2024-09-07 RX ADMIN — FENTANYL CITRATE 50 MCG: 50 INJECTION INTRAMUSCULAR; INTRAVENOUS at 08:19

## 2024-09-07 RX ADMIN — FENTANYL CITRATE 25 MCG: 50 INJECTION INTRAMUSCULAR; INTRAVENOUS at 08:56

## 2024-09-07 RX ADMIN — FENTANYL CITRATE 25 MCG: 50 INJECTION INTRAMUSCULAR; INTRAVENOUS at 08:03

## 2024-09-07 RX ADMIN — PANTOPRAZOLE SODIUM 40 MG: 40 TABLET, DELAYED RELEASE ORAL at 05:54

## 2024-09-07 RX ADMIN — INSULIN LISPRO 12 UNITS: 100 INJECTION, SOLUTION INTRAVENOUS; SUBCUTANEOUS at 13:11

## 2024-09-07 RX ADMIN — ROCURONIUM BROMIDE 50 MG: 10 INJECTION, SOLUTION INTRAVENOUS at 08:19

## 2024-09-07 RX ADMIN — ROPIVACAINE HYDROCHLORIDE 30 ML: 5 INJECTION, SOLUTION EPIDURAL; INFILTRATION; PERINEURAL at 10:16

## 2024-09-07 RX ADMIN — INSULIN LISPRO 12 UNITS: 100 INJECTION, SOLUTION INTRAVENOUS; SUBCUTANEOUS at 17:39

## 2024-09-07 RX ADMIN — LIDOCAINE HYDROCHLORIDE 50 MG: 10 INJECTION, SOLUTION EPIDURAL; INFILTRATION; INTRACAUDAL; PERINEURAL at 08:19

## 2024-09-07 RX ADMIN — FENTANYL CITRATE 25 MCG: 50 INJECTION INTRAMUSCULAR; INTRAVENOUS at 10:39

## 2024-09-07 RX ADMIN — OXYCODONE HYDROCHLORIDE 5 MG: 5 TABLET ORAL at 11:22

## 2024-09-07 RX ADMIN — SUGAMMADEX 200 MG: 100 INJECTION, SOLUTION INTRAVENOUS at 10:12

## 2024-09-07 RX ADMIN — DOCUSATE SODIUM 100 MG: 100 CAPSULE, LIQUID FILLED ORAL at 12:05

## 2024-09-07 RX ADMIN — FLUTICASONE FUROATE AND VILANTEROL TRIFENATATE 1 PUFF: 200; 25 POWDER RESPIRATORY (INHALATION) at 11:31

## 2024-09-07 RX ADMIN — PROPOFOL 50 MCG/KG/MIN: 10 INJECTION, EMULSION INTRAVENOUS at 08:31

## 2024-09-07 RX ADMIN — ACETAMINOPHEN 1000 MG: 10 INJECTION INTRAVENOUS at 09:41

## 2024-09-07 RX ADMIN — ATORVASTATIN CALCIUM 10 MG: 10 TABLET, FILM COATED ORAL at 21:17

## 2024-09-07 RX ADMIN — FUROSEMIDE 20 MG: 20 TABLET ORAL at 12:05

## 2024-09-07 RX ADMIN — FUROSEMIDE 20 MG: 20 TABLET ORAL at 17:39

## 2024-09-07 RX ADMIN — UMECLIDINIUM 1 PUFF: 62.5 AEROSOL, POWDER ORAL at 08:37

## 2024-09-07 RX ADMIN — LATANOPROST 1 DROP: 50 SOLUTION OPHTHALMIC at 08:37

## 2024-09-07 RX ADMIN — INSULIN LISPRO 3 UNITS: 100 INJECTION, SOLUTION INTRAVENOUS; SUBCUTANEOUS at 17:39

## 2024-09-07 RX ADMIN — SODIUM CHLORIDE, SODIUM LACTATE, POTASSIUM CHLORIDE, AND CALCIUM CHLORIDE: .6; .31; .03; .02 INJECTION, SOLUTION INTRAVENOUS at 08:00

## 2024-09-07 RX ADMIN — INSULIN LISPRO 1 UNITS: 100 INJECTION, SOLUTION INTRAVENOUS; SUBCUTANEOUS at 13:11

## 2024-09-07 RX ADMIN — GABAPENTIN 200 MG: 100 CAPSULE ORAL at 21:17

## 2024-09-07 RX ADMIN — FLUTICASONE PROPIONATE 1 SPRAY: 50 SPRAY, METERED NASAL at 08:37

## 2024-09-07 RX ADMIN — PROPOFOL 150 MG: 10 INJECTION, EMULSION INTRAVENOUS at 08:19

## 2024-09-07 RX ADMIN — GLYCOPYRROLATE 0.1 MG: 0.2 INJECTION, SOLUTION INTRAMUSCULAR; INTRAVENOUS at 09:20

## 2024-09-07 RX ADMIN — CEFAZOLIN SODIUM 2000 MG: 2 SOLUTION INTRAVENOUS at 08:32

## 2024-09-07 RX ADMIN — INSULIN GLARGINE 15 UNITS: 100 INJECTION, SOLUTION SUBCUTANEOUS at 21:18

## 2024-09-07 RX ADMIN — ONDANSETRON 4 MG: 2 INJECTION INTRAMUSCULAR; INTRAVENOUS at 09:40

## 2024-09-07 RX ADMIN — INSULIN LISPRO 3 UNITS: 100 INJECTION, SOLUTION INTRAVENOUS; SUBCUTANEOUS at 21:18

## 2024-09-07 RX ADMIN — MONTELUKAST 10 MG: 10 TABLET, FILM COATED ORAL at 17:39

## 2024-09-07 NOTE — ANESTHESIA PREPROCEDURE EVALUATION
Procedure:  INSERTION NAIL IM FEMUR RETROGRADE (Right: Leg Upper)  OPEN REDUCTION W/ INTERNAL FIXATION (ORIF) FEMUR (Right: Leg Upper)    Relevant Problems   CARDIO   (+) Chest pain   (+) Coronary artery disease without angina pectoris   (+) Hyperlipidemia associated with type 2 diabetes mellitus  (HCC)   (+) Hypertension      ENDO   (+) Type 2 diabetes mellitus with diabetic neuropathy (HCC)      GI/HEPATIC   (+) Gastroesophageal reflux disease without esophagitis      /RENAL   (+) Chronic kidney disease-mineral and bone disorder   (+) Stage 3b chronic kidney disease (HCC)      MUSCULOSKELETAL   (+) Osteoarthritis      NEURO/PSYCH   (+) Chronic pain syndrome   (+) Seizure disorder (HCC)   (+) Type 2 diabetes mellitus with diabetic neuropathy (HCC)      PULMONARY   (+) Simple chronic bronchitis (HCC)        Physical Exam    Airway    Mallampati score: II  TM Distance: >3 FB       Dental    upper dentures and lower dentures    Cardiovascular  Rhythm: regular, Rate: normal    Pulmonary   Decreased breath sounds    Other Findings  Cardiac Cath 8/2024:     ·  Mild nonobstructive coronary atherosclerosis.  ·  Systemic hypertension.          ECHO 7/2024:     ·  Left Ventricle: Left ventricular cavity size is normal. Wall thickness is normal. The left ventricular ejection fraction is 55%. Systolic function is normal. Wall motion is normal. Diastolic function is mildly abnormal, consistent with grade I (abnormal) relaxation.  ·  Right Ventricle: Right ventricular cavity size is normal. Systolic function is normal.  ·  Aortic Valve: There is mild regurgitation.  ·  Mitral Valve: There is mild regurgitation.  ·  Tricuspid Valve: There is mild regurgitation. The right ventricular systolic pressure is normal.     post-pubertal.      Anesthesia Plan  ASA Score- 3     Anesthesia Type- general with ASA Monitors.         Additional Monitors:     Airway Plan: ETT.    Comment: Post op femoral block .       Plan Factors-Exercise  tolerance (METS): <4 METS.    Chart reviewed. EKG reviewed. Imaging results reviewed. Existing labs reviewed. Patient summary reviewed.    Patient is not a current smoker.  Patient did not smoke on day of surgery.            Induction- intravenous.    Postoperative Plan- Plan for postoperative opioid use. Planned trial extubation    Perioperative Resuscitation Plan - Level 1 - Full Code.       Informed Consent- Anesthetic plan and risks discussed with patient.  I personally reviewed this patient with the CRNA. Discussed and agreed on the Anesthesia Plan with the CRNA..

## 2024-09-07 NOTE — PROGRESS NOTES
Progress Note - Orthopedics   Shantelle Romano 79 y.o. female MRN: [id number]  Unit/Bed#: -01      Subjective:    79 y.o.female with right distal femur periprosthetic fracture. Patient is laying comfortably in hospital bed in no acute distress. Patient reports she has increased pain in her right leg due as a result of being moved while she was being cleaned for surgery. Patient reports having numbness in her right foot. Patient has maintained nonweightbearing status of right lower extremity. Patient denies any chest pain, lightheadedness, dizziness, nausea, vomiting, fevers, and chills. Patient offers no additional complaints.     Labs:  0   Lab Value Date/Time    HCT 33.5 (L) 09/07/2024 0553    HCT 34.9 09/06/2024 0511    HCT 41.9 09/05/2024 0634    HGB 10.2 (L) 09/07/2024 0553    HGB 10.6 (L) 09/06/2024 0511    HGB 13.0 09/05/2024 0634    INR 0.92 08/27/2024 1527    WBC 9.53 09/07/2024 0553    WBC 8.67 09/06/2024 0511    WBC 13.83 (H) 09/05/2024 0634       Meds:    Current Facility-Administered Medications:     acetaminophen (TYLENOL) tablet 650 mg, 650 mg, Oral, Q4H PRN, Rex Diamond, DO, 650 mg at 09/06/24 1217    albuterol (PROVENTIL HFA,VENTOLIN HFA) inhaler 2 puff, 2 puff, Inhalation, Q6H PRN, Rex Diamond DO    atorvastatin (LIPITOR) tablet 10 mg, 10 mg, Oral, HS, Rex Diamond, DO, 10 mg at 09/06/24 2135    fluticasone (FLONASE) 50 mcg/act nasal spray 1 spray, 1 spray, Nasal, Daily, Rex Diamond DO, 1 spray at 09/06/24 0941    fluticasone-vilanterol 200-25 mcg/actuation 1 puff, 1 puff, Inhalation, Daily, 1 puff at 09/06/24 0941 **AND** umeclidinium 62.5 mcg/actuation inhaler AEPB 1 puff, 1 puff, Inhalation, Daily, Rex Diamond DO, 1 puff at 09/06/24 0941    gabapentin (NEURONTIN) capsule 200 mg, 200 mg, Oral, HS, Rex Diamond DO, 200 mg at 09/06/24 2135    insulin glargine (LANTUS) subcutaneous injection 15 Units 0.15 mL, 15 Units, Subcutaneous, HS, Rani Guzman MD, 15 Units at  "09/06/24 2135    insulin lispro (HumALOG/ADMELOG) 100 units/mL subcutaneous injection 1-5 Units, 1-5 Units, Subcutaneous, TID AC, 1 Units at 09/06/24 1722 **AND** Fingerstick Glucose (POCT), , , TID AC, Rex Quirozrty, DO    insulin lispro (HumALOG/ADMELOG) 100 units/mL subcutaneous injection 1-5 Units, 1-5 Units, Subcutaneous, HS, Rex Quirozrtconor, DO, 1 Units at 09/05/24 2220    insulin lispro (HumALOG/ADMELOG) 100 units/mL subcutaneous injection 12 Units, 12 Units, Subcutaneous, TID With Meals, Rex Diamond, DO, 12 Units at 09/06/24 1723    latanoprost (XALATAN) 0.005 % ophthalmic solution 1 drop, 1 drop, Left Eye, Daily, Rex Diamond, DO, 1 drop at 09/06/24 0942    montelukast (SINGULAIR) tablet 10 mg, 10 mg, Oral, QPM, Rex Quirozrty, DO, 10 mg at 09/06/24 1727    ondansetron (ZOFRAN) injection 4 mg, 4 mg, Intravenous, Q6H PRN, Rex Quirozrty, DO    oxyCODONE (ROXICODONE) IR tablet 5 mg, 5 mg, Oral, Q6H PRN, Rex Quirozrty, DO, 5 mg at 09/06/24 0833    oxyCODONE (ROXICODONE) split tablet 2.5 mg, 2.5 mg, Oral, Q6H PRN, Rex Quirozrty, DO    pantoprazole (PROTONIX) EC tablet 40 mg, 40 mg, Oral, Early Morning, Rex C Yorty, DO, 40 mg at 09/07/24 0554    Blood Culture:   Lab Results   Component Value Date    BLOODCX No Growth After 5 Days. 12/02/2016       Wound Culture:   No results found for: \"WOUNDCULT\"    Ins and Outs:  I/O last 24 hours:  In: 240 [P.O.:240]  Out: 1050 [Urine:1050]          Physical:  Vitals:    09/06/24 2135   BP: 122/65   Pulse: 81   Resp: 18   Temp: 99.5 °F (37.5 °C)   SpO2: 96%     Musculoskeletal: right Lower Extremity  Skin: Extremity appears well perfused overall . No erythema. Healed incision present over anterior knee.   Knee immobilizer intact, slid distally slightly  Sensation intact to saphenous, sural, tibial, superficial peroneal nerve, and deep peroneal  Motor intact to +FHL/EHL, +ankle dorsi/plantar flexion  2+ DP pulse  Digits warm and well perfused  Capillary " refill < 2 seconds    Assessment:    79 y.o.female with right distal femur periprosthetic fracture. History of right total knee arthroplasty.      Plan:  Nonweightbearing right lower extremity in knee immobilizer   PT/OT postop eval  Pain control per primary team  DVT ppx: Hold pending surgical intervention today  Diet: NPO currently   Medical management per primary team  Dispo: Ortho will follow. Plan for open reduction internal fixation of right distal femur periprosthetic fracture today with Dr. Yanez. Please see above for additional details.       Rach Jay PA-C

## 2024-09-07 NOTE — ANESTHESIA POSTPROCEDURE EVALUATION
Post-Op Assessment Note    CV Status:  Stable    Pain management: satisfactory to patient       Mental Status:  Sleepy and arousable   Hydration Status:  Euvolemic   PONV Controlled:  Controlled   Airway Patency:  Patent     Post Op Vitals Reviewed: Yes    No anethesia notable event occurred.    Staff: AnesthesiologistASHLEY             /56 (09/07/24 1030)    Temp      Pulse 78 (09/07/24 1030)   Resp 19 (09/07/24 1030)    SpO2 100 % (09/07/24 1030)

## 2024-09-07 NOTE — PLAN OF CARE
Problem: Potential for Falls  Goal: Patient will remain free of falls  Description: INTERVENTIONS:  - Educate patient/family on patient safety including physical limitations  - Instruct patient to call for assistance with activity   - Consult OT/PT to assist with strengthening/mobility   - Keep Call bell within reach  - Keep bed low and locked with side rails adjusted as appropriate  - Keep care items and personal belongings within reach  - Initiate and maintain comfort rounds  - Make Fall Risk Sign visible to staff  - Offer Toileting every 2 Hours, in advance of need  - Initiate/Maintain bed alarm  - Obtain necessary fall risk management equipment: non slip socks  - Apply yellow socks and bracelet for high fall risk patients  - Consider moving patient to room near nurses station  Outcome: Progressing     Problem: Prexisting or High Potential for Compromised Skin Integrity  Goal: Skin integrity is maintained or improved  Description: INTERVENTIONS:  - Identify patients at risk for skin breakdown  - Assess and monitor skin integrity  - Assess and monitor nutrition and hydration status  - Monitor labs   - Assess for incontinence   - Turn and reposition patient  - Assist with mobility/ambulation  - Relieve pressure over bony prominences  - Avoid friction and shearing  - Provide appropriate hygiene as needed including keeping skin clean and dry  - Evaluate need for skin moisturizer/barrier cream  - Collaborate with interdisciplinary team   - Patient/family teaching  - Consider wound care consult   Outcome: Progressing     Problem: PAIN - ADULT  Goal: Verbalizes/displays adequate comfort level or baseline comfort level  Description: Interventions:  - Encourage patient to monitor pain and request assistance  - Assess pain using appropriate pain scale  - Administer analgesics based on type and severity of pain and evaluate response  - Implement non-pharmacological measures as appropriate and evaluate response  - Consider  cultural and social influences on pain and pain management  - Notify physician/advanced practitioner if interventions unsuccessful or patient reports new pain  Outcome: Progressing     Problem: INFECTION - ADULT  Goal: Absence or prevention of progression during hospitalization  Description: INTERVENTIONS:  - Assess and monitor for signs and symptoms of infection  - Monitor lab/diagnostic results  - Monitor all insertion sites, i.e. indwelling lines, tubes, and drains  - Monitor endotracheal if appropriate and nasal secretions for changes in amount and color  - [place] appropriate cooling/warming therapies per order  - Administer medications as ordered  - Instruct and encourage patient and family to use good hand hygiene technique  - Identify and instruct in appropriate isolation precautions for identified infection/condition  Outcome: Progressing     Problem: SAFETY ADULT  Goal: Patient will remain free of falls  Description: INTERVENTIONS:  - Educate patient/family on patient safety including physical limitations  - Instruct patient to call for assistance with activity   - Consult OT/PT to assist with strengthening/mobility   - Keep Call bell within reach  - Keep bed low and locked with side rails adjusted as appropriate  - Keep care items and personal belongings within reach  - Initiate and maintain comfort rounds  - Make Fall Risk Sign visible to staff  - Offer Toileting every 2 Hours, in advance of need  - Initiate/Maintain bed alarm  - Obtain necessary fall risk management equipment: non slip socks  - Apply yellow socks and bracelet for high fall risk patients  - Consider moving patient to room near nurses station  Outcome: Progressing  Goal: Maintain or return to baseline ADL function  Description: INTERVENTIONS:  -  Assess patient's ability to carry out ADLs; assess patient's baseline for ADL function and identify physical deficits which impact ability to perform ADLs (bathing, care of mouth/teeth,  toileting, grooming, dressing, etc.)  - Assess/evaluate cause of self-care deficits   - Assess range of motion  - Assess patient's mobility; develop plan if impaired  - Assess patient's need for assistive devices and provide as appropriate  - Encourage maximum independence but intervene and supervise when necessary  - Involve family in performance of ADLs  - Assess for home care needs following discharge   - Consider OT consult to assist with ADL evaluation and planning for discharge  - Provide patient education as appropriate  Outcome: Progressing  Goal: Maintains/Returns to pre admission functional level  Description: INTERVENTIONS:  - Perform AM-PAC 6 Click Basic Mobility/ Daily Activity assessment daily.  - Set and communicate daily mobility goal to care team and patient/family/caregiver.   - Collaborate with rehabilitation services on mobility goals if consulted  - Perform Range of Motion 2 times a day.  - Reposition patient every 2 hours.  - Dangle patient 2 times a day  - Stand patient 2 times a day  - Ambulate patient 2 times a day  - Out of bed to chair 2 times a day   - Out of bed for meals 2 times a day  - Out of bed for toileting  - Record patient progress and toleration of activity level   Outcome: Progressing     Problem: DISCHARGE PLANNING  Goal: Discharge to home or other facility with appropriate resources  Description: INTERVENTIONS:  - Identify barriers to discharge w/patient and caregiver  - Arrange for needed discharge resources and transportation as appropriate  - Identify discharge learning needs (meds, wound care, etc.)  - Arrange for interpretive services to assist at discharge as needed  - Refer to Case Management Department for coordinating discharge planning if the patient needs post-hospital services based on physician/advanced practitioner order or complex needs related to functional status, cognitive ability, or social support system  Outcome: Progressing     Problem: Knowledge  Deficit  Goal: Patient/family/caregiver demonstrates understanding of disease process, treatment plan, medications, and discharge instructions  Description: Complete learning assessment and assess knowledge base.  Interventions:  - Provide teaching at level of understanding  - Provide teaching via preferred learning methods  Outcome: Progressing

## 2024-09-07 NOTE — UTILIZATION REVIEW
NOTIFICATION OF INPATIENT ADMISSION   AUTHORIZATION REQUEST   SERVICING FACILITY:   [place] [address]  Tax ID: [id number]  NPI: [id number]   ATTENDING PROVIDER:  Attending Name and NPI#: Rex Diamond Do [[id number]]  Address: [address] [place] [address]  Phone: [phone]     ADMISSION INFORMATION:  Place of Service: Inpatient Acute [facility]  Place of Service Code: 21  Inpatient Admission Date/Time: 9/5/24 11:31 AM  Discharge Date/Time: No discharge date for patient encounter.  Admitting Diagnosis Code/Description:  Hip pain [M25.559]  Knee pain [M25.569]  Femur fracture, right (HCC) [S72.91XA]  Fall, initial encounter [W19.XXXA]  Fall in elderly patient [R29.6]     UTILIZATION REVIEW CONTACT:  Ahmet Duque, Supervisor, Utilization Review Assistants  Network Utilization Review Department  Phone: [phone]  Fax [phone]  Email: [email or web address]  Contact for approvals/pending authorizations, clinical reviews, and discharge.     PHYSICIAN ADVISORY SERVICES:  Medical Necessity Denial & Hthn-wv-Cobx Review  Phone: [phone]  Fax: [phone]  Email: [email or web address]     DISCHARGE SUPPORT TEAM:  For Patients Discharge Needs & Updates  Phone: [phone] opt. 2 Fax: [phone]  Email: [email or web address]

## 2024-09-07 NOTE — PLAN OF CARE
Problem: Potential for Falls  Goal: Patient will remain free of falls  Description: INTERVENTIONS:  - Educate patient/family on patient safety including physical limitations  - Instruct patient to call for assistance with activity   - Consult OT/PT to assist with strengthening/mobility   - Keep Call bell within reach  - Keep bed low and locked with side rails adjusted as appropriate  - Keep care items and personal belongings within reach  - Initiate and maintain comfort rounds  - Make Fall Risk Sign visible to staff  - Offer Toileting every  Hours, in advance of need  - Initiate/Maintain alarm  - Obtain necessary fall risk management equipment:   - Apply yellow socks and bracelet for high fall risk patients  - Consider moving patient to room near nurses station  Outcome: Progressing     Problem: Prexisting or High Potential for Compromised Skin Integrity  Goal: Skin integrity is maintained or improved  Description: INTERVENTIONS:  - Identify patients at risk for skin breakdown  - Assess and monitor skin integrity  - Assess and monitor nutrition and hydration status  - Monitor labs   - Assess for incontinence   - Turn and reposition patient  - Assist with mobility/ambulation  - Relieve pressure over bony prominences  - Avoid friction and shearing  - Provide appropriate hygiene as needed including keeping skin clean and dry  - Evaluate need for skin moisturizer/barrier cream  - Collaborate with interdisciplinary team   - Patient/family teaching  - Consider wound care consult   Outcome: Progressing     Problem: PAIN - ADULT  Goal: Verbalizes/displays adequate comfort level or baseline comfort level  Description: Interventions:  - Encourage patient to monitor pain and request assistance  - Assess pain using appropriate pain scale  - Administer analgesics based on type and severity of pain and evaluate response  - Implement non-pharmacological measures as appropriate and evaluate response  - Consider cultural and  social influences on pain and pain management  - Notify physician/advanced practitioner if interventions unsuccessful or patient reports new pain  Outcome: Progressing     Problem: INFECTION - ADULT  Goal: Absence or prevention of progression during hospitalization  Description: INTERVENTIONS:  - Assess and monitor for signs and symptoms of infection  - Monitor lab/diagnostic results  - Monitor all insertion sites, i.e. indwelling lines, tubes, and drains  - Monitor endotracheal if appropriate and nasal secretions for changes in amount and color  - [place] appropriate cooling/warming therapies per order  - Administer medications as ordered  - Instruct and encourage patient and family to use good hand hygiene technique  - Identify and instruct in appropriate isolation precautions for identified infection/condition  Outcome: Progressing     Problem: SAFETY ADULT  Goal: Patient will remain free of falls  Description: INTERVENTIONS:  - Educate patient/family on patient safety including physical limitations  - Instruct patient to call for assistance with activity   - Consult OT/PT to assist with strengthening/mobility   - Keep Call bell within reach  - Keep bed low and locked with side rails adjusted as appropriate  - Keep care items and personal belongings within reach  - Initiate and maintain comfort rounds  - Make Fall Risk Sign visible to staff  - Offer Toileting every  Hours, in advance of need  - Initiate/Maintain alarm  - Obtain necessary fall risk management equipment:   - Apply yellow socks and bracelet for high fall risk patients  - Consider moving patient to room near nurses station  Outcome: Progressing  Goal: Maintain or return to baseline ADL function  Description: INTERVENTIONS:  -  Assess patient's ability to carry out ADLs; assess patient's baseline for ADL function and identify physical deficits which impact ability to perform ADLs (bathing, care of mouth/teeth, toileting, grooming, dressing, etc.)  -  Assess/evaluate cause of self-care deficits   - Assess range of motion  - Assess patient's mobility; develop plan if impaired  - Assess patient's need for assistive devices and provide as appropriate  - Encourage maximum independence but intervene and supervise when necessary  - Involve family in performance of ADLs  - Assess for home care needs following discharge   - Consider OT consult to assist with ADL evaluation and planning for discharge  - Provide patient education as appropriate  Outcome: Progressing  Goal: Maintains/Returns to pre admission functional level  Description: INTERVENTIONS:  - Perform AM-PAC 6 Click Basic Mobility/ Daily Activity assessment daily.  - Set and communicate daily mobility goal to care team and patient/family/caregiver.   - Collaborate with rehabilitation services on mobility goals if consulted  - Perform Range of Motion  times a day.  - Reposition patient every  hours.  - Dangle patient  times a day  - Stand patient  times a day  - Ambulate patient  times a day  - Out of bed to chair  times a day   - Out of bed for meals times a day  - Out of bed for toileting  - Record patient progress and toleration of activity level   Outcome: Progressing     Problem: DISCHARGE PLANNING  Goal: Discharge to home or other facility with appropriate resources  Description: INTERVENTIONS:  - Identify barriers to discharge w/patient and caregiver  - Arrange for needed discharge resources and transportation as appropriate  - Identify discharge learning needs (meds, wound care, etc.)  - Arrange for interpretive services to assist at discharge as needed  - Refer to Case Management Department for coordinating discharge planning if the patient needs post-hospital services based on physician/advanced practitioner order or complex needs related to functional status, cognitive ability, or social support system  Outcome: Progressing     Problem: Knowledge Deficit  Goal: Patient/family/caregiver demonstrates  understanding of disease process, treatment plan, medications, and discharge instructions  Description: Complete learning assessment and assess knowledge base.  Interventions:  - Provide teaching at level of understanding  - Provide teaching via preferred learning methods  Outcome: Progressing

## 2024-09-07 NOTE — ANESTHESIA PROCEDURE NOTES
Peripheral Block    Patient location during procedure: OR  Start time: 9/7/2024 10:16 AM  Reason for block: at surgeon's request and post-op pain management  Staffing  Performed by: Tian Maya DO  Authorized by: Tian Maya DO    Preanesthetic Checklist  Completed: patient identified, IV checked, site marked, risks and benefits discussed, surgical consent, monitors and equipment checked, pre-op evaluation and timeout performed  Peripheral Block  Patient position: supine  Prep: ChloraPrep  Patient monitoring: continuous pulse oximetry, frequent blood pressure checks and heart rate  Block type: Femoral  Laterality: right  Injection technique: single-shot  Procedures: ultrasound guided, Ultrasound guidance required for the procedure to increase accuracy and safety of medication placement and decrease risk of complications.  ropivacaine (NAROPIN) 0.5 % injection 20 mL - Perineural   30 mL - 9/7/2024 10:16:00 AM  Needle  Needle type: Stimuplex   Needle gauge: 20 G  Needle length: 4 in  Needle localization: anatomical landmarks and ultrasound guidance  Assessment  Injection assessment: frequent aspiration, injected with ease, negative aspiration, no paresthesia on injection, no symptoms of intraneural/intravenous injection, negative for heart rate change, needle tip visualized at all times and incremental injection  Paresthesia pain: none  Post-procedure:  site cleaned and adhesive bandage applied  patient tolerated the procedure well with no immediate complications

## 2024-09-07 NOTE — ASSESSMENT & PLAN NOTE
Lab Results   Component Value Date    HGBA1C 7.6 (H) 07/03/2024       Recent Labs     09/06/24 2038 09/07/24  0731 09/07/24  1036 09/07/24  1134   POCGLU 126 157* 188* 204*       Blood Sugar Average: Last 72 hrs:  (P) 180.8  Target blood sugar for the hospital is 140-180  Continue Lantus, Accu-Cheks before meals and at bedtime with sliding scale insulin coverage  Diabetic neuropathy-continue gabapentin

## 2024-09-07 NOTE — ASSESSMENT & PLAN NOTE
Without exacerbation  Continue Singulair  Continue home inhalers inclusive of fluticasone-vilanterol, umeclidinium and Flonase

## 2024-09-07 NOTE — PROGRESS NOTES
[facility]  Progress Note  Name: Shantelle Romano I  MRN: [id number]  Unit/Bed#: -01 I Date of Admission: 9/5/2024   Date of Service: 9/7/2024 I Hospital Day: 2    Assessment & Plan   * Femur fracture, right (HCC)  Assessment & Plan  Patient presents status post fall  X-ray and CT scan consistent with periprosthetic right femur fracture  Orthopedic surgery consulted in the ED and recommended operative intervention    Status post operative fixation with orthopedic surgery  Nonweightbearing right lower extremity  PT/OT evaluation and treatment  Case management consultation for safe disposition planning      Type 2 diabetes mellitus with diabetic neuropathy (HCC)  Assessment & Plan  Lab Results   Component Value Date    HGBA1C 7.6 (H) 07/03/2024       Recent Labs     09/06/24 2038 09/07/24  0731 09/07/24  1036 09/07/24  1134   POCGLU 126 157* 188* 204*       Blood Sugar Average: Last 72 hrs:  (P) 180.8  Well-controlled on current insulin regimen    Lantus 30 units daily at bedtime  Lispro 12 units 3 times daily with meals  Sliding scale insulin with Accu-Cheks      Simple chronic bronchitis (HCC)  Assessment & Plan  Saturating well on room air  Currently not in acute exacerbation    Continue home inhalers and Singulair  Monitor respiratory status    Chronic heart failure with preserved ejection fraction (HCC)  Assessment & Plan  Patient is currently euvolemic    Continue home loop diuretic  Monitor volume status    Obesity, morbid (HCC)  Assessment & Plan  Present on admission as evidenced by BMI of 36    Encourage lifestyle modifications and weight loss    Gastroesophageal reflux disease without esophagitis  Assessment & Plan  Continue home PPI           VTE Pharmacologic Prophylaxis: VTE Score: 9 Moderate Risk (Score 3-4) - Pharmacological DVT Prophylaxis Ordered: heparin.    Mobility:   Basic Mobility Inpatient Raw Score: 6  -[facility] Goal: 2: Bed activities/Dependent transfer  -[facility]  Achieved: 2: Bed activities/Dependent transfer  [facility] Goal NOT achieved. Continue with multidisciplinary rounding and encourage appropriate mobility to improve upon [facility] goals.    Patient Centered Rounds: I performed bedside rounds with nursing staff today.     Discussions with Specialists or Other Care Team Provider: Orthopedic Surgery    Education and Discussions with Family / Patient: Updated  (son) at bedside.    Total Time Spent on Date of Encounter in care of patient: 35 mins. This time was spent on one or more of the following: performing physical exam; counseling and coordination of care; obtaining or reviewing history; documenting in the medical record; reviewing/ordering tests, medications or procedures; communicating with other healthcare professionals and discussing with patient's family/caregivers.    Current Length of Stay: 2 day(s)  Current Patient Status: Inpatient   Certification Statement: The patient will continue to require additional inpatient hospital stay due to right femur fracture  Discharge Plan: Anticipate discharge in 24-48 hrs to discharge location to be determined pending rehab evaluations.    Code Status: Level 1 - Full Code    Subjective:   Patient seen and examined at bedside. No acute events overnight. Denies chest pain, SOB, diaphoresis, nausea/vomiting/diarrhea, fevers/chills.    Objective:     Vitals:   Temp (24hrs), Av.7 °F (37.1 °C), Min:97.8 °F (36.6 °C), Max:99.5 °F (37.5 °C)    Temp:  [97.8 °F (36.6 °C)-99.5 °F (37.5 °C)] 98.6 °F (37 °C)  HR:  [65-91] 65  Resp:  [13-20] 17  BP: (112-150)/(51-71) 120/51  SpO2:  [93 %-100 %] 96 %  Body mass index is 36.79 kg/m².     Input and Output Summary (last 24 hours):     Intake/Output Summary (Last 24 hours) at 2024 1144  Last data filed at 2024 1031  Gross per 24 hour   Intake 1001.9 ml   Output 1200 ml   Net -198.1 ml       Physical Exam:   Physical Exam  Vitals and nursing note reviewed.   Constitutional:        General: She is not in acute distress.     Appearance: She is well-developed.   HENT:      Head: Normocephalic and atraumatic.   Eyes:      Conjunctiva/sclera: Conjunctivae normal.   Cardiovascular:      Rate and Rhythm: Normal rate and regular rhythm.      Heart sounds: No murmur heard.  Pulmonary:      Effort: Pulmonary effort is normal. No respiratory distress.      Breath sounds: Normal breath sounds.   Abdominal:      Palpations: Abdomen is soft.      Tenderness: There is no abdominal tenderness.   Musculoskeletal:         General: No swelling.      Cervical back: Neck supple.   Skin:     General: Skin is warm and dry.      Capillary Refill: Capillary refill takes less than 2 seconds.   Neurological:      Mental Status: She is alert.   Psychiatric:         Mood and Affect: Mood normal.          Additional Data:     Labs:  Results from last 7 days   Lab Units 09/07/24  0553   WBC Thousand/uL 9.53   HEMOGLOBIN g/dL 10.2*   HEMATOCRIT % 33.5*   PLATELETS Thousands/uL 135*   SEGS PCT % 78*   LYMPHO PCT % 9*   MONO PCT % 9   EOS PCT % 3     Results from last 7 days   Lab Units 09/07/24  0553   SODIUM mmol/L 138   POTASSIUM mmol/L 4.3   CHLORIDE mmol/L 105   CO2 mmol/L 27   BUN mg/dL 15   CREATININE mg/dL 0.95   ANION GAP mmol/L 6   CALCIUM mg/dL 8.5   GLUCOSE RANDOM mg/dL 134         Results from last 7 days   Lab Units 09/07/24  1134 09/07/24  1036 09/07/24  0731 09/06/24  2038 09/06/24  1632 09/06/24  1125 09/06/24  0752 09/05/24  2125 09/05/24  1609 09/05/24  1240   POC GLUCOSE mg/dl 204* 188* 157* 126 151* 152* 179* 198* 226* 227*               Lines/Drains:  Invasive Devices       Peripheral Intravenous Line  Duration             Peripheral IV 09/05/24 Left Antecubital 2 days    Peripheral IV 09/07/24 Left Forearm <1 day              Drain  Duration             External Urinary Catheter 1 day                  Imaging: No pertinent imaging reviewed.    Recent Cultures (last 7 days):         Last 24 Hours  Medication List:   Current Facility-Administered Medications   Medication Dose Route Frequency Provider Last Rate    acetaminophen  650 mg Oral Q4H PRN Rex Diamond, DO      albuterol  2 puff Inhalation Q6H PRN Rex Diamond, DO      atorvastatin  10 mg Oral HS Rex Diamond, DO      fluticasone  1 spray Nasal Daily Rex Diamond, DO      fluticasone-vilanterol  1 puff Inhalation Daily Rex Diamond, DO      And    umeclidinium  1 puff Inhalation Daily Rex Diamond, DO      furosemide  20 mg Oral BID Rex Diamond, DO      gabapentin  200 mg Oral HS Rex Diamond, DO      insulin glargine  15 Units Subcutaneous HS Rani Guzman MD      insulin lispro  1-5 Units Subcutaneous TID AC Rex Diamond, DO      insulin lispro  1-5 Units Subcutaneous HS Rex Diamond, DO      insulin lispro  12 Units Subcutaneous TID With Meals Rex Diamond, DO      latanoprost  1 drop Left Eye Daily Rex Diamond, DO      montelukast  10 mg Oral QPM Rex Diamond, DO      ondansetron  4 mg Intravenous Q6H PRN Rex Diamond, DO      oxyCODONE  5 mg Oral Q6H PRN Rex Diamond, DO      oxyCODONE  2.5 mg Oral Q6H PRN Rex Diamond, DO      pantoprazole  40 mg Oral Early Morning Rex Diamond, DO          Today, Patient Was Seen By: Rex Diamond DO    **Please Note: This note may have been constructed using a voice recognition system.**

## 2024-09-07 NOTE — UTILIZATION REVIEW
Continued Stay Review  PLEASE SEE INITIAL REVIEW COMPLETED B MAURO SALINAS RN ON 9/6  Date: 9/7                          Current Patient Class: Inpatient  Current Level of Care: MEd/surg    HPI:79 y.o. female initially admitted on 9/5   Assessment/Plan: Date: 9/7  Day 3: Has surpassed a 2nd midnight with active treatments and services. Initially admitted to inpatient for fracture femur.  S/p surgical repair today. Continue with PT/OT evaluation, post acute plan for rehab.   OPERATIVE NOTE:  Surgery Date: 9/7/2024     Procedure(s):  Right - INSERTION NAIL IM FEMUR RETROGRADE  Right - OPEN REDUCTION W/ INTERNAL FIXATION (ORIF) FEMUR  Operative Findings:  Closed supracondylar periprosthetic right femoral fracture       Vital Signs (last 3 days)       Date/Time Temp Pulse Resp BP MAP (mmHg) SpO2 Calculated FIO2 (%) - Nasal Cannula O2 Flow Rate (L/min) Nasal Cannula O2 Flow Rate (L/min) O2 Device Cardiac (WDL) Patient Position - Orthostatic VS [place] Coma Scale Score Pain    09/07/24 1117 -- -- -- -- -- -- -- -- -- -- -- -- -- 8    09/07/24 11:06:04 98.6 °F (37 °C) 65 17 120/51 74 96 % -- -- -- -- -- -- -- --    09/07/24 1045 -- 74 13 112/53 -- 96 % -- 3 L/min -- Nasal cannula WDL -- -- 6    09/07/24 1039 -- 68 18 -- -- 94 % -- 3 L/min -- Nasal cannula -- -- -- 10 - Worst Possible Pain    09/07/24 1030 -- 78 19 120/56 80 100 % -- 3 L/min -- Simple mask -- -- -- No Pain    09/07/24 1022 97.9 °F (36.6 °C) 77 14 124/54 78 100 % -- 5 L/min -- Simple mask X -- -- --    09/07/24 0759 97.8 °F (36.6 °C) 84 20 138/65 -- 95 % 28 -- 2 L/min Nasal cannula -- -- -- --    09/07/24 07:31:59 99.3 °F (37.4 °C) 72 20 150/71 97 95 % -- -- -- -- -- -- -- --    09/06/24 21:35:54 99.5 °F (37.5 °C) 81 18 122/65 84 96 % -- -- -- -- -- -- -- --    09/06/24 2030 -- -- -- -- -- -- -- -- -- -- -- -- 15 No Pain    09/06/24 15:09:45 99 °F (37.2 °C) 91 18 133/57 82 93 % -- -- -- -- -- -- -- --    09/06/24 1217 -- -- -- -- -- -- -- -- -- -- -- --  -- 8    09/06/24 0938 -- -- -- -- -- -- 28 -- 2 L/min Nasal cannula -- -- 15 7 09/06/24 0833 -- -- -- -- -- -- -- -- -- -- -- -- -- 8 09/06/24 07:55:21 98.7 °F (37.1 °C) 91 18 144/62 89 94 % -- -- -- -- -- -- -- --    09/05/24 21:52:56 99.2 °F (37.3 °C) 91 18 107/58 74 96 % -- -- -- -- -- -- 15 --    09/05/24 2043 -- -- -- -- -- -- -- -- -- -- -- -- -- 7 09/05/24 14:55:12 98.8 °F (37.1 °C) 88 18 112/51 71 92 % -- -- -- -- -- -- -- --    09/05/24 1315 -- -- -- -- -- 95 % -- -- -- -- -- -- 15 No Pain    09/05/24 12:41:23 99.4 °F (37.4 °C) 86 18 90/60 70 95 % -- -- -- -- -- -- -- --    09/05/24 1159 -- -- -- -- -- -- -- -- -- -- -- -- -- 7 09/05/24 1130 -- 80 -- 112/53 76 94 % -- -- -- -- -- -- -- --    09/05/24 1100 -- 84 -- 119/55 79 94 % -- -- -- -- -- -- -- --    09/05/24 1030 -- 87 -- 131/55 79 94 % -- -- -- -- -- -- -- --    09/05/24 1000 -- 83 -- 121/56 81 95 % -- -- -- -- -- -- -- --    09/05/24 0900 -- 82 -- -- -- 95 % -- -- -- -- -- -- -- --    09/05/24 0827 -- -- -- -- -- -- -- -- -- -- -- -- -- 8    09/05/24 0800 -- 79 -- 139/63 90 97 % -- -- -- -- -- -- -- --    09/05/24 0745 -- 88 -- 118/78 -- 95 % -- -- -- Nasal cannula -- Lying -- 4    09/05/24 0632 -- -- -- -- -- -- -- -- -- -- -- -- -- 10 - Worst Possible Pain    09/05/24 0615 -- -- -- -- -- -- -- -- -- -- -- -- 15 --    09/05/24 0612 98 °F (36.7 °C) 76 18 201/78 -- 92 % -- -- -- None (Room air) -- -- -- 10 - Worst Possible Pain          Weight (last 2 days)       Date/Time Weight    09/07/24 0600 71.8 (158.29)    09/06/24 0600 71.3 (157.19)    09/05/24 1159 71 (156.53)              Pertinent Labs/Diagnostic Results:   Radiology:  CT lower extremity wo contrast right   Final Interpretation by Rajinder Paredes MD (09/05 8224)   Status post total right knee arthroplasty with a comminuted displaced distal right femur periprosthetic fracture as described above.            Workstation performed: [id number]         XR tibia fibula 2 views RIGHT    Final Interpretation by Dion Marroquin MD (09/05 1021)      Partially visualized distal femur periprosthetic fracture.         Computerized Assisted Algorithm (CAA) may have been used to analyze all applicable images.               Resident: Zhang Casper I, the attending radiologist, have reviewed the images and agree with the final report above.      Workstation performed: [id number]         XR knee 1 or 2 vw right   ED Interpretation by Sivakumar Escamilla MD (09/05 0702)   fracture      Final Interpretation by Dion Marroquin MD (09/05 1018)      Comminuted periprosthetic distal femur fracture.      This report is concordant with SIVAKUMAR ESCAMILLA's preliminary interpretation that is documented in the electronic medical record (EPIC).         Computerized Assisted Algorithm (CAA) may have been used to analyze all applicable images.         Resident: Zhang Casper I, the attending radiologist, have reviewed the images and agree with the final report above.      Workstation performed: [id number]         XR knee 1 or 2 vw right    (Results Pending)           Results from last 7 days   Lab Units 09/07/24  0553 09/06/24  0511 09/05/24  0634   WBC Thousand/uL 9.53 8.67 13.83*   HEMOGLOBIN g/dL 10.2* 10.6* 13.0   HEMATOCRIT % 33.5* 34.9 41.9   PLATELETS Thousands/uL 135* 141* 163   TOTAL NEUT ABS Thousands/µL 7.39 7.24 10.40*         Results from last 7 days   Lab Units 09/07/24  0553 09/06/24  0511 09/05/24  0634   SODIUM mmol/L 138 139 136   POTASSIUM mmol/L 4.3 4.0 3.6   CHLORIDE mmol/L 105 107 98   CO2 mmol/L 27 26 29   ANION GAP mmol/L 6 6 9   BUN mg/dL 15 25 31*   CREATININE mg/dL 0.95 1.11 1.48*   EGFR ml/min/1.73sq m 56 46 32   CALCIUM mg/dL 8.5 8.3* 8.7   MAGNESIUM mg/dL 1.9 2.1  --    PHOSPHORUS mg/dL 2.4 2.3  --          Results from last 7 days   Lab Units 09/07/24  1036 09/07/24  0731 09/06/24  2038 09/06/24  1632 09/06/24  1125 09/06/24  0752 09/05/24  2125 09/05/24  1609 09/05/24  1240    POC GLUCOSE mg/dl 188* 157* 126 151* 152* 179* 198* 226* 227*     Results from last 7 days   Lab Units 09/07/24  0553 09/06/24  0511 09/05/24  0634   GLUCOSE RANDOM mg/dL 134 198* 194*     Medications:   Scheduled Medications:  atorvastatin, 10 mg, Oral, HS  fluticasone, 1 spray, Nasal, Daily  fluticasone-vilanterol, 1 puff, Inhalation, Daily   And  umeclidinium, 1 puff, Inhalation, Daily  gabapentin, 200 mg, Oral, HS  insulin glargine, 15 Units, Subcutaneous, HS  insulin lispro, 1-5 Units, Subcutaneous, TID AC  insulin lispro, 1-5 Units, Subcutaneous, HS  insulin lispro, 12 Units, Subcutaneous, TID With Meals  latanoprost, 1 drop, Left Eye, Daily  montelukast, 10 mg, Oral, QPM  pantoprazole, 40 mg, Oral, Early Morning      Continuous IV Infusions:     PRN Meds:  acetaminophen, 650 mg, Oral, Q4H PRN  albuterol, 2 puff, Inhalation, Q6H PRN  ondansetron, 4 mg, Intravenous, Q6H PRN  oxyCODONE, 5 mg, Oral, Q6H PRN  oxyCODONE, 2.5 mg, Oral, Q6H PRN        Discharge Plan: D    Network Utilization Review Department  ATTENTION: Please call with any questions or concerns to [phone] and carefully listen to the prompts so that you are directed to the right person. All voicemails are confidential.   For Discharge needs, contact Care Management DC Support Team at [phone] opt. [phone]  Send all requests for admission clinical reviews, approved or denied determinations and any other requests to dedicated fax number below belonging to the campus where the patient is receiving treatment. List of dedicated fax numbers for the Facilities:  FACILITY NAME UR FAX NUMBER   ADMISSION DENIALS (Administrative/Medical Necessity) [phone]   DISCHARGE SUPPORT TEAM (NETWORK) [phone]   PARENT CHILD HEALTH (Maternity/NICU/Pediatrics) [phone]   [facility] [phone]   [facility] [phone]   [facility] [phone]   [facility]  [facility] [phone]   [facility] [phone]   [facility] [phone]   [facility] [phone]   [facility] [phone]   [facility] [phone]   [facility] [phone]   [facility] [phone]   [facility] [phone]

## 2024-09-07 NOTE — DISCHARGE INSTR - AVS FIRST PAGE
Discharge Instructions - Orthopedics  Shantelle Romano 79 y.o. female MRN: [id number]  Unit/Bed#: PACU 15    Weight Bearing Status:                                           Weightbearing as tolerated right lower extremity    DVT prophylaxis:  Lovenox 30 mg subcutaneous injections twice a day x 25 days  Pain:  Continue analgesics as directed    Dressing Instructions:   Please keep clean, dry and intact for 5-7 days postop. Then may remove surgical dressings. May shower once dressings are removed. Keep incisions clean and dry.     Appt Instructions:   If you do not have your appointment, please call the clinic at [phone]  Otherwise follow up as scheduled.    Contact the office sooner if you experience any increased numbness/tingling in the extremities.      Miscellaneous:  May perform full range of motion of right knee as tolerated without restrictions.           Dear Shantelle Romano,     It was our pleasure to care for you here at [facility].  It is our hope that we were always able to exceed the expected standards for your care during your stay.  You were hospitalized due to a right femur fracture.  You were cared for on the medical/surgical floor by Nancy Zuniga MD with the [facility] who covers for your primary care physician (PCP), Clarisa Billingsley DO, while you were hospitalized.  You were additionally seen by the [facility]-Dr. Yanez.  If you have any questions or concerns related to this hospitalization, you may contact us at .  For follow up as well as any medication refills, we recommend that you follow up with your primary care physician.  A registered nurse will reach out to you by phone within a few days after your discharge to answer any additional questions that you may have after going home.  However, at this time we provide for you here, the most important instructions / recommendations at discharge:      Notable Medication Adjustments -   New prescription Lovenox 30 mg subcutaneous twice a day x 25 days  New prescription oxycodone 5 mg take 1 tablet every 4-6 hours as needed for moderate to severe pain  Okay to continue all other preadmission medications at the preadmission doses  Testing Required after Discharge -   To be further determined in the outpatient setting by your PCP, and/or orthopedic surgery  Important follow up information -   Please follow-up with the providers as outlined in this discharge package  Other Instructions -   Please participate to the best of your ability with PT and OT at the [facility] rehab facility  Please review this entire after visit summary as additional general instructions including medication list, appointments, activity, diet, any pertinent wound care, and other additional recommendations from your care team that may be provided for you.      Sincerely,     Nancy Zuniga MD

## 2024-09-07 NOTE — OP NOTE
OPERATIVE REPORT  PATIENT NAME: Shantelle Romano  : 1945  MRN: [id number]  Pt Location:  [facility] OR ROOM 03    Surgery Date: 2024    Surgeons and Role:     * Rambo Yanez MD - Primary     * Rach Jay PA-C - Assisting     Preop Diagnosis:  Femur fracture, right (HCC) [S72.91XA]    Post-Op Diagnosis Codes:     * Femur fracture, right (HCC) [S72.91XA]    Procedure(s):  Right - INSERTION NAIL IM FEMUR RETROGRADE  Right - OPEN REDUCTION W/ INTERNAL FIXATION (ORIF) FEMUR    Specimens:  * No specimens in log *    Estimated Blood Loss:   Minimal    Drains:  External Urinary Catheter (Active)   Collection Container Canister and suction tubing (For Female) 24 170   Suction Pressure (mmHg) 100 mmHg 24 170   Interventions Removed and skin assessed;Device changed 24   Output (mL) 550 mL 24 06   Number of days: 2       Anesthesia Type:   Choice     Operative Indications:  Femur fracture, right (HCC) [S72.91XA]  Closed supracondylar periprosthetic right femoral fracture    Operative Findings:  Closed supracondylar periprosthetic right femoral fracture    Complications:   None    Chronic Narcotic Use:  No    Procedure and Technique:  Open reduction internal fixation of closed supracondylar periprosthetic right femoral fracture    After informed surgical consent been obtained patient was brought to the operating room and transferred to the operating table in the supine position.  General anesthesia was obtained.  Right lower extremity was prepped and draped normal sterile fashion.    Close reduction maneuvers were attempted and she just would not reduce in the sagittal plane to a satisfactory result.    After this a small incision was made in the distal lateral aspect of the thigh and dissection was taken down through skin and subcutaneous tissues to the level of the iliotibial band.  Dissection was then taken down through the iliotibial band and posterior to the vastus lateralis  to the level of the fracture.    The fracture was reduced to an anatomic position.  This was quite unstable and so this was stabilized with a Synthes one third tubular plate with bicortical 3.5 millimeter screws nicely holding her fracture and reduction. We then made the standard starting wound in the distal aspect of the knee and the guidewire was placed up the canal and into the proximal fragment.    The guidewire was measured and the ideal size for her femur would have been 280 mm but the smallest nail that was available was 300 mm.  I carefully considered whether a 300 would be okay but my main issue with this was that there was really no accommodation for any future events around her proximal femur.  Should she have an intertrochanteric or a femoral neck fracture in the future, this nail could not be worked around and there will be no way to stabilize her femur without removing the entire construct.    As a result I proceeded with internal fixation.  The supracondylar locking plate was then slid in a submuscular fashion up the lateral aspect of the thigh.  This was stabilized distally with 5 mm locking screws and proximally with 4.5 mm bicortical screws.    Final fluoroscopic views were obtained showing everything to be in good position    Powdered vancomycin was placed in the wound.  The wound was closed in 1 strata fix 2-0 Vicryl and staples.  Sterile dressings were applied.  Patient was then transferred to recovery stable condition having tolerated procedure well. I was present for the entire procedure, a qualified resident physician was not available, and a physician assistant was required during the procedure for retraction, tissue handling, dissection and suturing.    Patient Disposition:  PACU     SIGNATURE: Rambo Yanez MD  DATE: September 7, 2024  TIME: 9:53 AM

## 2024-09-07 NOTE — ASSESSMENT & PLAN NOTE
Status post a mechanical fall prior to arrival   Initial x-ray, and CAT scan revealed a right femur periprosthetic fracture   Status post an orthopedic surgical evaluation  Patient is status post postop day 2 open reduction and internal fixation of the right femur fracture  Continue nonweight bearing to the right lower extremity  Appreciate PT and OT evaluation, they recommend postacute rehabilitation services  Case management is working on placement  In the interim, continue Lovenox for DVT prophylaxis, and continue Tylenol, and oral oxycodone for mild, moderate, and severe pain respectively  Patient is medically stable for discharge

## 2024-09-08 LAB
ANION GAP SERPL CALCULATED.3IONS-SCNC: 9 MMOL/L (ref 4–13)
BUN SERPL-MCNC: 26 MG/DL (ref 5–25)
CALCIUM SERPL-MCNC: 8.6 MG/DL (ref 8.4–10.2)
CHLORIDE SERPL-SCNC: 100 MMOL/L (ref 96–108)
CO2 SERPL-SCNC: 26 MMOL/L (ref 21–32)
CREAT SERPL-MCNC: 1.06 MG/DL (ref 0.6–1.3)
ERYTHROCYTE [DISTWIDTH] IN BLOOD BY AUTOMATED COUNT: 14.5 % (ref 11.6–15.1)
GFR SERPL CREATININE-BSD FRML MDRD: 49 ML/MIN/1.73SQ M
GLUCOSE SERPL-MCNC: 185 MG/DL (ref 65–140)
GLUCOSE SERPL-MCNC: 186 MG/DL (ref 65–140)
GLUCOSE SERPL-MCNC: 187 MG/DL (ref 65–140)
GLUCOSE SERPL-MCNC: 218 MG/DL (ref 65–140)
GLUCOSE SERPL-MCNC: 260 MG/DL (ref 65–140)
HCT VFR BLD AUTO: 29.9 % (ref 34.8–46.1)
HGB BLD-MCNC: 9.6 G/DL (ref 11.5–15.4)
MAGNESIUM SERPL-MCNC: 2 MG/DL (ref 1.9–2.7)
MCH RBC QN AUTO: 28.6 PG (ref 26.8–34.3)
MCHC RBC AUTO-ENTMCNC: 32.1 G/DL (ref 31.4–37.4)
MCV RBC AUTO: 89 FL (ref 82–98)
PHOSPHATE SERPL-MCNC: 3.3 MG/DL (ref 2.3–4.1)
PLATELET # BLD AUTO: 167 THOUSANDS/UL (ref 149–390)
PMV BLD AUTO: 9.5 FL (ref 8.9–12.7)
POTASSIUM SERPL-SCNC: 4.3 MMOL/L (ref 3.5–5.3)
RBC # BLD AUTO: 3.36 MILLION/UL (ref 3.81–5.12)
SODIUM SERPL-SCNC: 135 MMOL/L (ref 135–147)
WBC # BLD AUTO: 13.84 THOUSAND/UL (ref 4.31–10.16)

## 2024-09-08 PROCEDURE — 99232 SBSQ HOSP IP/OBS MODERATE 35: CPT | Performed by: INTERNAL MEDICINE

## 2024-09-08 PROCEDURE — 80048 BASIC METABOLIC PNL TOTAL CA: CPT

## 2024-09-08 PROCEDURE — 83735 ASSAY OF MAGNESIUM: CPT | Performed by: INTERNAL MEDICINE

## 2024-09-08 PROCEDURE — 97110 THERAPEUTIC EXERCISES: CPT

## 2024-09-08 PROCEDURE — 84100 ASSAY OF PHOSPHORUS: CPT | Performed by: INTERNAL MEDICINE

## 2024-09-08 PROCEDURE — 82948 REAGENT STRIP/BLOOD GLUCOSE: CPT

## 2024-09-08 PROCEDURE — 97163 PT EVAL HIGH COMPLEX 45 MIN: CPT

## 2024-09-08 PROCEDURE — 99024 POSTOP FOLLOW-UP VISIT: CPT

## 2024-09-08 PROCEDURE — 85027 COMPLETE CBC AUTOMATED: CPT

## 2024-09-08 RX ADMIN — FUROSEMIDE 20 MG: 20 TABLET ORAL at 08:03

## 2024-09-08 RX ADMIN — ATORVASTATIN CALCIUM 10 MG: 10 TABLET, FILM COATED ORAL at 21:06

## 2024-09-08 RX ADMIN — MONTELUKAST 10 MG: 10 TABLET, FILM COATED ORAL at 17:03

## 2024-09-08 RX ADMIN — DOCUSATE SODIUM 100 MG: 100 CAPSULE, LIQUID FILLED ORAL at 08:03

## 2024-09-08 RX ADMIN — FLUTICASONE FUROATE AND VILANTEROL TRIFENATATE 1 PUFF: 200; 25 POWDER RESPIRATORY (INHALATION) at 08:03

## 2024-09-08 RX ADMIN — FUROSEMIDE 20 MG: 20 TABLET ORAL at 17:04

## 2024-09-08 RX ADMIN — FLUTICASONE PROPIONATE 1 SPRAY: 50 SPRAY, METERED NASAL at 08:03

## 2024-09-08 RX ADMIN — OXYCODONE HYDROCHLORIDE 5 MG: 5 TABLET ORAL at 07:56

## 2024-09-08 RX ADMIN — INSULIN LISPRO 2 UNITS: 100 INJECTION, SOLUTION INTRAVENOUS; SUBCUTANEOUS at 12:13

## 2024-09-08 RX ADMIN — INSULIN GLARGINE 15 UNITS: 100 INJECTION, SOLUTION SUBCUTANEOUS at 21:07

## 2024-09-08 RX ADMIN — INSULIN LISPRO 1 UNITS: 100 INJECTION, SOLUTION INTRAVENOUS; SUBCUTANEOUS at 17:04

## 2024-09-08 RX ADMIN — LATANOPROST 1 DROP: 50 SOLUTION OPHTHALMIC at 08:03

## 2024-09-08 RX ADMIN — INSULIN LISPRO 12 UNITS: 100 INJECTION, SOLUTION INTRAVENOUS; SUBCUTANEOUS at 12:14

## 2024-09-08 RX ADMIN — GABAPENTIN 200 MG: 100 CAPSULE ORAL at 21:06

## 2024-09-08 RX ADMIN — INSULIN LISPRO 12 UNITS: 100 INJECTION, SOLUTION INTRAVENOUS; SUBCUTANEOUS at 07:52

## 2024-09-08 RX ADMIN — INSULIN LISPRO 2 UNITS: 100 INJECTION, SOLUTION INTRAVENOUS; SUBCUTANEOUS at 21:06

## 2024-09-08 RX ADMIN — OXYCODONE HYDROCHLORIDE 5 MG: 5 TABLET ORAL at 19:11

## 2024-09-08 RX ADMIN — INSULIN LISPRO 1 UNITS: 100 INJECTION, SOLUTION INTRAVENOUS; SUBCUTANEOUS at 07:52

## 2024-09-08 RX ADMIN — PANTOPRAZOLE SODIUM 40 MG: 40 TABLET, DELAYED RELEASE ORAL at 06:12

## 2024-09-08 RX ADMIN — UMECLIDINIUM 1 PUFF: 62.5 AEROSOL, POWDER ORAL at 08:03

## 2024-09-08 RX ADMIN — INSULIN LISPRO 12 UNITS: 100 INJECTION, SOLUTION INTRAVENOUS; SUBCUTANEOUS at 17:04

## 2024-09-08 NOTE — PLAN OF CARE
Problem: Potential for Falls  Goal: Patient will remain free of falls  Description: INTERVENTIONS:  - Educate patient/family on patient safety including physical limitations  - Instruct patient to call for assistance with activity   - Consult OT/PT to assist with strengthening/mobility   - Keep Call bell within reach  - Keep bed low and locked with side rails adjusted as appropriate  - Keep care items and personal belongings within reach  - Initiate and maintain comfort rounds  - Make Fall Risk Sign visible to staff  - Offer Toileting every 2 Hours, in advance of need  - Initiate/Maintain bed alarm  - Obtain necessary fall risk management equipment:   - Apply yellow socks and bracelet for high fall risk patients  - Consider moving patient to room near nurses station  Outcome: Progressing     Problem: Prexisting or High Potential for Compromised Skin Integrity  Goal: Skin integrity is maintained or improved  Description: INTERVENTIONS:  - Identify patients at risk for skin breakdown  - Assess and monitor skin integrity  - Assess and monitor nutrition and hydration status  - Monitor labs   - Assess for incontinence   - Turn and reposition patient  - Assist with mobility/ambulation  - Relieve pressure over bony prominences  - Avoid friction and shearing  - Provide appropriate hygiene as needed including keeping skin clean and dry  - Evaluate need for skin moisturizer/barrier cream  - Collaborate with interdisciplinary team   - Patient/family teaching  - Consider wound care consult   Outcome: Progressing     Problem: PAIN - ADULT  Goal: Verbalizes/displays adequate comfort level or baseline comfort level  Description: Interventions:  - Encourage patient to monitor pain and request assistance  - Assess pain using appropriate pain scale  - Administer analgesics based on type and severity of pain and evaluate response  - Implement non-pharmacological measures as appropriate and evaluate response  - Consider cultural and  social influences on pain and pain management  - Notify physician/advanced practitioner if interventions unsuccessful or patient reports new pain  Outcome: Progressing     Problem: INFECTION - ADULT  Goal: Absence or prevention of progression during hospitalization  Description: INTERVENTIONS:  - Assess and monitor for signs and symptoms of infection  - Monitor lab/diagnostic results  - Monitor all insertion sites, i.e. indwelling lines, tubes, and drains  - Monitor endotracheal if appropriate and nasal secretions for changes in amount and color  - [place] appropriate cooling/warming therapies per order  - Administer medications as ordered  - Instruct and encourage patient and family to use good hand hygiene technique  - Identify and instruct in appropriate isolation precautions for identified infection/condition  Outcome: Progressing     Problem: SAFETY ADULT  Goal: Patient will remain free of falls  Description: INTERVENTIONS:  - Educate patient/family on patient safety including physical limitations  - Instruct patient to call for assistance with activity   - Consult OT/PT to assist with strengthening/mobility   - Keep Call bell within reach  - Keep bed low and locked with side rails adjusted as appropriate  - Keep care items and personal belongings within reach  - Initiate and maintain comfort rounds  - Make Fall Risk Sign visible to staff  - Offer Toileting every 2 Hours, in advance of need  - Initiate/Maintain bed alarm  - Obtain necessary fall risk management equipment:   - Apply yellow socks and bracelet for high fall risk patients  - Consider moving patient to room near nurses station  Outcome: Progressing  Goal: Maintain or return to baseline ADL function  Description: INTERVENTIONS:  -  Assess patient's ability to carry out ADLs; assess patient's baseline for ADL function and identify physical deficits which impact ability to perform ADLs (bathing, care of mouth/teeth, toileting, grooming, dressing,  etc.)  - Assess/evaluate cause of self-care deficits   - Assess range of motion  - Assess patient's mobility; develop plan if impaired  - Assess patient's need for assistive devices and provide as appropriate  - Encourage maximum independence but intervene and supervise when necessary  - Involve family in performance of ADLs  - Assess for home care needs following discharge   - Consider OT consult to assist with ADL evaluation and planning for discharge  - Provide patient education as appropriate  Outcome: Progressing  Goal: Maintains/Returns to pre admission functional level  Description: INTERVENTIONS:  - Perform AM-PAC 6 Click Basic Mobility/ Daily Activity assessment daily.  - Set and communicate daily mobility goal to care team and patient/family/caregiver.   - Collaborate with rehabilitation services on mobility goals if consulted  - Perform Range of Motion 2 times a day.  - Reposition patient every 2 hours.  - Dangle patient 2 times a day  - Stand patient 2 times a day  - Ambulate patient 2 times a day  - Out of bed to chair 2 times a day   - Out of bed for meals 2 times a day  - Out of bed for toileting  - Record patient progress and toleration of activity level   Outcome: Progressing     Problem: DISCHARGE PLANNING  Goal: Discharge to home or other facility with appropriate resources  Description: INTERVENTIONS:  - Identify barriers to discharge w/patient and caregiver  - Arrange for needed discharge resources and transportation as appropriate  - Identify discharge learning needs (meds, wound care, etc.)  - Arrange for interpretive services to assist at discharge as needed  - Refer to Case Management Department for coordinating discharge planning if the patient needs post-hospital services based on physician/advanced practitioner order or complex needs related to functional status, cognitive ability, or social support system  Outcome: Progressing     Problem: Knowledge Deficit  Goal:  Patient/family/caregiver demonstrates understanding of disease process, treatment plan, medications, and discharge instructions  Description: Complete learning assessment and assess knowledge base.  Interventions:  - Provide teaching at level of understanding  - Provide teaching via preferred learning methods  Outcome: Progressing

## 2024-09-08 NOTE — PROGRESS NOTES
[facility]  Progress Note  Name: Shantelle Romano I  MRN: [id number]  Unit/Bed#: -01 I Date of Admission: 9/5/2024   Date of Service: 9/8/2024 I Hospital Day: 3    Assessment & Plan   * Femur fracture, right (HCC)  Assessment & Plan  Patient presents status post fall  X-ray and CT scan consistent with periprosthetic right femur fracture  Orthopedic surgery consulted in the ED and recommended operative intervention    Status post operative fixation with orthopedic surgery  Nonweightbearing right lower extremity  PT/OT evaluation and treatment  Case management consultation for safe disposition planning      Type 2 diabetes mellitus with diabetic neuropathy (HCC)  Assessment & Plan  Lab Results   Component Value Date    HGBA1C 7.6 (H) 07/03/2024       Recent Labs     09/06/24 2038 09/07/24  0731 09/07/24  1036 09/07/24  1134   POCGLU 126 157* 188* 204*       Blood Sugar Average: Last 72 hrs:  (P) 180.8  Well-controlled on current insulin regimen    Lantus 30 units daily at bedtime  Lispro 12 units 3 times daily with meals  Sliding scale insulin with Accu-Cheks      Simple chronic bronchitis (HCC)  Assessment & Plan  Saturating well on room air  Currently not in acute exacerbation    Continue home inhalers and Singulair  Monitor respiratory status    Chronic heart failure with preserved ejection fraction (HCC)  Assessment & Plan  Patient is currently euvolemic    Continue home loop diuretic  Monitor volume status    Obesity, morbid (HCC)  Assessment & Plan  Present on admission as evidenced by BMI of 36    Encourage lifestyle modifications and weight loss    Gastroesophageal reflux disease without esophagitis  Assessment & Plan  Continue home PPI           VTE Pharmacologic Prophylaxis: VTE Score: 9 Moderate Risk (Score 3-4) - Pharmacological DVT Prophylaxis Ordered: heparin.    Mobility:   Basic Mobility Inpatient Raw Score: 6  -[facility] Goal: 2: Bed activities/Dependent transfer  -[facility]  Achieved: 4: Move to chair/commode  [facility] Goal NOT achieved. Continue with multidisciplinary rounding and encourage appropriate mobility to improve upon [facility] goals.    Patient Centered Rounds: I performed bedside rounds with nursing staff today.     Discussions with Specialists or Other Care Team Provider: Orthopedic Surgery    Education and Discussions with Family / Patient: Updated  (son) at bedside.    Total Time Spent on Date of Encounter in care of patient: 35 mins. This time was spent on one or more of the following: performing physical exam; counseling and coordination of care; obtaining or reviewing history; documenting in the medical record; reviewing/ordering tests, medications or procedures; communicating with other healthcare professionals and discussing with patient's family/caregivers.    Current Length of Stay: 3 day(s)  Current Patient Status: Inpatient   Certification Statement: The patient will continue to require additional inpatient hospital stay due to right femur fracture  Discharge Plan: Anticipate discharge in 24-48 hrs to discharge location to be determined pending rehab evaluations.    Code Status: Level 1 - Full Code    Subjective:   Patient seen and examined at bedside. No acute events overnight. Denies chest pain, SOB, diaphoresis, nausea/vomiting/diarrhea, fevers/chills.    Objective:     Vitals:   Temp (24hrs), Av.2 °F (36.8 °C), Min:98 °F (36.7 °C), Max:98.4 °F (36.9 °C)    Temp:  [98 °F (36.7 °C)-98.4 °F (36.9 °C)] 98.4 °F (36.9 °C)  HR:  [61-72] 72  Resp:  [15-22] 18  BP: (112-160)/(49-84) 113/55  SpO2:  [94 %-96 %] 95 %  Body mass index is 36.64 kg/m².     Input and Output Summary (last 24 hours):     Intake/Output Summary (Last 24 hours) at 2024 1122  Last data filed at 2024 0601  Gross per 24 hour   Intake 240 ml   Output 1150 ml   Net -910 ml       Physical Exam:   Physical Exam  Vitals and nursing note reviewed.   Constitutional:       General: She  is not in acute distress.     Appearance: She is well-developed.   HENT:      Head: Normocephalic and atraumatic.   Eyes:      Conjunctiva/sclera: Conjunctivae normal.   Cardiovascular:      Rate and Rhythm: Normal rate and regular rhythm.      Heart sounds: No murmur heard.  Pulmonary:      Effort: Pulmonary effort is normal. No respiratory distress.      Breath sounds: Normal breath sounds.   Abdominal:      Palpations: Abdomen is soft.      Tenderness: There is no abdominal tenderness.   Musculoskeletal:         General: No swelling.      Cervical back: Neck supple.   Skin:     General: Skin is warm and dry.      Capillary Refill: Capillary refill takes less than 2 seconds.   Neurological:      Mental Status: She is alert.   Psychiatric:         Mood and Affect: Mood normal.          Additional Data:     Labs:  Results from last 7 days   Lab Units 09/08/24  0623 09/07/24  0553   WBC Thousand/uL 13.84* 9.53   HEMOGLOBIN g/dL 9.6* 10.2*   HEMATOCRIT % 29.9* 33.5*   PLATELETS Thousands/uL 167 135*   SEGS PCT %  --  78*   LYMPHO PCT %  --  9*   MONO PCT %  --  9   EOS PCT %  --  3     Results from last 7 days   Lab Units 09/08/24  0623   SODIUM mmol/L 135   POTASSIUM mmol/L 4.3   CHLORIDE mmol/L 100   CO2 mmol/L 26   BUN mg/dL 26*   CREATININE mg/dL 1.06   ANION GAP mmol/L 9   CALCIUM mg/dL 8.6   GLUCOSE RANDOM mg/dL 187*         Results from last 7 days   Lab Units 09/08/24  1120 09/08/24  0744 09/07/24  2107 09/07/24  1616 09/07/24  1134 09/07/24  1036 09/07/24  0731 09/06/24  2038 09/06/24  1632 09/06/24  1125 09/06/24  0752 09/05/24  2125   POC GLUCOSE mg/dl 218* 186* 309* 313* 204* 188* 157* 126 151* 152* 179* 198*               Lines/Drains:  Invasive Devices       Peripheral Intravenous Line  Duration             Peripheral IV 09/05/24 Left Antecubital 3 days    Peripheral IV 09/07/24 Left Forearm 1 day              Drain  Duration             External Urinary Catheter 2 days                  Imaging: No  pertinent imaging reviewed.    Recent Cultures (last 7 days):         Last 24 Hours Medication List:   Current Facility-Administered Medications   Medication Dose Route Frequency Provider Last Rate    acetaminophen  650 mg Oral Q4H PRN Rex Diamond, DO      albuterol  2 puff Inhalation Q6H PRN Rex Diamond, DO      atorvastatin  10 mg Oral HS Rex Diamond, DO      docusate sodium  100 mg Oral Daily Rex Diamond, DO      fluticasone  1 spray Nasal Daily Rex Diamond, DO      fluticasone-vilanterol  1 puff Inhalation Daily Rex Diamond, DO      And    umeclidinium  1 puff Inhalation Daily Rex Diamond, DO      furosemide  20 mg Oral BID Rex Diamond, DO      gabapentin  200 mg Oral HS Rex Diamond, DO      insulin glargine  15 Units Subcutaneous HS Rani Guzman MD      insulin lispro  1-5 Units Subcutaneous TID AC Rex Diamond, DO      insulin lispro  1-5 Units Subcutaneous HS Rex Diamond, DO      insulin lispro  12 Units Subcutaneous TID With Meals Rex Diamond, DO      latanoprost  1 drop Left Eye Daily Rex Diamond, DO      montelukast  10 mg Oral QPM Rex Diamond, DO      ondansetron  4 mg Intravenous Q6H PRN Rex Diamond, DO      oxyCODONE  5 mg Oral Q6H PRN Rex Diamond, DO      oxyCODONE  2.5 mg Oral Q6H PRN Rex Diamond, DO      pantoprazole  40 mg Oral Early Morning Rex Diamond, DO          Today, Patient Was Seen By: Rex Diamond DO    **Please Note: This note may have been constructed using a voice recognition system.**

## 2024-09-08 NOTE — PHYSICAL THERAPY NOTE
Physical Therapy Evaluation     Patient's Name: Shantelle Romano    Admitting Diagnosis  Hip pain [M25.559]  Knee pain [M25.569]  Femur fracture, right (Formerly [facility]) [S72.91XA]  Fall, initial encounter [W19.XXXA]  Fall in elderly patient [R29.6]    Problem List  Patient Active Problem List   Diagnosis    Simple chronic bronchitis (Formerly [facility])    Hypertension    Alternating constipation and diarrhea    Chest pain    Vitamin D deficiency    Stage 3b chronic kidney disease ([facility]ly [facility])    Bilateral leg edema    Chronic pain syndrome    Coronary artery disease without angina pectoris    Chronic heart failure with preserved ejection fraction ([facility]ly [facility])    Gastroesophageal reflux disease without esophagitis    Hyperlipidemia associated with type 2 diabetes mellitus  ([facility]ly [facility])    Left ventricular hypertrophy    Lung nodule    Macular pucker, left    Memory loss    Osteoarthritis    Seizure disorder ([facility])    Strabismic amblyopia of right eye    Neurologic gait dysfunction    Type 2 diabetes mellitus with diabetic neuropathy ([facility])    Chronic kidney disease-mineral and bone disorder    Osteopenia of multiple sites    Urinary incontinence    Former smoker    Obesity, morbid ([facility]ly [facility])    Stroke-like symptoms    Abnormal thyroid function test    Thyroid nodule    Optic neuritis    Femur fracture, right ([facility]ly [facility])       Past Medical History  Past Medical History:   Diagnosis Date    Acute kidney injury superimposed on chronic kidney disease  ([facility]ly [facility]) 11/26/2016    ADHD (attention deficit hyperactivity disorder) 03/17/2019    Arthritis     BL HIPS    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    Carpal tunnel syndrome     Chest pain 03/06/2017    Chronic kidney disease     Claudication ([facility]ly [facility]) 3/15/2019    Closed fracture of base of middle phalanx of finger 06/22/2017    Closed fracture of middle phalanx of left little finger 07/08/2017    Coagulopathy ([facility]ly [facility]) 05/15/2019    Colloid cyst of brain ([facility]ly [facility])     COPD (chronic obstructive pulmonary disease) ([facility]ly [facility])     COPD (chronic obstructive  pulmonary disease) ([facility])     Coronary artery disease     Cough     Diabetes mellitus (HCC)     GERD (gastroesophageal reflux disease)     Glaucoma     Gout     History of brain disorder 10/07/2020    Hyperlipidemia     Hypertension     Irritable bowel syndrome     Melena 02/26/2019    PAD (peripheral artery disease) ([facility]) 5/15/2019    Paronychia of great toe of left foot 10/07/2020    Post-traumatic seizures ([facility]) 07/23/2015    Renal disorder     Seizures ([facility])     last 2015    Shortness of breath     Single seizure ([facility]) 05/31/2016    SOB (shortness of breath)     Syncope and collapse 11/11/2020    Urinary tract infection without hematuria 11/26/2016    Wheezing        Past Surgical History  Past Surgical History:   Procedure Laterality Date    BRAIN SURGERY      CARDIAC CATHETERIZATION N/A 8/28/2024    Procedure: Cardiac catheterization;  Surgeon: Matt Beltran MD;  Location: [facility];  Service: Cardiology    CATARACT EXTRACTION      CHOLECYSTECTOMY      COLECTOMY RADHA      COLONOSCOPY      DECOMPRESSION SPINE LUMBAR POSTERIOR  08/19/2019    HERNIA REPAIR      HYSTERECTOMY      INCISION TENDON SHEATH HAND      NECK SURGERY  05/24/2018    NEUROPLASTY / TRANSPOSITION MEDIAN NERVE AT CARPAL TUNNEL      MT COLONOSCOPY FLX DX W/COLLJ SPEC WHEN PFRMD N/A 03/26/2019    Procedure: COLONOSCOPY;  Surgeon: Yaniv Hawley MD;  Location: [facility];  Service: Gastroenterology    MT ESOPHAGOGASTRODUODENOSCOPY TRANSORAL DIAGNOSTIC N/A 03/26/2019    Procedure: ESOPHAGOGASTRODUODENOSCOPY (EGD);  Surgeon: Yaniv Hawley MD;  Location: [facility];  Service: Gastroenterology    REPLACEMENT TOTAL KNEE Right     RETINAL DETACHMENT SURGERY      TUBAL LIGATION            09/08/24 0844   PT Last Visit   PT Visit Date 09/08/24   Note Type   Note type Evaluation   Pain Assessment   Pain Assessment Tool 0-10   Pain Score 7   Pain Location/Orientation Orientation: Right;Location: Leg;Location: Knee   Pain  Onset/Description Onset: Ongoing   Patient's Stated Pain Goal No pain   Hospital Pain Intervention(s) Repositioned;Ambulation/increased activity;Emotional support;Rest;Elevated   Restrictions/Precautions   Weight Bearing Precautions Per Order Yes   RLE Weight Bearing Per Order WBAT   Other Precautions Fall Risk;Pain;Chair Alarm;Bed Alarm;Impulsive  (3L O2 NC)   Home Living   Type of Home House  (bi-level)   Home Layout Two level;Bed/bath upstairs;Performs ADLs on one level;Stairs to enter with rails  (7 + 7 ELVI; in process of acquiring for stair glide/ was previously approved but now may not be getting per pt)   Bathroom Shower/Tub Walk-in shower   Bathroom Toilet Standard   Bathroom Equipment Grab bars in shower;Shower chair;Toilet raiser;Grab bars around toilet   Bathroom Accessibility Accessible   Home Equipment Walker  (rollator at baseline)   Prior Function   Level of [place] Needs assistance with ADLs;Needs assistance with functional mobility;Needs assistance with IADLS   Lives With Son;Family   Receives Help From Family;Personal care attendant  (aide 3-4 day/wk for 4-5hr/day)   IADLs Family/Friend/Other provides transportation;Family/Friend/Other provides meals;Family/Friend/Other provides medication management   Falls in the last 6 months (S)  5 to 10   Vocational Retired   Comments pt enjoys reading, crocheting, music, writing   General   Additional Pertinent History POD#1 s/p ORIF right distal femur periprosthetic fracture   Family/Caregiver Present No   Cognition   Overall Cognitive Status   (questionable)   Arousal/Participation Alert   Orientation Level Oriented X4   Memory Within functional limits   Following Commands Follows multistep commands without difficulty   Comments pt agreeable to PT evaluation   RLE Assessment   RLE Assessment   (DNT formal MMT, grossly observed 3+/5 with functional mobility)   LLE Assessment   LLE Assessment   (Grossly 3+/5 observed with functional mobility)    Coordination   Movements are Fluid and Coordinated 1   Sensation WFL   Bed Mobility   Supine to Sit 3  Moderate assistance   Additional items Assist x 2;HOB elevated;Bedrails;Increased time required;Verbal cues;LE management   Transfers   Sit to Stand 4  Minimal assistance   Additional items Assist x 2;Armrests;Increased time required;Verbal cues;Impulsive   Stand to Sit 4  Minimal assistance   Additional items Assist x 2;Armrests;Increased time required;Verbal cues;Impulsive   Additional Comments vc for appropriate hand/foot placement, sequencing with RW   Ambulation/Elevation   Gait pattern Decreased foot clearance;Antalgic;Wide REBEKAH;Shuffling;Foward flexed;Short stride;Step to;Excessively slow;Decreased R stance   Gait Assistance 4  Minimal assist   Additional items Assist x 2;Verbal cues;Tactile cues   Assistive Device Rolling walker   Distance 3' from EOB to recliner   Balance   Static Sitting Fair +   Dynamic Sitting Fair   Static Standing Poor +   Dynamic Standing Poor   Ambulatory Poor   Endurance Deficit   Endurance Deficit Yes   Activity Tolerance   Activity Tolerance Patient limited by fatigue;Patient limited by pain   Nurse Made Aware BAM Dash   Assessment   Prognosis Good   Problem List Decreased strength;Decreased range of motion;Decreased endurance;Impaired balance;Decreased mobility;Pain;Orthopedic restrictions;Obesity;Decreased safety awareness   Assessment Pt is 79 y.o. female seen for PT evaluation on 9/8/2024 s/p admit to [facility] on 9/5/2024 w/ Femur fracture, right (HCC). POD#1 s/p ORIF right distal femur periprosthetic fracture. PT was consulted to assess pt's functional mobility and d/c needs. Order placed for PT eval and tx. PTA, pt resides with son and family in bilevel home with 7+7 ELVI, ambulates with rollator, + fall history. At time of eval, pt requiring mod A x2 for bed mobility, min a x2 transfers, min Ax2 short gait trial from EOB to recliner with use of RW. Upon  evaluation, pt presenting with impaired functional mobility d/t decreased strength, decreased ROM, decreased endurance, impaired balance, decreased mobility, decreased safety awareness, orthopedic restrictions of WBAT R LE, pain, and activity intolerance. Pertinent PMHx and current co-morbidities affecting pt's physical performance at time of assessment include: bronchitis, femur fx, chronic heart failure, GERD, type 2 DM, obesity, HTN, memory loss, seizure disorder, neurologic gait dysfunction, CKD, urinary incontinence, stroke like symptoms, thyroid nodule, optic neuritis. Personal factors affecting pt at time of eval include: ambulating w/ assistive device, stairs to enter home, inability to ambulate household distances, inability to navigate level surfaces w/o external assistance, and positive fall history. The following objective measures performed on IE also reveal limitations: Barthel Index: 40/100, Modified [place]: 4 (moderate/severe disability), and AM-PAC 6-Clicks: 9/24. Pt's clinical presentation is currently unstable/unpredictable seen in pt's presentation of advanced age, abnormal lab value(s), need for input for task focus and mobility technique, and ongoing medical assessment. Overall, pt's rehab potential and prognosis to return to PLOF is good as impacted by objective findings, warranting pt to receive further skilled PT interventions to address identified impairments, activity limitation(s), and participation restriction(s). Pt to benefit from continued PT tx to address deficits as defined above and maximize level of functional independent mobility. From PT/mobility standpoint, recommend level 2, moderate resource intensity in order to facilitate return to PLOF.   Barriers to Discharge Inaccessible home environment;Decreased caregiver support   Goals   Patient Goals to go home   STG Expiration Date 09/18/24   Short Term Goal #1 In 7-10 days: Increase bilateral LE strength 1/2 grade to facilitate  "independent mobility, Perform all bed mobility tasks with min A of 1 to decrease caregiver burden, Perform all transfers with min A of 1 to improve independence, Ambulate > 25 ft. with least restrictive assistive device with min A of 1 w/o LOB and w/ normalized gait pattern 100% of the time, Increase all balance 1/2 grade to decrease risk for falls, Tolerate 4 hr OOB to faciliate upright tolerance, PT provider will perform functional balance assessment to determine fall risk, and PT to see and establish goals for elevations/stairs when appropriate   PT Treatment Day 1   Plan   Treatment/Interventions Functional transfer training;LE strengthening/ROM;Elevations;Therapeutic exercise;Endurance training;Patient/family training;Equipment eval/education;Bed mobility;Gait training;Spoke to nursing;Continued evaluation   PT Frequency 4-6x/wk   Discharge Recommendation   Rehab Resource Intensity Level, PT II (Moderate Resource Intensity)   Equipment Recommended Walker  (RW)   Change/add to Walker Package? Yes, Change Size   Walker Size [place] (Ht <5'1\")   AM-PAC Basic Mobility Inpatient   Turning in Flat Bed Without Bedrails 2   Lying on Back to Sitting on Edge of Flat Bed Without Bedrails 1   Moving Bed to Chair 2   Standing Up From Chair Using Arms 2   Walk in Room 1   Climb 3-5 Stairs With Railing 1   Basic Mobility Inpatient Raw Score 9   Turning Head Towards Sound 4   Follow Simple Instructions 3   Low Function Basic Mobility Raw Score  16   Low Function Basic Mobility Standardized Score  25.72   [facility] Highest Level Of Mobility   -[facility] Goal 3: Sit at edge of bed   -[facility] Achieved 4: Move to chair/commode   Modified [place] Scale   Modified [place] Scale 4   Barthel Index   Feeding 10   Bathing 0   Grooming Score 0   Dressing Score 5   Bladder Score 5   Bowels Score 10   Toilet Use Score 5   Transfers (Bed/Chair) Score 5   Mobility (Level Surface) Score 0   Stairs Score 0   Barthel Index Score 40   Additional " Treatment Session   Start Time 0904   End Time 0914   Treatment Assessment Pt seen for PT treatment session this date s/p PT eval, consisting of ther ex focused on strengthening. VC for technique. Current goals and POC remain appropriate, pt continues to have rehab potential and is making progress towards STGs. Pt prognosis for achieving goals is good, pending pt progress with hospitalization/medical status improvements, and indicated by Stimulability and ability to follow directions. PT recommends level 2, moderate resource intensity upon discharge. Pt continues to be functioning below baseline level, and remains limited 2* factors listed above. PT will continue to see pt during current hospitalization in order to address the deficits listed above and provide interventions consistent w/ POC in effort to achieve STGs.   Exercises   Quad Sets Sitting;10 reps;AROM;Bilateral   Glute Sets Sitting;10 reps;AROM;Bilateral   Hip Abduction Sitting;10 reps;AAROM;Right;AROM;Left   Ankle Pumps Sitting;10 reps;AROM;Bilateral   End of Consult   Patient Position at End of Consult Bedside chair;All needs within reach;Bed/Chair alarm activated         Kailey Lester, PT

## 2024-09-08 NOTE — PROGRESS NOTES
Orthopedics   Shantelle Romano 79 y.o. female MRN: [id number]  Unit/Bed#: -01      Subjective:  79 y.o.female POD#1 s/p ORIF right distal femur periprosthetic fracture. Patient is laying comfortably in hospital bed in no acute distress. Patient reports she is doing better today with less pain in the right lower extremity. Patient reports she is applying ice to her right leg, which is helping. Patient denies any numbness or tingling in the right lower extremity. Patient reports she has not been out of bed since surgery. Patient mentions having a headache. Patient denies any chest pain, lightheadedness, dizziness, nausea, vomiting, fevers, and chills. Patient offers no additional complaints.       Labs:  0   Lab Value Date/Time    HCT 29.9 (L) 09/08/2024 0623    HCT 33.5 (L) 09/07/2024 0553    HCT 34.9 09/06/2024 0511    HGB 9.6 (L) 09/08/2024 0623    HGB 10.2 (L) 09/07/2024 0553    HGB 10.6 (L) 09/06/2024 0511    INR 0.92 08/27/2024 1527    WBC 13.84 (H) 09/08/2024 0623    WBC 9.53 09/07/2024 0553    WBC 8.67 09/06/2024 0511       Meds:    Current Facility-Administered Medications:     acetaminophen (TYLENOL) tablet 650 mg, 650 mg, Oral, Q4H PRN, Rex Diamond, DO, 650 mg at 09/06/24 1217    albuterol (PROVENTIL HFA,VENTOLIN HFA) inhaler 2 puff, 2 puff, Inhalation, Q6H PRN, Rex GURWINDER Quirozrty, DO    atorvastatin (LIPITOR) tablet 10 mg, 10 mg, Oral, HS, Rex Quirozrty, DO, 10 mg at 09/07/24 2117    docusate sodium (COLACE) capsule 100 mg, 100 mg, Oral, Daily, Rex Quirozrtconor, DO, 100 mg at 09/07/24 1205    fluticasone (FLONASE) 50 mcg/act nasal spray 1 spray, 1 spray, Nasal, Daily, Rex Diamond DO, 1 spray at 09/07/24 0837    fluticasone-vilanterol 200-25 mcg/actuation 1 puff, 1 puff, Inhalation, Daily, 1 puff at 09/07/24 1131 **AND** umeclidinium 62.5 mcg/actuation inhaler AEPB 1 puff, 1 puff, Inhalation, Daily, Rex Diamond DO, 1 puff at 09/07/24 0837    furosemide (LASIX) tablet 20 mg, 20 mg, Oral, BID,  "Rex Diamond DO, 20 mg at 09/07/24 1739    gabapentin (NEURONTIN) capsule 200 mg, 200 mg, Oral, HS, Rex Diamond, DO, 200 mg at 09/07/24 2117    insulin glargine (LANTUS) subcutaneous injection 15 Units 0.15 mL, 15 Units, Subcutaneous, HS, Rani Guzman MD, 15 Units at 09/07/24 2118    insulin lispro (HumALOG/ADMELOG) 100 units/mL subcutaneous injection 1-5 Units, 1-5 Units, Subcutaneous, TID AC, 3 Units at 09/07/24 1739 **AND** Fingerstick Glucose (POCT), , , TID AC, Rex Diamond, DO    insulin lispro (HumALOG/ADMELOG) 100 units/mL subcutaneous injection 1-5 Units, 1-5 Units, Subcutaneous, HS, Rex Diamond, DO, 3 Units at 09/07/24 2118    insulin lispro (HumALOG/ADMELOG) 100 units/mL subcutaneous injection 12 Units, 12 Units, Subcutaneous, TID With Meals, Rex Diamond DO, 12 Units at 09/07/24 1739    latanoprost (XALATAN) 0.005 % ophthalmic solution 1 drop, 1 drop, Left Eye, Daily, Rex Diamond DO, 1 drop at 09/07/24 0837    montelukast (SINGULAIR) tablet 10 mg, 10 mg, Oral, QPM, Rex Diamond DO, 10 mg at 09/07/24 1739    ondansetron (ZOFRAN) injection 4 mg, 4 mg, Intravenous, Q6H PRN, Rex Diamond, DO, 4 mg at 09/07/24 0940    oxyCODONE (ROXICODONE) IR tablet 5 mg, 5 mg, Oral, Q6H PRN, Rex Diamond, DO, 5 mg at 09/07/24 1122    oxyCODONE (ROXICODONE) split tablet 2.5 mg, 2.5 mg, Oral, Q6H PRN, Rex Diamond, DO    pantoprazole (PROTONIX) EC tablet 40 mg, 40 mg, Oral, Early Morning, Rex Diamond DO, 40 mg at 09/08/24 0612    Blood Culture:   Lab Results   Component Value Date    BLOODCX No Growth After 5 Days. 12/02/2016       Wound Culture:   No results found for: \"WOUNDCULT\"    Ins and Outs:  I/O last 24 hours:  In: 1241.9 [P.O.:240; I.V.:751.9; IV Piggyback:250]  Out: 1300 [Urine:1150; Blood:150]          Physical Exam:  Vitals:    09/08/24 0649   BP:    Pulse: 61   Resp: 15   Temp:    SpO2:      right Lower Extremity extremity:  Skin: Visible skin shows no acute " abnormalities. Ace wrap present from proximal thigh distally to foot.   Dressings appear clean dry intact without soilage present  TTP over distal femur, along surgical incisions.   Sensation intact to saphenous, sural, tibial, superficial peroneal nerve, and deep peroneal  Motor intact to +FHL/EHL, +ankle dorsi/plantar flexion  2+ DP pulse  Digits warm and well perfused  Capillary refill < 2 seconds  Calf supple and nontender to palpation     Assessment: 79 y.o.female POD#1 s/p ORIF right distal femur periprosthetic fracture.     Plan:  WBAT right lower extremity  DVT prophylaxis: Lovenox while inpatient then transition to aspirin 81mg BID upon discharge for 6 weeks postop  Analgesics per primary team  PT/OT for ambulation and gait training   Will continue to assess for acute blood loss anemia  Hgb noted to be 9.6 today.   Apply ice as needed to right lower extremity   Dispo: Ortho will follow.         Rach Jay PA-C

## 2024-09-08 NOTE — PLAN OF CARE
Problem: Potential for Falls  Goal: Patient will remain free of falls  Description: INTERVENTIONS:  - Educate patient/family on patient safety including physical limitations  - Instruct patient to call for assistance with activity   - Consult OT/PT to assist with strengthening/mobility   - Keep Call bell within reach  - Keep bed low and locked with side rails adjusted as appropriate  - Keep care items and personal belongings within reach  - Initiate and maintain comfort rounds  - Make Fall Risk Sign visible to staff  - Offer Toileting every 2 Hours, in advance of need  - Initiate/Maintain bedalarm  - Obtain necessary fall risk management equipment: nonslip socks  - Apply yellow socks and bracelet for high fall risk patients  - Consider moving patient to room near nurses station  Outcome: Progressing     Problem: Prexisting or High Potential for Compromised Skin Integrity  Goal: Skin integrity is maintained or improved  Description: INTERVENTIONS:  - Identify patients at risk for skin breakdown  - Assess and monitor skin integrity  - Assess and monitor nutrition and hydration status  - Monitor labs   - Assess for incontinence   - Turn and reposition patient  - Assist with mobility/ambulation  - Relieve pressure over bony prominences  - Avoid friction and shearing  - Provide appropriate hygiene as needed including keeping skin clean and dry  - Evaluate need for skin moisturizer/barrier cream  - Collaborate with interdisciplinary team   - Patient/family teaching  - Consider wound care consult   Outcome: Progressing     Problem: PAIN - ADULT  Goal: Verbalizes/displays adequate comfort level or baseline comfort level  Description: Interventions:  - Encourage patient to monitor pain and request assistance  - Assess pain using appropriate pain scale  - Administer analgesics based on type and severity of pain and evaluate response  - Implement non-pharmacological measures as appropriate and evaluate response  - Consider  cultural and social influences on pain and pain management  - Notify physician/advanced practitioner if interventions unsuccessful or patient reports new pain  Outcome: Progressing     Problem: INFECTION - ADULT  Goal: Absence or prevention of progression during hospitalization  Description: INTERVENTIONS:  - Assess and monitor for signs and symptoms of infection  - Monitor lab/diagnostic results  - Monitor all insertion sites, i.e. indwelling lines, tubes, and drains  - Monitor endotracheal if appropriate and nasal secretions for changes in amount and color  - [place] appropriate cooling/warming therapies per order  - Administer medications as ordered  - Instruct and encourage patient and family to use good hand hygiene technique  - Identify and instruct in appropriate isolation precautions for identified infection/condition  Outcome: Progressing     Problem: SAFETY ADULT  Goal: Patient will remain free of falls  Description: INTERVENTIONS:  - Educate patient/family on patient safety including physical limitations  - Instruct patient to call for assistance with activity   - Consult OT/PT to assist with strengthening/mobility   - Keep Call bell within reach  - Keep bed low and locked with side rails adjusted as appropriate  - Keep care items and personal belongings within reach  - Initiate and maintain comfort rounds  - Make Fall Risk Sign visible to staff  - Offer Toileting every 2 Hours, in advance of need  - Initiate/Maintain bed alarm  - Obtain necessary fall risk management equipment: non slip socks  - Apply yellow socks and bracelet for high fall risk patients  - Consider moving patient to room near nurses station  Outcome: Progressing  Goal: Maintain or return to baseline ADL function  Description: INTERVENTIONS:  -  Assess patient's ability to carry out ADLs; assess patient's baseline for ADL function and identify physical deficits which impact ability to perform ADLs (bathing, care of mouth/teeth,  toileting, grooming, dressing, etc.)  - Assess/evaluate cause of self-care deficits   - Assess range of motion  - Assess patient's mobility; develop plan if impaired  - Assess patient's need for assistive devices and provide as appropriate  - Encourage maximum independence but intervene and supervise when necessary  - Involve family in performance of ADLs  - Assess for home care needs following discharge   - Consider OT consult to assist with ADL evaluation and planning for discharge  - Provide patient education as appropriate  Outcome: Progressing  Goal: Maintains/Returns to pre admission functional level  Description: INTERVENTIONS:  - Perform AM-PAC 6 Click Basic Mobility/ Daily Activity assessment daily.  - Set and communicate daily mobility goal to care team and patient/family/caregiver.   - Collaborate with rehabilitation services on mobility goals if consulted  - Perform Range of Motion 2 times a day.  - Reposition patient every 2 hours.  - Dangle patient 2 times a day  - Stand patient 2 times a day  - Ambulate patient 2 times a day  - Out of bed to chair 2 times a day   - Out of bed for meals 2 times a day  - Out of bed for toileting  - Record patient progress and toleration of activity level   Outcome: Progressing     Problem: DISCHARGE PLANNING  Goal: Discharge to home or other facility with appropriate resources  Description: INTERVENTIONS:  - Identify barriers to discharge w/patient and caregiver  - Arrange for needed discharge resources and transportation as appropriate  - Identify discharge learning needs (meds, wound care, etc.)  - Arrange for interpretive services to assist at discharge as needed  - Refer to Case Management Department for coordinating discharge planning if the patient needs post-hospital services based on physician/advanced practitioner order or complex needs related to functional status, cognitive ability, or social support system  Outcome: Progressing     Problem: Knowledge  Deficit  Goal: Patient/family/caregiver demonstrates understanding of disease process, treatment plan, medications, and discharge instructions  Description: Complete learning assessment and assess knowledge base.  Interventions:  - Provide teaching at level of understanding  - Provide teaching via preferred learning methods  Outcome: Progressing

## 2024-09-08 NOTE — PLAN OF CARE
Problem: PHYSICAL THERAPY ADULT  Goal: Performs mobility at highest level of function for planned discharge setting.  See evaluation for individualized goals.  Description: Treatment/Interventions: Functional transfer training, LE strengthening/ROM, Elevations, Therapeutic exercise, Endurance training, Patient/family training, Equipment eval/education, Bed mobility, Gait training, Spoke to nursing, Continued evaluation  Equipment Recommended: Walker (RW)       See flowsheet documentation for full assessment, interventions and recommendations.  9/8/2024 1411 by Kailey Lester PT  Note: Prognosis: Good  Problem List: Decreased strength, Decreased range of motion, Decreased endurance, Impaired balance, Decreased mobility, Pain, Orthopedic restrictions, Obesity, Decreased safety awareness  Assessment: Pt is 79 y.o. female seen for PT evaluation on 9/8/2024 s/p admit to [facility] on 9/5/2024 w/ Femur fracture, right (HCC). POD#1 s/p ORIF right distal femur periprosthetic fracture. PT was consulted to assess pt's functional mobility and d/c needs. Order placed for PT eval and tx. PTA, pt resides with son and family in bilevel home with 7+7 ELVI, ambulates with rollator, + fall history. At time of eval, pt requiring mod A x2 for bed mobility, min a x2 transfers, min Ax2 short gait trial from EOB to recliner with use of RW. Upon evaluation, pt presenting with impaired functional mobility d/t decreased strength, decreased ROM, decreased endurance, impaired balance, decreased mobility, decreased safety awareness, orthopedic restrictions of WBAT R LE, pain, and activity intolerance. Pertinent PMHx and current co-morbidities affecting pt's physical performance at time of assessment include: bronchitis, femur fx, chronic heart failure, GERD, type 2 DM, obesity, HTN, memory loss, seizure disorder, neurologic gait dysfunction, CKD, urinary incontinence, stroke like symptoms, thyroid nodule, optic neuritis. Personal factors  affecting pt at time of eval include: ambulating w/ assistive device, stairs to enter home, inability to ambulate household distances, inability to navigate level surfaces w/o external assistance, and positive fall history. The following objective measures performed on IE also reveal limitations: Barthel Index: 40/100, Modified [place]: 4 (moderate/severe disability), and AM-PAC 6-Clicks: 9/24. Pt's clinical presentation is currently unstable/unpredictable seen in pt's presentation of advanced age, abnormal lab value(s), need for input for task focus and mobility technique, and ongoing medical assessment. Overall, pt's rehab potential and prognosis to return to PLOF is good as impacted by objective findings, warranting pt to receive further skilled PT interventions to address identified impairments, activity limitation(s), and participation restriction(s). Pt to benefit from continued PT tx to address deficits as defined above and maximize level of functional independent mobility. From PT/mobility standpoint, recommend level 2, moderate resource intensity in order to facilitate return to PLOF.  Barriers to Discharge: Inaccessible home environment, Decreased caregiver support     Rehab Resource Intensity Level, PT: II (Moderate Resource Intensity)    See flowsheet documentation for full assessment.     9/8/2024 1411 by Kailey Lester PT  Note: Prognosis: Good  Problem List: Decreased strength, Decreased range of motion, Decreased endurance, Impaired balance, Decreased mobility, Pain, Orthopedic restrictions, Obesity, Decreased safety awareness  Assessment: Pt is 79 y.o. female seen for PT evaluation on 9/8/2024 s/p admit to [facility] on 9/5/2024 w/ Femur fracture, right (HCC). POD#1 s/p ORIF right distal femur periprosthetic fracture. PT was consulted to assess pt's functional mobility and d/c needs. Order placed for PT eval and tx. PTA, pt resides with son and family in bilevel home with 7+7 ELVI, ambulates  with rollator, + fall history. At time of eval, pt requiring mod A x2 for bed mobility, min a x2 transfers, min Ax2 short gait trial from EOB to recliner with use of RW. Upon evaluation, pt presenting with impaired functional mobility d/t decreased strength, decreased ROM, decreased endurance, impaired balance, decreased mobility, decreased safety awareness, orthopedic restrictions of WBAT R LE, pain, and activity intolerance. Pertinent PMHx and current co-morbidities affecting pt's physical performance at time of assessment include: bronchitis, femur fx, chronic heart failure, GERD, type 2 DM, obesity, HTN, memory loss, seizure disorder, neurologic gait dysfunction, CKD, urinary incontinence, stroke like symptoms, thyroid nodule, optic neuritis. Personal factors affecting pt at time of eval include: ambulating w/ assistive device, stairs to enter home, inability to ambulate household distances, inability to navigate level surfaces w/o external assistance, and positive fall history. The following objective measures performed on IE also reveal limitations: Barthel Index: 40/100, Modified [place]: 4 (moderate/severe disability), and AM-PAC 6-Clicks: 9/24. Pt's clinical presentation is currently unstable/unpredictable seen in pt's presentation of advanced age, abnormal lab value(s), need for input for task focus and mobility technique, and ongoing medical assessment. Overall, pt's rehab potential and prognosis to return to PLOF is good as impacted by objective findings, warranting pt to receive further skilled PT interventions to address identified impairments, activity limitation(s), and participation restriction(s). Pt to benefit from continued PT tx to address deficits as defined above and maximize level of functional independent mobility. From PT/mobility standpoint, recommend level 2, moderate resource intensity in order to facilitate return to PLOF.  Barriers to Discharge: Inaccessible home environment, Decreased  caregiver support     Rehab Resource Intensity Level, PT: II (Moderate Resource Intensity)    See flowsheet documentation for full assessment.

## 2024-09-08 NOTE — PLAN OF CARE
Problem: Potential for Falls  Goal: Patient will remain free of falls  Description: INTERVENTIONS:  - Educate patient/family on patient safety including physical limitations  - Instruct patient to call for assistance with activity   - Consult OT/PT to assist with strengthening/mobility   - Keep Call bell within reach  - Keep bed low and locked with side rails adjusted as appropriate  - Keep care items and personal belongings within reach  - Initiate and maintain comfort rounds  - Make Fall Risk Sign visible to staff  - Offer Toileting every 2 Hours, in advance of need  - Initiate/Maintain bed alarm  - Obtain necessary fall risk management equipment:   - Apply yellow socks and bracelet for high fall risk patients  - Consider moving patient to room near nurses station  Outcome: Progressing     Problem: Prexisting or High Potential for Compromised Skin Integrity  Goal: Skin integrity is maintained or improved  Description: INTERVENTIONS:  - Identify patients at risk for skin breakdown  - Assess and monitor skin integrity  - Assess and monitor nutrition and hydration status  - Monitor labs   - Assess for incontinence   - Turn and reposition patient  - Assist with mobility/ambulation  - Relieve pressure over bony prominences  - Avoid friction and shearing  - Provide appropriate hygiene as needed including keeping skin clean and dry  - Evaluate need for skin moisturizer/barrier cream  - Collaborate with interdisciplinary team   - Patient/family teaching  - Consider wound care consult   Outcome: Progressing     Problem: PAIN - ADULT  Goal: Verbalizes/displays adequate comfort level or baseline comfort level  Description: Interventions:  - Encourage patient to monitor pain and request assistance  - Assess pain using appropriate pain scale  - Administer analgesics based on type and severity of pain and evaluate response  - Implement non-pharmacological measures as appropriate and evaluate response  - Consider cultural and  social influences on pain and pain management  - Notify physician/advanced practitioner if interventions unsuccessful or patient reports new pain  Outcome: Progressing     Problem: INFECTION - ADULT  Goal: Absence or prevention of progression during hospitalization  Description: INTERVENTIONS:  - Assess and monitor for signs and symptoms of infection  - Monitor lab/diagnostic results  - Monitor all insertion sites, i.e. indwelling lines, tubes, and drains  - Monitor endotracheal if appropriate and nasal secretions for changes in amount and color  - [place] appropriate cooling/warming therapies per order  - Administer medications as ordered  - Instruct and encourage patient and family to use good hand hygiene technique  - Identify and instruct in appropriate isolation precautions for identified infection/condition  Outcome: Progressing     Problem: SAFETY ADULT  Goal: Patient will remain free of falls  Description: INTERVENTIONS:  - Educate patient/family on patient safety including physical limitations  - Instruct patient to call for assistance with activity   - Consult OT/PT to assist with strengthening/mobility   - Keep Call bell within reach  - Keep bed low and locked with side rails adjusted as appropriate  - Keep care items and personal belongings within reach  - Initiate and maintain comfort rounds  - Make Fall Risk Sign visible to staff  - Offer Toileting every 2 Hours, in advance of need  - Initiate/Maintain bed alarm  - Obtain necessary fall risk management equipment:   - Apply yellow socks and bracelet for high fall risk patients  - Consider moving patient to room near nurses station  Outcome: Progressing  Goal: Maintain or return to baseline ADL function  Description: INTERVENTIONS:  -  Assess patient's ability to carry out ADLs; assess patient's baseline for ADL function and identify physical deficits which impact ability to perform ADLs (bathing, care of mouth/teeth, toileting, grooming, dressing,  etc.)  - Assess/evaluate cause of self-care deficits   - Assess range of motion  - Assess patient's mobility; develop plan if impaired  - Assess patient's need for assistive devices and provide as appropriate  - Encourage maximum independence but intervene and supervise when necessary  - Involve family in performance of ADLs  - Assess for home care needs following discharge   - Consider OT consult to assist with ADL evaluation and planning for discharge  - Provide patient education as appropriate  Outcome: Progressing  Goal: Maintains/Returns to pre admission functional level  Description: INTERVENTIONS:  - Perform AM-PAC 6 Click Basic Mobility/ Daily Activity assessment daily.  - Set and communicate daily mobility goal to care team and patient/family/caregiver.   - Collaborate with rehabilitation services on mobility goals if consulted  - Perform Range of Motion 2 times a day.  - Reposition patient every 2 hours.  - Dangle patient 2 times a day  - Stand patient 2 times a day  - Ambulate patient 2 times a day  - Out of bed to chair 2 times a day   - Out of bed for meals 2 times a day  - Out of bed for toileting  - Record patient progress and toleration of activity level   Outcome: Progressing     Problem: DISCHARGE PLANNING  Goal: Discharge to home or other facility with appropriate resources  Description: INTERVENTIONS:  - Identify barriers to discharge w/patient and caregiver  - Arrange for needed discharge resources and transportation as appropriate  - Identify discharge learning needs (meds, wound care, etc.)  - Arrange for interpretive services to assist at discharge as needed  - Refer to Case Management Department for coordinating discharge planning if the patient needs post-hospital services based on physician/advanced practitioner order or complex needs related to functional status, cognitive ability, or social support system  Outcome: Progressing     Problem: Knowledge Deficit  Goal:  Patient/family/caregiver demonstrates understanding of disease process, treatment plan, medications, and discharge instructions  Description: Complete learning assessment and assess knowledge base.  Interventions:  - Provide teaching at level of understanding  - Provide teaching via preferred learning methods  Outcome: Progressing

## 2024-09-09 LAB
ANION GAP SERPL CALCULATED.3IONS-SCNC: 7 MMOL/L (ref 4–13)
BUN SERPL-MCNC: 36 MG/DL (ref 5–25)
CALCIUM SERPL-MCNC: 8.4 MG/DL (ref 8.4–10.2)
CHLORIDE SERPL-SCNC: 100 MMOL/L (ref 96–108)
CO2 SERPL-SCNC: 29 MMOL/L (ref 21–32)
CREAT SERPL-MCNC: 1.11 MG/DL (ref 0.6–1.3)
ERYTHROCYTE [DISTWIDTH] IN BLOOD BY AUTOMATED COUNT: 14.9 % (ref 11.6–15.1)
GFR SERPL CREATININE-BSD FRML MDRD: 46 ML/MIN/1.73SQ M
GLUCOSE SERPL-MCNC: 151 MG/DL (ref 65–140)
GLUCOSE SERPL-MCNC: 152 MG/DL (ref 65–140)
GLUCOSE SERPL-MCNC: 181 MG/DL (ref 65–140)
GLUCOSE SERPL-MCNC: 185 MG/DL (ref 65–140)
GLUCOSE SERPL-MCNC: 210 MG/DL (ref 65–140)
HCT VFR BLD AUTO: 29.1 % (ref 34.8–46.1)
HGB BLD-MCNC: 9.1 G/DL (ref 11.5–15.4)
MCH RBC QN AUTO: 28.4 PG (ref 26.8–34.3)
MCHC RBC AUTO-ENTMCNC: 31.3 G/DL (ref 31.4–37.4)
MCV RBC AUTO: 91 FL (ref 82–98)
PLATELET # BLD AUTO: 171 THOUSANDS/UL (ref 149–390)
PMV BLD AUTO: 9.5 FL (ref 8.9–12.7)
POTASSIUM SERPL-SCNC: 4.1 MMOL/L (ref 3.5–5.3)
RBC # BLD AUTO: 3.2 MILLION/UL (ref 3.81–5.12)
SODIUM SERPL-SCNC: 136 MMOL/L (ref 135–147)
WBC # BLD AUTO: 11.33 THOUSAND/UL (ref 4.31–10.16)

## 2024-09-09 PROCEDURE — 97530 THERAPEUTIC ACTIVITIES: CPT

## 2024-09-09 PROCEDURE — 82948 REAGENT STRIP/BLOOD GLUCOSE: CPT

## 2024-09-09 PROCEDURE — 94760 N-INVAS EAR/PLS OXIMETRY 1: CPT

## 2024-09-09 PROCEDURE — 99024 POSTOP FOLLOW-UP VISIT: CPT | Performed by: PHYSICIAN ASSISTANT

## 2024-09-09 PROCEDURE — 97167 OT EVAL HIGH COMPLEX 60 MIN: CPT

## 2024-09-09 PROCEDURE — 80048 BASIC METABOLIC PNL TOTAL CA: CPT

## 2024-09-09 PROCEDURE — 85027 COMPLETE CBC AUTOMATED: CPT

## 2024-09-09 PROCEDURE — 97110 THERAPEUTIC EXERCISES: CPT

## 2024-09-09 PROCEDURE — 99232 SBSQ HOSP IP/OBS MODERATE 35: CPT | Performed by: HOSPITALIST

## 2024-09-09 RX ORDER — ENOXAPARIN SODIUM 100 MG/ML
30 INJECTION SUBCUTANEOUS EVERY 12 HOURS SCHEDULED
Status: DISCONTINUED | OUTPATIENT
Start: 2024-09-09 | End: 2024-09-10

## 2024-09-09 RX ADMIN — ENOXAPARIN SODIUM 30 MG: 30 INJECTION SUBCUTANEOUS at 22:09

## 2024-09-09 RX ADMIN — INSULIN LISPRO 1 UNITS: 100 INJECTION, SOLUTION INTRAVENOUS; SUBCUTANEOUS at 07:54

## 2024-09-09 RX ADMIN — INSULIN LISPRO 1 UNITS: 100 INJECTION, SOLUTION INTRAVENOUS; SUBCUTANEOUS at 17:34

## 2024-09-09 RX ADMIN — FLUTICASONE FUROATE AND VILANTEROL TRIFENATATE 1 PUFF: 200; 25 POWDER RESPIRATORY (INHALATION) at 09:47

## 2024-09-09 RX ADMIN — INSULIN LISPRO 2 UNITS: 100 INJECTION, SOLUTION INTRAVENOUS; SUBCUTANEOUS at 22:07

## 2024-09-09 RX ADMIN — MONTELUKAST 10 MG: 10 TABLET, FILM COATED ORAL at 17:35

## 2024-09-09 RX ADMIN — INSULIN LISPRO 1 UNITS: 100 INJECTION, SOLUTION INTRAVENOUS; SUBCUTANEOUS at 11:54

## 2024-09-09 RX ADMIN — UMECLIDINIUM 1 PUFF: 62.5 AEROSOL, POWDER ORAL at 09:47

## 2024-09-09 RX ADMIN — INSULIN LISPRO 12 UNITS: 100 INJECTION, SOLUTION INTRAVENOUS; SUBCUTANEOUS at 07:54

## 2024-09-09 RX ADMIN — ATORVASTATIN CALCIUM 10 MG: 10 TABLET, FILM COATED ORAL at 22:06

## 2024-09-09 RX ADMIN — FUROSEMIDE 20 MG: 20 TABLET ORAL at 09:47

## 2024-09-09 RX ADMIN — FLUTICASONE PROPIONATE 1 SPRAY: 50 SPRAY, METERED NASAL at 09:47

## 2024-09-09 RX ADMIN — INSULIN LISPRO 12 UNITS: 100 INJECTION, SOLUTION INTRAVENOUS; SUBCUTANEOUS at 17:35

## 2024-09-09 RX ADMIN — OXYCODONE HYDROCHLORIDE 5 MG: 5 TABLET ORAL at 09:48

## 2024-09-09 RX ADMIN — LATANOPROST 1 DROP: 50 SOLUTION OPHTHALMIC at 09:47

## 2024-09-09 RX ADMIN — DOCUSATE SODIUM 100 MG: 100 CAPSULE, LIQUID FILLED ORAL at 09:48

## 2024-09-09 RX ADMIN — FUROSEMIDE 20 MG: 20 TABLET ORAL at 17:35

## 2024-09-09 RX ADMIN — INSULIN LISPRO 12 UNITS: 100 INJECTION, SOLUTION INTRAVENOUS; SUBCUTANEOUS at 11:54

## 2024-09-09 RX ADMIN — PANTOPRAZOLE SODIUM 40 MG: 40 TABLET, DELAYED RELEASE ORAL at 05:59

## 2024-09-09 RX ADMIN — INSULIN GLARGINE 15 UNITS: 100 INJECTION, SOLUTION SUBCUTANEOUS at 22:06

## 2024-09-09 RX ADMIN — GABAPENTIN 200 MG: 100 CAPSULE ORAL at 22:06

## 2024-09-09 NOTE — PROGRESS NOTES
[facility]  Progress Note  Name: Shantelle Romano I  MRN: [id number]  Unit/Bed#: -01 I Date of Admission: 9/5/2024   Date of Service: 9/9/2024 I Hospital Day: 4    Assessment & Plan   * Femur fracture, right (HCC)  Assessment & Plan  Status post a mechanical fall prior to arrival   Initial x-ray, and CAT scan revealed a right femur periprosthetic fracture   Status post an orthopedic surgical evaluation  Patient is status post postop day 2 open reduction and internal fixation of the right femur fracture  Continue nonweight bearing to the right lower extremity  Appreciate PT and OT evaluation, they recommend postacute rehabilitation services  Case management is working on placement  In the interim, continue Lovenox for DVT prophylaxis, and continue Tylenol, and oral oxycodone for mild, moderate, and severe pain respectively  Patient is medically stable for discharge    Chronic heart failure with preserved ejection fraction (HCC)  Assessment & Plan  Without exacerbation  Patient examines euvolemic  Continue Lasix, and Lipitor daily    Type 2 diabetes mellitus with diabetic neuropathy (HCC)  Assessment & Plan  Lab Results   Component Value Date    HGBA1C 7.6 (H) 07/03/2024       Recent Labs     09/06/24  2038 09/07/24  0731 09/07/24  1036 09/07/24  1134   POCGLU 126 157* 188* 204*       Blood Sugar Average: Last 72 hrs:  (P) 180.8  Target blood sugar for the hospital is 140-180  Continue Lantus, Accu-Cheks before meals and at bedtime with sliding scale insulin coverage  Diabetic neuropathy-continue gabapentin      Simple chronic bronchitis (HCC)  Assessment & Plan  Without exacerbation  Continue Singulair  Continue home inhalers inclusive of fluticasone-vilanterol, umeclidinium and Flonase    Gastroesophageal reflux disease without esophagitis  Assessment & Plan  Continue Protonix    Obesity, morbid (HCC)  Assessment & Plan  Present on admission as evidenced by BMI of 36    Encourage  lifestyle modifications and weight loss               VTE Pharmacologic Prophylaxis: VTE Score: 9 High Risk (Score >/= 5) - Pharmacological DVT Prophylaxis Ordered: enoxaparin (Lovenox). Sequential Compression Devices Ordered.    Mobility:   Basic Mobility Inpatient Raw Score: 6  JH-HLM Goal: 2: Bed activities/Dependent transfer  JH-HLM Achieved: 4: Move to chair/commode  JH-HLM Goal NOT achieved. Continue with multidisciplinary rounding and encourage appropriate mobility to improve upon JH-HLM goals.    Patient Centered Rounds: I performed bedside rounds with nursing staff today.   Discussions with Specialists or Other Care Team Provider: Orthopedic surgery    Education and Discussions with Family / Patient: Updated  (daughter) via phone.    Total Time Spent on Date of Encounter in care of patient: 35 mins. This time was spent on one or more of the following: performing physical exam; counseling and coordination of care; obtaining or reviewing history; documenting in the medical record; reviewing/ordering tests, medications or procedures; communicating with other healthcare professionals and discussing with patient's family/caregivers.    Current Length of Stay: 4 day(s)  Current Patient Status: Inpatient   Certification Statement: The patient will continue to require additional inpatient hospital stay due to the need for placement  Discharge Plan: Anticipate discharge in 24-48 hrs to rehab facility.    Code Status: Level 1 - Full Code    Subjective:   Patient seen, resting in bed, reports feeling okay, denies any pain or discomfort at this time, reports that she was uncomfortable earlier when she was sitting up in a chair but now feels better    Objective:     Vitals:   Temp (24hrs), Av.6 °F (37 °C), Min:98.4 °F (36.9 °C), Max:98.8 °F (37.1 °C)    Temp:  [98.4 °F (36.9 °C)-98.8 °F (37.1 °C)] 98.4 °F (36.9 °C)  HR:  [69-83] 83  Resp:  [18] 18  BP: (108-147)/(39-61) 147/61  SpO2:  [70 %-100 %] 97  %  Body mass index is 36.53 kg/m².     Input and Output Summary (last 24 hours):     Intake/Output Summary (Last 24 hours) at 9/9/2024 1704  Last data filed at 9/9/2024 1230  Gross per 24 hour   Intake 680 ml   Output --   Net 680 ml       Physical Exam:   Physical Exam  Constitutional:       General: She is not in acute distress.     Appearance: Normal appearance. She is normal weight. She is not ill-appearing.   HENT:      Head: Normocephalic and atraumatic.      Nose: Nose normal.      Mouth/Throat:      Mouth: Mucous membranes are moist.   Eyes:      Extraocular Movements: Extraocular movements intact.      Pupils: Pupils are equal, round, and reactive to light.   Cardiovascular:      Rate and Rhythm: Normal rate and regular rhythm.      Pulses: Normal pulses.      Heart sounds: Normal heart sounds. No murmur heard.     No friction rub. No gallop.   Pulmonary:      Effort: Pulmonary effort is normal. No respiratory distress.      Breath sounds: Normal breath sounds. No wheezing, rhonchi or rales.   Abdominal:      General: There is no distension.      Palpations: Abdomen is soft. There is no mass.      Tenderness: There is no abdominal tenderness.      Hernia: No hernia is present.   Musculoskeletal:         General: No swelling or tenderness. Normal range of motion.      Cervical back: Normal range of motion and neck supple. No rigidity.      Right lower leg: No edema.      Left lower leg: No edema.   Skin:     General: Skin is warm.      Capillary Refill: Capillary refill takes less than 2 seconds.      Findings: No erythema or rash.   Neurological:      General: No focal deficit present.      Mental Status: She is alert and oriented to person, place, and time. Mental status is at baseline.      Cranial Nerves: No cranial nerve deficit.      Motor: No weakness.   Psychiatric:         Mood and Affect: Mood normal.         Behavior: Behavior normal.          Additional Data:     Labs:  Results from last 7 days    Lab Units 09/09/24  0602 09/08/24  0623 09/07/24  0553   WBC Thousand/uL 11.33*   < > 9.53   HEMOGLOBIN g/dL 9.1*   < > 10.2*   HEMATOCRIT % 29.1*   < > 33.5*   PLATELETS Thousands/uL 171   < > 135*   SEGS PCT %  --   --  78*   LYMPHO PCT %  --   --  9*   MONO PCT %  --   --  9   EOS PCT %  --   --  3    < > = values in this interval not displayed.     Results from last 7 days   Lab Units 09/09/24  0602   SODIUM mmol/L 136   POTASSIUM mmol/L 4.1   CHLORIDE mmol/L 100   CO2 mmol/L 29   BUN mg/dL 36*   CREATININE mg/dL 1.11   ANION GAP mmol/L 7   CALCIUM mg/dL 8.4   GLUCOSE RANDOM mg/dL 181*         Results from last 7 days   Lab Units 09/09/24  1605 09/09/24  1121 09/09/24  0745 09/08/24  2034 09/08/24  1614 09/08/24  1120 09/08/24  0744 09/07/24  2107 09/07/24  1616 09/07/24  1134 09/07/24  1036 09/07/24  0731   POC GLUCOSE mg/dl 151* 152* 185* 260* 185* 218* 186* 309* 313* 204* 188* 157*               Lines/Drains:  Invasive Devices       Peripheral Intravenous Line  Duration             Peripheral IV 09/07/24 Left Forearm 2 days                          Imaging: No pertinent imaging reviewed.    Recent Cultures (last 7 days):         Last 24 Hours Medication List:   Current Facility-Administered Medications   Medication Dose Route Frequency Provider Last Rate    acetaminophen  650 mg Oral Q4H PRN Rex C Yorty, DO      albuterol  2 puff Inhalation Q6H PRN Rex C Yorty, DO      atorvastatin  10 mg Oral HS Rex C Yorty, DO      docusate sodium  100 mg Oral Daily Rex C Yorty, DO      enoxaparin  30 mg Subcutaneous Q12H [facility] Nancy Zuniga MD      fluticasone  1 spray Nasal Daily Rex C Yorty, DO      fluticasone-vilanterol  1 puff Inhalation Daily Rex C Yorty, DO      And    umeclidinium  1 puff Inhalation Daily Rex C Yorty, DO      furosemide  20 mg Oral BID Rex Diamond DO      gabapentin  200 mg Oral HS Rex Diamond DO      insulin glargine  15 Units Subcutaneous HS Rani  MD Thomas      insulin lispro  1-5 Units Subcutaneous TID AC Rex C Richierty, DO      insulin lispro  1-5 Units Subcutaneous HS Rex C Dara, DO      insulin lispro  12 Units Subcutaneous TID With Meals Rex GURWINDER Diamond, DO      latanoprost  1 drop Left Eye Daily Rex C Richierty, DO      montelukast  10 mg Oral QPM Rex C Yorty, DO      ondansetron  4 mg Intravenous Q6H PRN Rex C Yorty, DO      oxyCODONE  5 mg Oral Q6H PRN Rex C Yorty, DO      oxyCODONE  2.5 mg Oral Q6H PRN Rex C Yorty, DO      pantoprazole  40 mg Oral Early Morning Rex C Richiertconor, DO          Today, Patient Was Seen By: Nancy Zuniga MD    **Please Note: This note may have been constructed using a voice recognition system.**

## 2024-09-09 NOTE — PROGRESS NOTES
PHYSICAL MEDICINE AND REHABILITATION   PREADMISSION ASSESSMENT     Projected IGC and Rehabilitation Diagnoses:  Impairment of mobility, safety and Activities of Daily Living (ADLs) due to Orthopedic Disorders:  08.9  Other Orthopedic right distal femur fx  Etiologic: Periprosthetic right distal femur fracture  Date of Onset: 9/5/24   Date of surgery: 9/7/24    PATIENT INFORMATION  Name: Shantelle Romano Phone #: [phone] (home)   Address: [facility] [place] [address]  YOB: 1945 Age: 79 y.o. #   Marital Status:   Ethnicity: White  Employment Status: retired  Extended Emergency Contact Information  Primary Emergency Contact: Solomon Mcdonald   [facility]  Home Phone: [phone]  Mobile Phone: [phone]  Relation: Son  Secondary Emergency Contact: RezarasheedLars  Home Phone: [phone]  Relation: Son In-Law   needed? No  Advance Directive: Level 1 Full Code (no ACP docs)    INSURANCE/COVERAGE:     Primary Payor: [facility] REP / Plan: [facility] MEDICARE ADVANTRA / Product Type: Medicare HMO /   Secondary Payer:[employer]  ID# [id number]   Payer Contact:  Payer Contact:   Contact Phone:  Contact Phone:     Authorization #: ***  Coverage Dates:  LCD:   MEDICARE #: [id number]   Medical Record #: [id number]    REFERRAL SOURCE:   Referring provider: Nancy Zuniga MD  Referring facility: [facility]  Room: /-01  PCP: Clarisa Billingsley DO PCP phone number: [phone]    MEDICAL INFORMATION  HPI: Pt is a 79 year old female with a past medical history significant for chronic bronchitis, insulin-dependent type 2 diabetes mellitus, GERD who presented to [facility] ER on 9/5 status post fall.  Imaging in the ED revealed a right periprosthetic femur fracture.  Orthopedic consulted. Pt is s/p ORIF right distal femur periprosthetic fracture on 9/7. WBAT right lower extremity.     PT and OT have been  consulted and are recommending post-acute rehab services.   Patient's case has been reviewed with [facility] medical director, patient meets medical criteria for acute rehab and has demonstrated the ability to tolerate three or more hours of therapy per day. Patient is medically stable and ready for discharge to [facility].        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    Carpal tunnel syndrome     Chest pain 03/06/2017    Chronic kidney disease     Claudication ([facility]) 3/15/2019    Closed fracture of base of middle phalanx of finger 06/22/2017    Closed fracture of middle phalanx of left little finger 07/08/2017    Coagulopathy ([facility]) 05/15/2019    Colloid cyst of brain ([facility])     COPD (chronic obstructive pulmonary disease) ([facility])     COPD (chronic obstructive pulmonary disease) ([facility])     Coronary artery disease     Cough     Diabetes mellitus ([facility])     GERD (gastroesophageal reflux disease)     Glaucoma     Gout     History of brain disorder 10/07/2020    Hyperlipidemia     Hypertension     Irritable bowel syndrome     Melena 02/26/2019    PAD (peripheral artery disease) ([facility]) 5/15/2019    Paronychia of great toe of left foot 10/07/2020    Post-traumatic seizures ([facility]) 07/23/2015    Renal disorder     Seizures ([facility])     last 2015    Shortness of breath     Single seizure ([facility]) 05/31/2016    SOB (shortness of breath)     Syncope and collapse 11/11/2020    Urinary tract infection without hematuria 11/26/2016    Wheezing     Past Surgical History:   Procedure Laterality Date    BRAIN SURGERY      CARDIAC CATHETERIZATION N/A 8/28/2024    Procedure: Cardiac catheterization;  Surgeon: Matt Beltran MD;  Location: [facility] CARD[facility]AC [facility]ATH [facility];  Service: Cardiology    CATARACT  EXTRACTION      CHOLECYSTECTOMY      COLECTOMY RADHA      COLONOSCOPY      DECOMPRESSION SPINE LUMBAR POSTERIOR  08/19/2019    HERNIA REPAIR      HYSTERECTOMY      INCISION TENDON SHEATH HAND      NECK SURGERY  05/24/2018    NEUROPLASTY / TRANSPOSITION MEDIAN NERVE AT CARPAL TUNNEL      ORIF FEMUR FRACTURE Right 9/7/2024    Procedure: OPEN REDUCTION W/ INTERNAL FIXATION (ORIF) FEMUR;  Surgeon: Rambo Yanez MD;  Location: [facility];  Service: Orthopedics    OR COLONOSCOPY FLX DX W/COLLJ SPEC WHEN PFRMD N/A 03/26/2019    Procedure: COLONOSCOPY;  Surgeon: Yaniv Hawley MD;  Location: [facility];  Service: Gastroenterology    OR ESOPHAGOGASTRODUODENOSCOPY TRANSORAL DIAGNOSTIC N/A 03/26/2019    Procedure: ESOPHAGOGASTRODUODENOSCOPY (EGD);  Surgeon: Yaniv Hawley MD;  Location: [facility];  Service: Gastroenterology    REPLACEMENT TOTAL KNEE Right     RETINAL DETACHMENT SURGERY      TUBAL LIGATION       Allergies   Allergen Reactions    Brimonidine Other (See Comments)    Salicylic Acid-Sulfur Other (See Comments)    Sulfa Antibiotics Rash and Other (See Comments)         Medical/functional conditions requiring inpatient rehabilitation: Right hip fx, impaired self care and mobility, decreased strength/endurance    Risk for medical/clinical complications: Risk for falls, Risk for skin breakdown secondary to decreased mobility, Risk for infection at surgical site, Risk for uncontrolled pain, Risk for acute respiratory failure    Comorbidities:  DM2  Simple chronic bronchitis  Chronic heart failure   GERD    Surgeries in the last 100 days: s/p ORIF right distal femur periprosthetic fracture on 9/7    CURRENT VITAL SIGNS:   Temp:  [98.5 °F (36.9 °C)-98.8 °F (37.1 °C)] 98.8 °F (37.1 °C)  HR:  [67-77] 76  Resp:  [18] 18  BP: (103-147)/(39-56) 130/50   Intake/Output Summary (Last 24 hours) at 9/9/2024 1207  Last data filed at 9/9/2024 0556  Gross per 24 hour   Intake 240 ml   Output --   Net 240 ml        LABORATORY  RESULTS:      Lab Results   Component Value Date    HGB 9.1 (L) 09/09/2024    HCT 29.1 (L) 09/09/2024    WBC 11.33 (H) 09/09/2024     Lab Results   Component Value Date    BUN 36 (H) 09/09/2024    BUN 31 (H) 08/09/2024    BUN 21 05/21/2019    K 4.1 09/09/2024    K 4.5 08/09/2024    K 3.9 05/21/2019     09/09/2024     08/09/2024     05/21/2019    CREATININE 1.11 09/09/2024    CREATININE 1.15 (H) 05/21/2019     Lab Results   Component Value Date    PROTIME 13.0 08/27/2024    INR 0.92 08/27/2024        DIAGNOSTIC STUDIES:  XR knee 1 or 2 vw right    Result Date: 9/7/2024  Impression: Fluoroscopy provided for procedure guidance. Please refer to the separate procedure note for additional details. Workstation performed: [id number]     XR tibia fibula 2 views RIGHT    Result Date: 9/5/2024  Impression: Partially visualized distal femur periprosthetic fracture. Computerized Assisted Algorithm (CAA) may have been used to analyze all applicable images. Resident: Zhang Casper I, the attending radiologist, have reviewed the images and agree with the final report above. Workstation performed: [id number]     XR knee 1 or 2 vw right    Result Date: 9/5/2024  Impression: Comminuted periprosthetic distal femur fracture. This report is concordant with SIVAKUMAR SHAIKH's preliminary interpretation that is documented in the electronic medical record (EPIC). Computerized Assisted Algorithm (CAA) may have been used to analyze all applicable images. Resident: Zhang Casper I, the attending radiologist, have reviewed the images and agree with the final report above. Workstation performed: [id number]     CT lower extremity wo contrast right    Result Date: 9/5/2024  Impression: Status post total right knee arthroplasty with a comminuted displaced distal right femur periprosthetic fracture as described above. Workstation performed: [id number]       PRECAUTIONS/SPECIAL NEEDS:  Weight Bearing Precautions:  WBAT R LE,  Blood Sugar Management: Per MD orders, Edema Management, Safety Concerns, Pain Management, Requires O2: *** L/min, Visually Impaired, and Language Preference: English, Fall precautions    MEDICATIONS:     Current Facility-Administered Medications:     acetaminophen (TYLENOL) tablet 650 mg, 650 mg, Oral, Q4H PRN, Rex C Yorty, DO, 650 mg at 09/06/24 1217    albuterol (PROVENTIL HFA,VENTOLIN HFA) inhaler 2 puff, 2 puff, Inhalation, Q6H PRN, Rex C Yorty, DO    atorvastatin (LIPITOR) tablet 10 mg, 10 mg, Oral, HS, Rex C Yorty, DO, 10 mg at 09/08/24 2106    docusate sodium (COLACE) capsule 100 mg, 100 mg, Oral, Daily, Rex C Yorty, DO, 100 mg at 09/09/24 0948    fluticasone (FLONASE) 50 mcg/act nasal spray 1 spray, 1 spray, Nasal, Daily, Rex C Yorty, DO, 1 spray at 09/09/24 0947    fluticasone-vilanterol 200-25 mcg/actuation 1 puff, 1 puff, Inhalation, Daily, 1 puff at 09/09/24 0947 **AND** umeclidinium 62.5 mcg/actuation inhaler AEPB 1 puff, 1 puff, Inhalation, Daily, Rex C Yorty, DO, 1 puff at 09/09/24 0947    furosemide (LASIX) tablet 20 mg, 20 mg, Oral, BID, Rex C Yorty, DO, 20 mg at 09/09/24 0947    gabapentin (NEURONTIN) capsule 200 mg, 200 mg, Oral, HS, Rex C Yorty, DO, 200 mg at 09/08/24 2106    insulin glargine (LANTUS) subcutaneous injection 15 Units 0.15 mL, 15 Units, Subcutaneous, HS, Rani Guzman MD, 15 Units at 09/08/24 2107    insulin lispro (HumALOG/ADMELOG) 100 units/mL subcutaneous injection 1-5 Units, 1-5 Units, Subcutaneous, TID AC, 1 Units at 09/09/24 1154 **AND** Fingerstick Glucose (POCT), , , TID AC, Rex Diamond DO    insulin lispro (HumALOG/ADMELOG) 100 units/mL subcutaneous injection 1-5 Units, 1-5 Units, Subcutaneous, HS, Rex Diamond DO, 2 Units at 09/08/24 2106    insulin lispro (HumALOG/ADMELOG) 100 units/mL subcutaneous injection 12 Units, 12 Units, Subcutaneous, TID With Meals, Rex Diamond DO, 12 Units at 09/09/24 1154    latanoprost  "(XALATAN) 0.005 % ophthalmic solution 1 drop, 1 drop, Left Eye, Daily, Rex C Yorty, DO, 1 drop at 24 0947    montelukast (SINGULAIR) tablet 10 mg, 10 mg, Oral, QPM, Rex C Yorty, DO, 10 mg at 24 1703    ondansetron (ZOFRAN) injection 4 mg, 4 mg, Intravenous, Q6H PRN, Rex C Yorty, DO, 4 mg at 24 0940    oxyCODONE (ROXICODONE) IR tablet 5 mg, 5 mg, Oral, Q6H PRN, Rex C Yorty, DO, 5 mg at 24 0948    oxyCODONE (ROXICODONE) split tablet 2.5 mg, 2.5 mg, Oral, Q6H PRN, Rex C Yorty, DO    pantoprazole (PROTONIX) EC tablet 40 mg, 40 mg, Oral, Early Morning, Rex C Yorty, DO, 40 mg at 24 0559    SKIN INTEGRITY:   Right Knee    PRIOR LEVEL OF FUNCTION:  She lives in a(n) {[facility] Home Type:[address]}  Shantelle Romano is {ARC Marital Status:61914} and {places; lives with:5711::\"lives with their family\"}.  {ARC PRE PRIOR FUNCTION:00859}    FALLS IN THE LAST 6 MONTHS: ***    HOME ENVIRONMENT:  The living area: Two level;Bed/bath upstairs;Performs ADLs on one level   There are 7+7 steps to enter the home.    The patient will not have 24 hour supervision/physical assistance available upon discharge.    PREVIOUS DME:  Equipment in home (previous DME): Shower Chair, Grab Bars, and Rolling Walker    FUNCTIONAL STATUS:***  Physical Therapy Occupational Therapy Speech Therapy          CARE SCORES:  Self Care:  Eating: {ARC Pre Admission Screenin}  Oral hygiene: {ARC Pre Admission Screenin}  Toilet hygiene: {ARC Pre Admission Screenin}  Shower/bathing self: {ARC Pre Admission Screenin}  Upper body dressing: {ARC Pre Admission Screenin}  Lower body dressing: {ARC Pre Admission Screenin}  Putting on/taking off footwear: {ARC Pre Admission Screenin}  Transfers:  Roll left and right: {ARC Pre Admission Screenin}  Sit to lying: {ARC Pre Admission Screenin}  Lying to sitting on side of bed: {ARC Pre Admission Screenin}  Sit to stand: " {ARC Pre Admission Screenin}  Chair/bed to chair transfer: {ARC Pre Admission Screenin}  Toilet transfer: {ARC Pre Admission Screenin}  Mobility:  Walk 10 ft: {ARC Pre Admission Screenin}  Walk 50 ft with two turns: {ARC Pre Admission Screenin}  Walk 150ft: {ARC Pre Admission Screenin}    CURRENT GAP IN FUNCTION  Prior to Admission: Needs assistance with ADLs;Needs assistance with functional mobility;Needs assistance with IADLS    Expected functional outcomes: It is expected that with skilled acute rehabilitation services the patient will progress to Minimal Assistance for self care and Supervision for mobility     Estimated length of stay: 10 to 14 days    Anticipated Post-Discharge Disposition/Treatment  Disposition: Return to previous home/apartment.  Outpatient Services: Physical Therapy (PT) and Occupational Therapy (OT)    BARRIERS TO DISCHARGE  Weakness, Pain, Balance Difficulty, Fatigue, Home Accessibility, Caregiver Accessibility, and Equipment Needs    INTERVENTIONS FOR DISCHARGE  Adaptive equipment, Patient/Family/Caregiver Education, Community Resources, Arrange DME needs, Therapy exercises, and Energy conservation education     REQUIRED THERAPY:  Patient will require PT and OT 90 minutes each per day, five days per week to achieve rehab goals.     REQUIRED FUNCTIONAL AND MEDICAL MANAGEMENT FOR INPATIENT REHABILITATION:  Skin:  Monitor skin for breakdown, Pain Management: Overall pain is moderately controlled, Deep Vein Thrombosis (DVT) Prophylaxis:  Per MD orders, Diabetes Management: continue sliding scale insulin, patient to do finger sticks as ordered, SLIM to continue to manage diabetes, further internal medicine management of additional medical conditions while on ARC, PT/OT intervention, patient/family education and training, and any needed consults PRN.    RECOMMENDED LEVEL OF CARE:    Pt is a 79 year old female with a past medical history significant for  chronic bronchitis, insulin-dependent type 2 diabetes mellitus, GERD who presented to [facility] ER on 9/5 status post fall.  Imaging in the ED revealed a right periprosthetic femur fracture.  Orthopedic consulted. Pt is s/p ORIF right distal femur periprosthetic fracture on 9/7. WBAT right lower extremity.    Pt was independent PTA with transfers, amb, and ADLs. Pt lives with family in a Bilevel home with 7+7 ELVI. Pt is currently functioning below baseline needing *** A for ADLs and *** A for transfers/amb. Pt would benefit from [facility]C admission to have close medical management while participating in 3 hours of therapy per day that will include physical and occupational therapy. Physical and occupational therapists will address functional mobility deficits and assist patient in improving their strength, endurance, ROM, and self care. The rehab nursing staff will follow therapy recommendations to have 24 hour follow through during non-therapy hours. PM&R to maximize function and provide medical oversight. The MD will monitor patient co-morbidities while on the unit as well as order any additional tests, labs, and consults needed. [facility] specialized interdisciplinary team will meet weekly to discuss patient overall medical status and rehab goals in preparation for D/C home. Inpatient acute rehab is recommended for patient to maximize overall strength, endurance, self care, and mobility for a safe and timely transition back home.

## 2024-09-09 NOTE — PLAN OF CARE
Problem: PHYSICAL THERAPY ADULT  Goal: Performs mobility at highest level of function for planned discharge setting.  See evaluation for individualized goals.  Description: Treatment/Interventions: Functional transfer training, LE strengthening/ROM, Elevations, Therapeutic exercise, Endurance training, Patient/family training, Equipment eval/education, Bed mobility, Gait training, Spoke to nursing, Continued evaluation  Equipment Recommended: Walker (RW)       See flowsheet documentation for full assessment, interventions and recommendations.  Outcome: Progressing  Note: Prognosis: Good  Problem List: Decreased strength, Decreased endurance, Decreased range of motion, Impaired balance, Decreased mobility  Assessment: Pt seen for PT treatment session this date with interventions consisting of therapeutic activity to educate patient on safe transfers to progress as able and therapeutic exercise to improve strength to improve functional mobility. Pt agreeable to PT treatment session upon arrival, pt found supine in bed, in no apparent distress and A&O x 4 . Since previous session, pt has made fair progress as evidenced by increased activity tolerance  Barriers during this session include pain.  Pt continues to be functioning below baseline level, and remains limited 2* factors listed above and including decreased strength, impaired activity tolerance, impaired  balance, and pain.  Pt prognosis for achieving goals is good, pending pt progress with hospitalization/medical status improvements, and indicated by motivated to participate in therapy, ability to follow cues, and supportive family. PT will continue to see pt during current hospitalization in order to address the deficits listed above and provide interventions consistent w/ POC in effort to achieve goals. Current goals and POC remain appropriate, pt continues to have rehab potential  Upon conclusion pt seated OOB in recliner. The patient's AM-PAC Basic  Mobility Inpatient Short Form Raw Score is 6.  Based on patient presentations and impairments, pt would most appropriately benefit from Level 2 resource intensity upon discharge.  Please also refer to the recommendation of the Physical Therapist for safe discharge planning. RN verbalized pt appropriate for PT session.  Barriers to Discharge: Inaccessible home environment, Decreased caregiver support     Rehab Resource Intensity Level, PT: II (Moderate Resource Intensity)    See flowsheet documentation for full assessment.

## 2024-09-09 NOTE — PLAN OF CARE
Problem: Potential for Falls  Goal: Patient will remain free of falls  Description: INTERVENTIONS:  - Educate patient/family on patient safety including physical limitations  - Instruct patient to call for assistance with activity   - Consult OT/PT to assist with strengthening/mobility   - Keep Call bell within reach  - Keep bed low and locked with side rails adjusted as appropriate  - Keep care items and personal belongings within reach  - Initiate and maintain comfort rounds  - Make Fall Risk Sign visible to staff  - Offer Toileting every 2 Hours, in advance of need  - Initiate/Maintain bed/chair alarm  - Obtain necessary fall risk management equipment: slip resistant socks  - Apply yellow socks and bracelet for high fall risk patients  - Consider moving patient to room near nurses station  Outcome: Progressing

## 2024-09-09 NOTE — PROGRESS NOTES
"Progress Note - Orthopedics   Shantelle Romano 79 y.o. female MRN: [id number]  Unit/Bed#: -01 Encounter: [id number]    Assessment:  POD 2 status post ORIF right distal femur periprosthetic fracture    Plan:  Continue physical therapy and Occupational Therapy  Lovenox while inpatient for DVT prophylaxis then may transition to aspirin 81 twice daily upon discharge  Out of bed  DC planning  Acute postop loss anemia continue to monitor  Okay for discharge from orthopedic perspective  Weight bearing: Weightbearing as tolerated right lower extremity as per primary orthopedic surgeon    VTE Pharmacologic Prophylaxis: Sequential compression device (Venodyne)   VTE Mechanical Prophylaxis: sequential compression device    Subjective: The patient is resting comfortably in bed this morning.  She denies any new numbness or tingling.  She is eager to work with therapy.    Vitals: Blood pressure 130/50, pulse 76, temperature 98.8 °F (37.1 °C), resp. rate 18, height 4' 7\" (1.397 m), weight 71.3 kg (157 lb 3 oz), SpO2 94%, not currently breastfeeding.,Body mass index is 36.53 kg/m².      Intake/Output Summary (Last 24 hours) at 9/9/2024 0928  Last data filed at 9/9/2024 0556  Gross per 24 hour   Intake 240 ml   Output --   Net 240 ml       Invasive Devices       Peripheral Intravenous Line  Duration             Peripheral IV 09/05/24 Left Antecubital 4 days    Peripheral IV 09/07/24 Left Forearm 2 days                    Physical Exam:   Right lower extremity is neurovascularly intact  Toes are pink  Compartments are soft  Dressing is clean, dry and intact  Negative Homans  Brisk cap refill  Sensation intact    Lab, Imaging and other studies: I have personally reviewed pertinent lab results.  CBC:   Lab Results   Component Value Date    WBC 11.33 (H) 09/09/2024    HGB 9.1 (L) 09/09/2024    HCT 29.1 (L) 09/09/2024    MCV 91 09/09/2024     09/09/2024    RBC 3.20 (L) 09/09/2024    MCH 28.4 09/09/2024    MCHC 31.3 (L) " 09/09/2024    RDW 14.9 09/09/2024    MPV 9.5 09/09/2024     CMP:   Lab Results   Component Value Date    SODIUM 136 09/09/2024     09/09/2024    CO2 29 09/09/2024    BUN 36 (H) 09/09/2024    CREATININE 1.11 09/09/2024    CALCIUM 8.4 09/09/2024    EGFR 46 09/09/2024

## 2024-09-09 NOTE — OCCUPATIONAL THERAPY NOTE
Occupational Therapy Evaluation     Patient Name: Shantelle Romano  Today's Date: 9/9/2024  Problem List  Principal Problem:    Femur fracture, right ([facility])  Active Problems:    Simple chronic bronchitis ([facility])    Chronic heart failure with preserved ejection fraction (HCC)    Gastroesophageal reflux disease without esophagitis    Type 2 diabetes mellitus with diabetic neuropathy ([facility])    Obesity, morbid ([facility])    Past Medical History  Past Medical History:   Diagnosis Date    Acute kidney injury superimposed on chronic kidney disease  ([facility]) 11/26/2016    ADHD (attention deficit hyperactivity disorder) 03/17/2019    Arthritis     BL HIPS    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    Carpal tunnel syndrome     Chest pain 03/06/2017    Chronic kidney disease     Claudication ([facility]) 3/15/2019    Closed fracture of base of middle phalanx of finger 06/22/2017    Closed fracture of middle phalanx of left little finger 07/08/2017    Coagulopathy ([facility]) 05/15/2019    Colloid cyst of brain ([facility])     COPD (chronic obstructive pulmonary disease) ([facility])     COPD (chronic obstructive pulmonary disease) ([facility])     Coronary artery disease     Cough     Diabetes mellitus ([facility])     GERD (gastroesophageal reflux disease)     Glaucoma     Gout     History of brain disorder 10/07/2020    Hyperlipidemia     Hypertension     Irritable bowel syndrome     Melena 02/26/2019    PAD (peripheral artery disease) ([facility]) 5/15/2019    Paronychia of great toe of left foot 10/07/2020    Post-traumatic seizures ([facility]) 07/23/2015    Renal disorder     Seizures ([facility])     last 2015    Shortness of breath     Single seizure ([facility]) 05/31/2016    SOB (shortness of breath)     Syncope and collapse 11/11/2020    Urinary tract infection without hematuria 11/26/2016    Wheezing      Past Surgical History  Past Surgical History:   Procedure Laterality Date    BRAIN SURGERY      CARDIAC CATHETERIZATION N/A 8/28/2024    Procedure: Cardiac catheterization;  Surgeon: Matt CONWAY  MD Ruby;  Location: [facility];  Service: Cardiology    CATARACT EXTRACTION      CHOLECYSTECTOMY      COLECTOMY RADHA      COLONOSCOPY      DECOMPRESSION SPINE LUMBAR POSTERIOR  08/19/2019    HERNIA REPAIR      HYSTERECTOMY      INCISION TENDON SHEATH HAND      NECK SURGERY  05/24/2018    NEUROPLASTY / TRANSPOSITION MEDIAN NERVE AT CARPAL TUNNEL      ORIF FEMUR FRACTURE Right 9/7/2024    Procedure: OPEN REDUCTION W/ INTERNAL FIXATION (ORIF) FEMUR;  Surgeon: Rambo Yanez MD;  Location: [facility];  Service: Orthopedics    OK COLONOSCOPY FLX DX W/COLLJ SPEC WHEN PFRMD N/A 03/26/2019    Procedure: COLONOSCOPY;  Surgeon: Yaniv Hawley MD;  Location: [facility];  Service: Gastroenterology    OK ESOPHAGOGASTRODUODENOSCOPY TRANSORAL DIAGNOSTIC N/A 03/26/2019    Procedure: ESOPHAGOGASTRODUODENOSCOPY (EGD);  Surgeon: Yaniv Hawley MD;  Location: [facility];  Service: Gastroenterology    REPLACEMENT TOTAL KNEE Right     RETINAL DETACHMENT SURGERY      TUBAL LIGATION           09/09/24 1000   OT Last Visit   OT Visit Date 09/09/24   Note Type   Note type Evaluation   Additional Comments Nsg staff verbally cleared pt for OT evaluation.  Pt received  supine in bed c HOB semi-elevated on this date + is agreeable to OT session @ this time. @ exit, pt remains in  seated in bedside recliner c all lines intact, all needs met, call bell in hand + nsg aware of location/disposition.  (During evaluation, family present in room.)   Pain Assessment   Pain Assessment Tool 0-10   Pain Score 10 - Worst Possible Pain   Pain Location/Orientation Orientation: Right;Location: Leg   Pain Onset/Description Onset: Ongoing;Frequency: Constant/Continuous   Patient's Stated Pain Goal No pain   Hospital Pain Intervention(s) Rest;Repositioned;Medication (See MAR);Cold applied   Multiple Pain Sites No   Restrictions/Precautions   Weight Bearing Precautions Per Order Yes   RLE Weight Bearing Per Order WBAT   Other Precautions Fall  Risk;Pain;Chair Alarm;Bed Alarm;O2  (3LO2)   Home Living   Type of Home House  (bi-level)   Home Layout Two level;Bed/bath upstairs;Performs ADLs on one level;Stairs to enter with rails  (7 + 7 ELVI; in process of acquiring for stair glide/ was previously approved but now may not be getting per pt)   Bathroom Shower/Tub Walk-in shower   Bathroom Toilet Standard   Bathroom Equipment Grab bars in shower;Shower chair;Toilet raiser;Grab bars around toilet   Bathroom Accessibility Accessible   Home Equipment Walker  (Rollator at baseline)   Prior Function   Level of [place] Needs assistance with ADLs;Needs assistance with functional mobility;Needs assistance with IADLS   Lives With Son;Family   Receives Help From Family;Personal care attendant  (Aide 3-4 day/wk for 4-5hr/day)   IADLs Family/Friend/Other provides transportation;Family/Friend/Other provides meals;Family/Friend/Other provides medication management   Falls in the last 6 months 5 to 10   Vocational Retired   Lifestyle   Autonomy Pt lives in  2 story home c family + @ baseline, performs ADLs  with A, IADLs with A + fxl mobility with A with rollator. (-) .   Reciprocal Relationships Family   Intrinsic Gratification pt enjoys reading, crocheting, music, writing   General   Additional General Comments IM nailing ORIF RLE   ADL   Eating Assistance 5  Supervision/Setup   Grooming Assistance 5  Supervision/Setup   UB Bathing Assistance 3  Moderate Assistance   LB Bathing Assistance 1  Total Assistance   UB Dressing Assistance 5  Supervision/Setup   LB Dressing Assistance 2  Maximal Assistance   Toileting Assistance  1  Total Assistance   Bed Mobility   Supine to Sit 3  Moderate assistance   Additional items Assist x 2;Increased time required;HOB elevated;Verbal cues;LE management   Transfers   Sit to Stand 4  Minimal assistance   Additional items Assist x 2;Increased time required;Verbal cues  (elevated bed surface)   Stand to Sit 4  Minimal assistance    Additional items Assist x 2;Armrests;Increased time required;Verbal cues   Functional Mobility   Additional Comments Pt performed short distance ADL related fxl mobility from bed-bedside recliner c min A, x 2, with RW simulating toileting distances.   No overt LOB demonstrated + pt c/o pain /  denies SOB/ denies dizziness during transitional movements. Reports feeling moderately below baseline c abilities related to ADL-focused fxl mobility. F safety awareness noted while navigating t/o room. Transitions to bedside recliner for remainder of session.   Balance   Static Sitting Fair +   Dynamic Sitting Fair   Static Standing Fair -   Dynamic Standing Fair -   Ambulatory Poor +   Activity Tolerance   Activity Tolerance Patient limited by fatigue;Patient limited by pain   Medical Staff Made Aware PT/OT co-eval on this date d/t medical complexity, ambulatory dysfunction c high fall risk, various impeding lines + requirement for skilled interdisciplinary analysis of appropriate d/c recommendations. PT/OT POC/goals I'ly determined per respective discipline. (Brian)   Nurse Made Aware Medical staff made aware of current fxn, recommendations for d/c planning, fall risk + pt's location upon exit. (Zoey)   RUE Assessment   RUE Assessment WFL   LUE Assessment   LUE Assessment WFL   Hand Function   Gross Motor Coordination Functional   Fine Motor Coordination Functional   Psychosocial   Psychosocial (WDL) WDL   Cognition   Overall Cognitive Status Impaired   Arousal/Participation Alert   Attention Within functional limits   Orientation Level Oriented X4   Memory Within functional limits   Following Commands Follows multistep commands without difficulty   Comments Tangential   Assessment   Limitation Decreased ADL status;Decreased endurance;Decreased self-care trans;Decreased high-level ADLs;Decreased UE strength   Prognosis Fair   Assessment Patient is a 79 y.o. female seen for OT evaluation s/p admit to  [facility]  on 9/5/2024 w/Femur fracture, right (HCC) + commorbidities/PMHx (as listed in medical record) affecting patient's functional performance c ADL tasks at time of assessment. OT orders placed for evaluation and treatment to assess pt's ADLs, cognitive status + performance during functional tasks in order to formulate appropriate d/c recommendations.     Therapist performed at least two patient identifiers during session including name and wristband. Personal factors affecting patient at time of initial evaluation include: inaccessible home environment, step(s) to enter environment, multi-level environment, inability to perform ADLs, inability to ambulate household distances, inability to navigate community distances, and decreased initiation and engagement.   Pt's clinical presentation is currently unstable/unpredictable given new onset deficits that effect pt's occupational performance and ability to safely return to PLOF including decrease activity tolerance, decrease standing balance, decrease sitting balance, decrease performance during ADL tasks, increased pain, decrease generalized strength, decrease activity engagement, decrease performance during functional transfers, and high fall risk combined with medical complications of pain impacting overall mobility status, abnormal renal lab values, abnormal CBC, abnormal blood sugars, low SpO2 values, and need for input for mobility technique/safety. This evaluation required an extensive review of medical and/or therapy records and additional review of physical, cognitive and psychosocial history related to functional performance. Based upon functional performance deficits and assessments, this evaluation has been identified as a  high complexity evaluation.      Patient to benefit from continued Occupational Therapy treatment while in the hospital to address aforementioned deficits and maximize level of functional independence with ADLs and functional mobility.    Goals   Patient Goals Go home   Plan   Treatment Interventions ADL retraining;UE strengthening/ROM;Functional transfer training;Endurance training;Patient/family training;Equipment evaluation/education;Compensatory technique education;Energy conservation   Goal Expiration Date 09/19/24   OT Treatment Day 0   OT Frequency 3-5x/wk   Discharge Recommendation   Rehab Resource Intensity Level, OT II (Moderate Resource Intensity)   -PAC Daily Activity Inpatient   Lower Body Dressing 2   Bathing 2   Toileting 2   Upper Body Dressing 3   Grooming 3   Eating 3   Daily Activity Raw Score 15   Daily Activity Standardized Score (Calc for Raw Score >=11) 34.69   AM-PAC Applied Cognition Inpatient   Following a Speech/Presentation 3   Understanding Ordinary Conversation 3   Taking Medications 2   Remembering Where Things Are Placed or Put Away 2   Remembering List of 4-5 Errands 2   Taking Care of Complicated Tasks 2   Applied Cognition Raw Score 14   Applied Cognition Standardized Score 32.02   Barthel Index   Feeding 5   Bathing 0   Grooming Score 0   Dressing Score 5   Bladder Score 5   Bowels Score 5   Toilet Use Score 5   Transfers (Bed/Chair) Score 5   Mobility (Level Surface) Score 0   Stairs Score 0   Barthel Index Score 30   End of Consult   Patient Position at End of Consult Bedside chair;All needs within reach   Nurse Communication Nurse aware of consult     The patient's raw score on the AM-PAC Daily Activity inpatient short form is 15, standardized score is 34.69, less than 39.4. Please refer to the recommendation of the Occupational Therapist for safe DC planning.    Resource Intensity Recommendation: Level II (Moderate Resource Intensity)        GOALS:    *ADL transfers with (I) for inc'd independence with ADLs/purposeful tasks    *UB ADL with (I) for inc'd independence with self cares    *LB ADL with (I) using AE prn for inc'd independence with self cares    *Toileting with (I) for clothing management and  hygiene for return to PLOF with personal care    *Increase stand tolerance x 5  m for inc'd tolerance with standing purposeful tasks    *Participate in 10m UE therex to increase overall stamina/activity tolerance for purposeful tasks    *Bed mobility- (I) for inc'd independence to manage own comfort and initiate EOB & OOB purposeful tasks    *Patient will verbalize 3 safety awareness/ principles to prevent falls in the home setting.     *Patient will verbalize and demonstrate use of energy conservation/deep breathing techniques and work simplification skills during functional activities with no verbal cues.     *Patient will increase OOB/sitting tolerance to 2-4 hours per day to increase participation in self-care and leisure tasks with no s/s of exertion.     *Patient will engage in ongoing cognitive assessment to assist with safe discharge planning/recommendations.     *Pt will verbalize and demonstrate understanding of post-op movement precautions 100% (if applicable) in tx sessions for increased safety and functional mobility.      *Pt will demonstrate use of long handled AE (if appropriate) during 100% of tx sessions for increased ADL safety and independence following D/C     Sophie Snow OTR/BRIANNA

## 2024-09-09 NOTE — PLAN OF CARE
Problem: OCCUPATIONAL THERAPY ADULT  Goal: Performs self-care activities at highest level of function for planned discharge setting.  See evaluation for individualized goals.  Description: Treatment Interventions: ADL retraining, UE strengthening/ROM, Functional transfer training, Endurance training, Patient/family training, Equipment evaluation/education, Compensatory technique education, Energy conservation          See flowsheet documentation for full assessment, interventions and recommendations.   Note: Limitation: Decreased ADL status, Decreased endurance, Decreased self-care trans, Decreased high-level ADLs, Decreased UE strength  Prognosis: Fair  Assessment: Patient is a 79 y.o. female seen for OT evaluation s/p admit to  [facility] on 9/5/2024 w/Femur fracture, right (HCC) + commorbidities/PMHx (as listed in medical record) affecting patient's functional performance c ADL tasks at time of assessment. OT orders placed for evaluation and treatment to assess pt's ADLs, cognitive status + performance during functional tasks in order to formulate appropriate d/c recommendations.     Therapist performed at least two patient identifiers during session including name and wristband. Personal factors affecting patient at time of initial evaluation include: inaccessible home environment, step(s) to enter environment, multi-level environment, inability to perform ADLs, inability to ambulate household distances, inability to navigate community distances, and decreased initiation and engagement.   Pt's clinical presentation is currently unstable/unpredictable given new onset deficits that effect pt's occupational performance and ability to safely return to PLOF including decrease activity tolerance, decrease standing balance, decrease sitting balance, decrease performance during ADL tasks, increased pain, decrease generalized strength, decrease activity engagement, decrease performance during functional  transfers, and high fall risk combined with medical complications of pain impacting overall mobility status, abnormal renal lab values, abnormal CBC, abnormal blood sugars, low SpO2 values, and need for input for mobility technique/safety. This evaluation required an extensive review of medical and/or therapy records and additional review of physical, cognitive and psychosocial history related to functional performance. Based upon functional performance deficits and assessments, this evaluation has been identified as a  high complexity evaluation.      Patient to benefit from continued Occupational Therapy treatment while in the hospital to address aforementioned deficits and maximize level of functional independence with ADLs and functional mobility.     Rehab Resource Intensity Level, OT: II (Moderate Resource Intensity)

## 2024-09-09 NOTE — UTILIZATION REVIEW
Continued Stay Review  PLEASE SEE INITIAL REVIEW COMPLETED B MAURO SALINAS RN ON 9/6     Date: 9/8 & 9/9                          Current Patient Class: Inpatient  Current Level of Care: MED-SURG    HPI:79 y.o. female initially admitted on 9/5.    Assessment/Plan:   9/8:POD#1 s/p ORIF right distal femur periprosthetic fracture. Patient is laying comfortably in hospital bed in no acute distress. Patient reports she is doing better today with less pain in the right lower extremity. Patient reports she is applying ice to her right leg, which is helping. Patient denies any numbness or tingling in the right lower extremity. Patient reports she has not been out of bed since surgery. Patient mentions having a headache. Patient denies any chest pain, lightheadedness, dizziness, nausea, vomiting, fevers, and chills.WBC 13.84. HGB 9.6, HCT 29.9. BUN 26.. WBAT right lower extremity.Lovenox.Analgesics. PT/OT. O2@2L.    9/9:POD 2 status post ORIF right distal femur periprosthetic fracture.  resting comfortably in bed this morning. She denies any new numbness or tingling. She is eager to work with therapy. WBC 11.33. HGB 9.1, HCT 29.1.BUN 36. . WBAT right lower extremity.OOB. O2@4L. Lovenox.Analgesics. PT/OT.Okay for discharge from orthopedic perspective.        Vital Signs (last 3 days)       Date/Time Temp Pulse Resp BP MAP (mmHg) SpO2 Calculated FIO2 (%) - Nasal Cannula O2 Flow Rate (L/min) Nasal Cannula O2 Flow Rate (L/min) O2 Device Cardiac (WDL) Patient Position - Orthostatic VS [place] Coma Scale Score Pain    09/09/24 1048 -- -- -- -- -- -- 36 -- 4 L/min Nasal cannula -- -- 15 --    09/09/24 0948 -- -- -- -- -- -- -- -- -- -- -- -- -- 10 - Worst Possible Pain    09/09/24 07:49:19 98.8 °F (37.1 °C) 76 18 130/50 77 94 % -- -- -- -- -- Lying -- --    09/09/24 07:47:35 98.8 °F (37.1 °C) 77 18 108/39 62 94 % -- -- -- -- -- Lying -- --    09/09/24 0719 -- -- -- -- -- 90 % 36 -- 4 L/min Nasal cannula -- -- --  --    09/09/24 0700 -- -- -- -- -- 70 % -- -- -- None (Room air) -- -- -- --    09/08/24 20:38:39 98.5 °F (36.9 °C) 74 -- 147/48 81 100 % -- -- -- -- -- -- -- --    09/08/24 20:37:32 98.5 °F (36.9 °C) 69 18 147/48 81 96 % -- -- -- -- -- -- -- --    09/08/24 1911 -- -- -- -- -- -- -- -- -- -- -- -- 15 10 - Worst Possible Pain    09/08/24 14:54:23 98.6 °F (37 °C) 67 18 103/56 72 97 % -- -- -- -- -- -- -- --    09/08/24 1438 -- -- -- -- -- 98 % 28 -- 2 L/min Nasal cannula -- -- -- --    09/08/24 1234 -- -- -- -- -- -- -- -- -- -- -- -- 15 --    09/08/24 0844 -- -- -- -- -- -- -- -- -- -- -- -- -- 7 09/08/24 0756 -- -- -- -- -- -- -- -- -- -- -- -- -- 7 09/08/24 07:47:51 98.4 °F (36.9 °C) 72 18 113/55 74 95 % -- -- -- -- -- -- -- --    09/08/24 0649 -- 61 15 -- -- -- -- -- -- -- -- -- -- --    09/07/24 21:09:37 98.1 °F (36.7 °C) 65 18 145/53 84 96 % -- -- -- -- -- -- -- --    09/07/24 1946 -- -- -- -- -- -- -- -- -- -- -- -- 15 5    09/07/24 1822 -- -- -- -- -- -- -- -- -- -- -- -- 15 --    09/07/24 1605 98 °F (36.7 °C) -- 21 160/84 90 -- -- -- -- -- -- -- -- --    09/07/24 1527 -- -- -- -- -- -- -- -- -- -- -- -- 15 7 09/07/24 15:10:51 98.3 °F (36.8 °C) 69 22 158/80 106 95 % -- -- -- -- -- -- -- --    09/07/24 14:12:50 98.2 °F (36.8 °C) 69 16 136/60 85 95 % -- -- -- -- -- -- -- --    09/07/24 14:12:13 98.2 °F (36.8 °C) 71 16 136/60 85 94 % -- -- -- -- -- -- -- --    09/07/24 13:16:17 98.3 °F (36.8 °C) 72 15 112/49 70 95 % -- -- -- -- -- Lying -- 7 09/07/24 12:06:49 98.1 °F (36.7 °C) 69 17 114/53 73 94 % -- -- -- -- -- -- -- --    09/07/24 1122 -- -- -- -- -- -- -- -- -- -- -- -- -- 8 09/07/24 1117 -- -- -- -- -- -- -- -- -- -- -- -- -- 8 09/07/24 11:06:04 98.6 °F (37 °C) 65 17 120/51 74 96 % -- -- -- -- -- -- -- --    09/07/24 1045 -- 74 13 112/53 -- 96 % -- 3 L/min -- Nasal cannula WDL -- -- 6 09/07/24 1039 -- 68 18 -- -- 94 % -- 3 L/min -- Nasal cannula -- -- -- 10 - Worst Possible Pain     09/07/24 1030 -- 78 19 120/56 80 100 % -- 3 L/min -- Simple mask -- -- -- No Pain    09/07/24 1022 97.9 °F (36.6 °C) 77 14 124/54 78 100 % -- 5 L/min -- Simple mask X -- -- --    09/07/24 0759 97.8 °F (36.6 °C) 84 20 138/65 -- 95 % 28 -- 2 L/min Nasal cannula -- -- -- --    09/07/24 07:31:59 99.3 °F (37.4 °C) 72 20 150/71 97 95 % -- -- -- -- -- -- -- --    09/06/24 21:35:54 99.5 °F (37.5 °C) 81 18 122/65 84 96 % -- -- -- -- -- -- -- --    09/06/24 2030 -- -- -- -- -- -- -- -- -- -- -- -- 15 No Pain    09/06/24 15:09:45 99 °F (37.2 °C) 91 18 133/57 82 93 % -- -- -- -- -- -- -- --    09/06/24 1217 -- -- -- -- -- -- -- -- -- -- -- -- -- 8    09/06/24 0938 -- -- -- -- -- -- 28 -- 2 L/min Nasal cannula -- -- 15 7    09/06/24 0833 -- -- -- -- -- -- -- -- -- -- -- -- -- 8    09/06/24 07:55:21 98.7 °F (37.1 °C) 91 18 144/62 89 94 % -- -- -- -- -- -- -- --          Weight (last 2 days)       Date/Time Weight    09/09/24 0600 71.3 (157.19)    09/08/24 0600 71.5 (157.63)    09/07/24 0600 71.8 (158.29)              Pertinent Labs/Diagnostic Results:   Radiology:  XR knee 1 or 2 vw right   Final Interpretation by Cheryl Calderon MD (09/07 2903)      Fluoroscopy provided for procedure guidance.      Please refer to the separate procedure note for additional details.                     Workstation performed: [id number]         CT lower extremity wo contrast right   Final Interpretation by Rajinder Paredes MD (09/05 3370)   Status post total right knee arthroplasty with a comminuted displaced distal right femur periprosthetic fracture as described above.            Workstation performed: [id number]         XR tibia fibula 2 views RIGHT   Final Interpretation by Dion Marroquin MD (09/05 1021)      Partially visualized distal femur periprosthetic fracture.         Computerized Assisted Algorithm (CAA) may have been used to analyze all applicable images.               Resident: Zhang Casper I, the attending radiologist,  have reviewed the images and agree with the final report above.      Workstation performed: [id number]         XR knee 1 or 2 vw right   ED Interpretation by Lalo Escamilla MD (09/05 0702)   fracture      Final Interpretation by Dion Marroquin MD (09/05 1018)      Comminuted periprosthetic distal femur fracture.      This report is concordant with LALO ESCAMILLA's preliminary interpretation that is documented in the electronic medical record (EPIC).         Computerized Assisted Algorithm (CAA) may have been used to analyze all applicable images.         Resident: Zhang Casper I, the attending radiologist, have reviewed the images and agree with the final report above.      Workstation performed: [id number]                     Results from last 7 days   Lab Units 09/09/24  0602 09/08/24  0623 09/07/24  0553 09/06/24  0511 09/05/24  0634   WBC Thousand/uL 11.33* 13.84* 9.53 8.67 13.83*   HEMOGLOBIN g/dL 9.1* 9.6* 10.2* 10.6* 13.0   HEMATOCRIT % 29.1* 29.9* 33.5* 34.9 41.9   PLATELETS Thousands/uL 171 167 135* 141* 163   TOTAL NEUT ABS Thousands/µL  --   --  7.39 7.24 10.40*         Results from last 7 days   Lab Units 09/09/24  0602 09/08/24  0623 09/07/24  0553 09/06/24  0511 09/05/24  0634   SODIUM mmol/L 136 135 138 139 136   POTASSIUM mmol/L 4.1 4.3 4.3 4.0 3.6   CHLORIDE mmol/L 100 100 105 107 98   CO2 mmol/L 29 26 27 26 29   ANION GAP mmol/L 7 9 6 6 9   BUN mg/dL 36* 26* 15 25 31*   CREATININE mg/dL 1.11 1.06 0.95 1.11 1.48*   EGFR ml/min/1.73sq m 46 49 56 46 32   CALCIUM mg/dL 8.4 8.6 8.5 8.3* 8.7   MAGNESIUM mg/dL  --  2.0 1.9 2.1  --    PHOSPHORUS mg/dL  --  3.3 2.4 2.3  --          Results from last 7 days   Lab Units 09/09/24  1121 09/09/24  0745 09/08/24  2034 09/08/24  1614 09/08/24  1120 09/08/24  0744 09/07/24  2107 09/07/24  1616 09/07/24  1134 09/07/24  1036 09/07/24  0731 09/06/24  2038   POC GLUCOSE mg/dl 152* 185* 260* 185* 218* 186* 309* 313* 204* 188* 157* 126     Results from last  7 days   Lab Units 09/09/24  0602 09/08/24  0623 09/07/24  0553 09/06/24  0511 09/05/24  0634   GLUCOSE RANDOM mg/dL 181* 187* 134 198* 194*       Medications:   Scheduled Medications:  atorvastatin, 10 mg, Oral, HS  docusate sodium, 100 mg, Oral, Daily  fluticasone, 1 spray, Nasal, Daily  fluticasone-vilanterol, 1 puff, Inhalation, Daily   And  umeclidinium, 1 puff, Inhalation, Daily  furosemide, 20 mg, Oral, BID  gabapentin, 200 mg, Oral, HS  insulin glargine, 15 Units, Subcutaneous, HS  insulin lispro, 1-5 Units, Subcutaneous, TID AC  insulin lispro, 1-5 Units, Subcutaneous, HS  insulin lispro, 12 Units, Subcutaneous, TID With Meals  latanoprost, 1 drop, Left Eye, Daily  montelukast, 10 mg, Oral, QPM  pantoprazole, 40 mg, Oral, Early Morning      Continuous IV Infusions:  sodium chloride 0.9 % infusion  Rate: 100 mL/hr Dose: 100 mL/hr  Freq: Continuous Route: IV  Indications of Use: IV Hydration  Last Dose: Stopped (09/06/24 1210)  Start: 09/05/24 1215 End: 09/06/24 1114         1334     2344      0645     0951     1114-D/C'd  1210             PRN Meds:  acetaminophen, 650 mg, Oral, Q4H PRN  albuterol, 2 puff, Inhalation, Q6H PRN  ondansetron, 4 mg, Intravenous, Q6H PRN  oxyCODONE, 5 mg, Oral, Q6H PRN 9/8 x2, 9/9 x 1  oxyCODONE, 2.5 mg, Oral, Q6H PRN        Discharge Plan: D    Network Utilization Review Department  ATTENTION: Please call with any questions or concerns to [phone] and carefully listen to the prompts so that you are directed to the right person. All voicemails are confidential.   For Discharge needs, contact Care Management DC Support Team at [phone] opt. [phone]  Send all requests for admission clinical reviews, approved or denied determinations and any other requests to dedicated fax number below belonging to the campus where the patient is receiving treatment. List of dedicated fax numbers for the Facilities:  FACILITY NAME UR FAX NUMBER   ADMISSION DENIALS (Administrative/Medical  Necessity) [phone]   DISCHARGE SUPPORT TEAM (NETWORK) [phone]   PARENT CHILD HEALTH (Maternity/NICU/Pediatrics) [phone]   [facility] [phone]   [facility] [phone]   [facility] [phone]   [facility] [phone]   [facility] [phone]   [facility] [phone]   [facility] [phone]   [facility] [phone]   [facility] [phone]   [facility] [phone]   [facility] [phone]   [facility] [phone]

## 2024-09-09 NOTE — PHYSICAL THERAPY NOTE
PT Treatment Note    NAME:  Shantelle Romano  DATE: 09/09/24    AGE:   79 y.o.  Mrn:   [id number]  ADMIT DX:  Hip pain [M25.559]  Knee pain [M25.569]  Femur fracture, right (HCC) [S72.91XA]  Fall, initial encounter [W19.XXXA]  Fall in elderly patient [R29.6]  Performed at least 2 patient identifiers during session: Name and Epic photo       09/09/24 1002   PT Last Visit   PT Visit Date 09/09/24   Note Type   Note Type Treatment   Pain Assessment   Pain Assessment Tool 0-10   Pain Score 10 - Worst Possible Pain   Pain Location/Orientation Orientation: Right;Location: Hip   Patient's Stated Pain Goal No pain   Hospital Pain Intervention(s) Medication (See MAR);Cold applied;Repositioned   Restrictions/Precautions   Weight Bearing Precautions Per Order Yes   RLE Weight Bearing Per Order WBAT   Cognition   Overall Cognitive Status WFL   Arousal/Participation Alert   Attention Within functional limits   Orientation Level Oriented X4   Memory Within functional limits   Following Commands Follows all commands and directions without difficulty   Subjective   Subjective I am in a lot of pain   Bed Mobility   Rolling R 3  Moderate assistance   Additional items Assist x 2;Verbal cues;LE management;Increased time required   Supine to Sit 3  Moderate assistance   Additional items Assist x 2;Verbal cues;HOB elevated;LE management   Transfers   Sit to Stand 4  Minimal assistance   Additional items Assist x 2;Increased time required;Verbal cues   Stand to Sit 4  Minimal assistance   Additional items Assist x 2;Armrests;Increased time required;Verbal cues   Ambulation/Elevation   Gait pattern Improper Weight shift;Decreased foot clearance;Shuffling;Foward flexed   Gait Assistance 4  Minimal assist   Additional items Assist x 2;Verbal cues   Assistive Device Rolling walker   Distance 3 feet, bed to recliner   Balance   Static Sitting Fair +   Dynamic Sitting Fair   Static Standing Fair -   Dynamic Standing Fair -   Ambulatory Poor +    Activity Tolerance   Activity Tolerance Patient limited by pain;Patient limited by fatigue   Exercises   Quad Sets Sitting;10 reps;Right   Glute Sets Sitting;10 reps;Right   Hip Flexion Sitting;10 reps;Right   Knee AROM Short Arc Quad Sitting;10 reps;Right;AAROM   Assessment   Prognosis Good   Problem List Decreased strength;Decreased endurance;Decreased range of motion;Impaired balance;Decreased mobility   Assessment Pt seen for PT treatment session this date with interventions consisting of therapeutic activity to educate patient on safe transfers to progress as able and therapeutic exercise to improve strength to improve functional mobility. Pt agreeable to PT treatment session upon arrival, pt found supine in bed, in no apparent distress and A&O x 4 . Since previous session, pt has made fair progress as evidenced by increased activity tolerance  Barriers during this session include pain.  Pt continues to be functioning below baseline level, and remains limited 2* factors listed above and including decreased strength, impaired activity tolerance, impaired  balance, and pain.  Pt prognosis for achieving goals is good, pending pt progress with hospitalization/medical status improvements, and indicated by motivated to participate in therapy, ability to follow cues, and supportive family. PT will continue to see pt during current hospitalization in order to address the deficits listed above and provide interventions consistent w/ POC in effort to achieve goals. Current goals and POC remain appropriate, pt continues to have rehab potential  Upon conclusion pt seated OOB in recliner. The patient's AM-PAC Basic Mobility Inpatient Short Form Raw Score is 6.  Based on patient presentations and impairments, pt would most appropriately benefit from Level 2 resource intensity upon discharge.  Please also refer to the recommendation of the Physical Therapist for safe discharge planning. RN verbalized pt appropriate for PT  session.   Goals   Patient Goals to go home   LTG Expiration Date 09/18/24   PT Treatment Day 2   Plan   Treatment/Interventions Functional transfer training;LE strengthening/ROM;Elevations;Therapeutic exercise;Endurance training;Gait training   Progress Progressing toward goals   PT Frequency 4-6x/wk   Discharge Recommendation   Rehab Resource Intensity Level, PT II (Moderate Resource Intensity)   Equipment Recommended Walker   AM-PAC Basic Mobility Inpatient   Turning in Flat Bed Without Bedrails 1   Lying on Back to Sitting on Edge of Flat Bed Without Bedrails 1   Moving Bed to Chair 1   Standing Up From Chair Using Arms 1   Walk in Room 1   Climb 3-5 Stairs With Railing 1   Basic Mobility Inpatient Raw Score 6   [facility] Highest Level Of Mobility   -[facility] Goal 2: Bed activities/Dependent transfer   -[facility] Achieved 4: Move to chair/commode       Co treatment with OT secondary to complex medical condition of pt, possible A of 2 required to achieve and maintain transitional movements, requiring the need of skilled therapeutic intervention of 2 therapists to achieve delivery of services.      Time In: 1002  Time Out: 1027  Total Treatment Minutes: 25    Brian Barr PT

## 2024-09-09 NOTE — CASE MANAGEMENT
Case Management Discharge Planning Note    Patient name Shantelle Romano  Location /-01 MRN [id number]  : 1945 Date 2024       Current Admission Date: 2024  Current Admission Diagnosis:Femur fracture, right (HCC)   Patient Active Problem List    Diagnosis Date Noted Date Diagnosed    Femur fracture, right (HCC) 2024     Optic neuritis 2024     Thyroid nodule 2024     Abnormal thyroid function test 2024     Stroke-like symptoms 2024     Obesity, morbid (HCC) 2024     Former smoker 10/23/2023     Urinary incontinence 2023     Osteopenia of multiple sites 2022     Chronic kidney disease-mineral and bone disorder 2021     Neurologic gait dysfunction 2020     Type 2 diabetes mellitus with diabetic neuropathy ([facility]) 10/21/2020     Macular pucker, left 10/07/2020     Bilateral leg edema 2020     Chronic pain syndrome 2020     Stage 3b chronic kidney disease ([facility]) 2019     Vitamin D deficiency 05/15/2019     Chest pain 2017     Alternating constipation and diarrhea 2016     Simple chronic bronchitis ([facility])      Hypertension      Chronic heart failure with preserved ejection fraction ([facility]) 2016     Left ventricular hypertrophy 2016     Lung nodule 2016     Memory loss 2016     Osteoarthritis 2016     Coronary artery disease without angina pectoris 2015     Gastroesophageal reflux disease without esophagitis 2015     Hyperlipidemia associated with type 2 diabetes mellitus  ([facility]) 2015     Seizure disorder ([facility]) 2015     Strabismic amblyopia of right eye 2015       LOS (days): 4  Geometric Mean LOS (GMLOS) (days): 2.8  Days to GMLOS:-1.4     OBJECTIVE:  Risk of Unplanned Readmission Score: 20.57         Current admission status: Inpatient   Preferred Pharmacy:   [id number] - [place] [address] - [facility] [address]  [facility] [place]  [address]  Phone: [phone] Fax: [phone]    [facility] [address] - [facility]  [address] [facility]  [place] [address]  Phone: [phone] Fax: [phone]    Primary Care Provider: Clarisa Billingsley DO    Primary Insurance: [employer]  Secondary [facility]: [facility]    DISCHARGE DETAILS:                                          Other Referral/Resources/Interventions Provided:  Interventions: Short Term Rehab  Referral Comments: CM provided pt and family with STR choice list. Family requested to tour facilities today, and will provide choice tomorrow am. CM will continue to follow.

## 2024-09-10 VITALS
WEIGHT: 165.57 LBS | RESPIRATION RATE: 18 BRPM | DIASTOLIC BLOOD PRESSURE: 50 MMHG | TEMPERATURE: 99.1 F | HEART RATE: 86 BPM | HEIGHT: 55 IN | OXYGEN SATURATION: 92 % | SYSTOLIC BLOOD PRESSURE: 115 MMHG | BODY MASS INDEX: 38.32 KG/M2

## 2024-09-10 LAB
ANION GAP SERPL CALCULATED.3IONS-SCNC: 8 MMOL/L (ref 4–13)
BUN SERPL-MCNC: 39 MG/DL (ref 5–25)
CALCIUM SERPL-MCNC: 8.5 MG/DL (ref 8.4–10.2)
CHLORIDE SERPL-SCNC: 101 MMOL/L (ref 96–108)
CO2 SERPL-SCNC: 31 MMOL/L (ref 21–32)
CREAT SERPL-MCNC: 1.09 MG/DL (ref 0.6–1.3)
ERYTHROCYTE [DISTWIDTH] IN BLOOD BY AUTOMATED COUNT: 15.1 % (ref 11.6–15.1)
GFR SERPL CREATININE-BSD FRML MDRD: 47 ML/MIN/1.73SQ M
GLUCOSE SERPL-MCNC: 194 MG/DL (ref 65–140)
GLUCOSE SERPL-MCNC: 216 MG/DL (ref 65–140)
GLUCOSE SERPL-MCNC: 228 MG/DL (ref 65–140)
HCT VFR BLD AUTO: 28.3 % (ref 34.8–46.1)
HGB BLD-MCNC: 8.6 G/DL (ref 11.5–15.4)
MCH RBC QN AUTO: 27.9 PG (ref 26.8–34.3)
MCHC RBC AUTO-ENTMCNC: 30.4 G/DL (ref 31.4–37.4)
MCV RBC AUTO: 92 FL (ref 82–98)
PLATELET # BLD AUTO: 200 THOUSANDS/UL (ref 149–390)
PMV BLD AUTO: 9.9 FL (ref 8.9–12.7)
POTASSIUM SERPL-SCNC: 3.9 MMOL/L (ref 3.5–5.3)
RBC # BLD AUTO: 3.08 MILLION/UL (ref 3.81–5.12)
SODIUM SERPL-SCNC: 140 MMOL/L (ref 135–147)
WBC # BLD AUTO: 10.32 THOUSAND/UL (ref 4.31–10.16)

## 2024-09-10 PROCEDURE — 85027 COMPLETE CBC AUTOMATED: CPT

## 2024-09-10 PROCEDURE — 97110 THERAPEUTIC EXERCISES: CPT

## 2024-09-10 PROCEDURE — 80048 BASIC METABOLIC PNL TOTAL CA: CPT

## 2024-09-10 PROCEDURE — 99239 HOSP IP/OBS DSCHRG MGMT >30: CPT | Performed by: HOSPITALIST

## 2024-09-10 PROCEDURE — 82948 REAGENT STRIP/BLOOD GLUCOSE: CPT

## 2024-09-10 PROCEDURE — 97530 THERAPEUTIC ACTIVITIES: CPT

## 2024-09-10 RX ORDER — OXYCODONE HYDROCHLORIDE 5 MG/1
5 TABLET ORAL EVERY 6 HOURS PRN
Qty: 12 TABLET | Refills: 0 | Status: SHIPPED | OUTPATIENT
Start: 2024-09-10 | End: 2024-09-13

## 2024-09-10 RX ORDER — OXYCODONE HYDROCHLORIDE 5 MG/1
5 TABLET ORAL EVERY 6 HOURS PRN
Qty: 12 TABLET | Refills: 0 | Status: SHIPPED | OUTPATIENT
Start: 2024-09-10 | End: 2024-09-10

## 2024-09-10 RX ORDER — ENOXAPARIN SODIUM 100 MG/ML
30 INJECTION SUBCUTANEOUS EVERY 12 HOURS SCHEDULED
Qty: 15 ML | Refills: 0
Start: 2024-09-10 | End: 2024-10-05

## 2024-09-10 RX ORDER — ENOXAPARIN SODIUM 100 MG/ML
30 INJECTION SUBCUTANEOUS EVERY 12 HOURS SCHEDULED
Status: DISCONTINUED | OUTPATIENT
Start: 2024-09-10 | End: 2024-09-10 | Stop reason: HOSPADM

## 2024-09-10 RX ADMIN — FLUTICASONE FUROATE AND VILANTEROL TRIFENATATE 1 PUFF: 200; 25 POWDER RESPIRATORY (INHALATION) at 08:35

## 2024-09-10 RX ADMIN — INSULIN LISPRO 2 UNITS: 100 INJECTION, SOLUTION INTRAVENOUS; SUBCUTANEOUS at 11:20

## 2024-09-10 RX ADMIN — LATANOPROST 1 DROP: 50 SOLUTION OPHTHALMIC at 08:34

## 2024-09-10 RX ADMIN — DOCUSATE SODIUM 100 MG: 100 CAPSULE, LIQUID FILLED ORAL at 08:35

## 2024-09-10 RX ADMIN — INSULIN LISPRO 12 UNITS: 100 INJECTION, SOLUTION INTRAVENOUS; SUBCUTANEOUS at 08:34

## 2024-09-10 RX ADMIN — FLUTICASONE PROPIONATE 1 SPRAY: 50 SPRAY, METERED NASAL at 08:35

## 2024-09-10 RX ADMIN — OXYCODONE HYDROCHLORIDE 5 MG: 5 TABLET ORAL at 16:27

## 2024-09-10 RX ADMIN — INSULIN LISPRO 12 UNITS: 100 INJECTION, SOLUTION INTRAVENOUS; SUBCUTANEOUS at 11:20

## 2024-09-10 RX ADMIN — PANTOPRAZOLE SODIUM 40 MG: 40 TABLET, DELAYED RELEASE ORAL at 05:59

## 2024-09-10 RX ADMIN — FUROSEMIDE 20 MG: 20 TABLET ORAL at 08:35

## 2024-09-10 RX ADMIN — INSULIN LISPRO 1 UNITS: 100 INJECTION, SOLUTION INTRAVENOUS; SUBCUTANEOUS at 08:34

## 2024-09-10 RX ADMIN — UMECLIDINIUM 1 PUFF: 62.5 AEROSOL, POWDER ORAL at 08:35

## 2024-09-10 RX ADMIN — ENOXAPARIN SODIUM 30 MG: 30 INJECTION SUBCUTANEOUS at 08:34

## 2024-09-10 NOTE — ASSESSMENT & PLAN NOTE
Without exacerbation  Patient examines euvolemic  Continue Lasix, and Lipitor daily post discharge

## 2024-09-10 NOTE — CASE MANAGEMENT
Case Management Discharge Planning Note    Patient name Shantelle Romano  Location /-01 MRN [id number]  : 1945 Date 9/10/2024       Current Admission Date: 2024  Current Admission Diagnosis:Femur fracture, right (HCC)   Patient Active Problem List    Diagnosis Date Noted Date Diagnosed    Femur fracture, right (HCC) 2024     Optic neuritis 2024     Thyroid nodule 2024     Abnormal thyroid function test 2024     Stroke-like symptoms 2024     Obesity, morbid (HCC) 2024     Former smoker 10/23/2023     Urinary incontinence 2023     Osteopenia of multiple sites 2022     Chronic kidney disease-mineral and bone disorder 2021     Neurologic gait dysfunction 2020     Type 2 diabetes mellitus with diabetic neuropathy ([facility]) 10/21/2020     Macular pucker, left 10/07/2020     Bilateral leg edema 2020     Chronic pain syndrome 2020     Stage 3b chronic kidney disease ([facility]) 2019     Vitamin D deficiency 05/15/2019     Chest pain 2017     Alternating constipation and diarrhea 2016     Simple chronic bronchitis ([facility])      Hypertension      Chronic heart failure with preserved ejection fraction ([facility]) 2016     Left ventricular hypertrophy 2016     Lung nodule 2016     Memory loss 2016     Osteoarthritis 2016     Coronary artery disease without angina pectoris 2015     Gastroesophageal reflux disease without esophagitis 2015     Hyperlipidemia associated with type 2 diabetes mellitus  ([facility]) 2015     Seizure disorder ([facility]) 2015     Strabismic amblyopia of right eye 2015       LOS (days): 5  Geometric Mean LOS (GMLOS) (days): 2.8  Days to GMLOS:-2.3     OBJECTIVE:  Risk of Unplanned Readmission Score: 22.42         Current admission status: Inpatient   Preferred Pharmacy:   [id number] - [place] [address] - [facility] [address]  [facility] [place]  [address]  Phone: [phone] Fax: [phone]    [facility] - [place] [address] - [facility]  [address] [facility]  [place] [address]  Phone: [phone] Fax: [phone]    Primary Care Provider: Clarisa Billingsley DO    Primary Insurance: DARREL ALVARES  Secondary Insurance: [facility]    DISCHARGE DETAILS:                                                                                                               Facility Insurance Auth Number: [id number]

## 2024-09-10 NOTE — PHYSICAL THERAPY NOTE
PT Treatment Note    NAME:  Shantelle Romano  DATE: 09/10/24    AGE:   79 y.o.  Mrn:   [id number]  ADMIT DX:  Hip pain [M25.559]  Knee pain [M25.569]  Femur fracture, right (HCC) [S72.91XA]  Fall, initial encounter [W19.XXXA]  Fall in elderly patient [R29.6]  Performed at least 2 patient identifiers during session: Name and ID bracelet       09/10/24 0940   PT Last Visit   PT Visit Date 09/10/24   Note Type   Note Type Treatment   End of Consult   Patient Position at End of Consult Bedside chair;All needs within reach   Pain Assessment   Pain Assessment Tool 0-10   Pain Score 10 - Worst Possible Pain   Pain Location/Orientation Orientation: Lower;Location: Back   Hospital Pain Intervention(s) Repositioned;Ambulation/increased activity;Rest   Restrictions/Precautions   Weight Bearing Precautions Per Order Yes   RLE Weight Bearing Per Order WBAT   Other Precautions WBS;Fall Risk;Pain;O2  (2 L o2)   General   Chart Reviewed Yes   Family/Caregiver Present Yes   Cognition   Overall Cognitive Status Impaired   Arousal/Participation Alert   Attention Within functional limits   Orientation Level Oriented X4   Memory Decreased recall of precautions;Decreased recall of recent events   Following Commands Follows one step commands without difficulty   Bed Mobility   Rolling R 3  Moderate assistance   Additional items Assist x 2;Verbal cues;LE management;Increased time required   Supine to Sit 3  Moderate assistance   Additional items Assist x 2;HOB elevated;Increased time required;Verbal cues;LE management   Transfers   Sit to Stand 3  Moderate assistance   Additional items Assist x 2;Bedrails;Increased time required;Verbal cues   Stand to Sit 3  Moderate assistance   Additional items Assist x 2;Increased time required;Verbal cues;Armrests   Stand pivot 3  Moderate assistance   Additional items Assist x 2;Verbal cues;Increased time required  (mod A x 1 for walker mgmt)   Additional Comments pt denies dizziness or SOB upon  activity; max verbal cues required for transfers with little/no carry over   Ambulation/Elevation   Gait pattern Improper Weight shift;Decreased R stance;Step to;Excessively slow   Gait Assistance 4  Minimal assist   Additional items Assist x 2;Verbal cues   Assistive Device Rolling walker   Distance 3'   Balance   Static Sitting Fair +   Dynamic Sitting Fair   Static Standing Fair -   Dynamic Standing Fair -   Ambulatory Poor +   Activity Tolerance   Activity Tolerance Patient limited by pain   Exercises   Glute Sets Sitting;15 reps;Bilateral   Hip Flexion Sitting;15 reps;Bilateral  (tactile cues required for proper exercise technique on RLE)   Hip Abduction Sitting;15 reps;Bilateral   Hip Adduction Sitting;15 reps;Bilateral   Knee AROM Long Arc Quad Sitting;15 reps;Bilateral  (tactile cues required for proper exercise technique on RLE)   Ankle Pumps Sitting;15 reps;Bilateral   Balance training  pt performs standing M/L weight shifting with BUE support and Teresa x 2 x 15 reps each. Max verbal cues provided for proper exercise technique with good carry over;   Assessment   Prognosis Good   Problem List Decreased strength;Decreased endurance;Impaired balance;Decreased mobility;Pain   Assessment Pt seen for PT treatment session this date with interventions consisting of therapeutic activity to educate patient on safe transfers to progress as able and therapeutic exercise to improve strength to improve functional mobility. Pt agreeable to PT treatment session upon arrival, pt found supine in bed, in no apparent distress. Since previous session, pt has made fair progress as evidenced by ability to mobilize OOB  Barriers during this session include pain.  Pt continues to be functioning below baseline level, and remains limited 2* factors listed above and including impaired activity tolerance, impaired  balance, pain, decreased endurance, and decreased mobility.  Pt prognosis for achieving goals is good, pending pt  progress with hospitalization/medical status improvements, and indicated by success with treatment and continued required assistance. PT will continue to see pt during current hospitalization in order to address the deficits listed above and provide interventions consistent w/ POC in effort to achieve goals. Current goals and POC remain appropriate, pt continues to have rehab potential  Upon conclusion pt seated OOB in recliner. The patient's AM-PAC Basic Mobility Inpatient Short Form Raw Score is 7.  Based on patient presentations and impairments, pt would most appropriately benefit from Level 2 resource intensity upon discharge.  Please also refer to the recommendation of the Physical Therapist for safe discharge planning. RN verbalized pt appropriate for PT session. Co treatment with OT secondary to complex medical condition of pt, possible A of 2 required to achieve and maintain transitional movements, requiring the need of skilled therapeutic intervention of 2 therapists to achieve delivery of services.   Goals   Patient Goals to get up to the chair   STG Expiration Date 09/18/24   LTG Expiration Date 09/18/24   PT Treatment Day 3   Plan   Treatment/Interventions Functional transfer training;LE strengthening/ROM;Therapeutic exercise;Endurance training;Equipment eval/education;Gait training;Bed mobility   Progress Progressing toward goals   PT Frequency 4-6x/wk   Discharge Recommendation   Rehab Resource Intensity Level, PT II (Moderate Resource Intensity)   Equipment Recommended Walker   AM-PAC Basic Mobility Inpatient   Turning in Flat Bed Without Bedrails 2   Lying on Back to Sitting on Edge of Flat Bed Without Bedrails 1   Moving Bed to Chair 1   Standing Up From Chair Using Arms 1   Walk in Room 1   Climb 3-5 Stairs With Railing 1   Basic Mobility Inpatient Raw Score 7   Turning Head Towards Sound 4   Follow Simple Instructions 3   Low Function Basic Mobility Raw Score  17   Low Function Basic Mobility  Standardized Score  22.01   [facility] Highest Level Of Mobility   [facility] Goal 2: Bed activities   -[facility] Achieved 4: Move to chair/commode   End of Consult   Patient Position at End of Consult Bedside chair;All needs within reach       Time In: 0916  Time Out: 0940  Total Treatment Minutes: 24    Janice Kelly

## 2024-09-10 NOTE — PLAN OF CARE
Problem: PHYSICAL THERAPY ADULT  Goal: Performs mobility at highest level of function for planned discharge setting.  See evaluation for individualized goals.  Description: Treatment/Interventions: Functional transfer training, LE strengthening/ROM, Elevations, Therapeutic exercise, Endurance training, Patient/family training, Equipment eval/education, Bed mobility, Gait training, Spoke to nursing, Continued evaluation  Equipment Recommended: Walker (RW)       See flowsheet documentation for full assessment, interventions and recommendations.  Outcome: Progressing  Note: Prognosis: Good  Problem List: Decreased strength, Decreased endurance, Impaired balance, Decreased mobility, Pain  Assessment: Pt seen for PT treatment session this date with interventions consisting of therapeutic activity to educate patient on safe transfers to progress as able and therapeutic exercise to improve strength to improve functional mobility. Pt agreeable to PT treatment session upon arrival, pt found supine in bed, in no apparent distress. Since previous session, pt has made fair progress as evidenced by ability to mobilize OOB  Barriers during this session include pain.  Pt continues to be functioning below baseline level, and remains limited 2* factors listed above and including impaired activity tolerance, impaired  balance, pain, decreased endurance, and decreased mobility.  Pt prognosis for achieving goals is good, pending pt progress with hospitalization/medical status improvements, and indicated by success with treatment and continued required assistance. PT will continue to see pt during current hospitalization in order to address the deficits listed above and provide interventions consistent w/ POC in effort to achieve goals. Current goals and POC remain appropriate, pt continues to have rehab potential  Upon conclusion pt seated OOB in recliner. The patient's AM-PAC Basic Mobility Inpatient Short Form Raw Score is 10.   Based on patient presentations and impairments, pt would most appropriately benefit from Level 3 resource intensity upon discharge.  Please also refer to the recommendation of the Physical Therapist for safe discharge planning. RN verbalized pt appropriate for PT session. Co treatment with OT secondary to complex medical condition of pt, possible A of 2 required to achieve and maintain transitional movements, requiring the need of skilled therapeutic intervention of 2 therapists to achieve delivery of services.  Barriers to Discharge: Inaccessible home environment, Decreased caregiver support     Rehab Resource Intensity Level, PT: III (Minimum Resource Intensity)    See flowsheet documentation for full assessment.

## 2024-09-10 NOTE — PROGRESS NOTES
Patient:    MRN:  [id number]    Aidin Request ID:  [id number]    Level of care reserved:  Skilled Nursing Facility    Partner Reserved:  [place] [id number] [phone]    Clinical needs requested:    Geography searched:  35 miles around [id number]    Start of Service:    Request sent:  10:04am EDT on 9/6/2024 by Noreen Sosa    Partner reserved:  8:43am EDT on 9/10/2024 by Noreen Sosa    Choice list shared:  11:22am EDT on 9/9/2024 by Noreen Sosa

## 2024-09-10 NOTE — CASE MANAGEMENT
Case Management Discharge Planning Note    Patient name Shantelle Romano  Location /-01 MRN [id number]  : 1945 Date 9/10/2024       Current Admission Date: 2024  Current Admission Diagnosis:Femur fracture, right (HCC)   Patient Active Problem List    Diagnosis Date Noted Date Diagnosed    Femur fracture, right (HCC) 2024     Optic neuritis 2024     Thyroid nodule 2024     Abnormal thyroid function test 2024     Stroke-like symptoms 2024     Obesity, morbid (HCC) 2024     Former smoker 10/23/2023     Urinary incontinence 2023     Osteopenia of multiple sites 2022     Chronic kidney disease-mineral and bone disorder 2021     Neurologic gait dysfunction 2020     Type 2 diabetes mellitus with diabetic neuropathy ([facility]) 10/21/2020     Macular pucker, left 10/07/2020     Bilateral leg edema 2020     Chronic pain syndrome 2020     Stage 3b chronic kidney disease ([facility]) 2019     Vitamin D deficiency 05/15/2019     Chest pain 2017     Alternating constipation and diarrhea 2016     Simple chronic bronchitis ([facility])      Hypertension      Chronic heart failure with preserved ejection fraction ([facility]) 2016     Left ventricular hypertrophy 2016     Lung nodule 2016     Memory loss 2016     Osteoarthritis 2016     Coronary artery disease without angina pectoris 2015     Gastroesophageal reflux disease without esophagitis 2015     Hyperlipidemia associated with type 2 diabetes mellitus  ([facility]) 2015     Seizure disorder ([facility]) 2015     Strabismic amblyopia of right eye 2015       LOS (days): 5  Geometric Mean LOS (GMLOS) (days): 2.8  Days to GMLOS:-2.3     OBJECTIVE:  Risk of Unplanned Readmission Score: 22.42         Current admission status: Inpatient   Preferred Pharmacy:   [id number] - [place] - [facility] [address]  [facility] [place]  [address]  Phone: [phone] Fax: [phone]    [facility] - [place] - [facility]  [facility]  [place] [address]  Phone: [phone] Fax: [phone]    Primary Care Provider: Clarisa Billingsley DO    Primary Insurance: [employer]  REP  Secondary Insurance: [employer] [facility]    DISCHARGE DETAILS:                                          Other Referral/Resources/Interventions Provided:  Interventions: SNF, Transportation  Referral Comments: CM was notified by D/C support that auth was approved. CM scheduled S transport for today 1630 via [employer]. CM notified pt's son, facility, [facility], and nurse.    Would you like to participate in our [facility] service program?  : No - Declined             Dispatcher Contacted: Yes  Number/Name of Dispatcher: [facility] / [phone]     ETA of Transport (Date): 09/10/24  ETA of Transport (Time): 1630                            Accepting Facility Name, City & State : [facility]  [address] [id number]  Receiving Facility/Agency Phone Number: [phone]  Facility/Agency Fax Number: [phone]

## 2024-09-10 NOTE — ASSESSMENT & PLAN NOTE
Without exacerbation  Continue Singulair post discharge  Continue home inhalers inclusive of fluticasone-vilanterol, umeclidinium and Flonase post discharge

## 2024-09-10 NOTE — PLAN OF CARE
Problem: OCCUPATIONAL THERAPY ADULT  Goal: Performs self-care activities at highest level of function for planned discharge setting.  See evaluation for individualized goals.  Description: Treatment Interventions: ADL retraining, UE strengthening/ROM, Functional transfer training, Endurance training, Patient/family training, Equipment evaluation/education, Compensatory technique education, Energy conservation    See flowsheet documentation for full assessment, interventions and recommendations.   Note: Limitation: Decreased ADL status, Decreased endurance, Decreased self-care trans, Decreased high-level ADLs, Decreased UE strength  Prognosis: Fair  Assessment: Patient participated in Skilled OT session this date with interventions consisting of ADL re training with the use of correct body mechnaics, safety awareness and fall prevention techniques, therapeutic exercise to: increase functional use of BUEs, increase BUE muscle strength ,  therapeutic activities to: increase activity tolerance, increase postural control, increase trunk control, and increase OOB/ sitting tolerance . Patient agreeable to OT treatment session, upon arrival patient was found supine in bed.  In comparison to previous session, patient with improvements in activity tolerance and exercise completion . Patient requiring verbal cues for safety, verbal cues for correct technique, and frequent rest periods. Patient continues to be functioning below baseline level, occupational performance remains limited secondary to factors listed above and increased risk for falls and injury.   From OT standpoint, recommendation at time of d/c would with moderate intensity OT resources.   Patient to benefit from continued Occupational Therapy treatment while in the hospital to address deficits as defined above and maximize level of functional independence with ADLs and functional mobility.     Rehab Resource Intensity Level, OT: II (Moderate Resource  Intensity)     Anushka Mckeon OTR/L

## 2024-09-10 NOTE — CASE MANAGEMENT
[facility] has received APPROVED authorization.  Insurance: [employer]   Called in by Rep: Marita ROY#: [phone]  Authorization received for: SNF  Facility: [facility]   Authorization #: [id number]   Start of Care: 09/10  Next Review Date: 09/16  Continued Stay Care Coordinator: Jocelyne ROY#: [phone]  Submit next review to F#: [phone]    Care Manager notified: Noreen Sosa     Please reach out to CM for updates on any clinical information.

## 2024-09-10 NOTE — OCCUPATIONAL THERAPY NOTE
Occupational Therapy Treatment Note     Patient Name: Shantelle Romano  Today's Date: 9/10/2024  Problem List  Principal Problem:    Femur fracture, right (Formerly [facility])  Active Problems:    Simple chronic bronchitis (Formerly [facility])    Chronic heart failure with preserved ejection fraction (Formerly [facility])    Gastroesophageal reflux disease without esophagitis    Type 2 diabetes mellitus with diabetic neuropathy ([facility]ly [facility])    Obesity, morbid (Formerly [facility])    Past Medical History  Past Medical History:   Diagnosis Date    Acute kidney injury superimposed on chronic kidney disease  (Formerly [facility]) 11/26/2016    ADHD (attention deficit hyperactivity disorder) 03/17/2019    Arthritis     BL HIPS    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    Carpal tunnel syndrome     Chest pain 03/06/2017    Chronic kidney disease     Claudication (Formerly [facility]) 3/15/2019    Closed fracture of base of middle phalanx of finger 06/22/2017    Closed fracture of middle phalanx of left little finger 07/08/2017    Coagulopathy (Formerly [facility]) 05/15/2019    Colloid cyst of brain (Formerly [facility])     COPD (chronic obstructive pulmonary disease) (Formerly [facility])     COPD (chronic obstructive pulmonary disease) (Formerly [facility])     Coronary artery disease     Cough     Diabetes mellitus ([facility]ly [facility])     GERD (gastroesophageal reflux disease)     Glaucoma     Gout     History of brain disorder 10/07/2020    Hyperlipidemia     Hypertension     Irritable bowel syndrome     Melena 02/26/2019    PAD (peripheral artery disease) ([facility]ly [facility]) 5/15/2019    Paronychia of great toe of left foot 10/07/2020    Post-traumatic seizures (Formerly [facility]) 07/23/2015    Renal disorder     Seizures (Formerly [facility])     last 2015    Shortness of breath     Single seizure ([facility]ly [facility]) 05/31/2016    SOB (shortness of breath)     Syncope and collapse 11/11/2020    Urinary tract infection without hematuria 11/26/2016    Wheezing      Past Surgical History  Past Surgical History:   Procedure Laterality Date    BRAIN SURGERY      CARDIAC CATHETERIZATION N/A 8/28/2024    Procedure: Cardiac catheterization;  Surgeon: Matt CONWAY  MD Ruby;  Location: [facility];  Service: Cardiology    CATARACT EXTRACTION      CHOLECYSTECTOMY      COLECTOMY RADHA      COLONOSCOPY      DECOMPRESSION SPINE LUMBAR POSTERIOR  08/19/2019    HERNIA REPAIR      HYSTERECTOMY      INCISION TENDON SHEATH HAND      NECK SURGERY  05/24/2018    NEUROPLASTY / TRANSPOSITION MEDIAN NERVE AT CARPAL TUNNEL      ORIF FEMUR FRACTURE Right 9/7/2024    Procedure: OPEN REDUCTION W/ INTERNAL FIXATION (ORIF) FEMUR;  Surgeon: Rambo Yanez MD;  Location: [facility];  Service: Orthopedics    TX COLONOSCOPY FLX DX W/COLLJ SPEC WHEN PFRMD N/A 03/26/2019    Procedure: COLONOSCOPY;  Surgeon: Yaniv Hawley MD;  Location: [facility];  Service: Gastroenterology    TX ESOPHAGOGASTRODUODENOSCOPY TRANSORAL DIAGNOSTIC N/A 03/26/2019    Procedure: ESOPHAGOGASTRODUODENOSCOPY (EGD);  Surgeon: Yaniv Hawley MD;  Location: [facility];  Service: Gastroenterology    REPLACEMENT TOTAL KNEE Right     RETINAL DETACHMENT SURGERY      TUBAL LIGATION          09/10/24 0911   OT Last Visit   OT Visit Date 09/10/24   Note Type   Note Type Treatment  (Pt seen as a co-session with PT due to the patient's co-morbidities, clinically unstable presentation, and present impairments which are a regression from the patient's baseline.)   Pain Assessment   Pain Assessment Tool 0-10   Pain Score 10 - Worst Possible Pain   Pain Location/Orientation Orientation: Lower;Location: Back   Pain Radiating Towards down legs   Pain Onset/Description Onset: Ongoing;Frequency: Intermittent   Effect of Pain on Daily Activities yes mobility   Patient's Stated Pain Goal No pain   Hospital Pain Intervention(s) Medication (See MAR);Cold applied;Repositioned   Restrictions/Precautions   Weight Bearing Precautions Per Order Yes   RLE Weight Bearing Per Order WBAT   Other Precautions Fall Risk;Pain;Chair Alarm;Bed Alarm;O2  (2L O2 via NC)   Lifestyle   Autonomy assistance at baseline for ADLs and IADLs - aide assist  with showering and dressing; pt reports having reacher at home   ADL   Where Assessed Chair   LB Dressing Assistance 2  Maximal Assistance   LB Dressing Deficit Don/doff R sock;Don/doff L sock;Increased time to complete   LB Dressing Comments Pt required max A to doff bilateral socks and don R sock with attempt to don L sock but unable d/t limited reach and forward bend   Toileting Assistance    (max A for toileting hygiene d/t bladder incontinence)   Functional Standing Tolerance   Time 2 mins   Activity stance for transfers and weight-shifting R/L   Comments F- balance   Bed Mobility   Supine to Sit 3  Moderate assistance   Additional items Assist x 2;Increased time required;Verbal cues;LE management;Bedrails   Sit to Supine   (DNT; pt seated in recliner upon conclusion of session)   Additional Comments VC for bedrail utilization and proper body mechanics   Transfers   Sit to Stand 3  Moderate assistance   Additional items Assist x 2;Increased time required;Verbal cues   Stand to Sit 3  Moderate assistance   Additional items Assist x 2;Increased time required;Verbal cues   Stand pivot 3  Moderate assistance   Additional items Assist x 2;Increased time required;Verbal cues   Additional Comments RW used; VC for safe hand placement, proper body mechanics and RW management with directional turns   Therapeutic Excerise-Strength   UE Strength Yes   Right Upper Extremity- Strength   R Shoulder Flexion;Extension  (& protraction/retraction)   R Elbow Elbow flexion;Elbow extension   R Position Seated   Equipment Dumbbell  (1#)   R Weight/Reps/Sets x15 reps   RUE Strength Comment visual cues for exercise technique and sequence   Left Upper Extremity-Strength   L Shoulder Flexion;Extension  (& protraction/retraction)   L Elbow Elbow flexion;Elbow extension   L Position Seated   Equipment Dumbell  (1#)   L Weights/Reps/Sets x15 reps   LUE Strength Comment visual cues for exercise technique and sequence   Subjective  "  Subjective \"I can't move either of my legs\"   Cognition   Overall Cognitive Status Impaired   Arousal/Participation Alert   Attention Within functional limits   Orientation Level Oriented X4   Memory Decreased recall of precautions;Decreased recall of recent events   Following Commands Follows one step commands without difficulty   Comments Pt agreeable to OT treatment, tangential in conversation   Activity Tolerance   Activity Tolerance Patient limited by fatigue;Patient limited by pain   Medical Staff Made Aware Yes, nursing staff made aware of session outcomes   Assessment   Assessment Patient participated in Skilled OT session this date with interventions consisting of ADL re training with the use of correct body mechnaics, safety awareness and fall prevention techniques, therapeutic exercise to: increase functional use of BUEs, increase BUE muscle strength ,  therapeutic activities to: increase activity tolerance, increase postural control, increase trunk control, and increase OOB/ sitting tolerance . Patient agreeable to OT treatment session, upon arrival patient was found supine in bed.  In comparison to previous session, patient with improvements in activity tolerance and exercise completion . Patient requiring verbal cues for safety, verbal cues for correct technique, and frequent rest periods. Patient continues to be functioning below baseline level, occupational performance remains limited secondary to factors listed above and increased risk for falls and injury.   From OT standpoint, recommendation at time of d/c would with moderate intensity OT resources.   Patient to benefit from continued Occupational Therapy treatment while in the hospital to address deficits as defined above and maximize level of functional independence with ADLs and functional mobility.   Plan   Treatment Interventions ADL retraining;Functional transfer training;UE strengthening/ROM;Endurance training;Patient/family " training;Energy conservation;Activityengagement   Goal Expiration Date 09/19/24   OT Treatment Day 1   OT Frequency 3-5x/wk   Discharge Recommendation   Rehab Resource Intensity Level, OT II (Moderate Resource Intensity)   Additional Comments  The patient's raw score on the AM-PAC Daily Activity inpatient short form is 15, standardized score is 34.69, less than 39.4. Patients at this level are likely to benefit from discharge with moderate intensity OT resources. Please refer to the recommendation of the Occupational Therapist for safe discharge planning.   AM-PAC Daily Activity Inpatient   Lower Body Dressing 1   Bathing 2   Toileting 2   Upper Body Dressing 3   Grooming 3   Eating 4   Daily Activity Raw Score 15   Daily Activity Standardized Score (Calc for Raw Score >=11) 34.69   AM-PAC Applied Cognition Inpatient   Following a Speech/Presentation 2   Understanding Ordinary Conversation 3   Taking Medications 2   Remembering Where Things Are Placed or Put Away 2   Remembering List of 4-5 Errands 2   Taking Care of Complicated Tasks 2   Applied Cognition Raw Score 13   Applied Cognition Standardized Score 30.46     All needs met, call bell within reach  Anushka Mckeon MS, OTR/L

## 2024-09-10 NOTE — CASE MANAGEMENT
Case Management Discharge Planning Note    Patient name Shantelle Romano  Location /-01 MRN [id number]  : 1945 Date 9/10/2024       Current Admission Date: 2024  Current Admission Diagnosis:Femur fracture, right (HCC)   Patient Active Problem List    Diagnosis Date Noted Date Diagnosed    Femur fracture, right (HCC) 2024     Optic neuritis 2024     Thyroid nodule 2024     Abnormal thyroid function test 2024     Stroke-like symptoms 2024     Obesity, morbid (HCC) 2024     Former smoker 10/23/2023     Urinary incontinence 2023     Osteopenia of multiple sites 2022     Chronic kidney disease-mineral and bone disorder 2021     Neurologic gait dysfunction 2020     Type 2 diabetes mellitus with diabetic neuropathy ([facility]) 10/21/2020     Macular pucker, left 10/07/2020     Bilateral leg edema 2020     Chronic pain syndrome 2020     Stage 3b chronic kidney disease ([facility]) 2019     Vitamin D deficiency 05/15/2019     Chest pain 2017     Alternating constipation and diarrhea 2016     Simple chronic bronchitis ([facility])      Hypertension      Chronic heart failure with preserved ejection fraction ([facility]) 2016     Left ventricular hypertrophy 2016     Lung nodule 2016     Memory loss 2016     Osteoarthritis 2016     Coronary artery disease without angina pectoris 2015     Gastroesophageal reflux disease without esophagitis 2015     Hyperlipidemia associated with type 2 diabetes mellitus  ([facility]) 2015     Seizure disorder ([facility]) 2015     Strabismic amblyopia of right eye 2015       LOS (days): 5  Geometric Mean LOS (GMLOS) (days): 2.8  Days to GMLOS:-2.1     OBJECTIVE:  Risk of Unplanned Readmission Score: 22.37         Current admission status: Inpatient   Preferred Pharmacy:   [id number] - [place] - [address] [place]  [address]  Phone: [phone] Fax: [phone]    [facility] - [place] - [address]  [address]  [place] [address]  Phone: [phone] Fax: [phone]    Primary Care Provider: Clarisa Billingsley DO    Primary Insurance: [employer] REP  Secondary Insurance: [facility]    DISCHARGE DETAILS:                                          Other Referral/Resources/Interventions Provided:  Interventions: SNF  Referral Comments: CM spoke with pt's children to f/u on SNF choice. Daughter chose [facility]. CM reserved via [employer], and submitted for auth. CM will continue to follow.         Treatment Team Recommendation: SNF  Discharge Destination Plan:: SNF                                IMM Given (Date):: 09/10/24  IMM Given to:: Patient     Additional Comments: CM met with pt and son at bedside to review IMM and obtained verbal consent.

## 2024-09-10 NOTE — DISCHARGE SUMMARY
Discharge Summary - Hospitalist   Name: Shantelle Romano 79 y.o. female I MRN: [id number]  Unit/Bed#: -01 I Date of Admission: 9/5/2024   Date of Service: 9/10/2024 I Hospital Day: 5     Assessment & Plan  Femur fracture, right (HCC)  Status post a mechanical fall prior to arrival   Initial x-ray, and CAT scan revealed a right femur periprosthetic fracture   Status post an orthopedic surgical evaluation  Patient is status post postop day 2 open reduction and internal fixation of the right femur fracture  Continue nonweight bearing to the right lower extremity  Appreciate PT and OT evaluation, they recommend postacute rehabilitation services  Case management is working on placement  In the interim, continue Lovenox for DVT prophylaxis, and continue Tylenol, and oral oxycodone for mild, moderate, and severe pain respectively  Patient is medically stable for discharge    Update on 9/10/2024  Notified by case management that the patient has been accepted to the [facility] facility  Patient remains medically stable for discharge  Patient is allergic to aspirin, therefore will be discharged on Lovenox injections 30 mg subcutaneous twice a day for DVT prophylaxis x 25 days  Patient will follow-up in the outpatient setting with orthopedic surgery  Pain prescription for oxycodone has also been provided  DC on all other preadmit meds at preadmit doses  Outpatient follow-up with PCP also  Chronic heart failure with preserved ejection fraction (HCC)  Without exacerbation  Patient examines euvolemic  Continue Lasix, and Lipitor daily post discharge  Type 2 diabetes mellitus with diabetic neuropathy (HCC)  Lab Results   Component Value Date    HGBA1C 7.6 (H) 07/03/2024       Recent Labs     09/09/24  1605 09/09/24  2202 09/10/24  0748 09/10/24  1118   POCGLU 151* 210* 194* 228*       Blood Sugar Average: Last 72 hrs:  (P) 209.1769138806558854  Target blood sugar for the hospital is 140-180  Discharge on preadmit meds at  preadmit doses  Diabetic neuropathy-continue gabapentin post discharge    Simple chronic bronchitis (HCC)  Without exacerbation  Continue Singulair post discharge  Continue home inhalers inclusive of fluticasone-vilanterol, umeclidinium and Flonase post discharge  Gastroesophageal reflux disease without esophagitis  Continue Protonix post discharge  Obesity, morbid (HCC)  Present on admission as evidenced by BMI of 36    Encourage lifestyle modifications and weight loss     Medical Problems       Resolved Problems  Date Reviewed: 9/6/2024   None       Discharging Physician / Practitioner: Nancy Zuniga MD  PCP: Clarisa Billingsley DO  Admission Date:   Admission Orders (From admission, onward)       Ordered        09/05/24 1131  INPATIENT ADMISSION  Once                          Discharge Date: 09/10/24    Consultations During Hospital Stay:  Orthopedic surgery    Procedures Performed:   9/7/2024-Procedure(s):  Right - INSERTION NAIL IM FEMUR RETROGRADE  Right - OPEN REDUCTION W/ INTERNAL FIXATION (ORIF) FEMUR    Significant Findings / Test Results:   X-ray chest-no acute cardiopulmonary disease  X-ray right knee-Comminuted periprosthetic distal femur fracture.   X-ray right tibia fibula-partially visualized distal femur periprosthetic fracture  CT right lower extremity-Status post total right knee arthroplasty with a comminuted displaced distal right femur periprosthetic fracture as described above.          Incidental Findings:   None    Test Results Pending at Discharge (will require follow up):   None     Outpatient Tests Requested:  None    Complications: None    Reason for Admission: Mechanical fall, right femur fracture    Hospital Course:   Shantelle Romano is a 79 y.o. female patient who originally presented to the hospital on 9/5/2024 due to a mechanical fall.  Please refer to the initial history and physical examination completed by Dr. Rex Diamond for the initial presenting features and complaints.   "In brief, the patient is an 79-year-old female, who was admitted to the hospital after she came in with a fall.  Imaging studies were performed in the emergency room with results as outlined above.  Patient was diagnosed with a comminuted displaced distal right femur periprosthetic fracture.  An orthopedic consultation was obtained.  Orthopedic surgery took the patient to the OR on 9/7/2024 and performed the procedures as outlined above.  Patient did well shila and postoperatively.  She was seen by PT and OT, they recommended acute rehab.  Patient was ultimately discharged to the [place] rehab facility on 9/10/2024.  She was given prescriptions for Lovenox for DVT prophylaxis, and oxycodone for pain control.  No other changes were made to any of her preadmission medications, and/or to the preadmission doses.  She will follow-up in the near future in the outpatient setting with her PCP and orthopedic surgery.  Please refer to the assessment/plan portion of this discharge summary as outlined above for additional details regarding her stay.      Please see above list of diagnoses and related plan for additional information.     Condition at Discharge: good    Discharge Day Visit / Exam:   Subjective: Patient seen, resting in bed, looks and feels well, is eager on wanting to get to rehab  Vitals: Blood Pressure: 115/50 (09/10/24 0753)  Pulse: 86 (09/10/24 0753)  Temperature: 99.1 °F (37.3 °C) (09/10/24 0753)  Temp Source: Oral (09/09/24 0749)  Respirations: 18 (09/10/24 0753)  Height: 4' 7\" (139.7 cm) (09/05/24 1159)  Weight - Scale: 75.1 kg (165 lb 9.1 oz) (09/10/24 0553)  SpO2: 92 % (09/10/24 0753)  Exam:   Physical Exam  Vitals reviewed.   Constitutional:       Appearance: Normal appearance. She is well-developed.   HENT:      Head: Normocephalic and atraumatic.      Nose: Nose normal.      Mouth/Throat:      Mouth: Oropharynx is clear and moist.   Eyes:      Extraocular Movements: EOM normal.      " Conjunctiva/sclera: Conjunctivae normal.      Pupils: Pupils are equal, round, and reactive to light.   Neck:      Thyroid: No thyromegaly.      Vascular: No JVD.   Cardiovascular:      Rate and Rhythm: Normal rate and regular rhythm.      Heart sounds: No murmur heard.     No friction rub. No gallop.   Pulmonary:      Effort: Pulmonary effort is normal. No respiratory distress.      Breath sounds: Normal breath sounds.   Abdominal:      General: Bowel sounds are normal. There is no distension.      Palpations: Abdomen is soft. There is no mass.      Tenderness: There is no abdominal tenderness. There is no guarding.   Musculoskeletal:         General: No edema. Normal range of motion.      Cervical back: Normal range of motion and neck supple.   Lymphadenopathy:      Cervical: No cervical adenopathy.   Skin:     General: Skin is warm.      Findings: No erythema or rash.   Neurological:      Mental Status: She is alert and oriented to person, place, and time.      Cranial Nerves: No cranial nerve deficit.   Psychiatric:         Mood and Affect: Mood and affect normal.         Behavior: Behavior normal.          Discussion with Family: Updated  (son) at bedside.    Discharge instructions/Information to patient and family:   See after visit summary for information provided to patient and family.      Provisions for Follow-Up Care:  See after visit summary for information related to follow-up care and any pertinent home health orders.      Mobility at time of Discharge:   Basic Mobility Inpatient Raw Score: 7  JH-HLM Goal: 2: Bed activities/Dependent transfer  JH-HLM Achieved: 4: Move to chair/commode  HLM Goal NOT achieved. Continue to encourage mobility in post discharge setting.     Disposition:   Other Skilled Nursing Facility at [place]    Planned Readmission: None    Discharge Medications:  See after visit summary for reconciled discharge medications provided to patient and/or family.       Administrative Statements   Discharge Statement:  I have spent a total time of 45 minutes in caring for this patient on the day of the visit/encounter. >30 minutes of time was spent on: Prognosis, Instructions for management, Patient and family education, Documenting in the medical record, and Reviewing / ordering tests, medicine, procedures  .    **Please Note: This note may have been constructed using a voice recognition system**

## 2024-09-10 NOTE — ASSESSMENT & PLAN NOTE
Lab Results   Component Value Date    HGBA1C 7.6 (H) 07/03/2024       Recent Labs     09/09/24  1605 09/09/24  2202 09/10/24  0748 09/10/24  1118   POCGLU 151* 210* 194* 228*       Blood Sugar Average: Last 72 hrs:  (P) 209.2670392740326249  Target blood sugar for the hospital is 140-180  Discharge on preadmit meds at preadmit doses  Diabetic neuropathy-continue gabapentin post discharge

## 2024-09-10 NOTE — ASSESSMENT & PLAN NOTE
Status post a mechanical fall prior to arrival   Initial x-ray, and CAT scan revealed a right femur periprosthetic fracture   Status post an orthopedic surgical evaluation  Patient is status post postop day 2 open reduction and internal fixation of the right femur fracture  Continue nonweight bearing to the right lower extremity  Appreciate PT and OT evaluation, they recommend postacute rehabilitation services  Case management is working on placement  In the interim, continue Lovenox for DVT prophylaxis, and continue Tylenol, and oral oxycodone for mild, moderate, and severe pain respectively  Patient is medically stable for discharge    Update on 9/10/2024  Notified by case management that the patient has been accepted to the [facility]  Patient remains medically stable for discharge  Patient is allergic to aspirin, therefore will be discharged on Lovenox injections 30 mg subcutaneous twice a day for DVT prophylaxis x 25 days  Patient will follow-up in the outpatient setting with orthopedic surgery  Pain prescription for oxycodone has also been provided  DC on all other preadmit meds at preadmit doses  Outpatient follow-up with PCP also

## 2024-09-10 NOTE — CASE MANAGEMENT
[facility] received request for authorization from Care Manager.  Authorization request submitted for: [facility]  Facility Name: [facility] NPI: [id number]  Facility MD: Vadim Zamudio NPI: [id number]  Authorization initiated by contacting insurance: [employer]   Via: YABUY   Clinicals submitted via Portal attachment   Pending Reference #: [id number]    Care Manager notified: Noreen Sosa     Updates to authorization status will be noted in chart. Please reach out to CM for updates on any clinical information.

## 2024-09-10 NOTE — PLAN OF CARE
Problem: Potential for Falls  Goal: Patient will remain free of falls  Description: INTERVENTIONS:  - Educate patient/family on patient safety including physical limitations  - Instruct patient to call for assistance with activity   - Consult OT/PT to assist with strengthening/mobility   - Keep Call bell within reach  - Keep bed low and locked with side rails adjusted as appropriate  - Keep care items and personal belongings within reach  - Initiate and maintain comfort rounds  - Make Fall Risk Sign visible to staff  - Offer Toileting every  Hours, in advance of need  - Initiate/Maintain alarm  - Obtain necessary fall risk management equipment:   - Apply yellow socks and bracelet for high fall risk patients  - Consider moving patient to room near nurses station  Outcome: Progressing     Problem: Prexisting or High Potential for Compromised Skin Integrity  Goal: Skin integrity is maintained or improved  Description: INTERVENTIONS:  - Identify patients at risk for skin breakdown  - Assess and monitor skin integrity  - Assess and monitor nutrition and hydration status  - Monitor labs   - Assess for incontinence   - Turn and reposition patient  - Assist with mobility/ambulation  - Relieve pressure over bony prominences  - Avoid friction and shearing  - Provide appropriate hygiene as needed including keeping skin clean and dry  - Evaluate need for skin moisturizer/barrier cream  - Collaborate with interdisciplinary team   - Patient/family teaching  - Consider wound care consult   Outcome: Progressing     Problem: PAIN - ADULT  Goal: Verbalizes/displays adequate comfort level or baseline comfort level  Description: Interventions:  - Encourage patient to monitor pain and request assistance  - Assess pain using appropriate pain scale  - Administer analgesics based on type and severity of pain and evaluate response  - Implement non-pharmacological measures as appropriate and evaluate response  - Consider cultural and  social influences on pain and pain management  - Notify physician/advanced practitioner if interventions unsuccessful or patient reports new pain  Outcome: Progressing     Problem: INFECTION - ADULT  Goal: Absence or prevention of progression during hospitalization  Description: INTERVENTIONS:  - Assess and monitor for signs and symptoms of infection  - Monitor lab/diagnostic results  - Monitor all insertion sites, i.e. indwelling lines, tubes, and drains  - Monitor endotracheal if appropriate and nasal secretions for changes in amount and color  - [place] appropriate cooling/warming therapies per order  - Administer medications as ordered  - Instruct and encourage patient and family to use good hand hygiene technique  - Identify and instruct in appropriate isolation precautions for identified infection/condition  Outcome: Progressing     Problem: SAFETY ADULT  Goal: Patient will remain free of falls  Description: INTERVENTIONS:  - Educate patient/family on patient safety including physical limitations  - Instruct patient to call for assistance with activity   - Consult OT/PT to assist with strengthening/mobility   - Keep Call bell within reach  - Keep bed low and locked with side rails adjusted as appropriate  - Keep care items and personal belongings within reach  - Initiate and maintain comfort rounds  - Make Fall Risk Sign visible to staff  - Offer Toileting every  Hours, in advance of need  - Initiate/Maintain alarm  - Obtain necessary fall risk management equipment:   - Apply yellow socks and bracelet for high fall risk patients  - Consider moving patient to room near nurses station  Outcome: Progressing  Goal: Maintain or return to baseline ADL function  Description: INTERVENTIONS:  -  Assess patient's ability to carry out ADLs; assess patient's baseline for ADL function and identify physical deficits which impact ability to perform ADLs (bathing, care of mouth/teeth, toileting, grooming, dressing, etc.)  -  Assess/evaluate cause of self-care deficits   - Assess range of motion  - Assess patient's mobility; develop plan if impaired  - Assess patient's need for assistive devices and provide as appropriate  - Encourage maximum independence but intervene and supervise when necessary  - Involve family in performance of ADLs  - Assess for home care needs following discharge   - Consider OT consult to assist with ADL evaluation and planning for discharge  - Provide patient education as appropriate  Outcome: Progressing  Goal: Maintains/Returns to pre admission functional level  Description: INTERVENTIONS:  - Perform AM-PAC 6 Click Basic Mobility/ Daily Activity assessment daily.  - Set and communicate daily mobility goal to care team and patient/family/caregiver.   - Collaborate with rehabilitation services on mobility goals if consulted  - Perform Range of Motion  times a day.  - Reposition patient every  hours.  - Dangle patient  times a day  - Stand patient  times a day  - Ambulate patient  times a day  - Out of bed to chair  times a day   - Out of bed for meals times a day  - Out of bed for toileting  - Record patient progress and toleration of activity level   Outcome: Progressing     Problem: DISCHARGE PLANNING  Goal: Discharge to home or other facility with appropriate resources  Description: INTERVENTIONS:  - Identify barriers to discharge w/patient and caregiver  - Arrange for needed discharge resources and transportation as appropriate  - Identify discharge learning needs (meds, wound care, etc.)  - Arrange for interpretive services to assist at discharge as needed  - Refer to Case Management Department for coordinating discharge planning if the patient needs post-hospital services based on physician/advanced practitioner order or complex needs related to functional status, cognitive ability, or social support system  Outcome: Progressing     Problem: Knowledge Deficit  Goal: Patient/family/caregiver demonstrates  understanding of disease process, treatment plan, medications, and discharge instructions  Description: Complete learning assessment and assess knowledge base.  Interventions:  - Provide teaching at level of understanding  - Provide teaching via preferred learning methods  Outcome: Progressing

## 2024-09-11 ENCOUNTER — TRANSITIONAL CARE MANAGEMENT (OUTPATIENT)
Dept: FAMILY MEDICINE CLINIC | Facility: CLINIC | Age: 79
End: 2024-09-11

## 2024-09-11 ENCOUNTER — TELEPHONE (OUTPATIENT)
Dept: OBGYN CLINIC | Facility: HOSPITAL | Age: 79
End: 2024-09-11

## 2024-09-11 NOTE — TELEPHONE ENCOUNTER
Caller: Elma -[facility]    Doctor: Beau    Reason for call: Elma from [facility] calling has a question about dressing.  Silver Mepilex is saturated asking what you want to redress with, they do not have the silver Mepilex there.  They have ABD and Silver Alginate?    Call back#: [phone]- Ask for Elma or Pippa

## 2024-09-11 NOTE — UTILIZATION REVIEW
NOTIFICATION OF ADMISSION DISCHARGE   This is a Notification of Discharge from [facility]. Please be advised that this patient has been discharge from our facility. Below you will find the admission and discharge date and time including the patient’s disposition.   UTILIZATION REVIEW CONTACT:  Evelin Hammond  Utilization   Network Utilization Review Department  Phone: [phone] x carefully listen to the prompts. All voicemails are confidential.  Email: [email or web address]     ADMISSION INFORMATION  PRESENTATION DATE: 9/5/2024  6:11 AM  OBERVATION ADMISSION DATE: N/A  INPATIENT ADMISSION DATE: 9/5/24 11:31 AM   DISCHARGE DATE: 9/10/2024  5:03 PM   DISPOSITION:Non [facility] SNF/TCU/SNU    Network Utilization Review Department  ATTENTION: Please call with any questions or concerns to [phone] and carefully listen to the prompts so that you are directed to the right person. All voicemails are confidential.   For Discharge needs, contact Care Management DC Support Team at [phone] opt. 2  Send all requests for admission clinical reviews, approved or denied determinations and any other requests to dedicated fax number below belonging to the campus where the patient is receiving treatment. List of dedicated fax numbers for the Facilities:  FACILITY NAME UR FAX NUMBER   ADMISSION DENIALS (Administrative/Medical Necessity) [phone]   DISCHARGE SUPPORT TEAM ([facility]) [phone]   PARENT CHILD HEALTH (Maternity/NICU/Pediatrics) [phone]   [facility] [phone]   [facility] [phone]   [facility] [phone]   [facility] [phone]   [facility] [phone]   [facility] [phone]   [facility] [phone]   [facility]  [phone]   [facility] [phone]   [facility] [phone]   [facility] [phone]   [facility] [phone]      Discharge Summary - Hospitalist   Name: Shantelle Romano 79 y.o. female I MRN: [id number]  Unit/Bed#: -01 I Date of Admission: 9/5/2024   Date of Service: 9/10/2024 I Hospital Day: 5      Assessment & Plan  Femur fracture, right (HCC)  Status post a mechanical fall prior to arrival   Initial x-ray, and CAT scan revealed a right femur periprosthetic fracture   Status post an orthopedic surgical evaluation  Patient is status post postop day 2 open reduction and internal fixation of the right femur fracture  Continue nonweight bearing to the right lower extremity  Appreciate PT and OT evaluation, they recommend postacute rehabilitation services  Case management is working on placement  In the interim, continue Lovenox for DVT prophylaxis, and continue Tylenol, and oral oxycodone for mild, moderate, and severe pain respectively  Patient is medically stable for discharge     Update on 9/10/2024  Notified by case management that [facility]  Patient remains medically stable for discharge  Patient is allergic to aspirin, therefore will be discharged on Lovenox injections 30 mg subcutaneous twice a day for DVT prophylaxis x 25 days  Patient will follow-up in the outpatient setting with orthopedic surgery  Pain prescription for oxycodone has also been provided  DC on all other preadmit meds at preadmit doses  Outpatient follow-up with PCP also  Chronic heart failure with preserved ejection fraction (HCC)  Without exacerbation  Patient examines euvolemic  Continue Lasix, and Lipitor daily post discharge  Type 2 diabetes mellitus with diabetic neuropathy (HCC)        Lab Results   Component Value Date     HGBA1C 7.6 (H) 07/03/2024                 Recent Labs     09/09/24  1605 09/09/24  2202 09/10/24  0748 09/10/24  1118   POCGLU 151* 210* 194* 228*         Blood Sugar Average: Last 72 hrs:  (P) 209.2116466293769797  Target blood sugar for the hospital is 140-180  Discharge on preadmit meds at preadmit doses  Diabetic neuropathy-continue gabapentin post discharge     Simple chronic bronchitis (HCC)  Without exacerbation  Continue Singulair post discharge  Continue home inhalers inclusive of fluticasone-vilanterol, umeclidinium and Flonase post discharge  Gastroesophageal reflux disease without esophagitis  Continue Protonix post discharge  Obesity, morbid (HCC)  Present on admission as evidenced by BMI of 36     Encourage lifestyle modifications and weight loss     Medical Problems         Resolved Problems  Date Reviewed: 9/6/2024   None         Discharging Physician / Practitioner: Nancy Zuniga MD  PCP: Clarisa Billingsley DO  Admission Date:   Admission Orders (From admission, onward)          Ordered         09/05/24 1131   INPATIENT ADMISSION  Once                               Discharge Date: 09/10/24     Consultations During Hospital Stay:  Orthopedic surgery     Procedures Performed:   9/7/2024-Procedure(s):  Right - INSERTION NAIL IM FEMUR RETROGRADE  Right - OPEN REDUCTION W/ INTERNAL FIXATION (ORIF) FEMUR     Significant Findings / Test Results:   X-ray chest-no acute cardiopulmonary disease  X-ray right knee-Comminuted periprosthetic distal femur fracture.   X-ray right tibia fibula-partially visualized distal femur periprosthetic fracture  CT right lower extremity-Status post total right knee arthroplasty with a comminuted displaced distal right femur periprosthetic fracture as described above.            Incidental Findings:   None     Test Results Pending at Discharge (will require follow up):   None     Outpatient Tests Requested:  None     Complications: None     Reason for Admission: Mechanical fall, right femur  "fracture     Hospital Course:   Shantelle Romano is a 79 y.o. female patient who originally presented to the hospital on 9/5/2024 due to a mechanical fall.  Please refer to the initial history and physical examination completed by Dr. Rex Diamond for the initial presenting features and complaints.  In brief, the patient is an 79-year-old female, who was admitted to the hospital after she came in with a fall.  Imaging studies were performed in the emergency room with results as outlined above.  Patient was diagnosed with a comminuted displaced distal right femur periprosthetic fracture.  An orthopedic consultation was obtained.  Orthopedic surgery took the patient to the OR on 9/7/2024 and performed the procedures as outlined above.  Patient did well shila and postoperatively.  She was seen by PT and OT, they recommended acute rehab.  Patient was ultimately discharged to the [place] rehab facility on 9/10/2024.  She was given prescriptions for Lovenox for DVT prophylaxis, and oxycodone for pain control.  No other changes were made to any of her preadmission medications, and/or to the preadmission doses.  She will follow-up in the near future in the outpatient setting with her PCP and orthopedic surgery.  Please refer to the assessment/plan portion of this discharge summary as outlined above for additional details regarding her stay.        Please see above list of diagnoses and related plan for additional information.      Condition at Discharge: good     Discharge Day Visit / Exam:   Subjective: Patient seen, resting in bed, looks and feels well, is eager on wanting to get to rehab  Vitals: Blood Pressure: 115/50 (09/10/24 0753)  Pulse: 86 (09/10/24 0753)  Temperature: 99.1 °F (37.3 °C) (09/10/24 0753)  Temp Source: Oral (09/09/24 0749)  Respirations: 18 (09/10/24 0753)  Height: 4' 7\" (139.7 cm) (09/05/24 1159)  Weight - Scale: 75.1 kg (165 lb 9.1 oz) (09/10/24 0553)  SpO2: 92 % (09/10/24 0753)  Exam:   Physical " Exam  Vitals reviewed.   Constitutional:       Appearance: Normal appearance. She is well-developed.   HENT:      Head: Normocephalic and atraumatic.      Nose: Nose normal.      Mouth/Throat:      Mouth: Oropharynx is clear and moist.   Eyes:      Extraocular Movements: EOM normal.      Conjunctiva/sclera: Conjunctivae normal.      Pupils: Pupils are equal, round, and reactive to light.   Neck:      Thyroid: No thyromegaly.      Vascular: No JVD.   Cardiovascular:      Rate and Rhythm: Normal rate and regular rhythm.      Heart sounds: No murmur heard.     No friction rub. No gallop.   Pulmonary:      Effort: Pulmonary effort is normal. No respiratory distress.      Breath sounds: Normal breath sounds.   Abdominal:      General: Bowel sounds are normal. There is no distension.      Palpations: Abdomen is soft. There is no mass.      Tenderness: There is no abdominal tenderness. There is no guarding.   Musculoskeletal:         General: No edema. Normal range of motion.      Cervical back: Normal range of motion and neck supple.   Lymphadenopathy:      Cervical: No cervical adenopathy.   Skin:     General: Skin is warm.      Findings: No erythema or rash.   Neurological:      Mental Status: She is alert and oriented to person, place, and time.      Cranial Nerves: No cranial nerve deficit.   Psychiatric:         Mood and Affect: Mood and affect normal.         Behavior: Behavior normal.            Discussion with Family: Updated  (son) at bedside.     Discharge instructions/Information to patient and family:   See after visit summary for information provided to patient and family.       Provisions for Follow-Up Care:  See after visit summary for information related to follow-up care and any pertinent home health orders.       Mobility at time of Discharge:   Basic Mobility Inpatient Raw Score: 7  -[facility] Goal: 2: Bed activities/Dependent transfer  -HLM Achieved: 4: Move to chair/commode  HLM Goal NOT  achieved. Continue to encourage mobility in post discharge setting.     Disposition:   Other Skilled Nursing Facility at [place]     Planned Readmission: None     Discharge Medications:  See after visit summary for reconciled discharge medications provided to patient and/or family.          Administrative Statements

## 2024-09-12 ENCOUNTER — TELEPHONE (OUTPATIENT)
Dept: OBGYN CLINIC | Facility: HOSPITAL | Age: 79
End: 2024-09-12

## 2024-09-12 ENCOUNTER — TELEPHONE (OUTPATIENT)
Age: 79
End: 2024-09-12

## 2024-09-12 NOTE — TELEPHONE ENCOUNTER
Caller: Patients son Solomon    Doctor: Beau    Reason for call: Solomon is calling to follow up on the request from [facility]. They can not travel to the [place] office for follow up appts.     Call back#: [phone]

## 2024-09-12 NOTE — TELEPHONE ENCOUNTER
Caller: Son    Doctor: Beau    Reason for call: Received a call was unable to get the message. Call dc'd    Call back#: na

## 2024-09-12 NOTE — TELEPHONE ENCOUNTER
Caller: Simeon - [facility]    Doctor: Beau    Reason for call: Patient had surgery with you 9/7/24 OPEN REDUCTION W/ INTERNAL FIXATION (ORIF) FEMUR (Right: Leg Upper)   Simeon was asking if it is possible for her to follow up with Dr. Tatum instead for her PO care. There is only 1 person in the family can drive and they don't want to drive to [place].   Please call Simeon at the center    Call back#: [phone]

## 2024-09-15 DIAGNOSIS — J41.0 SIMPLE CHRONIC BRONCHITIS (HCC): ICD-10-CM

## 2024-09-17 ENCOUNTER — OFFICE VISIT (OUTPATIENT)
Dept: OBGYN CLINIC | Facility: HOSPITAL | Age: 79
End: 2024-09-17

## 2024-09-17 ENCOUNTER — HOSPITAL ENCOUNTER (OUTPATIENT)
Dept: RADIOLOGY | Facility: HOSPITAL | Age: 79
Discharge: HOME | End: 2024-09-17
Attending: ORTHOPAEDIC SURGERY
Payer: COMMERCIAL

## 2024-09-17 VITALS
HEIGHT: 55 IN | HEART RATE: 71 BPM | DIASTOLIC BLOOD PRESSURE: 67 MMHG | BODY MASS INDEX: 38.48 KG/M2 | SYSTOLIC BLOOD PRESSURE: 115 MMHG

## 2024-09-17 DIAGNOSIS — M97.8XXD PERIPROSTHETIC FRACTURE AROUND PROSTHETIC KNEE, SUBSEQUENT ENCOUNTER: Primary | ICD-10-CM

## 2024-09-17 DIAGNOSIS — S72.8X1A OTHER CLOSED FRACTURE OF RIGHT FEMUR, UNSPECIFIED PORTION OF FEMUR, INITIAL ENCOUNTER (HCC): ICD-10-CM

## 2024-09-17 DIAGNOSIS — Z96.659 PERIPROSTHETIC FRACTURE AROUND PROSTHETIC KNEE, SUBSEQUENT ENCOUNTER: Primary | ICD-10-CM

## 2024-09-17 PROCEDURE — 73552 X-RAY EXAM OF FEMUR 2/>: CPT

## 2024-09-17 PROCEDURE — 99024 POSTOP FOLLOW-UP VISIT: CPT | Performed by: ORTHOPAEDIC SURGERY

## 2024-09-17 RX ORDER — FLUTICASONE FUROATE, UMECLIDINIUM BROMIDE AND VILANTEROL TRIFENATATE 100; 62.5; 25 UG/1; UG/1; UG/1
POWDER RESPIRATORY (INHALATION)
Qty: 180 BLISTER | Refills: 3 | Status: SHIPPED | OUTPATIENT
Start: 2024-09-17

## 2024-09-17 NOTE — UTILIZATION REVIEW
NOTIFICATION OF ADMISSION DISCHARGE   This is a Notification of Discharge from [facility]. Please be advised that this patient has been discharge from our facility. Below you will find the admission and discharge date and time including the patient’s disposition.   UTILIZATION REVIEW CONTACT:  Ahmet Duque  Utilization   Network Utilization Review Department  Phone: [phone] x carefully listen to the prompts. All voicemails are confidential.  Email: [email or web address]     ADMISSION INFORMATION  PRESENTATION DATE: 9/5/2024  6:11 AM  OBERVATION ADMISSION DATE: N/A  INPATIENT ADMISSION DATE: 9/5/24 11:31 AM   DISCHARGE DATE: 9/10/2024  5:03 PM   DISPOSITION:Non [facility] SNF/TCU/SNU    Network Utilization Review Department  ATTENTION: Please call with any questions or concerns to [phone] and carefully listen to the prompts so that you are directed to the right person. All voicemails are confidential.   For Discharge needs, contact Care Management DC Support Team at [phone] opt. 2  Send all requests for admission clinical reviews, approved or denied determinations and any other requests to dedicated fax number below belonging to the campus where the patient is receiving treatment. List of dedicated fax numbers for the Facilities:  FACILITY NAME UR FAX NUMBER   ADMISSION DENIALS (Administrative/Medical Necessity) [phone]   DISCHARGE SUPPORT TEAM ([facility]) [phone]   PARENT CHILD HEALTH (Maternity/NICU/Pediatrics) [phone]   [facility] [phone]   [facility] [phone]   [facility] [phone]   [facility] [phone]   [facility] [phone]   [facility] [phone]   [facility] [phone]   [facility]  [phone]   [facility] [phone]   [facility] [phone]   [facility] [phone]   [facility] [phone]

## 2024-09-17 NOTE — PROGRESS NOTES
Assessment:  1. Periprosthetic fracture around prosthetic knee, subsequent encounter  XR femur 2 vw right            Surgery: Open Reduction W/ Internal Fixation (orif) Femur - Right  Date of Surgery: 9/7/2024        Discussion and Plan:   Continue WBAT  Continue physical therapy  May shower  Staples removed today and steri strips placed.     Follow Up:  Return in about 4 weeks (around 10/15/2024) for x-rays.  With Dr. Tatum as patient lives closer to [place] and it is difficult for the patient to come down here.       CHIEF COMPLAINT:  Chief Complaint   Patient presents with    Right Thigh - Post-op         SUBJECTIVE:  Shantelle Romano is a 79 y.o. year old female who presents for follow up 10 days after Open Reduction W/ Internal Fixation (orif) Femur - Right. Today patient has presents in a wheel chair.  She report the nursing facility has her up and walking with a walker.      PHYSICAL EXAMINATION:  General: well developed and well nourished, alert, oriented times 3, and appears comfortable  Psychiatric: Normal    MUSCULOSKELETAL EXAMINATION:  Incision: Clean, dry, intact and staples intact  Surgery Site: normal, no evidence of infection   Range of Motion: As expected  Neurovascular status: Neuro intact, good cap refill  Activity Restrictions:  WBAT  Done today: Staples removed      I have personally reviewed pertinent films in PACS and my interpretation is as follows.  Right femur x-rays demonstrates stable fracture with hardware in acceptable alignment and position.     Scribe Attestation      I,:  Sun Best MA am acting as a scribe while in the presence of the attending physician.:       I,:  Rambo Yanez MD personally performed the services described in this documentation    as scribed in my presence.:

## 2024-09-22 DIAGNOSIS — I50.32 CHRONIC HEART FAILURE WITH PRESERVED EJECTION FRACTION (HCC): ICD-10-CM

## 2024-09-22 DIAGNOSIS — R60.0 BILATERAL LEG EDEMA: ICD-10-CM

## 2024-09-23 RX ORDER — FUROSEMIDE 20 MG
20 TABLET ORAL 2 TIMES DAILY
Qty: 180 TABLET | Refills: 1 | Status: SHIPPED | OUTPATIENT
Start: 2024-09-23

## 2024-09-27 ENCOUNTER — HOSPITAL ENCOUNTER (OUTPATIENT)
Dept: NEUROLOGY | Facility: HOSPITAL | Age: 79
End: 2024-09-27
Payer: COMMERCIAL

## 2024-09-27 DIAGNOSIS — R56.9 SEIZURE (HCC): ICD-10-CM

## 2024-09-27 PROCEDURE — 95816 EEG AWAKE AND DROWSY: CPT | Performed by: STUDENT IN AN ORGANIZED HEALTH CARE EDUCATION/TRAINING PROGRAM

## 2024-09-27 PROCEDURE — 95816 EEG AWAKE AND DROWSY: CPT

## 2024-09-30 ENCOUNTER — OFFICE VISIT (OUTPATIENT)
Age: 79
End: 2024-09-30
Payer: COMMERCIAL

## 2024-09-30 VITALS
TEMPERATURE: 98 F | WEIGHT: 160 LBS | BODY MASS INDEX: 37.03 KG/M2 | DIASTOLIC BLOOD PRESSURE: 80 MMHG | HEIGHT: 55 IN | SYSTOLIC BLOOD PRESSURE: 114 MMHG | HEART RATE: 76 BPM | OXYGEN SATURATION: 95 %

## 2024-09-30 DIAGNOSIS — E66.01 OBESITY, MORBID (HCC): ICD-10-CM

## 2024-09-30 DIAGNOSIS — I50.32 CHRONIC HEART FAILURE WITH PRESERVED EJECTION FRACTION (HCC): ICD-10-CM

## 2024-09-30 DIAGNOSIS — J41.0 SIMPLE CHRONIC BRONCHITIS (HCC): ICD-10-CM

## 2024-09-30 DIAGNOSIS — R06.02 SOB (SHORTNESS OF BREATH): Primary | ICD-10-CM

## 2024-09-30 PROCEDURE — 99214 OFFICE O/P EST MOD 30 MIN: CPT | Performed by: INTERNAL MEDICINE

## 2024-09-30 PROCEDURE — G2211 COMPLEX E/M VISIT ADD ON: HCPCS | Performed by: INTERNAL MEDICINE

## 2024-09-30 RX ORDER — ALBUTEROL SULFATE 0.83 MG/ML
2.5 SOLUTION RESPIRATORY (INHALATION) EVERY 6 HOURS PRN
Qty: 360 ML | Refills: 1 | Status: SHIPPED | OUTPATIENT
Start: 2024-09-30

## 2024-09-30 NOTE — PROGRESS NOTES
"Assessment/Plan:   Diagnoses and all orders for this visit:    SOB (shortness of breath)  -     Complete PFT with post Bronchodilator and Six Minute walk; Future    Simple chronic bronchitis (HCC)  -     albuterol (2.5 mg/3 mL) 0.083 % nebulizer solution; Take 3 mL (2.5 mg total) by nebulization every 6 (six) hours as needed for wheezing or shortness of breath    Obesity, morbid (HCC)    Chronic heart failure with preserved ejection fraction (HCC)    Shortness of breath likely multifactorial in the setting of chronic simple bronchitis but congestive heart failure with chronic hypoxemic respiratory failure currently requiring 2 L of oxygen by nasal cannula  Since her chronic hypoxemic respiratory failure secondary to her fluid overload in the setting of her congestive heart failure diuresis has the furosemide has been doubled up following up with cardiology as well  Currently patient is not able to  on the walk in the hallway to check to see if she still needs the oxygen she is currently saturating well on on 2 L saturating around 97 to 98%  Ordered a complete PFT with a 6-minute walk test hopefully by the time she gets the appointment and goes in in the next 3 to 4 weeks she is able to ambulate better, discussed to continue with the oxygen for now while she is at the rehab for the physical therapy  Continue with Trelegy 1 puff daily  Continue with albuterol via the nebulizer 4 times daily as needed  Montelukast 10 mg daily at bedtime  Will follow-up in 4 to 6 weeks with the above testing.    Return in about 6 weeks (around 11/11/2024).  All questions are answered to the patient's satisfaction and understanding.  She verbalizes understanding.  She is encouraged to call with any further questions or concerns.    Portions of the record may have been created with voice recognition software.  Occasional wrong word or \"sound a like\" substitutions may have occurred due to the inherent limitations of voice recognition " software.  Read the chart carefully and recognize, using context, where substitutions have occurred.    Electronically Signed by Isabel Borja MD    ______________________________________________________________________    Chief Complaint:   Chief Complaint   Patient presents with    Follow-up       Patient ID: Shantelle is a 79 y.o. y.o. 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), Carpal tunnel syndrome, Chest pain (03/06/2017), Chronic kidney disease, Claudication (Formerly [facility] [place]) (3/15/2019), Closed fracture of base of middle phalanx of finger (06/22/2017), Closed fracture of middle phalanx of left little finger (07/08/2017), Coagulopathy (Formerly [facility] [place]) (05/15/2019), Colloid cyst of brain (Formerly [facility] [place]), COPD (chronic obstructive pulmonary disease) (Formerly [facility] [place]), COPD (chronic obstructive pulmonary disease) (Formerly [facility] [place]), Coronary artery disease, Cough, Diabetes mellitus (Formerly [facility] [place]), GERD (gastroesophageal reflux disease), Glaucoma, Gout, History of brain disorder (10/07/2020), Hyperlipidemia, Hypertension, Irritable bowel syndrome, Melena (02/26/2019), PAD (peripheral artery disease) (Formerly [facility] [place]) (5/15/2019), Paronychia of great toe of left foot (10/07/2020), Post-traumatic seizures (Formerly [facility] [place]) (07/23/2015), Renal disorder, Seizures (Formerly [facility] [place]), Shortness of breath, Single seizure (Formerly [facility] [place]) (05/31/2016), SOB (shortness of breath), Syncope and collapse (11/11/2020), Urinary tract infection without hematuria (11/26/2016), and Wheezing.    9/30/2024  Patient presents today for follow-up visit.  Patient is a very pleasant 79-year-old lady with former smoker, with greater than 20-pack-year smoking history her PFTs have been normal but given chronic bronchitis she has been on nebulizer treatments around-the-clock, she was recently admitted into the hospital with if fracture femur status post open reduction and at  the time of discharge she was discharged on 2 L of oxygen by nasal cannula continues she is currently in the rehab getting physical therapy  Daughter states that the physical therapy is not going as well as she thought it would, but if she is slowly progressing  She had multiple questions with regards to the oxygen and she states that she is worried that the patient has to go home on oxygen from the rehab,  She also states that during the physical therapy at the rehab they have been seeing occasional desaturations even with the continuous oxygen that they have been using she states.  Patient also states that her legs have been significantly swollen and that they diuretic has been doubled up    primary symptoms  Associated symptoms include myalgias.       Review of Systems   Constitutional:  Positive for appetite change.   HENT:  Positive for trouble swallowing.    Eyes: Negative.    Respiratory:  Positive for shortness of breath.    Cardiovascular:  Positive for leg swelling.   Gastrointestinal: Negative.    Endocrine: Negative.    Genitourinary: Negative.    Musculoskeletal:  Positive for myalgias.   Allergic/Immunologic: Negative.    Neurological: Negative.    Psychiatric/Behavioral: Negative.         Smoking history: She reports that she quit smoking about 23 years ago. Her smoking use included cigarettes. She started smoking about 63 years ago. She has a 20 pack-year smoking history. She has never used smokeless tobacco.    The following portions of the patient's history were reviewed and updated as appropriate: allergies, current medications, past family history, past medical history, past social history, past surgical history, and problem list.    Immunization History   Administered Date(s) Administered    COVID-19 J&J (Alida) vaccine 0.5 mL 06/03/2021    Influenza, high dose seasonal 0.7 mL 10/19/2023    Pneumococcal Conjugate 13-Valent 11/27/2017, 10/07/2020    Pneumococcal Polysaccharide PPV23 09/11/2019      Current Outpatient Medications   Medication Sig Dispense Refill    acetaminophen (TYLENOL) 650 mg CR tablet Take 500 mg by mouth 2 (two) times a day      albuterol (2.5 mg/3 mL) 0.083 % nebulizer solution Take 3 mL (2.5 mg total) by nebulization every 6 (six) hours as needed for wheezing or shortness of breath 360 mL 1    albuterol (ProAir HFA) 90 mcg/act inhaler Inhale 2 puffs every 6 (six) hours as needed for wheezing or shortness of breath (cough, chest tightness) 18 g 0    atorvastatin (LIPITOR) 10 mg tablet Take 1 tablet (10 mg total) by mouth daily at bedtime 90 tablet 1    Calcium Carbonate-Vitamin D (CALCIUM-D PO) Take 1 tablet by mouth in the morning      clotrimazole (LOTRIMIN) 1 % cream       Continuous Blood Gluc  (Dexcom G7 ) YOLANDA Use 1 Application if needed (check sugars) 1 each 0    Continuous Blood Gluc Sensor (Dexcom G7 Sensor) Use 1 Device every 10 days 9 each 3    dicyclomine (BENTYL) 20 mg tablet Take 20 mg by mouth 2 (two) times a day      DULoxetine (CYMBALTA) 30 mg delayed release capsule Take 30 mg by mouth 2 (two) times a day      enoxaparin (LOVENOX) 30 mg/0.3 mL Inject 0.3 mL (30 mg total) under the skin every 12 (twelve) hours for 25 days 15 mL 0    fluticasone (FLONASE) 50 mcg/act nasal spray 1 spray into each nostril daily pt uses as needed 54 g 1    fluticasone-umeclidinium-vilanterol (Trelegy Ellipta) 100-62.5-25 mcg/actuation inhaler inhale 1 puff by mouth daily Rinse mouth after use 180 blister 3    furosemide (LASIX) 20 mg tablet take 1 tablet by mouth twice a day 180 tablet 1    gabapentin (NEURONTIN) 100 mg capsule Take 2 capsules (200 mg total) by mouth daily at bedtime 180 capsule 1    guaiFENesin (MUCINEX) 600 mg 12 hr tablet Take 1 tablet (600 mg total) by mouth 2 (two) times a day as needed for cough or congestion 180 tablet 1    insulin aspart (NovoLOG FlexPen) 100 UNIT/ML injection pen inject 12 units subcutaneously before meals (3 MEALS A DAY) (Patient  "taking differently: Inject under the skin 3 (three) times a day with meals Sliding scale) 15 mL 5    Insulin Glargine Solostar (Lantus SoloStar) 100 UNIT/ML SOPN Inject 0.3 mL (30 Units total) under the skin daily (Patient taking differently: Inject 30 Units under the skin daily at bedtime) 15 mL 2    Insulin Pen Needle (B-D ULTRAFINE III SHORT PEN) 31G X 8 MM MISC Use 4 (four) times a day (with meals and at bedtime) 400 each 1    ketoconazole (NIZORAL) 2 % cream       latanoprost (XALATAN) 0.005 % ophthalmic solution Administer 1 drop into the left eye daily 2.5 mL 2    lidocaine (Lidoderm) 5 % Apply 1 patch topically over 12 hours daily as needed (pain) Remove & Discard patch within 12 hours or as directed by MD 30 patch 1    Lumigan 0.01 % ophthalmic drops INSTILL 1 DROP into both eyes at bedtime      montelukast (SINGULAIR) 10 mg tablet Take 1 tablet (10 mg total) by mouth every evening 90 tablet 1    Multiple Vitamin (MULTI VITAMIN DAILY PO) Take 1 tablet by mouth daily      mupirocin (BACTROBAN) 2 % ointment APPLY A SMALL AMOUNT TO THE AFFECTED FOOT/TOE AREA BY TOPICAL ROUTE 3 TIMES PER DAY      nystatin (MYCOSTATIN) cream Apply topically 2 (two) times a day 30 g 1    nystatin (MYCOSTATIN) powder Apply topically 3 (three) times a day as needed (rash) 60 g 1    omeprazole (PriLOSEC) 40 MG capsule Take 1 capsule (40 mg total) by mouth 2 (two) times a day for 10 days 20 capsule 0    saccharomyces boulardii (FLORASTOR) 250 mg capsule Take 250 mg by mouth in the morning      Spacer/Aero-Holding Chambers (AeroChamber Plus Elian-Vu Large) MISC Use as directed      vitamin B-12 (VITAMIN B-12) 500 mcg tablet Take 1,000 mcg by mouth daily       No current facility-administered medications for this visit.     Allergies: Brimonidine, Salicylic acid-sulfur, and Sulfa antibiotics    Objective:  Vitals:    09/30/24 1122   BP: 114/80   Pulse: 76   Temp: 98 °F (36.7 °C)   SpO2: 95%   Weight: 72.6 kg (160 lb)   Height: 4' 7\" " (1.397 m)   Oxygen Therapy  SpO2: 95 % (WITH 2 LTS OF OXYYEN)  .  Wt Readings from Last 3 Encounters:   09/30/24 72.6 kg (160 lb)   09/10/24 75.1 kg (165 lb 9.1 oz)   08/27/24 70.3 kg (154 lb 15.7 oz)     Body mass index is 37.19 kg/m².    Physical Exam  Constitutional:       Appearance: She is well-developed.   HENT:      Head: Normocephalic and atraumatic.   Eyes:      Pupils: Pupils are equal, round, and reactive to light.   Cardiovascular:      Rate and Rhythm: Normal rate and regular rhythm.   Pulmonary:      Effort: Pulmonary effort is normal.      Breath sounds: Wheezing (occ expiratory rhonchi) present. No rales.   Chest:      Chest wall: No tenderness.   Musculoskeletal:         General: Normal range of motion.      Cervical back: Normal range of motion and neck supple.      Right lower leg: Edema present.      Left lower leg: Edema present.   Skin:     General: Skin is warm and dry.   Neurological:      Mental Status: She is alert and oriented to person, place, and time.   Psychiatric:         Behavior: Behavior normal.         Lab Review:   Admission on 09/05/2024, Discharged on 09/10/2024   Component Date Value    WBC 09/05/2024 13.83 (H)     RBC 09/05/2024 4.64     Hemoglobin 09/05/2024 13.0     Hematocrit 09/05/2024 41.9     MCV 09/05/2024 90     MCH 09/05/2024 28.0     MCHC 09/05/2024 31.0 (L)     RDW 09/05/2024 15.1     MPV 09/05/2024 10.2     Platelets 09/05/2024 163     nRBC 09/05/2024 0     Segmented % 09/05/2024 75     Immature Grans % 09/05/2024 1     Lymphocytes % 09/05/2024 15     Monocytes % 09/05/2024 7     Eosinophils Relative 09/05/2024 2     Basophils Relative 09/05/2024 0     Absolute Neutrophils 09/05/2024 10.40 (H)     Absolute Immature Grans 09/05/2024 0.08     Absolute Lymphocytes 09/05/2024 2.05     Absolute Monocytes 09/05/2024 1.01     Eosinophils Absolute 09/05/2024 0.26     Basophils Absolute 09/05/2024 0.03     Sodium 09/05/2024 136     Potassium 09/05/2024 3.6     Chloride  09/05/2024 98     CO2 09/05/2024 29     ANION GAP 09/05/2024 9     BUN 09/05/2024 31 (H)     Creatinine 09/05/2024 1.48 (H)     Glucose 09/05/2024 194 (H)     Calcium 09/05/2024 8.7     eGFR 09/05/2024 32     POC Glucose 09/05/2024 227 (H)     POC Glucose 09/05/2024 226 (H)     POC Glucose 09/05/2024 198 (H)     Sodium 09/06/2024 139     Potassium 09/06/2024 4.0     Chloride 09/06/2024 107     CO2 09/06/2024 26     ANION GAP 09/06/2024 6     BUN 09/06/2024 25     Creatinine 09/06/2024 1.11     Glucose 09/06/2024 198 (H)     Calcium 09/06/2024 8.3 (L)     eGFR 09/06/2024 46     Magnesium 09/06/2024 2.1     Phosphorus 09/06/2024 2.3     WBC 09/06/2024 8.67     RBC 09/06/2024 3.76 (L)     Hemoglobin 09/06/2024 10.6 (L)     Hematocrit 09/06/2024 34.9     MCV 09/06/2024 93     MCH 09/06/2024 28.2     MCHC 09/06/2024 30.4 (L)     RDW 09/06/2024 15.1     MPV 09/06/2024 9.9     Platelets 09/06/2024 141 (L)     nRBC 09/06/2024 0     Segmented % 09/06/2024 84 (H)     Immature Grans % 09/06/2024 1     Lymphocytes % 09/06/2024 6 (L)     Monocytes % 09/06/2024 8     Eosinophils Relative 09/06/2024 1     Basophils Relative 09/06/2024 0     Absolute Neutrophils 09/06/2024 7.24     Absolute Immature Grans 09/06/2024 0.06     Absolute Lymphocytes 09/06/2024 0.55 (L)     Absolute Monocytes 09/06/2024 0.69     Eosinophils Absolute 09/06/2024 0.11     Basophils Absolute 09/06/2024 0.02     POC Glucose 09/06/2024 179 (H)     POC Glucose 09/06/2024 152 (H)     POC Glucose 09/06/2024 151 (H)     POC Glucose 09/06/2024 126     Sodium 09/07/2024 138     Potassium 09/07/2024 4.3     Chloride 09/07/2024 105     CO2 09/07/2024 27     ANION GAP 09/07/2024 6     BUN 09/07/2024 15     Creatinine 09/07/2024 0.95     Glucose 09/07/2024 134     Calcium 09/07/2024 8.5     eGFR 09/07/2024 56     Magnesium 09/07/2024 1.9     Phosphorus 09/07/2024 2.4     WBC 09/07/2024 9.53     RBC 09/07/2024 3.64 (L)     Hemoglobin 09/07/2024 10.2 (L)     Hematocrit  09/07/2024 33.5 (L)     MCV 09/07/2024 92     MCH 09/07/2024 28.0     MCHC 09/07/2024 30.4 (L)     RDW 09/07/2024 14.9     MPV 09/07/2024 8.9     Platelets 09/07/2024 135 (L)     nRBC 09/07/2024 0     Segmented % 09/07/2024 78 (H)     Immature Grans % 09/07/2024 1     Lymphocytes % 09/07/2024 9 (L)     Monocytes % 09/07/2024 9     Eosinophils Relative 09/07/2024 3     Basophils Relative 09/07/2024 0     Absolute Neutrophils 09/07/2024 7.39     Absolute Immature Grans 09/07/2024 0.07     Absolute Lymphocytes 09/07/2024 0.90     Absolute Monocytes 09/07/2024 0.89     Eosinophils Absolute 09/07/2024 0.26     Basophils Absolute 09/07/2024 0.02     POC Glucose 09/07/2024 157 (H)     POC Glucose 09/07/2024 188 (H)     POC Glucose 09/07/2024 204 (H)     POC Glucose 09/07/2024 313 (H)     POC Glucose 09/07/2024 309 (H)     Magnesium 09/08/2024 2.0     Phosphorus 09/08/2024 3.3     Sodium 09/08/2024 135     Potassium 09/08/2024 4.3     Chloride 09/08/2024 100     CO2 09/08/2024 26     ANION GAP 09/08/2024 9     BUN 09/08/2024 26 (H)     Creatinine 09/08/2024 1.06     Glucose 09/08/2024 187 (H)     Calcium 09/08/2024 8.6     eGFR 09/08/2024 49     WBC 09/08/2024 13.84 (H)     RBC 09/08/2024 3.36 (L)     Hemoglobin 09/08/2024 9.6 (L)     Hematocrit 09/08/2024 29.9 (L)     MCV 09/08/2024 89     MCH 09/08/2024 28.6     MCHC 09/08/2024 32.1     RDW 09/08/2024 14.5     Platelets 09/08/2024 167     MPV 09/08/2024 9.5     POC Glucose 09/08/2024 186 (H)     POC Glucose 09/08/2024 218 (H)     POC Glucose 09/08/2024 185 (H)     POC Glucose 09/08/2024 260 (H)     Sodium 09/09/2024 136     Potassium 09/09/2024 4.1     Chloride 09/09/2024 100     CO2 09/09/2024 29     ANION GAP 09/09/2024 7     BUN 09/09/2024 36 (H)     Creatinine 09/09/2024 1.11     Glucose 09/09/2024 181 (H)     Calcium 09/09/2024 8.4     eGFR 09/09/2024 46     WBC 09/09/2024 11.33 (H)     RBC 09/09/2024 3.20 (L)     Hemoglobin 09/09/2024 9.1 (L)     Hematocrit  09/09/2024 29.1 (L)     MCV 09/09/2024 91     MCH 09/09/2024 28.4     MCHC 09/09/2024 31.3 (L)     RDW 09/09/2024 14.9     Platelets 09/09/2024 171     MPV 09/09/2024 9.5     POC Glucose 09/09/2024 185 (H)     POC Glucose 09/09/2024 152 (H)     POC Glucose 09/09/2024 151 (H)     POC Glucose 09/09/2024 210 (H)     Sodium 09/10/2024 140     Potassium 09/10/2024 3.9     Chloride 09/10/2024 101     CO2 09/10/2024 31     ANION GAP 09/10/2024 8     BUN 09/10/2024 39 (H)     Creatinine 09/10/2024 1.09     Glucose 09/10/2024 216 (H)     Calcium 09/10/2024 8.5     eGFR 09/10/2024 47     WBC 09/10/2024 10.32 (H)     RBC 09/10/2024 3.08 (L)     Hemoglobin 09/10/2024 8.6 (L)     Hematocrit 09/10/2024 28.3 (L)     MCV 09/10/2024 92     MCH 09/10/2024 27.9     MCHC 09/10/2024 30.4 (L)     RDW 09/10/2024 15.1     Platelets 09/10/2024 200     MPV 09/10/2024 9.9     POC Glucose 09/10/2024 194 (H)     POC Glucose 09/10/2024 228 (H)    Admission on 08/27/2024, Discharged on 08/28/2024   Component Date Value    WBC 08/27/2024 18.17 (H)     RBC 08/27/2024 5.31 (H)     Hemoglobin 08/27/2024 14.9     Hematocrit 08/27/2024 46.0     MCV 08/27/2024 87     MCH 08/27/2024 28.1     MCHC 08/27/2024 32.4     RDW 08/27/2024 15.0     MPV 08/27/2024 9.4     Platelets 08/27/2024 247     nRBC 08/27/2024 0     Segmented % 08/27/2024 76 (H)     Immature Grans % 08/27/2024 1     Lymphocytes % 08/27/2024 15     Monocytes % 08/27/2024 7     Eosinophils Relative 08/27/2024 1     Basophils Relative 08/27/2024 0     Absolute Neutrophils 08/27/2024 13.95 (H)     Absolute Immature Grans 08/27/2024 0.14     Absolute Lymphocytes 08/27/2024 2.66     Absolute Monocytes 08/27/2024 1.23 (H)     Eosinophils Absolute 08/27/2024 0.16     Basophils Absolute 08/27/2024 0.03     Sodium 08/27/2024 137     Potassium 08/27/2024 3.5     Chloride 08/27/2024 97     CO2 08/27/2024 31     ANION GAP 08/27/2024 9     BUN 08/27/2024 36 (H)     Creatinine 08/27/2024 1.14     Glucose  08/27/2024 236 (H)     Calcium 08/27/2024 9.5     eGFR 08/27/2024 45     hs TnI 0hr 08/27/2024 12     BNP 08/27/2024 95     Protime 08/27/2024 12.9     INR 08/27/2024 0.91     Total Bilirubin 08/27/2024 0.60     Bilirubin, Direct 08/27/2024 0.15     Alkaline Phosphatase 08/27/2024 44     AST 08/27/2024 12 (L)     ALT 08/27/2024 19     Total Protein 08/27/2024 7.3     Albumin 08/27/2024 4.0     hs TnI 2hr 08/27/2024 12     Delta 2hr hsTnI 08/27/2024 0     Ventricular Rate 08/27/2024 62     Atrial Rate 08/27/2024 62     IA Interval 08/27/2024 152     QRSD Interval 08/27/2024 112     QT Interval 08/27/2024 484     QTC Interval 08/27/2024 491     P Axis 08/27/2024 60     QRS Axis 08/27/2024 8     T Wave [place] 08/27/2024 36     hs TnI 4hr 08/27/2024 12     Delta 4hr hsTnI 08/27/2024 0     WBC 08/27/2024 15.79 (H)     RBC 08/27/2024 5.10     Hemoglobin 08/27/2024 14.3     Hematocrit 08/27/2024 44.6     MCV 08/27/2024 88     MCH 08/27/2024 28.0     MCHC 08/27/2024 32.1     RDW 08/27/2024 15.0     Platelets 08/27/2024 233     MPV 08/27/2024 9.7     PTT 08/27/2024 23     Protime 08/27/2024 13.0     INR 08/27/2024 0.92     POC Glucose 08/27/2024 294 (H)     Ventricular Rate 08/27/2024 51     Atrial Rate 08/27/2024 51     IA Interval 08/27/2024 164     QRSD Interval 08/27/2024 106     QT Interval 08/27/2024 452     QTC Interval 08/27/2024 416     P Axis 08/27/2024 64     QRS [place] 08/27/2024 11     T Wave [place] 08/27/2024 27     PTT 08/27/2024 59 (H)     POC Glucose 08/27/2024 237 (H)     PTT 08/28/2024 66 (H)     Sodium 08/28/2024 139     Potassium 08/28/2024 4.0     Chloride 08/28/2024 100     CO2 08/28/2024 30     ANION GAP 08/28/2024 9     BUN 08/28/2024 35 (H)     Creatinine 08/28/2024 1.06     Glucose 08/28/2024 105     Glucose, Fasting 08/28/2024 105 (H)     Calcium 08/28/2024 9.5     eGFR 08/28/2024 49     WBC 08/28/2024 19.16 (H)     RBC 08/28/2024 5.33 (H)     Hemoglobin 08/28/2024 15.0     Hematocrit 08/28/2024 46.1      MCV 08/28/2024 87     MCH 08/28/2024 28.1     MCHC 08/28/2024 32.5     RDW 08/28/2024 15.0     Platelets 08/28/2024 272     MPV 08/28/2024 9.4     POC Glucose 08/28/2024 118     POC Glucose 08/28/2024 152 (H)    Appointment on 08/22/2024   Component Date Value    Lysozyme 08/22/2024 11.5 (H)     Vitamin B-12 08/22/2024 1,361 (H)     Folate 08/22/2024 16.1     SARAH BETH 08/22/2024 Negative     Angio Convert Enzyme 08/22/2024 27     Syphilis Total Antibody 08/22/2024 Non-reactive     C-ANCA 08/22/2024 <1:20     Atypical pANCA 08/22/2024 <1:20     MPO AB 08/22/2024 <0.2     WI-3 AB 08/22/2024 <0.2     P-ANCA 08/22/2024 <1:20     Vitamin B1, Whole Blood 08/22/2024 212.6 (H)     Treponema pallidum Antib* 08/22/2024 Non Reactive     Lyme Total Antibodies 08/22/2024 Negative    Hospital Outpatient Visit on 08/19/2024   Component Date Value    Ventricular Rate 08/19/2024 64     Atrial Rate 08/19/2024 64     WI Interval 08/19/2024 182     QRSD Interval 08/19/2024 114     QT Interval 08/19/2024 440     QTC Interval 08/19/2024 453     P Axis 08/19/2024 64     QRS [place] 08/19/2024 10     T Wave [place] 08/19/2024 22    Hospital Outpatient Visit on 08/19/2024   Component Date Value    Protocol Name 08/19/2024 LEXISCAN-SIT     Exercise duration (min) 08/19/2024 3     Exercise duration (sec) 08/19/2024 0     Post Peak Systolic BP 08/19/2024 182     Max Diastolic Bp 08/19/2024 78     Peak HR 08/19/2024 93     Max Predicted Heart Rate 08/19/2024 139     Reason for Termination 08/19/2024 Protocol Complete     Test Indication 08/19/2024 ANGINAL EQUIVALENT     Target Hr Formular 08/19/2024 (220 - Age)*85%     Chest Pain Statement 08/19/2024 none    Hospital Outpatient Visit on 08/19/2024   Component Date Value    Baseline HR 08/19/2024 66     Baseline BP 08/19/2024 182/78     O2 sat rest 08/19/2024 96     Stress peak HR 08/19/2024 92     Post peak BP 08/19/2024 178     O2 sat peak 08/19/2024 96     Recovery HR 08/19/2024 83     Recovery BP  08/19/2024 158/78     O2 sat recovery 08/19/2024 96     Rate Pressure Product 08/19/2024 16,376.0     Angina Index 08/19/2024 0     Rest Nuclear Isotope Dose 08/19/2024 10.31     Stress Nuclear Isotope D* 08/19/2024 30.90     End diastolic index (mL/* 08/19/2024 63.0     EF (%) 08/19/2024 66     Protocol Name 08/19/2024 LEXISCAN-SIT     Exercise duration (min) 08/19/2024 3     Exercise duration (sec) 08/19/2024 0     Post Peak Systolic BP 08/19/2024 182     Max Diastolic Bp 08/19/2024 78     Peak HR 08/19/2024 93     Max Predicted Heart Rate 08/19/2024 139     Reason for Termination 08/19/2024 Protocol Complete     Test Indication 08/19/2024 ANGINAL EQUIVALENT     Target Hr Formular 08/19/2024 (220 - Age)*85%     Chest Pain Statement 08/19/2024 none    Orders Only on 08/09/2024   Component Date Value    Glucose, Random 08/09/2024 120 (H)     BUN 08/09/2024 31 (H)     Creatinine 08/09/2024 1.15 (H)     Sodium 08/09/2024 139     Potassium 08/09/2024 4.5     Chloride 08/09/2024 101     CO2 08/09/2024 24     Calcium 08/09/2024 9.4     ANION GAP 08/09/2024 14 (H)     eGFR 08/09/2024 48 (L)     Comment 08/09/2024 (Note)    Appointment on 07/29/2024   Component Date Value    TSH 3RD GENERATON 07/29/2024 1.708     Sodium 07/29/2024 142     Potassium 07/29/2024 4.1     Chloride 07/29/2024 102     CO2 07/29/2024 31     ANION GAP 07/29/2024 9     BUN 07/29/2024 26 (H)     Creatinine 07/29/2024 1.32 (H)     Glucose 07/29/2024 184 (H)     Calcium 07/29/2024 9.5     eGFR 07/29/2024 37    Hospital Outpatient Visit on 07/22/2024   Component Date Value    AV peak gradient 07/22/2024 73     Triscuspid Valve Regurgi* 07/22/2024 7.0     RAA A4C 07/22/2024 11.7     TESS A4C 07/22/2024 15.2     LA Volume Index (BP) 07/22/2024 20.4     MV Peak A Josh 07/22/2024 1.09     MV stenosis pressure 1/2* 07/22/2024 60     MV Peak E Josh 07/22/2024 67     E wave deceleration time 07/22/2024 208     E/A ratio 07/22/2024 0.61     MV valve area p 1/2  meth* 07/22/2024 3.67     AV regurgitation pressur* 07/22/2024 459     TR Peak Josh 07/22/2024 1.3     RVID d 07/22/2024 2.2     A4C EF 07/22/2024 51     Tricuspid valve peak reg* 07/22/2024 1.29     Left ventricular stroke * 07/22/2024 78.00     IVSd 07/22/2024 1.00     Tricuspid annular plane * 07/22/2024 1.60     Ao root 07/22/2024 2.90     LVPWd 07/22/2024 0.90     LA size 07/22/2024 3.8     LA volume (BP) 07/22/2024 32     FS 07/22/2024 36     LVIDS 07/22/2024 3.20     IVS 07/22/2024 1     LVIDd 07/22/2024 5.00     LA length (A2C) 07/22/2024 4.40     AV Deceleration Time 07/22/2024 1,583     LEFT VENTRICLE SYSTOLIC * 07/22/2024 40     LV DIASTOLIC VOLUME (MOD* 07/22/2024 118     Left Atrium Area-systoli* 07/22/2024 15.2     Left Atrium Area-systoli* 07/22/2024 12.2     MV E' Tissue Velocity Se* 07/22/2024 6     LVSV, 2D 07/22/2024 78     BSA 07/22/2024 1.57     LV EF 07/22/2024 55    Admission on 07/02/2024, Discharged on 07/04/2024   Component Date Value    Ventricular Rate 07/02/2024 65     Atrial Rate 07/02/2024 65     NM Interval 07/02/2024 184     QRSD Interval 07/02/2024 106     QT Interval 07/02/2024 430     QTC Interval 07/02/2024 447     P Axis 07/02/2024 58     QRS Earth 07/02/2024 15     T Wave Axis 07/02/2024 29     POC Glucose 07/02/2024 114     Sodium 07/02/2024 138     Potassium 07/02/2024 4.3     Chloride 07/02/2024 103     CO2 07/02/2024 27     ANION GAP 07/02/2024 8     BUN 07/02/2024 23     Creatinine 07/02/2024 0.98     Glucose 07/02/2024 114     Calcium 07/02/2024 9.5     eGFR 07/02/2024 54     hs TnI 0hr 07/02/2024 6     WBC 07/02/2024 8.29     RBC 07/02/2024 4.98     Hemoglobin 07/02/2024 13.8     Hematocrit 07/02/2024 43.0     MCV 07/02/2024 86     MCH 07/02/2024 27.7     MCHC 07/02/2024 32.1     RDW 07/02/2024 14.4     MPV 07/02/2024 10.4     Platelets 07/02/2024 205     nRBC 07/02/2024 0     Segmented % 07/02/2024 64     Immature Grans % 07/02/2024 1     Lymphocytes % 07/02/2024 24      Monocytes % 07/02/2024 7     Eosinophils Relative 07/02/2024 3     Basophils Relative 07/02/2024 1     Absolute Neutrophils 07/02/2024 5.34     Absolute Immature Grans 07/02/2024 0.04     Absolute Lymphocytes 07/02/2024 2.01     Absolute Monocytes 07/02/2024 0.61     Eosinophils Absolute 07/02/2024 0.24     Basophils Absolute 07/02/2024 0.05     pH, Natalio 07/02/2024 7.367     pCO2, Natalio 07/02/2024 41.2 (L)     pO2, Natalio 07/02/2024 37.9     HCO3, Natalio 07/02/2024 23.1 (L)     Base Excess, Natalio 07/02/2024 -2.1     O2 Content, Natalio 07/02/2024 15.3     O2 HGB, VENOUS 07/02/2024 73.0     BNP 07/02/2024 122 (H)     hs TnI 2hr 07/02/2024 10     Delta 2hr hsTnI 07/02/2024 4     hs TnI 4hr 07/03/2024 9     Delta 4hr hsTnI 07/03/2024 3     POC Glucose 07/02/2024 138     Color, UA 07/02/2024 Colorless     Clarity, UA 07/02/2024 Clear     Specific Gravity, UA 07/02/2024 1.019     pH, UA 07/02/2024 7.5     Leukocytes, UA 07/02/2024 Negative     Nitrite, UA 07/02/2024 Negative     Protein, UA 07/02/2024 Negative     Glucose, UA 07/02/2024 Negative     Ketones, UA 07/02/2024 Negative     Urobilinogen, UA 07/02/2024 <2.0     Bilirubin, UA 07/02/2024 Negative     Occult Blood, UA 07/02/2024 Negative     RBC, UA 07/02/2024 None Seen     WBC, UA 07/02/2024 1-2     Epithelial Cells 07/02/2024 None Seen     Bacteria, UA 07/02/2024 None Seen     POC Glucose 07/02/2024 162 (H)     Cholesterol 07/03/2024 173     Triglycerides 07/03/2024 233 (H)     HDL, Direct 07/03/2024 39 (L)     LDL Calculated 07/03/2024 87     Hemoglobin A1C 07/03/2024 7.6 (H)     EAG 07/03/2024 171     WBC 07/03/2024 7.38     RBC 07/03/2024 4.91     Hemoglobin 07/03/2024 13.7     Hematocrit 07/03/2024 42.9     MCV 07/03/2024 87     MCH 07/03/2024 27.9     MCHC 07/03/2024 31.9     RDW 07/03/2024 14.6     MPV 07/03/2024 9.6     Platelets 07/03/2024 232     nRBC 07/03/2024 0     Segmented % 07/03/2024 61     Immature Grans % 07/03/2024 0     Lymphocytes % 07/03/2024 26      Monocytes % 07/03/2024 8     Eosinophils Relative 07/03/2024 4     Basophils Relative 07/03/2024 1     Absolute Neutrophils 07/03/2024 4.59     Absolute Immature Grans 07/03/2024 0.01     Absolute Lymphocytes 07/03/2024 1.91     Absolute Monocytes 07/03/2024 0.55     Eosinophils Absolute 07/03/2024 0.27     Basophils Absolute 07/03/2024 0.05     Sodium 07/03/2024 138     Potassium 07/03/2024 4.4     Chloride 07/03/2024 104     CO2 07/03/2024 27     ANION GAP 07/03/2024 7     BUN 07/03/2024 30 (H)     Creatinine 07/03/2024 1.36 (H)     Glucose 07/03/2024 150 (H)     Glucose, Fasting 07/03/2024 150 (H)     Calcium 07/03/2024 9.3     AST 07/03/2024 16     ALT 07/03/2024 14     Alkaline Phosphatase 07/03/2024 47     Total Protein 07/03/2024 7.2     Albumin 07/03/2024 3.7     Total Bilirubin 07/03/2024 0.64     eGFR 07/03/2024 36     POC Glucose 07/03/2024 156 (H)     TSH 3RD GENERATON 07/03/2024 7.726 (H)     Free T4 07/03/2024 0.56 (L)     POC Glucose 07/03/2024 175 (H)     POC Glucose 07/03/2024 78     POC Glucose 07/03/2024 189 (H)     Sodium 07/04/2024 137     Potassium 07/04/2024 4.3     Chloride 07/04/2024 106     CO2 07/04/2024 24     ANION GAP 07/04/2024 7     BUN 07/04/2024 28 (H)     Creatinine 07/04/2024 1.14     Glucose 07/04/2024 169 (H)     Calcium 07/04/2024 8.5     eGFR 07/04/2024 45     WBC 07/04/2024 7.21     RBC 07/04/2024 4.70     Hemoglobin 07/04/2024 13.0     Hematocrit 07/04/2024 41.2     MCV 07/04/2024 88     MCH 07/04/2024 27.7     MCHC 07/04/2024 31.6     RDW 07/04/2024 14.4     Platelets 07/04/2024 203     MPV 07/04/2024 9.7     POC Glucose 07/04/2024 177 (H)     POC Glucose 07/04/2024 206 (H)    There may be more visits with results that are not included.       Diagnostics:  Reviewed radiology reports from this admission including: chest xray.  Chest x-ray: 8/27/2024  EEG awake or drowsy routine    Result Date: 9/28/2024  Narrative: Table formatting from the original result was not included.  Routine EEG Patient Name:  Shantelle Romano  MRN: [id number] :  1945 File #: [id number] Age: 79 y.o. Ordering Provider: Cari Shin MD  Study Start: 24 13:08 Study End: 24 13:43            Study type: Routine EEG ICD 10 diagnosis: Seizure R56.9 Patient History: Patient is 79 y.o. female with history of a right frontal craniotomy for whom EEG was ordered for the evaluation of seizure tendency. Medications include: Prior to Admission medications  Medication Sig Start Date Authorizing Provider acetaminophen (TYLENOL) 650 mg CR tablet Take 500 mg by mouth 2 (two) times a day  Historical Provider, MD albuterol (2.5 mg/3 mL) 0.083 % nebulizer solution Take 3 mL (2.5 mg total) by nebulization every 6 (six) hours as needed for wheezing or shortness of breath 24 JOSHUA Elizabeth albuterol (ProAir HFA) 90 mcg/act inhaler Inhale 2 puffs every 6 (six) hours as needed for wheezing or shortness of breath (cough, chest tightness) 24 Clarisa Billingsley DO atorvastatin (LIPITOR) 10 mg tablet Take 1 tablet (10 mg total) by mouth daily at bedtime 24 Clarisa Billingsley DO Calcium Carbonate-Vitamin D (CALCIUM-D PO) Take 1 tablet by mouth in the morning  Historical Provider, MD clotrimazole (LOTRIMIN) 1 % cream   Historical Provider, MD Continuous Blood Gluc  (Dexcom G7 ) YOLANDA Use 1 Application if needed (check sugars) 23 Clarisa Billingsley DO Continuous Blood Gluc Sensor (Dexcom G7 Sensor) Use 1 Device every 10 days 23 Clarisa Billingsley DO dicyclomine (BENTYL) 20 mg tablet Take 20 mg by mouth 2 (two) times a day  Historical Provider, MD DULoxetine (CYMBALTA) 30 mg delayed release capsule Take 30 mg by mouth 2 (two) times a day  Historical Provider, MD enoxaparin (LOVENOX) 30 mg/0.3 mL Inject 0.3 mL (30 mg total) under the skin every 12 (twelve) hours for 25 days 9/10/24 Nancy Zuniga MD fluticasone (FLONASE) 50 mcg/act nasal spray 1 spray into each nostril daily pt  uses as needed 5/2/24 Clarisa Billingsley DO fluticasone-umeclidinium-vilanterol (Trelegy Ellipta) 100-62.5-25 mcg/actuation inhaler inhale 1 puff by mouth daily Rinse mouth after use 9/17/24 JOSHUA Elizabeth furosemide (LASIX) 20 mg tablet take 1 tablet by mouth twice a day 9/23/24 JOSHUA Zavala gabapentin (NEURONTIN) 100 mg capsule Take 2 capsules (200 mg total) by mouth daily at bedtime 7/12/24 Clarisa Billingsley DO guaiFENesin (MUCINEX) 600 mg 12 hr tablet Take 1 tablet (600 mg total) by mouth 2 (two) times a day as needed for cough or congestion 7/12/24 Clarisa Billingsley DO insulin aspart (NovoLOG FlexPen) 100 UNIT/ML injection pen inject 12 units subcutaneously before meals (3 MEALS A DAY) Patient taking differently: Inject under the skin 3 (three) times a day with meals Sliding scale 7/29/24 Clarisa Billingsley DO Insulin Glargine Solostar (Lantus SoloStar) 100 UNIT/ML SOPN Inject 0.3 mL (30 Units total) under the skin daily Patient taking differently: Inject 30 Units under the skin daily at bedtime 5/2/24 Clarisa Billingsley DO Insulin Pen Needle (B-D ULTRAFINE III SHORT PEN) 31G X 8 MM MISC Use 4 (four) times a day (with meals and at bedtime) 8/9/24 Clarisa Billingsley DO ketoconazole (NIZORAL) 2 % cream  5/9/23 Historical Provider, MD latanoprost (XALATAN) 0.005 % ophthalmic solution Administer 1 drop into the left eye daily 7/19/23 Clarisa Billingsley DO lidocaine (Lidoderm) 5 % Apply 1 patch topically over 12 hours daily as needed (pain) Remove & Discard patch within 12 hours or as directed by MD 7/12/24 Clarisa Billingsley DO Lumigan 0.01 % ophthalmic drops INSTILL 1 DROP into both eyes at bedtime  Historical Provider, MD montelukast (SINGULAIR) 10 mg tablet Take 1 tablet (10 mg total) by mouth every evening 5/2/24 Clarisa Billingsley, DO Multiple Vitamin (MULTI VITAMIN DAILY PO) Take 1 tablet by mouth daily  Historical Provider, MD mupirocin (BACTROBAN) 2 % ointment APPLY A SMALL AMOUNT TO THE AFFECTED  FOOT/TOE AREA BY TOPICAL ROUTE 3 TIMES PER DAY  Historical Provider, MD nystatin (MYCOSTATIN) cream Apply topically 2 (two) times a day 2/28/24 Clarisa Billingsley DO nystatin (MYCOSTATIN) powder Apply topically 3 (three) times a day as needed (rash) 2/28/24 Clarisa Billingsley DO omeprazole (PriLOSEC) 40 MG capsule Take 1 capsule (40 mg total) by mouth 2 (two) times a day for 10 days 8/28/24 Mata Salas PA-C saccharomyces boulardii (FLORASTOR) 250 mg capsule Take 250 mg by mouth in the morning  Historical Provider, MD Spacer/Aero-Holding Chambers (AeroChamber Plus Elian-Vu Large) MISC Use as directed 5/5/23 Historical Provider, MD vitamin B-12 (VITAMIN B-12) 500 mcg tablet Take 1,000 mcg by mouth daily  Historical Provider, MD  Description of Procedure: 32 channel digital recording with electrodes placed according to the International 10-20 system with additional T1/T2 electrodes, EOG, EKG, and simultaneous video. The recording was technically satisfactory. Terminology Key Abnormal I - mild encephalopathy Abnormal II- moderate encephalopathy or mild/moderate focal cerebral dysfunction Abnormal III- severe encephalopathy, epileptiform activity, or severe focal cerebral dysfunction EEG Findings: During wakefulness, the background is well-organized, symmetric, and continuous.  The predominant activity is anterior low amplitude beta and posterior low amplitude theta. There is a 7 Hz posterior dominant rhythm. Drowsiness is characterized by attenuation of posterior dominant rhythm and diffuse low-moderate amplitude delta activity. Activating Procedures: Photic stimulation was performed. Photic driving was observed. No hyperventilation was performed. Other findings: The single lead ECG shows a regular rate and rhythm around 75bpm. EEG Impression: Abnormal I State of Consciousness: Awake and Drowsy - Background Slow, PDR 7Hz Interpretation: This routine EEG is indicative of a mild diffuse encephalopathy. No epileptiform  discharges or lateralizing signs are recorded. Luba Torres MD [facility]     XR femur 2 vw right    Result Date: 9/17/2024  Narrative: RIGHT FEMUR INDICATION:   Other fracture of right femur, initial encounter for closed fracture. COMPARISON:  None. VIEWS:  XR FEMUR 2 VW RIGHT FINDINGS: Patient status post ORIF of distal femoral comminuted fracture. Lateral metallic plate and multiple transverse screws are in place. Overlying skin clips are seen. Alignment is satisfactory. No significant callus formation from noted. Status post right hip arthroplasty. Anatomic alignment. No loosening. No lytic or blastic osseous lesion. Soft tissues are unremarkable.     Impression: Status post ORIF of comminuted distal femoral periprosthetic fracture. Lateral metallic plate and screws intact with satisfactory alignment. No significant callus formation currently. Skin clips in place. Satisfactory appearing right hip arthroplasty. Electronically signed: 09/17/2024 04:37 PM Derek Caldera MD    XR knee 1 or 2 vw right    Result Date: 9/7/2024  Narrative: C-ARM - XR KNEE 1 OR 2 VW RIGHT INDICATION: NSERTION NAIL IM FEMUR RETROGRADE - Right. Procedure guidance. TECHNIQUE: Fluoroscopic guidance provided. COMPARISON: None FLUOROSCOPY TIME: 2.5 minutes 7 FLUOROSCOPIC IMAGES FINDINGS: Fluoroscopy provided for procedure guidance. Interpreting radiologist not present for procedure.  Images show procedure in progress. Osseous and soft tissue detail limited by technique.     Impression: Fluoroscopy provided for procedure guidance. Please refer to the separate procedure note for additional details. Workstation performed: [id number]     XR tibia fibula 2 views RIGHT    Result Date: 9/5/2024  Narrative: XR TIBIA FIBULA 2 VW RIGHT INDICATION: Fall. COMPARISON: Concurrent knee radiographs FINDINGS: Partially visualized distal femur periprosthetic fracture, better seen and described on  concurrent knee radiographs. Calcaneal enthesophyte(s). No lytic or blastic osseous lesion. Unremarkable soft tissues.     Impression: Partially visualized distal femur periprosthetic fracture. Computerized Assisted Algorithm (CAA) may have been used to analyze all applicable images. Resident: Zhang Casper I, the attending radiologist, have reviewed the images and agree with the final report above. Workstation performed: [id number]     XR knee 1 or 2 vw right    Result Date: 9/5/2024  Narrative: XR KNEE 1 OR 2 VW RIGHT INDICATION: Fall. Knee gave out while in the bathroom with right knee pain. COMPARISON: None FINDINGS: History of total knee arthroplasty. Comminuted periprosthetic fracture of the distal femur with foreshortening and posterolateral displacement of the distal femur fragment. Soft tissue densities adjacent to the fracture consistent with effusion. No lytic or blastic osseous lesion.     Impression: Comminuted periprosthetic distal femur fracture. This report is concordant with SIVAKUMAR SHAIKH's preliminary interpretation that is documented in the electronic medical record (EPIC). Computerized Assisted Algorithm (CAA) may have been used to analyze all applicable images. Resident: Zhang Casper I, the attending radiologist, have reviewed the images and agree with the final report above. Workstation performed: [id number]     CT lower extremity wo contrast right    Result Date: 9/5/2024  Narrative: CT right knee without IV contrast INDICATION: requested by ortho for surgical planning. COMPARISON: Same date plain films. TECHNIQUE: CT examination of the above was performed. This examination, like all CT scans performed in the [facility], was performed utilizing techniques to minimize radiation dose exposure, including the use of iterative reconstruction and automated exposure control software.  Multiplanar 2D reformatted images were created from the source data. Rad dose  375.09  mGy-cm FINDINGS: OSSEOUS STRUCTURES: The patient is status post total right knee arthroplasty with a distal right femur comminuted displaced periprosthetic fracture. There is posterior displacement of distal fracture fragments with significant override measuring up to 2.9 cm. Associated hematoma formation noted at the fracture site. VISUALIZED MUSCULATURE:  Unremarkable. SOFT TISSUES:  Unremarkable. OTHER PERTINENT FINDINGS:  None.     Impression: Status post total right knee arthroplasty with a comminuted displaced distal right femur periprosthetic fracture as described above. Workstation performed: [id number]   Answers submitted by the patient for this visit:  Pulmonology Questionnaire (Submitted on 9/30/2024)  Chief Complaint: Primary symptoms  Do you have difficulty breathing?: Yes  Do you have shortness of breath that occurs with effort or exertion?: Yes  Do you have fatigue?: Yes  Which of the following makes your symptoms worse?: change in weather, climbing stairs, exposure to fumes, exposure to smoke, lying down, pollen, strenuous activity  Which of the following makes your symptoms better?: change in position, OTC cough suppressant, rest, steroid inhaler

## 2024-10-13 DIAGNOSIS — Z96.659 PERIPROSTHETIC FRACTURE AROUND PROSTHETIC KNEE, SUBSEQUENT ENCOUNTER: Primary | ICD-10-CM

## 2024-10-13 DIAGNOSIS — M97.8XXD PERIPROSTHETIC FRACTURE AROUND PROSTHETIC KNEE, SUBSEQUENT ENCOUNTER: Primary | ICD-10-CM

## 2024-10-17 ENCOUNTER — TELEPHONE (OUTPATIENT)
Age: 79
End: 2024-10-17

## 2024-10-17 ENCOUNTER — OFFICE VISIT (OUTPATIENT)
Dept: OBGYN CLINIC | Facility: CLINIC | Age: 79
End: 2024-10-17

## 2024-10-17 ENCOUNTER — APPOINTMENT (OUTPATIENT)
Dept: RADIOLOGY | Facility: CLINIC | Age: 79
End: 2024-10-17
Payer: COMMERCIAL

## 2024-10-17 VITALS
BODY MASS INDEX: 36.38 KG/M2 | WEIGHT: 157.2 LBS | HEART RATE: 51 BPM | SYSTOLIC BLOOD PRESSURE: 88 MMHG | HEIGHT: 55 IN | DIASTOLIC BLOOD PRESSURE: 54 MMHG

## 2024-10-17 DIAGNOSIS — Z96.659 PERIPROSTHETIC FRACTURE AROUND PROSTHETIC KNEE, SUBSEQUENT ENCOUNTER: ICD-10-CM

## 2024-10-17 DIAGNOSIS — M97.8XXD PERIPROSTHETIC FRACTURE AROUND PROSTHETIC KNEE, SUBSEQUENT ENCOUNTER: ICD-10-CM

## 2024-10-17 DIAGNOSIS — Z96.659 PERIPROSTHETIC FRACTURE AROUND PROSTHETIC KNEE, SUBSEQUENT ENCOUNTER: Primary | ICD-10-CM

## 2024-10-17 DIAGNOSIS — M97.8XXD PERIPROSTHETIC FRACTURE AROUND PROSTHETIC KNEE, SUBSEQUENT ENCOUNTER: Primary | ICD-10-CM

## 2024-10-17 PROCEDURE — 73552 X-RAY EXAM OF FEMUR 2/>: CPT

## 2024-10-17 PROCEDURE — 73560 X-RAY EXAM OF KNEE 1 OR 2: CPT

## 2024-10-17 PROCEDURE — 99024 POSTOP FOLLOW-UP VISIT: CPT | Performed by: ORTHOPAEDIC SURGERY

## 2024-10-17 RX ORDER — OXYCODONE HYDROCHLORIDE 5 MG/1
5 TABLET ORAL EVERY 6 HOURS PRN
Qty: 30 TABLET | Refills: 0 | Status: SHIPPED | OUTPATIENT
Start: 2024-10-17

## 2024-10-17 NOTE — TELEPHONE ENCOUNTER
PA for OXYCODONE APPROVED     Date(s) approved 1/2024-12/31/2025        Patient advised by          [x]MyChart Message  []Phone call   []LMOM  []L/M to call office as no active Communication consent on file  [x]Unable to leave detailed message as VM not approved on Communication consent   CONSENT NOT ON FILE     Pharmacy advised by    [x]Fax  []Phone call    Approval letter scanned into Media Yes

## 2024-10-17 NOTE — TELEPHONE ENCOUNTER
PA for SUBMITTED   oxyCODONE (Roxicodone) 5   via    []CMM-  :      [x]Surescripts-Case ID  : [id number]      []Availity-Auth ID #   NDC #   []Faxed to plan   []Other website   []Phone call Case ID #     Office notes sent, clinical questions answered. Awaiting determination    Turnaround time for your insurance to make a decision on your Prior Authorization can take 7-21 business days.

## 2024-10-17 NOTE — PROGRESS NOTES
Orthopaedics Office Visit - Post-op Patient Visit    ASSESSMENT/PLAN:    Assessment:   6 weeks s/p ORIF right periprosthetic distal femur fracture   DOS 24   Healing fractures   Stiffness at knee joint       Plan:   Weight bearing as tolerated right lower extremity   Maintain hinged knee brace during ambulation   Continue physical therapy as directed   AROM, PROM, HEP, modalities   Gait training, proprioception   Over the counter analgesics as needed / directed   Ice / heat as directed   Follow up 6 weeks with repeat XR       To Do Next Visit:  Xr right knee / femur   _____________________________________________________  CHIEF COMPLAINT:  Chief Complaint   Patient presents with    Right Leg - Post-op         SUBJECTIVE:  Shantelle Romano is a 79 y.o. female who presents  6 weeks s/p ORIF right periprosthetic distal femur fracture   DOS 24.  Patient states that her knee is doing well overall.  Patient states that she does have continued pain over the distal aspect of her leg however is improving.  Patient has been weightbearing as tolerated with use of a walker.  Patient has been participating physical therapy at the rehab facility.  Patient has been taking oxycodone as needed for pain.  Patient offers no other complaints at this time.    SOCIAL HISTORY:  Social History     Tobacco Use    Smoking status: Former     Current packs/day: 0.00     Average packs/day: 0.5 packs/day for 40.0 years (20.0 ttl pk-yrs)     Types: Cigarettes     Start date:      Quit date:      Years since quittin.8    Smokeless tobacco: Never    Tobacco comments:     stopped many years ago   Vaping Use    Vaping status: Never Used   Substance Use Topics    Alcohol use: Never    Drug use: Never       MEDICATIONS:    Current Outpatient Medications:     acetaminophen (TYLENOL) 650 mg CR tablet, Take 500 mg by mouth 2 (two) times a day, Disp: , Rfl:     albuterol (2.5 mg/3 mL) 0.083 % nebulizer solution, Take 3 mL (2.5 mg total) by  nebulization every 6 (six) hours as needed for wheezing or shortness of breath, Disp: 360 mL, Rfl: 1    albuterol (ProAir HFA) 90 mcg/act inhaler, Inhale 2 puffs every 6 (six) hours as needed for wheezing or shortness of breath (cough, chest tightness), Disp: 18 g, Rfl: 0    atorvastatin (LIPITOR) 10 mg tablet, Take 1 tablet (10 mg total) by mouth daily at bedtime, Disp: 90 tablet, Rfl: 1    Calcium Carbonate-Vitamin D (CALCIUM-D PO), Take 1 tablet by mouth in the morning, Disp: , Rfl:     clotrimazole (LOTRIMIN) 1 % cream, , Disp: , Rfl:     Continuous Blood Gluc  (Dexcom G7 ) YOLANDA, Use 1 Application if needed (check sugars), Disp: 1 each, Rfl: 0    Continuous Blood Gluc Sensor (Dexcom G7 Sensor), Use 1 Device every 10 days, Disp: 9 each, Rfl: 3    dicyclomine (BENTYL) 20 mg tablet, Take 20 mg by mouth 2 (two) times a day, Disp: , Rfl:     DULoxetine (CYMBALTA) 30 mg delayed release capsule, Take 30 mg by mouth 2 (two) times a day, Disp: , Rfl:     fluticasone (FLONASE) 50 mcg/act nasal spray, 1 spray into each nostril daily pt uses as needed, Disp: 54 g, Rfl: 1    fluticasone-umeclidinium-vilanterol (Trelegy Ellipta) 100-62.5-25 mcg/actuation inhaler, inhale 1 puff by mouth daily Rinse mouth after use, Disp: 180 blister, Rfl: 3    furosemide (LASIX) 20 mg tablet, take 1 tablet by mouth twice a day, Disp: 180 tablet, Rfl: 1    gabapentin (NEURONTIN) 100 mg capsule, Take 2 capsules (200 mg total) by mouth daily at bedtime, Disp: 180 capsule, Rfl: 1    guaiFENesin (MUCINEX) 600 mg 12 hr tablet, Take 1 tablet (600 mg total) by mouth 2 (two) times a day as needed for cough or congestion, Disp: 180 tablet, Rfl: 1    insulin aspart (NovoLOG FlexPen) 100 UNIT/ML injection pen, inject 12 units subcutaneously before meals (3 MEALS A DAY) (Patient taking differently: Inject under the skin 3 (three) times a day with meals Sliding scale), Disp: 15 mL, Rfl: 5    Insulin Glargine Solostar (Lantus SoloStar) 100  UNIT/ML SOPN, Inject 0.3 mL (30 Units total) under the skin daily (Patient taking differently: Inject 30 Units under the skin daily at bedtime), Disp: 15 mL, Rfl: 2    Insulin Pen Needle (B-D ULTRAFINE III SHORT PEN) 31G X 8 MM MISC, Use 4 (four) times a day (with meals and at bedtime), Disp: 400 each, Rfl: 1    ketoconazole (NIZORAL) 2 % cream, , Disp: , Rfl:     latanoprost (XALATAN) 0.005 % ophthalmic solution, Administer 1 drop into the left eye daily, Disp: 2.5 mL, Rfl: 2    lidocaine (Lidoderm) 5 %, Apply 1 patch topically over 12 hours daily as needed (pain) Remove & Discard patch within 12 hours or as directed by MD, Disp: 30 patch, Rfl: 1    Lumigan 0.01 % ophthalmic drops, INSTILL 1 DROP into both eyes at bedtime, Disp: , Rfl:     montelukast (SINGULAIR) 10 mg tablet, Take 1 tablet (10 mg total) by mouth every evening, Disp: 90 tablet, Rfl: 1    Multiple Vitamin (MULTI VITAMIN DAILY PO), Take 1 tablet by mouth daily, Disp: , Rfl:     mupirocin (BACTROBAN) 2 % ointment, APPLY A SMALL AMOUNT TO THE AFFECTED FOOT/TOE AREA BY TOPICAL ROUTE 3 TIMES PER DAY, Disp: , Rfl:     nystatin (MYCOSTATIN) cream, Apply topically 2 (two) times a day, Disp: 30 g, Rfl: 1    nystatin (MYCOSTATIN) powder, Apply topically 3 (three) times a day as needed (rash), Disp: 60 g, Rfl: 1    saccharomyces boulardii (FLORASTOR) 250 mg capsule, Take 250 mg by mouth in the morning, Disp: , Rfl:     Spacer/Aero-Holding Chambers (AeroChamber Plus Elian-Vu Large) MISC, Use as directed, Disp: , Rfl:     vitamin B-12 (VITAMIN B-12) 500 mcg tablet, Take 1,000 mcg by mouth daily, Disp: , Rfl:     enoxaparin (LOVENOX) 30 mg/0.3 mL, Inject 0.3 mL (30 mg total) under the skin every 12 (twelve) hours for 25 days, Disp: 15 mL, Rfl: 0    omeprazole (PriLOSEC) 40 MG capsule, Take 1 capsule (40 mg total) by mouth 2 (two) times a day for 10 days, Disp: 20 capsule, Rfl: 0    _____________________________________________________  PHYSICAL EXAMINATION:  Vital  "signs: BP (!) 88/54   Pulse (!) 51   Ht 4' 7\" (1.397 m)   Wt 71.3 kg (157 lb 3.2 oz)   BMI 36.54 kg/m²   General: No acute distress, awake and alert  Psychiatric: Mood and affect appear appropriate  HEENT: Trachea Midline, No torticollis, no apparent facial trauma  Cardiovascular: No audible murmurs; Extremities appear perfused  Pulmonary: No audible wheezing or stridor  Skin: No open lesions; see further details (if any) below      MUSCULOSKELETAL EXAMINATION:  Right knee examination :  Patient sitting comfortably in the office in no apparent distress   Nearly healed incision noted on the lateral aspect of the knee and thigh.  Small scab present at distal aspect of the incision site.  No erythema or ecchymosis appreciated.   Tenderness palpation noted over the lateral aspect of the knee.    Patient lacks the last 20 degrees of extension in the knee and is able to flex knee to approximately 90 degrees  NV intact    _____________________________________________________  STUDIES REVIEWED:  I personally reviewed the images obtained in office today and my independent interpretation is as follows:  Right knee  /  femur  Xrs 2 views :  Healing distal femur fracture with hardware maintained         PROCEDURES PERFORMED:  No procedures were performed at this time.                     Darinel Gomez PA-C - assisting  Thien Tatum MD                                Portions of the record may have been created with voice recognition software.  Occasional wrong word or \"sound a like\" substitutions may have occurred due to the inherent limitations of voice recognition software.  Read the chart carefully and recognize, using context, where substitutions have occurred.    "

## 2024-10-18 ENCOUNTER — TRANSITIONAL CARE MANAGEMENT (OUTPATIENT)
Dept: FAMILY MEDICINE CLINIC | Facility: CLINIC | Age: 79
End: 2024-10-18

## 2024-10-21 ENCOUNTER — TELEPHONE (OUTPATIENT)
Age: 79
End: 2024-10-21

## 2024-10-21 ENCOUNTER — TELEPHONE (OUTPATIENT)
Dept: FAMILY MEDICINE CLINIC | Facility: CLINIC | Age: 79
End: 2024-10-21

## 2024-10-21 NOTE — TELEPHONE ENCOUNTER
No, it cannot be a phone call  Needs video or in person  Can the visiting nurse do the video visit w/ them?

## 2024-10-21 NOTE — TELEPHONE ENCOUNTER
Caller: Shantelle Romano     Doctor: Lalo Damon     Reason for call: Patient is scheduled for a visit with Lalo Damon on 10/25/24 and is requesting to do this visit virtually. The patient just got out of rehab and is having a lot of trouble getting around.     Call back#: [phone]

## 2024-10-21 NOTE — TELEPHONE ENCOUNTER
Shantelle's son, Solomon called regarding her JANETTE tomorrow. He said there is no way they can easily get her out the house. When they brought her home it took them almost 2 hrs to get her up the steps. They questioned a virtual but they dont have face to face capabilities. Home care has been in there over the weekend and is coming again this week. They want to know what suggestions you might have. Can this be done with a phone call? I told them usually not it needs to be face to face. Please call with any ideas or let us know if we need to cancel appt rb

## 2024-10-22 ENCOUNTER — TELEMEDICINE (OUTPATIENT)
Dept: FAMILY MEDICINE CLINIC | Facility: CLINIC | Age: 79
End: 2024-10-22
Payer: COMMERCIAL

## 2024-10-22 ENCOUNTER — TELEPHONE (OUTPATIENT)
Dept: FAMILY MEDICINE CLINIC | Facility: CLINIC | Age: 79
End: 2024-10-22

## 2024-10-22 DIAGNOSIS — S72.8X1A OTHER CLOSED FRACTURE OF RIGHT FEMUR, UNSPECIFIED PORTION OF FEMUR, INITIAL ENCOUNTER (HCC): Primary | ICD-10-CM

## 2024-10-22 DIAGNOSIS — S31.000D WOUND OF SACRAL REGION, SUBSEQUENT ENCOUNTER: ICD-10-CM

## 2024-10-22 DIAGNOSIS — K21.9 GASTROESOPHAGEAL REFLUX DISEASE WITHOUT ESOPHAGITIS: ICD-10-CM

## 2024-10-22 PROCEDURE — 99495 TRANSJ CARE MGMT MOD F2F 14D: CPT | Performed by: FAMILY MEDICINE

## 2024-10-22 RX ORDER — METHOCARBAMOL 500 MG/1
500 TABLET, FILM COATED ORAL
COMMUNITY
Start: 2024-10-19

## 2024-10-22 NOTE — ASSESSMENT & PLAN NOTE
Pain overall well controlled. Continue home PT and f/u with Ortho as scheduled.   Will update CBC post-surgical anemia and CMP to monitor kidney function/electrolytes with increased Lasix dosing. F/u with Cardio as scheduled to discuss decrease in dosing given incontinence.   Orders:    CBC and differential; Future    Comprehensive metabolic panel; Future

## 2024-10-22 NOTE — TELEPHONE ENCOUNTER
Can we please fax labs from today to [facility] to be drawn by [facility] at next visit?

## 2024-10-22 NOTE — PROGRESS NOTES
Virtual TCM Visit:    Verification of patient location:    Patient is located at Home in the following state in which I hold an active license [place]    Assessment & Plan  Other closed fracture of right femur, unspecified portion of femur, initial encounter ([facility])  Pain overall well controlled. Continue home PT and f/u with Ortho as scheduled.   Will update CBC post-surgical anemia and CMP to monitor kidney function/electrolytes with increased Lasix dosing. F/u with Cardio as scheduled to discuss decrease in dosing given incontinence.   Orders:    CBC and differential; Future    Comprehensive metabolic panel; Future    Gastroesophageal reflux disease without esophagitis  Can decrease back to previous 20 mg BID -- to monitor for worsening symptoms   Orders:    omeprazole (PriLOSEC) 20 mg delayed release capsule; Take 1 capsule (20 mg total) by mouth 2 (two) times a day    Wound of sacral region, subsequent encounter  Would benefit from adjustment of diuretic if able to decrease incontinence. Continue wound care with [facility]              Encounter provider Clarisa Billingsley DO     Provider located at [facility]  [address]  [facility] [address]    After connecting through [employer], the patient was identified by name and date of birth. Shantelle Romano was informed that this is a telemedicine visit and that the visit is being conducted through the [employer] platform. She agrees to proceed..  My office door was closed. No one else was in the room.  She acknowledged consent and understanding of privacy and security of the video platform. The patient has agreed to participate and understands they can discontinue the visit at any time.    Patient is aware this is a billable service.    History of Present Illness     Transitional Care Management Review:   Shantelle Romano is a 79 y.o. female here for TCM follow up.    During the TCM phone call patient stated:  TCM Call       Date and time  "call was made  10/18/2024  8:26 AM    Hospital care reviewed  Records reviewed    Patient was hospitialized at  [facility]    Date of Admission  09/10/24    Date of discharge  10/18/24    Diagnosis  ortho aftercare    Disposition  Home    Were the patients medications reviewed and updated  No    Current Symptoms  Weakness          TCM Call       Post hospital issues  Reduced activity; Poor ADL (Activities of Daily Living) performance    Should patient be enrolled in anticoag monitoring?  No    Scheduled for follow up?  Yes    Did you obtain your prescribed medications  Yes    Do you need help managing your prescriptions or medications  No    Is transportation to your appointment needed  No    I have advised the patient to call PCP with any new or worsening symptoms  [facility]          HPI    Pt presents with her son and daughter for hospital follow up s/p admission to [facility] from 9/5-9/10. Pt presented s/p fall and was found to have R femur periprosthetic fracture. She underwent ORIF and was discharged to rehab, where she stayed until 10/18.   Lovenox x25 days     XR R Knee: Comminuted periprosthetic femur fracture  CT RLE: Fracture as above   WBC 13 --> 10; Hb 13 --> 8.6   Kidney function stable    Ortho 9/17: XR stable, WBAT  Pulm 9/30: On 2L, Lasix doubled by Cardio; ordered PFTs, 6 minute walk   Ortho 10/17: WBAT, continue PT     Today:   Has [facility]/home PT -- had intake for therapy yesterday   Pain is \"okay\"   Does have a sacral wound -- they cleaned it and she is bringing a salve for her tomorrow   Wondering if they can decrease PPI back to 20 mg BID -- needs refill   (+) urinary frequency, incontinence -- needs to talk to Cardio about decreasing Lasix (does have appointment Friday -- trying to transition to virtual)   No constipation     Would like to update med list -- no longer taking Bentyl, Cymbalta, Latanoprost, or Lovenox     Review of Systems   Constitutional:  Positive for appetite change " (a bit decreased).   Respiratory:  Positive for shortness of breath.    Gastrointestinal:  Negative for constipation.   Genitourinary:  Positive for frequency.        (+) incontinence   Musculoskeletal:  Positive for gait problem.     Objective     There were no vitals taken for this visit.    Physical Exam  Vitals and nursing note reviewed.   Constitutional:       General: She is not in acute distress.     Appearance: Normal appearance.      Interventions: Nasal cannula in place.   Pulmonary:      Effort: Pulmonary effort is normal. No respiratory distress.   Neurological:      Mental Status: She is alert.      Comments: Grossly intact   Psychiatric:         Mood and Affect: Mood normal.       Medications have been reviewed by provider in current encounter      Clarisa Billingsley, DO      VIRTUAL VISIT DISCLAIMER    Shantelle Romano verbally agrees to participate in Virtual Care Services. Pt is aware that Virtual Care Services could be limited without vital signs or the ability to perform a full hands-on physical exam. Shantelle Romano understands she or the provider may request at any time to terminate the video visit and request the patient to seek care or treatment in person.

## 2024-10-22 NOTE — TELEPHONE ENCOUNTER
Caller: Solomon Mcdonald    Doctor: Lalo Damon    Reason for call: Solomon called yesterday in regards to having a virtual visit Friday with Lalo Damon for Shantelle Romano.  He has not received a call back.  Please call him.    Thank you    Call back#: [phone]

## 2024-10-22 NOTE — ASSESSMENT & PLAN NOTE
Can decrease back to previous 20 mg BID -- to monitor for worsening symptoms   Orders:    omeprazole (PriLOSEC) 20 mg delayed release capsule; Take 1 capsule (20 mg total) by mouth 2 (two) times a day

## 2024-10-23 LAB
ALBUMIN SERPL-MCNC: 3.2 G/DL (ref 3.5–5.7)
ALP SERPL-CCNC: 62 U/L (ref 35–120)
ALT SERPL-CCNC: 5 U/L
ANION GAP SERPL CALCULATED.3IONS-SCNC: 11 MMOL/L (ref 3–11)
AST SERPL-CCNC: 10 U/L
BASOPHILS # BLD AUTO: 0.1 THOU/CMM (ref 0–0.1)
BASOPHILS NFR BLD AUTO: 1 %
BILIRUB SERPL-MCNC: 0.4 MG/DL (ref 0.2–1)
BUN SERPL-MCNC: 25 MG/DL (ref 7–25)
CALCIUM SERPL-MCNC: 9.1 MG/DL (ref 8.5–10.5)
CHLORIDE SERPL-SCNC: 102 MMOL/L (ref 100–109)
CO2 SERPL-SCNC: 27 MMOL/L (ref 21–31)
CREAT SERPL-MCNC: 1.08 MG/DL (ref 0.4–1.1)
CYTOLOGY CMNT CVX/VAG CYTO-IMP: ABNORMAL
DIFFERENTIAL METHOD BLD: ABNORMAL
EOSINOPHIL # BLD AUTO: 0.5 THOU/CMM (ref 0–0.5)
EOSINOPHIL NFR BLD AUTO: 5 %
ERYTHROCYTE [DISTWIDTH] IN BLOOD BY AUTOMATED COUNT: 18.2 % (ref 12–16)
GFR/BSA.PRED SERPLBLD CYS-BASED-ARV: 51 ML/MIN/{1.73_M2}
GLUCOSE SERPL-MCNC: 187 MG/DL (ref 65–99)
HCT VFR BLD AUTO: 33 % (ref 35–43)
HGB BLD-MCNC: 10.6 G/DL (ref 11.5–14.5)
LYMPHOCYTES # BLD AUTO: 1.4 THOU/CMM (ref 1–3)
LYMPHOCYTES NFR BLD AUTO: 16 %
MCH RBC QN AUTO: 26.9 PG (ref 26–34)
MCHC RBC AUTO-ENTMCNC: 32 G/DL (ref 32–37)
MCV RBC AUTO: 84 FL (ref 80–100)
MONOCYTES # BLD AUTO: 0.5 THOU/CMM (ref 0.3–1)
MONOCYTES NFR BLD AUTO: 6 %
NEUTROPHILS # BLD AUTO: 6.4 THOU/CMM (ref 1.8–7.8)
NEUTROPHILS NFR BLD AUTO: 72 %
PLATELET # BLD AUTO: 342 THOU/CMM (ref 140–350)
PMV BLD REES-ECKER: 8 FL (ref 7.5–11.3)
POTASSIUM SERPL-SCNC: 4.2 MMOL/L (ref 3.5–5.2)
PROT SERPL-MCNC: 6.6 G/DL (ref 6.3–8.3)
RBC # BLD AUTO: 3.94 MILL/CMM (ref 3.7–4.7)
SODIUM SERPL-SCNC: 140 MMOL/L (ref 135–145)
WBC # BLD AUTO: 8.9 THOU/CMM (ref 4–10)
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
## 2024-10-23 NOTE — TELEPHONE ENCOUNTER
It would be difficult to examine her from a cardiac standpoint via virtual appointment.  I would prefer and recommend that we keep this appointment in office/in person.

## 2024-10-23 NOTE — TELEPHONE ENCOUNTER
Caller: Gerda Mcdonaldn     Doctor: Lalo Damon     Reason for call: Pt just released form Rehab. Having mobility issues. Needs to switch scheduled HFU in [place], requesting to make appointment virtual.    Call back#: [phone]

## 2024-10-23 NOTE — TELEPHONE ENCOUNTER
If patient is unable to attend appointment in person, we can do a virtual appoint with the understanding that cardiac evaluation will be limited.

## 2024-10-24 ENCOUNTER — TELEPHONE (OUTPATIENT)
Age: 79
End: 2024-10-24

## 2024-10-24 ENCOUNTER — TELEPHONE (OUTPATIENT)
Dept: ADMINISTRATIVE | Facility: OTHER | Age: 79
End: 2024-10-24

## 2024-10-24 NOTE — TELEPHONE ENCOUNTER
VIKIM relaying Lalo SANDERS response.    Clerical:  Please change appointment type to virtual for this Friday.
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
## 2024-10-24 NOTE — TELEPHONE ENCOUNTER
10/24/24 1:21 PM    Patient was flagged by a Third Party Payer report as being due for a refill on the following medications, ATORVASTATIN TAB 10MG . Patient was contacted to review these medications. Patient stated no need for refill. No further action is required.     Thank you.  Ximena Love  [facility]
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
## 2024-10-24 NOTE — TELEPHONE ENCOUNTER
Appt. Cannot be changed to virtual because it is scheduled in Hospital follow up.  Patient was informed that appt is a virtual

## 2024-10-25 ENCOUNTER — OFFICE VISIT (OUTPATIENT)
Dept: CARDIOLOGY CLINIC | Facility: CLINIC | Age: 79
End: 2024-10-25
Payer: COMMERCIAL

## 2024-10-25 ENCOUNTER — TELEPHONE (OUTPATIENT)
Age: 79
End: 2024-10-25

## 2024-10-25 VITALS — BODY MASS INDEX: 36.54 KG/M2 | HEIGHT: 55 IN

## 2024-10-25 DIAGNOSIS — E11.69 HYPERLIPIDEMIA ASSOCIATED WITH TYPE 2 DIABETES MELLITUS  (HCC): ICD-10-CM

## 2024-10-25 DIAGNOSIS — I10 PRIMARY HYPERTENSION: ICD-10-CM

## 2024-10-25 DIAGNOSIS — I25.10 NONOBSTRUCTIVE ATHEROSCLEROSIS OF CORONARY ARTERY: Primary | ICD-10-CM

## 2024-10-25 DIAGNOSIS — I50.32 CHRONIC HEART FAILURE WITH PRESERVED EJECTION FRACTION (HFPEF) (HCC): ICD-10-CM

## 2024-10-25 DIAGNOSIS — E78.5 HYPERLIPIDEMIA ASSOCIATED WITH TYPE 2 DIABETES MELLITUS  (HCC): ICD-10-CM

## 2024-10-25 PROCEDURE — 99443 PR PHYS/QHP TELEPHONE EVALUATION 21-30 MIN: CPT

## 2024-10-25 RX ORDER — ATORVASTATIN CALCIUM 20 MG/1
20 TABLET, FILM COATED ORAL
Qty: 30 TABLET | Refills: 2 | Status: SHIPPED | OUTPATIENT
Start: 2024-10-25

## 2024-10-25 NOTE — TELEPHONE ENCOUNTER
"Hello,    The following message was sent via e-mail to the leadership team:     Please advise if you can help facilitate the following overbook request:    Patient Name: Shantelle Romano     Patient MRN: [id number]    Call back #: [phone]    Insurance: [employer], [facility]     Department:Cardiology    Speciality: General Cardiology    Reason for overbook request: PROVIDER REQUEST    Comments (Write \"N/a\" if no comments): Patient had a virtual visit w/ PA- Lalo Damon on 10/25 who is requesting a 4 week f/u appt. Soonest available was for 1/17. Booked appt and sent out overbook request.     Requested doctor and location: [place]    Date of current appointment: 1/17      Thank you.    "

## 2024-10-25 NOTE — TELEPHONE ENCOUNTER
Please advise that I will cancel the CBC since that was completed already, however CMP needs to be completed in approximately 2 weeks based on medication adjustments made during appointment today.

## 2024-10-25 NOTE — ASSESSMENT & PLAN NOTE
Continue Lasix 20 mg twice daily.  Ambulatory BP monitoring and low-sodium diet advised.  She has noted lightheadedness/dizziness/weakness if she stands too quickly.  She was advised to take positional changes slowly, maintain adequate p.o. hydration, wear compression stockings, and reach out to our office if she notes persisting or worsening symptoms, or abnormal ambulatory BP readings.  She was additionally advised to reach out to her PCP to discuss the weakness and balance difficulties.

## 2024-10-25 NOTE — PROGRESS NOTES
Virtual Brief Visit  Name: Shantelle Romano      : 1945      MRN: [id number]  Encounter Provider: Lalo Damon PA-C  Encounter Date: 10/25/2024   Encounter department: [facility]    This Visit is being completed by telephone. The Patient is located at Home and in the following state in which I hold an active license [place]    The patient was identified by name and date of birth. Shantelle Romano was informed that this is a telemedicine visit and that the visit is being conducted through Telephone.  My office door was closed. No one else was in the room.  She acknowledged consent and understanding of privacy and security of the video platform. The patient has agreed to participate and understands they can discontinue the visit at any time.    Patient is aware this is a billable service.     Assessment & Plan  Nonobstructive atherosclerosis of coronary artery  Uptitrate atorvastatin to 20 mg daily.  CMP ordered to be completed in approximately 2 weeks.  Will hold off on initiation of aspirin at this time given recent anemia.  Recommend reassessment at her next cardiology appointment, and recommend close follow-up with her PCP regarding her anemia.       Chronic heart failure with preserved ejection fraction (HFpEF) ([facility])  Wt Readings from Last 3 Encounters:   10/17/24 71.3 kg (157 lb 3.2 oz)   24 72.6 kg (160 lb)   09/10/24 75.1 kg (165 lb 9.1 oz)   EF 55%  Patient's family reports that they feel she is close to euvolemic.  They expressed interest in decreasing dosing of Lasix, however I advised that this be readdressed when she would be able to follow-up closely in person in office.  Patient and family agreeable.  Continue Lasix 20 mg twice daily.  Recommend low-sodium diet and daily weights.  Patient was advised to call our office for weight gain, lower extremity edema and to proceed to the ER for dyspnea.  She was advised to notify office of worsening  lightheadedness, or significant weight loss.       Primary hypertension  Continue Lasix 20 mg twice daily.  Ambulatory BP monitoring and low-sodium diet advised.  She has noted lightheadedness/dizziness/weakness if she stands too quickly.  She was advised to take positional changes slowly, maintain adequate p.o. hydration, wear compression stockings, and reach out to our office if she notes persisting or worsening symptoms, or abnormal ambulatory BP readings.  She was additionally advised to reach out to her PCP to discuss the weakness and balance difficulties.       Hyperlipidemia associated with type 2 diabetes mellitus  (HCC)    Lab Results   Component Value Date    HGBA1C 7.6 (H) 07/03/2024     Uptitrate atorvastatin to 20 mg daily.  CMP ordered to be completed in approximately 2 weeks.  Continue to follow with PCP for management of DM 2  Orders:    atorvastatin (LIPITOR) 20 mg tablet; Take 1 tablet (20 mg total) by mouth daily at bedtime    Comprehensive metabolic panel; Future        Follow-up: 1 month or sooner as needed.   All questions and concerned addressed.   Patient was advised to notify our office with onset of new or worsening cardiac symptoms.      History of Present Illness   Shantelle Romano is an 79-year-old female with a past medical history of nonobstructive CAD, chronic HFpEF, hypertension, hyperlipidemia, COPD, type 2 diabetes mellitus (A1c 7.6), CKD 3, GERD, recent femur fracture (9/2024), recent sacral wound who presents for hospital follow-up.  This patient previously followed with Dr. Richards.      She presented to [facility] ED in 8/2024 for evaluation of chest pain and dyspnea on exertion.  Given her recent abnormal elective stress test, patient underwent cardiac catheterization for further evaluation.  Cardiac catheterization revealed mild nonobstructive CAD.  Upon discussion with patient and her family members over the phone today, she denies recurrence of chest pain.  She felt dizzy and  weak and off balance and had a fall in September, and had subsequent right femur fracture.  She underwent right ORIF.  They reports she sometimes experiences lightheadedness/dizziness if she stands up too quickly.  They report this has been an ongoing issue for some time now, as well as that she feels off balance, and has vision changes that also contribute to this lightheadedness/dizziness.  She reports that during that hospitalization, it was noted that her oxygen level was continually dropping.  She was discharged, and has been maintained on 2 L O2 via NC since then.  She reports she gets dyspneic on exertion with associated oxygen desaturation, and reports that the dyspnea is improved with nebulizers and inhalers.  She denies orthopnea, PND.  Per patient's family, her lower extremity edema has improved significantly.  They report the edema is improved with elevating the legs, and wrapping the leg/using compression stockings.  They report the lower extremity edema is never completely resolved, but overall has improved recently.  They report her weight has been stable at 150 pounds for the past several days, which they report was approximately her target dry weight.  Her family does express interest in decreasing Lasix due to frequent urination/incontinence, however we discussed that decreasing Lasix when she would not be able to follow-up in office at this point would be difficult as a provider would not be able to examine her/her volume status.  We discussed that she could transition to taking Lasix 40 mg once daily and see if this alleviates her symptoms.  Patient's family reports they would prefer to remain on the 20 mg twice daily dosing, and reassess her diuretic dosing once she is able to follow-up in office more regularly.  She reports ambulatory SBP typically in the 120s/60s.  She denies pain, numbness, tingling, weakness, significant bruising/bleeding/swelling of the right wrist/hand s/p cardiac  catheterization.          Family History: Brother and mother with history of cardiac disease  Social History: Former smoker, quit in 2003      Recent cardiac workup:  -Cardiac catheterization  8/28/2024: Mild nonobstructive CAD  -Pharmacologic MPI 8/2024: Partially reversible apical defect  -TTE 7/2024: EF 55%, mild aortic regurgitation, mild mitral   regurgitaiton, mild tricuspid regurgitation.  -TTE 11/2022: EF 55%, grade 1 DD, mild aortic insufficiency  -Lower extremity arterial duplex 11/2020: No evidence of PAD  -TTE 11/2020: EF 55%, mild mitral stenosis  -Pharmacologic MPI 11/2020: Negative for ischemia            Visit Time  Total Visit Duration: 30 minutes

## 2024-10-25 NOTE — TELEPHONE ENCOUNTER
New letter entered -- please fax as below.     Can we also check to see if they need to the DME orders sent as well and if so can we place those?

## 2024-10-25 NOTE — TELEPHONE ENCOUNTER
Received call form Blank at [facility] stating the pt just recently had an CBC and CMP done on 10/23/24 and would like to verify if and when you would like the pt to have them redrawn.  OV note from today has not been completed yet.  Please review and contact Blank with you recommendations, thank you.    Blank's phone number [phone]

## 2024-10-25 NOTE — TELEPHONE ENCOUNTER
Spoke to pts son, Solomon and relayed Lalo SANDERS response, Solomon verbalized understanding.  Faxed order requisition to [facility] [phone]

## 2024-10-25 NOTE — TELEPHONE ENCOUNTER
Patients son in law called again. Wanted to know if a power lift chair could be added additionally as well. So a script for that, along with a letter of medical necessity to go with it. Thank you.    Attention :Roxana Rucker @ [facility]   Ph# [phone]  Fax# [phone]

## 2024-10-26 NOTE — ASSESSMENT & PLAN NOTE
Lab Results   Component Value Date    HGBA1C 7.6 (H) 07/03/2024     Uptitrate atorvastatin to 20 mg daily.  CMP ordered to be completed in approximately 2 weeks.  Continue to follow with PCP for management of DM 2  Orders:    atorvastatin (LIPITOR) 20 mg tablet; Take 1 tablet (20 mg total) by mouth daily at bedtime    Comprehensive metabolic panel; Future

## 2024-10-28 DIAGNOSIS — L85.3 DRY SKIN: Primary | ICD-10-CM

## 2024-10-28 RX ORDER — AMMONIUM LACTATE 12 G/100G
LOTION TOPICAL 2 TIMES DAILY PRN
Qty: 225 G | Refills: 1 | Status: SHIPPED | OUTPATIENT
Start: 2024-10-28

## 2024-10-28 NOTE — TELEPHONE ENCOUNTER
Roxana states the letter needs to be signed as well as to add to the letter that the patient has had mobility issues prior to the fracture. Please send letter . One letter needs to state power lift chair. The other letter extorior removal ramp or stair glide.

## 2024-10-31 DIAGNOSIS — Z79.4 TYPE 2 DIABETES MELLITUS WITH DIABETIC NEUROPATHY, WITH LONG-TERM CURRENT USE OF INSULIN (HCC): ICD-10-CM

## 2024-10-31 DIAGNOSIS — E11.40 TYPE 2 DIABETES MELLITUS WITH DIABETIC NEUROPATHY, WITH LONG-TERM CURRENT USE OF INSULIN (HCC): ICD-10-CM

## 2024-10-31 RX ORDER — INSULIN GLARGINE 100 [IU]/ML
30 INJECTION, SOLUTION SUBCUTANEOUS DAILY
Qty: 3 ML | Refills: 0 | Status: SHIPPED | OUTPATIENT
Start: 2024-10-31

## 2024-10-31 NOTE — TELEPHONE ENCOUNTER
[facility] requesting a 1 temporary pen for  (Lantus SoloStar) 100 UNIT/ML SOPN. As per pharmacy Patient called in requesting 1 pen due to being damaged.  Patient is not eligible for a refill until 11/2/24. Please review and reach out to pharmacy for additional questions.

## 2024-11-05 ENCOUNTER — TELEPHONE (OUTPATIENT)
Age: 79
End: 2024-11-05

## 2024-11-05 DIAGNOSIS — S31.819A BUTTOCK WOUND, RIGHT, INITIAL ENCOUNTER: Primary | ICD-10-CM

## 2024-11-05 DIAGNOSIS — R39.9 UTI SYMPTOMS: Primary | ICD-10-CM

## 2024-11-05 NOTE — TELEPHONE ENCOUNTER
Pt started feeling symptoms of burning upon urination nurse asking can pcp Bobbytus put in a order for UA and culture.

## 2024-11-05 NOTE — TELEPHONE ENCOUNTER
Original message stated that this came from [facility] so I faxed the lab orders there initially. I re-faxed to [facility] since new info came in. Are you able to order wound care for her?

## 2024-11-05 NOTE — TELEPHONE ENCOUNTER
Received call from BAM Bell from [facility].  She is requesting an order for a UA with Reflex to culture for her next visit with pt.  Pt reports burning with urination x 2 weeks.  Arabella also reports that pt has wound on her right buttocks.  Wound has yellow slough and is not improving with current application of Medi-honey.  Arabella is asking for an order for Santyl applied once daily to affected area for that Right buttock wound.  Fax # [phone].  Please review and advise.

## 2024-11-07 DIAGNOSIS — R39.9 UTI SYMPTOMS: Primary | ICD-10-CM

## 2024-11-07 LAB
BACTERIA URNS QL MICRO: ABNORMAL
CAOX CRY #/AREA URNS HPF: PRESENT /[HPF]
GLUCOSE UR QL STRIP: NEGATIVE MG/DL
HGB UR QL STRIP: NEGATIVE MG/DL
KETONES UR QL STRIP: NEGATIVE MG/DL
LEUKOCYTE ESTERASE UR QL STRIP: 500 /UL
MUCOUS THREADS URNS QL MICRO: ABNORMAL
NITRITE UR QL STRIP: NEGATIVE
PH UR: 5 [PH] (ref 4.5–8)
PROT 24H UR-MRATE: NEGATIVE MG/DL
RBC #/AREA URNS HPF: ABNORMAL /HPF (ref 0–2)
SL AMB POCT URINE COMMENT: ABNORMAL
SP GR UR: 1.02 (ref 1–1.03)
SQUAMOUS #/AREA URNS HPF: >10 /LPF (ref 0–5)
TRANS CELLS #/AREA URNS HPF: >10 /LPF (ref 0–1)
WBC #/AREA URNS HPF: ABNORMAL /HPF (ref 0–5)

## 2024-11-07 RX ORDER — CEPHALEXIN 500 MG/1
500 CAPSULE ORAL 2 TIMES DAILY
Qty: 10 CAPSULE | Refills: 0 | Status: SHIPPED | OUTPATIENT
Start: 2024-11-07 | End: 2024-11-12

## 2024-11-08 LAB — LEGIONELLA SPEC CULT: NORMAL

## 2024-11-21 ENCOUNTER — TELEPHONE (OUTPATIENT)
Age: 79
End: 2024-11-21

## 2024-11-21 DIAGNOSIS — E78.5 HYPERLIPIDEMIA ASSOCIATED WITH TYPE 2 DIABETES MELLITUS  (HCC): ICD-10-CM

## 2024-11-21 DIAGNOSIS — E11.69 HYPERLIPIDEMIA ASSOCIATED WITH TYPE 2 DIABETES MELLITUS  (HCC): ICD-10-CM

## 2024-11-21 RX ORDER — ATORVASTATIN CALCIUM 20 MG/1
20 TABLET, FILM COATED ORAL
Qty: 90 TABLET | Refills: 1 | Status: SHIPPED | OUTPATIENT
Start: 2024-11-21

## 2024-11-21 NOTE — TELEPHONE ENCOUNTER
Caller: JAVY Sousa    Doctor/Office: Marco A/Tamara    #: [phone]      What needs to be faxed: RX for the blood work needs to be sent to [facility] so the visiting nurse can draw blood 11/22/24- tomorrow.    ATTN to: [facility]    Fax#: [phone]  PHONE: [phone]

## 2024-11-21 NOTE — TELEPHONE ENCOUNTER
Faxed pt lab order for CMP to [facility].    Fax# [phone]    Confirmation received.

## 2024-11-26 ENCOUNTER — OFFICE VISIT (OUTPATIENT)
Dept: CARDIOLOGY CLINIC | Facility: CLINIC | Age: 79
End: 2024-11-26
Payer: COMMERCIAL

## 2024-11-26 VITALS
HEIGHT: 55 IN | BODY MASS INDEX: 34.48 KG/M2 | HEART RATE: 74 BPM | OXYGEN SATURATION: 94 % | DIASTOLIC BLOOD PRESSURE: 64 MMHG | SYSTOLIC BLOOD PRESSURE: 110 MMHG | RESPIRATION RATE: 16 BRPM | WEIGHT: 149 LBS

## 2024-11-26 DIAGNOSIS — I25.10 CORONARY ARTERY DISEASE INVOLVING NATIVE CORONARY ARTERY OF NATIVE HEART WITHOUT ANGINA PECTORIS: ICD-10-CM

## 2024-11-26 DIAGNOSIS — I50.32 CHRONIC HEART FAILURE WITH PRESERVED EJECTION FRACTION (HFPEF) (HCC): Primary | ICD-10-CM

## 2024-11-26 PROCEDURE — 99214 OFFICE O/P EST MOD 30 MIN: CPT | Performed by: STUDENT IN AN ORGANIZED HEALTH CARE EDUCATION/TRAINING PROGRAM

## 2024-11-26 NOTE — PATIENT INSTRUCTIONS
1) will reach out to your PCP regarding blood work  2) Will consider further medication for cardiomyopathy

## 2024-11-26 NOTE — PROGRESS NOTES
[facility]  Follow up note  Shantelle Romano 79 y.o. female MRN: [id number]        1. Chronic heart failure with preserved ejection fraction (HFpEF) ([facility])  -     Iron Panel (Includes Ferritin, Iron Sat%, Iron, and TIBC); Future  2. Coronary artery disease involving native coronary artery of native heart without angina pectoris      Assessment & Plan  Chronic heart failure with preserved ejection fraction (HFpEF) ([facility])  Wt Readings from Last 3 Encounters:   11/26/24 67.6 kg (149 lb)   10/17/24 71.3 kg (157 lb 3.2 oz)   09/30/24 72.6 kg (160 lb)   Patient does seem to be mildly hypervolemic with improvement in volume status on her current dose of furosemide.  Will continue to have the patient obtain daily weights and assess for signs or symptoms of dehydration.  She was previously educated on signs or symptoms of worsening decompensated heart failure which she was able to verbally repeat to me.  She was previously placed on home oxygen, during the visit today we took her off O2 while placing a pulse oximeter on her.  She was consistently saturating in the mid 90s%. She would likely benefit from iron studies given her heart failure diagnosis, I will reach out to her PCP to see if there is any other work up for anemia they are planning.     Coronary artery disease involving native coronary artery of native heart without angina pectoris  No symptoms angina will continue with statin.  Will obtain iron panel and reach out to PCP for any further workup for anemia.    HPI:   Shantelle Romano is a 79 y.o. year old female past medical history of nonobstructive CAD, HFpEF, hypertension, hyperlipidemia, COPD, type 2 diabetes, CKD    Presents here for follow-up.  She had a recent admission 8/2024 for chest pain and obtained a cardiac catheterization in setting an abnormal stress test, the cardiac catheterization which showed mild nonobstructive CAD.  In September she underwent right ORIF.  After recent hospitalization she was  sent home on 2 L of O2 which she has required more intermittently recently.    Today she reports no new change in her exertional tolerance, no chest pain, no weight gain, no lower extremity edema.  Denies any bleeding from mouth or rectum.  No recent fevers, chills, nausea, vomiting.  She reports that she overall is just fatigued and is working with physical therapist to improve her strength.    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    Carpal tunnel syndrome     Chest pain 03/06/2017    Chronic kidney disease     Claudication ([facility]) 3/15/2019    Closed fracture of base of middle phalanx of finger 06/22/2017    Closed fracture of middle phalanx of left little finger 07/08/2017    Coagulopathy ([facility]) 05/15/2019    Colloid cyst of brain ([facility])     COPD (chronic obstructive pulmonary disease) ([facility])     COPD (chronic obstructive pulmonary disease) ([facility])     Coronary artery disease     Cough     Diabetes mellitus ([facility])     GERD (gastroesophageal reflux disease)     Glaucoma     Gout     History of brain disorder 10/07/2020    Hyperlipidemia     Hypertension     Irritable bowel syndrome     Melena 02/26/2019    PAD (peripheral artery disease) ([facility]) 5/15/2019    Paronychia of great toe of left foot 10/07/2020    Post-traumatic seizures ([facility]) 07/23/2015    Renal disorder     Seizures ([facility])     last 2015    Shortness of breath     Single seizure ([facility]) 05/31/2016    SOB (shortness of breath)     Syncope and collapse 11/11/2020    Urinary tract infection without hematuria 11/26/2016    Wheezing      Social History     Substance and Sexual Activity   Alcohol Use Never     Social History     Substance and Sexual Activity   Drug Use Never     Social History     Tobacco Use   Smoking Status Former    Current  packs/day: 0.00    Average packs/day: 0.5 packs/day for 40.0 years (20.0 ttl pk-yrs)    Types: Cigarettes    Start date:     Quit date:     Years since quittin.9   Smokeless Tobacco Never   Tobacco Comments    stopped many years ago       Allergies:  Allergies   Allergen Reactions    Brimonidine Other (See Comments)    Salicylic Acid-Sulfur Other (See Comments)    Sulfa Antibiotics Rash and Other (See Comments)       Medications:     Current Outpatient Medications:     acetaminophen (TYLENOL) 650 mg CR tablet, Take 500 mg by mouth 2 (two) times a day, Disp: , Rfl:     albuterol (2.5 mg/3 mL) 0.083 % nebulizer solution, Take 3 mL (2.5 mg total) by nebulization every 6 (six) hours as needed for wheezing or shortness of breath, Disp: 360 mL, Rfl: 1    albuterol (ProAir HFA) 90 mcg/act inhaler, Inhale 2 puffs every 6 (six) hours as needed for wheezing or shortness of breath (cough, chest tightness), Disp: 18 g, Rfl: 0    ammonium lactate (LAC-HYDRIN) 12 % lotion, Apply topically 2 (two) times a day as needed for dry skin, Disp: 225 g, Rfl: 1    atorvastatin (LIPITOR) 20 mg tablet, take 1 tablet by mouth at bedtime, Disp: 90 tablet, Rfl: 1    Calcium Carbonate-Vitamin D (CALCIUM-D PO), Take 1 tablet by mouth in the morning, Disp: , Rfl:     clotrimazole (LOTRIMIN) 1 % cream, , Disp: , Rfl:     collagenase (SANTYL) ointment, Apply topically daily, Disp: 90 g, Rfl: 1    Continuous Blood Gluc  (Dexcom G7 ) YOLANDA, Use 1 Application if needed (check sugars), Disp: 1 each, Rfl: 0    Continuous Blood Gluc Sensor (Dexcom G7 Sensor), Use 1 Device every 10 days, Disp: 9 each, Rfl: 3    fluticasone (FLONASE) 50 mcg/act nasal spray, 1 spray into each nostril daily pt uses as needed, Disp: 54 g, Rfl: 1    fluticasone-umeclidinium-vilanterol (Trelegy Ellipta) 100-62.5-25 mcg/actuation inhaler, inhale 1 puff by mouth daily Rinse mouth after use, Disp: 180 blister, Rfl: 3    furosemide (LASIX) 20 mg tablet,  take 1 tablet by mouth twice a day, Disp: 180 tablet, Rfl: 1    gabapentin (NEURONTIN) 100 mg capsule, Take 2 capsules (200 mg total) by mouth daily at bedtime, Disp: 180 capsule, Rfl: 1    guaiFENesin (MUCINEX) 600 mg 12 hr tablet, Take 1 tablet (600 mg total) by mouth 2 (two) times a day as needed for cough or congestion, Disp: 180 tablet, Rfl: 1    insulin aspart (NovoLOG FlexPen) 100 UNIT/ML injection pen, inject 12 units subcutaneously before meals (3 MEALS A DAY), Disp: 15 mL, Rfl: 5    Insulin Glargine Solostar (Lantus SoloStar) 100 UNIT/ML SOPN, Inject 0.3 mL (30 Units total) under the skin daily, Disp: 3 mL, Rfl: 0    Insulin Pen Needle (B-D ULTRAFINE III SHORT PEN) 31G X 8 MM MISC, Use 4 (four) times a day (with meals and at bedtime), Disp: 400 each, Rfl: 1    ketoconazole (NIZORAL) 2 % cream, , Disp: , Rfl:     lidocaine (Lidoderm) 5 %, Apply 1 patch topically over 12 hours daily as needed (pain) Remove & Discard patch within 12 hours or as directed by MD, Disp: 30 patch, Rfl: 1    Lumigan 0.01 % ophthalmic drops, INSTILL 1 DROP into both eyes at bedtime, Disp: , Rfl:     methocarbamol (ROBAXIN) 500 mg tablet, Take 500 mg by mouth daily at bedtime as needed for muscle spasms, Disp: , Rfl:     montelukast (SINGULAIR) 10 mg tablet, Take 1 tablet (10 mg total) by mouth every evening, Disp: 90 tablet, Rfl: 1    Multiple Vitamin (MULTI VITAMIN DAILY PO), Take 1 tablet by mouth daily, Disp: , Rfl:     mupirocin (BACTROBAN) 2 % ointment, APPLY A SMALL AMOUNT TO THE AFFECTED FOOT/TOE AREA BY TOPICAL ROUTE 3 TIMES PER DAY, Disp: , Rfl:     nystatin (MYCOSTATIN) cream, Apply topically 2 (two) times a day, Disp: 30 g, Rfl: 1    nystatin (MYCOSTATIN) powder, Apply topically 3 (three) times a day as needed (rash), Disp: 60 g, Rfl: 1    omeprazole (PriLOSEC) 20 mg delayed release capsule, Take 1 capsule (20 mg total) by mouth 2 (two) times a day, Disp: 180 capsule, Rfl: 1    oxyCODONE (Roxicodone) 5 immediate release  "tablet, Take 1 tablet (5 mg total) by mouth every 6 (six) hours as needed for moderate pain Max Daily Amount: 20 mg, Disp: 30 tablet, Rfl: 0    Spacer/Aero-Holding Chambers (AeroChamber Plus Elian-Vu Large) MISC, Use as directed, Disp: , Rfl:     vitamin B-12 (VITAMIN B-12) 500 mcg tablet, Take 1,000 mcg by mouth daily, Disp: , Rfl:       Vitals:    11/26/24 0800   BP: 110/64   Pulse: 74   Resp: 16   SpO2: 94%     Weight (last 2 days)       None          Physical Exam  Gen: NAD  Cardiac: RRR, murmur +  Lung: Minimal bibasilar crackles  Legs: Mild edema in ankles   Laboratory Studies:    Lab Results   Component Value Date    HGBA1C 7.6 (H) 07/03/2024     Lab Results   Component Value Date    WBC 8.9 10/23/2024    HGB 10.6 (L) 10/23/2024    HCT 33.0 (L) 10/23/2024    MCV 84 10/23/2024     10/23/2024     Lab Results   Component Value Date    SODIUM 140 10/23/2024    K 4.2 10/23/2024     10/23/2024    CO2 27 10/23/2024    BUN 25 10/23/2024    CREATININE 1.08 10/23/2024    GLUC 187 (H) 10/23/2024    CALCIUM 9.1 10/23/2024         Cardiac testing:     EKG reviewed personally:   8/27/2024    Echocardiogram:  7/22/2024: LVEF 55%, grade 1 diastolic dysfunction, mild AR, mild MR, mild TR    Stress tests:  8/19/2024:  There is a left ventricular perfusion defect that is small in size with moderate reduction in uptake present in the apical location(s) that is partially reversible.        Catheterization:  8/28/2024: Mild nonobstructive atherosclerosis    Holter:         Rosemarie Strickland MD    Portions of the record may have been created with voice recognition software.  Occasional wrong word or \"sound a like\" substitutions may have occurred due to the inherent limitations of voice recognition software.  Read the chart carefully and recognize, using context, where substitutions have occurred.   "

## 2024-11-29 NOTE — ASSESSMENT & PLAN NOTE
No symptoms angina will continue with statin.  Will obtain iron panel and reach out to PCP for any further workup for anemia.

## 2024-12-01 DIAGNOSIS — Z96.659 PERIPROSTHETIC FRACTURE AROUND PROSTHETIC KNEE, SUBSEQUENT ENCOUNTER: Primary | ICD-10-CM

## 2024-12-01 DIAGNOSIS — Z79.899 LONG TERM USE OF DRUG: ICD-10-CM

## 2024-12-01 DIAGNOSIS — M97.8XXD PERIPROSTHETIC FRACTURE AROUND PROSTHETIC KNEE, SUBSEQUENT ENCOUNTER: Primary | ICD-10-CM

## 2024-12-01 DIAGNOSIS — D64.9 ANEMIA, UNSPECIFIED TYPE: Primary | ICD-10-CM

## 2024-12-03 ENCOUNTER — APPOINTMENT (OUTPATIENT)
Dept: RADIOLOGY | Facility: CLINIC | Age: 79
End: 2024-12-03
Payer: COMMERCIAL

## 2024-12-03 ENCOUNTER — TELEPHONE (OUTPATIENT)
Age: 79
End: 2024-12-03

## 2024-12-03 ENCOUNTER — OFFICE VISIT (OUTPATIENT)
Dept: OBGYN CLINIC | Facility: CLINIC | Age: 79
End: 2024-12-03

## 2024-12-03 VITALS
SYSTOLIC BLOOD PRESSURE: 136 MMHG | HEIGHT: 55 IN | HEART RATE: 65 BPM | BODY MASS INDEX: 34.63 KG/M2 | DIASTOLIC BLOOD PRESSURE: 70 MMHG

## 2024-12-03 DIAGNOSIS — Z96.659 PERIPROSTHETIC FRACTURE AROUND PROSTHETIC KNEE, SUBSEQUENT ENCOUNTER: Primary | ICD-10-CM

## 2024-12-03 DIAGNOSIS — M97.8XXD PERIPROSTHETIC FRACTURE AROUND PROSTHETIC KNEE, SUBSEQUENT ENCOUNTER: Primary | ICD-10-CM

## 2024-12-03 DIAGNOSIS — Z96.659 PERIPROSTHETIC FRACTURE AROUND PROSTHETIC KNEE, SUBSEQUENT ENCOUNTER: ICD-10-CM

## 2024-12-03 DIAGNOSIS — M97.8XXD PERIPROSTHETIC FRACTURE AROUND PROSTHETIC KNEE, SUBSEQUENT ENCOUNTER: ICD-10-CM

## 2024-12-03 PROCEDURE — 73560 X-RAY EXAM OF KNEE 1 OR 2: CPT

## 2024-12-03 PROCEDURE — 99024 POSTOP FOLLOW-UP VISIT: CPT | Performed by: ORTHOPAEDIC SURGERY

## 2024-12-03 NOTE — PROGRESS NOTES
Orthopaedics Office Visit - Post-op Patient Visit    ASSESSMENT/PLAN:    Assessment:   12 weeks s/p ORIF right periprosthetic distal femur fracture   DOS 24   Healing fractures   Stiffness at knee joint       Plan:   Weight bearing as tolerated right lower extremity   Maintain hinged knee brace during ambulation   Strongly recommend patient begin outpatient physical therapy. Referral placed today  Over the counter analgesics as needed / directed   Ice / heat as directed   Follow up 2 months      To Do Next Visit:  Xr right knee / femur   _____________________________________________________  CHIEF COMPLAINT:  Chief Complaint   Patient presents with    Right Leg - Post-op         SUBJECTIVE:  Shantelle Romano is a 79 y.o. female who presents  12 weeks s/p ORIF right periprosthetic distal femur fracture   DOS 24.  Patient states that her knee is doing well overall.  Today the patient reports she is doing well. She continues home physical therapy. She is not having any pain. The patient presents in a wheelchair today but admits she does walk around her house.     SOCIAL HISTORY:  Social History     Tobacco Use    Smoking status: Former     Current packs/day: 0.00     Average packs/day: 0.5 packs/day for 40.0 years (20.0 ttl pk-yrs)     Types: Cigarettes     Start date:      Quit date:      Years since quittin.9    Smokeless tobacco: Never    Tobacco comments:     stopped many years ago   Vaping Use    Vaping status: Never Used   Substance Use Topics    Alcohol use: Never    Drug use: Never       MEDICATIONS:    Current Outpatient Medications:     acetaminophen (TYLENOL) 650 mg CR tablet, Take 500 mg by mouth 2 (two) times a day, Disp: , Rfl:     albuterol (2.5 mg/3 mL) 0.083 % nebulizer solution, Take 3 mL (2.5 mg total) by nebulization every 6 (six) hours as needed for wheezing or shortness of breath, Disp: 360 mL, Rfl: 1    albuterol (ProAir HFA) 90 mcg/act inhaler, Inhale 2 puffs every 6 (six) hours  as needed for wheezing or shortness of breath (cough, chest tightness), Disp: 18 g, Rfl: 0    ammonium lactate (LAC-HYDRIN) 12 % lotion, Apply topically 2 (two) times a day as needed for dry skin, Disp: 225 g, Rfl: 1    atorvastatin (LIPITOR) 20 mg tablet, take 1 tablet by mouth at bedtime, Disp: 90 tablet, Rfl: 1    Calcium Carbonate-Vitamin D (CALCIUM-D PO), Take 1 tablet by mouth in the morning, Disp: , Rfl:     clotrimazole (LOTRIMIN) 1 % cream, , Disp: , Rfl:     collagenase (SANTYL) ointment, Apply topically daily, Disp: 90 g, Rfl: 1    Continuous Blood Gluc  (Dexcom G7 ) YOLANDA, Use 1 Application if needed (check sugars), Disp: 1 each, Rfl: 0    Continuous Blood Gluc Sensor (Dexcom G7 Sensor), Use 1 Device every 10 days, Disp: 9 each, Rfl: 3    fluticasone (FLONASE) 50 mcg/act nasal spray, 1 spray into each nostril daily pt uses as needed, Disp: 54 g, Rfl: 1    fluticasone-umeclidinium-vilanterol (Trelegy Ellipta) 100-62.5-25 mcg/actuation inhaler, inhale 1 puff by mouth daily Rinse mouth after use, Disp: 180 blister, Rfl: 3    furosemide (LASIX) 20 mg tablet, take 1 tablet by mouth twice a day, Disp: 180 tablet, Rfl: 1    gabapentin (NEURONTIN) 100 mg capsule, Take 2 capsules (200 mg total) by mouth daily at bedtime, Disp: 180 capsule, Rfl: 1    guaiFENesin (MUCINEX) 600 mg 12 hr tablet, Take 1 tablet (600 mg total) by mouth 2 (two) times a day as needed for cough or congestion, Disp: 180 tablet, Rfl: 1    insulin aspart (NovoLOG FlexPen) 100 UNIT/ML injection pen, inject 12 units subcutaneously before meals (3 MEALS A DAY), Disp: 15 mL, Rfl: 5    Insulin Glargine Solostar (Lantus SoloStar) 100 UNIT/ML SOPN, Inject 0.3 mL (30 Units total) under the skin daily, Disp: 3 mL, Rfl: 0    Insulin Pen Needle (B-D ULTRAFINE III SHORT PEN) 31G X 8 MM MISC, Use 4 (four) times a day (with meals and at bedtime), Disp: 400 each, Rfl: 1    ketoconazole (NIZORAL) 2 % cream, , Disp: , Rfl:     lidocaine  "(Lidoderm) 5 %, Apply 1 patch topically over 12 hours daily as needed (pain) Remove & Discard patch within 12 hours or as directed by MD, Disp: 30 patch, Rfl: 1    Lumigan 0.01 % ophthalmic drops, INSTILL 1 DROP into both eyes at bedtime, Disp: , Rfl:     methocarbamol (ROBAXIN) 500 mg tablet, Take 500 mg by mouth daily at bedtime as needed for muscle spasms, Disp: , Rfl:     montelukast (SINGULAIR) 10 mg tablet, Take 1 tablet (10 mg total) by mouth every evening, Disp: 90 tablet, Rfl: 1    Multiple Vitamin (MULTI VITAMIN DAILY PO), Take 1 tablet by mouth daily, Disp: , Rfl:     mupirocin (BACTROBAN) 2 % ointment, APPLY A SMALL AMOUNT TO THE AFFECTED FOOT/TOE AREA BY TOPICAL ROUTE 3 TIMES PER DAY, Disp: , Rfl:     nystatin (MYCOSTATIN) cream, Apply topically 2 (two) times a day, Disp: 30 g, Rfl: 1    nystatin (MYCOSTATIN) powder, Apply topically 3 (three) times a day as needed (rash), Disp: 60 g, Rfl: 1    omeprazole (PriLOSEC) 20 mg delayed release capsule, Take 1 capsule (20 mg total) by mouth 2 (two) times a day, Disp: 180 capsule, Rfl: 1    oxyCODONE (Roxicodone) 5 immediate release tablet, Take 1 tablet (5 mg total) by mouth every 6 (six) hours as needed for moderate pain Max Daily Amount: 20 mg, Disp: 30 tablet, Rfl: 0    Spacer/Aero-Holding Chambers (AeroChamber Plus Elian-Vu Large) MISC, Use as directed, Disp: , Rfl:     vitamin B-12 (VITAMIN B-12) 500 mcg tablet, Take 1,000 mcg by mouth daily, Disp: , Rfl:     _____________________________________________________  PHYSICAL EXAMINATION:  Vital signs: /70   Pulse 65   Ht 4' 7\" (1.397 m)   BMI 34.63 kg/m²   General: No acute distress, awake and alert  Psychiatric: Mood and affect appear appropriate  HEENT: Trachea Midline, No torticollis, no apparent facial trauma  Cardiovascular: No audible murmurs; Extremities appear perfused  Pulmonary: No audible wheezing or stridor  Skin: No open lesions; see further details (if any) below      MUSCULOSKELETAL " EXAMINATION:  Right Knee  Incision well healed  Range of motion from 10 to 100.    There is mild crepitus with range of motion.   There is no effusion.    There is no tenderness over the knee.    The patient is able to perform a straight leg raise.    The patient is neurovascular intact distally.      _____________________________________________________  STUDIES REVIEWED:  I personally reviewed the images obtained in office today and my independent interpretation is as follows:  Right knee  x-rays obtained 12/3/2024 demonstrate orthopedic hardware intact, increased callus formation of periprosthetic femur fracture.        PROCEDURES PERFORMED:  No procedures were performed at this time.         Scribe Attestation      I,:  Kamilla Villalta am acting as a scribe while in the presence of the attending physician.:       I,:  Thien Tatum MD personally performed the services described in this documentation    as scribed in my presence.:

## 2024-12-03 NOTE — TELEPHONE ENCOUNTER
Received call from pt's son requesting blood work orders to be faxed to [facility] at [phone].

## 2024-12-10 LAB
FERRITIN SERPL-MCNC: 55 NG/ML (ref 11–306.8)
FOLATE SERPL-MCNC: >22.3 NG/ML
IRON SATN MFR SERPL: 17 % (ref 20–50)
IRON SERPL-MCNC: 49 UG/DL (ref 50–212)
TIBC SERPL-MCNC: 291 UG/DL (ref 260–430)
TRANSFERRIN SERPL-MCNC: 208 MG/DL (ref 203–362)
VIT B12 SERPL-MCNC: 1754 PG/ML (ref 180–914)

## 2024-12-11 ENCOUNTER — RESULTS FOLLOW-UP (OUTPATIENT)
Dept: FAMILY MEDICINE CLINIC | Facility: CLINIC | Age: 79
End: 2024-12-11

## 2024-12-16 ENCOUNTER — HOSPITAL ENCOUNTER (OUTPATIENT)
Dept: PULMONOLOGY | Facility: HOSPITAL | Age: 79
Discharge: HOME | End: 2024-12-16
Payer: COMMERCIAL

## 2024-12-16 DIAGNOSIS — R06.02 SOB (SHORTNESS OF BREATH): ICD-10-CM

## 2024-12-16 PROCEDURE — 94618 PULMONARY STRESS TESTING: CPT | Performed by: INTERNAL MEDICINE

## 2024-12-16 PROCEDURE — 94640 AIRWAY INHALATION TREATMENT: CPT

## 2024-12-16 PROCEDURE — 94010 BREATHING CAPACITY TEST: CPT | Performed by: INTERNAL MEDICINE

## 2024-12-16 PROCEDURE — 94621 CARDIOPULM EXERCISE TESTING: CPT

## 2024-12-16 PROCEDURE — 94726 PLETHYSMOGRAPHY LUNG VOLUMES: CPT

## 2024-12-16 PROCEDURE — 94010 BREATHING CAPACITY TEST: CPT

## 2024-12-16 PROCEDURE — 94726 PLETHYSMOGRAPHY LUNG VOLUMES: CPT | Performed by: INTERNAL MEDICINE

## 2024-12-16 RX ORDER — ALBUTEROL SULFATE 0.83 MG/ML
2.5 SOLUTION RESPIRATORY (INHALATION) ONCE AS NEEDED
Status: COMPLETED | OUTPATIENT
Start: 2024-12-16 | End: 2024-12-16

## 2024-12-16 RX ADMIN — ALBUTEROL SULFATE 2.5 MG: 2.5 SOLUTION RESPIRATORY (INHALATION) at 11:18

## 2024-12-17 PROBLEM — Z98.890 HISTORY OF TRABECULECTOMY: Status: ACTIVE | Noted: 2024-11-27

## 2024-12-17 PROBLEM — H40.1123 PRIMARY OPEN ANGLE GLAUCOMA (POAG) OF LEFT EYE, SEVERE STAGE: Status: ACTIVE | Noted: 2024-11-27

## 2024-12-17 PROBLEM — H50.9 STRABISMUS: Status: ACTIVE | Noted: 2024-11-27

## 2024-12-17 PROBLEM — H53.15 VISUAL DISTORTIONS OF SHAPE AND SIZE: Status: ACTIVE | Noted: 2024-11-27

## 2024-12-17 PROBLEM — H50.00 ESOTROPIA: Status: ACTIVE | Noted: 2024-11-27

## 2024-12-17 PROBLEM — E11.9 TYPE 2 DIABETES MELLITUS WITH HEMOGLOBIN A1C GOAL OF LESS THAN 7.0% (HCC): Status: ACTIVE | Noted: 2021-03-22

## 2024-12-17 PROBLEM — H47.093: Status: ACTIVE | Noted: 2024-11-27

## 2024-12-17 PROBLEM — H35.103 ROP (RETINOPATHY OF PREMATURITY), BILATERAL: Status: ACTIVE | Noted: 2024-11-27

## 2024-12-17 PROBLEM — Z96.1 PSEUDOPHAKIA: Status: ACTIVE | Noted: 2024-11-27

## 2024-12-17 PROBLEM — H53.003 AMBLYOPIA OF BOTH EYES: Status: ACTIVE | Noted: 2024-11-27

## 2024-12-17 PROBLEM — E11.9 TYPE 2 DIABETES MELLITUS WITH HEMOGLOBIN A1C GOAL OF LESS THAN 7.0% (HCC): Status: RESOLVED | Noted: 2021-03-22 | Resolved: 2024-12-17

## 2024-12-17 PROBLEM — H47.093: Status: RESOLVED | Noted: 2024-11-27 | Resolved: 2024-12-17

## 2024-12-17 NOTE — PROGRESS NOTES
Name: Shantelle Romano      : 1945      MRN: [id number]  Encounter Provider: Clarisa Billingsley DO  Encounter Date: 2024   Encounter department: [facility]  :  Assessment & Plan  Type 2 diabetes mellitus with diabetic neuropathy, with long-term current use of insulin (HCC)  A1c 6.4% (from 7.6) -- much improved, continue current regimen   Lab Results   Component Value Date    HGBA1C 6.4 2024       Orders:  •  POCT hemoglobin A1c  •  CBC and differential; Future  •  Iron Panel (Includes Ferritin, Iron Sat%, Iron, and TIBC); Future  •  Comprehensive metabolic panel; Future  •  Lipid panel; Future  •  Hemoglobin A1C; Future  •  Albumin / creatinine urine ratio; Future  •  TSH, 3rd generation with Free T4 reflex; Future    Hypertension associated with diabetes  (HCC)  Overall reasonable at home and in good range in office -- continue current regimen     Orders:  •  CBC and differential; Future  •  Comprehensive metabolic panel; Future  •  Lipid panel; Future    Hyperlipidemia associated with type 2 diabetes mellitus  (HCC)  Update lipids prior to next visit, continue statin     Orders:  •  CBC and differential; Future  •  Comprehensive metabolic panel; Future  •  Lipid panel; Future    Chronic heart failure with preserved ejection fraction (HCC)  Weight overall stable at home, no evidence of exacerbation on exam. Continue medical management and f/u with Cardio as scheduled  Orders:  •  alteplase (CATHFLO) injection 2 mg  •  sodium chloride 0.9 % infusion  •  ferric carboxymaltose (INJECTAFER) 750 mg in sodium chloride 0.9 % 250 mL infusion    Coronary artery disease involving native coronary artery of native heart without angina pectoris  Asymptomatic, f/u with Cardio        Simple chronic bronchitis (HCC)  No exacerbation on exam. S/p PFTs yesterday and has upcoming visit with Pulm to review. Continue PRN O2 (currently using qHS)   Orders:  •  CBC and differential; Future    Stage  "3b chronic kidney disease (HCC)  Kidney function stable, if not improved, on most recent labs. Reviewed importance of regular hydration throughout the day. F/u with Nephro as scheduled   Orders:  •  Comprehensive metabolic panel; Future    Other closed fracture of right femur, unspecified portion of femur, initial encounter ([facility])  Healing well, WBAT per Ortho. Going to transition to outpatient PT soon. Has f/u with Ortho again in 02/2025        Low ferritin  Per Cardiology recommendation, will order iron infusion as below:   \"Good Morning,     I reviewed her lab work, she meets criteria for iron transfusion given her heart failure and ferritin <100. Our office does not typically set up the infusions outpatient. Would this be something you would be able to faciliate for the patient?     It would be 1 g of ferric carboxymaltose, redose 1 g at 6 weeks, and repeat levels in 12 weeks     Take care,   -Ali\"  Orders:  •  alteplase (CATHFLO) injection 2 mg  •  sodium chloride 0.9 % infusion  •  ferric carboxymaltose (INJECTAFER) 750 mg in sodium chloride 0.9 % 250 mL infusion    Vitamin B12 deficiency  Above goal. Pt unsure of current supplement dosing -- if 1000 mcg daily, decrease to 500 mcg daily; if 500 mcg daily, stop supplement   Orders:  •  Vitamin B12; Future    Vitamin D deficiency    Orders:  •  Vitamin D 25 hydroxy; Future           History of Present Illness     HPI    Pt presents with her son and aid for chronic follow up, lab review     DM: Home sugars a bit labile, lowest 85, highest 230   HTN: Home BPs 110  HLD: On statin   CHF/CAD: Weights have been stable in 147-149; Cardio recommended iron infusions   Chronic Bronchitis: Had PFTs yesterday, using oxygen at night; hasn't been using SRINATH frequently   CKD: Drinking some water/seltzer -- per her son is doing a bit better     B12 1754, Folate 22    Iron 49, Ferritin 55       Review of Systems   Eyes:  Positive for visual disturbance.   Respiratory:  Negative " "for cough and shortness of breath.    Cardiovascular:  Negative for chest pain, palpitations and leg swelling.   Gastrointestinal:  Positive for constipation (\"I'm always constipated\"). Negative for abdominal pain, blood in stool and diarrhea.   Genitourinary:  Negative for difficulty urinating, dysuria and hematuria.   Musculoskeletal:  Positive for back pain and neck pain.   Neurological:  Negative for dizziness and headaches.       Objective   /74   Pulse 72   Temp 97.9 °F (36.6 °C)   Ht 4' 7\" (1.397 m)   Wt 68.5 kg (151 lb)   SpO2 92%   BMI 35.10 kg/m²      Physical Exam  Vitals and nursing note reviewed.   Constitutional:       General: She is not in acute distress.     Appearance: She is well-developed.   HENT:      Head: Normocephalic and atraumatic.      Right Ear: Tympanic membrane, ear canal and external ear normal.      Left Ear: Ear canal and external ear normal. There is impacted cerumen.      Nose: Nose normal. No rhinorrhea.      Mouth/Throat:      Mouth: Mucous membranes are moist.      Pharynx: No oropharyngeal exudate or posterior oropharyngeal erythema.   Eyes:      Conjunctiva/sclera: Conjunctivae normal.   Cardiovascular:      Rate and Rhythm: Normal rate and regular rhythm.      Heart sounds: Murmur heard.   Pulmonary:      Effort: Pulmonary effort is normal. No respiratory distress.      Breath sounds: Normal breath sounds.   Abdominal:      General: Bowel sounds are normal. There is no distension.      Palpations: Abdomen is soft.      Tenderness: There is no abdominal tenderness.   Musculoskeletal:      Right lower leg: No edema.      Left lower leg: No edema.      Comments: Antalgic gait, ambulating with walker   Lymphadenopathy:      Cervical: No cervical adenopathy.   Skin:     General: Skin is warm and dry.   Neurological:      Mental Status: She is alert. Mental status is at baseline.   Psychiatric:         Mood and Affect: Mood normal.         "

## 2024-12-18 ENCOUNTER — OFFICE VISIT (OUTPATIENT)
Dept: FAMILY MEDICINE CLINIC | Facility: CLINIC | Age: 79
End: 2024-12-18
Payer: COMMERCIAL

## 2024-12-18 ENCOUNTER — OFFICE VISIT (OUTPATIENT)
Age: 79
End: 2024-12-18
Payer: COMMERCIAL

## 2024-12-18 ENCOUNTER — TELEPHONE (OUTPATIENT)
Dept: FAMILY MEDICINE CLINIC | Facility: CLINIC | Age: 79
End: 2024-12-18

## 2024-12-18 VITALS
OXYGEN SATURATION: 92 % | DIASTOLIC BLOOD PRESSURE: 74 MMHG | HEART RATE: 72 BPM | HEIGHT: 55 IN | SYSTOLIC BLOOD PRESSURE: 132 MMHG | WEIGHT: 151 LBS | BODY MASS INDEX: 34.94 KG/M2 | TEMPERATURE: 97.9 F

## 2024-12-18 VITALS
WEIGHT: 151 LBS | BODY MASS INDEX: 34.94 KG/M2 | DIASTOLIC BLOOD PRESSURE: 84 MMHG | HEART RATE: 83 BPM | SYSTOLIC BLOOD PRESSURE: 110 MMHG | TEMPERATURE: 97.7 F | HEIGHT: 55 IN | OXYGEN SATURATION: 94 %

## 2024-12-18 DIAGNOSIS — E55.9 VITAMIN D DEFICIENCY: ICD-10-CM

## 2024-12-18 DIAGNOSIS — J41.0 SIMPLE CHRONIC BRONCHITIS (HCC): Primary | ICD-10-CM

## 2024-12-18 DIAGNOSIS — J41.0 SIMPLE CHRONIC BRONCHITIS (HCC): ICD-10-CM

## 2024-12-18 DIAGNOSIS — E11.40 TYPE 2 DIABETES MELLITUS WITH DIABETIC NEUROPATHY, WITH LONG-TERM CURRENT USE OF INSULIN (HCC): Primary | ICD-10-CM

## 2024-12-18 DIAGNOSIS — E78.5 HYPERLIPIDEMIA ASSOCIATED WITH TYPE 2 DIABETES MELLITUS  (HCC): ICD-10-CM

## 2024-12-18 DIAGNOSIS — R91.1 LUNG NODULE: ICD-10-CM

## 2024-12-18 DIAGNOSIS — I25.10 CORONARY ARTERY DISEASE INVOLVING NATIVE CORONARY ARTERY OF NATIVE HEART WITHOUT ANGINA PECTORIS: ICD-10-CM

## 2024-12-18 DIAGNOSIS — E11.69 HYPERLIPIDEMIA ASSOCIATED WITH TYPE 2 DIABETES MELLITUS  (HCC): ICD-10-CM

## 2024-12-18 DIAGNOSIS — E11.59 HYPERTENSION ASSOCIATED WITH DIABETES  (HCC): ICD-10-CM

## 2024-12-18 DIAGNOSIS — S72.8X1A OTHER CLOSED FRACTURE OF RIGHT FEMUR, UNSPECIFIED PORTION OF FEMUR, INITIAL ENCOUNTER (HCC): ICD-10-CM

## 2024-12-18 DIAGNOSIS — E53.8 VITAMIN B12 DEFICIENCY: ICD-10-CM

## 2024-12-18 DIAGNOSIS — R79.0 LOW FERRITIN: ICD-10-CM

## 2024-12-18 DIAGNOSIS — Z79.4 TYPE 2 DIABETES MELLITUS WITH DIABETIC NEUROPATHY, WITH LONG-TERM CURRENT USE OF INSULIN (HCC): Primary | ICD-10-CM

## 2024-12-18 DIAGNOSIS — I50.32 CHRONIC HEART FAILURE WITH PRESERVED EJECTION FRACTION (HCC): ICD-10-CM

## 2024-12-18 DIAGNOSIS — I15.2 HYPERTENSION ASSOCIATED WITH DIABETES  (HCC): ICD-10-CM

## 2024-12-18 DIAGNOSIS — N18.32 STAGE 3B CHRONIC KIDNEY DISEASE (HCC): ICD-10-CM

## 2024-12-18 PROBLEM — M97.8XXD: Status: RESOLVED | Noted: 2024-10-17 | Resolved: 2024-12-18

## 2024-12-18 PROBLEM — Z96.659: Status: RESOLVED | Noted: 2024-10-17 | Resolved: 2024-12-18

## 2024-12-18 LAB — SL AMB POCT HEMOGLOBIN AIC: 6.4 (ref ?–6.5)

## 2024-12-18 PROCEDURE — G2211 COMPLEX E/M VISIT ADD ON: HCPCS | Performed by: INTERNAL MEDICINE

## 2024-12-18 PROCEDURE — 99214 OFFICE O/P EST MOD 30 MIN: CPT | Performed by: FAMILY MEDICINE

## 2024-12-18 PROCEDURE — 99214 OFFICE O/P EST MOD 30 MIN: CPT | Performed by: INTERNAL MEDICINE

## 2024-12-18 PROCEDURE — 83036 HEMOGLOBIN GLYCOSYLATED A1C: CPT | Performed by: FAMILY MEDICINE

## 2024-12-18 RX ORDER — SACCHAROMYCES BOULARDII 250 MG
250 CAPSULE ORAL 2 TIMES DAILY
COMMUNITY

## 2024-12-18 RX ORDER — SODIUM CHLORIDE 9 MG/ML
20 INJECTION, SOLUTION INTRAVENOUS ONCE
OUTPATIENT
Start: 2024-12-18

## 2024-12-18 NOTE — PROGRESS NOTES
"Assessment/Plan:   Diagnoses and all orders for this visit:    Simple chronic bronchitis (HCC)    Lung nodule    Recent PFTs on 12/16/2024 patient could not complete the testing appropriately but the data does suggest likely obstruction on the spirometry and the lung volumes are decreased demonstrating restriction can be likely secondary to her body habitus  6-minute walk test with no need for supplemental oxygen.  Shortness of breath likely multifactorial in the setting of chronic simple bronchitis but congestive heart failure with chronic hypoxemic respiratory failure currently requiring 2 L of oxygen by nasal cannula  Since her chronic hypoxemic respiratory failure secondary to her fluid overload in the setting of her congestive heart failure diuresis has the furosemide has been doubled up following up with cardiology   discussed to continue with the oxygen for now while she is at the rehab for the physical therapy  Continue with Trelegy 1 puff daily  Continue with albuterol via the nebulizer 4 times daily as needed  Montelukast 10 mg daily at bedtime  Follow-up in 6 months or earlier as needed    Return in about 6 months (around 6/18/2025).  All questions are answered to the patient's satisfaction and understanding.  She verbalizes understanding.  She is encouraged to call with any further questions or concerns.    Portions of the record may have been created with voice recognition software.  Occasional wrong word or \"sound a like\" substitutions may have occurred due to the inherent limitations of voice recognition software.  Read the chart carefully and recognize, using context, where substitutions have occurred.    Electronically Signed by Isabel Borja MD    ______________________________________________________________________    Chief Complaint:   Chief Complaint   Patient presents with    Follow-up       Patient ID: Shantelle is a 79 y.o. y.o. 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), Carpal tunnel syndrome, Chest pain (03/06/2017), Chronic kidney disease, Claudication ([facility]) (3/15/2019), Closed fracture of base of middle phalanx of finger (06/22/2017), Closed fracture of middle phalanx of left little finger (07/08/2017), Coagulopathy ([facility]) (05/15/2019), Colloid cyst of brain ([facility]), COPD (chronic obstructive pulmonary disease) ([facility]), COPD (chronic obstructive pulmonary disease) ([facility]), Coronary artery disease, Cough, Diabetes mellitus ([facility]), GERD (gastroesophageal reflux disease), Glaucoma, Gout, History of brain disorder (10/07/2020), Hyperlipidemia, Hypertension, Irritable bowel syndrome, Melena (02/26/2019), PAD (peripheral artery disease) ([facility]) (5/15/2019), Paronychia of great toe of left foot (10/07/2020), Post-traumatic seizures ([facility]) (07/23/2015), Renal disorder, Seizures ([facility]), Shortness of breath, Single seizure ([facility]) (05/31/2016), SOB (shortness of breath), Syncope and collapse (11/11/2020), Urinary tract infection without hematuria (11/26/2016), and Wheezing.    12/18/2024  Patient presents today for follow-up visit.  Patient is a very pleasant 79-year-old lady with former smoker, with greater than 20-pack-year smoking history her PFTs have been normal but given chronic bronchitis she has been on nebulizer treatments around-the-clock, she was recently admitted into the hospital with if fracture femur status post open reduction and at the time of discharge she was discharged on 2 L of oxygen by nasal cannula continues she is currently in the rehab getting physical therapy  Daughter states that the physical therapy is not going as well as she thought it would, but if she is slowly progressing  She had multiple questions with regards to the oxygen and she states that she is worried that the patient has to go home on oxygen  from the rehab,  She also states that during the physical therapy at the rehab they have been seeing occasional desaturations even with the continuous oxygen that they have been using she states.  Patient also states that her legs have been significantly swollen and that they diuretic has been doubled up    General  Associated symptoms include myalgias.       Review of Systems   Constitutional:  Positive for appetite change.   HENT:  Positive for trouble swallowing.    Eyes: Negative.    Respiratory:  Positive for shortness of breath.    Cardiovascular:  Positive for leg swelling.   Gastrointestinal: Negative.    Endocrine: Negative.    Genitourinary: Negative.    Musculoskeletal:  Positive for myalgias.   Allergic/Immunologic: Negative.    Neurological: Negative.    Psychiatric/Behavioral: Negative.         Smoking history: She reports that she quit smoking about 23 years ago. Her smoking use included cigarettes. She started smoking about 64 years ago. She has a 20 pack-year smoking history. She has never used smokeless tobacco.    The following portions of the patient's history were reviewed and updated as appropriate: allergies, current medications, past family history, past medical history, past social history, past surgical history, and problem list.    Immunization History   Administered Date(s) Administered    COVID-19 [employer] ([employer]) vaccine 0.5 mL 06/03/2021    Influenza, high dose seasonal 0.7 mL 10/19/2023    Pneumococcal Conjugate 13-Valent 11/27/2017, 10/07/2020    Pneumococcal Polysaccharide PPV23 09/11/2019     Current Outpatient Medications   Medication Sig Dispense Refill    acetaminophen (TYLENOL) 650 mg CR tablet Take 500 mg by mouth 2 (two) times a day      albuterol (2.5 mg/3 mL) 0.083 % nebulizer solution Take 3 mL (2.5 mg total) by nebulization every 6 (six) hours as needed for wheezing or shortness of breath 360 mL 1    albuterol (ProAir HFA) 90 mcg/act inhaler Inhale 2 puffs every 6 (six)  hours as needed for wheezing or shortness of breath (cough, chest tightness) 18 g 0    ammonium lactate (LAC-HYDRIN) 12 % lotion Apply topically 2 (two) times a day as needed for dry skin 225 g 1    atorvastatin (LIPITOR) 20 mg tablet take 1 tablet by mouth at bedtime 90 tablet 1    Calcium Carbonate-Vitamin D (CALCIUM-D PO) Take 1 tablet by mouth in the morning      clotrimazole (LOTRIMIN) 1 % cream       collagenase (SANTYL) ointment Apply topically daily 90 g 1    Continuous Blood Gluc  (Dexcom G7 ) YOLANDA Use 1 Application if needed (check sugars) 1 each 0    Continuous Blood Gluc Sensor (Dexcom G7 Sensor) Use 1 Device every 10 days 9 each 3    fluticasone (FLONASE) 50 mcg/act nasal spray 1 spray into each nostril daily pt uses as needed 54 g 1    fluticasone-umeclidinium-vilanterol (Trelegy Ellipta) 100-62.5-25 mcg/actuation inhaler inhale 1 puff by mouth daily Rinse mouth after use 180 blister 3    furosemide (LASIX) 20 mg tablet take 1 tablet by mouth twice a day 180 tablet 1    gabapentin (NEURONTIN) 100 mg capsule Take 2 capsules (200 mg total) by mouth daily at bedtime 180 capsule 1    guaiFENesin (MUCINEX) 600 mg 12 hr tablet Take 1 tablet (600 mg total) by mouth 2 (two) times a day as needed for cough or congestion 180 tablet 1    insulin aspart (NovoLOG FlexPen) 100 UNIT/ML injection pen inject 12 units subcutaneously before meals (3 MEALS A DAY) 15 mL 5    Insulin Glargine Solostar (Lantus SoloStar) 100 UNIT/ML SOPN Inject 0.3 mL (30 Units total) under the skin daily 3 mL 0    Insulin Pen Needle (B-D ULTRAFINE III SHORT PEN) 31G X 8 MM MISC Use 4 (four) times a day (with meals and at bedtime) 400 each 1    ketoconazole (NIZORAL) 2 % cream       lidocaine (Lidoderm) 5 % Apply 1 patch topically over 12 hours daily as needed (pain) Remove & Discard patch within 12 hours or as directed by MD 30 patch 1    Lumigan 0.01 % ophthalmic drops INSTILL 1 DROP into both eyes at bedtime       "methocarbamol (ROBAXIN) 500 mg tablet Take 500 mg by mouth daily at bedtime as needed for muscle spasms      montelukast (SINGULAIR) 10 mg tablet Take 1 tablet (10 mg total) by mouth every evening 90 tablet 1    Multiple Vitamin (MULTI VITAMIN DAILY PO) Take 1 tablet by mouth daily      mupirocin (BACTROBAN) 2 % ointment APPLY A SMALL AMOUNT TO THE AFFECTED FOOT/TOE AREA BY TOPICAL ROUTE 3 TIMES PER DAY      nystatin (MYCOSTATIN) cream Apply topically 2 (two) times a day 30 g 1    nystatin (MYCOSTATIN) powder Apply topically 3 (three) times a day as needed (rash) 60 g 1    omeprazole (PriLOSEC) 20 mg delayed release capsule Take 1 capsule (20 mg total) by mouth 2 (two) times a day 180 capsule 1    saccharomyces boulardii (FLORASTOR) 250 mg capsule Take 250 mg by mouth 2 (two) times a day      Spacer/Aero-Holding Chambers (AeroChamber Plus Elian-Vu Large) MISC Use as directed      vitamin B-12 (VITAMIN B-12) 500 mcg tablet Take 1,000 mcg by mouth daily       No current facility-administered medications for this visit.     Allergies: Brimonidine, Salicylic acid-sulfur, and Sulfa antibiotics    Objective:  Vitals:    12/18/24 1422   BP: 110/84   Pulse: 83   Temp: 97.7 °F (36.5 °C)   SpO2: 94%   Weight: 68.5 kg (151 lb)   Height: 4' 7\" (1.397 m)   Oxygen Therapy  SpO2: 94 %  .  Wt Readings from Last 3 Encounters:   12/18/24 68.5 kg (151 lb)   12/18/24 68.5 kg (151 lb)   11/26/24 67.6 kg (149 lb)     Body mass index is 35.1 kg/m².    Physical Exam  Constitutional:       Appearance: She is well-developed.   HENT:      Head: Normocephalic and atraumatic.   Eyes:      Pupils: Pupils are equal, round, and reactive to light.   Cardiovascular:      Rate and Rhythm: Normal rate and regular rhythm.   Pulmonary:      Effort: Pulmonary effort is normal.      Breath sounds: Normal breath sounds. No rales. Wheezes: occ expiratory rhonchi.  Chest:      Chest wall: No tenderness.   Musculoskeletal:         General: Normal range of " motion.      Cervical back: Normal range of motion and neck supple.      Right lower leg: Edema present.      Left lower leg: Edema present.   Skin:     General: Skin is warm and dry.   Neurological:      Mental Status: She is alert and oriented to person, place, and time.   Psychiatric:         Behavior: Behavior normal.         Lab Review:   Office Visit on 12/18/2024   Component Date Value    Hemoglobin A1C 12/18/2024 6.4    Orders Only on 12/10/2024   Component Date Value    Iron, Serum 12/10/2024 49 (L)     Transferrin 12/10/2024 208     Iron Saturation 12/10/2024 17 (L)     Total Iron Binding [place]* 12/10/2024 291     Ferritin 12/10/2024 55.0     Vitamin B-12 12/10/2024 1,754 (H)     FOLATE, SERUM 12/10/2024 >22.3    Orders Only on 11/07/2024   Component Date Value    Culture Result 11/07/2024 Report Below    Orders Only on 11/07/2024   Component Date Value    Specific Gravity Ur 11/07/2024 1.018     pH, Urine 11/07/2024 5.0     Protein, Urine 11/07/2024 Negative     Glucose, Urine 11/07/2024 Negative     Ketone, Urine 11/07/2024 Negative     Blood, Urine 11/07/2024 Negative     Leukocyte Esterase 11/07/2024 500 (A)     Nitrites Urine 11/07/2024 Negative     POCT URINE COMMENT 11/07/2024 Microscopic to follow.     Bacteria, Urine 11/07/2024 Few (A)     Calcium Oxalate Crystals 11/07/2024 Present     MUCUS THREADS 11/07/2024 Few     RBC, Urine 11/07/2024 0-2     Squamous Epithelial Cells 11/07/2024 >10     Transitional Epithelial * 11/07/2024 >10     SL AMB WBC, URINE 11/07/2024     Orders Only on 10/23/2024   Component Date Value    Hemoglobin 10/23/2024 10.6 (L)     HCT 10/23/2024 33.0 (L)     White Blood Cell Count 10/23/2024 8.9     Red Blood Cell Count 10/23/2024 3.94     Platelet Count 10/23/2024 342     SL AMB MPV 10/23/2024 8.0     MCV 10/23/2024 84     MCH 10/23/2024 26.9     MCHC 10/23/2024 32.0     RDW 10/23/2024 18.2 (H)     Differential Type 10/23/2024 AUTO     Neutrophils (Absolute)  10/23/2024 6.4     Lymphocytes (Absolute) 10/23/2024 1.4     Monocytes (Absolute) 10/23/2024 0.5     Eosinophils (Absolute) 10/23/2024 0.5     Basophils ABS 10/23/2024 0.1     Neutrophils 10/23/2024 72     Lymphocytes 10/23/2024 16     Monocytes 10/23/2024 6     Eosinophils 10/23/2024 5     Basophils PCT 10/23/2024 1     Glucose, Random 10/23/2024 187 (H)     BUN 10/23/2024 25     Creatinine 10/23/2024 1.08     Sodium 10/23/2024 140     Potassium 10/23/2024 4.2     Chloride 10/23/2024 102     CO2 10/23/2024 27     Calcium 10/23/2024 9.1     Alkaline Phosphatase 10/23/2024 62     Albumin 10/23/2024 3.2 (L)     TOTAL BILIRUBIN 10/23/2024 0.4     Protein, Total 10/23/2024 6.6     AST 10/23/2024 10     ALT 10/23/2024 5     ANION GAP 10/23/2024 11     eGFR 10/23/2024 51 (L)     Comment 10/23/2024 (Note)    Admission on 09/05/2024, Discharged on 09/10/2024   Component Date Value    WBC 09/05/2024 13.83 (H)     RBC 09/05/2024 4.64     Hemoglobin 09/05/2024 13.0     Hematocrit 09/05/2024 41.9     MCV 09/05/2024 90     MCH 09/05/2024 28.0     MCHC 09/05/2024 31.0 (L)     RDW 09/05/2024 15.1     MPV 09/05/2024 10.2     Platelets 09/05/2024 163     nRBC 09/05/2024 0     Segmented % 09/05/2024 75     Immature Grans % 09/05/2024 1     Lymphocytes % 09/05/2024 15     Monocytes % 09/05/2024 7     Eosinophils Relative 09/05/2024 2     Basophils Relative 09/05/2024 0     Absolute Neutrophils 09/05/2024 10.40 (H)     Absolute Immature Grans 09/05/2024 0.08     Absolute Lymphocytes 09/05/2024 2.05     Absolute Monocytes 09/05/2024 1.01     Eosinophils Absolute 09/05/2024 0.26     Basophils Absolute 09/05/2024 0.03     Sodium 09/05/2024 136     Potassium 09/05/2024 3.6     Chloride 09/05/2024 98     CO2 09/05/2024 29     ANION GAP 09/05/2024 9     BUN 09/05/2024 31 (H)     Creatinine 09/05/2024 1.48 (H)     Glucose 09/05/2024 194 (H)     Calcium 09/05/2024 8.7     eGFR 09/05/2024 32     POC Glucose 09/05/2024 227 (H)     POC Glucose  09/05/2024 226 (H)     POC Glucose 09/05/2024 198 (H)     Sodium 09/06/2024 139     Potassium 09/06/2024 4.0     Chloride 09/06/2024 107     CO2 09/06/2024 26     ANION GAP 09/06/2024 6     BUN 09/06/2024 25     Creatinine 09/06/2024 1.11     Glucose 09/06/2024 198 (H)     Calcium 09/06/2024 8.3 (L)     eGFR 09/06/2024 46     Magnesium 09/06/2024 2.1     Phosphorus 09/06/2024 2.3     WBC 09/06/2024 8.67     RBC 09/06/2024 3.76 (L)     Hemoglobin 09/06/2024 10.6 (L)     Hematocrit 09/06/2024 34.9     MCV 09/06/2024 93     MCH 09/06/2024 28.2     MCHC 09/06/2024 30.4 (L)     RDW 09/06/2024 15.1     MPV 09/06/2024 9.9     Platelets 09/06/2024 141 (L)     nRBC 09/06/2024 0     Segmented % 09/06/2024 84 (H)     Immature Grans % 09/06/2024 1     Lymphocytes % 09/06/2024 6 (L)     Monocytes % 09/06/2024 8     Eosinophils Relative 09/06/2024 1     Basophils Relative 09/06/2024 0     Absolute Neutrophils 09/06/2024 7.24     Absolute Immature Grans 09/06/2024 0.06     Absolute Lymphocytes 09/06/2024 0.55 (L)     Absolute Monocytes 09/06/2024 0.69     Eosinophils Absolute 09/06/2024 0.11     Basophils Absolute 09/06/2024 0.02     POC Glucose 09/06/2024 179 (H)     POC Glucose 09/06/2024 152 (H)     POC Glucose 09/06/2024 151 (H)     POC Glucose 09/06/2024 126     Sodium 09/07/2024 138     Potassium 09/07/2024 4.3     Chloride 09/07/2024 105     CO2 09/07/2024 27     ANION GAP 09/07/2024 6     BUN 09/07/2024 15     Creatinine 09/07/2024 0.95     Glucose 09/07/2024 134     Calcium 09/07/2024 8.5     eGFR 09/07/2024 56     Magnesium 09/07/2024 1.9     Phosphorus 09/07/2024 2.4     WBC 09/07/2024 9.53     RBC 09/07/2024 3.64 (L)     Hemoglobin 09/07/2024 10.2 (L)     Hematocrit 09/07/2024 33.5 (L)     MCV 09/07/2024 92     MCH 09/07/2024 28.0     MCHC 09/07/2024 30.4 (L)     RDW 09/07/2024 14.9     MPV 09/07/2024 8.9     Platelets 09/07/2024 135 (L)     nRBC 09/07/2024 0     Segmented % 09/07/2024 78 (H)     Immature Grans %  09/07/2024 1     Lymphocytes % 09/07/2024 9 (L)     Monocytes % 09/07/2024 9     Eosinophils Relative 09/07/2024 3     Basophils Relative 09/07/2024 0     Absolute Neutrophils 09/07/2024 7.39     Absolute Immature Grans 09/07/2024 0.07     Absolute Lymphocytes 09/07/2024 0.90     Absolute Monocytes 09/07/2024 0.89     Eosinophils Absolute 09/07/2024 0.26     Basophils Absolute 09/07/2024 0.02     POC Glucose 09/07/2024 157 (H)     POC Glucose 09/07/2024 188 (H)     POC Glucose 09/07/2024 204 (H)     POC Glucose 09/07/2024 313 (H)     POC Glucose 09/07/2024 309 (H)     Magnesium 09/08/2024 2.0     Phosphorus 09/08/2024 3.3     Sodium 09/08/2024 135     Potassium 09/08/2024 4.3     Chloride 09/08/2024 100     CO2 09/08/2024 26     ANION GAP 09/08/2024 9     BUN 09/08/2024 26 (H)     Creatinine 09/08/2024 1.06     Glucose 09/08/2024 187 (H)     Calcium 09/08/2024 8.6     eGFR 09/08/2024 49     WBC 09/08/2024 13.84 (H)     RBC 09/08/2024 3.36 (L)     Hemoglobin 09/08/2024 9.6 (L)     Hematocrit 09/08/2024 29.9 (L)     MCV 09/08/2024 89     MCH 09/08/2024 28.6     MCHC 09/08/2024 32.1     RDW 09/08/2024 14.5     Platelets 09/08/2024 167     MPV 09/08/2024 9.5     POC Glucose 09/08/2024 186 (H)     POC Glucose 09/08/2024 218 (H)     POC Glucose 09/08/2024 185 (H)     POC Glucose 09/08/2024 260 (H)     Sodium 09/09/2024 136     Potassium 09/09/2024 4.1     Chloride 09/09/2024 100     CO2 09/09/2024 29     ANION GAP 09/09/2024 7     BUN 09/09/2024 36 (H)     Creatinine 09/09/2024 1.11     Glucose 09/09/2024 181 (H)     Calcium 09/09/2024 8.4     eGFR 09/09/2024 46     WBC 09/09/2024 11.33 (H)     RBC 09/09/2024 3.20 (L)     Hemoglobin 09/09/2024 9.1 (L)     Hematocrit 09/09/2024 29.1 (L)     MCV 09/09/2024 91     MCH 09/09/2024 28.4     MCHC 09/09/2024 31.3 (L)     RDW 09/09/2024 14.9     Platelets 09/09/2024 171     MPV 09/09/2024 9.5     POC Glucose 09/09/2024 185 (H)     POC Glucose 09/09/2024 152 (H)     POC Glucose  09/09/2024 151 (H)     POC Glucose 09/09/2024 210 (H)     Sodium 09/10/2024 140     Potassium 09/10/2024 3.9     Chloride 09/10/2024 101     CO2 09/10/2024 31     ANION GAP 09/10/2024 8     BUN 09/10/2024 39 (H)     Creatinine 09/10/2024 1.09     Glucose 09/10/2024 216 (H)     Calcium 09/10/2024 8.5     eGFR 09/10/2024 47     WBC 09/10/2024 10.32 (H)     RBC 09/10/2024 3.08 (L)     Hemoglobin 09/10/2024 8.6 (L)     Hematocrit 09/10/2024 28.3 (L)     MCV 09/10/2024 92     MCH 09/10/2024 27.9     MCHC 09/10/2024 30.4 (L)     RDW 09/10/2024 15.1     Platelets 09/10/2024 200     MPV 09/10/2024 9.9     POC Glucose 09/10/2024 194 (H)     POC Glucose 09/10/2024 228 (H)    Admission on 08/27/2024, Discharged on 08/28/2024   Component Date Value    WBC 08/27/2024 18.17 (H)     RBC 08/27/2024 5.31 (H)     Hemoglobin 08/27/2024 14.9     Hematocrit 08/27/2024 46.0     MCV 08/27/2024 87     MCH 08/27/2024 28.1     MCHC 08/27/2024 32.4     RDW 08/27/2024 15.0     MPV 08/27/2024 9.4     Platelets 08/27/2024 247     nRBC 08/27/2024 0     Segmented % 08/27/2024 76 (H)     Immature Grans % 08/27/2024 1     Lymphocytes % 08/27/2024 15     Monocytes % 08/27/2024 7     Eosinophils Relative 08/27/2024 1     Basophils Relative 08/27/2024 0     Absolute Neutrophils 08/27/2024 13.95 (H)     Absolute Immature Grans 08/27/2024 0.14     Absolute Lymphocytes 08/27/2024 2.66     Absolute Monocytes 08/27/2024 1.23 (H)     Eosinophils Absolute 08/27/2024 0.16     Basophils Absolute 08/27/2024 0.03     Sodium 08/27/2024 137     Potassium 08/27/2024 3.5     Chloride 08/27/2024 97     CO2 08/27/2024 31     ANION GAP 08/27/2024 9     BUN 08/27/2024 36 (H)     Creatinine 08/27/2024 1.14     Glucose 08/27/2024 236 (H)     Calcium 08/27/2024 9.5     eGFR 08/27/2024 45     hs TnI 0hr 08/27/2024 12     BNP 08/27/2024 95     Protime 08/27/2024 12.9     INR 08/27/2024 0.91     Total Bilirubin 08/27/2024 0.60     Bilirubin, Direct 08/27/2024 0.15     Alkaline  Phosphatase 08/27/2024 44     AST 08/27/2024 12 (L)     ALT 08/27/2024 19     Total Protein 08/27/2024 7.3     Albumin 08/27/2024 4.0     hs TnI 2hr 08/27/2024 12     Delta 2hr hsTnI 08/27/2024 0     Ventricular Rate 08/27/2024 62     Atrial Rate 08/27/2024 62     TN Interval 08/27/2024 152     QRSD Interval 08/27/2024 112     QT Interval 08/27/2024 484     QTC Interval 08/27/2024 491     P Axis 08/27/2024 60     QRS Axis 08/27/2024 8     T Wave [place] 08/27/2024 36     hs TnI 4hr 08/27/2024 12     Delta 4hr hsTnI 08/27/2024 0     WBC 08/27/2024 15.79 (H)     RBC 08/27/2024 5.10     Hemoglobin 08/27/2024 14.3     Hematocrit 08/27/2024 44.6     MCV 08/27/2024 88     MCH 08/27/2024 28.0     MCHC 08/27/2024 32.1     RDW 08/27/2024 15.0     Platelets 08/27/2024 233     MPV 08/27/2024 9.7     PTT 08/27/2024 23     Protime 08/27/2024 13.0     INR 08/27/2024 0.92     POC Glucose 08/27/2024 294 (H)     Ventricular Rate 08/27/2024 51     Atrial Rate 08/27/2024 51     TN Interval 08/27/2024 164     QRSD Interval 08/27/2024 106     QT Interval 08/27/2024 452     QTC Interval 08/27/2024 416     P Axis 08/27/2024 64     QRS [place] 08/27/2024 11     T Wave [place] 08/27/2024 27     PTT 08/27/2024 59 (H)     POC Glucose 08/27/2024 237 (H)     PTT 08/28/2024 66 (H)     Sodium 08/28/2024 139     Potassium 08/28/2024 4.0     Chloride 08/28/2024 100     CO2 08/28/2024 30     ANION GAP 08/28/2024 9     BUN 08/28/2024 35 (H)     Creatinine 08/28/2024 1.06     Glucose 08/28/2024 105     Glucose, Fasting 08/28/2024 105 (H)     Calcium 08/28/2024 9.5     eGFR 08/28/2024 49     WBC 08/28/2024 19.16 (H)     RBC 08/28/2024 5.33 (H)     Hemoglobin 08/28/2024 15.0     Hematocrit 08/28/2024 46.1     MCV 08/28/2024 87     MCH 08/28/2024 28.1     MCHC 08/28/2024 32.5     RDW 08/28/2024 15.0     Platelets 08/28/2024 272     MPV 08/28/2024 9.4     POC Glucose 08/28/2024 118     POC Glucose 08/28/2024 152 (H)    Appointment on 08/22/2024   Component Date  Value    Lysozyme 08/22/2024 11.5 (H)     Vitamin B-12 08/22/2024 1,361 (H)     Folate 08/22/2024 16.1     SARAH BETH 08/22/2024 Negative     Angio Convert Enzyme 08/22/2024 27     Syphilis Total Antibody 08/22/2024 Non-reactive     C-ANCA 08/22/2024 <1:20     Atypical pANCA 08/22/2024 <1:20     MPO AB 08/22/2024 <0.2     IL-3 AB 08/22/2024 <0.2     P-ANCA 08/22/2024 <1:20     Vitamin B1, Whole Blood 08/22/2024 212.6 (H)     Treponema pallidum Antib* 08/22/2024 Non Reactive     Lyme Total Antibodies 08/22/2024 Negative    Hospital Outpatient Visit on 08/19/2024   Component Date Value    Ventricular Rate 08/19/2024 64     Atrial Rate 08/19/2024 64     IL Interval 08/19/2024 182     QRSD Interval 08/19/2024 114     QT Interval 08/19/2024 440     QTC Interval 08/19/2024 453     P Axis 08/19/2024 64     QRS [place] 08/19/2024 10     T Wave [place] 08/19/2024 22    Hospital Outpatient Visit on 08/19/2024   Component Date Value    Protocol Name 08/19/2024 LEXISCAN-SIT     Exercise duration (min) 08/19/2024 3     Exercise duration (sec) 08/19/2024 0     Post Peak Systolic BP 08/19/2024 182     Max Diastolic Bp 08/19/2024 78     Peak HR 08/19/2024 93     Max Predicted Heart Rate 08/19/2024 139     Reason for Termination 08/19/2024 Protocol Complete     Test Indication 08/19/2024 ANGINAL EQUIVALENT     Target Hr Formular 08/19/2024 (220 - Age)*85%     Chest Pain Statement 08/19/2024 none    There may be more visits with results that are not included.       Diagnostics:  Results Review Statement: No pertinent imaging studies reviewed.    Complete PFT with post Bronchodilator and Six Minute walk  Result Date: 12/16/2024  Narrative: Images from the original result were not included. Pulmonary Function Test Report Shantelle Romano 79 y.o. female MRN: [id number] Date Performed: 12/16/24 Date Interpretation: 12/16/2024 Interpretation: Patient could not complete testing appropriately.  Results suggest possible obstruction on spirometry and  restriction on lung volumes but this may not be accurate 6 minute walk test: Resting SPO2 98% on room air with heart rate of 70 and Jagdish Score of 0.  Patient ambulated 137 meters in 6 minutes, stopping 3 times due to dyspnea and leg fatigue. At the end of the study SPO2 was 93% on room air with heart rate of 95 and Jagdish score of 7.  No supplemental oxygen required. Mainor Epstein MD [facility]     XR knee 1 or 2 vw right  Result Date: 12/3/2024  Narrative: RIGHT KNEE INDICATION:   Periprosthetic fracture around other internal prosthetic joint, subsequent encounter. Presence of unspecified artificial knee joint.   COMPARISON:  None. VIEWS:  XR KNEE 1 OR 2 VW RIGHT FINDINGS: Patient post ORIF of fracture of the distal femur. There is increased callus formation compared to 10/17/2024. 2 metallic plate and multiple transverse screws are in place. Fracture lines are less distinct than on prior although still present. Alignment is maintained. There is no joint effusion. Satisfactory appearing right knee arthroplasty. Anatomic alignment without loosening. No lytic or blastic osseous lesion. Soft tissues are unremarkable.     Impression: Further healing status post ORIF of the distal femoral metaphyseal fracture. Metallic plate and screws intact. Interval healing with increased callus formation and less distinct fracture lines. Satisfactory appearing right knee arthroplasty. No loosening. Electronically signed: 12/03/2024 06:08 PM Derek Caldera MD

## 2024-12-18 NOTE — ASSESSMENT & PLAN NOTE
No exacerbation on exam. S/p PFTs yesterday and has upcoming visit with Pulm to review. Continue PRN O2 (currently using qHS)   Orders:  •  CBC and differential; Future

## 2024-12-18 NOTE — ASSESSMENT & PLAN NOTE
Overall reasonable at home and in good range in office -- continue current regimen     Orders:  •  CBC and differential; Future  •  Comprehensive metabolic panel; Future  •  Lipid panel; Future

## 2024-12-18 NOTE — ASSESSMENT & PLAN NOTE
"Per Cardiology recommendation, will order iron infusion as below:   \"Good Morning,     I reviewed her lab work, she meets criteria for iron transfusion given her heart failure and ferritin <100. Our office does not typically set up the infusions outpatient. Would this be something you would be able to faciliate for the patient?     It would be 1 g of ferric carboxymaltose, redose 1 g at 6 weeks, and repeat levels in 12 weeks     Take care,   -Ali\"  Orders:  •  alteplase (CATHFLO) injection 2 mg  •  sodium chloride 0.9 % infusion  •  ferric carboxymaltose (INJECTAFER) 750 mg in sodium chloride 0.9 % 250 mL infusion  "

## 2024-12-18 NOTE — ASSESSMENT & PLAN NOTE
A1c 6.4% (from 7.6) -- much improved, continue current regimen   Lab Results   Component Value Date    HGBA1C 6.4 12/18/2024       Orders:  •  POCT hemoglobin A1c  •  CBC and differential; Future  •  Iron Panel (Includes Ferritin, Iron Sat%, Iron, and TIBC); Future  •  Comprehensive metabolic panel; Future  •  Lipid panel; Future  •  Hemoglobin A1C; Future  •  Albumin / creatinine urine ratio; Future  •  TSH, 3rd generation with Free T4 reflex; Future

## 2024-12-18 NOTE — ASSESSMENT & PLAN NOTE
Healing well, WBAT per Ortho. Going to transition to outpatient PT soon. Has f/u with Ortho again in 02/2025

## 2024-12-18 NOTE — TELEPHONE ENCOUNTER
Patient's son called back, he confirmed the appointment on 1/3/25 @ 2 pm will work for his mother.     Please advise, thank you

## 2024-12-18 NOTE — ASSESSMENT & PLAN NOTE
Update lipids prior to next visit, continue statin     Orders:  •  CBC and differential; Future  •  Comprehensive metabolic panel; Future  •  Lipid panel; Future

## 2024-12-18 NOTE — ASSESSMENT & PLAN NOTE
Weight overall stable at home, no evidence of exacerbation on exam. Continue medical management and f/u with Cardio as scheduled  Orders:  •  alteplase (CATHFLO) injection 2 mg  •  sodium chloride 0.9 % infusion  •  ferric carboxymaltose (INJECTAFER) 750 mg in sodium chloride 0.9 % 250 mL infusion

## 2024-12-18 NOTE — ASSESSMENT & PLAN NOTE
Kidney function stable, if not improved, on most recent labs. Reviewed importance of regular hydration throughout the day. F/u with Nephro as scheduled   Orders:  •  Comprehensive metabolic panel; Future

## 2024-12-31 ENCOUNTER — TELEPHONE (OUTPATIENT)
Dept: INFUSION CENTER | Facility: CLINIC | Age: 79
End: 2024-12-31

## 2024-12-31 NOTE — TELEPHONE ENCOUNTER
New patient phone call made. Reviewed appointment date and time. Reviewed policies and procedures of infusion center. All questions answered at this time.

## 2025-01-02 DIAGNOSIS — R79.0 LOW FERRITIN: Primary | ICD-10-CM

## 2025-01-02 DIAGNOSIS — I50.32 CHRONIC HEART FAILURE WITH PRESERVED EJECTION FRACTION (HCC): ICD-10-CM

## 2025-01-02 RX ORDER — SODIUM CHLORIDE 9 MG/ML
20 INJECTION, SOLUTION INTRAVENOUS ONCE
Status: CANCELLED | OUTPATIENT
Start: 2025-01-03

## 2025-01-03 ENCOUNTER — TELEPHONE (OUTPATIENT)
Age: 80
End: 2025-01-03

## 2025-01-03 ENCOUNTER — HOSPITAL ENCOUNTER (OUTPATIENT)
Dept: INFUSION CENTER | Facility: CLINIC | Age: 80
End: 2025-01-03
Payer: COMMERCIAL

## 2025-01-03 VITALS
HEART RATE: 67 BPM | TEMPERATURE: 96.8 F | DIASTOLIC BLOOD PRESSURE: 76 MMHG | RESPIRATION RATE: 18 BRPM | SYSTOLIC BLOOD PRESSURE: 147 MMHG

## 2025-01-03 DIAGNOSIS — I50.32 CHRONIC HEART FAILURE WITH PRESERVED EJECTION FRACTION (HCC): ICD-10-CM

## 2025-01-03 DIAGNOSIS — R79.0 LOW FERRITIN: Primary | ICD-10-CM

## 2025-01-03 PROCEDURE — 96365 THER/PROPH/DIAG IV INF INIT: CPT

## 2025-01-03 RX ORDER — SODIUM CHLORIDE 9 MG/ML
20 INJECTION, SOLUTION INTRAVENOUS ONCE
Status: CANCELLED | OUTPATIENT
Start: 2025-01-17

## 2025-01-03 RX ORDER — SODIUM CHLORIDE 9 MG/ML
20 INJECTION, SOLUTION INTRAVENOUS ONCE
Status: COMPLETED | OUTPATIENT
Start: 2025-01-03 | End: 2025-01-03

## 2025-01-03 RX ADMIN — SODIUM CHLORIDE 20 ML/HR: 9 INJECTION, SOLUTION INTRAVENOUS at 13:59

## 2025-01-03 RX ADMIN — IRON SUCROSE 200 MG: 20 INJECTION, SOLUTION INTRAVENOUS at 13:58

## 2025-01-03 NOTE — TELEPHONE ENCOUNTER
Patients ayana Carbajal, called the infusion center   They would need a new order from the pcp to make any changes .    Solomon is requesting that the order be change to every other week instead of every week.

## 2025-01-03 NOTE — PROGRESS NOTES
Pt here for venofer infusion, offering no complaints. Peripheral IV placed with positive blood return noted. Tolerated infusion without incident. Peripheral IV removed. AVS given to pt, and jennifer. Walked out in stable condition. Next appointment on 1/17/25 at 10am.

## 2025-01-06 ENCOUNTER — TELEPHONE (OUTPATIENT)
Age: 80
End: 2025-01-06

## 2025-01-06 NOTE — TELEPHONE ENCOUNTER
Jina called from [facility], looking for update on forms faxed over regarding Recliner Lift. Advised nothing in chart. She will re-fax. Requests call back if not received.

## 2025-01-09 ENCOUNTER — OFFICE VISIT (OUTPATIENT)
Dept: NEUROLOGY | Facility: CLINIC | Age: 80
End: 2025-01-09
Payer: COMMERCIAL

## 2025-01-09 VITALS
DIASTOLIC BLOOD PRESSURE: 78 MMHG | WEIGHT: 153 LBS | BODY MASS INDEX: 35.41 KG/M2 | OXYGEN SATURATION: 93 % | SYSTOLIC BLOOD PRESSURE: 128 MMHG | HEIGHT: 55 IN | HEART RATE: 61 BPM

## 2025-01-09 DIAGNOSIS — R41.89 SEVERE COGNITIVE IMPAIRMENT: Primary | ICD-10-CM

## 2025-01-09 DIAGNOSIS — R56.9 SEIZURE (HCC): ICD-10-CM

## 2025-01-09 DIAGNOSIS — G31.1 SENILE DEGENERATION OF BRAIN, NOT ELSEWHERE CLASSIFIED (HCC): ICD-10-CM

## 2025-01-09 PROCEDURE — 99215 OFFICE O/P EST HI 40 MIN: CPT | Performed by: PSYCHIATRY & NEUROLOGY

## 2025-01-09 NOTE — ASSESSMENT & PLAN NOTE
Distant hx. Patient stable off keppra. Continue to monitor. rEEG 9/30/2024 with mild diffuse encephalopathy and no epileptiform discharges or lateralizing signs recorded.

## 2025-01-09 NOTE — PROGRESS NOTES
Name: Shantelle Romano      : 1945      MRN: [id number]  Encounter Provider: Cari Shin MD  Encounter Date: 2025   Encounter department: [facility]  :  Assessment & Plan  Severe cognitive impairment  MOCA today  with points lost in visuospatial/executive, naming, attention, language, abstraction, delayed recall, and orientation. Brain fog, poor short term memory, and cognitive impairment initially suspected due to keppra, but no change after months off medication. 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 A PET scan may be useful to determine if patient has a type of dementia. Will need to rule out reversible causes. B12 on 12/10/2024 was 1754.    Check vitamin D, TSH, lyme  PET scan  Neuropsych referral  Transfer of care to [facility] for follow up    Orders:    NM PET brain metabolic evaluation; Future    Ambulatory referral to Neuropsychology; Future    Lyme Total AB W Reflex to IGM/IGG; Future    Seizure (HCC)  Distant hx. Patient stable off keppra. Continue to monitor. rEEG 2024 with mild diffuse encephalopathy and no epileptiform discharges or lateralizing signs recorded.             History of Present Illness   80 yo female here for follow up last seen 2024. Since stopping keppra, she has had no keppra, but her cognition per her son-in-law present today has not improved. She endorses poor short term memory. Son-in-law endorses reduced functioning and difficulty understanding her train of thought and reasoning most of the time. EEG results discussed. MOCA completed today. Next steps discussed. They request transfer of care to [facility] due to difficulty with transportation. Patient agreeable to bloodwork and imaging.           Review of Systems   Musculoskeletal:  Negative for gait  "problem.   Neurological:  Negative for weakness.   Psychiatric/Behavioral:  Positive for agitation, confusion and decreased concentration. Negative for behavioral problems, dysphoric mood, hallucinations, self-injury, sleep disturbance and suicidal ideas. The patient is not nervous/anxious and is not hyperactive.     I have personally reviewed the MA's review of systems and made changes as necessary.         Objective   /78 (BP Location: Left arm, Patient Position: Sitting, Cuff Size: Adult)   Pulse 61   Ht 4' 7\" (1.397 m)   Wt 69.4 kg (153 lb)   SpO2 93%   BMI 35.56 kg/m²     MOCA 9/30    Physical Exam  Neurological:      Motor: Motor strength is normal.  Psychiatric:         Speech: Speech normal.       Neurological Exam  Mental Status   Oriented only to person and place. Speech is normal. Follows one-step commands.  MOCA 9/30.    Cranial Nerves  CN V: Facial sensation is normal.  CN VII: Full and symmetric facial movement.  CN XI: Shoulder shrug strength is normal.  CN XII: Tongue midline without atrophy or fasciculations.  Right eye blind, right eye medial deviated, pupil less reactive all chronic..    Motor   Strength is 5/5 throughout all four extremities.  BLE limited by knee replacements.    Sensory  Light touch is normal in upper and lower extremities.     Coordination  Right: Finger-to-nose normal.Left: Finger-to-nose normal.    Gait  Casual gait is normal including stance, stride, and arm swing.            "

## 2025-01-10 ENCOUNTER — OFFICE VISIT (OUTPATIENT)
Dept: CARDIOLOGY CLINIC | Facility: CLINIC | Age: 80
End: 2025-01-10
Payer: COMMERCIAL

## 2025-01-10 ENCOUNTER — TELEPHONE (OUTPATIENT)
Age: 80
End: 2025-01-10

## 2025-01-10 VITALS
DIASTOLIC BLOOD PRESSURE: 67 MMHG | HEART RATE: 73 BPM | OXYGEN SATURATION: 96 % | HEIGHT: 55 IN | RESPIRATION RATE: 16 BRPM | WEIGHT: 151 LBS | SYSTOLIC BLOOD PRESSURE: 135 MMHG | BODY MASS INDEX: 34.94 KG/M2

## 2025-01-10 DIAGNOSIS — I10 PRIMARY HYPERTENSION: ICD-10-CM

## 2025-01-10 DIAGNOSIS — E78.5 DYSLIPIDEMIA: ICD-10-CM

## 2025-01-10 DIAGNOSIS — I50.32 CHRONIC HEART FAILURE WITH PRESERVED EJECTION FRACTION (HCC): Primary | ICD-10-CM

## 2025-01-10 DIAGNOSIS — I25.10 NONOBSTRUCTIVE ATHEROSCLEROSIS OF CORONARY ARTERY: ICD-10-CM

## 2025-01-10 PROCEDURE — 99214 OFFICE O/P EST MOD 30 MIN: CPT

## 2025-01-10 NOTE — PROGRESS NOTES
[facility] Cardiology   Office Visit    Shantelle Romano 79 y.o. female MRN: [id number]    01/10/25      Assessment & Plan  Chronic HFpEF  Wt Readings from Last 3 Encounters:   01/10/25 68.5 kg (151 lb)   01/09/25 69.4 kg (153 lb)   12/18/24 68.5 kg (151 lb)   Euvolemic on exam.  Continue Lasix 20 mg bid.  Can take extra tablet as needed for weight gain of 3 pounds in 1 day or 5 pounds in 1 week.  Encourage low-sodium diet, daily weights, LE compression stockings, and LE elevation.  Advised to notify our office for any new/worsening symptoms.  Nonobstructive CAD  Continue Lipitor.  Not on ASA due to anemia, continue follow-up with PCP regarding such.  Primary hypertension  BP is well-controlled.  Continue Lasix.  Encourage ambulatory monitoring and low-sodium diet.  Dyslipidemia  Continue Lipitor, dietary control, and periodic surveillance.        Interval history: Shantelle Romano is a 79 y.o. year old female with history of chronic HFpEF, nonobstructive CAD, hypertension, dyslipidemia, and cognitive impairment who presents for office visit.  Some of history was obtained by family given cognitive impairment.  Patient has reportedly been well overall from a cardiac standpoint since time of last visit with cardiology.  She monitors weight on a routine basis which has been overall stable.  Endorses intermittent LE edema which is not currently present.  Patient occasionally wears compression stockings for such.  Reports strict compliance to all medications.  Denies adverse effects to current medications.  Patient rarely experiences BRBPR.  She follows closely with PCP for management of anemia.  She was also recently ordered a PET scan by neurology for further evaluation of cognitive impairment.  Denies any additional complaints at this time.  Follows with Dr. Mcclelland as primary cardiologist.      Review of Systems:  Review of Systems   Constitutional:  Negative for chills and fever.   HENT:  Negative for ear pain and sore  "throat.    Eyes:  Negative for pain and visual disturbance.   Respiratory:  Negative for cough and shortness of breath.    Cardiovascular:  Negative for chest pain and palpitations. Leg swelling: intermittent.  Gastrointestinal:  Negative for abdominal pain and vomiting.   Genitourinary:  Negative for dysuria and hematuria.   Musculoskeletal:  Negative for arthralgias and back pain.   Skin:  Negative for color change and rash.   Neurological:  Negative for seizures and syncope.   All other systems reviewed and are negative.      PHYSICAL EXAM:  Vitals:   Vitals:    01/10/25 1545   BP: 135/67   BP Location: Left arm   Patient Position: Sitting   Cuff Size: Standard   Pulse: 73   Resp: 16   SpO2: 96%   Weight: 68.5 kg (151 lb)   Height: 4' 7\" (1.397 m)        Physical Exam:  GEN: Alert and in no acute distress.  Well appearing and well nourished.   HEENT: Sclera anicteric, conjunctivae pink, mucous membranes moist. Oropharynx clear.   NECK: Supple, no carotid bruits, no significant JVD. Trachea midline, no thyromegaly.   HEART: Regular rhythm, normal S1 and S2, no murmurs, clicks, gallops or rubs. PMI nondisplaced, no thrills.   LUNGS: Clear to auscultation bilaterally; no wheezes, rales, or rhonchi. No increased work of breathing or signs of respiratory distress.   ABDOMEN: Soft, nontender, nondistended, normoactive bowel sounds.   EXTREMITIES: Skin warm and well perfused, no clubbing or cyanosis, no edema.  NEURO: No focal findings. Normal speech. Mood and affect normal.   SKIN: Normal without suspicious lesions on exposed skin.    Follow up: 3-4 months or sooner as needed    Allergies   Allergen Reactions    Brimonidine Other (See Comments)    Salicylic Acid-Sulfur Other (See Comments)    Sulfa Antibiotics Rash and Other (See Comments)         Current Outpatient Medications:     acetaminophen (TYLENOL) 650 mg CR tablet, Take 500 mg by mouth 2 (two) times a day, Disp: , Rfl:     albuterol (2.5 mg/3 mL) 0.083 % " nebulizer solution, Take 3 mL (2.5 mg total) by nebulization every 6 (six) hours as needed for wheezing or shortness of breath, Disp: 360 mL, Rfl: 1    albuterol (ProAir HFA) 90 mcg/act inhaler, Inhale 2 puffs every 6 (six) hours as needed for wheezing or shortness of breath (cough, chest tightness), Disp: 18 g, Rfl: 0    ammonium lactate (LAC-HYDRIN) 12 % lotion, Apply topically 2 (two) times a day as needed for dry skin, Disp: 225 g, Rfl: 1    atorvastatin (LIPITOR) 20 mg tablet, take 1 tablet by mouth at bedtime, Disp: 90 tablet, Rfl: 1    Calcium Carbonate-Vitamin D (CALCIUM-D PO), Take 1 tablet by mouth in the morning, Disp: , Rfl:     clotrimazole (LOTRIMIN) 1 % cream, , Disp: , Rfl:     Continuous Blood Gluc  (Dexcom G7 ) YOLANDA, Use 1 Application if needed (check sugars), Disp: 1 each, Rfl: 0    Continuous Blood Gluc Sensor (Dexcom G7 Sensor), Use 1 Device every 10 days, Disp: 9 each, Rfl: 3    fluticasone (FLONASE) 50 mcg/act nasal spray, 1 spray into each nostril daily pt uses as needed, Disp: 54 g, Rfl: 1    fluticasone-umeclidinium-vilanterol (Trelegy Ellipta) 100-62.5-25 mcg/actuation inhaler, inhale 1 puff by mouth daily Rinse mouth after use, Disp: 180 blister, Rfl: 3    furosemide (LASIX) 20 mg tablet, take 1 tablet by mouth twice a day, Disp: 180 tablet, Rfl: 1    gabapentin (NEURONTIN) 100 mg capsule, Take 2 capsules (200 mg total) by mouth daily at bedtime, Disp: 180 capsule, Rfl: 1    guaiFENesin (MUCINEX) 600 mg 12 hr tablet, Take 1 tablet (600 mg total) by mouth 2 (two) times a day as needed for cough or congestion, Disp: 180 tablet, Rfl: 1    insulin aspart (NovoLOG FlexPen) 100 UNIT/ML injection pen, inject 12 units subcutaneously before meals (3 MEALS A DAY), Disp: 15 mL, Rfl: 5    Insulin Glargine Solostar (Lantus SoloStar) 100 UNIT/ML SOPN, Inject 0.3 mL (30 Units total) under the skin daily, Disp: 3 mL, Rfl: 0    Insulin Pen Needle (B-D ULTRAFINE III SHORT PEN) 31G X 8 MM  MISC, Use 4 (four) times a day (with meals and at bedtime), Disp: 400 each, Rfl: 1    lidocaine (Lidoderm) 5 %, Apply 1 patch topically over 12 hours daily as needed (pain) Remove & Discard patch within 12 hours or as directed by MD, Disp: 30 patch, Rfl: 1    Lumigan 0.01 % ophthalmic drops, INSTILL 1 DROP into both eyes at bedtime, Disp: , Rfl:     methocarbamol (ROBAXIN) 500 mg tablet, Take 500 mg by mouth daily at bedtime as needed for muscle spasms, Disp: , Rfl:     Multiple Vitamin (MULTI VITAMIN DAILY PO), Take 1 tablet by mouth daily, Disp: , Rfl:     mupirocin (BACTROBAN) 2 % ointment, APPLY A SMALL AMOUNT TO THE AFFECTED FOOT/TOE AREA BY TOPICAL ROUTE 3 TIMES PER DAY, Disp: , Rfl:     nystatin (MYCOSTATIN) cream, Apply topically 2 (two) times a day, Disp: 30 g, Rfl: 1    nystatin (MYCOSTATIN) powder, Apply topically 3 (three) times a day as needed (rash), Disp: 60 g, Rfl: 1    omeprazole (PriLOSEC) 20 mg delayed release capsule, Take 1 capsule (20 mg total) by mouth 2 (two) times a day, Disp: 180 capsule, Rfl: 1    saccharomyces boulardii (FLORASTOR) 250 mg capsule, Take 250 mg by mouth 2 (two) times a day, Disp: , Rfl:     Spacer/Aero-Holding Chambers (AeroChamber Plus Elian-Vu Large) MISC, Use as directed, Disp: , Rfl:     vitamin B-12 (VITAMIN B-12) 500 mcg tablet, Take 1,000 mcg by mouth daily, Disp: , Rfl:     collagenase (SANTYL) ointment, Apply topically daily (Patient not taking: Reported on 1/9/2025), Disp: 90 g, Rfl: 1    ketoconazole (NIZORAL) 2 % cream, , Disp: , Rfl:     montelukast (SINGULAIR) 10 mg tablet, Take 1 tablet (10 mg total) by mouth every evening, Disp: 90 tablet, Rfl: 1    Past Medical History:   Diagnosis Date    Acute kidney injury superimposed on chronic kidney disease  (HCC) 11/26/2016    ADHD (attention deficit hyperactivity disorder) 03/17/2019    Arthritis     BL HIPS    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    Carpal tunnel syndrome     Chest pain 03/06/2017    Chronic kidney  disease     Claudication ([facility]) 3/15/2019    Closed fracture of base of middle phalanx of finger 06/22/2017    Closed fracture of middle phalanx of left little finger 07/08/2017    Coagulopathy ([facility]) 05/15/2019    Colloid cyst of brain (HCC)     COPD (chronic obstructive pulmonary disease) (HCC)     COPD (chronic obstructive pulmonary disease) (HCC)     Coronary artery disease     Cough     Diabetes mellitus (HCC)     GERD (gastroesophageal reflux disease)     Glaucoma     Gout     History of brain disorder 10/07/2020    Hyperlipidemia     Hypertension     Irritable bowel syndrome     Melena 02/26/2019    PAD (peripheral artery disease) ([facility]) 5/15/2019    Paronychia of great toe of left foot 10/07/2020    Post-traumatic seizures ([facility]) 07/23/2015    Renal disorder     Seizures ([facility])     last 2015    Shortness of breath     Single seizure ([facility]) 05/31/2016    SOB (shortness of breath)     Syncope and collapse 11/11/2020    Urinary tract infection without hematuria 11/26/2016    Wheezing        Family History   Problem Relation Age of Onset    Asthma Mother     Heart disease Mother     Emphysema Father     Cancer Father     Prostate cancer Father     Kidney cancer Sister     Diabetes Sister     Kidney disease Sister     Brain cancer Brother     Bone cancer Brother     Heart disease Brother        Past Medical History:   Diagnosis Date    Acute kidney injury superimposed on chronic kidney disease  ([facility]) 11/26/2016    ADHD (attention deficit hyperactivity disorder) 03/17/2019    Arthritis     BL HIPS    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    Carpal tunnel syndrome     Chest pain 03/06/2017    Chronic kidney disease     Claudication ([facility]) 3/15/2019    Closed fracture of base of middle phalanx of finger 06/22/2017    Closed fracture of middle phalanx of left little finger 07/08/2017    Coagulopathy ([facility]) 05/15/2019    Colloid cyst of brain (HCC)     COPD (chronic obstructive pulmonary disease) (HCC)     COPD (chronic  obstructive pulmonary disease) (HCC)     Coronary artery disease     Cough     Diabetes mellitus (HCC)     GERD (gastroesophageal reflux disease)     Glaucoma     Gout     History of brain disorder 10/07/2020    Hyperlipidemia     Hypertension     Irritable bowel syndrome     Melena 02/26/2019    PAD (peripheral artery disease) ([facility]) 5/15/2019    Paronychia of great toe of left foot 10/07/2020    Post-traumatic seizures ([facility]) 07/23/2015    Renal disorder     Seizures ([facility])     last 2015    Shortness of breath     Single seizure ([facility]) 05/31/2016    SOB (shortness of breath)     Syncope and collapse 11/11/2020    Urinary tract infection without hematuria 11/26/2016    Wheezing        Past Surgical History:   Procedure Laterality Date    BRAIN SURGERY      CARDIAC CATHETERIZATION N/A 8/28/2024    Procedure: Cardiac catheterization;  Surgeon: Matt Beltran MD;  Location: [facility];  Service: Cardiology    CATARACT EXTRACTION      CHOLECYSTECTOMY      COLECTOMY RADHA      COLONOSCOPY      DECOMPRESSION SPINE LUMBAR POSTERIOR  08/19/2019    HERNIA REPAIR      HYSTERECTOMY      INCISION TENDON SHEATH HAND      NECK SURGERY  05/24/2018    NEUROPLASTY / TRANSPOSITION MEDIAN NERVE AT CARPAL TUNNEL      ORIF FEMUR FRACTURE Right 9/7/2024    Procedure: OPEN REDUCTION W/ INTERNAL FIXATION (ORIF) FEMUR;  Surgeon: Rambo Yanez MD;  Location: [facility];  Service: Orthopedics    OK COLONOSCOPY FLX DX W/COLLJ SPEC WHEN PFRMD N/A 03/26/2019    Procedure: COLONOSCOPY;  Surgeon: Yaniv Hawley MD;  Location: [facility];  Service: Gastroenterology    OK ESOPHAGOGASTRODUODENOSCOPY TRANSORAL DIAGNOSTIC N/A 03/26/2019    Procedure: ESOPHAGOGASTRODUODENOSCOPY (EGD);  Surgeon: Yaniv Hawley MD;  Location: [facility];  Service: Gastroenterology    REPLACEMENT TOTAL KNEE Right     RETINAL DETACHMENT SURGERY      TUBAL LIGATION         Social History     Socioeconomic History    Marital status:      Spouse name:  Not on file    Number of children: Not on file    Years of education: Not on file    Highest education level: Not on file   Occupational History    Occupation: retired   Tobacco Use    Smoking status: Former     Current packs/day: 0.00     Average packs/day: 0.5 packs/day for 40.0 years (20.0 ttl pk-yrs)     Types: Cigarettes     Start date:      Quit date:      Years since quittin.0    Smokeless tobacco: Never    Tobacco comments:     stopped many years ago   Vaping Use    Vaping status: Never Used   Substance and Sexual Activity    Alcohol use: Never    Drug use: Never    Sexual activity: Not Currently     Partners: Male   Other Topics Concern    Not on file   Social History Narrative    Not on file     Social Drivers of Health     Financial Resource Strain: Low Risk  (2024)    Overall Financial Resource Strain (CARDIA)     Difficulty of Paying Living Expenses: Not hard at all   Food Insecurity: No Food Insecurity (2024)    Nursing - Inadequate Food Risk Classification     Worried About Running Out of Food in the Last Year: Never true     Ran Out of Food in the Last Year: Never true     Ran Out of Food in the Last Year: Not on file   Transportation Needs: No Transportation Needs (2024)    PRAPARE - Transportation     Lack of Transportation (Medical): No     Lack of Transportation (Non-Medical): No   Physical Activity: Not on file   Stress: Not on file   Social Connections: Unknown (2024)    Received from [employer]    Social [employer]     How often do you feel lonely or isolated from those around you? (Adult - for ages 18 years and over): Not on file   Intimate Partner Violence: Not on file   Housing Stability: Low Risk  (2024)    Housing Stability Vital Sign     Unable to Pay for Housing in the Last Year: No     Number of Times Moved in the Last Year: 0     Homeless in the Last Year: No       LABORATORY RESULTS:    Lab Results   Component Value Date    WBC 8.9 10/23/2024    HGB  "10.6 (L) 10/23/2024    HCT 33.0 (L) 10/23/2024    MCV 84 10/23/2024     10/23/2024     Lab Results   Component Value Date    CALCIUM 9.1 10/23/2024    K 4.2 10/23/2024    CO2 27 10/23/2024     10/23/2024    BUN 25 10/23/2024    CREATININE 1.08 10/23/2024     Lab Results   Component Value Date    HGBA1C 6.4 12/18/2024       Lipid Profile:   No results found for: \"CHOL\"  Lab Results   Component Value Date    HDL 39 (L) 07/03/2024    HDL 42 (L) 10/13/2023    HDL 46 (L) 07/12/2022     Lab Results   Component Value Date    LDLCALC 87 07/03/2024    LDLCALC 84 10/13/2023    LDLCALC 58 07/12/2022     Lab Results   Component Value Date    TRIG 233 (H) 07/03/2024    TRIG 181 (H) 10/13/2023    TRIG 159 (H) 07/12/2022       The ASCVD Risk score (Ally DK, et al., 2019) failed to calculate for the following reasons:    The 2019 ASCVD risk score is only valid for ages 40 to 79    1. Chronic HFpEF        2. Nonobstructive CAD        3. Primary hypertension        4. Dyslipidemia            Imaging: I have reviewed all pertinent imaging studies.      Recommend aggressive risk factor modification and therapeutic lifestyle changes.  Low-salt, low-calorie, low-fat, low-cholesterol diet with regular exercise and to optimize weight.    Discussed concepts of atherosclerosis, including signs and symptoms of cardiac disease.    Medications reviewed and possible side effects discussed.  Previous studies were reviewed.    Safety measures were reviewed.  All questions and concerns addressed.  Patient was advised to report any problems requiring medical attention.    Follow-up with PCP and appropriate specialist and lab work as discussed.    Return for follow up visit as scheduled or earlier, if needed.  Thank you for allowing me to participate in the care and evaluation of your patient.  Should you have any questions, please feel free to contact me.    Lambert Rico PA-C  1/10/2025,4:41 PM    "

## 2025-01-10 NOTE — ASSESSMENT & PLAN NOTE
Wt Readings from Last 3 Encounters:   01/10/25 68.5 kg (151 lb)   01/09/25 69.4 kg (153 lb)   12/18/24 68.5 kg (151 lb)   Euvolemic on exam.  Continue Lasix 20 mg bid.  Can take extra tablet as needed for weight gain of 3 pounds in 1 day or 5 pounds in 1 week.  Encourage low-sodium diet, daily weights, LE compression stockings, and LE elevation.  Advised to notify our office for any new/worsening symptoms.

## 2025-01-10 NOTE — TELEPHONE ENCOUNTER
Writer spoke to patient son. Writer is going to mail outside resources when returning to the office on Monday. Writer verified address on file was correct.

## 2025-01-10 NOTE — TELEPHONE ENCOUNTER
Writer called and left VM for patients son in regards to ROF. Writer will send outside resources when call is returned. Writer is closing referral at this time due to not taking NP and not being able to complete referral at this time. Writer left call back # [phone].

## 2025-01-10 NOTE — PATIENT INSTRUCTIONS
Please follow instructions as recommended during visit.  Recommend low salt DASH diet  Strongly encourage compliance with your medications.   Please reach out to us if you have any questions   Recommend you present to the emergency room or call our office if you have chest pain, chest pressure, shortness of breath, any new, persistent or progressive symptoms.

## 2025-01-14 ENCOUNTER — TELEPHONE (OUTPATIENT)
Age: 80
End: 2025-01-14

## 2025-01-14 ENCOUNTER — TELEPHONE (OUTPATIENT)
Dept: NEUROLOGY | Facility: CLINIC | Age: 80
End: 2025-01-14

## 2025-01-14 NOTE — TELEPHONE ENCOUNTER
Pts Son in law calling in requesting assistance for getting lab to come to pts home to complete blood work that was ordered.     Please assist, If possible. Thank you.

## 2025-01-14 NOTE — TELEPHONE ENCOUNTER
Received JANETTE approval from both Dr. Bah and Dr. Torres due to location.  I called and left a message for the son to please return my call to schedule a follow up appt in the [place] office.  I left my direct TEAMS number for call back.

## 2025-01-14 NOTE — TELEPHONE ENCOUNTER
ELSIE phoned [employer] at [phone] and was informed that someone from [employer] should be calling pt today to schedule appt.

## 2025-01-15 NOTE — TELEPHONE ENCOUNTER
Son returned my call, appt scheduled for 4/3 at 12:30 pm with Dr. Bah at the [facility].

## 2025-01-17 ENCOUNTER — HOSPITAL ENCOUNTER (OUTPATIENT)
Dept: INFUSION CENTER | Facility: CLINIC | Age: 80
End: 2025-01-17
Payer: COMMERCIAL

## 2025-01-17 VITALS
DIASTOLIC BLOOD PRESSURE: 58 MMHG | RESPIRATION RATE: 18 BRPM | SYSTOLIC BLOOD PRESSURE: 119 MMHG | TEMPERATURE: 97.4 F | HEART RATE: 80 BPM

## 2025-01-17 DIAGNOSIS — I50.32 CHRONIC HEART FAILURE WITH PRESERVED EJECTION FRACTION (HCC): Primary | ICD-10-CM

## 2025-01-17 DIAGNOSIS — R79.0 LOW FERRITIN: ICD-10-CM

## 2025-01-17 PROCEDURE — 96365 THER/PROPH/DIAG IV INF INIT: CPT

## 2025-01-17 RX ORDER — SODIUM CHLORIDE 9 MG/ML
20 INJECTION, SOLUTION INTRAVENOUS ONCE
Status: COMPLETED | OUTPATIENT
Start: 2025-01-17 | End: 2025-01-17

## 2025-01-17 RX ORDER — SODIUM CHLORIDE 9 MG/ML
20 INJECTION, SOLUTION INTRAVENOUS ONCE
Status: CANCELLED | OUTPATIENT
Start: 2025-01-31

## 2025-01-17 RX ADMIN — IRON SUCROSE 200 MG: 20 INJECTION, SOLUTION INTRAVENOUS at 10:13

## 2025-01-17 RX ADMIN — SODIUM CHLORIDE 20 ML/HR: 9 INJECTION, SOLUTION INTRAVENOUS at 10:15

## 2025-01-17 NOTE — PROGRESS NOTES
Pt here for venofer infusion, offering no complaints. Reached out to Dr. Jose Cruz TAVARES pt to receive Venofer 200mg every other week for 5 doses. Peripheral IV placed with positive blood return noted. Tolerated infusion without incident. Peripheral IV removed. AVS given to pt. Walked out in stable condition. Next appointment on 1/31/25 at 2pm.

## 2025-01-24 ENCOUNTER — HOSPITAL ENCOUNTER (OUTPATIENT)
Dept: RADIOLOGY | Age: 80
Discharge: HOME | End: 2025-01-24
Payer: COMMERCIAL

## 2025-01-24 DIAGNOSIS — R41.89 SEVERE COGNITIVE IMPAIRMENT: ICD-10-CM

## 2025-01-24 DIAGNOSIS — G31.1 SENILE DEGENERATION OF BRAIN, NOT ELSEWHERE CLASSIFIED (HCC): ICD-10-CM

## 2025-01-24 LAB — GLUCOSE SERPL-MCNC: 114 MG/DL (ref 65–140)

## 2025-01-24 PROCEDURE — 78608 BRAIN IMAGING (PET): CPT

## 2025-01-24 PROCEDURE — 82948 REAGENT STRIP/BLOOD GLUCOSE: CPT

## 2025-01-24 PROCEDURE — A9552 F18 FDG: HCPCS

## 2025-01-26 ENCOUNTER — RESULTS FOLLOW-UP (OUTPATIENT)
Dept: OTHER | Facility: HOSPITAL | Age: 80
End: 2025-01-26

## 2025-01-27 DIAGNOSIS — J41.0 SIMPLE CHRONIC BRONCHITIS (HCC): ICD-10-CM

## 2025-01-27 RX ORDER — GUAIFENESIN 600 MG/1
TABLET, EXTENDED RELEASE ORAL
Qty: 180 TABLET | Refills: 1 | Status: SHIPPED | OUTPATIENT
Start: 2025-01-27

## 2025-01-30 DIAGNOSIS — Z96.659 PERIPROSTHETIC FRACTURE AROUND PROSTHETIC KNEE, SUBSEQUENT ENCOUNTER: Primary | ICD-10-CM

## 2025-01-30 DIAGNOSIS — M97.8XXD PERIPROSTHETIC FRACTURE AROUND PROSTHETIC KNEE, SUBSEQUENT ENCOUNTER: Primary | ICD-10-CM

## 2025-01-31 ENCOUNTER — APPOINTMENT (OUTPATIENT)
Dept: LAB | Facility: HOSPITAL | Age: 80
End: 2025-01-31
Payer: COMMERCIAL

## 2025-01-31 ENCOUNTER — HOSPITAL ENCOUNTER (OUTPATIENT)
Dept: INFUSION CENTER | Facility: CLINIC | Age: 80
End: 2025-01-31
Payer: COMMERCIAL

## 2025-01-31 VITALS
HEART RATE: 66 BPM | DIASTOLIC BLOOD PRESSURE: 96 MMHG | SYSTOLIC BLOOD PRESSURE: 133 MMHG | RESPIRATION RATE: 18 BRPM | TEMPERATURE: 96.8 F

## 2025-01-31 DIAGNOSIS — I50.32 CHRONIC HEART FAILURE WITH PRESERVED EJECTION FRACTION (HCC): ICD-10-CM

## 2025-01-31 DIAGNOSIS — I50.32 CHRONIC HEART FAILURE WITH PRESERVED EJECTION FRACTION (HCC): Primary | ICD-10-CM

## 2025-01-31 DIAGNOSIS — R94.39 ABNORMAL CARDIOVASCULAR STRESS TEST: ICD-10-CM

## 2025-01-31 DIAGNOSIS — Z79.4 TYPE 2 DIABETES MELLITUS WITH DIABETIC NEUROPATHY, WITH LONG-TERM CURRENT USE OF INSULIN (HCC): ICD-10-CM

## 2025-01-31 DIAGNOSIS — D64.9 ANEMIA, UNSPECIFIED TYPE: ICD-10-CM

## 2025-01-31 DIAGNOSIS — I50.32 CHRONIC HEART FAILURE WITH PRESERVED EJECTION FRACTION (HFPEF) (HCC): ICD-10-CM

## 2025-01-31 DIAGNOSIS — E11.40 TYPE 2 DIABETES MELLITUS WITH DIABETIC NEUROPATHY, WITH LONG-TERM CURRENT USE OF INSULIN (HCC): ICD-10-CM

## 2025-01-31 DIAGNOSIS — R07.9 CHEST PAIN, UNSPECIFIED TYPE: ICD-10-CM

## 2025-01-31 DIAGNOSIS — I25.10 CORONARY ARTERY DISEASE INVOLVING NATIVE CORONARY ARTERY OF NATIVE HEART WITHOUT ANGINA PECTORIS: ICD-10-CM

## 2025-01-31 DIAGNOSIS — E11.69 HYPERLIPIDEMIA ASSOCIATED WITH TYPE 2 DIABETES MELLITUS  (HCC): ICD-10-CM

## 2025-01-31 DIAGNOSIS — R79.0 LOW FERRITIN: ICD-10-CM

## 2025-01-31 DIAGNOSIS — Z79.899 LONG TERM USE OF DRUG: ICD-10-CM

## 2025-01-31 DIAGNOSIS — I10 PRIMARY HYPERTENSION: ICD-10-CM

## 2025-01-31 DIAGNOSIS — S72.8X1A OTHER CLOSED FRACTURE OF RIGHT FEMUR, UNSPECIFIED PORTION OF FEMUR, INITIAL ENCOUNTER (HCC): ICD-10-CM

## 2025-01-31 DIAGNOSIS — R39.9 UTI SYMPTOMS: ICD-10-CM

## 2025-01-31 DIAGNOSIS — R41.89 SEVERE COGNITIVE IMPAIRMENT: ICD-10-CM

## 2025-01-31 DIAGNOSIS — N18.32 STAGE 3B CHRONIC KIDNEY DISEASE (HCC): ICD-10-CM

## 2025-01-31 DIAGNOSIS — E78.5 HYPERLIPIDEMIA ASSOCIATED WITH TYPE 2 DIABETES MELLITUS  (HCC): ICD-10-CM

## 2025-01-31 DIAGNOSIS — J41.0 SIMPLE CHRONIC BRONCHITIS (HCC): ICD-10-CM

## 2025-01-31 LAB
BACTERIA UR QL AUTO: ABNORMAL /HPF
BASOPHILS # BLD AUTO: 0.06 THOUSANDS/ÂΜL (ref 0–0.1)
BASOPHILS NFR BLD AUTO: 1 % (ref 0–1)
BILIRUB UR QL STRIP: NEGATIVE
CLARITY UR: CLEAR
COLOR UR: ABNORMAL
CREAT UR-MCNC: 57.9 MG/DL
EOSINOPHIL # BLD AUTO: 0.17 THOUSAND/ÂΜL (ref 0–0.61)
EOSINOPHIL NFR BLD AUTO: 2 % (ref 0–6)
ERYTHROCYTE [DISTWIDTH] IN BLOOD BY AUTOMATED COUNT: 16.5 % (ref 11.6–15.1)
EST. AVERAGE GLUCOSE BLD GHB EST-MCNC: 146 MG/DL
FERRITIN SERPL-MCNC: 204 NG/ML (ref 11–307)
FOLATE SERPL-MCNC: >22.3 NG/ML
GLUCOSE UR STRIP-MCNC: NEGATIVE MG/DL
HBA1C MFR BLD: 6.7 %
HCT VFR BLD AUTO: 44.7 % (ref 34.8–46.1)
HGB BLD-MCNC: 13.4 G/DL (ref 11.5–15.4)
HGB UR QL STRIP.AUTO: NEGATIVE
IMM GRANULOCYTES # BLD AUTO: 0.05 THOUSAND/UL (ref 0–0.2)
IMM GRANULOCYTES NFR BLD AUTO: 1 % (ref 0–2)
INR PPP: 0.99 (ref 0.85–1.19)
KETONES UR STRIP-MCNC: NEGATIVE MG/DL
LEUKOCYTE ESTERASE UR QL STRIP: ABNORMAL
LYMPHOCYTES # BLD AUTO: 1.66 THOUSANDS/ÂΜL (ref 0.6–4.47)
LYMPHOCYTES NFR BLD AUTO: 19 % (ref 14–44)
MCH RBC QN AUTO: 26.5 PG (ref 26.8–34.3)
MCHC RBC AUTO-ENTMCNC: 30 G/DL (ref 31.4–37.4)
MCV RBC AUTO: 88 FL (ref 82–98)
MICROALBUMIN UR-MCNC: 18.4 MG/L
MICROALBUMIN/CREAT 24H UR: 32 MG/G CREATININE (ref 0–30)
MONOCYTES # BLD AUTO: 0.62 THOUSAND/ÂΜL (ref 0.17–1.22)
MONOCYTES NFR BLD AUTO: 7 % (ref 4–12)
NEUTROPHILS # BLD AUTO: 6.06 THOUSANDS/ÂΜL (ref 1.85–7.62)
NEUTS SEG NFR BLD AUTO: 70 % (ref 43–75)
NITRITE UR QL STRIP: NEGATIVE
NON-SQ EPI CELLS URNS QL MICRO: ABNORMAL /HPF
NRBC BLD AUTO-RTO: 0 /100 WBCS
PH UR STRIP.AUTO: 6 [PH]
PLATELET # BLD AUTO: 282 THOUSANDS/UL (ref 149–390)
PMV BLD AUTO: 10.8 FL (ref 8.9–12.7)
PROT UR STRIP-MCNC: ABNORMAL MG/DL
PROTHROMBIN TIME: 13.4 SECONDS (ref 12.3–15)
RBC # BLD AUTO: 5.06 MILLION/UL (ref 3.81–5.12)
RBC #/AREA URNS AUTO: ABNORMAL /HPF
SP GR UR STRIP.AUTO: 1.01 (ref 1–1.03)
TSH SERPL DL<=0.05 MIU/L-ACNC: 6.76 UIU/ML (ref 0.45–4.5)
UROBILINOGEN UR STRIP-ACNC: <2 MG/DL
WBC # BLD AUTO: 8.62 THOUSAND/UL (ref 4.31–10.16)
WBC #/AREA URNS AUTO: ABNORMAL /HPF

## 2025-01-31 PROCEDURE — 80061 LIPID PANEL: CPT

## 2025-01-31 PROCEDURE — 80053 COMPREHEN METABOLIC PANEL: CPT

## 2025-01-31 PROCEDURE — 81001 URINALYSIS AUTO W/SCOPE: CPT

## 2025-01-31 PROCEDURE — 82570 ASSAY OF URINE CREATININE: CPT

## 2025-01-31 PROCEDURE — 36415 COLL VENOUS BLD VENIPUNCTURE: CPT

## 2025-01-31 PROCEDURE — 86618 LYME DISEASE ANTIBODY: CPT

## 2025-01-31 PROCEDURE — 84439 ASSAY OF FREE THYROXINE: CPT

## 2025-01-31 PROCEDURE — 84443 ASSAY THYROID STIM HORMONE: CPT

## 2025-01-31 PROCEDURE — 85610 PROTHROMBIN TIME: CPT

## 2025-01-31 PROCEDURE — 82607 VITAMIN B-12: CPT

## 2025-01-31 PROCEDURE — 83550 IRON BINDING TEST: CPT

## 2025-01-31 PROCEDURE — 83036 HEMOGLOBIN GLYCOSYLATED A1C: CPT

## 2025-01-31 PROCEDURE — 87086 URINE CULTURE/COLONY COUNT: CPT

## 2025-01-31 PROCEDURE — 82746 ASSAY OF FOLIC ACID SERUM: CPT

## 2025-01-31 PROCEDURE — 85025 COMPLETE CBC W/AUTO DIFF WBC: CPT

## 2025-01-31 PROCEDURE — 82043 UR ALBUMIN QUANTITATIVE: CPT

## 2025-01-31 PROCEDURE — 82728 ASSAY OF FERRITIN: CPT

## 2025-01-31 PROCEDURE — 96365 THER/PROPH/DIAG IV INF INIT: CPT

## 2025-01-31 PROCEDURE — 83540 ASSAY OF IRON: CPT

## 2025-01-31 RX ORDER — SODIUM CHLORIDE 9 MG/ML
20 INJECTION, SOLUTION INTRAVENOUS ONCE
OUTPATIENT
Start: 2025-02-17

## 2025-01-31 RX ORDER — SODIUM CHLORIDE 9 MG/ML
20 INJECTION, SOLUTION INTRAVENOUS ONCE
Status: COMPLETED | OUTPATIENT
Start: 2025-01-31 | End: 2025-01-31

## 2025-01-31 RX ADMIN — SODIUM CHLORIDE 20 ML/HR: 9 INJECTION, SOLUTION INTRAVENOUS at 14:49

## 2025-01-31 RX ADMIN — IRON SUCROSE 200 MG: 20 INJECTION, SOLUTION INTRAVENOUS at 14:50

## 2025-02-01 LAB
ALBUMIN SERPL BCG-MCNC: 3.9 G/DL (ref 3.5–5)
ALP SERPL-CCNC: 71 U/L (ref 34–104)
ALT SERPL W P-5'-P-CCNC: 13 U/L (ref 7–52)
ANION GAP SERPL CALCULATED.3IONS-SCNC: 12 MMOL/L (ref 4–13)
AST SERPL W P-5'-P-CCNC: 17 U/L (ref 13–39)
B BURGDOR IGG+IGM SER QL IA: NEGATIVE
BACTERIA UR CULT: NORMAL
BILIRUB SERPL-MCNC: 0.5 MG/DL (ref 0.2–1)
BUN SERPL-MCNC: 25 MG/DL (ref 5–25)
CALCIUM SERPL-MCNC: 9.4 MG/DL (ref 8.4–10.2)
CHLORIDE SERPL-SCNC: 100 MMOL/L (ref 96–108)
CHOLEST SERPL-MCNC: 144 MG/DL (ref ?–200)
CO2 SERPL-SCNC: 31 MMOL/L (ref 21–32)
CREAT SERPL-MCNC: 0.99 MG/DL (ref 0.6–1.3)
GFR SERPL CREATININE-BSD FRML MDRD: 53 ML/MIN/1.73SQ M
GLUCOSE P FAST SERPL-MCNC: 179 MG/DL (ref 65–99)
HDLC SERPL-MCNC: 35 MG/DL
IRON SATN MFR SERPL: 22 % (ref 15–50)
IRON SERPL-MCNC: 58 UG/DL (ref 50–212)
LDLC SERPL CALC-MCNC: 68 MG/DL (ref 0–100)
POTASSIUM SERPL-SCNC: 4.1 MMOL/L (ref 3.5–5.3)
PROT SERPL-MCNC: 7.4 G/DL (ref 6.4–8.4)
SODIUM SERPL-SCNC: 143 MMOL/L (ref 135–147)
T4 FREE SERPL-MCNC: 0.67 NG/DL (ref 0.61–1.12)
TIBC SERPL-MCNC: 264.6 UG/DL (ref 250–450)
TRANSFERRIN SERPL-MCNC: 189 MG/DL (ref 203–362)
TRIGL SERPL-MCNC: 206 MG/DL (ref ?–150)
UIBC SERPL-MCNC: 207 UG/DL (ref 155–355)
VIT B12 SERPL-MCNC: 2492 PG/ML (ref 180–914)

## 2025-02-02 ENCOUNTER — RESULTS FOLLOW-UP (OUTPATIENT)
Dept: FAMILY MEDICINE CLINIC | Facility: CLINIC | Age: 80
End: 2025-02-02

## 2025-02-04 ENCOUNTER — OFFICE VISIT (OUTPATIENT)
Dept: OBGYN CLINIC | Facility: CLINIC | Age: 80
End: 2025-02-04
Payer: COMMERCIAL

## 2025-02-04 ENCOUNTER — APPOINTMENT (OUTPATIENT)
Dept: RADIOLOGY | Facility: CLINIC | Age: 80
End: 2025-02-04
Payer: COMMERCIAL

## 2025-02-04 VITALS — HEIGHT: 55 IN | BODY MASS INDEX: 35.1 KG/M2

## 2025-02-04 DIAGNOSIS — M97.8XXD PERIPROSTHETIC FRACTURE AROUND PROSTHETIC KNEE, SUBSEQUENT ENCOUNTER: ICD-10-CM

## 2025-02-04 DIAGNOSIS — Z96.659 PERIPROSTHETIC FRACTURE AROUND PROSTHETIC KNEE, SUBSEQUENT ENCOUNTER: ICD-10-CM

## 2025-02-04 DIAGNOSIS — Z96.659 PERIPROSTHETIC FRACTURE AROUND PROSTHETIC KNEE, SUBSEQUENT ENCOUNTER: Primary | ICD-10-CM

## 2025-02-04 DIAGNOSIS — M97.8XXD PERIPROSTHETIC FRACTURE AROUND PROSTHETIC KNEE, SUBSEQUENT ENCOUNTER: Primary | ICD-10-CM

## 2025-02-04 PROCEDURE — 99213 OFFICE O/P EST LOW 20 MIN: CPT | Performed by: ORTHOPAEDIC SURGERY

## 2025-02-04 PROCEDURE — 73552 X-RAY EXAM OF FEMUR 2/>: CPT

## 2025-02-04 PROCEDURE — 73560 X-RAY EXAM OF KNEE 1 OR 2: CPT

## 2025-02-04 NOTE — PROGRESS NOTES
Name: Shantelle Romano      : 1945       MRN: [id number]   Encounter Provider: Thien Tatum MD   Encounter Date: 25  Encounter department: [facility] [place]         Assessment & Plan  Periprosthetic fracture around prosthetic knee, subsequent encounter    5 months s/p ORIF right femur periprosthetic fracture  X-rays obtained and reviewed   Continue WBAT  Recommend beginning physical therapy  Continue use walker for balance  Patient is doing well overall today  OTC analgesics as needed / directed  Follow up 6 months           To Do Next Visit:  Right knee and femur x-rays    _____________________________________________________  CHIEF COMPLAINT:  Chief Complaint   Patient presents with    Right Leg - Follow-up         SUBJECTIVE:  Shantelle Romano is a 79 y.o. female who presents 5 months s/p ORIF right femur periprosthetic fracture. The patient is doing very well today. She is not complaining of any knee pain. She has been ambulating with walker.     PAST MEDICAL HISTORY:  Past Medical History:   Diagnosis Date    Acute kidney injury superimposed on chronic kidney disease  ([facility]) 2016    ADHD (attention deficit hyperactivity disorder) 2019    Arthritis     BL HIPS    Boutonniere deformity 2017    Carpal tunnel syndrome     Chest pain 2017    Chronic kidney disease     Claudication ([facility]) 3/15/2019    Closed fracture of base of middle phalanx of finger 2017    Closed fracture of middle phalanx of left little finger 2017    Coagulopathy ([facility]) 05/15/2019    Colloid cyst of brain (HCC)     COPD (chronic obstructive pulmonary disease) (HCC)     COPD (chronic obstructive pulmonary disease) (HCC)     Coronary artery disease     Cough     Diabetes mellitus (HCC)     GERD (gastroesophageal reflux disease)     Glaucoma     Gout     History of brain disorder 10/07/2020    Hyperlipidemia     Hypertension     Irritable bowel syndrome     Melena  02/26/2019    PAD (peripheral artery disease) ([facility]) 5/15/2019    Paronychia of great toe of left foot 10/07/2020    Post-traumatic seizures ([facility]) 07/23/2015    Renal disorder     Seizures ([facility])     last 2015    Shortness of breath     Single seizure ([facility]) 05/31/2016    SOB (shortness of breath)     Syncope and collapse 11/11/2020    Urinary tract infection without hematuria 11/26/2016    Wheezing        PAST SURGICAL HISTORY:  Past Surgical History:   Procedure Laterality Date    BRAIN SURGERY      CARDIAC CATHETERIZATION N/A 8/28/2024    Procedure: Cardiac catheterization;  Surgeon: Matt Beltran MD;  Location: [facility];  Service: Cardiology    CATARACT EXTRACTION      CHOLECYSTECTOMY      COLECTOMY RADHA      COLONOSCOPY      DECOMPRESSION SPINE LUMBAR POSTERIOR  08/19/2019    HERNIA REPAIR      HYSTERECTOMY      INCISION TENDON SHEATH HAND      NECK SURGERY  05/24/2018    NEUROPLASTY / TRANSPOSITION MEDIAN NERVE AT CARPAL TUNNEL      ORIF FEMUR FRACTURE Right 9/7/2024    Procedure: OPEN REDUCTION W/ INTERNAL FIXATION (ORIF) FEMUR;  Surgeon: Rambo Yanez MD;  Location: [facility];  Service: Orthopedics    MA COLONOSCOPY FLX DX W/COLLJ SPEC WHEN PFRMD N/A 03/26/2019    Procedure: COLONOSCOPY;  Surgeon: Yaniv Hawley MD;  Location: [facility];  Service: Gastroenterology    MA ESOPHAGOGASTRODUODENOSCOPY TRANSORAL DIAGNOSTIC N/A 03/26/2019    Procedure: ESOPHAGOGASTRODUODENOSCOPY (EGD);  Surgeon: Yaniv Hawley MD;  Location: [facility];  Service: Gastroenterology    REPLACEMENT TOTAL KNEE Right     RETINAL DETACHMENT SURGERY      TUBAL LIGATION         FAMILY HISTORY:  Family History   Problem Relation Age of Onset    Asthma Mother     Heart disease Mother     Emphysema Father     Cancer Father     Prostate cancer Father     Kidney cancer Sister     Diabetes Sister     Kidney disease Sister     Brain cancer Brother     Bone cancer Brother     Heart disease Brother        SOCIAL  HISTORY:  Social History     Tobacco Use    Smoking status: Former     Current packs/day: 0.00     Average packs/day: 0.5 packs/day for 40.0 years (20.0 ttl pk-yrs)     Types: Cigarettes     Start date:      Quit date:      Years since quittin.1    Smokeless tobacco: Never    Tobacco comments:     stopped many years ago   Vaping Use    Vaping status: Never Used   Substance Use Topics    Alcohol use: Never    Drug use: Never       MEDICATIONS:    Current Outpatient Medications:     acetaminophen (TYLENOL) 650 mg CR tablet, Take 500 mg by mouth 2 (two) times a day, Disp: , Rfl:     albuterol (2.5 mg/3 mL) 0.083 % nebulizer solution, Take 3 mL (2.5 mg total) by nebulization every 6 (six) hours as needed for wheezing or shortness of breath, Disp: 360 mL, Rfl: 1    albuterol (ProAir HFA) 90 mcg/act inhaler, Inhale 2 puffs every 6 (six) hours as needed for wheezing or shortness of breath (cough, chest tightness), Disp: 18 g, Rfl: 0    ammonium lactate (LAC-HYDRIN) 12 % lotion, Apply topically 2 (two) times a day as needed for dry skin, Disp: 225 g, Rfl: 1    atorvastatin (LIPITOR) 20 mg tablet, take 1 tablet by mouth at bedtime, Disp: 90 tablet, Rfl: 1    Calcium Carbonate-Vitamin D (CALCIUM-D PO), Take 1 tablet by mouth in the morning, Disp: , Rfl:     clotrimazole (LOTRIMIN) 1 % cream, , Disp: , Rfl:     Continuous Blood Gluc  (Dexcom G7 ) YOLANDA, Use 1 Application if needed (check sugars), Disp: 1 each, Rfl: 0    Continuous Blood Gluc Sensor (Dexcom G7 Sensor), Use 1 Device every 10 days, Disp: 9 each, Rfl: 3    fluticasone (FLONASE) 50 mcg/act nasal spray, 1 spray into each nostril daily pt uses as needed, Disp: 54 g, Rfl: 1    fluticasone-umeclidinium-vilanterol (Trelegy Ellipta) 100-62.5-25 mcg/actuation inhaler, inhale 1 puff by mouth daily Rinse mouth after use, Disp: 180 blister, Rfl: 3    furosemide (LASIX) 20 mg tablet, take 1 tablet by mouth twice a day, Disp: 180 tablet, Rfl: 1     gabapentin (NEURONTIN) 100 mg capsule, Take 2 capsules (200 mg total) by mouth daily at bedtime, Disp: 180 capsule, Rfl: 1    guaiFENesin (MUCINEX) 600 mg 12 hr tablet, take 1 tablet by mouth twice a day if needed for cough or congestion, Disp: 180 tablet, Rfl: 1    insulin aspart (NovoLOG FlexPen) 100 UNIT/ML injection pen, inject 12 units subcutaneously before meals (3 MEALS A DAY), Disp: 15 mL, Rfl: 5    Insulin Glargine Solostar (Lantus SoloStar) 100 UNIT/ML SOPN, Inject 0.3 mL (30 Units total) under the skin daily, Disp: 3 mL, Rfl: 0    Insulin Pen Needle (B-D ULTRAFINE III SHORT PEN) 31G X 8 MM MISC, Use 4 (four) times a day (with meals and at bedtime), Disp: 400 each, Rfl: 1    lidocaine (Lidoderm) 5 %, Apply 1 patch topically over 12 hours daily as needed (pain) Remove & Discard patch within 12 hours or as directed by MD, Disp: 30 patch, Rfl: 1    Lumigan 0.01 % ophthalmic drops, INSTILL 1 DROP into both eyes at bedtime, Disp: , Rfl:     methocarbamol (ROBAXIN) 500 mg tablet, Take 500 mg by mouth daily at bedtime as needed for muscle spasms, Disp: , Rfl:     montelukast (SINGULAIR) 10 mg tablet, Take 1 tablet (10 mg total) by mouth every evening, Disp: 90 tablet, Rfl: 1    Multiple Vitamin (MULTI VITAMIN DAILY PO), Take 1 tablet by mouth daily, Disp: , Rfl:     mupirocin (BACTROBAN) 2 % ointment, APPLY A SMALL AMOUNT TO THE AFFECTED FOOT/TOE AREA BY TOPICAL ROUTE 3 TIMES PER DAY, Disp: , Rfl:     nystatin (MYCOSTATIN) cream, Apply topically 2 (two) times a day, Disp: 30 g, Rfl: 1    nystatin (MYCOSTATIN) powder, Apply topically 3 (three) times a day as needed (rash), Disp: 60 g, Rfl: 1    omeprazole (PriLOSEC) 20 mg delayed release capsule, Take 1 capsule (20 mg total) by mouth 2 (two) times a day, Disp: 180 capsule, Rfl: 1    saccharomyces boulardii (FLORASTOR) 250 mg capsule, Take 250 mg by mouth 2 (two) times a day, Disp: , Rfl:     Spacer/Aero-Holding Chambers (AeroChamber Plus Elian-Vu Large) MISC, Use as  "directed, Disp: , Rfl:     vitamin B-12 (VITAMIN B-12) 500 mcg tablet, Take 1,000 mcg by mouth daily, Disp: , Rfl:     collagenase (SANTYL) ointment, Apply topically daily (Patient not taking: Reported on 1/9/2025), Disp: 90 g, Rfl: 1    ketoconazole (NIZORAL) 2 % cream, , Disp: , Rfl:     ALLERGIES:  Allergies   Allergen Reactions    Brimonidine Other (See Comments)    Salicylic Acid-Sulfur Other (See Comments)    Sulfa Antibiotics Rash and Other (See Comments)       LABS:  HgA1c:   Lab Results   Component Value Date    HGBA1C 6.7 (H) 01/31/2025     BMP:   Lab Results   Component Value Date    CALCIUM 9.4 01/31/2025    K 4.1 01/31/2025    CO2 31 01/31/2025     01/31/2025    BUN 25 01/31/2025    CREATININE 0.99 01/31/2025     CBC: No components found for: \"CBC\"    _____________________________________________________  PHYSICAL EXAMINATION:  Vital signs: Ht 4' 7\" (1.397 m)   BMI 35.10 kg/m²   General: No acute distress, awake and alert  Psychiatric: Mood and affect appear appropriate  HEENT: Trachea Midline, No torticollis, no apparent facial trauma  Cardiovascular: No audible murmurs; Extremities appear perfused  Pulmonary: No audible wheezing or stridor  Skin: No open lesions; see further details (if any) below    MUSCULOSKELETAL EXAMINATION:  Extremities:    Right Knee  Incision well healed  Range of motion from 5 to 115.    There is mild crepitus with range of motion.   There is no effusion.    There is no tenderness over the knee.    The patient is able to perform a straight leg raise.    The patient is neurovascular intact distally.    _____________________________________________________  STUDIES REVIEWED:  I personally reviewed the images obtained in office today and my independent interpretation is as follows:  X-rays of the right femur and knee obtained 2/4/2025 demonstrate orthopedic hardware intact, healed periprosthetic femur fracture.      PROCEDURES PERFORMED:  Procedures      Scribe Attestation "      I,:  Kamilla Villalta am acting as a scribe while in the presence of the attending physician.:       I,:  Thien Tatum MD personally performed the services described in this documentation    as scribed in my presence.:

## 2025-02-04 NOTE — TELEPHONE ENCOUNTER
Cari Shin MD to Shantelle Romano         1/26/25  4:48 PM  Srinath Pepper,  I have reviewed your Pet scan results, while they are abnormal, the abnormalities line up with your prior surgery, so I do not believe we can use these results alone for a diagnosis as they could simply be postsurgical changes. I would like to wait for the bloodwork and neuropsych testing to provide a more complete picture before we make any diagnosis.  Dr. Shin     This . [place] [facility]'s MyChart message has not been read.

## 2025-02-04 NOTE — TELEPHONE ENCOUNTER
Inbound call received from Patient to request results of her PET Scan. Patient was provided Dr. Shin's advisement from 01/26/2025. Patient verbalized understanding and asked for CTS to speak with Matthew, her daughter's boyfriend. Matthew stated he was with her at the last provider appointment 01/09/.2025 and they were going to send a list of Neuro Psych providers to the house but they never received them. Matthew states the list may be sent via [employer]. They will follow up and make a Neuro Psych appointment.     [employer] message with locations was sent.     Dr. Shin: The Patient wanted you to know she got the blood work done. Please advise on results. Thank you!

## 2025-02-05 NOTE — ASSESSMENT & PLAN NOTE
5 months s/p ORIF right femur periprosthetic fracture  X-rays obtained and reviewed   Continue WBAT  Recommend beginning physical therapy  Continue use walker for balance  Patient is doing well overall today  OTC analgesics as needed / directed  Follow up 6 months

## 2025-02-07 ENCOUNTER — TELEPHONE (OUTPATIENT)
Dept: OTHER | Facility: HOSPITAL | Age: 80
End: 2025-02-07

## 2025-02-12 ENCOUNTER — CONSULT (OUTPATIENT)
Age: 80
End: 2025-02-12
Payer: COMMERCIAL

## 2025-02-12 VITALS
TEMPERATURE: 98.3 F | SYSTOLIC BLOOD PRESSURE: 118 MMHG | BODY MASS INDEX: 34.25 KG/M2 | OXYGEN SATURATION: 92 % | DIASTOLIC BLOOD PRESSURE: 62 MMHG | HEART RATE: 60 BPM | WEIGHT: 148 LBS | HEIGHT: 55 IN

## 2025-02-12 DIAGNOSIS — L81.4 LENTIGINES: ICD-10-CM

## 2025-02-12 DIAGNOSIS — L72.0 EPIDERMAL INCLUSION CYST: ICD-10-CM

## 2025-02-12 DIAGNOSIS — Z12.83 SKIN CANCER SCREENING: ICD-10-CM

## 2025-02-12 DIAGNOSIS — L82.1 SEBORRHEIC KERATOSIS: ICD-10-CM

## 2025-02-12 DIAGNOSIS — D18.01 CHERRY ANGIOMA: ICD-10-CM

## 2025-02-12 DIAGNOSIS — D22.9 MULTIPLE MELANOCYTIC NEVI: Primary | ICD-10-CM

## 2025-02-12 PROCEDURE — 99203 OFFICE O/P NEW LOW 30 MIN: CPT | Performed by: STUDENT IN AN ORGANIZED HEALTH CARE EDUCATION/TRAINING PROGRAM

## 2025-02-12 NOTE — PATIENT INSTRUCTIONS
EPIDERMAL INCLUSION CYST        Assessment and Plan:  Based on a thorough discussion of this condition and the management approach to it (including a comprehensive discussion of the known risks, side effects and potential benefits of treatment), the patient (family) agrees to implement the following specific plan:  Patient is scheduled for surgical removal    What are epidermal inclusion cysts?  Epidermal inclusion cysts are the most common, benign cutaneous cysts. There are many different names for epidermal inclusion cysts, including epidermoid cyst, epidermal cyst, infundibular cyst, inclusion cyst, and keratin cyst. These cysts can occur anywhere on the body and typically present as nodules directly underneath the skin. There is often a visible pore or opening in the center. The cysts are freely moveable and can range from a few millimeters to several centimeters in diameter. The center of epidermoid cysts almost always contains keratin, which has a cheesy appearance, and not sebum. They also do not originate from sebaceous glands. Therefore, epidermal inclusion cysts are not the same as sebaceous cysts.    Cysts may remain stable or progressively enlarge over time. There are no reliable predictive factors to tell if an epidermal inclusion cyst will enlarge, become inflamed, or remain quiescent. Infected cysts tend to become larger, turn red, and are more noticeable to the patient. There may be accompanying pain and discomfort.     What causes epidermal inclusion cysts?  Epidermal inclusion cysts often appear out of the blue and are not contagious. They are due to a proliferation of epidermal cells within the dermis and are more common in men than women. They occur more frequently in patients in their 20s to 40s. Epidermal inclusion cysts by themselves are usually not inherited, but they can be hereditary in rare syndromes such as Chan syndrome, nodular elastosis with cysts and comedones (Favre-Racouchot  syndrome), and basal cell nevus syndrome (Gorlin syndrome). Elderly patients with chronic sun-damaged skin areas have a higher likelihood of developing epidermoid cysts. They often occur in areas where hair follicles have been inflamed or repeatedly irritated are more frequent in patients with acne vulgaris. In the  period, they are called milia.     Patients on BRAF inhibitors such as imiquimod and cyclosporine have a higher incidence of epidermoid cysts of the face.    How do we diagnose an epidermal inclusion cyst?  Epidermoid inclusion cysts are often diagnosed by history and physical exam. There is usually no need for biopsy prior to removal.  Radiographic and laboratory exams, such as ultrasound studies, are unnecessary and not typically ordered unless the practitioner suspects a genetic condition.    What is the treatment for an epidermal inclusion cyst?  Inflamed, uninfected epidermal inclusion cysts rarely resolve spontaneously without therapy or surgical intervention. Treatment is not emergent unless desired by the patient.     Definitive treatment is via surgical excision with walls intact. This method will prevent recurrence. This is best done when the cyst is not inflamed, to decrease the probability of rupture during surgery.   A local anesthetic will be injected around the cyst  A small incision is made in the skin overlying the cyst, and contents are expressed  The incision is repaired with sutures    Another option is to use a 4mm punch biopsy with cyst extraction through the defect.    Incision and drainage is often needed if the cyst is infected or inflamed. If there is surrounding cellulitis, oral antibiotic therapy may be necessary. The common agents used target methicillin sensitive Staphylococcal aureus and methicillin resistant S aureus in areas of high prevalence.       SEBORRHEIC KERATOSES  - Relevant exam: Scattered over the trunk/extremities are waxy brown to black plaques and  papules with stuck on appearance and consistent dermoscopy  - Exam and clinical history consistent with seborrheic keratoses  - Counseled that these are benign growths that do not require treatment    MELANOCYTIC NEVI  -Relevant exam: Scattered over the trunk/extremities are homogenously pigmented brown macules and papules. ELM performed and without concerning findings. No outliers unless otherwise noted in today's note  - Exam and clinical history consistent with melanocytic nevi  - Counseled to return to clinic prior to scheduled appointment should any of these lesions change or should any new lesions of concern arise  - Counseled on use of sun protection daily. Reviewed latest FDA sunscreen guidelines, including use of broad spectrum (UVA and UVB blocking) sunscreen or sun protective clothing with SPF 30-50 every 2-3 hours and reapplied after exposure to water    LENTIGINES  OTHER SKIN CHANGES DUE TO CHRONIC EXPOSURE TO NONIONIZING RADIATION  - Relevant exam: Over sun exposed areas are brown macules. ELM performed and without concerning findings.  - Exam and clinical history consistent with lentigines.  - Counseled to return to clinic prior to scheduled appointment should any of these lesions change or should any new lesions of concern arise.  - Recommended use of sunscreen as above and below.    CHERRY ANGIOMAS  - Relevant exam: Scattered over the trunk/extremities are red papules  - Exam and clinical history consistent with cherry angiomas  - Educated that these are benign

## 2025-02-12 NOTE — PROGRESS NOTES
"[facility] D[facility]atology [facility] Note     Patient Name: Shantelle Romano  Encounter Date: 2/12/25     Have you been cared for by a [facility] Dermatologist in the last 3 years and, if so, which description applies to you?    NO.   I am considered a \"new\" patient and must complete all patient intake questions. I am FEMALE/of child-bearing potential.    REVIEW OF SYSTEMS:  Have you recently had or currently have any of the following? Recent fever or chills? No  Any non-healing wound? No  Are you pregnant or planning to become pregnant? No  Are you currently or planning to be nursing or breast feeding? No   PAST MEDICAL HISTORY:  Have you personally ever had or currently have any of the following?  If \"YES,\" then please provide more detail. Skin cancer (such as Melanoma, Basal Cell Carcinoma, Squamous Cell Carcinoma?  No  Tuberculosis, HIV/AIDS, Hepatitis B or C: No  Radiation Treatment No   HISTORY OF IMMUNOSUPPRESSION:   Do you have a history of any of the following:  Systemic Immunosuppression such as Diabetes, Biologic or Immunotherapy, Chemotherapy, Organ Transplantation, Bone Marrow Transplantation or Prednsione?  YES, diabetes    Answering \"YES\" requires the addition of the dotphrase \"IMMUNOSUPPRESSED\" as the first diagnosis of the patient's visit.   FAMILY HISTORY:  Any \"first degree relatives\" (parent, brother, sister, or child) with the following?    Skin Cancer, Pancreatic or Other Cancer? YES, brother-not sure what kind, father- not sure what kind   PATIENT EXPERIENCE:    Do you want the Dermatologist to perform a COMPLETE skin exam today including a clinical examination under the \"bra and underwear\" areas?  Yes  If necessary, do we have your permission to call and leave a detailed message on your Preferred Phone number that includes your specific medical information?  Yes      Allergies   Allergen Reactions   • Brimonidine Other (See Comments)   • Salicylic Acid-Sulfur Other (See Comments)   • Sulfa Antibiotics " Rash and Other (See Comments)      Current Outpatient Medications:   •  acetaminophen (TYLENOL) 650 mg CR tablet, Take 500 mg by mouth 2 (two) times a day, Disp: , Rfl:   •  albuterol (2.5 mg/3 mL) 0.083 % nebulizer solution, Take 3 mL (2.5 mg total) by nebulization every 6 (six) hours as needed for wheezing or shortness of breath, Disp: 360 mL, Rfl: 1  •  albuterol (ProAir HFA) 90 mcg/act inhaler, Inhale 2 puffs every 6 (six) hours as needed for wheezing or shortness of breath (cough, chest tightness), Disp: 18 g, Rfl: 0  •  ammonium lactate (LAC-HYDRIN) 12 % lotion, Apply topically 2 (two) times a day as needed for dry skin, Disp: 225 g, Rfl: 1  •  atorvastatin (LIPITOR) 20 mg tablet, take 1 tablet by mouth at bedtime, Disp: 90 tablet, Rfl: 1  •  Calcium Carbonate-Vitamin D (CALCIUM-D PO), Take 1 tablet by mouth in the morning, Disp: , Rfl:   •  clotrimazole (LOTRIMIN) 1 % cream, , Disp: , Rfl:   •  Continuous Blood Gluc  (Dexcom G7 ) YOLANDA, Use 1 Application if needed (check sugars), Disp: 1 each, Rfl: 0  •  Continuous Blood Gluc Sensor (Dexcom G7 Sensor), Use 1 Device every 10 days, Disp: 9 each, Rfl: 3  •  fluticasone (FLONASE) 50 mcg/act nasal spray, 1 spray into each nostril daily pt uses as needed, Disp: 54 g, Rfl: 1  •  fluticasone-umeclidinium-vilanterol (Trelegy Ellipta) 100-62.5-25 mcg/actuation inhaler, inhale 1 puff by mouth daily Rinse mouth after use, Disp: 180 blister, Rfl: 3  •  furosemide (LASIX) 20 mg tablet, take 1 tablet by mouth twice a day, Disp: 180 tablet, Rfl: 1  •  gabapentin (NEURONTIN) 100 mg capsule, Take 2 capsules (200 mg total) by mouth daily at bedtime, Disp: 180 capsule, Rfl: 1  •  guaiFENesin (MUCINEX) 600 mg 12 hr tablet, take 1 tablet by mouth twice a day if needed for cough or congestion, Disp: 180 tablet, Rfl: 1  •  insulin aspart (NovoLOG FlexPen) 100 UNIT/ML injection pen, inject 12 units subcutaneously before meals (3 MEALS A DAY), Disp: 15 mL, Rfl: 5  •   Insulin Glargine Solostar (Lantus SoloStar) 100 UNIT/ML SOPN, Inject 0.3 mL (30 Units total) under the skin daily, Disp: 3 mL, Rfl: 0  •  Insulin Pen Needle (B-D ULTRAFINE III SHORT PEN) 31G X 8 MM MISC, Use 4 (four) times a day (with meals and at bedtime), Disp: 400 each, Rfl: 1  •  lidocaine (Lidoderm) 5 %, Apply 1 patch topically over 12 hours daily as needed (pain) Remove & Discard patch within 12 hours or as directed by MD, Disp: 30 patch, Rfl: 1  •  Lumigan 0.01 % ophthalmic drops, INSTILL 1 DROP into both eyes at bedtime, Disp: , Rfl:   •  methocarbamol (ROBAXIN) 500 mg tablet, Take 500 mg by mouth daily at bedtime as needed for muscle spasms, Disp: , Rfl:   •  montelukast (SINGULAIR) 10 mg tablet, Take 1 tablet (10 mg total) by mouth every evening, Disp: 90 tablet, Rfl: 1  •  Multiple Vitamin (MULTI VITAMIN DAILY PO), Take 1 tablet by mouth daily, Disp: , Rfl:   •  mupirocin (BACTROBAN) 2 % ointment, APPLY A SMALL AMOUNT TO THE AFFECTED FOOT/TOE AREA BY TOPICAL ROUTE 3 TIMES PER DAY, Disp: , Rfl:   •  nystatin (MYCOSTATIN) cream, Apply topically 2 (two) times a day, Disp: 30 g, Rfl: 1  •  nystatin (MYCOSTATIN) powder, Apply topically 3 (three) times a day as needed (rash), Disp: 60 g, Rfl: 1  •  omeprazole (PriLOSEC) 20 mg delayed release capsule, Take 1 capsule (20 mg total) by mouth 2 (two) times a day, Disp: 180 capsule, Rfl: 1  •  saccharomyces boulardii (FLORASTOR) 250 mg capsule, Take 250 mg by mouth 2 (two) times a day, Disp: , Rfl:   •  Spacer/Aero-Holding Chambers (AeroChamber Plus Elian-Vu Large) MISC, Use as directed, Disp: , Rfl:   •  vitamin B-12 (VITAMIN B-12) 500 mcg tablet, Take 1,000 mcg by mouth daily, Disp: , Rfl:   •  collagenase (SANTYL) ointment, Apply topically daily (Patient not taking: Reported on 1/9/2025), Disp: 90 g, Rfl: 1  •  ketoconazole (NIZORAL) 2 % cream, , Disp: , Rfl:       New patient present for full skin check, SOC on chest present for about a year.    Whom besides the  patient is providing clinical information about today's encounter?   Other:  Son is present.    Physical Exam and Assessment/Plan by Diagnosis:    EPIDERMAL INCLUSION CYST    Physical Exam:  Anatomic Location Affected:  Right upper chest  Morphological Description:  subcutaneous nodule  Pertinent Positives:  Pertinent Negatives:    Additional History of Present Condition:  Present for a bout a year.    Assessment and Plan:  Based on a thorough discussion of this condition and the management approach to it (including a comprehensive discussion of the known risks, side effects and potential benefits of treatment), the patient (family) agrees to implement the following specific plan:  Patient is scheduled for surgical removal    SEBORRHEIC KERATOSES  - Relevant exam: Scattered over the trunk/extremities are waxy brown to black plaques and papules with stuck on appearance and consistent dermoscopy  - Exam and clinical history consistent with seborrheic keratoses  - Counseled that these are benign growths that do not require treatment    MELANOCYTIC NEVI  -Relevant exam: Scattered over the trunk/extremities are homogenously pigmented brown macules and papules. ELM performed and without concerning findings. No outliers unless otherwise noted in today's note  - Exam and clinical history consistent with melanocytic nevi  - Counseled to return to clinic prior to scheduled appointment should any of these lesions change or should any new lesions of concern arise  - Counseled on use of sun protection daily. Reviewed latest FDA sunscreen guidelines, including use of broad spectrum (UVA and UVB blocking) sunscreen or sun protective clothing with SPF 30-50 every 2-3 hours and reapplied after exposure to water    LENTIGINES  OTHER SKIN CHANGES DUE TO CHRONIC EXPOSURE TO NONIONIZING RADIATION  - Relevant exam: Over sun exposed areas are brown macules. ELM performed and without concerning findings.  - Exam and clinical history  consistent with lentigines.  - Counseled to return to clinic prior to scheduled appointment should any of these lesions change or should any new lesions of concern arise.  - Recommended use of sunscreen as above and below.    CHERRY ANGIOMAS  - Relevant exam: Scattered over the trunk/extremities are red papules  - Exam and clinical history consistent with cherry angiomas  - Educated that these are benign          Scribe Attestation    I,:  Rubi Amaro am acting as a scribe while in the presence of the attending physician.:       I,:  Thien Bañuelos, DO personally performed the services described in this documentation    as scribed in my presence.:

## 2025-02-17 ENCOUNTER — HOSPITAL ENCOUNTER (OUTPATIENT)
Dept: INFUSION CENTER | Facility: CLINIC | Age: 80
Discharge: HOME | End: 2025-02-17
Payer: COMMERCIAL

## 2025-02-17 VITALS
DIASTOLIC BLOOD PRESSURE: 67 MMHG | HEART RATE: 73 BPM | RESPIRATION RATE: 18 BRPM | TEMPERATURE: 97 F | SYSTOLIC BLOOD PRESSURE: 152 MMHG

## 2025-02-17 DIAGNOSIS — R79.0 LOW FERRITIN: ICD-10-CM

## 2025-02-17 DIAGNOSIS — I50.32 CHRONIC HEART FAILURE WITH PRESERVED EJECTION FRACTION (HCC): Primary | ICD-10-CM

## 2025-02-17 PROCEDURE — 96365 THER/PROPH/DIAG IV INF INIT: CPT

## 2025-02-17 RX ORDER — SODIUM CHLORIDE 9 MG/ML
20 INJECTION, SOLUTION INTRAVENOUS ONCE
Status: COMPLETED | OUTPATIENT
Start: 2025-02-17 | End: 2025-02-17

## 2025-02-17 RX ORDER — SODIUM CHLORIDE 9 MG/ML
20 INJECTION, SOLUTION INTRAVENOUS ONCE
OUTPATIENT
Start: 2025-02-28

## 2025-02-17 RX ADMIN — IRON SUCROSE 200 MG: 20 INJECTION, SOLUTION INTRAVENOUS at 09:27

## 2025-02-17 RX ADMIN — SODIUM CHLORIDE 20 ML/HR: 9 INJECTION, SOLUTION INTRAVENOUS at 09:27

## 2025-02-17 NOTE — PROGRESS NOTES
Patient presents for Venofer infusion. Patient denies any recent antibiotic use or signs of infection. Patient offers no complaints. Patient tolerated infusion well. AVS declined. Next appointment reviewed for 2/28/25 at 2PM

## 2025-02-26 ENCOUNTER — TELEPHONE (OUTPATIENT)
Dept: LAB | Facility: HOSPITAL | Age: 80
End: 2025-02-26

## 2025-02-28 ENCOUNTER — HOSPITAL ENCOUNTER (OUTPATIENT)
Dept: INFUSION CENTER | Facility: CLINIC | Age: 80
Discharge: HOME | End: 2025-02-28

## 2025-02-28 VITALS
HEART RATE: 77 BPM | RESPIRATION RATE: 18 BRPM | DIASTOLIC BLOOD PRESSURE: 63 MMHG | TEMPERATURE: 98.3 F | SYSTOLIC BLOOD PRESSURE: 143 MMHG

## 2025-02-28 DIAGNOSIS — R79.0 LOW FERRITIN: ICD-10-CM

## 2025-02-28 DIAGNOSIS — I50.32 CHRONIC HEART FAILURE WITH PRESERVED EJECTION FRACTION (HCC): ICD-10-CM

## 2025-02-28 NOTE — PROGRESS NOTES
Pt to clinic for venofer, offers no complaints today.  Unable to establish PIV,  U/S guidance was utilized.  Reached out to provider VIRGIL Lopez hold off on last infusion for now and recheck labs in a few months.  Patient made aware. Left clinic in stable condition.

## 2025-03-08 ENCOUNTER — TELEPHONE (OUTPATIENT)
Dept: OTHER | Facility: OTHER | Age: 80
End: 2025-03-08

## 2025-03-08 NOTE — TELEPHONE ENCOUNTER
"Patient's son-in-law stated, \"I was told by the pharmacy that Dr. Billingsley put a hold on her Gabapentin prescription, which I was not aware of the change and she is still taking that medication. If she should continue taking that then she is going to need refill send to [facility] pharmacy [facility].\"     Please call pt's son-in-law back when office reopens   "

## 2025-03-10 DIAGNOSIS — E11.40 TYPE 2 DIABETES MELLITUS WITH DIABETIC NEUROPATHY, WITH LONG-TERM CURRENT USE OF INSULIN (HCC): ICD-10-CM

## 2025-03-10 DIAGNOSIS — Z79.4 TYPE 2 DIABETES MELLITUS WITH DIABETIC NEUROPATHY, WITH LONG-TERM CURRENT USE OF INSULIN (HCC): ICD-10-CM

## 2025-03-10 RX ORDER — GABAPENTIN 100 MG/1
200 CAPSULE ORAL
Qty: 180 CAPSULE | Refills: 1 | Status: SHIPPED | OUTPATIENT
Start: 2025-03-10

## 2025-03-10 RX ORDER — GABAPENTIN 100 MG/1
200 CAPSULE ORAL
Qty: 180 CAPSULE | Refills: 1 | Status: SHIPPED | OUTPATIENT
Start: 2025-03-10 | End: 2025-03-10 | Stop reason: SDUPTHER

## 2025-03-10 NOTE — TELEPHONE ENCOUNTER
I'm not sure what the pharmacy is referring to, as it is still active on her med list on my end -- but I did send in a refill for her

## 2025-03-13 ENCOUNTER — TELEPHONE (OUTPATIENT)
Dept: LAB | Facility: HOSPITAL | Age: 80
End: 2025-03-13

## 2025-03-14 ENCOUNTER — APPOINTMENT (OUTPATIENT)
Dept: LAB | Facility: HOSPITAL | Age: 80
End: 2025-03-14
Attending: INTERNAL MEDICINE
Payer: COMMERCIAL

## 2025-03-14 DIAGNOSIS — E83.9 CHRONIC KIDNEY DISEASE-MINERAL AND BONE DISORDER: ICD-10-CM

## 2025-03-14 DIAGNOSIS — N18.9 CHRONIC KIDNEY DISEASE-MINERAL AND BONE DISORDER: ICD-10-CM

## 2025-03-14 DIAGNOSIS — L30.4 INTERTRIGO: ICD-10-CM

## 2025-03-14 DIAGNOSIS — M89.9 CHRONIC KIDNEY DISEASE-MINERAL AND BONE DISORDER: ICD-10-CM

## 2025-03-14 DIAGNOSIS — E11.59 HYPERTENSION ASSOCIATED WITH DIABETES  (HCC): ICD-10-CM

## 2025-03-14 DIAGNOSIS — E53.8 VITAMIN B12 DEFICIENCY: ICD-10-CM

## 2025-03-14 DIAGNOSIS — J41.0 SIMPLE CHRONIC BRONCHITIS (HCC): ICD-10-CM

## 2025-03-14 DIAGNOSIS — E78.5 HYPERLIPIDEMIA ASSOCIATED WITH TYPE 2 DIABETES MELLITUS  (HCC): ICD-10-CM

## 2025-03-14 DIAGNOSIS — E55.9 VITAMIN D DEFICIENCY: ICD-10-CM

## 2025-03-14 DIAGNOSIS — E11.40 TYPE 2 DIABETES MELLITUS WITH DIABETIC NEUROPATHY, WITH LONG-TERM CURRENT USE OF INSULIN (HCC): ICD-10-CM

## 2025-03-14 DIAGNOSIS — E11.69 HYPERLIPIDEMIA ASSOCIATED WITH TYPE 2 DIABETES MELLITUS  (HCC): ICD-10-CM

## 2025-03-14 DIAGNOSIS — N18.32 STAGE 3B CHRONIC KIDNEY DISEASE (HCC): ICD-10-CM

## 2025-03-14 DIAGNOSIS — Z79.4 TYPE 2 DIABETES MELLITUS WITH DIABETIC NEUROPATHY, WITH LONG-TERM CURRENT USE OF INSULIN (HCC): ICD-10-CM

## 2025-03-14 DIAGNOSIS — I15.2 HYPERTENSION ASSOCIATED WITH DIABETES  (HCC): ICD-10-CM

## 2025-03-14 LAB
25(OH)D3 SERPL-MCNC: 47.8 NG/ML (ref 30–100)
ALBUMIN SERPL BCG-MCNC: 4.1 G/DL (ref 3.5–5)
ALP SERPL-CCNC: 66 U/L (ref 34–104)
ALT SERPL W P-5'-P-CCNC: 16 U/L (ref 7–52)
ANION GAP SERPL CALCULATED.3IONS-SCNC: 8 MMOL/L (ref 4–13)
AST SERPL W P-5'-P-CCNC: 21 U/L (ref 13–39)
BACTERIA UR QL AUTO: ABNORMAL /HPF
BASOPHILS # BLD AUTO: 0.05 THOUSANDS/ÂΜL (ref 0–0.1)
BASOPHILS NFR BLD AUTO: 1 % (ref 0–1)
BILIRUB SERPL-MCNC: 0.36 MG/DL (ref 0.2–1)
BILIRUB UR QL STRIP: NEGATIVE
BUN SERPL-MCNC: 26 MG/DL (ref 5–25)
CALCIUM SERPL-MCNC: 9.9 MG/DL (ref 8.4–10.2)
CHLORIDE SERPL-SCNC: 102 MMOL/L (ref 96–108)
CHOLEST SERPL-MCNC: 146 MG/DL (ref ?–200)
CLARITY UR: CLEAR
CO2 SERPL-SCNC: 31 MMOL/L (ref 21–32)
COLOR UR: ABNORMAL
CREAT SERPL-MCNC: 1.03 MG/DL (ref 0.6–1.3)
CREAT UR-MCNC: 50.4 MG/DL
CREAT UR-MCNC: 50.4 MG/DL
EOSINOPHIL # BLD AUTO: 0.25 THOUSAND/ÂΜL (ref 0–0.61)
EOSINOPHIL NFR BLD AUTO: 3 % (ref 0–6)
ERYTHROCYTE [DISTWIDTH] IN BLOOD BY AUTOMATED COUNT: 15.9 % (ref 11.6–15.1)
EST. AVERAGE GLUCOSE BLD GHB EST-MCNC: 137 MG/DL
FERRITIN SERPL-MCNC: 294 NG/ML (ref 11–307)
GFR SERPL CREATININE-BSD FRML MDRD: 51 ML/MIN/1.73SQ M
GLUCOSE P FAST SERPL-MCNC: 104 MG/DL (ref 65–99)
GLUCOSE UR STRIP-MCNC: NEGATIVE MG/DL
HBA1C MFR BLD: 6.4 %
HCT VFR BLD AUTO: 46 % (ref 34.8–46.1)
HDLC SERPL-MCNC: 37 MG/DL
HGB BLD-MCNC: 14.1 G/DL (ref 11.5–15.4)
HGB UR QL STRIP.AUTO: NEGATIVE
IMM GRANULOCYTES # BLD AUTO: 0.04 THOUSAND/UL (ref 0–0.2)
IMM GRANULOCYTES NFR BLD AUTO: 1 % (ref 0–2)
IRON SATN MFR SERPL: 32 % (ref 15–50)
IRON SERPL-MCNC: 78 UG/DL (ref 50–212)
KETONES UR STRIP-MCNC: NEGATIVE MG/DL
LDLC SERPL CALC-MCNC: 77 MG/DL (ref 0–100)
LEUKOCYTE ESTERASE UR QL STRIP: ABNORMAL
LYMPHOCYTES # BLD AUTO: 1.56 THOUSANDS/ÂΜL (ref 0.6–4.47)
LYMPHOCYTES NFR BLD AUTO: 18 % (ref 14–44)
MCH RBC QN AUTO: 27.5 PG (ref 26.8–34.3)
MCHC RBC AUTO-ENTMCNC: 30.7 G/DL (ref 31.4–37.4)
MCV RBC AUTO: 90 FL (ref 82–98)
MICROALBUMIN UR-MCNC: 19.1 MG/L
MICROALBUMIN/CREAT 24H UR: 38 MG/G CREATININE (ref 0–30)
MONOCYTES # BLD AUTO: 0.49 THOUSAND/ÂΜL (ref 0.17–1.22)
MONOCYTES NFR BLD AUTO: 6 % (ref 4–12)
NEUTROPHILS # BLD AUTO: 6.15 THOUSANDS/ÂΜL (ref 1.85–7.62)
NEUTS SEG NFR BLD AUTO: 71 % (ref 43–75)
NITRITE UR QL STRIP: NEGATIVE
NON-SQ EPI CELLS URNS QL MICRO: ABNORMAL /HPF
NONHDLC SERPL-MCNC: 109 MG/DL
NRBC BLD AUTO-RTO: 0 /100 WBCS
PH UR STRIP.AUTO: 7 [PH]
PHOSPHATE SERPL-MCNC: 3.1 MG/DL (ref 2.3–4.1)
PLATELET # BLD AUTO: 265 THOUSANDS/UL (ref 149–390)
PMV BLD AUTO: 10.3 FL (ref 8.9–12.7)
POTASSIUM SERPL-SCNC: 4.5 MMOL/L (ref 3.5–5.3)
PROT SERPL-MCNC: 7.6 G/DL (ref 6.4–8.4)
PROT UR STRIP-MCNC: ABNORMAL MG/DL
PROT UR-MCNC: 13.5 MG/DL
PROT/CREAT UR: 0.3 MG/G{CREAT} (ref 0–0.1)
PTH-INTACT SERPL-MCNC: 39.5 PG/ML (ref 12–88)
RBC # BLD AUTO: 5.13 MILLION/UL (ref 3.81–5.12)
RBC #/AREA URNS AUTO: ABNORMAL /HPF
SODIUM SERPL-SCNC: 141 MMOL/L (ref 135–147)
SP GR UR STRIP.AUTO: 1.01 (ref 1–1.03)
T4 FREE SERPL-MCNC: 0.68 NG/DL (ref 0.61–1.12)
TIBC SERPL-MCNC: 245 UG/DL (ref 250–450)
TRANSFERRIN SERPL-MCNC: 175 MG/DL (ref 203–362)
TRIGL SERPL-MCNC: 162 MG/DL (ref ?–150)
TSH SERPL DL<=0.05 MIU/L-ACNC: 5.84 UIU/ML (ref 0.45–4.5)
UIBC SERPL-MCNC: 167 UG/DL (ref 155–355)
UROBILINOGEN UR STRIP-ACNC: <2 MG/DL
VIT B12 SERPL-MCNC: 1455 PG/ML (ref 180–914)
WBC # BLD AUTO: 8.54 THOUSAND/UL (ref 4.31–10.16)
WBC #/AREA URNS AUTO: ABNORMAL /HPF

## 2025-03-14 PROCEDURE — 84439 ASSAY OF FREE THYROXINE: CPT

## 2025-03-14 PROCEDURE — 82570 ASSAY OF URINE CREATININE: CPT

## 2025-03-14 PROCEDURE — 83036 HEMOGLOBIN GLYCOSYLATED A1C: CPT

## 2025-03-14 PROCEDURE — 82306 VITAMIN D 25 HYDROXY: CPT

## 2025-03-14 PROCEDURE — 83550 IRON BINDING TEST: CPT

## 2025-03-14 PROCEDURE — 83970 ASSAY OF PARATHORMONE: CPT

## 2025-03-14 PROCEDURE — 84100 ASSAY OF PHOSPHORUS: CPT

## 2025-03-14 PROCEDURE — 84156 ASSAY OF PROTEIN URINE: CPT

## 2025-03-14 PROCEDURE — 80053 COMPREHEN METABOLIC PANEL: CPT

## 2025-03-14 PROCEDURE — 81001 URINALYSIS AUTO W/SCOPE: CPT

## 2025-03-14 PROCEDURE — 85025 COMPLETE CBC W/AUTO DIFF WBC: CPT

## 2025-03-14 PROCEDURE — 82043 UR ALBUMIN QUANTITATIVE: CPT

## 2025-03-14 PROCEDURE — 84443 ASSAY THYROID STIM HORMONE: CPT

## 2025-03-14 PROCEDURE — 83540 ASSAY OF IRON: CPT

## 2025-03-14 PROCEDURE — 82607 VITAMIN B-12: CPT

## 2025-03-14 PROCEDURE — 36415 COLL VENOUS BLD VENIPUNCTURE: CPT

## 2025-03-14 PROCEDURE — 80061 LIPID PANEL: CPT

## 2025-03-14 PROCEDURE — 82728 ASSAY OF FERRITIN: CPT

## 2025-03-14 RX ORDER — NYSTATIN 100000 [USP'U]/G
POWDER TOPICAL
Qty: 60 G | Refills: 1 | Status: SHIPPED | OUTPATIENT
Start: 2025-03-14

## 2025-03-15 DIAGNOSIS — Z79.4 TYPE 2 DIABETES MELLITUS WITH DIABETIC NEUROPATHY, WITH LONG-TERM CURRENT USE OF INSULIN (HCC): ICD-10-CM

## 2025-03-15 DIAGNOSIS — E11.40 TYPE 2 DIABETES MELLITUS WITH DIABETIC NEUROPATHY, WITH LONG-TERM CURRENT USE OF INSULIN (HCC): ICD-10-CM

## 2025-03-16 ENCOUNTER — RESULTS FOLLOW-UP (OUTPATIENT)
Dept: FAMILY MEDICINE CLINIC | Facility: CLINIC | Age: 80
End: 2025-03-16

## 2025-03-16 RX ORDER — INSULIN ASPART 100 [IU]/ML
INJECTION, SOLUTION INTRAVENOUS; SUBCUTANEOUS
Qty: 15 ML | Refills: 5 | Status: SHIPPED | OUTPATIENT
Start: 2025-03-16 | End: 2025-03-19

## 2025-03-18 PROBLEM — E11.69 HYPERLIPIDEMIA ASSOCIATED WITH TYPE 2 DIABETES MELLITUS  (HCC): Status: ACTIVE | Noted: 2025-01-10

## 2025-03-18 PROBLEM — R29.90 STROKE-LIKE SYMPTOMS: Status: RESOLVED | Noted: 2024-07-02 | Resolved: 2025-03-18

## 2025-03-18 PROBLEM — R07.9 CHEST PAIN: Status: RESOLVED | Noted: 2017-03-06 | Resolved: 2025-03-18

## 2025-03-18 PROBLEM — S72.91XA FEMUR FRACTURE, RIGHT (HCC): Status: RESOLVED | Noted: 2024-09-05 | Resolved: 2025-03-18

## 2025-03-18 PROBLEM — R94.6 ABNORMAL THYROID FUNCTION TEST: Status: RESOLVED | Noted: 2024-07-04 | Resolved: 2025-03-18

## 2025-03-18 PROBLEM — H50.9 STRABISMUS: Status: RESOLVED | Noted: 2024-11-27 | Resolved: 2025-03-18

## 2025-03-19 ENCOUNTER — OFFICE VISIT (OUTPATIENT)
Dept: FAMILY MEDICINE CLINIC | Facility: CLINIC | Age: 80
End: 2025-03-19
Payer: COMMERCIAL

## 2025-03-19 VITALS
DIASTOLIC BLOOD PRESSURE: 74 MMHG | TEMPERATURE: 98.2 F | HEIGHT: 55 IN | HEART RATE: 61 BPM | BODY MASS INDEX: 34.48 KG/M2 | WEIGHT: 149 LBS | OXYGEN SATURATION: 94 % | SYSTOLIC BLOOD PRESSURE: 132 MMHG

## 2025-03-19 DIAGNOSIS — Z79.4 TYPE 2 DIABETES MELLITUS WITH DIABETIC NEUROPATHY, WITH LONG-TERM CURRENT USE OF INSULIN (HCC): Primary | ICD-10-CM

## 2025-03-19 DIAGNOSIS — E11.40 TYPE 2 DIABETES MELLITUS WITH DIABETIC NEUROPATHY, WITH LONG-TERM CURRENT USE OF INSULIN (HCC): Primary | ICD-10-CM

## 2025-03-19 DIAGNOSIS — N18.32 STAGE 3B CHRONIC KIDNEY DISEASE (HCC): ICD-10-CM

## 2025-03-19 DIAGNOSIS — I50.32 CHRONIC HEART FAILURE WITH PRESERVED EJECTION FRACTION (HCC): ICD-10-CM

## 2025-03-19 DIAGNOSIS — E11.59 HYPERTENSION ASSOCIATED WITH DIABETES  (HCC): ICD-10-CM

## 2025-03-19 DIAGNOSIS — I25.10 NONOBSTRUCTIVE ATHEROSCLEROSIS OF CORONARY ARTERY: ICD-10-CM

## 2025-03-19 DIAGNOSIS — J41.0 SIMPLE CHRONIC BRONCHITIS (HCC): ICD-10-CM

## 2025-03-19 DIAGNOSIS — R56.9 SEIZURE (HCC): ICD-10-CM

## 2025-03-19 DIAGNOSIS — R41.89 SEVERE COGNITIVE IMPAIRMENT: ICD-10-CM

## 2025-03-19 DIAGNOSIS — I15.2 HYPERTENSION ASSOCIATED WITH DIABETES  (HCC): ICD-10-CM

## 2025-03-19 DIAGNOSIS — Z00.00 MEDICARE ANNUAL WELLNESS VISIT, SUBSEQUENT: ICD-10-CM

## 2025-03-19 DIAGNOSIS — E11.69 HYPERLIPIDEMIA ASSOCIATED WITH TYPE 2 DIABETES MELLITUS  (HCC): ICD-10-CM

## 2025-03-19 DIAGNOSIS — E78.5 HYPERLIPIDEMIA ASSOCIATED WITH TYPE 2 DIABETES MELLITUS  (HCC): ICD-10-CM

## 2025-03-19 PROBLEM — E66.01 OBESITY, MORBID (HCC): Status: RESOLVED | Noted: 2024-04-03 | Resolved: 2025-03-19

## 2025-03-19 PROCEDURE — G2211 COMPLEX E/M VISIT ADD ON: HCPCS | Performed by: FAMILY MEDICINE

## 2025-03-19 PROCEDURE — 99214 OFFICE O/P EST MOD 30 MIN: CPT | Performed by: FAMILY MEDICINE

## 2025-03-19 PROCEDURE — G0439 PPPS, SUBSEQ VISIT: HCPCS | Performed by: FAMILY MEDICINE

## 2025-03-19 RX ORDER — INSULIN ASPART 100 [IU]/ML
INJECTION, SOLUTION INTRAVENOUS; SUBCUTANEOUS
Qty: 15 ML | Refills: 5 | Status: SHIPPED | OUTPATIENT
Start: 2025-03-19

## 2025-03-19 NOTE — PROGRESS NOTES
Name: Shantelle Romano      : 1945      MRN: [id number]  Encounter Provider: Clarisa Billingsley DO  Encounter Date: 3/19/2025   Encounter department: [facility]    Assessment & Plan  Type 2 diabetes mellitus with diabetic neuropathy, with long-term current use of insulin (HCC)  A1c stable; however, pt having labile sugars. Reviewed that she should be checking her blood sugar prior to meals and taking short-acting insulin at that time (not after meals or randomly throughout the day). Provided sliding scale to follow -- will start at low dose, but can adjust if readings consistently high. Continue Lantus as is, but hold for qHS sugar <150. Encouraged to eat well balanced meals including protein, fruits/veggies, and carbs to prevent significant spikes/drops. Encouraged to trial new insulin regimen for at least 2 weeks and call if persistently above goal or still labile.   Foot exam as below   Eye exam UTD -- will link records   Lab Results   Component Value Date    HGBA1C 6.4 (H) 2025       Orders:    insulin aspart (NovoLOG FlexPen) 100 UNIT/ML injection pen; Per sliding scale, Max 100 units per day    Hypertension associated with diabetes  (HCC)  Within goal in office and at home -- continue current regimen   Lab Results   Component Value Date    HGBA1C 6.4 (H) 2025            Hyperlipidemia associated with type 2 diabetes mellitus  (HCC)  Reviewed sources of HDL/TG to adjust in diet as above; otherwise, continue statin   Lab Results   Component Value Date    HGBA1C 6.4 (H) 2025            Nonobstructive CAD  Asymptomatic, continue medical management and f/u with Cardio as scheduled       Chronic HFpEF  Weight stable. Mild edema in LE, otherwise no evidence of exacerbation on exam.          Stage 3b chronic kidney disease (HCC)  Lab Results   Component Value Date    EGFR 51 2025    EGFR 53 2025    EGFR 51 (L) 10/23/2024    CREATININE 1.03 2025     CREATININE 0.99 01/31/2025    CREATININE 1.08 10/23/2024   Stable, encouraged good hydration          Simple chronic bronchitis (HCC)  No exacerbation on exam, continue Trelegy + PRN SRINATH as well as supplemental O2 at night. F/u with Pulm as scheduled        Seizure (HCC)  Asymptomatic of AED, continue to monitor        Severe cognitive impairment  Reprinted list of Neuropsychology providers -- encouraged to schedule. F/u with Neuro as scheduled.        Medicare annual wellness visit, subsequent            Preventive health issues were discussed with patient, and age appropriate screening tests were ordered as noted in patient's After Visit Summary. Personalized health advice and appropriate referrals for health education or preventive services given if needed, as noted in patient's After Visit Summary.    History of Present Illness     HPI     Pt presents with her son and caretaker for chronic follow up, lab review, AWV     DM: A1c 6.4% (from 6.7); (+) microalbuminuria; they note her sugars are quite labile   HTN: Home 120-130s/50-60s   HLD: Lipids: Total 146, LDL 77, HDL 37,  (improved); LFTs WNL   CAD/HFpEF: Following with Cardio (last 01/2025) -- no changes  CKD: Cr 1.03/GFR 51 (stable)   Chronic Bronchitis: Following with Pulm, PFTs non-diagnostic; recommended continuing supplemental O2 while doing PT for femur fracture (currently using at night/PRN)   Seizure/Cognitive Impairment: Following with Neuro, no change off Keppra. PET showed FT dementia vs post-surgical changes. Referred to Neuropsych    B12 1455 (improved, but still high) -- previously halved dosing, so will stop and monitor off supplement   TSH elevated, T4 WNL  Iron 78 (from 58), Ferritin 294 (from 55)       Patient Care Team:  Clarisa Billingsley DO as PCP - General (Family Medicine)  MD Fredrick Hernandez MD Stephen Strohlein, MD as Endoscopist  Cm Cabral MD (Nephrology)    Review of Systems   Constitutional:  Positive  for fatigue. Negative for unexpected weight change.   HENT:  Negative for congestion, ear pain, rhinorrhea and sore throat.    Respiratory:  Negative for cough and shortness of breath.    Cardiovascular:  Negative for chest pain, palpitations and leg swelling.   Gastrointestinal:  Positive for constipation (alternating) and diarrhea (alternating). Negative for blood in stool.   Endocrine: Negative for polyuria.   Genitourinary:  Positive for frequency. Negative for dysuria and hematuria.   Musculoskeletal:  Positive for myalgias (chronic low back pain).   Psychiatric/Behavioral:  Positive for sleep disturbance.      Medical History Reviewed by provider this encounter:       Annual Wellness Visit Questionnaire   Shantelle is here for her Subsequent Wellness visit.     Health Risk Assessment:   Patient rates overall health as fair. Patient feels that their physical health rating is slightly worse. Patient is satisfied with their life. Eyesight was rated as slightly worse. Hearing was rated as same. Patient feels that their emotional and mental health rating is same. Patients states they are never, rarely angry. Patient states they are sometimes unusually tired/fatigued. Pain experienced in the last 7 days has been some. Patient's pain rating has been 1/10. Patient states that she has experienced no weight loss or gain in last 6 months.     Depression Screening:   PHQ-2 Score: 0      Fall Risk Screening:   In the past year, patient has experienced: no history of falling in past year      Urinary Incontinence Screening:   Patient has leaked urine accidently in the last six months.     Home Safety:  Patient has trouble with stairs inside or outside of their home. Patient has working smoke alarms and has working carbon monoxide detector. Home safety hazards include: none.     Nutrition:   Current diet is Regular.     Medications:   Patient is currently taking over-the-counter supplements. OTC medications include: see  medication list. Patient is able to manage medications.     Activities of Daily Living (ADLs)/Instrumental Activities of Daily Living (IADLs):   Walk and transfer into and out of bed and chair?: Yes  Dress and groom yourself?: No    Bathe or shower yourself?: No    Feed yourself? Yes  Do your laundry/housekeeping?: No  Manage your money, pay your bills and track your expenses?: No  Make your own meals?: No    Do your own shopping?: No    Durable Medical Equipment Suppliers  N/A    Previous Hospitalizations:   Any hospitalizations or ED visits within the last 12 months?: No      Advance Care Planning:   Living will: No    ACP document given: Yes      Cognitive Screening:   Provider or family/friend/caregiver concerned regarding cognition?: No    PREVENTIVE SCREENINGS      Cardiovascular Screening:    General: Screening Not Indicated and History Lipid Disorder      Diabetes Screening:     General: Screening Not Indicated and History Diabetes      Colorectal Cancer Screening:     General: Screening Current      Breast Cancer Screening:     General: Screening Not Indicated      Cervical Cancer Screening:    General: Screening Not Indicated      Osteoporosis Screening:    General: Screening Current      Abdominal Aortic Aneurysm (AAA) Screening:        General: Screening Not Indicated      Lung Cancer Screening:     General: Screening Not Indicated      Hepatitis C Screening:    General: Screening Not Indicated    Screening, Brief Intervention, and Referral to Treatment (SBIRT)     Screening  Typical number of drinks in a day: 0  Typical number of drinks in a week: 0  Interpretation: Low risk drinking behavior.    Single Item Drug Screening:  How often have you used an illegal drug (including marijuana) or a prescription medication for non-medical reasons in the past year? never    Single Item Drug Screen Score: 0  Interpretation: Negative screen for possible drug use disorder    Social Drivers of Health     Financial  "Resource Strain: Low Risk  (1/23/2024)    Overall Financial Resource Strain (CARDIA)     Difficulty of Paying Living Expenses: Not hard at all   Food Insecurity: No Food Insecurity (3/19/2025)    Hunger Vital Sign     Worried About Running Out of Food in the Last Year: Never true     Ran Out of Food in the Last Year: Never true   Transportation Needs: No Transportation Needs (3/19/2025)    PRAPARE - Transportation     Lack of Transportation (Medical): No     Lack of Transportation (Non-Medical): No   Housing Stability: Low Risk  (3/19/2025)    Housing Stability Vital Sign     Unable to Pay for Housing in the Last Year: No     Number of Times Moved in the Last Year: 0     Homeless in the Last Year: No   Utilities: Not At Risk (3/19/2025)    [facility] Utilities     Threatened with loss of utilities: No     No results found.    Objective   /74 (BP Location: Left arm, Patient Position: Sitting, Cuff Size: Large)   Pulse 61   Temp 98.2 °F (36.8 °C)   Ht 4' 7\" (1.397 m)   Wt 67.6 kg (149 lb)   SpO2 94%   BMI 34.63 kg/m²     Physical Exam  Vitals and nursing note reviewed.   Constitutional:       General: She is not in acute distress.     Appearance: She is well-developed.   HENT:      Head: Normocephalic and atraumatic.      Right Ear: Ear canal and external ear normal. There is impacted cerumen.      Left Ear: Ear canal and external ear normal. There is impacted cerumen.      Nose: Nose normal. No rhinorrhea.      Mouth/Throat:      Mouth: Mucous membranes are moist.      Pharynx: No oropharyngeal exudate or posterior oropharyngeal erythema.   Eyes:      Conjunctiva/sclera: Conjunctivae normal.      Comments: Chronic R eye changes   Cardiovascular:      Rate and Rhythm: Normal rate and regular rhythm.      Pulses: Pulses are weak.           Dorsalis pedis pulses are 1+ on the right side and 1+ on the left side.      Comments: Mild non-pitting edema (R>L)  Pulmonary:      Effort: Pulmonary effort is normal. No " respiratory distress.      Breath sounds: Normal breath sounds.   Abdominal:      General: Bowel sounds are normal. There is no distension.      Palpations: Abdomen is soft.      Tenderness: There is no abdominal tenderness.   Musculoskeletal:      Comments: Ambulating with walker   Feet:      Right foot:      Skin integrity: No callus or dry skin.      Left foot:      Skin integrity: No callus or dry skin.   Lymphadenopathy:      Cervical: No cervical adenopathy.   Skin:     General: Skin is warm and dry.   Neurological:      Mental Status: She is alert.      Comments: Grossly intact   Psychiatric:         Mood and Affect: Mood normal.       Patient's shoes and socks removed.    Right Foot/Ankle   Right Foot Inspection  Skin Exam: No dry skin, no callus and no callus.     Toe Exam: ROM and strength within normal limits and swelling.  no right toe deformity    Sensory   Vibration: intact  Monofilament testing: diminished    Vascular  Capillary refills: < 3 seconds  The right DP pulse is 1+.     Left Foot/Ankle  Left Foot Inspection  Skin Exam: No dry skin and no callus.     Toe Exam: ROM and strength within normal limits and swelling. No left toe deformity.     Sensory   Vibration: intact  Monofilament testing: diminished    Vascular  Capillary refills: < 3 seconds  The left DP pulse is 1+.     Assign Risk Category  No deformity present  Loss of protective sensation  Weak pulses  Risk: 2    Pneumo UTD, defers Shingles/TDap

## 2025-03-19 NOTE — ASSESSMENT & PLAN NOTE
No exacerbation on exam, continue Trelegy + PRN SRINATH as well as supplemental O2 at night. F/u with Pulm as scheduled

## 2025-03-19 NOTE — ASSESSMENT & PLAN NOTE
Reprinted list of Neuropsychology providers -- encouraged to schedule. F/u with Neuro as scheduled.

## 2025-03-19 NOTE — ASSESSMENT & PLAN NOTE
Within goal in office and at home -- continue current regimen   Lab Results   Component Value Date    HGBA1C 6.4 (H) 03/14/2025

## 2025-03-19 NOTE — ASSESSMENT & PLAN NOTE
Lab Results   Component Value Date    EGFR 51 03/14/2025    EGFR 53 01/31/2025    EGFR 51 (L) 10/23/2024    CREATININE 1.03 03/14/2025    CREATININE 0.99 01/31/2025    CREATININE 1.08 10/23/2024   Stable, encouraged good hydration

## 2025-03-19 NOTE — ASSESSMENT & PLAN NOTE
Reviewed sources of HDL/TG to adjust in diet as above; otherwise, continue statin   Lab Results   Component Value Date    HGBA1C 6.4 (H) 03/14/2025

## 2025-03-19 NOTE — ASSESSMENT & PLAN NOTE
A1c stable; however, pt having labile sugars. Reviewed that she should be checking her blood sugar prior to meals and taking short-acting insulin at that time (not after meals or randomly throughout the day). Provided sliding scale to follow -- will start at low dose, but can adjust if readings consistently high. Continue Lantus as is, but hold for qHS sugar <150. Encouraged to eat well balanced meals including protein, fruits/veggies, and carbs to prevent significant spikes/drops. Encouraged to trial new insulin regimen for at least 2 weeks and call if persistently above goal or still labile.   Foot exam as below   Eye exam UTD -- will link records   Lab Results   Component Value Date    HGBA1C 6.4 (H) 03/14/2025       Orders:    insulin aspart (NovoLOG FlexPen) 100 UNIT/ML injection pen; Per sliding scale, Max 100 units per day

## 2025-03-25 ENCOUNTER — TELEPHONE (OUTPATIENT)
Dept: FAMILY MEDICINE CLINIC | Facility: CLINIC | Age: 80
End: 2025-03-25

## 2025-03-25 NOTE — TELEPHONE ENCOUNTER
Pt's son advised.    He is wondering if at some point in the next day or so you can give pt a call to give some reassurance with insulin changes. Pt has been worried and voicing to her son that she is not feeling well (joint aches, fatigue) since changing regimen. He said it is nothing severe but feels that she would be at ease if she discussed with PCP.

## 2025-03-25 NOTE — TELEPHONE ENCOUNTER
Pt's son calls in to report elevated blood sugar for pt.     At 1230 she was given 4 units of insulin. Pt would not eat food until 1330 and at that time her blood sugar was 222. Pt's blood sugar currently is at 300. Her son would like to know if he should give her insulin now even though she is not ready to eat.     Other days her sugars are ranging 130-150.

## 2025-03-25 NOTE — TELEPHONE ENCOUNTER
Would recommend rechecking just prior to when she is going to eat and then they can give further insulin correction at that time.

## 2025-03-25 NOTE — TELEPHONE ENCOUNTER
Ron called asking to update note that he did not give her anything but water pill. Her Sugar is now 240. Asking still if he should give her the insulin please advise thank you.

## 2025-03-26 NOTE — TELEPHONE ENCOUNTER
Spoke with pt and family -- reviewed sliding scale, balanced meals. Will continue current plan as is

## 2025-04-03 ENCOUNTER — OFFICE VISIT (OUTPATIENT)
Dept: NEUROLOGY | Facility: CLINIC | Age: 80
End: 2025-04-03
Payer: COMMERCIAL

## 2025-04-03 VITALS
SYSTOLIC BLOOD PRESSURE: 116 MMHG | HEIGHT: 55 IN | OXYGEN SATURATION: 96 % | HEART RATE: 64 BPM | BODY MASS INDEX: 34.34 KG/M2 | WEIGHT: 148.4 LBS | DIASTOLIC BLOOD PRESSURE: 60 MMHG | TEMPERATURE: 97.2 F | RESPIRATION RATE: 18 BRPM

## 2025-04-03 DIAGNOSIS — E11.59 HYPERTENSION ASSOCIATED WITH DIABETES  (HCC): ICD-10-CM

## 2025-04-03 DIAGNOSIS — R41.89 SEVERE COGNITIVE IMPAIRMENT: Primary | ICD-10-CM

## 2025-04-03 DIAGNOSIS — I15.2 HYPERTENSION ASSOCIATED WITH DIABETES  (HCC): ICD-10-CM

## 2025-04-03 DIAGNOSIS — Z87.898 HISTORY OF SEIZURE: ICD-10-CM

## 2025-04-03 PROCEDURE — 99215 OFFICE O/P EST HI 40 MIN: CPT | Performed by: PSYCHIATRY & NEUROLOGY

## 2025-04-03 RX ORDER — RIVASTIGMINE 9.5 MG/24H
1 PATCH, EXTENDED RELEASE TRANSDERMAL DAILY
Qty: 30 PATCH | Refills: 5 | Status: SHIPPED | OUTPATIENT
Start: 2025-04-03

## 2025-04-03 RX ORDER — RIVASTIGMINE 4.6 MG/24H
1 PATCH, EXTENDED RELEASE TRANSDERMAL DAILY
Qty: 30 PATCH | Refills: 0 | Status: SHIPPED | OUTPATIENT
Start: 2025-04-03

## 2025-04-03 NOTE — ASSESSMENT & PLAN NOTE
Lab Results   Component Value Date    HGBA1C 6.4 (H) 03/14/2025          Management as per family physician

## 2025-04-03 NOTE — ASSESSMENT & PLAN NOTE
Remote history patient is stable off the Keppra continue to monitor and continue with seizure precautions

## 2025-04-03 NOTE — PROGRESS NOTES
Neurology Ambulatory Visit  Name: Shantelle Romano       : 1945       MRN: [id number]   Encounter Provider: Nicki Bah MD   Encounter Date: 4/3/2025  [facility]: [facility]      Shantelle Romano is a 79 y.o. female.       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 Griffin on the needs notes were reviewed in detail, patient was also advised to go for cognitive therapy discussed with them different medication options she is agreeable to go on Exelon patch 4.6 mg per 24-hour for 1 month followed by 9.5 mg per 24 hours side effects discussed with them they will discuss with the PCP prior to starting the patch and if has any side effects then they will stop and let me know.  They will follow-up with family physician regarding her history of anxiety.    She was advised to be under constant supervision to keep her blood pressure, cholesterol, sugar and weight under control, to eat a healthy nutritious diet avoid red meat to eat a lot of vegetables and plans to take a multivitamin every day.  To take fall and safety precautions to go to the hospital if has any worsening symptoms and call me otherwise to see me back in 4 months or sooner if needed and follow-up with the other physicians we may need to add  Namenda in the future depending on how the patient is doing.  History of seizure       Remote history patient is stable off the Keppra continue to monitor and continue with seizure precautions  Hypertension associated with diabetes  (HCC)    Lab Results   Component Value Date    HGBA1C 6.4 (H) 03/14/2025          Management as per family physician      Subjective:    HISTORY OF PRESENT ILLNESS      79-year-old female with past medical history of a single seizure in 2015 she has not had any seizures since then she had a colloid cyst surgery done in 2010 she has been off the Keppra for a long time who was seeing my associate  Was in the knee for cognitive impairment they wanted to be seen close to home and hence they transferred their care since her last visit in January 2025 according to the family patient still continues to have some anxiety and short-term memory issues she lives with her son and daughter and her boyfriend her appetite is good weight has been stable she is able to do her ADLs she ambulates with a walker she has not had any falls no witnessed seizures, she recently had a PET scan results were reviewed with her, denies any alcohol use, sleep is good no other complaints.      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 given evidence of prior frontal surgery it is also possible this reflects postsurgical changes and asymmetric encephalomalacia no characteristics of Alzheimer's dementia       Past Medical History:    Past Medical History:   Diagnosis Date    Acute kidney injury superimposed on chronic kidney disease  (HCC) 11/26/2016    ADHD (attention deficit hyperactivity disorder) 03/17/2019    Arthritis     BL HIPS    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    Carpal tunnel syndrome      Chest pain 03/06/2017    Chronic kidney disease     Claudication ([facility]) 03/15/2019    Closed fracture of base of middle phalanx of finger 06/22/2017    Closed fracture of middle phalanx of left little finger 07/08/2017    Coagulopathy ([facility]) 05/15/2019    Colloid cyst of brain ([facility])     COPD (chronic obstructive pulmonary disease) ([facility])     COPD (chronic obstructive pulmonary disease) ([facility])     Coronary artery disease     Cough     Diabetes mellitus ([facility])     Femur fracture, right ([facility]) 09/05/2024    GERD (gastroesophageal reflux disease)     Glaucoma     Gout     History of brain disorder 10/07/2020    Hyperlipidemia     Hypertension     Irritable bowel syndrome     Melena 02/26/2019    PAD (peripheral artery disease) ([facility]) 05/15/2019    Paronychia of great toe of left foot 10/07/2020    Post-traumatic seizures ([facility]) 07/23/2015    Renal disorder     Seizures ([facility])     last 2015    Shortness of breath     Single seizure ([facility]) 05/31/2016    SOB (shortness of breath)     Stroke-like symptoms 07/02/2024    Syncope and collapse 11/11/2020    Urinary tract infection without hematuria 11/26/2016    Wheezing      Past Surgical History:   Procedure Laterality Date    BRAIN SURGERY      CARDIAC CATHETERIZATION N/A 8/28/2024    Procedure: Cardiac catheterization;  Surgeon: Matt Beltran MD;  Location: [facility];  Service: Cardiology    CATARACT EXTRACTION      CHOLECYSTECTOMY      COLECTOMY RADHA      COLONOSCOPY      DECOMPRESSION SPINE LUMBAR POSTERIOR  08/19/2019    HERNIA REPAIR      HYSTERECTOMY      INCISION TENDON SHEATH HAND      NECK SURGERY  05/24/2018    NEUROPLASTY / TRANSPOSITION MEDIAN NERVE AT CARPAL TUNNEL      ORIF FEMUR FRACTURE Right 9/7/2024    Procedure: OPEN REDUCTION W/ INTERNAL FIXATION (ORIF) FEMUR;  Surgeon: Rambo Yanez MD;  Location: [facility];  Service: Orthopedics    AR COLONOSCOPY FLX DX W/COLLJ SPEC WHEN PFRMD N/A 03/26/2019    Procedure: COLONOSCOPY;  Surgeon: Yaniv Hawley  MD;  Location: [facility];  Service: Gastroenterology    OH ESOPHAGOGASTRODUODENOSCOPY TRANSORAL DIAGNOSTIC N/A 2019    Procedure: ESOPHAGOGASTRODUODENOSCOPY (EGD);  Surgeon: Yaniv Hawley MD;  Location: [facility];  Service: Gastroenterology    REPLACEMENT TOTAL KNEE Right     RETINAL DETACHMENT SURGERY      TUBAL LIGATION         Family History:  Family History   Problem Relation Age of Onset    Asthma Mother     Heart disease Mother     Emphysema Father     Cancer Father     Prostate cancer Father     Kidney cancer Sister     Diabetes Sister     Kidney disease Sister     Brain cancer Brother     Bone cancer Brother     Heart disease Brother        Social History:  Social History     Tobacco Use    Smoking status: Former     Current packs/day: 0.00     Average packs/day: 0.5 packs/day for 40.0 years (20.0 ttl pk-yrs)     Types: Cigarettes     Start date:      Quit date:      Years since quittin.2    Smokeless tobacco: Never    Tobacco comments:     stopped many years ago   Vaping Use    Vaping status: Never Used   Substance Use Topics    Alcohol use: Never    Drug use: Never      Living situation:    Work:      Allergies:  Allergies   Allergen Reactions    Brimonidine Other (See Comments)    Salicylic Acid-Sulfur Other (See Comments)    Sulfa Antibiotics Rash and Other (See Comments)       Medications:    Current Outpatient Medications:     acetaminophen (TYLENOL) 650 mg CR tablet, Take 500 mg by mouth 2 (two) times a day, Disp: , Rfl:     albuterol (2.5 mg/3 mL) 0.083 % nebulizer solution, Take 3 mL (2.5 mg total) by nebulization every 6 (six) hours as needed for wheezing or shortness of breath, Disp: 360 mL, Rfl: 1    albuterol (ProAir HFA) 90 mcg/act inhaler, Inhale 2 puffs every 6 (six) hours as needed for wheezing or shortness of breath (cough, chest tightness), Disp: 18 g, Rfl: 0    ammonium lactate (LAC-HYDRIN) 12 % lotion, Apply topically 2 (two) times a day as needed for dry skin,  Disp: 225 g, Rfl: 1    atorvastatin (LIPITOR) 20 mg tablet, take 1 tablet by mouth at bedtime, Disp: 90 tablet, Rfl: 1    Calcium Carbonate-Vitamin D (CALCIUM-D PO), Take 1 tablet by mouth in the morning, Disp: , Rfl:     clotrimazole (LOTRIMIN) 1 % cream, , Disp: , Rfl:     Continuous Blood Gluc  (Dexcom G7 ) YOLANDA, Use 1 Application if needed (check sugars), Disp: 1 each, Rfl: 0    Continuous Blood Gluc Sensor (Dexcom G7 Sensor), Use 1 Device every 10 days, Disp: 9 each, Rfl: 3    fluticasone (FLONASE) 50 mcg/act nasal spray, 1 spray into each nostril daily pt uses as needed, Disp: 54 g, Rfl: 1    fluticasone-umeclidinium-vilanterol (Trelegy Ellipta) 100-62.5-25 mcg/actuation inhaler, inhale 1 puff by mouth daily Rinse mouth after use, Disp: 180 blister, Rfl: 3    furosemide (LASIX) 20 mg tablet, take 1 tablet by mouth twice a day, Disp: 180 tablet, Rfl: 1    gabapentin (NEURONTIN) 100 mg capsule, Take 2 capsules (200 mg total) by mouth daily at bedtime, Disp: 180 capsule, Rfl: 1    guaiFENesin (MUCINEX) 600 mg 12 hr tablet, take 1 tablet by mouth twice a day if needed for cough or congestion, Disp: 180 tablet, Rfl: 1    insulin aspart (NovoLOG FlexPen) 100 UNIT/ML injection pen, Per sliding scale, Max 100 units per day, Disp: 15 mL, Rfl: 5    Insulin Glargine Solostar (Lantus SoloStar) 100 UNIT/ML SOPN, Inject 0.3 mL (30 Units total) under the skin daily, Disp: 3 mL, Rfl: 0    Insulin Pen Needle (B-D ULTRAFINE III SHORT PEN) 31G X 8 MM MISC, Use 4 (four) times a day (with meals and at bedtime), Disp: 400 each, Rfl: 1    lidocaine (Lidoderm) 5 %, Apply 1 patch topically over 12 hours daily as needed (pain) Remove & Discard patch within 12 hours or as directed by MD, Disp: 30 patch, Rfl: 1    Lumigan 0.01 % ophthalmic drops, INSTILL 1 DROP into both eyes at bedtime, Disp: , Rfl:     montelukast (SINGULAIR) 10 mg tablet, Take 1 tablet (10 mg total) by mouth every evening, Disp: 90 tablet, Rfl: 1     "Multiple Vitamin (MULTI VITAMIN DAILY PO), Take 1 tablet by mouth daily, Disp: , Rfl:     mupirocin (BACTROBAN) 2 % ointment, APPLY A SMALL AMOUNT TO THE AFFECTED FOOT/TOE AREA BY TOPICAL ROUTE 3 TIMES PER DAY, Disp: , Rfl:     nystatin (MYCOSTATIN) cream, Apply topically 2 (two) times a day, Disp: 30 g, Rfl: 1    nystatin powder, apply topically to affected area three times a day if needed for rash, Disp: 60 g, Rfl: 1    omeprazole (PriLOSEC) 20 mg delayed release capsule, Take 1 capsule (20 mg total) by mouth 2 (two) times a day, Disp: 180 capsule, Rfl: 1    rivastigmine (EXELON) 4.6 mg/24 hr TD 24 hr patch, Place 1 patch on the skin over 24 hours daily, Disp: 30 patch, Rfl: 0    rivastigmine (Exelon) 9.5 mg/24 hr TD 24 hr patch, Place 1 patch on the skin over 24 hours daily, Disp: 30 patch, Rfl: 5    Spacer/Aero-Holding Chambers (AeroChamber Plus Elian-Vu Large) MISC, Use as directed, Disp: , Rfl:     methocarbamol (ROBAXIN) 500 mg tablet, Take 500 mg by mouth daily at bedtime as needed for muscle spasms (Patient not taking: Reported on 4/3/2025), Disp: , Rfl:     saccharomyces boulardii (FLORASTOR) 250 mg capsule, Take 250 mg by mouth 2 (two) times a day (Patient not taking: Reported on 4/3/2025), Disp: , Rfl:     Objective:  /60 (BP Location: Right arm, Patient Position: Sitting, Cuff Size: Large)   Pulse 64   Temp (!) 97.2 °F (36.2 °C) (Temporal)   Resp 18   Ht 4' 7\" (1.397 m)   Wt 67.3 kg (148 lb 6.4 oz)   SpO2 96%   BMI 34.49 kg/m²      Labs  I have reviewed pertinent labs:  CBC:   Lab Results   Component Value Date    WBC 8.54 03/14/2025    RBC 5.13 (H) 03/14/2025    HGB 14.1 03/14/2025    HCT 46.0 03/14/2025    MCV 90 03/14/2025     03/14/2025    MCH 27.5 03/14/2025    MCHC 30.7 (L) 03/14/2025    RDW 15.9 (H) 03/14/2025    MPV 10.3 03/14/2025    NEUTROABS 6.15 03/14/2025     CMP:   Lab Results   Component Value Date    SODIUM 141 03/14/2025    K 4.5 03/14/2025     03/14/2025    CO2 " 31 03/14/2025    AGAP 8 03/14/2025    BUN 26 (H) 03/14/2025    CREATININE 1.03 03/14/2025    GLUC 187 (H) 10/23/2024    GLUF 104 (H) 03/14/2025    CALCIUM 9.9 03/14/2025    AST 21 03/14/2025    ALT 16 03/14/2025    ALKPHOS 66 03/14/2025    TP 7.6 03/14/2025    ALB 4.1 03/14/2025    TBILI 0.36 03/14/2025    EGFR 51 03/14/2025     Thyroid Studies:   Lab Results   Component Value Date    FEI0DMXJXAVT 5.841 (H) 03/14/2025    FREET4 0.68 03/14/2025     Vitamin D Level   Lab Results   Component Value Date    LZPE04IXDHMW 47.8 03/14/2025     Vitamin B12   Lab Results   Component Value Date    BGMXUPFX05 1,455 (H) 03/14/2025     Folate   Lab Results   Component Value Date    FOLATE >22.3 01/31/2025              General Exam  GENERAL APPEARANCE:  No distress, alert, interactive and cooperative.  CARDIOVASCULAR: Warm and well perfused  LUNGS: normal work of breathing on room air  EXTREMITIES: no peripheral edema     Neurologic Exam  MENTAL STATE:  Orientation was normal to time, place and person without aphasia or apraxia.  MoCA is 14/30    CRANIAL NERVES:  CN 2       visual fields full to confrontation.  CN 3, 4, 6  Pupils round, 4 mm in diameter, equally reactive to light. Lids symmetric; no ptosis. EOMs normal alignment, full range. No nystagmus.  CN 5  Facial sensation intact bilaterally.  CN 7  Normal and symmetric facial strength.   CN 8  Hearing intact to finger rub bilaterally.  CN 9  Palate elevates symmetrically.  CN 11  Normal strength of shoulder shrug and neck turning.  CN 12  Tongue midline, with normal bulk and strength.     MOTOR:  Motor exam was normal. Muscle bulk and tone were normal in both upper and lower extremities. Muscle strength was 5/5 in distal and proximal muscles in both upper and lower extremities. No fasciculations, tremor or other abnormal movements were present.     REFLEXES:  RIGHT UE   LEFT UE  BR:2              BR:2    Biceps:2      Biceps:2    Triceps:2     Triceps:2       RIGHT LLE    LEFT LLE    Knee:2           Knee:2    Ankle:2         Ankle:2    Babinski: toes downgoing to plantar stimulation. No clonus.     SENSORY:  Intact to temperature and vibratory sensation in the upper and lower extremities bilaterally. Cortical function is intact.    COORDINATION:   Coordination exam was normal. In both upper extremities, finger-nose-finger was intact without dysmetria or overshoot.      GAIT:  Ambulates with a walker      ROS:  Review of Systems   Constitutional: Negative.  Negative for appetite change, fatigue and fever.   HENT: Negative.  Negative for hearing loss, tinnitus, trouble swallowing and voice change.    Eyes: Negative.  Negative for photophobia, pain and visual disturbance.   Respiratory: Negative.  Negative for shortness of breath.    Cardiovascular: Negative.  Negative for palpitations.   Gastrointestinal: Negative.  Negative for nausea and vomiting.   Endocrine: Negative.  Negative for cold intolerance.   Genitourinary: Negative.  Negative for dysuria, frequency and urgency.   Musculoskeletal: Negative.  Negative for back pain, gait problem, myalgias, neck pain and neck stiffness.   Skin: Negative.  Negative for rash.   Allergic/Immunologic: Negative.    Neurological: Negative.  Negative for dizziness, tremors, seizures, syncope, facial asymmetry, speech difficulty, weakness, light-headedness, numbness and headaches.   Hematological: Negative.  Does not bruise/bleed easily.   Psychiatric/Behavioral: Negative.  Negative for confusion, hallucinations and sleep disturbance.        I have reviewed the past medical history, surgical history, social and family history, current medications, allergies vitals, review of systems, and updated this information as appropriate today.    Administrative Statements   The total amount of time spent with the patient and on chart review and documentation was  45 minutes. Issues addressed during this clinic visit included overall management, medication  counseling or monitoring, and counseling and coordination of care.         Nicki Bah MD

## 2025-04-03 NOTE — ASSESSMENT & PLAN NOTE
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 Griffin on the needs notes were reviewed in detail, patient was also advised to go for cognitive therapy discussed with them different medication options she is agreeable to go on Exelon patch 4.6 mg per 24-hour for 1 month followed by 9.5 mg per 24 hours side effects discussed with them they will discuss with the PCP prior to starting the patch and if has any side effects then they will stop and let me know.  They will follow-up with family physician regarding her history of anxiety.    She was advised to be under constant supervision to keep her blood pressure, cholesterol, sugar and weight under control, to eat a healthy nutritious diet avoid red meat to eat a lot of vegetables and plans to take a multivitamin every day.  To take fall and safety precautions to go to the hospital if has any worsening symptoms and call me otherwise to see me back in 4 months or sooner if needed and follow-up with the other physicians we may need to add Namenda in the future depending on how the patient is doing.

## 2025-04-04 ENCOUNTER — TELEPHONE (OUTPATIENT)
Age: 80
End: 2025-04-04

## 2025-04-04 ENCOUNTER — TELEPHONE (OUTPATIENT)
Dept: FAMILY MEDICINE CLINIC | Facility: CLINIC | Age: 80
End: 2025-04-04

## 2025-04-04 NOTE — TELEPHONE ENCOUNTER
Patient family called saw neuro prescribed Rivastigmine patch - wants to confirm pt can take this medication with her other prescribed meds.

## 2025-04-04 NOTE — TELEPHONE ENCOUNTER
It is likely fine from her other medications standpoint, but one of the potential side effects is low heart rate, which pt already has, so if she starts it, I would recommend they check her heart rate daily to monitor. If it is consistently <60, it may not be the best option for her

## 2025-04-04 NOTE — TELEPHONE ENCOUNTER
Spoke to patient son and let he know we aren't doing the testing and that I could send outside resources. Patient stated they already have a list and he will call them and see if they can get her in in.

## 2025-04-04 NOTE — TELEPHONE ENCOUNTER
Patient's son, Solomon, called to follow up on providers message.  Read providers message verbatim.  Solomon will monitor patients pulse daily  and if consistently below 60 he will contact Neuro.    Please advise, thank you.

## 2025-04-04 NOTE — TELEPHONE ENCOUNTER
Patient's son called to see if an over the toilet commode could be ordered for the patient through [employer].  Son would like it delivered.     Please advise, thank you.

## 2025-04-08 ENCOUNTER — OFFICE VISIT (OUTPATIENT)
Dept: NEPHROLOGY | Facility: CLINIC | Age: 80
End: 2025-04-08
Payer: COMMERCIAL

## 2025-04-08 VITALS
DIASTOLIC BLOOD PRESSURE: 70 MMHG | SYSTOLIC BLOOD PRESSURE: 110 MMHG | HEART RATE: 65 BPM | RESPIRATION RATE: 16 BRPM | TEMPERATURE: 97.4 F | BODY MASS INDEX: 34.02 KG/M2 | WEIGHT: 147 LBS | OXYGEN SATURATION: 99 % | HEIGHT: 55 IN

## 2025-04-08 DIAGNOSIS — E11.40 TYPE 2 DIABETES MELLITUS WITH DIABETIC NEUROPATHY, WITH LONG-TERM CURRENT USE OF INSULIN (HCC): ICD-10-CM

## 2025-04-08 DIAGNOSIS — N18.32 STAGE 3B CHRONIC KIDNEY DISEASE (HCC): Primary | ICD-10-CM

## 2025-04-08 DIAGNOSIS — I15.2 HYPERTENSION ASSOCIATED WITH DIABETES  (HCC): ICD-10-CM

## 2025-04-08 DIAGNOSIS — E11.59 HYPERTENSION ASSOCIATED WITH DIABETES  (HCC): ICD-10-CM

## 2025-04-08 DIAGNOSIS — N18.9 CHRONIC KIDNEY DISEASE-MINERAL AND BONE DISORDER: ICD-10-CM

## 2025-04-08 DIAGNOSIS — M89.9 CHRONIC KIDNEY DISEASE-MINERAL AND BONE DISORDER: ICD-10-CM

## 2025-04-08 DIAGNOSIS — E83.9 CHRONIC KIDNEY DISEASE-MINERAL AND BONE DISORDER: ICD-10-CM

## 2025-04-08 DIAGNOSIS — Z79.4 TYPE 2 DIABETES MELLITUS WITH DIABETIC NEUROPATHY, WITH LONG-TERM CURRENT USE OF INSULIN (HCC): ICD-10-CM

## 2025-04-08 PROCEDURE — 99214 OFFICE O/P EST MOD 30 MIN: CPT | Performed by: INTERNAL MEDICINE

## 2025-04-08 NOTE — ASSESSMENT & PLAN NOTE
Lab Results   Component Value Date    EGFR 51 03/14/2025    EGFR 53 01/31/2025    EGFR 51 (L) 10/23/2024    CREATININE 1.03 03/14/2025    CREATININE 0.99 01/31/2025    CREATININE 1.08 10/23/2024   PTH and phosphorus along with vitamin D are within acceptable range and will continue to monitor

## 2025-04-08 NOTE — ASSESSMENT & PLAN NOTE
Lab Results   Component Value Date    HGBA1C 6.4 (H) 03/14/2025     Blood pressure is very well-controlled

## 2025-04-08 NOTE — ASSESSMENT & PLAN NOTE
Lab Results   Component Value Date    EGFR 51 03/14/2025    EGFR 53 01/31/2025    EGFR 51 (L) 10/23/2024    CREATININE 1.03 03/14/2025    CREATININE 0.99 01/31/2025    CREATININE 1.08 10/23/2024   Renal function overall stable with some fluctuation.  At present actually stage III A but will continue to monitor with hydration

## 2025-04-08 NOTE — PROGRESS NOTES
Name: Shantelle Romano      : 1945      MRN: [id number]  Encounter Provider: Cm Cabral MD  Encounter Date: 2025   Encounter department: [facility]  :  Assessment & Plan  Stage 3b chronic kidney disease (HCC)  Lab Results   Component Value Date    EGFR 51 2025    EGFR 53 2025    EGFR 51 (L) 10/23/2024    CREATININE 1.03 2025    CREATININE 0.99 2025    CREATININE 1.08 10/23/2024   Renal function overall stable with some fluctuation.  At present actually stage III A but will continue to monitor with hydration         Chronic kidney disease-mineral and bone disorder  Lab Results   Component Value Date    EGFR 51 2025    EGFR 53 2025    EGFR 51 (L) 10/23/2024    CREATININE 1.03 2025    CREATININE 0.99 2025    CREATININE 1.08 10/23/2024   PTH and phosphorus along with vitamin D are within acceptable range and will continue to monitor         Type 2 diabetes mellitus with diabetic neuropathy, with long-term current use of insulin (HCC)    Lab Results   Component Value Date    HGBA1C 6.4 (H) 2025   Very  well-controlled         Hypertension associated with diabetes  ([facility])    Lab Results   Component Value Date    HGBA1C 6.4 (H) 2025     Blood pressure is very well-controlled         Everything discussed the patient at length.  I will see her back in 1 year again.  Will get blood and urine test before that visit  History of Present Illness   HPI  Shantelle Romano is a 79 y.o. female who presents CKD follow-up    Patient overall doing well    Does have some memory problem and being monitored by neurologist and will be getting further testing    No chest pain no palpitation    Denies any urinary complaint      Review of Systems   Constitutional:  Negative for fatigue.   HENT:  Negative for congestion.    Eyes:  Negative for photophobia and pain.   Respiratory:  Negative for chest tightness and shortness of breath.   "  Cardiovascular:  Negative for chest pain and palpitations.   Gastrointestinal:  Negative for abdominal distention, abdominal pain and blood in stool.   Endocrine: Negative for polydipsia.   Genitourinary:  Negative for difficulty urinating, dysuria, flank pain, hematuria and urgency.   Musculoskeletal:  Negative for arthralgias and back pain.   Skin:  Negative for rash.   Neurological:  Negative for dizziness, light-headedness and headaches.   Hematological:  Does not bruise/bleed easily.   Psychiatric/Behavioral:  Negative for behavioral problems. The patient is not nervous/anxious.           Objective   Pulse 65   Temp (!) 97.4 °F (36.3 °C) (Temporal)   Resp 16   Ht 4' 7\" (1.397 m)   Wt 66.7 kg (147 lb)   SpO2 99%   BMI 34.17 kg/m²      Physical Exam  Constitutional:       General: She is not in acute distress.     Appearance: She is well-developed. She is obese.   HENT:      Head: Normocephalic.      Mouth/Throat:      Mouth: Mucous membranes are moist.   Eyes:      General: No scleral icterus.     Conjunctiva/sclera: Conjunctivae normal.   Neck:      Vascular: No JVD.   Cardiovascular:      Rate and Rhythm: Normal rate.      Heart sounds: Normal heart sounds.   Pulmonary:      Effort: Pulmonary effort is normal.      Breath sounds: No wheezing.   Abdominal:      Palpations: Abdomen is soft.      Tenderness: There is no abdominal tenderness.   Musculoskeletal:         General: Normal range of motion.      Cervical back: Neck supple.   Skin:     General: Skin is warm.      Findings: No rash.   Neurological:      Mental Status: She is alert and oriented to person, place, and time.   Psychiatric:         Behavior: Behavior normal.         "

## 2025-04-13 DIAGNOSIS — K21.9 GASTROESOPHAGEAL REFLUX DISEASE WITHOUT ESOPHAGITIS: ICD-10-CM

## 2025-04-14 ENCOUNTER — TELEPHONE (OUTPATIENT)
Age: 80
End: 2025-04-14

## 2025-04-14 ENCOUNTER — PATIENT MESSAGE (OUTPATIENT)
Age: 80
End: 2025-04-14

## 2025-04-14 RX ORDER — OMEPRAZOLE 20 MG/1
20 CAPSULE, DELAYED RELEASE ORAL 2 TIMES DAILY
Qty: 180 CAPSULE | Refills: 1 | Status: SHIPPED | OUTPATIENT
Start: 2025-04-14

## 2025-04-14 NOTE — TELEPHONE ENCOUNTER
Received call from Patient's Daughter on behalf of Patient.     Patient's Daughter verbalized that the cyst on Patient's chest seems to be inflamed/infected, hurts.     Warm Transferred to Derm Clinical Pod

## 2025-04-14 NOTE — TELEPHONE ENCOUNTER
Spoke with daughter. Patient reports inflamed cyst for the past week it's red all around with white enlarged papule.     Daughter was able to send a mychart photo to review.    Please advise if she needs oral antibiotics. She is scheduled for excision on 5/07/2025 for removal.      [facility] #[id number] - [place] [facility] - [address]

## 2025-04-15 ENCOUNTER — TELEPHONE (OUTPATIENT)
Dept: FAMILY MEDICINE CLINIC | Facility: CLINIC | Age: 80
End: 2025-04-15

## 2025-04-15 ENCOUNTER — TELEPHONE (OUTPATIENT)
Age: 80
End: 2025-04-15

## 2025-04-15 DIAGNOSIS — L72.9 INFECTED CYST OF SKIN: Primary | ICD-10-CM

## 2025-04-15 DIAGNOSIS — L08.9 INFECTED CYST OF SKIN: Primary | ICD-10-CM

## 2025-04-15 RX ORDER — DOXYCYCLINE 100 MG/1
100 CAPSULE ORAL 2 TIMES DAILY
Qty: 20 CAPSULE | Refills: 0 | Status: SHIPPED | OUTPATIENT
Start: 2025-04-15 | End: 2025-04-25

## 2025-04-15 NOTE — PATIENT COMMUNICATION
Please advise if there's anything patient can do to reduce swelling and inflammation of cyst. Her care taker is concerned and would like to know if she needs to be seen sooner.    They reported redness, swelling, and pain to the site yesterday afternoon.    She is scheduled on 5/07/2025 for excision of cyst.

## 2025-04-15 NOTE — TELEPHONE ENCOUNTER
Please see patients photo regarding cyst.   Please advise if there's anything patient can do to reduce swelling and inflammation of cyst. Her care taker is concerned and would like to know if she needs to be seen sooner.    They reported redness, swelling, and pain to the site yesterday afternoon.    She is scheduled on 5/07/2025 for excision of cyst.

## 2025-04-15 NOTE — TELEPHONE ENCOUNTER
Son in law called asking for any information about a growth on her chest that they have sent a picture to the dermatologist through her my chart as well so if PCP would like to see it it is in her chart. When it was first examined they were just going to keep an eye on it, the growth has now become larger, also painful and inflamed. The family is still waiting for a response from Derm and would like for PCP to weigh in on the situation. Please advise

## 2025-04-15 NOTE — TELEPHONE ENCOUNTER
Rec'd call from patients son stating that they were offered an appointment with Dr. Bañuelos tomorrow at 8:10 am but he was unable to accept until he arranged transportation. (No documentation.)    They have arranged transportation.    Rescheduled patients procedure from 5/7/2025 to tomorrow 4/15/2025 @ 8:10 am with Dr. Bañuelos in [place].    Son aware of office location.  Son aware to arrive 15 minutes prior.  Confirmed [facility] health coverage.

## 2025-04-15 NOTE — TELEPHONE ENCOUNTER
Spoke with son of patient and he requested I call the home number. He was unable to speak with me as he was in the gym. I called the home and spoke with son in law Lars after being given permission from patient. Offered patient an appointment for 4/16 at 8:10am. They are working on securing transportation. If patient or family member calls back please schedule them for tomorrow at 8:10am with Cosmo Bañuelos.

## 2025-04-16 ENCOUNTER — EVALUATION (OUTPATIENT)
Age: 80
End: 2025-04-16
Attending: PSYCHIATRY & NEUROLOGY
Payer: COMMERCIAL

## 2025-04-16 ENCOUNTER — PROCEDURE VISIT (OUTPATIENT)
Age: 80
End: 2025-04-16
Payer: COMMERCIAL

## 2025-04-16 VITALS
BODY MASS INDEX: 33.74 KG/M2 | DIASTOLIC BLOOD PRESSURE: 56 MMHG | TEMPERATURE: 97.8 F | HEART RATE: 73 BPM | OXYGEN SATURATION: 98 % | HEIGHT: 55 IN | WEIGHT: 145.8 LBS | SYSTOLIC BLOOD PRESSURE: 125 MMHG

## 2025-04-16 DIAGNOSIS — L72.0 EPIDERMAL INCLUSION CYST: Primary | ICD-10-CM

## 2025-04-16 DIAGNOSIS — R41.89 SEVERE COGNITIVE IMPAIRMENT: Primary | ICD-10-CM

## 2025-04-16 DIAGNOSIS — R26.89 BALANCE PROBLEM: Primary | ICD-10-CM

## 2025-04-16 PROCEDURE — 97530 THERAPEUTIC ACTIVITIES: CPT

## 2025-04-16 PROCEDURE — 99213 OFFICE O/P EST LOW 20 MIN: CPT | Performed by: STUDENT IN AN ORGANIZED HEALTH CARE EDUCATION/TRAINING PROGRAM

## 2025-04-16 PROCEDURE — 97166 OT EVAL MOD COMPLEX 45 MIN: CPT

## 2025-04-16 NOTE — PROGRESS NOTES
"[facility] Note     Patient Name: Shantelle Romano  Encounter Date: 04/16/25     Have you been cared for by a [facility] Dermatologist in the last 3 years and, if so, which description applies to you?    Yes.  I have been here within the last 3 years, and my medical history has NOT changed since that time.  I am FEMALE/of child-bearing potential.    REVIEW OF SYSTEMS:  Have you recently had or currently have any of the following? No changes in my recent health.   PAST MEDICAL HISTORY:  Have you personally ever had or currently have any of the following?  If \"YES,\" then please provide more detail. No changes in my medical history.   HISTORY OF IMMUNOSUPPRESSION: Do you have a history of any of the following:  Systemic Immunosuppression such as Diabetes, Biologic or Immunotherapy, Chemotherapy, Organ Transplantation, Bone Marrow Transplantation or Prednisone?  No     Answering \"YES\" requires the addition of the dotphrase \"IMMUNOSUPPRESSED\" as the first diagnosis of the patient's visit.   FAMILY HISTORY:  Any \"first degree relatives\" (parent, brother, sister, or child) with the following?    No changes in my family's known health.   PATIENT EXPERIENCE:    Do you want the Dermatologist to perform a COMPLETE skin exam today including a clinical examination under the \"bra and underwear\" areas?  NO  If necessary, do we have your permission to call and leave a detailed message on your Preferred Phone number that includes your specific medical information?  Yes      Allergies   Allergen Reactions   • Brimonidine Other (See Comments)   • Salicylic Acid-Sulfur Other (See Comments)   • Sulfa Antibiotics Rash and Other (See Comments)      Current Outpatient Medications:   •  acetaminophen (TYLENOL) 650 mg CR tablet, Take 500 mg by mouth 2 (two) times a day, Disp: , Rfl:   •  albuterol (2.5 mg/3 mL) 0.083 % nebulizer solution, Take 3 mL (2.5 mg total) by nebulization every 6 (six) hours as needed for wheezing or " shortness of breath, Disp: 360 mL, Rfl: 1  •  albuterol (ProAir HFA) 90 mcg/act inhaler, Inhale 2 puffs every 6 (six) hours as needed for wheezing or shortness of breath (cough, chest tightness), Disp: 18 g, Rfl: 0  •  ammonium lactate (LAC-HYDRIN) 12 % lotion, Apply topically 2 (two) times a day as needed for dry skin, Disp: 225 g, Rfl: 1  •  atorvastatin (LIPITOR) 20 mg tablet, take 1 tablet by mouth at bedtime, Disp: 90 tablet, Rfl: 1  •  Calcium Carbonate-Vitamin D (CALCIUM-D PO), Take 1 tablet by mouth in the morning, Disp: , Rfl:   •  clotrimazole (LOTRIMIN) 1 % cream, , Disp: , Rfl:   •  Continuous Blood Gluc  (Dexcom G7 ) YOLANDA, Use 1 Application if needed (check sugars), Disp: 1 each, Rfl: 0  •  Continuous Blood Gluc Sensor (Dexcom G7 Sensor), Use 1 Device every 10 days, Disp: 9 each, Rfl: 3  •  fluticasone (FLONASE) 50 mcg/act nasal spray, 1 spray into each nostril daily pt uses as needed, Disp: 54 g, Rfl: 1  •  fluticasone-umeclidinium-vilanterol (Trelegy Ellipta) 100-62.5-25 mcg/actuation inhaler, inhale 1 puff by mouth daily Rinse mouth after use, Disp: 180 blister, Rfl: 3  •  furosemide (LASIX) 20 mg tablet, take 1 tablet by mouth twice a day, Disp: 180 tablet, Rfl: 1  •  gabapentin (NEURONTIN) 100 mg capsule, Take 2 capsules (200 mg total) by mouth daily at bedtime, Disp: 180 capsule, Rfl: 1  •  guaiFENesin (MUCINEX) 600 mg 12 hr tablet, take 1 tablet by mouth twice a day if needed for cough or congestion, Disp: 180 tablet, Rfl: 1  •  insulin aspart (NovoLOG FlexPen) 100 UNIT/ML injection pen, Per sliding scale, Max 100 units per day, Disp: 15 mL, Rfl: 5  •  Insulin Glargine Solostar (Lantus SoloStar) 100 UNIT/ML SOPN, Inject 0.3 mL (30 Units total) under the skin daily, Disp: 3 mL, Rfl: 0  •  Insulin Pen Needle (B-D ULTRAFINE III SHORT PEN) 31G X 8 MM MISC, Use 4 (four) times a day (with meals and at bedtime), Disp: 400 each, Rfl: 1  •  lidocaine (Lidoderm) 5 %, Apply 1 patch topically  over 12 hours daily as needed (pain) Remove & Discard patch within 12 hours or as directed by MD, Disp: 30 patch, Rfl: 1  •  Lumigan 0.01 % ophthalmic drops, INSTILL 1 DROP into both eyes at bedtime, Disp: , Rfl:   •  montelukast (SINGULAIR) 10 mg tablet, Take 1 tablet (10 mg total) by mouth every evening, Disp: 90 tablet, Rfl: 1  •  Multiple Vitamin (MULTI VITAMIN DAILY PO), Take 1 tablet by mouth daily, Disp: , Rfl:   •  mupirocin (BACTROBAN) 2 % ointment, APPLY A SMALL AMOUNT TO THE AFFECTED FOOT/TOE AREA BY TOPICAL ROUTE 3 TIMES PER DAY, Disp: , Rfl:   •  nystatin (MYCOSTATIN) cream, Apply topically 2 (two) times a day, Disp: 30 g, Rfl: 1  •  nystatin powder, apply topically to affected area three times a day if needed for rash, Disp: 60 g, Rfl: 1  •  omeprazole (PriLOSEC) 20 mg delayed release capsule, take 1 capsule by mouth twice a day, Disp: 180 capsule, Rfl: 1  •  rivastigmine (EXELON) 4.6 mg/24 hr TD 24 hr patch, Place 1 patch on the skin over 24 hours daily, Disp: 30 patch, Rfl: 0  •  saccharomyces boulardii (FLORASTOR) 250 mg capsule, Take 250 mg by mouth 2 (two) times a day, Disp: , Rfl:   •  Spacer/Aero-Holding Chambers (AeroChamber Plus Elian-Vu Large) MISC, Use as directed, Disp: , Rfl:   •  doxycycline monohydrate (MONODOX) 100 mg capsule, Take 1 capsule (100 mg total) by mouth 2 (two) times a day for 10 days (Patient not taking: Reported on 4/16/2025), Disp: 20 capsule, Rfl: 0  •  methocarbamol (ROBAXIN) 500 mg tablet, Take 500 mg by mouth daily at bedtime as needed for muscle spasms (Patient not taking: Reported on 4/16/2025), Disp: , Rfl:   •  rivastigmine (Exelon) 9.5 mg/24 hr TD 24 hr patch, Place 1 patch on the skin over 24 hours daily (Patient not taking: Reported on 4/8/2025), Disp: 30 patch, Rfl: 5          Whom besides the patient is providing clinical information about today's encounter?   NO ADDITIONAL HISTORIAN (patient alone provided history)    Physical Exam and Assessment/Plan by  Diagnosis:    Chief complaint: Patient is a 78 y/o female present for an inflamed cyst on the Right Chest, became more swollen within the last two weeks.    EPIDERMAL INCLUSION CYST    Physical Exam:  Anatomic Location Affected:  Right Upper Chest  Morphological Description:  inflamed subcutaneous nodule  Pertinent Positives:  Pertinent Negatives:    Additional History of Present Condition:  present for over a year and became inflamed 2 weeks ago. Yesterday, JOSHUA Cardozo prescribed Doxycycline 100mg BID x10 days but has not started.    Assessment and Plan:  Based on a thorough discussion of this condition and the management approach to it (including a comprehensive discussion of the known risks, side effects and potential benefits of treatment), the patient (family) agrees to implement the following specific plan:  Advised to take Doxycycline 100mg BID [id number]  Excision scheduled on May 7th, 2025 at 9:40am    What are epidermal inclusion cysts?  Epidermal inclusion cysts are the most common, benign cutaneous cysts. There are many different names for epidermal inclusion cysts, including epidermoid cyst, epidermal cyst, infundibular cyst, inclusion cyst, and keratin cyst. These cysts can occur anywhere on the body and typically present as nodules directly underneath the skin. There is often a visible pore or opening in the center. The cysts are freely moveable and can range from a few millimeters to several centimeters in diameter. The center of epidermoid cysts almost always contains keratin, which has a cheesy appearance, and not sebum. They also do not originate from sebaceous glands. Therefore, epidermal inclusion cysts are not the same as sebaceous cysts.    Cysts may remain stable or progressively enlarge over time. There are no reliable predictive factors to tell if an epidermal inclusion cyst will enlarge, become inflamed, or remain quiescent. Infected cysts tend to become larger, turn red, and are more  noticeable to the patient. There may be accompanying pain and discomfort.     What causes epidermal inclusion cysts?  Epidermal inclusion cysts often appear out of the blue and are not contagious. They are due to a proliferation of epidermal cells within the dermis and are more common in men than women. They occur more frequently in patients in their 20s to 40s. Epidermal inclusion cysts by themselves are usually not inherited, but they can be hereditary in rare syndromes such as Chan syndrome, nodular elastosis with cysts and comedones (Favre-Racouchot syndrome), and basal cell nevus syndrome (Gorlin syndrome). Elderly patients with chronic sun-damaged skin areas have a higher likelihood of developing epidermoid cysts. They often occur in areas where hair follicles have been inflamed or repeatedly irritated are more frequent in patients with acne vulgaris. In the  period, they are called milia.     Patients on BRAF inhibitors such as imiquimod and cyclosporine have a higher incidence of epidermoid cysts of the face.    How do we diagnose an epidermal inclusion cyst?  Epidermoid inclusion cysts are often diagnosed by history and physical exam. There is usually no need for biopsy prior to removal.  Radiographic and laboratory exams, such as ultrasound studies, are unnecessary and not typically ordered unless the practitioner suspects a genetic condition.    What is the treatment for an epidermal inclusion cyst?  Inflamed, uninfected epidermal inclusion cysts rarely resolve spontaneously without therapy or surgical intervention. Treatment is not emergent unless desired by the patient.     Definitive treatment is via surgical excision with walls intact. This method will prevent recurrence. This is best done when the cyst is not inflamed, to decrease the probability of rupture during surgery.   A local anesthetic will be injected around the cyst  A small incision is made in the skin overlying the cyst, and  contents are expressed  The incision is repaired with sutures    Another option is to use a 4mm punch biopsy with cyst extraction through the defect.    Incision and drainage is often needed if the cyst is infected or inflamed. If there is surrounding cellulitis, oral antibiotic therapy may be necessary. The common agents used target methicillin sensitive Staphylococcal aureus and methicillin resistant S aureus in areas of high prevalence.     Scribe Attestation    I,:  Marilu Dixon MA am acting as a scribe while in the presence of the attending physician.:       I,:  Thien Bañuelos DO personally performed the services described in this documentation    as scribed in my presence.:

## 2025-04-16 NOTE — PATIENT INSTRUCTIONS
EPIDERMAL INCLUSION CYST    Physical Exam:  Anatomic Location Affected:  Right Upper Chest    Additional History of Present Condition:  present for over a year and became inflamed 2 weeks ago. Yesterday, JOSHUA Cardozo prescribed Doxycycline 100mg BID x10 days but has not started.    Assessment and Plan:  Based on a thorough discussion of this condition and the management approach to it (including a comprehensive discussion of the known risks, side effects and potential benefits of treatment), the patient (family) agrees to implement the following specific plan:  Advised to take Doxycycline 100mg BID [id number]  Excision scheduled on May 7th, 2025 at 9:40am    What are epidermal inclusion cysts?  Epidermal inclusion cysts are the most common, benign cutaneous cysts. There are many different names for epidermal inclusion cysts, including epidermoid cyst, epidermal cyst, infundibular cyst, inclusion cyst, and keratin cyst. These cysts can occur anywhere on the body and typically present as nodules directly underneath the skin. There is often a visible pore or opening in the center. The cysts are freely moveable and can range from a few millimeters to several centimeters in diameter. The center of epidermoid cysts almost always contains keratin, which has a cheesy appearance, and not sebum. They also do not originate from sebaceous glands. Therefore, epidermal inclusion cysts are not the same as sebaceous cysts.    Cysts may remain stable or progressively enlarge over time. There are no reliable predictive factors to tell if an epidermal inclusion cyst will enlarge, become inflamed, or remain quiescent. Infected cysts tend to become larger, turn red, and are more noticeable to the patient. There may be accompanying pain and discomfort.     What causes epidermal inclusion cysts?  Epidermal inclusion cysts often appear out of the blue and are not contagious. They are due to a proliferation of epidermal cells within the dermis  and are more common in men than women. They occur more frequently in patients in their 20s to 40s. Epidermal inclusion cysts by themselves are usually not inherited, but they can be hereditary in rare syndromes such as Chan syndrome, nodular elastosis with cysts and comedones (Favre-Racouchot syndrome), and basal cell nevus syndrome (Gorlin syndrome). Elderly patients with chronic sun-damaged skin areas have a higher likelihood of developing epidermoid cysts. They often occur in areas where hair follicles have been inflamed or repeatedly irritated are more frequent in patients with acne vulgaris. In the  period, they are called milia.     Patients on BRAF inhibitors such as imiquimod and cyclosporine have a higher incidence of epidermoid cysts of the face.    How do we diagnose an epidermal inclusion cyst?  Epidermoid inclusion cysts are often diagnosed by history and physical exam. There is usually no need for biopsy prior to removal.  Radiographic and laboratory exams, such as ultrasound studies, are unnecessary and not typically ordered unless the practitioner suspects a genetic condition.    What is the treatment for an epidermal inclusion cyst?  Inflamed, uninfected epidermal inclusion cysts rarely resolve spontaneously without therapy or surgical intervention. Treatment is not emergent unless desired by the patient.     Definitive treatment is via surgical excision with walls intact. This method will prevent recurrence. This is best done when the cyst is not inflamed, to decrease the probability of rupture during surgery.   A local anesthetic will be injected around the cyst  A small incision is made in the skin overlying the cyst, and contents are expressed  The incision is repaired with sutures    Another option is to use a 4mm punch biopsy with cyst extraction through the defect.    Incision and drainage is often needed if the cyst is infected or inflamed. If there is surrounding cellulitis, oral  antibiotic therapy may be necessary. The common agents used target methicillin sensitive Staphylococcal aureus and methicillin resistant S aureus in areas of high prevalence.

## 2025-04-16 NOTE — PROGRESS NOTES
"OCCUPATIONAL THERAPY INITIAL EVALUATION    POC expires Auth Status Total   Visits  Start date  Expiration date PT/OT + Visit Limit? Co-Insurance   25 BOMN  25                                                Visit/Unit Tracking  AUTH Status: [facility] Date               Visits  Authed:  Used 1 (IE)               Remaining                    PLAN OF CARE START:25  PLAN OF CARE END: 2025  PROGRESS NOTE DUE: follow up next visit   FREQUENCY: 1-2x/week for 8-12 weeks  PRECAUTIONS: anxiety, FALL/SAFETY  DIAGNOSIS: severe cognitive impairment  VISITS: 1 (IE)      Today's date: 2025  Patient name: Shantelle Romano  : 1945  MRN: [id number]  Referring provider: Nicki Bah MD  Dx:   Encounter Diagnosis     ICD-10-CM    1. Severe cognitive impairment  R41.89 Ambulatory Referral to Occupational Therapy          SKILLED ANALYSIS:  Pt is a 79 year old female presenting to OP OT s/p severe cognitive impairment, referral from neurology.  Potential dx of frontal temporal dementia-recommendation for neuro psych testing made.   Pt and son in law currently reports difficulty with medication management, performance of ADLs and IADLs (does have private assist 5x/week for care). Limited insight into deficits noted. Son in law reports patient often repeats questions or comments within minutes of each other. Pt reports her balance and standing is a barrier to her lifestyle, educated on PT services (referral made this session).  Pt is demonstrating deficits based on clinical observation and the following assessments: SLUMS and [place] Making Part A. Post assessments pt demonstrating the following: Pt demonstrates anxiety and perseveration in relation to testing throughout, often required re-direction to task at hand. Pt scored \"dementia\" level of Slums testing. Impaired sustained attention noted.  Trialed snellgorve, however pt unable to comprehend instructions to task this session. Recommend OP OT 1-2x/wk for " "8-12  weeks with focus on aforementioned deficits to maximize functional performance and improve QOL.  Findings and recommendations discussed with pt, and they are in agreement. Educated pt on charges of insurance, assessments performed and standardized norms, POC, goal creation, and OP OT services.       Subjective  Occupational Profile  *type of home: private home,  split level   *lives with: son in law, daughter, and grandson   *vocation: retired, different small jobs through the year   *driving: no  *DME: commode, shower chair, grab bars, chair lift for stairs   *Assistive device for inside home: RW  *Assistive device for outside home: RW  *ADL and IADL status: private caregiver 5x/week from about 8-2 to assist with all ADLs and IADLs    PATIENT GOAL:to get better    PAIN: none    HISTORY OF PRESENT ILLNESS:   Pt is a 79 y.o. female who was referred to Occupational Therapy s/p  Severe cognitive impairment [R41.89].  Per neuro on 4/3/25 : \"MoCA is 14/30 discussed at length with the family patient does have cognitive impairment and her recent PET CT scan results were discussed with them suggestive of possible frontal temporal dementia versus her prior history of cranial surgery for removal of colloid cyst, advised patient to go for neuropsych testing, Dr. Griffin on the needs notes were reviewed in detail, patient was also advised to go for cognitive therapy discussed with them different medication options she is agreeable to go on Exelon patch 4.6 mg per 24-hour for 1 month followed by 9.5 mg per 24 hours side effects discussed with them they will discuss with the PCP prior to starting the patch and if has any side effects then they will stop and let me know.\"  \"PET brain metabolic scan from 1/24/2025 was reported as mild patchy hypometabolism in the right frontal and temporal lobes along with mildly decreased activity at the right anterior cingulate gyrus 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 given " "evidence of prior frontal surgery it is also possible this reflects postsurgical changes and asymmetric encephalomalacia no characteristics of Alzheimer's dementia\"  History of femur fx post fall, bout of SNF and home health rehab in September of 2024.     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    Carpal tunnel syndrome     Chest pain 03/06/2017    Chronic kidney disease     Claudication (Formerly [facility]) 03/15/2019    Closed fracture of base of middle phalanx of finger 06/22/2017    Closed fracture of middle phalanx of left little finger 07/08/2017    Coagulopathy (Formerly [facility]) 05/15/2019    Colloid cyst of brain (Formerly [facility])     COPD (chronic obstructive pulmonary disease) (Formerly [facility])     COPD (chronic obstructive pulmonary disease) (Formerly [facility])     Coronary artery disease     Cough     Diabetes mellitus (Formerly [facility])     Femur fracture, right (Formerly [facility]) 09/05/2024    GERD (gastroesophageal reflux disease)     Glaucoma     Gout     History of brain disorder 10/07/2020    Hyperlipidemia     Hypertension     Irritable bowel syndrome     Melena 02/26/2019    PAD (peripheral artery disease) (Formerly [facility]) 05/15/2019    Paronychia of great toe of left foot 10/07/2020    Post-traumatic seizures (Formerly [facility]) 07/23/2015    Renal disorder     Seizures (Formerly [facility])     last 2015    Shortness of breath     Single seizure (Formerly [facility]) 05/31/2016    SOB (shortness of breath)     Stroke-like symptoms 07/02/2024    Syncope and collapse 11/11/2020    Urinary tract infection without hematuria 11/26/2016    Wheezing        Past Surgical Hx:   Past Surgical History:   Procedure Laterality Date    BRAIN SURGERY      CARDIAC CATHETERIZATION N/A 8/28/2024    Procedure: Cardiac catheterization;  Surgeon: Matt Beltran MD;  Location: [facility];  Service: Cardiology    CATARACT EXTRACTION      " CHOLECYSTECTOMY      COLECTOMY RADHA      COLONOSCOPY      DECOMPRESSION SPINE LUMBAR POSTERIOR  08/19/2019    HERNIA REPAIR      HYSTERECTOMY      INCISION TENDON SHEATH HAND      NECK SURGERY  05/24/2018    NEUROPLASTY / TRANSPOSITION MEDIAN NERVE AT CARPAL TUNNEL      ORIF FEMUR FRACTURE Right 9/7/2024    Procedure: OPEN REDUCTION W/ INTERNAL FIXATION (ORIF) FEMUR;  Surgeon: Rambo Yanez MD;  Location: [facility];  Service: Orthopedics    TN COLONOSCOPY FLX DX W/COLLJ SPEC WHEN PFRMD N/A 03/26/2019    Procedure: COLONOSCOPY;  Surgeon: Yaniv Hawley MD;  Location: [facility];  Service: Gastroenterology    TN ESOPHAGOGASTRODUODENOSCOPY TRANSORAL DIAGNOSTIC N/A 03/26/2019    Procedure: ESOPHAGOGASTRODUODENOSCOPY (EGD);  Surgeon: Yaniv Hawley MD;  Location: [facility];  Service: Gastroenterology    REPLACEMENT TOTAL KNEE Right     RETINAL DETACHMENT SURGERY      TUBAL LIGATION            Assessments    FUNCTIONAL ACTIVITY QUESTIONAIRE   The Functional Activities Questionnaire (FAQ) measures instrumental activities of daily living (IADLs). The FAQ may be used to differentiate those with mild cognitive impairment and mild Alzheimer's disease   TASK Completely unable to perform task (3)  Requires assistance (2)  Has difficulty but accomplishes task, or has never done, but the informant feels could do the task with difficulty (1)  Normal performance, or has never don the task, but the informant feels the patient could do the task if necessary    Writing checks, paying bills, balancing checkbook   X     Assembling tax records, business affairs, papers   X     Shopping alone for clothes, house hold necessities, or groceries  X      Playing a game of skill, working on a hobby    X    Heating water, making a cup of coffee, turning off the stove   X    Preparing a balance meal  X     Keeping track of current events  X     Paying attention to, understanding, discussing a TV show, book, or magazine    X   Remembering  "appointments, family occasions, holidays, medications   X    Traveling our of the neighborhood, arranging or taking buses  X      Total points: 17     Evaluation   SLUMS:  *screen individuals to look for the presence of cognitive deficits, and to identify changes in cognition over time*    SCORING AND NORMS    HIGH SCHOOL EDUCATION  27-30, NORMAL   21-26, MILD NEUROCOGNITIVE DISORDER   1-20, DEMENTIA   LESS THEN HIGH SCHOOL EDUCATION  25-30, NORMAL   20-24, MILD NEUROCOGNITIVE DISORDER  1-19, DEMENTIA     QUESTION Status on 4/16/25   What day of the week is it 1   What is the year 1   What state are we in 1   How much did you spend 1   How much do you have left 0   Naming animals in 1 minute 1   Recall of 5 objects 2   Series of numbers 1   Clock face hour markers 0   Clock face time  0   X in triangle 1   Largest figure 1   Short story, females name 0   Short story, when did she go back to work 2   Short story, what work did she do 2   Short story, what state did she live in 0   Total score 14/30 (less then high school education)        TRAIL MAKING TEST:   \"widely used test to assess executive function in patients with neuro injury. Successful performance of the TMT requires a variety of mental abilities including letter and number recognition mental flexibility, visual scanning, and motor function. Norms are based on age related scores\"     STATUS ON IE: 4/16/25 COMMENTS   TRAIL MAKING PART A     NORM: 58 seconds 1 minute and 19 seconds        SHORT TERM GOALS (6-8 weeks)    GOAL  STATUS ON IE  GOAL STATUS    Pt will increase sustained attention by completing trail making part A in 1 minute and 14 seconds  or less to complete IADLs 1 minute and 19 seconds Established      Pt will score 16/30 or greater on SLUMS to improve independence with life roles 14/30 Established      Pt will demo G carryover and understanding of temporal orientation compensatory strategies and orientation of daily schedules (ie. Use of " calendars, alarms, etc) for inc safety with life roles and IADL fxn  Established      Pt will demo G carryover of use of internal/external memory strategy aides for improved recall of daily events, improved executive functioning with 25% accuracy   Established      LONG TERM GOALS( 8-12 weeks)    GOAL  STATUS ON IE  GOAL STATUS    Pt will increase sustained attention by completing trail making part A in 1 minute and 10 seconds  or less to complete IADLs 1 minute and 19 seconds Established      Pt will score 18/30 or greater on SLUMS to improve independence with life roles 14/30 Established      Pt will follow 1-2 step written directions for improved overall comprehension and engagement in life roles and salient tasks.  Established      Pt will demo G carryover of use of internal/external memory strategy aides for improved recall of daily events, improved executive functioning with 50% accuracy   Established          PLANNED THERAPY INTERVENTIONS:  Internal and external memory aides  Hypersensitivity strategies education  Multi-modal environment  Sustained/alternating/divided attention  Temporal Awareness: Organize the Hour activities  Memory and mental manipulation  Auditory processing with immediate recall  Memory retention with immediate and delayed recall  Edu on cog/vision apps

## 2025-04-23 ENCOUNTER — OFFICE VISIT (OUTPATIENT)
Age: 80
End: 2025-04-23
Attending: PSYCHIATRY & NEUROLOGY
Payer: COMMERCIAL

## 2025-04-23 DIAGNOSIS — R41.89 SEVERE COGNITIVE IMPAIRMENT: Primary | ICD-10-CM

## 2025-04-23 PROCEDURE — 97530 THERAPEUTIC ACTIVITIES: CPT

## 2025-04-23 NOTE — PROGRESS NOTES
POC expires Auth Status Total   Visits  Start date  Expiration date PT/OT + Visit Limit? Co-Insurance   25 BOMN  25                                                Visit/Unit Tracking  AUTH Status: BOMN Date              Visits  Authed:  Used 1 (IE) 1              Remaining                    PLAN OF CARE START: 25  PLAN OF CARE END: 2025  PROGRESS NOTE DUE: follow up next visit   FREQUENCY: 1-2x/week for 8-12 weeks  PRECAUTIONS: anxiety, FALL/SAFETY  DIAGNOSIS: severe cognitive impairment  VISITS: 2      Daily Note     Today's date: 2025  Patient name: Shantelle Romano  : 1945  MRN: [id number]  Referring provider: Nicki Bah MD  Dx:   Encounter Diagnosis     ICD-10-CM    1. Severe cognitive impairment  R41.89                      Subjective: Pt presents with her son-in-law and reports no new changes since last visit.       Objective: See treatment diary below.     TA:  *Qbitz; emphasis on simple EF/ skills and attention. Patient getting frustrated throughout and unable to complete despite max vcs. Terminated after ~15 minutes after trialing chunking strategies and multiple downgrades of task.     *Finding words written down by therapist in scrambled bananagrams. Emphasis on direction following, attention. Patient needing mod vcs throughout and difficulty re-directing to task.       Assessment: Tolerated treatment fair. Focus of today's session on simple direction following and attention in multi modal environment. Noted to get easily frustrated with all tasks today, benefiting from encouragement. Patient would benefit from continued OT to address sustained attention, temporal orientation, utilization of internal/external memory strategies, direction following, and overall increased cognitive functions for daily tasks.        Plan: Continue per plan of care.

## 2025-04-27 ENCOUNTER — NURSE TRIAGE (OUTPATIENT)
Dept: OTHER | Facility: OTHER | Age: 80
End: 2025-04-27

## 2025-04-27 ENCOUNTER — HOSPITAL ENCOUNTER (EMERGENCY)
Facility: HOSPITAL | Age: 80
Discharge: HOME | End: 2025-04-27
Attending: EMERGENCY MEDICINE
Payer: COMMERCIAL

## 2025-04-27 ENCOUNTER — DOCUMENTATION (OUTPATIENT)
Dept: DERMATOLOGY | Facility: CLINIC | Age: 80
End: 2025-04-27

## 2025-04-27 VITALS
HEART RATE: 68 BPM | DIASTOLIC BLOOD PRESSURE: 77 MMHG | RESPIRATION RATE: 18 BRPM | TEMPERATURE: 98.7 F | OXYGEN SATURATION: 96 % | WEIGHT: 147.27 LBS | BODY MASS INDEX: 34.23 KG/M2 | SYSTOLIC BLOOD PRESSURE: 184 MMHG

## 2025-04-27 DIAGNOSIS — L72.9 INFECTED CYST OF SKIN: Primary | ICD-10-CM

## 2025-04-27 DIAGNOSIS — L08.9 INFECTED CYST OF SKIN: Primary | ICD-10-CM

## 2025-04-27 PROCEDURE — 99284 EMERGENCY DEPT VISIT MOD MDM: CPT | Performed by: EMERGENCY MEDICINE

## 2025-04-27 PROCEDURE — 99282 EMERGENCY DEPT VISIT SF MDM: CPT

## 2025-04-27 PROCEDURE — 10061 I&D ABSCESS COMP/MULTIPLE: CPT | Performed by: EMERGENCY MEDICINE

## 2025-04-27 RX ORDER — OXYCODONE HYDROCHLORIDE 5 MG/1
5 TABLET ORAL ONCE
Refills: 0 | Status: COMPLETED | OUTPATIENT
Start: 2025-04-27 | End: 2025-04-27

## 2025-04-27 RX ORDER — MUPIROCIN 20 MG/G
OINTMENT TOPICAL 3 TIMES DAILY
Qty: 30 G | Refills: 1 | Status: SHIPPED | OUTPATIENT
Start: 2025-04-27

## 2025-04-27 RX ORDER — LIDOCAINE HYDROCHLORIDE AND EPINEPHRINE 10; 10 MG/ML; UG/ML
20 INJECTION, SOLUTION INFILTRATION; PERINEURAL ONCE
Status: COMPLETED | OUTPATIENT
Start: 2025-04-27 | End: 2025-04-27

## 2025-04-27 RX ORDER — DOXYCYCLINE 100 MG/1
100 CAPSULE ORAL 2 TIMES DAILY
Qty: 14 CAPSULE | Refills: 0 | Status: SHIPPED | OUTPATIENT
Start: 2025-04-27 | End: 2025-05-04

## 2025-04-27 RX ADMIN — OXYCODONE HYDROCHLORIDE 5 MG: 5 TABLET ORAL at 15:23

## 2025-04-27 RX ADMIN — LIDOCAINE HYDROCHLORIDE,EPINEPHRINE BITARTRATE 20 ML: 10; .01 INJECTION, SOLUTION INFILTRATION; PERINEURAL at 15:51

## 2025-04-27 NOTE — DISCHARGE INSTRUCTIONS
Take antibiotics as sent in to pharmacy by your dermatologist. Wash wound with water in shower. Cover with dressing. Follow up with dermatology

## 2025-04-27 NOTE — QUICK NOTE
Discussed with on call attending. Significantly inflamed EIC of the chest, scheduled for excision on 5/7. Sent course of doxycycline and mupirocin ointment. Instructed to go to ED to have it further evaluated for potential drainage if patient was in significant pain or having systemic symptoms. Told schedulers to have patient scheduled into dermatology clinic at [facility] this upcoming week. They will reach out to patient to schedule.

## 2025-04-27 NOTE — TELEPHONE ENCOUNTER
"Regarding: Cyst on on chest/ pain / open wound  ----- Message from Kylie LIAO sent at 4/27/2025 11:06 AM EDT -----  Pt's son stated, \" She has a Cyst on her chest that bursted open, it looks really bad. We have a gauze and bandage around it, she also is complaining of pain.\"    "

## 2025-04-27 NOTE — TELEPHONE ENCOUNTER
"Regarding: Wound/med questions/wound opened  ----- Message from Adia PURDY sent at 4/27/2025  5:32 PM EDT -----  \"She was in the emergency room because of her wound. She was prescribed antibiotics and a cream but her wound opened. I wanted to know if we should still use the cream\"    "

## 2025-04-27 NOTE — TELEPHONE ENCOUNTER
"FOLLOW UP: Patient does not feel comfortable with current provider and is requesting if it is possible to keep 5/7 date but have a different provider.  Please follow up with patient or Lars  [phone]     REASON FOR CONVERSATION: Advice Only    SYMPTOMS: N/A    OTHER: Seeking guidance on recently prescribed medications.  Questions answered, caller verbalized understanding.    DISPOSITION: Home Care (Information or Advice Only Call)      Reason for Disposition   Health information question, no triage required and triager able to answer question    Answer Assessment - Initial Assessment Questions  1. REASON FOR CALL: \"What is the main reason for your call?\" or \"How can I best help you?\"          Ointment was prescribed for wound and caller is uncertain if it should be applied to open skin    Protocols used: Information Only Call - No Triage-Adult-    "

## 2025-04-27 NOTE — ED PROVIDER NOTES
Time reflects when diagnosis was documented in both MDM as applicable and the Disposition within this note       Time User Action Codes Description Comment    4/27/2025  3:43 PM Aimee Faust Add [L72.9,  L08.9] Infected cyst of skin           ED Disposition       ED Disposition   Discharge    Condition   Stable    Date/Time   Sun Apr 27, 2025  3:43 PM    Comment   Shantelle Romano discharge to home/self care.                   Assessment & Plan       Medical Decision Making  Risk  Prescription drug management.      Infected cyst, performed incision and drainage, continue abx as previously prescribed and follow up with dermatology       Medications   oxyCODONE (ROXICODONE) IR tablet 5 mg (5 mg Oral Given 4/27/25 1523)   lidocaine-epinephrine (XYLOCAINE/EPINEPHRINE) 1 %-1:100,000 injection 20 mL (20 mL Infiltration Given 4/27/25 1551)       ED Risk Strat Scores                    (ISAR) Identification of Seniors at Risk  Before the illness or injury that brought you to the Emergency, did you need someone to help you on a regular basis?: 0  In the last 24 hours, have you needed more help than usual?: 0  Have you been hospitalized for one or more nights during the past 6 months?: 0  In general, do you see well?: 0  In general, do you have serious problems with your memory?: 0  Do you take more than three different medications every day?: 1  ISAR Score: 1            SBIRT 22yo+      Flowsheet Row Most Recent Value   Initial Alcohol Screen: US AUDIT-C     1. How often do you have a drink containing alcohol? 0 Filed at: 04/27/2025 1504   2. How many drinks containing alcohol do you have on a typical day you are drinking?  0 Filed at: 04/27/2025 1504   3a. Male UNDER 65: How often do you have five or more drinks on one occasion? 0 Filed at: 04/27/2025 1504   3b. FEMALE Any Age, or MALE 65+: How often do you have 4 or more drinks on one occassion? 0 Filed at: 04/27/2025 1504   Audit-C Score 0 Filed at: 04/27/2025 1504   LEV:  How many times in the past year have you...    Used an illegal drug or used a prescription medication for non-medical reasons? Never Filed at: 04/27/2025 1504                            History of Present Illness       Chief Complaint   Patient presents with    Abscess     To R chest, seeing derm, was on doxy, completed dose, reports it burst and is now open and draining. Pt reports pain to site.        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    Carpal tunnel syndrome     Chest pain 03/06/2017    Chronic kidney disease     Claudication (Formerly [facility]) 03/15/2019    Closed fracture of base of middle phalanx of finger 06/22/2017    Closed fracture of middle phalanx of left little finger 07/08/2017    Coagulopathy (Formerly [facility]) 05/15/2019    Colloid cyst of brain (Formerly [facility])     COPD (chronic obstructive pulmonary disease) (Formerly [facility])     COPD (chronic obstructive pulmonary disease) (Formerly [facility])     Coronary artery disease     Cough     Diabetes mellitus ([facility]ly [facility])     Femur fracture, right ([facility]ly [facility]) 09/05/2024    GERD (gastroesophageal reflux disease)     Glaucoma     Gout     History of brain disorder 10/07/2020    Hyperlipidemia     Hypertension     Irritable bowel syndrome     Melena 02/26/2019    PAD (peripheral artery disease) ([facility]) 05/15/2019    Paronychia of great toe of left foot 10/07/2020    Post-traumatic seizures (Formerly [facility]) 07/23/2015    Renal disorder     Seizures (Formerly [facility])     last 2015    Shortness of breath     Single seizure ([facility]) 05/31/2016    SOB (shortness of breath)     Stroke-like symptoms 07/02/2024    Syncope and collapse 11/11/2020    Urinary tract infection without hematuria 11/26/2016    Wheezing       Past Surgical History:   Procedure Laterality Date    BRAIN SURGERY      CARDIAC CATHETERIZATION N/A 8/28/2024    Procedure: Cardiac  catheterization;  Surgeon: Matt Beltran MD;  Location: [facility];  Service: Cardiology    CATARACT EXTRACTION      CHOLECYSTECTOMY      COLECTOMY RADHA      COLONOSCOPY      DECOMPRESSION SPINE LUMBAR POSTERIOR  2019    HERNIA REPAIR      HYSTERECTOMY      INCISION TENDON SHEATH HAND      NECK SURGERY  2018    NEUROPLASTY / TRANSPOSITION MEDIAN NERVE AT CARPAL TUNNEL      ORIF FEMUR FRACTURE Right 2024    Procedure: OPEN REDUCTION W/ INTERNAL FIXATION (ORIF) FEMUR;  Surgeon: Rambo Yanez MD;  Location: [facility];  Service: Orthopedics    AL COLONOSCOPY FLX DX W/COLLJ SPEC WHEN PFRMD N/A 2019    Procedure: COLONOSCOPY;  Surgeon: Yaniv Hawley MD;  Location: [facility];  Service: Gastroenterology    AL ESOPHAGOGASTRODUODENOSCOPY TRANSORAL DIAGNOSTIC N/A 2019    Procedure: ESOPHAGOGASTRODUODENOSCOPY (EGD);  Surgeon: Yaniv Hawley MD;  Location: [facility];  Service: Gastroenterology    REPLACEMENT TOTAL KNEE Right     RETINAL DETACHMENT SURGERY      TUBAL LIGATION        Family History   Problem Relation Age of Onset    Asthma Mother     Heart disease Mother     Emphysema Father     Cancer Father     Prostate cancer Father     Kidney cancer Sister     Diabetes Sister     Kidney disease Sister     Brain cancer Brother     Bone cancer Brother     Heart disease Brother       Social History     Tobacco Use    Smoking status: Former     Current packs/day: 0.00     Average packs/day: 0.5 packs/day for 40.0 years (20.0 ttl pk-yrs)     Types: Cigarettes     Start date:      Quit date:      Years since quittin.3    Smokeless tobacco: Never    Tobacco comments:     stopped many years ago   Vaping Use    Vaping status: Never Used   Substance Use Topics    Alcohol use: Never    Drug use: Never      E-Cigarette/Vaping    E-Cigarette Use Never User       E-Cigarette/Vaping Substances    Nicotine No     THC No     CBD No     Flavoring No     Other No     Unknown No        I have reviewed and agree with the history as documented.     HPI  80 yo F with infected epidermal inclusion cyst to chest presents with worsening pain and drainage to cyst. She has been seen by dermatology, family called office today and was prescribed doxycycline, recommended ED evaluation.   Review of Systems   Constitutional:  Negative for chills and fever.   HENT:  Negative for dental problem and ear pain.    Eyes:  Negative for pain and redness.   Respiratory:  Negative for cough and shortness of breath.    Cardiovascular:  Negative for chest pain and palpitations.   Gastrointestinal:  Negative for abdominal pain and nausea.   Endocrine: Negative for polydipsia and polyphagia.   Genitourinary:  Negative for dysuria and frequency.   Musculoskeletal:  Negative for arthralgias and joint swelling.   Skin:  Positive for color change and wound. Negative for rash.   Neurological:  Negative for dizziness and headaches.   Psychiatric/Behavioral:  Negative for behavioral problems and confusion.    All other systems reviewed and are negative.          Objective       ED Triage Vitals   Temperature Pulse Blood Pressure Respirations SpO2 Patient Position - Orthostatic VS   04/27/25 1459 04/27/25 1459 04/27/25 1459 04/27/25 1459 04/27/25 1459 04/27/25 1459   98.7 °F (37.1 °C) 68 (!) 184/77 18 96 % Sitting      Temp Source Heart Rate Source BP Location FiO2 (%) Pain Score    04/27/25 1459 04/27/25 1459 04/27/25 1459 -- 04/27/25 1504    Oral Monitor Right arm  10 - Worst Possible Pain      Vitals      Date and Time Temp Pulse SpO2 Resp BP Pain Score FACES Pain Rating User   04/27/25 1523 -- -- -- -- -- 7 -- ES   04/27/25 1504 -- -- -- -- -- 10 - Worst Possible Pain -- VB   04/27/25 1459 98.7 °F (37.1 °C) 68 96 % 18 184/77 -- -- SA            Physical Exam  Vitals and nursing note reviewed.   Constitutional:       General: She is not in acute distress.  HENT:      Head: Normocephalic and atraumatic.      Right Ear:  External ear normal.      Left Ear: External ear normal.      Nose: Nose normal.   Eyes:      General: No scleral icterus.  Cardiovascular:      Rate and Rhythm: Normal rate.   Pulmonary:      Effort: Pulmonary effort is normal. No respiratory distress.   Abdominal:      General: There is no distension.   Musculoskeletal:         General: No deformity. Normal range of motion.      Comments: Cyst to R chest wall   Skin:     Findings: No rash.   Neurological:      General: No focal deficit present.      Mental Status: She is alert.      Gait: Gait normal.   Psychiatric:         Mood and Affect: Mood normal.         Results Reviewed       None            No orders to display       Incision and drain    Date/Time: 4/27/2025 5:51 PM    Performed by: Aimee Fasut MD  Authorized by: Aimee Faust MD  Universal Protocol:  Patient identity confirmed: verbally with patient and arm band    Patient location:  ED  Location:     Type:  Cyst    Size:  4 cm x 4 cm    Location:  Trunk  Pre-procedure details:     Skin preparation:  Chloraprep  Anesthesia (see MAR for exact dosages):     Anesthesia method:  Local infiltration    Local anesthetic:  Lidocaine 1% WITH epi  Procedure details:     Complexity:  Intermediate    Incision types:  Single straight    Scalpel blade:  11    Approach:  Open    Incision depth:  Subcutaneous    Wound management:  Probed and deloculated    Drainage:  Purulent    Drainage amount:  Moderate    Wound treatment:  Wound left open  Post-procedure details:     Patient tolerance of procedure:  Tolerated well, no immediate complications      ED Medication and Procedure Management   Prior to Admission Medications   Prescriptions Last Dose Informant Patient Reported? Taking?   Calcium Carbonate-Vitamin D (CALCIUM-D PO)  Self, Child Yes No   Sig: Take 1 tablet by mouth in the morning   Continuous Blood Gluc  (Dexcom G7 ) YOLANDA  Self, Child No No   Sig: Use 1 Application if needed (check sugars)    Continuous Blood Gluc Sensor (Dexcom G7 Sensor)  Self, Child No No   Sig: Use 1 Device every 10 days   Insulin Glargine Solostar (Lantus SoloStar) 100 UNIT/ML SOPN  Self, Child No No   Sig: Inject 0.3 mL (30 Units total) under the skin daily   Insulin Pen Needle (B-D ULTRAFINE III SHORT PEN) 31G X 8 MM MISC  Self, Child No No   Sig: Use 4 (four) times a day (with meals and at bedtime)   Lumigan 0.01 % ophthalmic drops  Self, Child Yes No   Sig: INSTILL 1 DROP into both eyes at bedtime   Multiple Vitamin (MULTI VITAMIN DAILY PO)  Self, Child Yes No   Sig: Take 1 tablet by mouth daily   Spacer/Aero-Holding Chambers (AeroChamber Plus Elian-Vu Large) MISC  Self, Child Yes No   Sig: Use as directed   acetaminophen (TYLENOL) 650 mg CR tablet  Self, Child Yes No   Sig: Take 500 mg by mouth 2 (two) times a day   albuterol (2.5 mg/3 mL) 0.083 % nebulizer solution  Child, Self No No   Sig: Take 3 mL (2.5 mg total) by nebulization every 6 (six) hours as needed for wheezing or shortness of breath   albuterol (ProAir HFA) 90 mcg/act inhaler  Self, Child No No   Sig: Inhale 2 puffs every 6 (six) hours as needed for wheezing or shortness of breath (cough, chest tightness)   ammonium lactate (LAC-HYDRIN) 12 % lotion  Self, Child No No   Sig: Apply topically 2 (two) times a day as needed for dry skin   atorvastatin (LIPITOR) 20 mg tablet  Self, Child No No   Sig: take 1 tablet by mouth at bedtime   clotrimazole (LOTRIMIN) 1 % cream  Self, Child Yes No   doxycycline monohydrate (MONODOX) 100 mg capsule   No No   Sig: Take 1 capsule (100 mg total) by mouth 2 (two) times a day for 7 days   fluticasone (FLONASE) 50 mcg/act nasal spray  Self, Child No No   Si spray into each nostril daily pt uses as needed   fluticasone-umeclidinium-vilanterol (Trelegy Ellipta) 100-62.5-25 mcg/actuation inhaler  Child, Self No No   Sig: inhale 1 puff by mouth daily Rinse mouth after use   furosemide (LASIX) 20 mg tablet  Child, Self No No   Sig: take  1 tablet by mouth twice a day   gabapentin (NEURONTIN) 100 mg capsule  Self, Child No No   Sig: Take 2 capsules (200 mg total) by mouth daily at bedtime   guaiFENesin (MUCINEX) 600 mg 12 hr tablet  Self, Child No No   Sig: take 1 tablet by mouth twice a day if needed for cough or congestion   insulin aspart (NovoLOG FlexPen) 100 UNIT/ML injection pen  Self, Child No No   Sig: Per sliding scale, Max 100 units per day   lidocaine (Lidoderm) 5 %  Self, Child No No   Sig: Apply 1 patch topically over 12 hours daily as needed (pain) Remove & Discard patch within 12 hours or as directed by MD   methocarbamol (ROBAXIN) 500 mg tablet  Child, Self Yes No   Sig: Take 500 mg by mouth daily at bedtime as needed for muscle spasms   Patient not taking: Reported on 4/16/2025   montelukast (SINGULAIR) 10 mg tablet  Self, Child No No   Sig: Take 1 tablet (10 mg total) by mouth every evening   mupirocin (BACTROBAN) 2 % ointment  Self, Child Yes No   Sig: APPLY A SMALL AMOUNT TO THE AFFECTED FOOT/TOE AREA BY TOPICAL ROUTE 3 TIMES PER DAY   mupirocin (BACTROBAN) 2 % ointment   No No   Sig: Apply topically 3 (three) times a day   nystatin (MYCOSTATIN) cream  Self, Child No No   Sig: Apply topically 2 (two) times a day   nystatin powder  Self, Child No No   Sig: apply topically to affected area three times a day if needed for rash   omeprazole (PriLOSEC) 20 mg delayed release capsule   No No   Sig: take 1 capsule by mouth twice a day   rivastigmine (EXELON) 4.6 mg/24 hr TD 24 hr patch  Self, Child No No   Sig: Place 1 patch on the skin over 24 hours daily   rivastigmine (Exelon) 9.5 mg/24 hr TD 24 hr patch  Self, Child No No   Sig: Place 1 patch on the skin over 24 hours daily   Patient not taking: Reported on 4/8/2025   saccharomyces boulardii (FLORASTOR) 250 mg capsule  Self, Child Yes No   Sig: Take 250 mg by mouth 2 (two) times a day      Facility-Administered Medications: None     Discharge Medication List as of 4/27/2025  3:43 PM         CONTINUE these medications which have NOT CHANGED    Details   acetaminophen (TYLENOL) 650 mg CR tablet Take 500 mg by mouth 2 (two) times a day, Historical Med      albuterol (2.5 mg/3 mL) 0.083 % nebulizer solution Take 3 mL (2.5 mg total) by nebulization every 6 (six) hours as needed for wheezing or shortness of breath, Starting Mon 9/30/2024, Normal      albuterol (ProAir HFA) 90 mcg/act inhaler Inhale 2 puffs every 6 (six) hours as needed for wheezing or shortness of breath (cough, chest tightness), Starting Thu 5/2/2024, Normal      ammonium lactate (LAC-HYDRIN) 12 % lotion Apply topically 2 (two) times a day as needed for dry skin, Starting Mon 10/28/2024, Normal      atorvastatin (LIPITOR) 20 mg tablet take 1 tablet by mouth at bedtime, Starting Thu 11/21/2024, Normal      Calcium Carbonate-Vitamin D (CALCIUM-D PO) Take 1 tablet by mouth in the morning, Historical Med      clotrimazole (LOTRIMIN) 1 % cream Historical Med      Continuous Blood Gluc  (Dexcom G7 ) YOLANDA Use 1 Application if needed (check sugars), Starting Wed 7/26/2023, Print      Continuous Blood Gluc Sensor (Dexcom G7 Sensor) Use 1 Device every 10 days, Starting Wed 7/26/2023, Print      doxycycline monohydrate (MONODOX) 100 mg capsule Take 1 capsule (100 mg total) by mouth 2 (two) times a day for 7 days, Starting Sun 4/27/2025, Until Sun 5/4/2025, Normal      fluticasone (FLONASE) 50 mcg/act nasal spray 1 spray into each nostril daily pt uses as needed, Starting Thu 5/2/2024, Normal      fluticasone-umeclidinium-vilanterol (Trelegy Ellipta) 100-62.5-25 mcg/actuation inhaler inhale 1 puff by mouth daily Rinse mouth after use, Normal      furosemide (LASIX) 20 mg tablet take 1 tablet by mouth twice a day, Starting Mon 9/23/2024, Normal      gabapentin (NEURONTIN) 100 mg capsule Take 2 capsules (200 mg total) by mouth daily at bedtime, Starting Mon 3/10/2025, Normal      guaiFENesin (MUCINEX) 600 mg 12 hr tablet take 1  tablet by mouth twice a day if needed for cough or congestion, Normal      insulin aspart (NovoLOG FlexPen) 100 UNIT/ML injection pen Per sliding scale, Max 100 units per day, Fill Later      Insulin Glargine Solostar (Lantus SoloStar) 100 UNIT/ML SOPN Inject 0.3 mL (30 Units total) under the skin daily, Starting Thu 10/31/2024, Normal      Insulin Pen Needle (B-D ULTRAFINE III SHORT PEN) 31G X 8 MM MISC Use 4 (four) times a day (with meals and at bedtime), Starting Fri 8/9/2024, Normal      lidocaine (Lidoderm) 5 % Apply 1 patch topically over 12 hours daily as needed (pain) Remove & Discard patch within 12 hours or as directed by MD, Starting Fri 7/12/2024, Normal      Lumigan 0.01 % ophthalmic drops INSTILL 1 DROP into both eyes at bedtime, Historical Med      methocarbamol (ROBAXIN) 500 mg tablet Take 500 mg by mouth daily at bedtime as needed for muscle spasms, Starting Sat 10/19/2024, Historical Med      montelukast (SINGULAIR) 10 mg tablet Take 1 tablet (10 mg total) by mouth every evening, Starting Thu 5/2/2024, Normal      Multiple Vitamin (MULTI VITAMIN DAILY PO) Take 1 tablet by mouth daily, Historical Med      !! mupirocin (BACTROBAN) 2 % ointment APPLY A SMALL AMOUNT TO THE AFFECTED FOOT/TOE AREA BY TOPICAL ROUTE 3 TIMES PER DAY, Historical Med      !! mupirocin (BACTROBAN) 2 % ointment Apply topically 3 (three) times a day, Starting Sun 4/27/2025, Normal      nystatin (MYCOSTATIN) cream Apply topically 2 (two) times a day, Starting Wed 2/28/2024, Normal      nystatin powder apply topically to affected area three times a day if needed for rash, Normal      omeprazole (PriLOSEC) 20 mg delayed release capsule take 1 capsule by mouth twice a day, Starting Mon 4/14/2025, Normal      rivastigmine (EXELON) 4.6 mg/24 hr TD 24 hr patch Place 1 patch on the skin over 24 hours daily, Starting Thu 4/3/2025, Normal      rivastigmine (Exelon) 9.5 mg/24 hr TD 24 hr patch Place 1 patch on the skin over 24 hours daily,  Starting Thu 4/3/2025, Normal      saccharomyces boulardii (FLORASTOR) 250 mg capsule Take 250 mg by mouth 2 (two) times a day, Historical Med      Spacer/Aero-Holding Chambers (AeroChamber Plus Elian-Vu Large) MISC Use as directed, Starting Fri 5/5/2023, Historical Med       !! - Potential duplicate medications found. Please discuss with provider.        No discharge procedures on file.  ED SEPSIS DOCUMENTATION   Time reflects when diagnosis was documented in both MDM as applicable and the Disposition within this note       Time User Action Codes Description Comment    4/27/2025  3:43 PM Aimee Faust Add [L72.9,  L08.9] Infected cyst of skin                  Aimee Faust MD  04/27/25 8771

## 2025-04-27 NOTE — TELEPHONE ENCOUNTER
Reason for Disposition  • Patient sounds very sick or weak to the triager    Protocols used: Skin Lump or Localized Swelling-Adult-

## 2025-04-27 NOTE — TELEPHONE ENCOUNTER
"FOLLOW UP: Please follow up with pt to check in and see if she needs office earlier appt.    REASON FOR CONVERSATION: Cyst    SYMPTOMS: Pt diagnosed with epidermal inclusion cyst to right side of chest. Since last night cyst has burst and is draining yellow drainage. Area is now a deep red, is inflamed- half dollar size, and has multiple bumps. Area is painful rated at a 7/10. Denies fever.     OTHER: Pt scheduled to have cyst removed on 5/7/25. Completed last dose of doxycycline yesterday night.     DISPOSITION:  Now    Pt symptoms addressed with on call Dermatologist. Per Julia Avina- If having systemic symptoms or pain should go to the ED. Will send in another course of antibiotics and have office follow up with pt.     Called pts son in law (Lars) to give providers recommendation. Lars states they just uploaded a photo of the wound to [employer].     Photo passed along to on call provider to see if recommendation would still apply or if pt should come in to ED for evaluation.     Per on call Dermatology specialist- if pt having systemic symptoms are area is painful can go to ED- area may need to be drained.     Reached back out to Lars and recommended ED evaluation since area is painful and pt is Type 2 DM.     Lars is aware and verbalized understanding. Will be brining pt to [facility] ED.     Answer Assessment - Initial Assessment Questions  1. APPEARANCE of SWELLING: \"What does it look like?\"      Red, inflamed, multiple lumps on it. Last night and earlier today was draining yellow.     2. SIZE: \"How large is the swelling?\" (e.g., inches, cm; or compare to size of pinhead, tip of pen, eraser, coin, pea, grape, ping pong ball)       Half dollar size.     3. LOCATION: \"Where is the swelling located?\"      Right side of chest.     4. ONSET: \"When did the swelling start?\"      Started draining last night.     5. COLOR: \"What color is it?\" \"Is there more than one color?\"      Red    6. PAIN: \"Is there any " "pain?\" If Yes, ask: \"How bad is the pain?\" (Scale 1-10; or mild, moderate, severe)        6-7/10 on and off.     7. ITCH: \"Does it itch?\" If Yes, ask: \"How bad is the itch?\"       Mildly above area.     8. CAUSE: \"What do you think caused the swelling?\"      Diagnosed by derm with cyst.     9 OTHER SYMPTOMS: \"Do you have any other symptoms?\" (e.g., fever)      Denies fever. Finished last dose of doxy 100mg BID last night.    Protocols used: Skin Lump or Localized Swelling-Adult-AH    "

## 2025-04-28 ENCOUNTER — VBI (OUTPATIENT)
Dept: FAMILY MEDICINE CLINIC | Facility: CLINIC | Age: 80
End: 2025-04-28

## 2025-04-28 ENCOUNTER — TELEPHONE (OUTPATIENT)
Dept: DERMATOLOGY | Facility: CLINIC | Age: 80
End: 2025-04-28

## 2025-04-28 DIAGNOSIS — L08.9 INFECTED CYST OF SKIN: ICD-10-CM

## 2025-04-28 DIAGNOSIS — L72.9 INFECTED CYST OF SKIN: ICD-10-CM

## 2025-04-28 NOTE — TELEPHONE ENCOUNTER
Per in basket message-    Julia Avina MD sent to [facility]; [facility]    Hi can we try to get this patient into [facility] for large infected EIC on the chest? Thank you!    Called pt to get pt scheduled for the above message but n/a, left v/m with c/b info.

## 2025-04-28 NOTE — TELEPHONE ENCOUNTER
Patient's son in law called office to check if there was any possibility that they were able to change provider who would be doing excision of cyst on chest area since patient is not to happy with provider who is scheduled to do it.     I informed Son in law I have some openings but this would be in [facility] for the next closest area on 5/21/2025 or 5/28/2025. He will communicate with patient's family before changing anything before they decide if they want her to wait or keep original appointment for 5/07/2025.

## 2025-04-28 NOTE — TELEPHONE ENCOUNTER
04/28/25 1:50 PM    Patient contacted post ED visit, [facility] department spoke with patient/caregiver and outreach was successful.    Thank you.  Thien Alcantar MA  [facility]

## 2025-04-29 NOTE — TELEPHONE ENCOUNTER
Received call from son in law Lars stating the patient is not comfortable with Dr Bañuelos and would like to sho for the [facility].    Lars stated he spoke with someone previously and was told 05/21 was available.    I looked at the [facility] swathi and offered the next available on 07/9.    Lars declined stating that the family would like to keep it on or around the same date that she currently has.    I then offered 05/21 in [place].    Lars declined stating the family does not want to travel that far.    Lars stated he will keep the 05/7 appt and discuss with the patient to see  what she says

## 2025-04-30 ENCOUNTER — APPOINTMENT (OUTPATIENT)
Age: 80
End: 2025-04-30
Attending: PSYCHIATRY & NEUROLOGY
Payer: COMMERCIAL

## 2025-05-01 ENCOUNTER — OFFICE VISIT (OUTPATIENT)
Age: 80
End: 2025-05-01
Attending: PSYCHIATRY & NEUROLOGY
Payer: COMMERCIAL

## 2025-05-01 DIAGNOSIS — R41.89 SEVERE COGNITIVE IMPAIRMENT: Primary | ICD-10-CM

## 2025-05-01 PROCEDURE — 97530 THERAPEUTIC ACTIVITIES: CPT

## 2025-05-01 NOTE — PROGRESS NOTES
"POC expires Auth Status Total   Visits  Start date  Expiration date PT/OT + Visit Limit? Co-Insurance   25 BOMN  25                                                Visit/Unit Tracking  AUTH Status: BOMN Date             Visits  Authed:  Used 1 (IE) 1 1             Remaining                    PLAN OF CARE START: 25  PLAN OF CARE END: 2025  PROGRESS NOTE DUE: 25 (PN on schedule)  FREQUENCY: 1-2x/week for 8-12 weeks  PRECAUTIONS: anxiety, FALL/SAFETY  DIAGNOSIS: severe cognitive impairment  VISITS: 3      Daily Note     Today's date: 2025  Patient name: Shantelle Romano  : 1945  MRN: [id number]  Referring provider: Nicki Bah MD  Dx:   Encounter Diagnosis     ICD-10-CM    1. Severe cognitive impairment  R41.89                        Subjective: \"The homework you gave me was easy\"      Objective: See treatment diary below.     TA:  *Connect 4 placing pieces w/ alternating attention rules (yellow-city, red-food). Emphasis also on working memory and direction following. Pt needing mod vcs throughout for correct direction following and sequencing of task.     *16 piece jigsaw puzzle; emphasis on EF/ skills to apply to everyday problem solving tasks. Pt needing max vcs for visual scanning and problem solving. Often needing redirection to task.       Assessment: Tolerated treatment fair. Focus of today's session on simple direction following and problem solving in multi modal environment. Patient demo better frustration tolerance during today's session and was fairly receptive to cueing. Patient would benefit from continued OT to address sustained attention, temporal orientation, utilization of internal/external memory strategies, direction following, and overall increased cognitive functions for daily tasks.        Plan: Continue per plan of care.         "

## 2025-05-03 ENCOUNTER — NURSE TRIAGE (OUTPATIENT)
Dept: OTHER | Facility: OTHER | Age: 80
End: 2025-05-03

## 2025-05-03 NOTE — TELEPHONE ENCOUNTER
"Regarding: medication early refill/cyst/mupirocin  ----- Message from Carter LIAO sent at 5/3/2025  3:10 PM EDT -----  Pt son in law stated, \"my mother in law was prescribed mupirocin (BACTROBAN) 2 % ointment for a cyst however when we went to refill it the pharmacy says it cannot be filled until 5/14. She only has enough for maybe 2 days. I don't what else to give to her.\"    "

## 2025-05-03 NOTE — TELEPHONE ENCOUNTER
"FOLLOW UP: Please follow up with a recommendation for care as mupirocin can not be refilled until 5/14      REASON FOR CONVERSATION: Medication Refill    SYMPTOMS: N/A    OTHER: N/A    DISPOSITION: Call PCP When Office is Open      Reason for Disposition   [1] Caller has NON-URGENT medicine question about med that PCP prescribed AND [2] triager unable to answer question    Answer Assessment - Initial Assessment Questions  1. NAME of MEDICINE: \"What medicine(s) are you calling about?\"          Mupirocin 2% ointment      2. QUESTION: \"What is your question?\" (e.g., double dose of medicine, side effect)          Caller reports that attempted to refill and pharmacy advised not due until 5/14.  Report will be able to stretch what is remaining for tomorrow.    Protocols used: Medication Question Call-Adult-    "

## 2025-05-05 ENCOUNTER — OFFICE VISIT (OUTPATIENT)
Dept: CARDIOLOGY CLINIC | Facility: CLINIC | Age: 80
End: 2025-05-05
Payer: COMMERCIAL

## 2025-05-05 VITALS
WEIGHT: 145 LBS | BODY MASS INDEX: 33.56 KG/M2 | SYSTOLIC BLOOD PRESSURE: 116 MMHG | RESPIRATION RATE: 16 BRPM | HEIGHT: 55 IN | DIASTOLIC BLOOD PRESSURE: 56 MMHG | OXYGEN SATURATION: 96 % | HEART RATE: 62 BPM

## 2025-05-05 DIAGNOSIS — L30.4 INTERTRIGO: ICD-10-CM

## 2025-05-05 DIAGNOSIS — I50.32 CHRONIC HEART FAILURE WITH PRESERVED EJECTION FRACTION (HCC): Primary | ICD-10-CM

## 2025-05-05 PROCEDURE — 99214 OFFICE O/P EST MOD 30 MIN: CPT | Performed by: STUDENT IN AN ORGANIZED HEALTH CARE EDUCATION/TRAINING PROGRAM

## 2025-05-05 NOTE — PROGRESS NOTES
[facility] Cardiology  Follow up note  Shantelle Romano 79 y.o. female MRN: [id number]        1. Chronic HFpEF  Assessment & Plan:  Wt Readings from Last 3 Encounters:   04/27/25 66.8 kg (147 lb 4.3 oz)   04/16/25 66.1 kg (145 lb 12.8 oz)   04/08/25 66.7 kg (147 lb)                 Assessment & Plan  Chronic HFpEF  Wt Readings from Last 3 Encounters:   05/05/25 65.8 kg (145 lb)   04/27/25 66.8 kg (147 lb 4.3 oz)   04/16/25 66.1 kg (145 lb 12.8 oz)     Euvolemic currently, dyspnea improved with IV iron infusions.  Has not required oxygen at this time.Will have her continue with her current medication regimen.  I reached out to her PCP regarding SGLT2 which may be difficult to initiate given her high doses of insulin.  Otherwise the patient herself is not interested in starting any new medications medications.        HPI:   Shantelle Romano is a 79 y.o. year old femalepast medical history of nonobstructive CAD, HFpEF, hypertension, hyperlipidemia, COPD  on intermittent , type 2 diabetes, CKD     She had a recent admission 8/2024 for chest pain and obtained a cardiac catheterization in setting an abnormal stress test, the cardiac catheterization which showed mild nonobstructive CAD. In September she underwent right ORIF. After recent hospitalization she was sent home on 2 L of O2 which she has required more intermittently recently     Patient currently denies chest pain, palpitations, orthopnea or PND.    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    Carpal tunnel syndrome     Chest pain 03/06/2017    Chronic kidney disease     Claudication (HCC) 03/15/2019    Closed fracture  of base of middle phalanx of finger 2017    Closed fracture of middle phalanx of left little finger 2017    Coagulopathy ([facility]) 05/15/2019    Colloid cyst of brain ([facility])     COPD (chronic obstructive pulmonary disease) ([facility])     COPD (chronic obstructive pulmonary disease) ([facility])     Coronary artery disease     Cough     Diabetes mellitus ([facility])     Femur fracture, right ([facility]) 2024    GERD (gastroesophageal reflux disease)     Glaucoma     Gout     History of brain disorder 10/07/2020    Hyperlipidemia     Hypertension     Irritable bowel syndrome     Melena 2019    PAD (peripheral artery disease) ([facility]) 05/15/2019    Paronychia of great toe of left foot 10/07/2020    Post-traumatic seizures ([facility]) 2015    Renal disorder     Seizures ([facility])     last     Shortness of breath     Single seizure ([facility]) 2016    SOB (shortness of breath)     Stroke-like symptoms 2024    Syncope and collapse 2020    Urinary tract infection without hematuria 2016    Wheezing      Social History     Substance and Sexual Activity   Alcohol Use Never     Social History     Substance and Sexual Activity   Drug Use Never     Social History     Tobacco Use   Smoking Status Former    Current packs/day: 0.00    Average packs/day: 0.5 packs/day for 40.0 years (20.0 ttl pk-yrs)    Types: Cigarettes    Start date:     Quit date:     Years since quittin.3   Smokeless Tobacco Never   Tobacco Comments    stopped many years ago       Allergies:  Allergies   Allergen Reactions    Brimonidine Other (See Comments)    Salicylic Acid-Sulfur Other (See Comments)    Sulfa Antibiotics Rash and Other (See Comments)       Medications:     Current Outpatient Medications:     acetaminophen (TYLENOL) 650 mg CR tablet, Take 500 mg by mouth 2 (two) times a day, Disp: , Rfl:     albuterol (2.5 mg/3 mL) 0.083 % nebulizer solution, Take 3 mL (2.5 mg total) by nebulization every 6 (six) hours as needed for  wheezing or shortness of breath, Disp: 360 mL, Rfl: 1    albuterol (ProAir HFA) 90 mcg/act inhaler, Inhale 2 puffs every 6 (six) hours as needed for wheezing or shortness of breath (cough, chest tightness), Disp: 18 g, Rfl: 0    ammonium lactate (LAC-HYDRIN) 12 % lotion, Apply topically 2 (two) times a day as needed for dry skin, Disp: 225 g, Rfl: 1    atorvastatin (LIPITOR) 20 mg tablet, take 1 tablet by mouth at bedtime, Disp: 90 tablet, Rfl: 1    Calcium Carbonate-Vitamin D (CALCIUM-D PO), Take 1 tablet by mouth in the morning, Disp: , Rfl:     clotrimazole (LOTRIMIN) 1 % cream, , Disp: , Rfl:     Continuous Blood Gluc  (Dexcom G7 ) YOLANDA, Use 1 Application if needed (check sugars), Disp: 1 each, Rfl: 0    Continuous Blood Gluc Sensor (Dexcom G7 Sensor), Use 1 Device every 10 days, Disp: 9 each, Rfl: 3    fluticasone (FLONASE) 50 mcg/act nasal spray, 1 spray into each nostril daily pt uses as needed, Disp: 54 g, Rfl: 1    fluticasone-umeclidinium-vilanterol (Trelegy Ellipta) 100-62.5-25 mcg/actuation inhaler, inhale 1 puff by mouth daily Rinse mouth after use, Disp: 180 blister, Rfl: 3    furosemide (LASIX) 20 mg tablet, take 1 tablet by mouth twice a day, Disp: 180 tablet, Rfl: 1    gabapentin (NEURONTIN) 100 mg capsule, Take 2 capsules (200 mg total) by mouth daily at bedtime, Disp: 180 capsule, Rfl: 1    guaiFENesin (MUCINEX) 600 mg 12 hr tablet, take 1 tablet by mouth twice a day if needed for cough or congestion, Disp: 180 tablet, Rfl: 1    insulin aspart (NovoLOG FlexPen) 100 UNIT/ML injection pen, Per sliding scale, Max 100 units per day, Disp: 15 mL, Rfl: 5    Insulin Glargine Solostar (Lantus SoloStar) 100 UNIT/ML SOPN, Inject 0.3 mL (30 Units total) under the skin daily, Disp: 3 mL, Rfl: 0    Insulin Pen Needle (B-D ULTRAFINE III SHORT PEN) 31G X 8 MM MISC, Use 4 (four) times a day (with meals and at bedtime), Disp: 400 each, Rfl: 1    lidocaine (Lidoderm) 5 %, Apply 1 patch topically  over 12 hours daily as needed (pain) Remove & Discard patch within 12 hours or as directed by MD, Disp: 30 patch, Rfl: 1    Lumigan 0.01 % ophthalmic drops, INSTILL 1 DROP into both eyes at bedtime, Disp: , Rfl:     montelukast (SINGULAIR) 10 mg tablet, Take 1 tablet (10 mg total) by mouth every evening, Disp: 90 tablet, Rfl: 1    Multiple Vitamin (MULTI VITAMIN DAILY PO), Take 1 tablet by mouth daily, Disp: , Rfl:     mupirocin (BACTROBAN) 2 % ointment, APPLY A SMALL AMOUNT TO THE AFFECTED FOOT/TOE AREA BY TOPICAL ROUTE 3 TIMES PER DAY, Disp: , Rfl:     nystatin (MYCOSTATIN) cream, Apply topically 2 (two) times a day, Disp: 30 g, Rfl: 1    omeprazole (PriLOSEC) 20 mg delayed release capsule, take 1 capsule by mouth twice a day, Disp: 180 capsule, Rfl: 1    rivastigmine (EXELON) 4.6 mg/24 hr TD 24 hr patch, Place 1 patch on the skin over 24 hours daily, Disp: 30 patch, Rfl: 0    saccharomyces boulardii (FLORASTOR) 250 mg capsule, Take 250 mg by mouth 2 (two) times a day, Disp: , Rfl:     Spacer/Aero-Holding Chambers (AeroChamber Plus Elian-Vu Large) MISC, Use as directed, Disp: , Rfl:     methocarbamol (ROBAXIN) 500 mg tablet, Take 500 mg by mouth daily at bedtime as needed for muscle spasms (Patient not taking: Reported on 4/16/2025), Disp: , Rfl:     mupirocin (BACTROBAN) 2 % ointment, Apply topically 3 (three) times a day, Disp: 30 g, Rfl: 1    nystatin powder, apply topically to affected area three times a day if needed for rash, Disp: 60 g, Rfl: 1    rivastigmine (Exelon) 9.5 mg/24 hr TD 24 hr patch, Place 1 patch on the skin over 24 hours daily (Patient not taking: Reported on 4/8/2025), Disp: 30 patch, Rfl: 5      Vitals:    05/05/25 1436   BP: 116/56   Pulse: 62   Resp: 16   SpO2: 96%     Weight (last 2 days)       None          Physical Exam    Laboratory Studies:    Laboratory studies personally reviewed  Lab Results   Component Value Date    LDLCALC 77 03/14/2025     Lab Results   Component Value Date     "HGBA1C 6.4 (H) 03/14/2025     Lab Results   Component Value Date    CHOLESTEROL 146 03/14/2025    CHOLESTEROL 144 01/31/2025    CHOLESTEROL 173 07/03/2024     Lab Results   Component Value Date    HDL 37 (L) 03/14/2025    HDL 35 (L) 01/31/2025    HDL 39 (L) 07/03/2024     Lab Results   Component Value Date    TRIG 162 (H) 03/14/2025    TRIG 206 (H) 01/31/2025    TRIG 233 (H) 07/03/2024     Lab Results   Component Value Date    NONHDLC 109 03/14/2025    NONHDLC 90 07/12/2022    NONHDLC 92 06/24/2021         Cardiac testing:     EKG reviewed personally:   8/27/2024     Echocardiogram:  7/22/2024: LVEF 55%, grade 1 diastolic dysfunction, mild AR, mild MR, mild TR     Stress tests:  8/19/2024:  There is a left ventricular perfusion defect that is small in size with moderate reduction in uptake present in the apical location(s) that is partially reversible.          Catheterization:  8/28/2024: Mild nonobstructive atherosclerosis    Rosemarie Strickland MD    Portions of the record may have been created with voice recognition software.  Occasional wrong word or \"sound a like\" substitutions may have occurred due to the inherent limitations of voice recognition software.  Read the chart carefully and recognize, using context, where substitutions have occurred.   "

## 2025-05-05 NOTE — ASSESSMENT & PLAN NOTE
Wt Readings from Last 3 Encounters:   05/05/25 65.8 kg (145 lb)   04/27/25 66.8 kg (147 lb 4.3 oz)   04/16/25 66.1 kg (145 lb 12.8 oz)     Euvolemic currently, dyspnea improved with IV iron infusions.  Has not required oxygen at this time.Will have her continue with her current medication regimen.  I reached out to her PCP regarding SGLT2 which may be difficult to initiate given her high doses of insulin.  Otherwise the patient herself is not interested in starting any new medications medications.

## 2025-05-05 NOTE — TELEPHONE ENCOUNTER
Ron called asking if patient can get a refill  on the ointment that patient is using . He does not have the name of it .   Looks like mupirocin was sent on 04/27 , called patient , spoke with her son to verify if this is the medication he is asking for , they received mupirocin 2% ointment and ran out of it , her pharmacy said that is too soon to refill it .

## 2025-05-06 RX ORDER — MUPIROCIN 20 MG/G
OINTMENT TOPICAL 3 TIMES DAILY
Qty: 30 G | Refills: 1 | Status: SHIPPED | OUTPATIENT
Start: 2025-05-06

## 2025-05-06 RX ORDER — NYSTATIN 100000 [USP'U]/G
POWDER TOPICAL
Qty: 60 G | Refills: 1 | Status: SHIPPED | OUTPATIENT
Start: 2025-05-06

## 2025-05-07 ENCOUNTER — PROCEDURE VISIT (OUTPATIENT)
Age: 80
End: 2025-05-07
Payer: COMMERCIAL

## 2025-05-07 ENCOUNTER — OFFICE VISIT (OUTPATIENT)
Age: 80
End: 2025-05-07
Attending: PSYCHIATRY & NEUROLOGY
Payer: COMMERCIAL

## 2025-05-07 VITALS
OXYGEN SATURATION: 94 % | HEIGHT: 55 IN | TEMPERATURE: 98.3 F | DIASTOLIC BLOOD PRESSURE: 76 MMHG | SYSTOLIC BLOOD PRESSURE: 120 MMHG | HEART RATE: 70 BPM | BODY MASS INDEX: 39.19 KG/M2

## 2025-05-07 DIAGNOSIS — L08.9 INFECTED CYST OF SKIN: Primary | ICD-10-CM

## 2025-05-07 DIAGNOSIS — R41.89 SEVERE COGNITIVE IMPAIRMENT: Primary | ICD-10-CM

## 2025-05-07 DIAGNOSIS — L72.9 INFECTED CYST OF SKIN: Primary | ICD-10-CM

## 2025-05-07 PROCEDURE — 11404 EXC TR-EXT B9+MARG 3.1-4 CM: CPT | Performed by: STUDENT IN AN ORGANIZED HEALTH CARE EDUCATION/TRAINING PROGRAM

## 2025-05-07 PROCEDURE — 88342 IMHCHEM/IMCYTCHM 1ST ANTB: CPT | Performed by: STUDENT IN AN ORGANIZED HEALTH CARE EDUCATION/TRAINING PROGRAM

## 2025-05-07 PROCEDURE — 12032 INTMD RPR S/A/T/EXT 2.6-7.5: CPT | Performed by: STUDENT IN AN ORGANIZED HEALTH CARE EDUCATION/TRAINING PROGRAM

## 2025-05-07 PROCEDURE — 88304 TISSUE EXAM BY PATHOLOGIST: CPT | Performed by: STUDENT IN AN ORGANIZED HEALTH CARE EDUCATION/TRAINING PROGRAM

## 2025-05-07 PROCEDURE — 97530 THERAPEUTIC ACTIVITIES: CPT

## 2025-05-07 NOTE — PROGRESS NOTES
"[facility] Note     Patient Name: Shantelle Romano  Encounter Date: May 7,2025       Have you been cared for by a [facility] Dermatologist in the last 3 years and, if so, which description applies to you? Yes. I have been here within the last 3 years, and my medical history has NOT changed since that time. I am of child-bearing potential.     REVIEW OF SYSTEMS:  Have you recently had or currently have any of the following? No changes in my recent health.   PAST MEDICAL HISTORY:  Have you personally ever had or currently have any of the following?  If \"YES,\" then please provide more detail. No changes in my medical history.   HISTORY OF IMMUNOSUPPRESSION: Do you have a history of any of the following:  Systemic Immunosuppression such as Diabetes, Biologic or Immunotherapy, Chemotherapy, Organ Transplantation, Bone Marrow Transplantation or Prednisone?  No     Answering \"YES\" requires the addition of the dotphrase \"IMMUNOSUPPRESSED\" as the first diagnosis of the patient's visit.   FAMILY HISTORY:  Any \"first degree relatives\" (parent, brother, sister, or child) with the following?    No changes in my family's known health.   PATIENT EXPERIENCE:    Do you want the Dermatologist to perform a COMPLETE skin exam today including a clinical examination under the \"bra and underwear\" areas?  NO  If necessary, do we have your permission to call and leave a detailed message on your Preferred Phone number that includes your specific medical information?  Yes      Allergies   Allergen Reactions   • Brimonidine Other (See Comments)   • Salicylic Acid-Sulfur Other (See Comments)   • Sulfa Antibiotics Rash and Other (See Comments)      Current Outpatient Medications:   •  acetaminophen (TYLENOL) 650 mg CR tablet, Take 500 mg by mouth 2 (two) times a day, Disp: , Rfl:   •  albuterol (2.5 mg/3 mL) 0.083 % nebulizer solution, Take 3 mL (2.5 mg total) by nebulization every 6 (six) hours as needed for wheezing or shortness " of breath, Disp: 360 mL, Rfl: 1  •  albuterol (ProAir HFA) 90 mcg/act inhaler, Inhale 2 puffs every 6 (six) hours as needed for wheezing or shortness of breath (cough, chest tightness), Disp: 18 g, Rfl: 0  •  ammonium lactate (LAC-HYDRIN) 12 % lotion, Apply topically 2 (two) times a day as needed for dry skin, Disp: 225 g, Rfl: 1  •  atorvastatin (LIPITOR) 20 mg tablet, take 1 tablet by mouth at bedtime, Disp: 90 tablet, Rfl: 1  •  Calcium Carbonate-Vitamin D (CALCIUM-D PO), Take 1 tablet by mouth in the morning, Disp: , Rfl:   •  clotrimazole (LOTRIMIN) 1 % cream, , Disp: , Rfl:   •  Continuous Blood Gluc  (Dexcom G7 ) YOLANDA, Use 1 Application if needed (check sugars), Disp: 1 each, Rfl: 0  •  Continuous Blood Gluc Sensor (Dexcom G7 Sensor), Use 1 Device every 10 days, Disp: 9 each, Rfl: 3  •  fluticasone (FLONASE) 50 mcg/act nasal spray, 1 spray into each nostril daily pt uses as needed, Disp: 54 g, Rfl: 1  •  fluticasone-umeclidinium-vilanterol (Trelegy Ellipta) 100-62.5-25 mcg/actuation inhaler, inhale 1 puff by mouth daily Rinse mouth after use, Disp: 180 blister, Rfl: 3  •  furosemide (LASIX) 20 mg tablet, take 1 tablet by mouth twice a day, Disp: 180 tablet, Rfl: 1  •  gabapentin (NEURONTIN) 100 mg capsule, Take 2 capsules (200 mg total) by mouth daily at bedtime, Disp: 180 capsule, Rfl: 1  •  guaiFENesin (MUCINEX) 600 mg 12 hr tablet, take 1 tablet by mouth twice a day if needed for cough or congestion, Disp: 180 tablet, Rfl: 1  •  insulin aspart (NovoLOG FlexPen) 100 UNIT/ML injection pen, Per sliding scale, Max 100 units per day, Disp: 15 mL, Rfl: 5  •  Insulin Glargine Solostar (Lantus SoloStar) 100 UNIT/ML SOPN, Inject 0.3 mL (30 Units total) under the skin daily, Disp: 3 mL, Rfl: 0  •  Insulin Pen Needle (B-D ULTRAFINE III SHORT PEN) 31G X 8 MM MISC, Use 4 (four) times a day (with meals and at bedtime), Disp: 400 each, Rfl: 1  •  lidocaine (Lidoderm) 5 %, Apply 1 patch topically over 12  hours daily as needed (pain) Remove & Discard patch within 12 hours or as directed by MD, Disp: 30 patch, Rfl: 1  •  Lumigan 0.01 % ophthalmic drops, INSTILL 1 DROP into both eyes at bedtime, Disp: , Rfl:   •  montelukast (SINGULAIR) 10 mg tablet, Take 1 tablet (10 mg total) by mouth every evening, Disp: 90 tablet, Rfl: 1  •  Multiple Vitamin (MULTI VITAMIN DAILY PO), Take 1 tablet by mouth daily, Disp: , Rfl:   •  mupirocin (BACTROBAN) 2 % ointment, APPLY A SMALL AMOUNT TO THE AFFECTED FOOT/TOE AREA BY TOPICAL ROUTE 3 TIMES PER DAY, Disp: , Rfl:   •  mupirocin (BACTROBAN) 2 % ointment, Apply topically 3 (three) times a day, Disp: 30 g, Rfl: 1  •  nystatin (MYCOSTATIN) cream, Apply topically 2 (two) times a day, Disp: 30 g, Rfl: 1  •  nystatin powder, apply topically to affected area three times a day if needed for rash, Disp: 60 g, Rfl: 1  •  omeprazole (PriLOSEC) 20 mg delayed release capsule, take 1 capsule by mouth twice a day, Disp: 180 capsule, Rfl: 1  •  rivastigmine (EXELON) 4.6 mg/24 hr TD 24 hr patch, Place 1 patch on the skin over 24 hours daily, Disp: 30 patch, Rfl: 0  •  saccharomyces boulardii (FLORASTOR) 250 mg capsule, Take 250 mg by mouth 2 (two) times a day, Disp: , Rfl:   •  Spacer/Aero-Holding Chambers (AeroChamber Plus Elian-Vu Large) MISC, Use as directed, Disp: , Rfl:   •  methocarbamol (ROBAXIN) 500 mg tablet, Take 500 mg by mouth daily at bedtime as needed for muscle spasms (Patient not taking: Reported on 4/16/2025), Disp: , Rfl:   •  rivastigmine (Exelon) 9.5 mg/24 hr TD 24 hr patch, Place 1 patch on the skin over 24 hours daily (Patient not taking: Reported on 4/8/2025), Disp: 30 patch, Rfl: 5              Whom besides the patient is providing clinical information about today's encounter?   NO ADDITIONAL HISTORIAN (patient alone provided history)    Physical Exam and Assessment/Plan by Diagnosis:      PROCEDURE:  EXCISION NOTE     Procedural Plan:     Attending:   Assistant:  Karma  "C  Lesion Anatomic Location (use description from previous biopsy if applicable): Right upper chest 3.5x2.0cm  Pre-Op Diagnosis: epidermal cyst  Lesion is being treated as \"benign\" or \"MALIGNANT\": benign  Accession Number of any associated previous biopsy/excision: N/A    Written and verbal (witnessed) informed consent was obtained. We discussed that \"excision\" is a method of removing lesions, both benign and malignant lesions.  A portion of normal skin is often taken to ensure completeness of removal.  I reviewed that this procedure will include numbing the area, cutting around and under the skin lesion, undermining (\"freeing up\") surrounding tissue, and closing the wound with sutures (stitches) both inside and out.  Risks include and are not limited to the following:  Bleeding, pain, infection, scarring, recurrence, more required treatment, no additional information, numbness and/or loss of function (if nerves are damaged).  These risks were considered against the benefits that we discussed, and the patient opted to continue with the procedure. It was discussed with patient that every effort is made to minimize scarring, but scarring is influenced also by extrinsic factor such as location, age and genetics.     Procedural Time Out:      Correct patient? yes  Correct site per Clinic Report? yes  Correct site per previous Path Report? N/A  Correct site per Patient's recollection? N/A    Anesthesia:      Local anesthesia: 1% xylocaine with epi     Excision Description:      Post-Op Diagnosis: Same as Pre-Op Diagnosis (above)  Pre-op Size: 3.5 cm x 2.0 cm   Margins (enter \"0\" for lipoma/cyst or similar diagnosis): 0 cm  TOTAL POSTOPERATIVE DEFECT SIZE (I.e., Pre-op Size + Margins on both sides):  3.5x2.0 cm    The patient was seated in the procedure/exam room, anesthetized locally, prepped and draped in the usual fashion. Using a #15 blade with a scalpel, the lesion was excised in elliptical fashion.     REQUIRED Akhil " "MELANOMA DATA      This procedure was not performed to treat primary cutaneous melanoma through wide local excision      Closure Description:      The specific type of closure that was utilized:     INTERMEDIATE Closure  INTERMEDIATE CLOSURE     The patient was brought back into the procedure room, anesthetized locally, prepped and draped in the usual fashion. Using a #15 blade with a scalpel, the lesion was excised in elliptical fashion. The wound was  undermined in the fascial plane.  Purpose of undermining was to decrease wound tension and facilitate closure. Hemostasis was achieved with light electrocoagulation.    The wound was closed with subcutaneous sutures as follows:    Deep Suture Throw, Size, and Type (select all that apply):  Interrupted Deeps; 4-0; vicryl     Epidermal edge closure was accomplished with superficial sutures as follows:    Superficial Suture Throw, Size and Type (select all that apply):  Simple Running; 4-0; Prolene    FINAL LENGTH OF CLOSURE (please enter a length even for lipoma/cyst or similar diagnosis): 3.5 cm       Postoperative Care:      The wound was cleaned with sterile saline, dried off, surgical vaseline ointment was applied, and the wound was covered. A pressure dressing was applied for stabilization and light pressure.    Estimated blood loss:  Less than 3ml.  Complications: none  Post-op medications: none  Additional notes: none    Discharge Plans:      Discharge plans: Plan for return to us for suture removal, as scheduled in 14 days.   Patient condition at discharge: STABLE    The patient was given detailed oral and written instructions on postoperative care as detailed in consent. We urged the patient to call us if any problems or question should arise.         Please complete this section and then \"cut and paste\" it into the Patient Instructions section.  These notes should be printed and shared directly with the patient for review PRIOR TO leaving our office.       " "  YOUR \"AFTER SURGERY\" REVIEW & INSTRUCTIONS    What to Know About Your Procedure  An \"excision\" was performed today to allow the dermatologist to remove a skin lesion. The procedure involves a local numbing medication and removing the entire lesion (or as much as possible). Typically, the lesion is being removed because it does not look \"normal,\" because it is becoming irritated and traumatized, or for significant appearance reasons.  The skin was cut deeply and then repaired - usually with sutures (stitches).  The removed tissue has been sent to the pathologist who will confirm the diagnosis and verify if the lesion has been completely removed.  Surgical “Vaseline-type” ointment has been applied after the procedure to help create a barrier between your wound and the outside world.     The advantage of using sutures (stitches) to repair a skin excision is that it allows the lesion to heal as quickly as possible with the least amount of scarring and risk of infection, Still, there are some risks and potential complications that you should watch out for that include but are not limited to the following:    Some bleeding is normal at the time of procedure and some bleeding on the gauze bandage after the procedure is normal for the first few days after surgery.  Profuse bleeding or bleeding with swelling and pain is NOT normal and should be reported as detailed below.  Infection is uncommon after skin surgery.  Infection should be reported and is indicated by pain, redness, and discharge of purulent material.  Some pain may occur initially the day after surgery.  Persistent pain or increasing pain days after surgery is not expected and should be reported.  Every effort is made to minimize scar, but location, size, and genetics do play a role in scar appearance.  A surgical wound does not achieve its optimal appearance until 6 months.  There are several treatments available if scarring would be problematic including " "scar creams, silicone pad, laser and scar revision.  Skin discoloration can occur especially in people of color.  Its important to avoid sun on wound in first 6 months after surgery.  Treatment is available if pigment is problematic.  Incomplete removal of the lesion or recurrence of lesion can occur and - depending on the lesion - this would then require further treatment and more surgery.  Nerve damage/numbness and/or loss of function is very rare, but is most likely to occur if the lesion being removed is large or if it is in a \"high risk\" location.  Allergic reaction to lidocaine is rare.  More commonly, epinephrine is used with the lidocaine, and, occasionally, epinephrine (a.k.a., adrenalin) may cause a brief feeling of anxiety or jitteriness.  The person at the microscope (pathologist) may provide additional information that was unexpected. This unexpected finding could prompt the need for additional treatment or evaluation.    At-Home Wound Care  Try NOT to remove the pressure bandage for 48 hours. Keep the area clean and dry while this bandage is on.   After removing the bandage for the first time, gently clean the area with soap and water. If the bandage is difficult to remove, getting the bandage wet in the shower will sometimes help soften the adhesive and allow it to be removed more easily.   You will now need to cleanse this area daily in the shower with gentle soap. There is no need to scrub the area. You will need to apply plain Vaseline ointment (this is over the counter and not a prescription) to the site for up to 2 weeks followed by a clean appropriately sized bandage to area.  Non stick dressing and paper tape (or Hypafix) are recommended for sensitive skin but a bandaid is fine if it covers the area well.  In general, sutures (stitches) are removed in about 5-7 days for face wounds and in about 12-14 days for the body wounds.  Your dermatologist wants you to return for suture removal in 14 " "DAYS.     General Restrictions  For TWO (2) DAYS:  You will need to take it very easy as this time is highest risk for bleeding. Being a \"couch potato\" during these two days is generally recommended.   For surgeries on the face/neck/scalp: Avoid leaning down to pick things up off the floor as this brings blood up to your head. Instead, squat down to pick things up.     For TWO WEEKS (14 DAYS):   No heavy lifting (anything greater than 10 pounds)   You can start to do slow, gentle activities such as slow walking but nothing to increase your heart rate and blood pressure too much (such as cardiovascular exercise).  It is important to take it easy as there is still a risk for bleeding and a high risk popping of stitches open during this time.     Site Specific Restrictions  If we did surgery near your eyes (including the nose, forehead, front part of your scalp, cheeks): It is VERY common to get a large amount of swelling around your eyes (puffy eyes). Although less frequent, this can be enough to swell your eyes shut and can also come along with bruising. This should not hurt and is very expected and normal. It is typically worst at ~ 3 days out from your surgery and dramatically better 1 week post-operatively.   If we did surgery around your nose: No blowing your nose as this puts you at higher risk of popping stitches durign this time. Instead dab under your nose with a tissue or use a Q-tip inside your nose.  If we did surgery on the skin above or below your lip or your lip itself: No sipping from straws as this uses a lot of the muscles around your mouth and increases the risk of popping stitches during this time.    Managing Your Pain After Surgery  You can expect to have some pain after surgery. This is normal. The pain is typically worse the first two days after surgery, and quickly begins to get better.   You can use heating pads or ice packs on your incisions to help reduce your pain.   The best strategy for " "controlling your pain after surgery is \"around the clock\" pain control. You can take \"over-the-counter\" (non-prescription) Acetaminophen (Tylenol) for discomfort, unless you have been told not to by your physician.  If you are taking Tylenol at the maximum dose, you can alternate Tylenol with Advil/Motrin (ibuprofen) as long as there are no contraindications.  Alternating these medications with each other (I.e., Tylenol followed by Motrin/Advil) allows you to maximize your pain control.  To alternate these medications properly, you will take a dose of pain medication every three hours, alternating Tylenol (acetaminophen) and Advil/Motrin (ibuprofen).  Start by taking 650 mg of Tylenol (2 pills of 325 mg)  3 hours later take 600 mg of Motrin (3 pills of 200 mg)  3 hours after taking the Motrin take 650 mg of Tylenol  3 hours after that take 600 mg of Motrin.    As an example, if your first dose of Tylenol (acetaminophen) is at 12:00 PM, then you would alternate with Motrin as directed below, continuing usually for no more than a total of 48 hours straight:     12:00 PM  Tylenol 650 mg (2 pills of 325 mg)    3:00 PM  Motrin 600 mg (3 pills of 200 mg)    6:00 PM  Tylenol 650 mg (2 pills of 325 mg)    9:00 PM  Motrin 600 mg (3 pills of 200 mg)      WARNING:  Do NOT take more than 4000 mg of Tylenol or 3200 mg of Motrin in a \"24-hour\" period.       What if you still have pain?    If you have pain that is not controlled with the over-the-counter pain medications (Tylenol and Motrin/Advil), do not hesitate to call our staff using the number provided. We will help make sure you are managing your pain in the best way possible, and if necessary, we can provide a prescription for additional pain medication.     Call our office IMMEDIATELY with any signs of wound infection.  This includes fever, chills, increasing redness, warmth, tenderness, severe discomfort/pain, or pus or foul smell coming from the wound. [facility] can be directly at [phone] (SKIN) and ask for the on-call Dermatologist covering surgery/Mohs.    If Bleeding is Noticed  If bleeding is soaking through the bandage, remove the bandage and see where the bleeding is coming from.  Place a clean cloth over the area and apply firm pressure directly to the area that is bleeding for thirty minutes.    Check the wound ONLY after 30 minutes of direct pressure; do not cheat and sneak a peak, as that does not count (i.e., resets the clock back to zero).  If bleeding persists after 30 minutes of legitimate direct pressure, then try one more round of direct pressure to the area.    Should bleeding become heavier or not stop after the second application of direct pressure for 30 minutes, then call [facility] directly at [phone] (SKIN) and ask to speak with the on-call Dermatologist covering surgery/Mohs.  If after hours, go to your nearest Emergency Room or Urgent Care and have the team call [facility] directly at [phone] (SKIN); you will be connected to our after hours team.                Scribe Attestation    I,:  Karma Talbot MA am acting as a scribe while in the presence of the attending physician.:       I,:  Thien Bañuelos DO personally performed the services described in this documentation    as scribed in my presence.:

## 2025-05-07 NOTE — PATIENT INSTRUCTIONS
"                                                                                      Please complete this section and then \"cut and paste\" it into the Patient Instructions section.  These notes should be printed and shared directly with the patient for review PRIOR TO leaving our office.         YOUR \"AFTER SURGERY\" REVIEW & INSTRUCTIONS    What to Know About Your Procedure  An \"excision\" was performed today to allow the dermatologist to remove a skin lesion. The procedure involves a local numbing medication and removing the entire lesion (or as much as possible). Typically, the lesion is being removed because it does not look \"normal,\" because it is becoming irritated and traumatized, or for significant appearance reasons.  The skin was cut deeply and then repaired - usually with sutures (stitches).  The removed tissue has been sent to the pathologist who will confirm the diagnosis and verify if the lesion has been completely removed.  Surgical “Vaseline-type” ointment has been applied after the procedure to help create a barrier between your wound and the outside world.     The advantage of using sutures (stitches) to repair a skin excision is that it allows the lesion to heal as quickly as possible with the least amount of scarring and risk of infection, Still, there are some risks and potential complications that you should watch out for that include but are not limited to the following:    Some bleeding is normal at the time of procedure and some bleeding on the gauze bandage after the procedure is normal for the first few days after surgery.  Profuse bleeding or bleeding with swelling and pain is NOT normal and should be reported as detailed below.  Infection is uncommon after skin surgery.  Infection should be reported and is indicated by pain, redness, and discharge of purulent material.  Some pain may occur initially the day after surgery.  Persistent pain or increasing pain days after surgery is not " "expected and should be reported.  Every effort is made to minimize scar, but location, size, and genetics do play a role in scar appearance.  A surgical wound does not achieve its optimal appearance until 6 months.  There are several treatments available if scarring would be problematic including scar creams, silicone pad, laser and scar revision.  Skin discoloration can occur especially in people of color.  Its important to avoid sun on wound in first 6 months after surgery.  Treatment is available if pigment is problematic.  Incomplete removal of the lesion or recurrence of lesion can occur and - depending on the lesion - this would then require further treatment and more surgery.  Nerve damage/numbness and/or loss of function is very rare, but is most likely to occur if the lesion being removed is large or if it is in a \"high risk\" location.  Allergic reaction to lidocaine is rare.  More commonly, epinephrine is used with the lidocaine, and, occasionally, epinephrine (a.k.a., adrenalin) may cause a brief feeling of anxiety or jitteriness.  The person at the microscope (pathologist) may provide additional information that was unexpected. This unexpected finding could prompt the need for additional treatment or evaluation.    At-Home Wound Care  Try NOT to remove the pressure bandage for 48 hours. Keep the area clean and dry while this bandage is on.   After removing the bandage for the first time, gently clean the area with soap and water. If the bandage is difficult to remove, getting the bandage wet in the shower will sometimes help soften the adhesive and allow it to be removed more easily.   You will now need to cleanse this area daily in the shower with gentle soap. There is no need to scrub the area. You will need to apply plain Vaseline ointment (this is over the counter and not a prescription) to the site for up to 2 weeks followed by a clean appropriately sized bandage to area.  Non stick dressing and " "paper tape (or Hypafix) are recommended for sensitive skin but a bandaid is fine if it covers the area well.  In general, sutures (stitches) are removed in about 5-7 days for face wounds and in about 12-14 days for the body wounds.  Your dermatologist wants you to return for suture removal in 14 DAYS.     General Restrictions  For TWO (2) DAYS:  You will need to take it very easy as this time is highest risk for bleeding. Being a \"couch potato\" during these two days is generally recommended.   For surgeries on the face/neck/scalp: Avoid leaning down to pick things up off the floor as this brings blood up to your head. Instead, squat down to pick things up.     For TWO WEEKS (14 DAYS):   No heavy lifting (anything greater than 10 pounds)   You can start to do slow, gentle activities such as slow walking but nothing to increase your heart rate and blood pressure too much (such as cardiovascular exercise).  It is important to take it easy as there is still a risk for bleeding and a high risk popping of stitches open during this time.     Site Specific Restrictions  If we did surgery near your eyes (including the nose, forehead, front part of your scalp, cheeks): It is VERY common to get a large amount of swelling around your eyes (puffy eyes). Although less frequent, this can be enough to swell your eyes shut and can also come along with bruising. This should not hurt and is very expected and normal. It is typically worst at ~ 3 days out from your surgery and dramatically better 1 week post-operatively.   If we did surgery around your nose: No blowing your nose as this puts you at higher risk of popping stitches durign this time. Instead dab under your nose with a tissue or use a Q-tip inside your nose.  If we did surgery on the skin above or below your lip or your lip itself: No sipping from straws as this uses a lot of the muscles around your mouth and increases the risk of popping stitches during this " "time.    Managing Your Pain After Surgery  You can expect to have some pain after surgery. This is normal. The pain is typically worse the first two days after surgery, and quickly begins to get better.   You can use heating pads or ice packs on your incisions to help reduce your pain.   The best strategy for controlling your pain after surgery is \"around the clock\" pain control. You can take \"over-the-counter\" (non-prescription) Acetaminophen (Tylenol) for discomfort, unless you have been told not to by your physician.  If you are taking Tylenol at the maximum dose, you can alternate Tylenol with Advil/Motrin (ibuprofen) as long as there are no contraindications.  Alternating these medications with each other (I.e., Tylenol followed by Motrin/Advil) allows you to maximize your pain control.  To alternate these medications properly, you will take a dose of pain medication every three hours, alternating Tylenol (acetaminophen) and Advil/Motrin (ibuprofen).  Start by taking 650 mg of Tylenol (2 pills of 325 mg)  3 hours later take 600 mg of Motrin (3 pills of 200 mg)  3 hours after taking the Motrin take 650 mg of Tylenol  3 hours after that take 600 mg of Motrin.    As an example, if your first dose of Tylenol (acetaminophen) is at 12:00 PM, then you would alternate with Motrin as directed below, continuing usually for no more than a total of 48 hours straight:     12:00 PM  Tylenol 650 mg (2 pills of 325 mg)    3:00 PM  Motrin 600 mg (3 pills of 200 mg)    6:00 PM  Tylenol 650 mg (2 pills of 325 mg)    9:00 PM  Motrin 600 mg (3 pills of 200 mg)      WARNING:  Do NOT take more than 4000 mg of Tylenol or 3200 mg of Motrin in a \"24-hour\" period.       What if you still have pain?    If you have pain that is not controlled with the over-the-counter pain medications (Tylenol and Motrin/Advil), do not hesitate to call our staff using the number provided. We will help make sure you are managing your pain in the best way " possible, and if necessary, we can provide a prescription for additional pain medication.     Call our office IMMEDIATELY with any signs of wound infection.  This includes fever, chills, increasing redness, warmth, tenderness, severe discomfort/pain, or pus or foul smell coming from the wound. [facility] can be directly at [phone] (SKIN) and ask for the on-call Dermatologist covering surgery/Mohs.    If Bleeding is Noticed  If bleeding is soaking through the bandage, remove the bandage and see where the bleeding is coming from.  Place a clean cloth over the area and apply firm pressure directly to the area that is bleeding for thirty minutes.    Check the wound ONLY after 30 minutes of direct pressure; do not cheat and sneak a peak, as that does not count (i.e., resets the clock back to zero).  If bleeding persists after 30 minutes of legitimate direct pressure, then try one more round of direct pressure to the area.    Should bleeding become heavier or not stop after the second application of direct pressure for 30 minutes, then call [facility] directly at [phone] (SKIN) and ask to speak with the on-call Dermatologist covering surgery/Mohs.  If after hours, go to your nearest Emergency Room or Urgent Care and have the team call [facility] directly at [phone] (SKIN); you will be connected to our after hours team.

## 2025-05-07 NOTE — PROGRESS NOTES
"POC expires Auth Status Total   Visits  Start date  Expiration date PT/OT + Visit Limit? Co-Insurance   25 BOMN  25                                                Visit/Unit Tracking  AUTH Status: BOMN Date            Visits  Authed:  Used 1 (IE) 1 1 1            Remaining                    PLAN OF CARE START: 25  PLAN OF CARE END: 2025  PROGRESS NOTE DUE: 25 (PN on schedule)  FREQUENCY: 1-2x/week for 8-12 weeks  PRECAUTIONS: anxiety, FALL/SAFETY  DIAGNOSIS: severe cognitive impairment  VISITS: 4      Daily Note     Today's date: 2025  Patient name: Shantelle Romano  : 1945  MRN: [id number]  Referring provider: Nicki Bah MD  Dx:   Encounter Diagnosis     ICD-10-CM    1. Severe cognitive impairment  R41.89                        Subjective: \"The homework you gave me was easy\"      Objective: See treatment diary below.     TA:  *card matching by suit (field of 4 total), instructed to name word based off of category associated with each suit written on external key. Required mod A overall for attention,direction following, and no repeats of words     *matching perfection pieces>board focused on basic  skill and attention. Good problem solving noted and matching. Good attention noted as well.     Assessment: Tolerated treatment fair. Focus of today's session on simple direction following, basic  skills, and problem solving in multi modal environment.  Provided with cognitive HEPs.  Patient would benefit from continued OT to address sustained attention, temporal orientation, utilization of internal/external memory strategies, direction following, and overall increased cognitive functions for daily tasks.        Plan: Continue per plan of care.         "

## 2025-05-13 ENCOUNTER — RESULTS FOLLOW-UP (OUTPATIENT)
Age: 80
End: 2025-05-13

## 2025-05-13 PROCEDURE — 88304 TISSUE EXAM BY PATHOLOGIST: CPT | Performed by: STUDENT IN AN ORGANIZED HEALTH CARE EDUCATION/TRAINING PROGRAM

## 2025-05-13 PROCEDURE — 88342 IMHCHEM/IMCYTCHM 1ST ANTB: CPT | Performed by: STUDENT IN AN ORGANIZED HEALTH CARE EDUCATION/TRAINING PROGRAM

## 2025-05-14 ENCOUNTER — EVALUATION (OUTPATIENT)
Age: 80
End: 2025-05-14
Attending: PSYCHIATRY & NEUROLOGY
Payer: COMMERCIAL

## 2025-05-14 DIAGNOSIS — R41.89 SEVERE COGNITIVE IMPAIRMENT: Primary | ICD-10-CM

## 2025-05-14 PROCEDURE — 97530 THERAPEUTIC ACTIVITIES: CPT

## 2025-05-14 NOTE — PROGRESS NOTES
POC expires Auth Status Total   Visits  Start date  Expiration date PT/OT + Visit Limit? Co-Insurance   25 BOMN  25                                                Visit/Unit Tracking  AUTH Status: BOMN Date           Visits  Authed:  Used 1 (IE) 1 1 1  1 (PN)           Remaining                    PLAN OF CARE START: 25  PLAN OF CARE END: 2025  PROGRESS NOTE DUE: follow up post scheduling   FREQUENCY: 1-2x/week for 8-12 weeks  PRECAUTIONS: anxiety, FALL/SAFETY  DIAGNOSIS: severe cognitive impairment  VISITS: 5      Daily Note     Today's date: 2025  Patient name: Shantelle Romano  : 1945  MRN: [id number]  Referring provider: Nicki Bah MD  Dx:   Encounter Diagnosis     ICD-10-CM    1. Severe cognitive impairment  R41.89                        Subjective: Pt reports no changes since last visit       Objective: See treatment diary below.     TA:Assessed the following this session to assess progress in therapy: trail making part A. All other observations/assessments carried forward from previous evaluation. See below for goal updates.     Assessments    FUNCTIONAL ACTIVITY QUESTIONAIRE   The Functional Activities Questionnaire (FAQ) measures instrumental activities of daily living (IADLs). The FAQ may be used to differentiate those with mild cognitive impairment and mild Alzheimer's disease   TASK Completely unable to perform task (3)  Requires assistance (2)  Has difficulty but accomplishes task, or has never done, but the informant feels could do the task with difficulty (1)  Normal performance, or has never don the task, but the informant feels the patient could do the task if necessary    Writing checks, paying bills, balancing checkbook   X     Assembling tax records, business affairs, papers   X     Shopping alone for clothes, house hold necessities, or groceries  X      Playing a game of skill, working on a hobby    X    Heating water, making a cup of  "coffee, turning off the stove   X    Preparing a balance meal  X     Keeping track of current events  X     Paying attention to, understanding, discussing a TV show, book, or magazine    X   Remembering appointments, family occasions, holidays, medications   X    Traveling our of the neighborhood, arranging or taking buses  X      Total points: 17     Evaluation   SLUMS:  *screen individuals to look for the presence of cognitive deficits, and to identify changes in cognition over time*    SCORING AND NORMS    HIGH SCHOOL EDUCATION  27-30, NORMAL   21-26, MILD NEUROCOGNITIVE DISORDER   1-20, DEMENTIA   LESS THEN HIGH SCHOOL EDUCATION  25-30, NORMAL   20-24, MILD NEUROCOGNITIVE DISORDER  1-19, DEMENTIA     QUESTION Status on 4/16/25   What day of the week is it 1   What is the year 1   What state are we in 1   How much did you spend 1   How much do you have left 0   Naming animals in 1 minute 1   Recall of 5 objects 2   Series of numbers 1   Clock face hour markers 0   Clock face time  0   X in triangle 1   Largest figure 1   Short story, females name 0   Short story, when did she go back to work 2   Short story, what work did she do 2   Short story, what state did she live in 0   Total score 14/30 (less then high school education)        TRAIL MAKING TEST:   \"widely used test to assess executive function in patients with neuro injury. Successful performance of the TMT requires a variety of mental abilities including letter and number recognition mental flexibility, visual scanning, and motor function. Norms are based on age related scores\"     PN 5/14 STATUS ON IE: 4/16/25 COMMENTS   TRAIL MAKING PART A     NORM: 58 seconds 1 minute and 14 seconds  1 minute and 19 seconds        SHORT TERM GOALS (6-8 weeks)    GOAL  STATUS ON IE  GOAL STATUS    Pt will increase sustained attention by completing trail making part A in 1 minute and 14 seconds  or less to complete IADLs 1 minute and 19 seconds ACHIEVED           Pt will " score 16/30 or greater on SLUMS to improve independence with life roles 14/30 Established      Pt will demo G carryover and understanding of temporal orientation compensatory strategies and orientation of daily schedules (ie. Use of calendars, alarms, etc) for inc safety with life roles and IADL fxn  Established      Pt will demo G carryover of use of internal/external memory strategy aides for improved recall of daily events, improved executive functioning with 25% accuracy   Established      LONG TERM GOALS( 8-12 weeks)    GOAL  STATUS ON IE  GOAL STATUS    Pt will increase sustained attention by completing trail making part A in 1 minute and 10 seconds  or less to complete IADLs 1 minute and 19 seconds PROGRESSING, CONTINUE GOAL        Pt will score 18/30 or greater on SLUMS to improve independence with life roles 14/30 Established      Pt will follow 1-2 step written directions for improved overall comprehension and engagement in life roles and salient tasks.  Established      Pt will demo G carryover of use of internal/external memory strategy aides for improved recall of daily events, improved executive functioning with 50% accuracy   Established        *coogam puzzle, provided with solution of 2D image to recreate 3D structure. Required max A for problem solving and completion of task.     Assessment: Tolerated treatment well. Pt demonstrated slight improvement on trail making part A since IE. Focus of today's session on simple direction following, basic  skills, and problem solving in multi modal environment. Continues to require max A for re-direction to task at hand.   Provided with cognitive HEPs.  Patient would benefit from continued OT to address sustained attention, temporal orientation, utilization of internal/external memory strategies, direction following, and overall increased cognitive functions for daily tasks.        Plan: Continue per plan of care.

## 2025-05-16 NOTE — TELEPHONE ENCOUNTER
Patient called office to check if she needs to keep site covered after it's been excised on 5/07/2025. I informed her it is not necessary  since it's been a week now. If interested she can continue to keep covered to reduce infection but not necessary if causing skin irritation.     Patient wanted to make family aware while I was on the phone but was not able to get them.    She appreciates the information.    Patient was advised to call if there's any other concerns.

## 2025-05-19 ENCOUNTER — TELEPHONE (OUTPATIENT)
Age: 80
End: 2025-05-19

## 2025-05-19 NOTE — TELEPHONE ENCOUNTER
Pt's son Solomon called wanting to confirm dosage of rivastigmine patch. Advised Dr Bah ordered rivastigmine 4.6 mg patch for 1 month followed by 9.5 mg patch. He verbalized understanding

## 2025-05-21 ENCOUNTER — EVALUATION (OUTPATIENT)
Age: 80
End: 2025-05-21
Payer: COMMERCIAL

## 2025-05-21 ENCOUNTER — CLINICAL SUPPORT (OUTPATIENT)
Age: 80
End: 2025-05-21

## 2025-05-21 ENCOUNTER — NURSE TRIAGE (OUTPATIENT)
Age: 80
End: 2025-05-21

## 2025-05-21 ENCOUNTER — OFFICE VISIT (OUTPATIENT)
Age: 80
End: 2025-05-21
Attending: PSYCHIATRY & NEUROLOGY
Payer: COMMERCIAL

## 2025-05-21 DIAGNOSIS — Z48.02 VISIT FOR SUTURE REMOVAL: Primary | ICD-10-CM

## 2025-05-21 DIAGNOSIS — R26.89 IMPAIRMENT OF BALANCE: Primary | ICD-10-CM

## 2025-05-21 DIAGNOSIS — R41.89 SEVERE COGNITIVE IMPAIRMENT: Primary | ICD-10-CM

## 2025-05-21 PROCEDURE — 97161 PT EVAL LOW COMPLEX 20 MIN: CPT

## 2025-05-21 PROCEDURE — 97530 THERAPEUTIC ACTIVITIES: CPT

## 2025-05-21 NOTE — PROGRESS NOTES
POC expires Auth Status Total   Visits  Start date  Expiration date PT/OT + Visit Limit? Co-Insurance   25 BOMN  25                                                Visit/Unit Tracking  AUTH Status: BOMN Date          Visits  Authed:  Used 1 (IE) 1 1 1  1 (PN) 1          Remaining                    PLAN OF CARE START: 25  PLAN OF CARE END: 2025  PROGRESS NOTE DUE: 25 (PN on schedule)   FREQUENCY: 1-2x/week for 8-12 weeks  PRECAUTIONS: anxiety, FALL/SAFETY  DIAGNOSIS: severe cognitive impairment  VISITS: 6      Daily Note     Today's date: 2025  Patient name: Shantelle Romano  : 1945  MRN: [id number]  Referring provider: Nicki Bah MD  Dx:   Encounter Diagnosis     ICD-10-CM    1. Severe cognitive impairment  R41.89                        Subjective: Pt reports no changes since last visit       Objective: See treatment diary below.     TA:  *multi matrix- matching shapes on blocks together by presenting 1 shape at a time. Required mod A for matching accuracy, pacing of task, and instruction following     *odd one out, field of 4 (100% accuracy), field of 5 (90% accuracy), field of 6 (85% accuracy)     *pattern blocks, difficulty level 2-3, 2 rounds total. Required max A overall for accuracy.     Assessment: Tolerated treatment fair. VC for appropriate behavior and comments throughout session.  Focus of today's session on simple direction following, basic  skills, and problem solving in multi modal environment. Continues to require max A for re-direction to task at hand.   Provided with cognitive HEPs. Family concerned with pt aggressive and combative behaviors at home, recommended follow up with primary MD and educated on strategies for de-escalation in the home.  Patient would benefit from continued OT to address sustained attention, temporal orientation, utilization of internal/external memory strategies, direction following, and overall  increased cognitive functions for daily tasks.        Plan: Continue per plan of care.

## 2025-05-21 NOTE — PROGRESS NOTES
"[facility] Dermatology Clinic Note     Patient Name: Shantelle Romano  Encounter Date: 5/21/25       Have you been cared for by a [facility] Dermatologist in the last 3 years and, if so, which description applies to you? Yes. I have been here within the last 3 years, and my medical history has NOT changed since that time. I am not of child-bearing potential.     REVIEW OF SYSTEMS:  Have you recently had or currently have any of the following? No changes in my recent health.   PAST MEDICAL HISTORY:  Have you personally ever had or currently have any of the following?  If \"YES,\" then please provide more detail. No changes in my medical history.   HISTORY OF IMMUNOSUPPRESSION: Do you have a history of any of the following:  Systemic Immunosuppression such as Diabetes, Biologic or Immunotherapy, Chemotherapy, Organ Transplantation, Bone Marrow Transplantation or Prednisone?  No     Answering \"YES\" requires the addition of the dotphrase \"IMMUNOSUPPRESSED\" as the first diagnosis of the patient's visit.   FAMILY HISTORY:  Any \"first degree relatives\" (parent, brother, sister, or child) with the following?    No changes in my family's known health.   PATIENT EXPERIENCE:    Do you want the Dermatologist to perform a COMPLETE skin exam today including a clinical examination under the \"bra and underwear\" areas?  NO  If necessary, do we have your permission to call and leave a detailed message on your Preferred Phone number that includes your specific medical information?  Yes      Allergies[1] Current Medications[2]              Whom besides the patient is providing clinical information about today's encounter?   NO ADDITIONAL HISTORIAN (patient alone provided history)    Physical Exam and Assessment/Plan by Diagnosis:    Suture removal    Date/Time: 5/21/2025 1:15 PM    Performed by: Roxanne Elena MA  Authorized by: Thien Bañuelos DO    Universal Protocol:  Procedure performed by: (JOSHUA Abad)  Consent: Verbal consent " "obtained. Written consent not obtained  Risks and benefits: risks, benefits and alternatives were discussed  Consent given by: patient  Time out: Immediately prior to procedure a \"time out\" was called to verify the correct patient, procedure, equipment, support staff and site/side marked as required.  Timeout called at: 5/21/2025 12:36 PM.  Patient understanding: patient states understanding of the procedure being performed  Patient consent: the patient's understanding of the procedure matches consent given  Procedure consent: procedure consent matches procedure scheduled  Relevant documents: relevant documents present and verified  Test results: test results available and properly labeled  Site marked: the operative site was not marked  Radiology Images displayed and confirmed. If images not available, report reviewed: imaging studies not available  Patient identity confirmed: verbally with patient      Patient location:  Clinic  Location:     Laterality:  Right    Location:  Trunk    Trunk location:  Chest  Procedure details:     Tools used:  Suture removal kit    Wound appearance:  No sign(s) of infection, good wound healing and clean  Post-procedure details:     Post-removal:  Steri-Strips applied    Patient tolerance of procedure:  Tolerated with difficulty    Complication (if applicable):  Patient stated she expereiened discomfort with suture removal    Roxanne Elena             [1]   Allergies  Allergen Reactions    Brimonidine Other (See Comments)    Salicylic Acid-Sulfur Other (See Comments)    Sulfa Antibiotics Rash and Other (See Comments)   [2]   Current Outpatient Medications:     acetaminophen (TYLENOL) 650 mg CR tablet, Take 500 mg by mouth 2 (two) times a day, Disp: , Rfl:     albuterol (2.5 mg/3 mL) 0.083 % nebulizer solution, Take 3 mL (2.5 mg total) by nebulization every 6 (six) hours as needed for wheezing or shortness of breath, Disp: 360 mL, Rfl: 1    albuterol (ProAir HFA) 90 mcg/act inhaler, " Inhale 2 puffs every 6 (six) hours as needed for wheezing or shortness of breath (cough, chest tightness), Disp: 18 g, Rfl: 0    ammonium lactate (LAC-HYDRIN) 12 % lotion, Apply topically 2 (two) times a day as needed for dry skin, Disp: 225 g, Rfl: 1    atorvastatin (LIPITOR) 20 mg tablet, take 1 tablet by mouth at bedtime, Disp: 90 tablet, Rfl: 1    Calcium Carbonate-Vitamin D (CALCIUM-D PO), Take 1 tablet by mouth in the morning, Disp: , Rfl:     clotrimazole (LOTRIMIN) 1 % cream, , Disp: , Rfl:     Continuous Blood Gluc  (Dexcom G7 ) YOLANDA, Use 1 Application if needed (check sugars), Disp: 1 each, Rfl: 0    Continuous Blood Gluc Sensor (Dexcom G7 Sensor), Use 1 Device every 10 days, Disp: 9 each, Rfl: 3    fluticasone (FLONASE) 50 mcg/act nasal spray, 1 spray into each nostril daily pt uses as needed, Disp: 54 g, Rfl: 1    fluticasone-umeclidinium-vilanterol (Trelegy Ellipta) 100-62.5-25 mcg/actuation inhaler, inhale 1 puff by mouth daily Rinse mouth after use, Disp: 180 blister, Rfl: 3    furosemide (LASIX) 20 mg tablet, take 1 tablet by mouth twice a day, Disp: 180 tablet, Rfl: 1    gabapentin (NEURONTIN) 100 mg capsule, Take 2 capsules (200 mg total) by mouth daily at bedtime, Disp: 180 capsule, Rfl: 1    guaiFENesin (MUCINEX) 600 mg 12 hr tablet, take 1 tablet by mouth twice a day if needed for cough or congestion, Disp: 180 tablet, Rfl: 1    insulin aspart (NovoLOG FlexPen) 100 UNIT/ML injection pen, Per sliding scale, Max 100 units per day, Disp: 15 mL, Rfl: 5    Insulin Glargine Solostar (Lantus SoloStar) 100 UNIT/ML SOPN, Inject 0.3 mL (30 Units total) under the skin daily, Disp: 3 mL, Rfl: 0    Insulin Pen Needle (B-D ULTRAFINE III SHORT PEN) 31G X 8 MM MISC, Use 4 (four) times a day (with meals and at bedtime), Disp: 400 each, Rfl: 1    lidocaine (Lidoderm) 5 %, Apply 1 patch topically over 12 hours daily as needed (pain) Remove & Discard patch within 12 hours or as directed by MD, Disp:  30 patch, Rfl: 1    Lumigan 0.01 % ophthalmic drops, INSTILL 1 DROP into both eyes at bedtime, Disp: , Rfl:     methocarbamol (ROBAXIN) 500 mg tablet, Take 500 mg by mouth daily at bedtime as needed for muscle spasms (Patient not taking: Reported on 4/16/2025), Disp: , Rfl:     montelukast (SINGULAIR) 10 mg tablet, Take 1 tablet (10 mg total) by mouth every evening, Disp: 90 tablet, Rfl: 1    Multiple Vitamin (MULTI VITAMIN DAILY PO), Take 1 tablet by mouth daily, Disp: , Rfl:     mupirocin (BACTROBAN) 2 % ointment, APPLY A SMALL AMOUNT TO THE AFFECTED FOOT/TOE AREA BY TOPICAL ROUTE 3 TIMES PER DAY, Disp: , Rfl:     mupirocin (BACTROBAN) 2 % ointment, Apply topically 3 (three) times a day, Disp: 30 g, Rfl: 1    nystatin (MYCOSTATIN) cream, Apply topically 2 (two) times a day, Disp: 30 g, Rfl: 1    nystatin powder, apply topically to affected area three times a day if needed for rash, Disp: 60 g, Rfl: 1    omeprazole (PriLOSEC) 20 mg delayed release capsule, take 1 capsule by mouth twice a day, Disp: 180 capsule, Rfl: 1    rivastigmine (EXELON) 4.6 mg/24 hr TD 24 hr patch, Place 1 patch on the skin over 24 hours daily, Disp: 30 patch, Rfl: 0    rivastigmine (Exelon) 9.5 mg/24 hr TD 24 hr patch, Place 1 patch on the skin over 24 hours daily (Patient not taking: Reported on 4/8/2025), Disp: 30 patch, Rfl: 5    saccharomyces boulardii (FLORASTOR) 250 mg capsule, Take 250 mg by mouth 2 (two) times a day, Disp: , Rfl:     Spacer/Aero-Holding Chambers (AeroChamber Plus Elian-Vu Large) MISC, Use as directed, Disp: , Rfl:

## 2025-05-21 NOTE — PROGRESS NOTES
PT Evaluation          POC expires Unit limit Auth Expiration date PT/OT + Visit Limit? Co-Insurance   25 N/a pend BOMN Yes                               Visit/Unit Tracking  AUTH Status:  Date               pend Used 1               Remaining                       Today's date: 2025  Patient name: Shantelle Romano  : 1945  MRN: [id number]  Referring provider: Nicki Bah MD  Dx:   Encounter Diagnosis     ICD-10-CM    1. Impairment of balance  R26.89             Assessment  Assessment details: Patient is a 79 y.o. Female who presents to skilled outpatient PT with reports of sig hx of falls (severe, with injury 2024 resulting in R femur fx) and imbalance. Co-morbidities impacting level of care include standard fall precautions, impulsivity, cognitive impairment, cardiac dysfunction, SOB, COPD, and seizures. Patient values on 5xSTS, TUG, 10mWT, mCSTIB, and METZGER indicate patient is at increased risk of falls. Distance ambulated on 6MWT was below age- and gender- compared norms. Sig amount of time towards patient education, including PT implications and limitations, PT POC/goals, basic HEP, and patient scores compared to normative values. Patient verbalized understanding/agreement and with no questions at end of session. Patient is a good candidate to receive skilled OPPT services to improve her balance and reduce her risk of falls.      Impairments: Abnormal coordination, Abnormal gait, Abnormal muscle tone, Abnormal or restricted ROM, Activity intolerance, Impaired balance, Impaired physical strength, Lacks appropriate HEP, Poor posture, Poor body mechanics, Pain with function, Safety issue, Weight-bearing intolerance, Abnormal movement, Difficulty understanding, Abnormal muscle firing  Understanding of Dx/Px/POC: Fair  Prognosis: Fair    Patient verbalized understanding of POC.         Please contact me if you have any  questions or recommendations. Thank you for the referral and the opportunity to share in Shantelle Romano's care.        Plan  Plan details:   Patient would benefit from: Skilled PT and Skilled OT  Planned modality interventions: Biofeedback, Cryotherapy, TENS, Thermotherapy  Planned therapy interventions: Abdominal trunk stabilization, ADL training, Balance, Balance/WB training, Breathing training, Body mechanics training, Coordination, Functional ROM exercises, Gait training, HEP, Joint Mobilization, Manual Therapy, Cosme taping, Motor coordination training, Neuromuscular re-education, Patient education, Postural training, Strengthening, Stretching, Therapeutic activities, Therapeutic exercises, Therapeutic training, Transfer training, Activity modification, Work reintegration  Frequency: 1-2x/wk  Duration in weeks: 12 weeks  Plan of Care beginning date: 5/21/2025  Plan of Care expiration date: 3 months - 8/21/2025  Treatment plan discussed with: Patient and Family       Goals  Short Term Goals (4 weeks):    - Patient will improve time on TUG by 2.9 seconds to facilitate improved safety in all ambulation  - Patient will be independent in basic HEP 2-3 weeks  - Patient will improve 5xSTS score by 2.3 seconds to promote improved LE functional strength needed for ADLs    Long Term Goals (12 weeks):  - Patient will be independent in a comprehensive home exercise program  - Patient will improve gait speed by 0.18 m/s to improve safety with community ambulation  - Patient will improve METZGER by 6 points in order to improve static balance and reduce risk for falls  - Patient will be able to demonstrate HT in gait without veering  - Patient will improve 6 Minute Walk Test score by 190 feet to promote improved cardiovascular endurance  - Patient will report 50% reduction in near falls in order to improve safety with functional tasks and reduce his risk for falls  - Patient will report going on walks at least 3 days per week  to promote independence and improved cardiovascular endurance  - Patient will be able to ascend/descend stairs reciprocally with 1 UE assist to promote independence and safety with ADLs  - Patient will report 50% reduction in near falls when ambulating on uneven terrain      Cut off score    All date taken from APTA Neuro Section or Rehab Measures      Mckeon/56  MDC: 6 pts  Age Norms:  60-69: M - 55   F - 55  70-79: M - 54   F - 53  80-89: M - 53   F - 50 5xSTS: Zuri et al 2010  MDC: 2.3 sec  Age Norms:  60-69: 11.4 sec  70-79: 12.6 sec  80-89: 14.8 sec   TUG  MDC: 4.14 sec  Cut off score:  >13.5 sec community dwelling adults  >32.2 frail elderly  <20 I for basic transfers  >30 dependent on transfers 10 Meter Walk Test: Cesario et al 2011  MDC: 0.18 m/s  20-29: M - 1.35 m   F - 1.34 m  30-39: M - 1.43 m   F - 1.34 m  40-49: M - 1.43 m   F - 1.39 m  50-59: M - 1.43 m   F - 1.31 m  60-69: M - 1.34 m   F - 1.24 m  70-79: M - 1.26 m   F - 1.13 m  80-89: M - 0.97 m   F - 0.94 m    Household Ambulator < 0.4 m/s  Limited Community Ambulator 0.4 - 0.8 m/s  Community Ambulator 0.8 - 1.2 m/s  Safely cross the street > 1.2 m/s   FGA  MCID: 4 pts  Geriatrics/community < 22/30 fall risk  Geriatrics/community < 20/30 unexplained falls    DGI  MDC: vestibular - 4 pts  MDC: geriatric/community - 3 pts  Falls risk <19/24 mCTSIB  Norm: 20-60 yrs  Eyes open firm: norm sway 0.21-0.48  Eyes closed firm: norm sway 0.48-0.99  Eyes open foam: norm sway 0.38-0.71  Eyes closed foam: norm sway 0.70-2.22   6 Minute Walk Test  MDC: 190.98 ft  MCID: 164 ft    Age Norms  60-69: M - 1876 ft (571.80 m)  F - 1765 ft (537.98 m)  70-79: M - 1729 ft (527.00 m)  F - 1545 ft (470.92 m)  80-89: M - 1368 ft (416.97 m)  F - 1286 ft (391.97 m) ABC: Kike & Nguyen, 2003  <67% increased risk for falls   Barwick-Syed Monofilaments  Evaluator Size:        Force (grams):          Hand/Dorsal Thresholds:        Plantar Thresholds:  - 1.65                        - 0.008                       - Normal                                 - Normal  - 2.36                       - 0.02                         - Normal                                 - Normal  - 2.44                       - 0.04                         - Normal                                 - Normal  - 2.83                       - 0.07                         - Normal                                 - Normal  - 3.22                       - 0.16                         - Diminished light touch          - Normal  - 3.61                       - 0.40                         - Diminished light touch          - Normal  - 3.84                       - 0.60                         - Diminished protective           - Diminished light touch  - 4.08                       - 1.00                         - Diminished protective           - Diminished light touch  - 4.17                       - 1.40                         - Diminished protective           - Diminished light touch  - 4.31                       - 2.00                         - Diminished protective           - Diminished light touch  - 4.56                       - 4.00                         - Loss of protective sense      - Diminished protective  - 4.74                       - 6.00                         - Loss of protective sense      - Diminished protective  - 4.93                       - 8.00                         - Loss of protective sense      - Diminished protective  - 5.07                       - 10.0                         - Loss of protective sense     - Loss of protective sense  - 5.18                       - 15.0                         - Loss of protective sense     - Loss of protective sense  - 5.46                       - 26.0                         - Loss of protective sense     - Loss of protective sense  - 5.88                       - 60.0                         - Loss of protective sense     - Loss of protective sense  -  "6.10                       - 100                          - Loss of protective sense     - Loss of protective sense  - 6.45                       - 180                          - Loss of protective sense     - Loss of protective sense  - 6.65                       - 300                          - Deep pressure sense only  - Deep pressure sense only         Subjective    History of Present Illness  - Mechanism of injury: Patient experienced severe fall with injury Sept 2024 resulting in R femur fx: had SNF and HH therapy services. Was unable to walk for ~ 1 month. Unable to discern amount of falls in past year: \"more than once, but I can't count.\" Son reports at least 3 falls / year. 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 Griffin on the needs notes were reviewed in detail, patient was also advised to go for cognitive therapy discussed with them different medication options she is agreeable to go on Exelon patch 4.6 mg per 24-hour for 1 month followed by 9.5 mg per 24 hours side effects discussed with them they will discuss with the PCP prior to starting the patch and if has any side effects then they will stop and let me know.\"    - Primary AD: RW  - Assist level at home: yes; private caregiver 5x/wk from ~8-2 to assist with ADLs/IADLs    Patient goal:   Improve balance  Improve car transfers    Pain  Patient denied pain     Social Support  - Steps to enter house: 1 small step, but transitioned to ramp  - Stairs in house: 7 ELVI, but has chair lift   - Lives in: private home, split level  - Lives with: son-in-law, daughter, son (Solomon)    - Employment status: retired caregiver  - " Hand dominance: R-handed    Treatments  - Previous treatment: SNF, HH  - Current treatment: OT for cognitive therapy  - Diagnostic Testing:   PET brain metabolic scan from 1/24/2025 was reported as mild patchy hypometabolism in the right frontal and temporal lobes along with mildly decreased activity at the right anterior cingulate gyrus 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 given evidence of prior frontal surgery it is also possible this reflects postsurgical changes and asymmetric encephalomalacia no characteristics of Alzheimer's dementia      Objective     LE MMT  - R Hip Flexion: 3+/5  L Hip Flexion: 3+/5  - R Hip Extension: 3+/5  L Hip Extension: 3+/5  - R Hip Abduction: 3+/5  L Hip Abduction: 4-/5  - R Hip Adduction: 3+/5  L Hip Adduction: 4-/5  - R Knee Extension: 3+/5  L Knee Extension: 3+/5  - R Knee Flexion: 3+/5  L Knee Flexion: 3+/5  - R Ankle DF: 3+/5   L Ankle DF: 3+/5  - R Ankle PF: 3+/5   L Ankle PF: 3+/5    Sensation  - Light touch: impaired; neuropathy  - Deep pressure: impaired; neuropathy    Coordination  - Alternate Toe Taps: wfl  - Finger to Nose: wfl    Gait  - Abnormalities: reduced gait speed, shuffling gait, reduced floor clearance            Outcome Measures Initial Eval  5/21        5xSTS 26.74s no UE        TUG  - Regular    - Cognitive    50.62s w RW    Defer           10 meter 0.34 m/s w RW        METZGER 27/56[        FGA defer        DGI defer        mCTSIB  - FTEO (firm)  - FTEC (firm)  - FTEO (foam)  - FTEC (foam)   30 sec  5.2 sec  Defer  Defer          6MWT 270 ft w RW                                     Precautions: standard fall precautions, COPD, cardiac dysfunction, HTN, IBS, arthritis, seizures, SOB, syncope/collapse    Past Medical History:   Diagnosis Date    Acute kidney injury superimposed on chronic kidney disease  (HCC) 11/26/2016    ADHD (attention deficit hyperactivity disorder) 03/17/2019    Arthritis     BL HIPS    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    Carpal  tunnel syndrome     Chest pain 03/06/2017    Chronic kidney disease     Claudication ([facility]) 03/15/2019    Closed fracture of base of middle phalanx of finger 06/22/2017    Closed fracture of middle phalanx of left little finger 07/08/2017    Coagulopathy ([facility]) 05/15/2019    Colloid cyst of brain ([facility])     COPD (chronic obstructive pulmonary disease) ([facility])     COPD (chronic obstructive pulmonary disease) ([facility])     Coronary artery disease     Cough     Diabetes mellitus ([facility])     Femur fracture, right ([facility]) 09/05/2024    GERD (gastroesophageal reflux disease)     Glaucoma     Gout     History of brain disorder 10/07/2020    Hyperlipidemia     Hypertension     Irritable bowel syndrome     Melena 02/26/2019    PAD (peripheral artery disease) ([facility]) 05/15/2019    Paronychia of great toe of left foot 10/07/2020    Post-traumatic seizures ([facility]) 07/23/2015    Renal disorder     Seizures ([facility])     last 2015    Shortness of breath     Single seizure ([facility]) 05/31/2016    SOB (shortness of breath)     Stroke-like symptoms 07/02/2024    Syncope and collapse 11/11/2020    Urinary tract infection without hematuria 11/26/2016    Wheezing

## 2025-05-21 NOTE — TELEPHONE ENCOUNTER
"FOLLOW UP: routed to the provider   Benny [phone], ok to leave a detailed message     REASON FOR CONVERSATION: AGITATION AND ANXIETY    SYMPTOMS: see triage below    OTHER: Pt's son Solomon called reporting worsening agitation and anxiety. Denies hallucination.    States that pt never tried any med for agitation and anxiety in the past. Requesting prn med be sent to [email or web address] pharmacy on file.     DISPOSITION: Discuss with Provider and Call Back Patient    Answer Assessment - Initial Assessment Questions  1. REASON FOR CALL: \"What is your main concern right now?\"      Worsening agitation and anxiety    2. ONSET: \"When did the  start?\"      Ongoing but worsening     3. SEVERITY: \"How bad is the ?\"      Pt gets nasty, argumentative, throwing things. Hard to redirect. This past Monday, pt was agitated all day    4. FEVER: \"Do you have a fever?\"      Denies    5. OTHER SYMPTOMS: \"Do you have any other new symptoms?\"      Sundowning but pt gets agitated and anxious during the day too.     6. TREATMENTS AND RESPONSE: \"What have you done so far to try to make this better? What medicines have you used?\"      Rivastigmine 9.5 mg patch daily    Protocols used: No Protocol Available-Adult-[place]    "

## 2025-05-21 NOTE — HOME EXERCISE EDUCATION
Program_ID:[id number]   Access Code: [email or web address]  URL: [email or web address]  Date: 05-  Prepared By: Noreen Pagan    Program Notes      Exercises      - Proper Sit to Stand Technique - 1 x daily - 5 x weekly - 2 sets - 10 reps      - Seated March - 1 x daily - 5 x weekly - 2 sets - 10 reps - 3 sec hold      - Seated Hip Abduction with Resistance - 1 x daily - 5 x weekly - 2 sets - 10 reps - 3 sec hold      - Seated Long Arc Quad - 1 x daily - 5 x weekly - 2 sets - 10 reps - 3 sec hold

## 2025-05-27 ENCOUNTER — TELEPHONE (OUTPATIENT)
Age: 80
End: 2025-05-27

## 2025-05-27 DIAGNOSIS — E78.5 HYPERLIPIDEMIA ASSOCIATED WITH TYPE 2 DIABETES MELLITUS  (HCC): ICD-10-CM

## 2025-05-27 DIAGNOSIS — J41.0 SIMPLE CHRONIC BRONCHITIS (HCC): ICD-10-CM

## 2025-05-27 DIAGNOSIS — E11.69 HYPERLIPIDEMIA ASSOCIATED WITH TYPE 2 DIABETES MELLITUS  (HCC): ICD-10-CM

## 2025-05-27 RX ORDER — MONTELUKAST SODIUM 10 MG/1
10 TABLET ORAL EVERY EVENING
Qty: 90 TABLET | Refills: 1 | Status: SHIPPED | OUTPATIENT
Start: 2025-05-27 | End: 2025-06-02 | Stop reason: SDUPTHER

## 2025-05-27 NOTE — TELEPHONE ENCOUNTER
It is still on her medication list on my end, so I'm not sure what the pharmacy is talking about. I did send in a refill for her

## 2025-05-27 NOTE — TELEPHONE ENCOUNTER
Patients son called in confused, because the pharmacy stated the medication (montelukast (SINGULAIR) 10 mg tablet) has been d/c but patient has been taking this every night before bed. Son is confused on why this was d/c and why no one told pt it was d/c if she should stop it or not. PCP please call son back to further assist.

## 2025-05-28 ENCOUNTER — OFFICE VISIT (OUTPATIENT)
Age: 80
End: 2025-05-28
Attending: PSYCHIATRY & NEUROLOGY
Payer: COMMERCIAL

## 2025-05-28 DIAGNOSIS — R41.89 SEVERE COGNITIVE IMPAIRMENT: Primary | ICD-10-CM

## 2025-05-28 PROCEDURE — 97530 THERAPEUTIC ACTIVITIES: CPT

## 2025-05-28 RX ORDER — GUAIFENESIN 600 MG/1
TABLET, EXTENDED RELEASE ORAL
Qty: 180 TABLET | Refills: 1 | Status: SHIPPED | OUTPATIENT
Start: 2025-05-28 | End: 2025-06-06 | Stop reason: SDUPTHER

## 2025-05-28 RX ORDER — ATORVASTATIN CALCIUM 20 MG/1
20 TABLET, FILM COATED ORAL
Qty: 90 TABLET | Refills: 1 | Status: SHIPPED | OUTPATIENT
Start: 2025-05-28 | End: 2025-06-02 | Stop reason: SDUPTHER

## 2025-05-28 NOTE — PROGRESS NOTES
"POC expires Auth Status Total   Visits  Start date  Expiration date PT/OT + Visit Limit? Co-Insurance   25 BOMN  25                                                Visit/Unit Tracking  AUTH Status: BOMN Date         Visits  Authed:  Used 1 (IE) 1 1 1  1 (PN) 1 1         Remaining                    PLAN OF CARE START: 25  PLAN OF CARE END: 2025  PROGRESS NOTE DUE: 25 (PN on schedule)   FREQUENCY: 1-2x/week for 8-12 weeks  PRECAUTIONS: anxiety, FALL/SAFETY  DIAGNOSIS: severe cognitive impairment  VISITS: 7      Daily Note     Today's date: 2025  Patient name: Shantelle Romano  : 1945  MRN: [id number]  Referring provider: Nicki Bah MD  Dx:   Encounter Diagnosis     ICD-10-CM    1. Severe cognitive impairment  R41.89                        Subjective: Pt reports no changes since last visit       Objective: See treatment diary below.     TA:  *recreating words on tabletop based on opposite of written words provided (ie. Opposite of \"up\"). Utilized banana grams to spell opposite word tabletop. Required mod A overall for spelling, identifying opposite word, and locating letters in field of many.     Assessment: Tolerated treatment well. Pt pleasant this session.  Focus of today's session on simple direction following, basic  skills, and problem solving in multi modal environment. Required mod VC for re-direction to task at hand.   Provided with cognitive HEPs. .  Patient would benefit from continued OT to address sustained attention, temporal orientation, utilization of internal/external memory strategies, direction following, and overall increased cognitive functions for daily tasks.        Plan: Continue per plan of care.         "

## 2025-05-28 NOTE — TELEPHONE ENCOUNTER
Call patients son, offered today's opening at 10AM, he declined as patient has another appointment.

## 2025-06-01 DIAGNOSIS — E78.5 HYPERLIPIDEMIA ASSOCIATED WITH TYPE 2 DIABETES MELLITUS  (HCC): ICD-10-CM

## 2025-06-01 DIAGNOSIS — E11.69 HYPERLIPIDEMIA ASSOCIATED WITH TYPE 2 DIABETES MELLITUS  (HCC): ICD-10-CM

## 2025-06-01 RX ORDER — ATORVASTATIN CALCIUM 20 MG/1
20 TABLET, FILM COATED ORAL
Qty: 90 TABLET | Refills: 1 | OUTPATIENT
Start: 2025-06-01

## 2025-06-02 ENCOUNTER — NURSE TRIAGE (OUTPATIENT)
Dept: OTHER | Facility: OTHER | Age: 80
End: 2025-06-02

## 2025-06-02 DIAGNOSIS — E78.5 HYPERLIPIDEMIA ASSOCIATED WITH TYPE 2 DIABETES MELLITUS  (HCC): ICD-10-CM

## 2025-06-02 DIAGNOSIS — Z79.4 TYPE 2 DIABETES MELLITUS WITH DIABETIC NEUROPATHY, WITH LONG-TERM CURRENT USE OF INSULIN (HCC): ICD-10-CM

## 2025-06-02 DIAGNOSIS — E11.69 HYPERLIPIDEMIA ASSOCIATED WITH TYPE 2 DIABETES MELLITUS  (HCC): ICD-10-CM

## 2025-06-02 DIAGNOSIS — J41.0 SIMPLE CHRONIC BRONCHITIS (HCC): ICD-10-CM

## 2025-06-02 DIAGNOSIS — E11.40 TYPE 2 DIABETES MELLITUS WITH DIABETIC NEUROPATHY, WITH LONG-TERM CURRENT USE OF INSULIN (HCC): ICD-10-CM

## 2025-06-02 RX ORDER — INSULIN GLARGINE 100 [IU]/ML
30 INJECTION, SOLUTION SUBCUTANEOUS DAILY
Qty: 3 ML | Refills: 0 | Status: SHIPPED | OUTPATIENT
Start: 2025-06-02 | End: 2025-06-03 | Stop reason: SDUPTHER

## 2025-06-02 NOTE — TELEPHONE ENCOUNTER
Medication Refill Request     [facility] closed - all new prescriptions to [facility]      Medication: guaiFENesin (MUCINEX) 600 mg 12 hr tablet     Dose/Frequency:  take 1 tablet by mouth twice a day if needed for cough or congestion     Quantity: 180 tablet     Pharmacy: [facility] [address] [place] [address] [[id number]]     Office:   [x] PCP/Provider -   [] Specialty/Provider -     Does the patient have enough for 3 days?   [x] Yes   [] No - Send as HP to POD    Is the patient completely out of the medication or does not have enough until the next business day?  [] Yes - send to Call Hub  [x] No - Send as HP to POD    ----  Medication Refill Request       Medication: atorvastatin (LIPITOR) 20 mg tablet     Dose/Frequency:  take 1 tablet by mouth at bedtime     Quantity: 90 tablet    Pharmacy: [facility] [address] [place] [address] [[id number]]     Office:   [x] PCP/Provider -   [] Specialty/Provider -     Does the patient have enough for 3 days?   [x] Yes   [] No - Send as HP to POD    Is the patient completely out of the medication or does not have enough until the next business day?  [] Yes - send to Call Hub  [x] No - Send as HP to POD  -----  Medication Refill Request       Medication:   montelukast (SINGULAIR) 10 mg tablet      Dose/Frequency: Take 1 tablet (10 mg total) by mouth every evening     Quantity:  90 tablet      Pharmacy: [facility] [address] [place] [address] [[id number]     Office:   [x] PCP/Provider -   [] Specialty/Provider -     Does the patient have enough for 3 days?   [x] Yes   [] No - Send as HP to POD    Is the patient completely out of the medication or does not have enough until the next business day?  [] Yes - send to Call Hub  [x] No - Send as HP to POD

## 2025-06-02 NOTE — TELEPHONE ENCOUNTER
"REASON FOR CONVERSATION: Medication Refill    SYMPTOMS: Patients pharmacy is closing and all scripts need to be sent to the [facility]. In need of urgent refill for Lantus Solostar    OTHER HEALTH INFORMATION: none    PROTOCOL DISPOSITION: Home Care    CARE ADVICE PROVIDED: Lantus sent as requested    PRACTICE FOLLOW-UP: Patient needs new scripts for all medications sent to the [facility].           Reason for Disposition   [1] Caller requesting a prescription renewal (no refills left), no triage required, AND [2] triager able to renew prescription per department policy    Answer Assessment - Initial Assessment Questions  1. DRUG NAME: \"What medicine do you need to have refilled?\"      Lantus Solostar    Protocols used: Medication Refill and Renewal Call-Adult-    "

## 2025-06-02 NOTE — TELEPHONE ENCOUNTER
"Regarding: Refill insulin  ----- Message from Jina LEON sent at 6/2/2025  6:06 PM EDT -----  \"I need a medication for Shantelle sent to [facility] in [place] instead of [facility] because they are closing down. She only has 2 days of insulin for night left. She has others that need to be refilled but this is the most important one\".     Medication Refill Request       Medication: Insulin Glargine Solostar (Lantus SoloStar) 100 UNIT/ML SOPN    Dose/Frequency: nject 0.3 mL (30 Units total) under the skin daily    Quantity:  3 mL (10 day supply)    Pharmacy: [id number] - [place], [place] - [address] [place] [address] [[id number]]    Office:   [x ] PCP/Provider -   [ ] Specialty/Provider -     Does the patient have enough for 3 days?   [ ] Yes   [ x] No - Send as HP to POD    "

## 2025-06-03 ENCOUNTER — OFFICE VISIT (OUTPATIENT)
Age: 80
End: 2025-06-03
Attending: PSYCHIATRY & NEUROLOGY
Payer: COMMERCIAL

## 2025-06-03 DIAGNOSIS — R41.89 SEVERE COGNITIVE IMPAIRMENT: Primary | ICD-10-CM

## 2025-06-03 DIAGNOSIS — Z79.4 TYPE 2 DIABETES MELLITUS WITH DIABETIC NEUROPATHY, WITH LONG-TERM CURRENT USE OF INSULIN (HCC): ICD-10-CM

## 2025-06-03 DIAGNOSIS — E11.40 TYPE 2 DIABETES MELLITUS WITH DIABETIC NEUROPATHY, WITH LONG-TERM CURRENT USE OF INSULIN (HCC): ICD-10-CM

## 2025-06-03 PROCEDURE — 97530 THERAPEUTIC ACTIVITIES: CPT | Performed by: OCCUPATIONAL THERAPIST

## 2025-06-03 RX ORDER — INSULIN ASPART 100 [IU]/ML
INJECTION, SOLUTION INTRAVENOUS; SUBCUTANEOUS
Qty: 15 ML | Refills: 5 | Status: SHIPPED | OUTPATIENT
Start: 2025-06-03

## 2025-06-03 RX ORDER — MONTELUKAST SODIUM 10 MG/1
10 TABLET ORAL EVERY EVENING
Qty: 90 TABLET | Refills: 1 | Status: SHIPPED | OUTPATIENT
Start: 2025-06-03

## 2025-06-03 RX ORDER — ATORVASTATIN CALCIUM 20 MG/1
20 TABLET, FILM COATED ORAL
Qty: 90 TABLET | Refills: 1 | Status: SHIPPED | OUTPATIENT
Start: 2025-06-03

## 2025-06-03 RX ORDER — INSULIN GLARGINE 100 [IU]/ML
30 INJECTION, SOLUTION SUBCUTANEOUS DAILY
Qty: 15 ML | Refills: 5 | Status: SHIPPED | OUTPATIENT
Start: 2025-06-03

## 2025-06-03 NOTE — TELEPHONE ENCOUNTER
Lantus sent to zenaida -- can we please see if [facility] can call [facility] to have the remainder of her scripts transferred

## 2025-06-03 NOTE — PROGRESS NOTES
POC expires Auth Status Total   Visits  Start date  Expiration date PT/OT + Visit Limit? Co-Insurance   25 BOMN  25                                                Visit/Unit Tracking  AUTH Status: BOMN Date 4/16 4/23 5/1 5/7 5/14 5/21 5/27 6/3       Visits  Authed:  Used 1 (IE) 1 1 1  1 (PN) 1 1 1        Remaining                    PLAN OF CARE START: 25  PLAN OF CARE END: 2025  PROGRESS NOTE DUE: 25 (PN on schedule)   FREQUENCY: 1-2x/week for 8-12 weeks  PRECAUTIONS: anxiety, FALL/SAFETY  DIAGNOSIS: severe cognitive impairment  VISITS: 8      Daily Note     Today's date: 6/3/2025  Patient name: Shantelle Romano  : 1945  MRN: [id number]  Referring provider: Nicki Bah MD  Dx:   Encounter Diagnosis     ICD-10-CM    1. Severe cognitive impairment  R41.89               Start Time: 1150  Stop Time: 1245  Total time in clinic (min): 55 minutes    Subjective: Pt reports no changes since last visit       Objective: See treatment diary below.     TA:  *Trail making activity, searching for numbers in ascending order and matching letter, min VC given for accuracy, occasional  loss of sequence noted     *16 piece puzzle, emphasis on basic EF/ skills, mod-max VC needed for accuracy    *Sliding puzzle, pt completed 2 puzzles with an emphasis on executive functioning and visual perceptual skills, min cues needed for accuracy, completed puzzle in fair amount of time with good accuracy     Assessment: Tolerated treatment well. Pt pleasant this session. Focus of session on sequencing, working memory, sustained attention, and EF/ skills. Good attention to task noted today. Provided with 4 cognitive HEPs. Patient would benefit from continued OT to address sustained attention, temporal orientation, utilization of internal/external memory strategies, direction following, and overall increased cognitive functions for daily tasks.        Plan: Continue per plan of care.

## 2025-06-06 DIAGNOSIS — J41.0 SIMPLE CHRONIC BRONCHITIS (HCC): ICD-10-CM

## 2025-06-06 NOTE — TELEPHONE ENCOUNTER
Patient's son in law called he is requesting a refill on her medication.    guaiFENesin (MUCINEX) 600 mg 12 hr tablet      [facility] pharmacy on file is best, please advise.       Thank you

## 2025-06-06 NOTE — TELEPHONE ENCOUNTER
Spoke with patients , scheduled an overbook on Tuesday 6/10 @ 2pm based on transportation needs. Overbook ok'd by Dr. Bah.

## 2025-06-08 RX ORDER — GUAIFENESIN 600 MG/1
600 TABLET, EXTENDED RELEASE ORAL EVERY 12 HOURS SCHEDULED
Qty: 180 TABLET | Refills: 1 | Status: SHIPPED | OUTPATIENT
Start: 2025-06-08

## 2025-06-10 ENCOUNTER — TELEPHONE (OUTPATIENT)
Age: 80
End: 2025-06-10

## 2025-06-10 ENCOUNTER — OFFICE VISIT (OUTPATIENT)
Dept: NEUROLOGY | Facility: CLINIC | Age: 80
End: 2025-06-10
Payer: COMMERCIAL

## 2025-06-10 VITALS — HEART RATE: 55 BPM | SYSTOLIC BLOOD PRESSURE: 108 MMHG | OXYGEN SATURATION: 94 % | DIASTOLIC BLOOD PRESSURE: 60 MMHG

## 2025-06-10 DIAGNOSIS — Z87.898 HISTORY OF SEIZURE: ICD-10-CM

## 2025-06-10 DIAGNOSIS — R41.89 SEVERE COGNITIVE IMPAIRMENT: Primary | ICD-10-CM

## 2025-06-10 PROCEDURE — 99214 OFFICE O/P EST MOD 30 MIN: CPT | Performed by: PSYCHIATRY & NEUROLOGY

## 2025-06-10 RX ORDER — DIVALPROEX SODIUM 250 MG/1
250 TABLET, FILM COATED, EXTENDED RELEASE ORAL DAILY
Qty: 30 TABLET | Refills: 5 | Status: SHIPPED | OUTPATIENT
Start: 2025-06-10

## 2025-06-10 RX ORDER — MEMANTINE HYDROCHLORIDE 5 MG-10 MG
KIT ORAL
Qty: 1 TABLET | Refills: 0 | Status: SHIPPED | OUTPATIENT
Start: 2025-06-10

## 2025-06-10 RX ORDER — MEMANTINE HYDROCHLORIDE 10 MG/1
10 TABLET ORAL 2 TIMES DAILY
Qty: 60 TABLET | Refills: 5 | Status: SHIPPED | OUTPATIENT
Start: 2025-06-10

## 2025-06-10 NOTE — TELEPHONE ENCOUNTER
Patient son in law Ron called- patient seen by Neuro today and two new medications ordered:  Namenda and Depakote  Lars requesting provider to review and ensure there are no contraindications for these medications or potential interactions with other medications.  Please advise

## 2025-06-10 NOTE — PROGRESS NOTES
Neurology Ambulatory Visit  Name: Shantelle Romano       : 1945       MRN: [id number]   Encounter Provider: Nicki Bah MD   Encounter Date: 6/10/2025  Encounter department: [facility]      Shantelle Romano is a 79 y.o. female.       Assessment & Plan  Severe cognitive impairment    Orders:    memantine (NAMENDA TITRATION PACK); Follow package directions.    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    Discussed with family regarding her aggressive behavior will try her on Depakote if does not get any relief with Depakote then could try Seroquel in the future, it sounds more like she is having mood issues that she would be labile with her mood also would recommend patient to be seen [facility] for a second opinion.      History of seizure         Remote history of seizures she has not had seizures for a real long time she is off the antiseizure medications    Subjective:  Chief Complaint   Patient presents with    Severe cognitive impairment       HISTORY OF PRESENT ILLNESS  Patient is here accompanied with her family in follow-up for her memory difficulties, has a  history of single seizure in 2015 she has not had any seizures since then she had a colitis surgery done in 2010 she has been off the Keppra for a long time she has had cognitive impairment for the last several months, since her last visit with me in April 2025 according to the family patient is having more of outbursts with her mood and also she has anxiety and short-term memory issues she lives with her son and daughter and her boyfriend, she ambulates with a walker she has not had any falls no witnessed seizures she had a PET scan that was consistent with frontotemporal dementia she recently had a neuropsych testing and was felt that it is a combination of frontotemporal dementia and secondary to her history of cranial surgery, her appetite is good weight has been stable she denies any alcohol use sleep is good no other complaints.               Past Medical History:    Past Medical History[1]  Past Surgical History[2]    Family History:  Family History[3]    Social History:  Social History[4]   Living situation:    Work:      Allergies:  Allergies[5]    Medications:  Current Medications[6]    Objective:  /60 (BP Location: Left arm, Patient Position: Sitting, Cuff Size: Large)   Pulse 55   SpO2 94%      Labs  I have reviewed pertinent labs:  CBC:   Lab Results   Component Value Date    WBC 8.54 03/14/2025    RBC 5.13 (H) 03/14/2025    HGB 14.1 03/14/2025    HCT 46.0 03/14/2025    MCV 90 03/14/2025     03/14/2025    MCH 27.5 03/14/2025    MCHC 30.7 (L) 03/14/2025    RDW 15.9 (H) 03/14/2025    MPV 10.3 03/14/2025    NEUTROABS 6.15 03/14/2025     CMP:   Lab Results   Component Value Date    SODIUM 141 03/14/2025    K 4.5 03/14/2025     03/14/2025    CO2 31 03/14/2025    AGAP 8 03/14/2025    BUN 26 (H) 03/14/2025    CREATININE 1.03 03/14/2025    GLUC 187 (H) 10/23/2024    GLUF 104 (H) 03/14/2025    CALCIUM 9.9 03/14/2025    AST 21 03/14/2025    ALT 16 03/14/2025    ALKPHOS 66 03/14/2025    TP 7.6  03/14/2025    ALB 4.1 03/14/2025    TBILI 0.36 03/14/2025    EGFR 51 03/14/2025     Thyroid Studies:   Lab Results   Component Value Date    SPW9VQXACCMB 5.841 (H) 03/14/2025    FREET4 0.68 03/14/2025     Vitamin D Level   Lab Results   Component Value Date    JXSR96JOVCST 47.8 03/14/2025     Vitamin B12   Lab Results   Component Value Date    DZGXRGOT84 1,455 (H) 03/14/2025     Folate   Lab Results   Component Value Date    FOLATE >22.3 01/31/2025              General Exam  GENERAL APPEARANCE:  No distress, alert, interactive and cooperative.  CARDIOVASCULAR: Warm and well perfused  LUNGS: normal work of breathing on room air  EXTREMITIES: no peripheral edema     Neurologic Exam  MENTAL STATE:  Orientation was normal to time, place and person without aphasia or apraxia.     CRANIAL NERVES:  CN 2       visual fields full to confrontation.  CN 3, 4, 6  Pupils round, 4 mm in diameter, equally reactive to light. Lids symmetric; no ptosis. EOMs normal alignment, full range. No nystagmus.  CN 5  Facial sensation intact bilaterally.  CN 7  Normal and symmetric facial strength.   CN 8  Hearing intact to finger rub bilaterally.  CN 9  Palate elevates symmetrically.  CN 11  Normal strength of shoulder shrug and neck turning.  CN 12  Tongue midline, with normal bulk and strength.     MOTOR:  Motor exam was normal. Muscle bulk and tone were normal in both upper and lower extremities. Muscle strength was 5/5 in distal and proximal muscles in both upper and lower extremities. No fasciculations, tremor or other abnormal movements were present.     REFLEXES:  RIGHT UE   LEFT UE  BR:2              BR:2    Biceps:2      Biceps:2    Triceps:2     Triceps:2       RIGHT LLE   LEFT LLE    Knee:2           Knee:2    Ankle:2         Ankle:2       GAIT:  Ambulates with a walker    ROS:  Review of Systems   Constitutional:  Negative for appetite change, fatigue and fever.   HENT: Negative.  Negative for hearing loss, tinnitus, trouble  swallowing and voice change.    Eyes: Negative.  Negative for photophobia, pain and visual disturbance.   Respiratory: Negative.  Negative for shortness of breath.    Cardiovascular: Negative.  Negative for palpitations.   Gastrointestinal: Negative.  Negative for nausea and vomiting.   Endocrine: Negative.  Negative for cold intolerance.   Genitourinary: Negative.  Negative for dysuria, frequency and urgency.   Musculoskeletal:  Negative for back pain, gait problem, myalgias, neck pain and neck stiffness.   Skin: Negative.  Negative for rash.   Allergic/Immunologic: Negative.    Neurological:  Negative for dizziness, tremors, seizures, syncope, facial asymmetry, speech difficulty, weakness, light-headedness, numbness and headaches.   Hematological: Negative.  Does not bruise/bleed easily.   Psychiatric/Behavioral: Negative.  Negative for confusion, hallucinations and sleep disturbance.    All other systems reviewed and are negative.      I have reviewed the past medical history, surgical history, social and family history, current medications, allergies vitals, review of systems, and updated this information as appropriate today.    Administrative Statements   The total amount of time spent with the patient and on chart review and documentation was minutes. Issues addressed during this clinic visit included overall management, medication counseling or monitoring, and counseling and coordination of care.         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    Carpal tunnel syndrome     Chest pain 03/06/2017    Chronic kidney disease     Claudication ([facility]) 03/15/2019    Closed fracture of base of middle phalanx of finger 06/22/2017    Closed fracture of middle phalanx of left little  finger 07/08/2017    Coagulopathy (HCC) 05/15/2019    Colloid cyst of brain (HCC)     COPD (chronic obstructive pulmonary disease) (HCC)     COPD (chronic obstructive pulmonary disease) (HCC)     Coronary artery disease     Cough     Diabetes mellitus (HCC)     Femur fracture, right (HCC) 09/05/2024    GERD (gastroesophageal reflux disease)     Glaucoma     Gout     History of brain disorder 10/07/2020    Hyperlipidemia     Hypertension     Irritable bowel syndrome     Melena 02/26/2019    PAD (peripheral artery disease) (HCC) 05/15/2019    Paronychia of great toe of left foot 10/07/2020    Post-traumatic seizures ([facility]) 07/23/2015    Renal disorder     Seizures ([facility])     last 2015    Shortness of breath     Single seizure ([facility]) 05/31/2016    SOB (shortness of breath)     Stroke-like symptoms 07/02/2024    Syncope and collapse 11/11/2020    Urinary tract infection without hematuria 11/26/2016    Wheezing    [2]   Past Surgical History:  Procedure Laterality Date    BRAIN SURGERY      CARDIAC CATHETERIZATION N/A 8/28/2024    Procedure: Cardiac catheterization;  Surgeon: Matt Beltran MD;  Location: [facility];  Service: Cardiology    CATARACT EXTRACTION      CHOLECYSTECTOMY      COLECTOMY RADHA      COLONOSCOPY      DECOMPRESSION SPINE LUMBAR POSTERIOR  08/19/2019    HERNIA REPAIR      HYSTERECTOMY      INCISION TENDON SHEATH HAND      NECK SURGERY  05/24/2018    NEUROPLASTY / TRANSPOSITION MEDIAN NERVE AT CARPAL TUNNEL      ORIF FEMUR FRACTURE Right 9/7/2024    Procedure: OPEN REDUCTION W/ INTERNAL FIXATION (ORIF) FEMUR;  Surgeon: Rambo Yanez MD;  Location: [facility];  Service: Orthopedics    NY COLONOSCOPY FLX DX W/COLLJ SPEC WHEN PFRMD N/A 03/26/2019    Procedure: COLONOSCOPY;  Surgeon: Yaniv Hawley MD;  Location: [facility];  Service: Gastroenterology    NY ESOPHAGOGASTRODUODENOSCOPY TRANSORAL DIAGNOSTIC N/A 03/26/2019    Procedure: ESOPHAGOGASTRODUODENOSCOPY (EGD);  Surgeon: Yaniv  MD Chandan;  Location: [facility];  Service: Gastroenterology    REPLACEMENT TOTAL KNEE Right     RETINAL DETACHMENT SURGERY      TUBAL LIGATION     [3]   Family History  Problem Relation Name Age of Onset    Asthma Mother      Heart disease Mother      Emphysema Father      Cancer Father      Prostate cancer Father      Kidney cancer Sister      Diabetes Sister      Kidney disease Sister      Brain cancer Brother      Bone cancer Brother      Heart disease Brother     [4]   Social History  Tobacco Use    Smoking status: Former     Current packs/day: 0.00     Average packs/day: 0.5 packs/day for 40.0 years (20.0 ttl pk-yrs)     Types: Cigarettes     Start date:      Quit date:      Years since quittin.4    Smokeless tobacco: Never    Tobacco comments:     stopped many years ago   Vaping Use    Vaping status: Never Used   Substance Use Topics    Alcohol use: Never    Drug use: Never   [5]   Allergies  Allergen Reactions    Brimonidine Other (See Comments)    Salicylic Acid-Sulfur Other (See Comments)    Sulfa Antibiotics Rash and Other (See Comments)   [6]   Current Outpatient Medications:     acetaminophen (TYLENOL) 650 mg CR tablet, Take 500 mg by mouth in the morning and 500 mg before bedtime., Disp: , Rfl:     albuterol (2.5 mg/3 mL) 0.083 % nebulizer solution, Take 3 mL (2.5 mg total) by nebulization every 6 (six) hours as needed for wheezing or shortness of breath, Disp: 360 mL, Rfl: 1    albuterol (ProAir HFA) 90 mcg/act inhaler, Inhale 2 puffs every 6 (six) hours as needed for wheezing or shortness of breath (cough, chest tightness), Disp: 18 g, Rfl: 0    ammonium lactate (LAC-HYDRIN) 12 % lotion, Apply topically 2 (two) times a day as needed for dry skin, Disp: 225 g, Rfl: 1    atorvastatin (LIPITOR) 20 mg tablet, Take 1 tablet (20 mg total) by mouth daily at bedtime, Disp: 90 tablet, Rfl: 1    Calcium Carbonate-Vitamin D (CALCIUM-D PO), Take 1 tablet by mouth in the morning, Disp: , Rfl:      clotrimazole (LOTRIMIN) 1 % cream, , Disp: , Rfl:     Continuous Blood Gluc  (Dexcom G7 ) YOLANDA, Use 1 Application if needed (check sugars), Disp: 1 each, Rfl: 0    Continuous Blood Gluc Sensor (Dexcom G7 Sensor), Use 1 Device every 10 days, Disp: 9 each, Rfl: 3    divalproex sodium (Depakote ER) 250 mg 24 hr tablet, Take 1 tablet (250 mg total) by mouth daily, Disp: 30 tablet, Rfl: 5    fluticasone (FLONASE) 50 mcg/act nasal spray, 1 spray into each nostril daily pt uses as needed, Disp: 54 g, Rfl: 1    fluticasone-umeclidinium-vilanterol (Trelegy Ellipta) 100-62.5-25 mcg/actuation inhaler, inhale 1 puff by mouth daily Rinse mouth after use, Disp: 180 blister, Rfl: 3    furosemide (LASIX) 20 mg tablet, take 1 tablet by mouth twice a day, Disp: 180 tablet, Rfl: 1    gabapentin (NEURONTIN) 100 mg capsule, Take 2 capsules (200 mg total) by mouth daily at bedtime, Disp: 180 capsule, Rfl: 1    guaiFENesin (MUCINEX) 600 mg 12 hr tablet, Take 1 tablet (600 mg total) by mouth every 12 (twelve) hours, Disp: 180 tablet, Rfl: 1    insulin aspart (NovoLOG FlexPen) 100 UNIT/ML injection pen, Per sliding scale, Max 100 units per day, Disp: 15 mL, Rfl: 5    Insulin Glargine Solostar (Lantus SoloStar) 100 UNIT/ML SOPN, Inject 0.3 mL (30 Units total) under the skin daily, Disp: 15 mL, Rfl: 5    Insulin Pen Needle (B-D ULTRAFINE III SHORT PEN) 31G X 8 MM MISC, Use 4 (four) times a day (with meals and at bedtime), Disp: 400 each, Rfl: 1    Lumigan 0.01 % ophthalmic drops, , Disp: , Rfl:     memantine (NAMENDA TITRATION PACK), Follow package directions., Disp: 1 tablet, Rfl: 0    memantine (NAMENDA) 10 mg tablet, Take 1 tablet (10 mg total) by mouth 2 (two) times a day To start after finishing Namenda titration pack, Disp: 60 tablet, Rfl: 5    montelukast (SINGULAIR) 10 mg tablet, Take 1 tablet (10 mg total) by mouth every evening, Disp: 90 tablet, Rfl: 1    Multiple Vitamin (MULTI VITAMIN DAILY PO), Take 1 tablet by  mouth in the morning., Disp: , Rfl:     mupirocin (BACTROBAN) 2 % ointment, Apply topically 3 (three) times a day, Disp: 30 g, Rfl: 1    nystatin (MYCOSTATIN) cream, Apply topically 2 (two) times a day, Disp: 30 g, Rfl: 1    nystatin powder, apply topically to affected area three times a day if needed for rash, Disp: 60 g, Rfl: 1    omeprazole (PriLOSEC) 20 mg delayed release capsule, take 1 capsule by mouth twice a day, Disp: 180 capsule, Rfl: 1    rivastigmine (Exelon) 9.5 mg/24 hr TD 24 hr patch, Place 1 patch on the skin over 24 hours daily, Disp: 30 patch, Rfl: 5    saccharomyces boulardii (FLORASTOR) 250 mg capsule, Take 250 mg by mouth in the morning and 250 mg in the evening., Disp: , Rfl:     Spacer/Aero-Holding Chambers (AeroChamber Plus Elian-Vu Large) MISC, Use as directed, Disp: , Rfl:     lidocaine (Lidoderm) 5 %, Apply 1 patch topically over 12 hours daily as needed (pain) Remove & Discard patch within 12 hours or as directed by MD (Patient not taking: Reported on 6/10/2025), Disp: 30 patch, Rfl: 1    methocarbamol (ROBAXIN) 500 mg tablet, Take 500 mg by mouth daily at bedtime as needed for muscle spasms (Patient not taking: Reported on 4/16/2025), Disp: , Rfl:     mupirocin (BACTROBAN) 2 % ointment, APPLY A SMALL AMOUNT TO THE AFFECTED FOOT/TOE AREA BY TOPICAL ROUTE 3 TIMES PER DAY, Disp: , Rfl:     rivastigmine (EXELON) 4.6 mg/24 hr TD 24 hr patch, Place 1 patch on the skin over 24 hours daily (Patient not taking: Reported on 6/10/2025), Disp: 30 patch, Rfl: 0

## 2025-06-11 ENCOUNTER — OFFICE VISIT (OUTPATIENT)
Age: 80
End: 2025-06-11
Payer: COMMERCIAL

## 2025-06-11 ENCOUNTER — OFFICE VISIT (OUTPATIENT)
Age: 80
End: 2025-06-11
Attending: PSYCHIATRY & NEUROLOGY
Payer: COMMERCIAL

## 2025-06-11 DIAGNOSIS — R41.89 SEVERE COGNITIVE IMPAIRMENT: Primary | ICD-10-CM

## 2025-06-11 DIAGNOSIS — R26.89 IMPAIRMENT OF BALANCE: Primary | ICD-10-CM

## 2025-06-11 PROCEDURE — 97112 NEUROMUSCULAR REEDUCATION: CPT

## 2025-06-11 PROCEDURE — 97530 THERAPEUTIC ACTIVITIES: CPT

## 2025-06-11 NOTE — PROGRESS NOTES
Daily Note     Today's date: 2025  Patient name: Shantelle Romano  : 1945  MRN: [id number]  Referring provider: Nicki Bah MD  Dx:   Encounter Diagnosis     ICD-10-CM    1. Impairment of balance  R26.89                      Subjective: patient reports not doing any exercises       Objective: See treatment diary below  NMR:  In // bars with gait belt   Sit to stands 10x   Lateral side stepping 4 laps ea   Forward walking 4 laps   Marching 4 laps     Assessment: Tolerated treatment well. Patient experienced no loss of balance during session. Patient was able to perform above exercises without any loss of balance. Patient did require frequent rest breaks. Continue to progress. Patient demonstrated fatigue post treatment, exhibited good technique with therapeutic exercises, and would benefit from continued PT      Plan: Continue per plan of care.        POC expires Unit limit Auth Expiration date PT/OT + Visit Limit? Co-Insurance   25 N/a pend BOMN Yes                               Visit/Unit Tracking  AUTH Status:  Date              pend Used 1 2              Remaining

## 2025-06-11 NOTE — ASSESSMENT & PLAN NOTE
Orders:    memantine (NAMENDA TITRATION PACK); Follow package directions.    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    Discussed with family regarding her aggressive behavior will try her on Depakote if does not get any relief with Depakote then could try Seroquel in the future, it sounds more like she is having mood issues that she would be labile with her mood also would recommend patient to be seen [facility] care for a second opinion.

## 2025-06-11 NOTE — PROGRESS NOTES
POC expires Auth Status Total   Visits  Start date  Expiration date PT/OT + Visit Limit? Co-Insurance   25 BOMN  25                                                Visit/Unit Tracking  AUTH Status: BOMN Date 4/16 4/23 5/1 5/7 5/14 5/21 5/27 6/3 6/11      Visits  Authed:  Used 1 (IE) 1 1 1  1 (PN) 1 1 1 1       Remaining                    PLAN OF CARE START: 25  PLAN OF CARE END: 2025  PROGRESS NOTE DUE: 25 (PN on schedule)   FREQUENCY: 1-2x/week for 8-12 weeks  PRECAUTIONS: anxiety, FALL/SAFETY  DIAGNOSIS: severe cognitive impairment  VISITS: 9       Daily Note     Today's date: 2025  Patient name: Shantelle Romano  : 1945  MRN: [id number]  Referring provider: Nicki Bah MD  Dx:   Encounter Diagnosis     ICD-10-CM    1. Severe cognitive impairment  R41.89                            Subjective: Pt reports she got dx with dementia by neurology earlier this week.       Objective: See treatment diary below.     TA:  *Mental manipulation task; patient instructed to listen to four words from therapist and find in scrambled words tabletop. Then instructed to place in chronological order. Emphasis on working memory of words, sustained attention, and problem solving. Pt able to complete with close to full accuracy. Able to repeat sentence back to therapist with G processing speed.     *Odd one out; picking out objects in groups of 4 or 5 different from the rest. Emphasis on EF/ skills, problem solving, attention. Accuracy overall at ~80%, improving to full accuracy once given min vcs.     *Talked extensively about new dementia dx made by neurology this week. Plan of care, scheduling, and eventual transition to skilled maintenance OT discussed. Patient and son in law fairly receptive to information.       Assessment: Tolerated treatment well. Pt pleasant this session. Focus of session on sequencing, working memory, sustained attention, and EF/ skills. Good attention to task noted  today and G problem solving. Provided with 3 cognitive HEPs. Patient would benefit from continued OT to address sustained attention, temporal orientation, utilization of internal/external memory strategies, direction following, and overall increased cognitive functions for daily tasks.        Plan: Continue per plan of care.

## 2025-06-16 ENCOUNTER — TELEPHONE (OUTPATIENT)
Age: 80
End: 2025-06-16

## 2025-06-16 NOTE — TELEPHONE ENCOUNTER
REASON FOR CONVERSATION: FU appt needed or to [facility]    Maggied call from Matthew asking if pt should be seen sooner that 3/6/26. Thought that pt had an appt scheduled in August, but per chart review no appt was scheduled in August. Wanted to clarify stating that he thought Dr. Bah mentioned pt should be seen in 6 months.     Contact Matthew at [phone]

## 2025-06-18 ENCOUNTER — EVALUATION (OUTPATIENT)
Age: 80
End: 2025-06-18
Attending: PSYCHIATRY & NEUROLOGY
Payer: COMMERCIAL

## 2025-06-18 ENCOUNTER — OFFICE VISIT (OUTPATIENT)
Age: 80
End: 2025-06-18
Payer: COMMERCIAL

## 2025-06-18 DIAGNOSIS — R26.89 IMPAIRMENT OF BALANCE: Primary | ICD-10-CM

## 2025-06-18 DIAGNOSIS — R41.89 SEVERE COGNITIVE IMPAIRMENT: Primary | ICD-10-CM

## 2025-06-18 PROCEDURE — 97112 NEUROMUSCULAR REEDUCATION: CPT

## 2025-06-18 PROCEDURE — 97530 THERAPEUTIC ACTIVITIES: CPT

## 2025-06-18 NOTE — ASSESSMENT & PLAN NOTE
A1c well controlled and per pt's caregiver, she has not had frequent significant low readings. Pt notes she is frustrated with not being able to eat what she wants -- attempted to review recommendation of balance meals (not eating just carbs alone), but pt became agitated. Will continue current regimen. Pt defers addition of SGLT.   Lab Results   Component Value Date    HGBA1C 6.8 (A) 06/19/2025       Orders:  •  POCT hemoglobin A1c  •  Comprehensive metabolic panel; Future  •  Lipid panel; Future  •  Hemoglobin A1C; Future  •  Iron Panel (Includes Ferritin, Iron Sat%, Iron, and TIBC); Future  •  CBC and differential; Future

## 2025-06-18 NOTE — PROGRESS NOTES
POC expires Auth Status Total   Visits  Start date  Expiration date PT/OT + Visit Limit? Co-Insurance   25 BOMN  25                                                Visit/Unit Tracking  AUTH Status: BOMN Date 4/16 4/23 5/1 5/7 5/14 5/21 5/27 6/3 6/11 6/18     Visits  Authed:  Used 1 (IE) 1 1 1  1 (PN) 1 1 1 1 1 (PN)      Remaining                    PLAN OF CARE START: 25  PLAN OF CARE END: 2025  PROGRESS NOTE DUE: follow up next visit (will require full re-eval)  FREQUENCY: 1-2x/week for 8-12 weeks  PRECAUTIONS: anxiety, FALL/SAFETY  DIAGNOSIS: severe cognitive impairment  VISITS: 10      Progress/Daily Note     Today's date: 2025  Patient name: Shantelle Romano  : 1945  MRN: [id number]  Referring provider: Nicki Bah MD  Dx:   Encounter Diagnosis     ICD-10-CM    1. Severe cognitive impairment  R41.89                   Start Time: 1110  Stop Time: 1150  Total time in clinic (min): 40 minutes    Subjective: Pt's son in law reports patient did not eat this morning and spiked her blood sugar last night.     Pt presents 10 minutes late for therapy today.       Objective: See treatment diary below. Assessed the following this session to assess progress in therapy: SLUMS. All other observations/assessments carried forward from previous evaluation. See below for goal updates.      TA:         FUNCTIONAL ACTIVITY QUESTIONAIRE   The Functional Activities Questionnaire (FAQ) measures instrumental activities of daily living (IADLs). The FAQ may be used to differentiate those with mild cognitive impairment and mild Alzheimer's disease   TASK Completely unable to perform task (3)  Requires assistance (2)  Has difficulty but accomplishes task, or has never done, but the informant feels could do the task with difficulty (1)  Normal performance, or has never don the task, but the informant feels the patient could do the task if necessary    Writing checks, paying bills, balancing checkbook    X   "     Assembling tax records, business affairs, papers    X       Shopping alone for clothes, house hold necessities, or groceries  X         Playing a game of skill, working on a hobby      X     Heating water, making a cup of coffee, turning off the stove     X     Preparing a balance meal   X       Keeping track of current events   X       Paying attention to, understanding, discussing a TV show, book, or magazine       X   Remembering appointments, family occasions, holidays, medications     X     Traveling our of the neighborhood, arranging or taking buses  X         Total points: 17      Evaluation   SLUMS:  *screen individuals to look for the presence of cognitive deficits, and to identify changes in cognition over time*     SCORING AND NORMS     HIGH SCHOOL EDUCATION  27-30, NORMAL   21-26, MILD NEUROCOGNITIVE DISORDER   1-20, DEMENTIA   LESS THEN HIGH SCHOOL EDUCATION  25-30, NORMAL   20-24, MILD NEUROCOGNITIVE DISORDER  1-19, DEMENTIA      QUESTION Status on 6/18/25 Status on 4/16/25   What day of the week is it 0 1   What is the year 1 1   What state are we in 1 1   How much did you spend 0 1   How much do you have left 2 0   Naming animals in 1 minute 1 1   Recall of 5 objects 2 2   Series of numbers 0 1   Clock face hour markers 2 0   Clock face time  0 0   X in triangle 1 1   Largest figure 1 1   Short story, females name 0 0   Short story, when did she go back to work 2 2   Short story, what work did she do 2 2   Short story, what state did she live in 0 0   Total score 15/30 (IMPROVED) 14/30 (less then high school education)          TRAIL MAKING TEST:   \"widely used test to assess executive function in patients with neuro injury. Successful performance of the TMT requires a variety of mental abilities including letter and number recognition mental flexibility, visual scanning, and motor function. Norms are based on age related scores\"       PN 5/14 STATUS ON IE: 4/16/25 COMMENTS   TRAIL MAKING PART A "      NORM: 58 seconds 1 minute and 14 seconds  1 minute and 19 seconds                SHORT TERM GOALS (6-8 weeks)    GOAL  STATUS ON IE  GOAL STATUS    Pt will increase sustained attention by completing trail making part A in 1 minute and 14 seconds  or less to complete IADLs 1 minute and 19 seconds ACHIEVED                        Pt will score 16/30 or greater on SLUMS to improve independence with life roles 14/30 Progressing, continue goal       Pt will demo G carryover and understanding of temporal orientation compensatory strategies and orientation of daily schedules (ie. Use of calendars, alarms, etc) for inc safety with life roles and IADL fxn   Established       Pt will demo G carryover of use of internal/external memory strategy aides for improved recall of daily events, improved executive functioning with 25% accuracy    Established       LONG TERM GOALS (8-12 weeks)    GOAL  STATUS ON IE  GOAL STATUS    Pt will increase sustained attention by completing trail making part A in 1 minute and 10 seconds  or less to complete IADLs 1 minute and 19 seconds PROGRESSING, CONTINUE GOAL          Pt will score 18/30 or greater on SLUMS to improve independence with life roles 14/30 Progressing, continue goal       Pt will follow 1-2 step written directions for improved overall comprehension and engagement in life roles and salient tasks.   Established       Pt will demo G carryover of use of internal/external memory strategy aides for improved recall of daily events, improved executive functioning with 50% accuracy.    Established           TA Treatment:  *Placing cards in field of 4 piles based on suit and color. Emphasis on working memory of rules, problem solving,and EF skills. Required moderate verbal cueing throughout for correct sequencing and direction following during task.     *Multi matrix activity; matching # cubes to letter cubes, emphasis on simple problem solving skills and sustained attention. Able to  complete in increased time, with independence.       Assessment: Tolerated treatment well. /EF skills, short term/long term memory, problem solving, language fluency, abstraction, and orientation assessed this session to assess progress in therapy services. Improvement in overall SLUMS score going from 14/30>15/30. Delayed recall section remains, however patient continues to make fair progress towards goal status. Pt fairly pleasant this session. Focus of session on sequencing, working memory, sustained attention, and EF/ skills. Patient would benefit from continued OT to address sustained attention, temporal orientation, utilization of internal/external memory strategies, direction following, and overall increased cognitive functions for daily tasks.        Plan: Continue per plan of care.

## 2025-06-18 NOTE — PROGRESS NOTES
Name: Shantelle Romano      : 1945      MRN: [id number]  Encounter Provider: Clarisa Billingsley DO  Encounter Date: 2025   Encounter department: [facility]  :  Assessment & Plan  Type 2 diabetes mellitus with diabetic neuropathy, with long-term current use of insulin (HCC)  A1c well controlled and per pt's caregiver, she has not had frequent significant low readings. Pt notes she is frustrated with not being able to eat what she wants -- attempted to review recommendation of balance meals (not eating just carbs alone), but pt became agitated. Will continue current regimen. Pt defers addition of SGLT.   Lab Results   Component Value Date    HGBA1C 6.8 (A) 2025       Orders:  •  POCT hemoglobin A1c  •  Comprehensive metabolic panel; Future  •  Lipid panel; Future  •  Hemoglobin A1C; Future  •  Iron Panel (Includes Ferritin, Iron Sat%, Iron, and TIBC); Future  •  CBC and differential; Future    Hypertension associated with diabetes  (HCC)  Within goal -- continue current regimen   Lab Results   Component Value Date    HGBA1C 6.8 (A) 2025       Orders:  •  Comprehensive metabolic panel; Future  •  Lipid panel; Future    Hyperlipidemia associated with type 2 diabetes mellitus  (HCC)  Update lipids prior to next visit -- continue statin   Lab Results   Component Value Date    HGBA1C 6.8 (A) 2025       Orders:  •  Comprehensive metabolic panel; Future  •  Lipid panel; Future    Chronic HFpEF  Nonobstructive CAD  No exacerbation on exam. Continue medical management and f/u with Cardio as scheduled            Simple chronic bronchitis (HCC)  No exacerbation on exam, continue inhaler regimen.        Stage 3b chronic kidney disease (HCC)  Encouraged good hydration     Orders:  •  Comprehensive metabolic panel; Future    Severe cognitive impairment  Pt continues to have significant agitation. Reviewed Neurology recommendations for trail of Depakote and, if no improvement,  "possible Seroquel as well as consult with [facility] -- new referral placed   Orders:  •  Ambulatory Referral to [facility]; Future    Seizure (HCC)  No breakthrough symptoms off Keppra        Vitamin B12 deficiency    Orders:  •  Vitamin B12; Future           History of Present Illness   HPI    Pt presents with her caretaker and son-in-law for chronic follow up     Neuro -- started on Exelon, doing OT; had neuropsych eval -- symptoms thought to be FT dementia + history of brain surgery; had f/u -- started Depakote and Memantine, also suggested senior care  Nephro -- no changes   Cardio -- consider SGLT2     Pt states she is afraid to eat because her blood sugars go high. Pt's son-in-law notes that when she is eating well, she doesn't need her mealtime insulin, but she often will sneak food   He also notes she is still having quite a bit of agitation       Review of Systems   Eyes:  Negative for visual disturbance.   Respiratory:  Negative for cough and shortness of breath.    Cardiovascular:  Negative for chest pain, palpitations and leg swelling.   Gastrointestinal:  Negative for abdominal pain, blood in stool, constipation and diarrhea.   Endocrine: Negative for polyuria.   Genitourinary:  Negative for dysuria and hematuria.   Neurological:  Negative for dizziness and headaches.   Psychiatric/Behavioral:  Positive for sleep disturbance (discussed trial of melatonin).        Objective   /66   Pulse 82   Temp 97.7 °F (36.5 °C)   Ht 4' 3\" (1.295 m)   Wt 64.4 kg (142 lb)   SpO2 95%   BMI 38.38 kg/m²      Physical Exam  Vitals and nursing note reviewed.   Constitutional:       General: She is not in acute distress.     Appearance: She is well-developed.   HENT:      Head: Normocephalic and atraumatic.      Right Ear: Tympanic membrane, ear canal and external ear normal.      Left Ear: Tympanic membrane, ear canal and external ear normal.      Nose: Nose normal. No rhinorrhea.      Mouth/Throat:      " Mouth: Mucous membranes are moist.      Pharynx: No oropharyngeal exudate or posterior oropharyngeal erythema.     Eyes:      Conjunctiva/sclera: Conjunctivae normal.       Cardiovascular:      Rate and Rhythm: Normal rate and regular rhythm.   Pulmonary:      Effort: Pulmonary effort is normal. No respiratory distress.      Breath sounds: Normal breath sounds.   Abdominal:      General: Bowel sounds are normal. There is no distension.      Palpations: Abdomen is soft.      Tenderness: There is no abdominal tenderness.     Musculoskeletal:      Right lower leg: No edema.      Left lower leg: No edema.      Comments: Ambulating with walker    Lymphadenopathy:      Cervical: No cervical adenopathy.     Skin:     General: Skin is warm and dry.          Neurological:      Mental Status: She is alert.     Psychiatric:         Behavior: Behavior is agitated.

## 2025-06-18 NOTE — ASSESSMENT & PLAN NOTE
Within goal -- continue current regimen   Lab Results   Component Value Date    HGBA1C 6.8 (A) 06/19/2025       Orders:  •  Comprehensive metabolic panel; Future  •  Lipid panel; Future

## 2025-06-18 NOTE — ASSESSMENT & PLAN NOTE
Update lipids prior to next visit -- continue statin   Lab Results   Component Value Date    HGBA1C 6.8 (A) 06/19/2025       Orders:  •  Comprehensive metabolic panel; Future  •  Lipid panel; Future

## 2025-06-18 NOTE — ASSESSMENT & PLAN NOTE
Pt continues to have significant agitation. Reviewed Neurology recommendations for trail of Depakote and, if no improvement, possible Seroquel as well as consult with [facility] -- new referral placed   Orders:  •  Ambulatory Referral to [facility]; Future

## 2025-06-18 NOTE — PROGRESS NOTES
Daily Note     Today's date: 2025  Patient name: Shantelle Romano  : 1945  MRN: [id number]  Referring provider: Nicki Bah MD  Dx:   Encounter Diagnosis     ICD-10-CM    1. Impairment of balance  R26.89                      Subjective: patient reports not doing any exercises       Objective: See treatment diary below  NMR:  In // bars with gait belt   Sit to stands 10x   Lateral side stepping 4 laps ea   Forward walking 4 laps   Marching 4 laps     Assessment: Tolerated treatment well. Patient experienced no loss of balance during session. Patient does require bilateral hand support.  Patient did require frequent rest breaks. Continue to progress. Patient demonstrated fatigue post treatment, exhibited good technique with therapeutic exercises, and would benefit from continued PT      Plan: Continue per plan of care.            POC expires Unit limit Auth Expiration date PT/OT + Visit Limit? Co-Insurance   25 N/a pend BOMN Yes                               Visit/Unit Tracking  AUTH Status:  Date             pend Used 1 2 3             Remaining

## 2025-06-19 ENCOUNTER — OFFICE VISIT (OUTPATIENT)
Dept: FAMILY MEDICINE CLINIC | Facility: CLINIC | Age: 80
End: 2025-06-19
Payer: COMMERCIAL

## 2025-06-19 VITALS
TEMPERATURE: 97.7 F | DIASTOLIC BLOOD PRESSURE: 66 MMHG | OXYGEN SATURATION: 95 % | BODY MASS INDEX: 32.86 KG/M2 | WEIGHT: 142 LBS | HEART RATE: 82 BPM | HEIGHT: 55 IN | SYSTOLIC BLOOD PRESSURE: 134 MMHG

## 2025-06-19 DIAGNOSIS — R56.9 SEIZURE (HCC): ICD-10-CM

## 2025-06-19 DIAGNOSIS — I50.32 CHRONIC HEART FAILURE WITH PRESERVED EJECTION FRACTION (HCC): ICD-10-CM

## 2025-06-19 DIAGNOSIS — E11.69 HYPERLIPIDEMIA ASSOCIATED WITH TYPE 2 DIABETES MELLITUS  (HCC): ICD-10-CM

## 2025-06-19 DIAGNOSIS — E53.8 VITAMIN B12 DEFICIENCY: ICD-10-CM

## 2025-06-19 DIAGNOSIS — E11.59 HYPERTENSION ASSOCIATED WITH DIABETES  (HCC): ICD-10-CM

## 2025-06-19 DIAGNOSIS — N18.32 STAGE 3B CHRONIC KIDNEY DISEASE (HCC): ICD-10-CM

## 2025-06-19 DIAGNOSIS — I25.10 NONOBSTRUCTIVE ATHEROSCLEROSIS OF CORONARY ARTERY: ICD-10-CM

## 2025-06-19 DIAGNOSIS — E11.40 TYPE 2 DIABETES MELLITUS WITH DIABETIC NEUROPATHY, WITH LONG-TERM CURRENT USE OF INSULIN (HCC): Primary | ICD-10-CM

## 2025-06-19 DIAGNOSIS — I15.2 HYPERTENSION ASSOCIATED WITH DIABETES  (HCC): ICD-10-CM

## 2025-06-19 DIAGNOSIS — J41.0 SIMPLE CHRONIC BRONCHITIS (HCC): ICD-10-CM

## 2025-06-19 DIAGNOSIS — E78.5 HYPERLIPIDEMIA ASSOCIATED WITH TYPE 2 DIABETES MELLITUS  (HCC): ICD-10-CM

## 2025-06-19 DIAGNOSIS — Z79.4 TYPE 2 DIABETES MELLITUS WITH DIABETIC NEUROPATHY, WITH LONG-TERM CURRENT USE OF INSULIN (HCC): Primary | ICD-10-CM

## 2025-06-19 DIAGNOSIS — R41.89 SEVERE COGNITIVE IMPAIRMENT: ICD-10-CM

## 2025-06-19 LAB — SL AMB POCT HEMOGLOBIN AIC: 6.8 (ref ?–6.5)

## 2025-06-19 PROCEDURE — 99214 OFFICE O/P EST MOD 30 MIN: CPT | Performed by: FAMILY MEDICINE

## 2025-06-19 PROCEDURE — 83036 HEMOGLOBIN GLYCOSYLATED A1C: CPT | Performed by: FAMILY MEDICINE

## 2025-06-19 PROCEDURE — G2211 COMPLEX E/M VISIT ADD ON: HCPCS | Performed by: FAMILY MEDICINE

## 2025-06-20 ENCOUNTER — TELEPHONE (OUTPATIENT)
Age: 80
End: 2025-06-20

## 2025-06-20 NOTE — TELEPHONE ENCOUNTER
Patient's son in law called to scheduled her appt with [facility]    The referral lists Dr. Polk. Unfortunately unable to schedule with him.    Scheduled appts with Blank Heller.     Asking Dr. Billingsley to write new referral removing Dr. Polk

## 2025-06-23 ENCOUNTER — OFFICE VISIT (OUTPATIENT)
Age: 80
End: 2025-06-23
Payer: COMMERCIAL

## 2025-06-23 VITALS
WEIGHT: 142 LBS | HEIGHT: 55 IN | DIASTOLIC BLOOD PRESSURE: 72 MMHG | HEART RATE: 67 BPM | SYSTOLIC BLOOD PRESSURE: 132 MMHG | TEMPERATURE: 98 F | BODY MASS INDEX: 32.86 KG/M2 | OXYGEN SATURATION: 93 % | RESPIRATION RATE: 16 BRPM

## 2025-06-23 DIAGNOSIS — J41.0 SIMPLE CHRONIC BRONCHITIS (HCC): Primary | ICD-10-CM

## 2025-06-23 DIAGNOSIS — E66.812 CLASS 2 OBESITY DUE TO EXCESS CALORIES WITHOUT SERIOUS COMORBIDITY WITH BODY MASS INDEX (BMI) OF 38.0 TO 38.9 IN ADULT: ICD-10-CM

## 2025-06-23 DIAGNOSIS — E66.09 CLASS 2 OBESITY DUE TO EXCESS CALORIES WITHOUT SERIOUS COMORBIDITY WITH BODY MASS INDEX (BMI) OF 38.0 TO 38.9 IN ADULT: ICD-10-CM

## 2025-06-23 PROCEDURE — 99214 OFFICE O/P EST MOD 30 MIN: CPT

## 2025-06-23 NOTE — PROGRESS NOTES
Follow-up  Visit - Pulmonary Medicine   Name: Shantelle Romano      : 1945      MRN: [id number]  Encounter Provider: JOSHUA Elizabeth  Encounter Date: 2025   Encounter department: [facility] [place]  :  Assessment & Plan  Simple chronic bronchitis (HCC)  -No recent exacerbations or respiratory infections.  Rarely requiring use of rescue inhaler.  Lungs are clear on exam today  -Continue Trelegy 100 daily, albuterol HFA/nebs every 6 hours as needed  -Continue Singulair nightly and Flonase nasal spray         Class 2 obesity due to excess calories without serious comorbidity with body mass index (BMI) of 38.0 to 38.9 in adult  - Encouraged weight loss         No follow-ups on file.    History of Present Illness   Shantelle Romano is a 79 y.o. female former smoker with a history of DM 2, CKD, HFpEF, HTN, HLD, and CAD who is here today for a follow-up visit for chronic bronchitis.  She is accompanied by her son. Last seen in the office 6 months ago.  Maintained on Trelegy daily, albuterol as needed, and Singulair nightly.  Patient reports she is doing well with her breathing.  Gets short of breath only if she is overexerting herself.  No cough or wheezing.  Has occasional mucus which she takes Mucinex.  Also using Flonase for postnasal drainage. Has had no exacerbations or respiratory infections since her last office visit.  Using her Trelegy daily as prescribed, rarely requiring use of rescue inhaler/nebs.  Wearing oxygen only at night.  Currently attending PT/OT weekly for cognitive/balance issues.    Review of Systems    Aside from what is mentioned in the HPI, ROS is otherwise negative         Medical History Reviewed by provider this encounter:     .    Objective   There were no vitals taken for this visit.    Physical Exam  Vitals and nursing note reviewed.   Constitutional:       General: She is not in acute distress.     Appearance: Normal appearance. She is  well-developed. She is obese.     Cardiovascular:      Rate and Rhythm: Normal rate and regular rhythm.      Heart sounds: Normal heart sounds, S1 normal and S2 normal. No murmur heard.  Pulmonary:      Effort: Pulmonary effort is normal.      Breath sounds: Normal breath sounds. No decreased breath sounds, wheezing, rhonchi or rales.     Musculoskeletal:         General: No swelling.      Right lower leg: No edema.      Left lower leg: No edema.     Neurological:      Mental Status: She is alert.     Psychiatric:         Mood and Affect: Mood and affect normal.         Behavior: Behavior normal. Behavior is cooperative.           Diagnostic Data:  Labs: I personally reviewed the most recent laboratory data pertinent to today's visit.      Radiology results:  Radiology Results Review : No pertinent imaging studies reviewed.      PFT/spirometry results: Reviewed study from 12/16/2024      Oximetry testing:      Other studies:      JOSHUA Elizabeth

## 2025-06-23 NOTE — ASSESSMENT & PLAN NOTE
-No recent exacerbations or respiratory infections.  Rarely requiring use of rescue inhaler.  Lungs are clear on exam today  -Continue Trelegy 100 daily, albuterol HFA/nebs every 6 hours as needed  -Continue Singulair nightly and Flonase nasal spray

## 2025-06-26 ENCOUNTER — OFFICE VISIT (OUTPATIENT)
Age: 80
End: 2025-06-26
Attending: PSYCHIATRY & NEUROLOGY
Payer: COMMERCIAL

## 2025-06-26 DIAGNOSIS — R41.89 SEVERE COGNITIVE IMPAIRMENT: Primary | ICD-10-CM

## 2025-06-26 PROCEDURE — 97530 THERAPEUTIC ACTIVITIES: CPT | Performed by: OCCUPATIONAL THERAPIST

## 2025-06-26 NOTE — PROGRESS NOTES
POC expires Auth Status Total   Visits  Start date  Expiration date PT/OT + Visit Limit? Co-Insurance   25 BOMN  25                                                Visit/Unit Tracking  AUTH Status: BOMN Date  6/3 6/11 6/18 6/26    Visits  Authed:  Used 1 (IE) 1 1 1  1 (PN) 1 1 1 1 1 (PN) 1     Remaining                    PLAN OF CARE START: 25  PLAN OF CARE END: 2025  PROGRESS NOTE DUE: follow up next visit (will require full re-eval)  FREQUENCY: 1-2x/week for 8-12 weeks  PRECAUTIONS: anxiety, FALL/SAFETY  DIAGNOSIS: severe cognitive impairment  VISITS: 11      Daily Note     Today's date: 2025  Patient name: Shantelle Romano  : 1945  MRN: [id number]  Referring provider: Nicki Bah MD  Dx:   Encounter Diagnosis     ICD-10-CM    1. Severe cognitive impairment  R41.89                     Start Time: 954  Stop Time: 1050  Total time in clinic (min): 56 minutes    Subjective: Pt reports no new updates since last visit.      Objective: See treatment diary below.      TA:  *mental manipulation activity, pt listening to 4 words from therapist and then finding words scrambled on table, pt then placing words in chronological order, emphasis on working memory, problem solving, and sustained attention    *16 piece beach puzzle, emphasis on EF/ skills and sustained attention, completed in a good amount of time with fair accuracy, min-mod VC for accuracy    *state the opposite worksheet, pt stating the opposite of each word on the list and then finding the letters to form the words with bananagrams, good accuracy noted    *perfection, pt matching pieces to correct shapes on board, emphasis on  skills, sustained attention, and basic problem solving    Assessment: Tolerated treatment well. Pt fairly pleasant this session. Focus of session on working memory, problem solving, sustained attention, and EF/ skills. Provided pt with 3 cognitive HEPs. Patient  would benefit from continued OT to address sustained attention, temporal orientation, utilization of internal/external memory strategies, direction following, and overall increased cognitive functions for daily tasks.        Plan: Continue per plan of care.

## 2025-07-02 ENCOUNTER — OFFICE VISIT (OUTPATIENT)
Age: 80
End: 2025-07-02
Attending: PSYCHIATRY & NEUROLOGY
Payer: COMMERCIAL

## 2025-07-02 ENCOUNTER — TELEPHONE (OUTPATIENT)
Dept: NEPHROLOGY | Facility: CLINIC | Age: 80
End: 2025-07-02

## 2025-07-02 ENCOUNTER — EVALUATION (OUTPATIENT)
Age: 80
End: 2025-07-02
Payer: COMMERCIAL

## 2025-07-02 DIAGNOSIS — R41.89 SEVERE COGNITIVE IMPAIRMENT: Primary | ICD-10-CM

## 2025-07-02 DIAGNOSIS — R26.89 IMPAIRMENT OF BALANCE: Primary | ICD-10-CM

## 2025-07-02 PROCEDURE — 97530 THERAPEUTIC ACTIVITIES: CPT | Performed by: OCCUPATIONAL THERAPIST

## 2025-07-02 PROCEDURE — 97112 NEUROMUSCULAR REEDUCATION: CPT

## 2025-07-02 NOTE — PROGRESS NOTES
PT Re-Evaluation / Progress Note         POC expires Unit limit Auth Expiration date PT/OT + Visit Limit? Co-Insurance   25 N/a pend BOMN Yes                                 AUTH Status:  Date            24 Used 1 2 3 4            Remaining  23 22 21 20                  Today's date: 2025  Patient name: Shantelle Romano  : 1945  MRN: [id number]  Referring provider: Nicki Bah MD  Dx:   Encounter Diagnosis     ICD-10-CM    1. Impairment of balance  R26.89             Assessment  Assessment details: Patient is a 79 y.o. Female who presented to skilled outpatient PT with reports of sig hx of falls (severe, with injury 2024 resulting in R femur fx) and imbalance. Co-morbidities impacting level of care include standard fall precautions, impulsivity, cognitive impairment, cardiac dysfunction, SOB, COPD, and seizures. Since IE, patient demonstrates significant improvements with MDC on: 5xSTS, TUG, and METZGER indicating clinical significant change on functional transfers, mobility, and balance. Also demonstrates improvements on 10mWT, 6MWT indicating improved gait speed and cardiovascular endurance. Despite improvements, patient values on 5xSTS, TUG, 10mWT, mCSTIB, and METZGER indicate patient is at increased risk of falls. Distance ambulated on 6MWT was below age- and gender- compared norms. Sig amount of time towards patient education, including PT implications and limitations, PT POC/goals, basic HEP, and patient scores compared to normative and previous values. Patient verbalized understanding/agreement and with no questions at end of session. PT POC to continue to reduce UE support to maximize balance gains. Patient is a good candidate to receive skilled OPPT services to improve her balance and reduce her risk of falls. Has completed 3/3 STG and is progressing towards completion of LTGs.     Outcome Measures  Initial Eval  5/21 7/2 PN   5xSTS 26.74s no UE 14.18s no UE   TUG  - Regular    - Cognitive    50.62s w RW    Defer      24.79s w RW     10 meter 0.34 m/s w RW 0.41 m/s w RW   METZGER 27/56 34/56   FGA defer defer   DGI defer defer   mCTSIB  - FTEO (firm)  - FTEC (firm)  - FTEO (foam)  - FTEC (foam)   30 sec  5.2 sec  Defer  Defer     30s  30s  Defer  Defer     6MWT 270 ft w  ft w RW     Impairments: Abnormal coordination, Abnormal gait, Abnormal muscle tone, Abnormal or restricted ROM, Activity intolerance, Impaired balance, Impaired physical strength, Lacks appropriate HEP, Poor posture, Poor body mechanics, Pain with function, Safety issue, Weight-bearing intolerance, Abnormal movement, Difficulty understanding, Abnormal muscle firing  Understanding of Dx/Px/POC: Fair  Prognosis: Fair    Patient verbalized understanding of POC.         Please contact me if you have any questions or recommendations. Thank you for the referral and the opportunity to share in Shantelle Brunsondner's care.        Plan  Plan details:   Patient would benefit from: Skilled PT and Skilled OT  Planned modality interventions: Biofeedback, Cryotherapy, TENS, Thermotherapy  Planned therapy interventions: Abdominal trunk stabilization, ADL training, Balance, Balance/WB training, Breathing training, Body mechanics training, Coordination, Functional ROM exercises, Gait training, HEP, Joint Mobilization, Manual Therapy, Cosme taping, Motor coordination training, Neuromuscular re-education, Patient education, Postural training, Strengthening, Stretching, Therapeutic activities, Therapeutic exercises, Therapeutic training, Transfer training, Activity modification, Work reintegration  Frequency: 1-2x/wk  Duration in weeks: 12 weeks  Plan of Care beginning date: 5/21/2025  Plan of Care expiration date: 3 months - 10/2/2025  Treatment plan discussed with: Patient and Family       Goals  Short Term Goals (4 weeks):    - Patient will improve time on TUG  by 2.9 seconds to facilitate improved safety in all ambulation MET  - Patient will be independent in basic HEP 2-3 weeksMET  - Patient will improve 5xSTS score by 2.3 seconds to promote improved LE functional strength needed for ADLsMET    Long Term Goals (12 weeks):  - Patient will be independent in a comprehensive home exercise program  - Patient will improve gait speed by 0.18 m/s to improve safety with community ambulation  - Patient will improve METZGER by 6 points in order to improve static balance and reduce risk for falls  - Patient will be able to demonstrate HT in gait without veering  - Patient will improve 6 Minute Walk Test score by 190 feet to promote improved cardiovascular endurance  - Patient will report 50% reduction in near falls in order to improve safety with functional tasks and reduce his risk for falls  - Patient will report going on walks at least 3 days per week to promote independence and improved cardiovascular endurance  - Patient will be able to ascend/descend stairs reciprocally with 1 UE assist to promote independence and safety with ADLs  - Patient will report 50% reduction in near falls when ambulating on uneven terrain      Cut off score    All date taken from [facility] Neuro Section or Rehab Measures      Metzger/56  MDC: 6 pts  Age Norms:  60-69: M - 55   F - 55  70-79: M - 54   F - 53  80-89: M - 53   F - 50 5xSTS: Zuri et al 2010  MDC: 2.3 sec  Age Norms:  60-69: 11.4 sec  70-79: 12.6 sec  80-89: 14.8 sec   TUG  MDC: 4.14 sec  Cut off score:  >13.5 sec community dwelling adults  >32.2 frail elderly  <20 I for basic transfers  >30 dependent on transfers 10 Meter Walk Test: Cesario et al 2011  MDC: 0.18 m/s  20-29: M - 1.35 m   F - 1.34 m  30-39: M - 1.43 m   F - 1.34 m  40-49: M - 1.43 m   F - 1.39 m  50-59: M - 1.43 m   F - 1.31 m  60-69: M - 1.34 m   F - 1.24 m  70-79: M - 1.26 m   F - 1.13 m  80-89: M - 0.97 m   F - 0.94 m    Household Ambulator < 0.4 m/s  Limited  Community Ambulator 0.4 - 0.8 m/s  Community Ambulator 0.8 - 1.2 m/s  Safely cross the street > 1.2 m/s   FGA  MCID: 4 pts  Geriatrics/community < 22/30 fall risk  Geriatrics/community < 20/30 unexplained falls    DGI  MDC: vestibular - 4 pts  MDC: geriatric/community - 3 pts  Falls risk <19/24 mCTSIB  Norm: 20-60 yrs  Eyes open firm: norm sway 0.21-0.48  Eyes closed firm: norm sway 0.48-0.99  Eyes open foam: norm sway 0.38-0.71  Eyes closed foam: norm sway 0.70-2.22   6 Minute Walk Test  MDC: 190.98 ft  MCID: 164 ft    Age Norms  60-69: M - 1876 ft (571.80 m)  F - 1765 ft (537.98 m)  70-79: M - 1729 ft (527.00 m)  F - 1545 ft (470.92 m)  80-89: M - 1368 ft (416.97 m)  F - 1286 ft (391.97 m) ABC: Kike & Nguyen, 2003  <67% increased risk for falls   B[employer]ed Monofilaments  Evaluator Size:        Force (grams):          Hand/Dorsal Thresholds:        Plantar Thresholds:  - 1.65                       - 0.008                       - Normal                                 - Normal  - 2.36                       - 0.02                         - Normal                                 - Normal  - 2.44                       - 0.04                         - Normal                                 - Normal  - 2.83                       - 0.07                         - Normal                                 - Normal  - 3.22                       - 0.16                         - Diminished light touch          - Normal  - 3.61                       - 0.40                         - Diminished light touch          - Normal  - 3.84                       - 0.60                         - Diminished protective           - Diminished light touch  - 4.08                       - 1.00                         - Diminished protective           - Diminished light touch  - 4.17                       - 1.40                         - Diminished protective           - Diminished light touch  - 4.31                       - 2.00          "                - Diminished protective           - Diminished light touch  - 4.56                       - 4.00                         - Loss of protective sense      - Diminished protective  - 4.74                       - 6.00                         - Loss of protective sense      - Diminished protective  - 4.93                       - 8.00                         - Loss of protective sense      - Diminished protective  - 5.07                       - 10.0                         - Loss of protective sense     - Loss of protective sense  - 5.18                       - 15.0                         - Loss of protective sense     - Loss of protective sense  - 5.46                       - 26.0                         - Loss of protective sense     - Loss of protective sense  - 5.88                       - 60.0                         - Loss of protective sense     - Loss of protective sense  - 6.10                       - 100                          - Loss of protective sense     - Loss of protective sense  - 6.45                       - 180                          - Loss of protective sense     - Loss of protective sense  - 6.65                       - 300                          - Deep pressure sense only  - Deep pressure sense only         Subjective    History of Present Illness  - Mechanism of injury: Patient experienced severe fall with injury Sept 2024 resulting in R femur fx: had SNF and HH therapy services. Was unable to walk for ~ 1 month. Unable to discern amount of falls in past year: \"more than once, but I can't count.\" Son reports at least 3 falls / year. Has difficulty getting in/out of car    Update 7/2/25: Patient and son deny falls in past month.     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 Griffin on the needs notes were reviewed in detail, patient was also advised to go for cognitive therapy discussed with them different medication options she is agreeable to go on Exelon patch 4.6 mg per 24-hour for 1 month followed by 9.5 mg per 24 hours side effects discussed with them they will discuss with the PCP prior to starting the patch and if has any side effects then they will stop and let me know.\"    - Primary AD: RW  - Assist level at home: yes; private caregiver 5x/wk from ~8-2 to assist with ADLs/IADLs    Patient goal:   Improve balance  Improve car transfers    Pain  Patient denied pain     Social Support  - Steps to enter house: 1 small step, but transitioned to ramp  - Stairs in house: 7 ELVI, but has chair lift   - Lives in: private home, split level  - Lives with: son-in-law, daughter, son (Solomon)    - Employment status: retired caregiver  - Hand dominance: R-handed    Treatments  - Previous treatment: SNF,   - Current treatment: OT for cognitive therapy  - Diagnostic Testing:   PET brain metabolic scan from 1/24/2025 was reported as mild patchy hypometabolism in the right frontal and temporal lobes along with mildly decreased activity at the right anterior cingulate gyrus 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 given evidence of prior frontal surgery it is also possible this reflects postsurgical changes and asymmetric encephalomalacia no characteristics of Alzheimer's dementia      Objective     LE MMT  - R Hip Flexion: 3+/5  L Hip Flexion: 3+/5  - R Hip Extension: 3+/5  L Hip Extension: 3+/5  - R Hip Abduction: 3+/5  L Hip Abduction: 4-/5  - R Hip Adduction: 3+/5  L Hip Adduction: 4-/5  - R Knee Extension: 3+/5  L Knee Extension: 3+/5  - R Knee Flexion: 3+/5  L Knee Flexion: 3+/5  - R Ankle DF: 3+/5   L Ankle DF: 3+/5  - R Ankle PF: 3+/5   L Ankle PF: 3+/5    Sensation  - Light touch: impaired; " neuropathy  - Deep pressure: impaired; neuropathy    Coordination  - Alternate Toe Taps: wfl  - Finger to Nose: wfl    Gait  - Abnormalities: reduced gait speed, shuffling gait, reduced floor clearance            Outcome Measures Initial Eval  5/21 7/2 PN       5xSTS 26.74s no UE 14.18s no UE       TUG  - Regular    - Cognitive    50.62s w RW    Defer      24.79s w RW         10 meter 0.34 m/s w RW 0.41 m/s w RW       METZGER 27/56 34/56       FGA defer defer       DGI defer defer       mCTSIB  - FTEO (firm)  - FTEC (firm)  - FTEO (foam)  - FTEC (foam)   30 sec  5.2 sec  Defer  Defer     30s  30s  Defer  Defer         6MWT 270 ft w  ft w RW                            NMR in // bars  March w focus on amplitude 3 laps, 0-1UE assist  Lateral side stepping 3 laps ea, 0-1 UE assist  Forward / backwards walking 4 laps, 2UE assist      Precautions: standard fall precautions, COPD, cardiac dysfunction, HTN, IBS, arthritis, seizures, SOB, syncope/collapse    Past Medical History:   Diagnosis Date    Acute kidney injury superimposed on chronic kidney disease  ([facility]) 11/26/2016    ADHD (attention deficit hyperactivity disorder) 03/17/2019    Arthritis     BL HIPS    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    Carpal tunnel syndrome     Chest pain 03/06/2017    CHF (congestive heart failure) ([facility]) 2005    Chronic kidney disease     Claudication ([facility]) 03/15/2019    Closed fracture of base of middle phalanx of finger 06/22/2017    Closed fracture of middle phalanx of left little finger 07/08/2017    Coagulopathy ([facility]) 05/15/2019    Colloid cyst of brain ([facility])     COPD (chronic obstructive pulmonary disease) ([facility])     COPD (chronic obstructive pulmonary disease) ([facility])     Coronary artery disease     Cough     Diabetes mellitus ([facility])     Femur fracture, right ([facility]) 09/05/2024    GERD (gastroesophageal reflux disease)     Glaucoma     Gout     History of brain disorder 10/07/2020    Hyperlipidemia     Hypertension     Impetigo 2003     Irritable bowel syndrome     Lung nodule 7/24    Melena 02/26/2019    Memory loss 2020    PAD (peripheral artery disease) ([facility]) 05/15/2019    Paronychia of great toe of left foot 10/07/2020    Post-traumatic seizures ([facility]) 07/23/2015    Renal disorder     Seizures ([facility])     last 2015    Shortness of breath     Single seizure ([facility]) 05/31/2016    SOB (shortness of breath)     Stroke-like symptoms 07/02/2024    Syncope and collapse 11/11/2020    Urinary tract infection without hematuria 11/26/2016    Wheezing

## 2025-07-02 NOTE — PROGRESS NOTES
"POC expires Auth Status Total   Visits  Start date  Expiration date PT/OT + Visit Limit? Co-Insurance   25 BOMN  25                                                Visit/Unit Tracking  AUTH Status: BOMN Date  6/3 6/11 6/18 6/26 7/2   Visits  Authed:  Used 1 (IE) 1 1 1  1 (PN) 1 1 1 1 1 (PN) 1 1    Remaining                    PLAN OF CARE START: 25  PLAN OF CARE END: 2025  PROGRESS NOTE DUE: full re-eval   FREQUENCY: 1-2x/week for 8-12 weeks  PRECAUTIONS: anxiety, FALL/SAFETY  DIAGNOSIS: severe cognitive impairment  VISITS: 12      Daily Note     Today's date: 2025  Patient name: Shantelle Romano  : 1945  MRN: [id number]  Referring provider: Nicki Bah MD  Dx:   Encounter Diagnosis     ICD-10-CM    1. Severe cognitive impairment  R41.89                 Start Time: 1145  Stop Time: 1230  Total time in clinic (min): 45 minutes    Subjective: \"I'm really tired. I might fall asleep.\"      Objective: See treatment diary below.      TA:  *pt sorting deck of 52 cards by suit, good accuracy noted and completed in a good amount of time, emphasis on sustained attention and direction following    *Coogram puzzle, copying 2D image into 3D structure, mod VC for accuracy, emphasis on EF/ skills and sustained attention, fair accuracy noted    *wooden geoboard activity, pt completed 1 simple puzzle with max VC for accuracy, emphasis on EF/ skills    Assessment: Tolerated treatment well. Pt pleasant this session. Focus of session on sustained attention, direction following, and EF/ skills. Patient would benefit from continued OT to address sustained attention, temporal orientation, utilization of internal/external memory strategies, direction following, and overall increased cognitive functions for daily tasks.        Plan: Continue per plan of care.         "

## 2025-07-06 DIAGNOSIS — Z79.4 TYPE 2 DIABETES MELLITUS WITH DIABETIC NEUROPATHY, WITH LONG-TERM CURRENT USE OF INSULIN (HCC): ICD-10-CM

## 2025-07-06 DIAGNOSIS — E11.40 TYPE 2 DIABETES MELLITUS WITH DIABETIC NEUROPATHY, WITH LONG-TERM CURRENT USE OF INSULIN (HCC): ICD-10-CM

## 2025-07-06 NOTE — TELEPHONE ENCOUNTER
Medication Refill Request       Medication: Insulin Pen Needle (B-D ULTRAFINE III SHORT PEN) 31G X 8MM MISC    Dose/Frequency: Use 4 times a day (with meals and at bedtime)    Quantity: 400    Pharmacy: [facility]    Office:   [x] PCP/Provider -   [] Specialty/Provider -     Does the patient have enough for 3 days?   [x] Yes- Send to POD (normal priority)   [] No - Send as HP to POD    Is the patient completely out of the medication or does not have enough until the next business day?  [] Yes - send to Call Hub  [x] No - Send as HP to POD

## 2025-07-07 ENCOUNTER — APPOINTMENT (EMERGENCY)
Dept: CT IMAGING | Facility: HOSPITAL | Age: 80
End: 2025-07-07
Payer: COMMERCIAL

## 2025-07-07 ENCOUNTER — TELEPHONE (OUTPATIENT)
Age: 80
End: 2025-07-07

## 2025-07-07 ENCOUNTER — HOSPITAL ENCOUNTER (EMERGENCY)
Facility: HOSPITAL | Age: 80
Discharge: HOME | End: 2025-07-07
Attending: EMERGENCY MEDICINE | Admitting: EMERGENCY MEDICINE
Payer: COMMERCIAL

## 2025-07-07 VITALS
TEMPERATURE: 97.8 F | HEART RATE: 55 BPM | WEIGHT: 127.87 LBS | SYSTOLIC BLOOD PRESSURE: 152 MMHG | RESPIRATION RATE: 22 BRPM | DIASTOLIC BLOOD PRESSURE: 67 MMHG | BODY MASS INDEX: 34.56 KG/M2 | OXYGEN SATURATION: 98 %

## 2025-07-07 DIAGNOSIS — E16.2 HYPOGLYCEMIA: Primary | ICD-10-CM

## 2025-07-07 LAB
2HR DELTA HS TROPONIN: 2 NG/L
ALBUMIN SERPL BCG-MCNC: 3.7 G/DL (ref 3.5–5)
ALP SERPL-CCNC: 45 U/L (ref 34–104)
ALT SERPL W P-5'-P-CCNC: 12 U/L (ref 7–52)
ANION GAP SERPL CALCULATED.3IONS-SCNC: 8 MMOL/L (ref 4–13)
AST SERPL W P-5'-P-CCNC: 15 U/L (ref 13–39)
ATRIAL RATE: 57 BPM
ATRIAL RATE: 65 BPM
BASOPHILS # BLD AUTO: 0.04 THOUSANDS/ÂΜL (ref 0–0.1)
BASOPHILS NFR BLD AUTO: 1 % (ref 0–1)
BILIRUB SERPL-MCNC: 0.34 MG/DL (ref 0.2–1)
BUN SERPL-MCNC: 21 MG/DL (ref 5–25)
CALCIUM SERPL-MCNC: 9.3 MG/DL (ref 8.4–10.2)
CARDIAC TROPONIN I PNL SERPL HS: 10 NG/L (ref ?–50)
CARDIAC TROPONIN I PNL SERPL HS: 8 NG/L (ref ?–50)
CHLORIDE SERPL-SCNC: 105 MMOL/L (ref 96–108)
CO2 SERPL-SCNC: 30 MMOL/L (ref 21–32)
CREAT SERPL-MCNC: 0.93 MG/DL (ref 0.6–1.3)
EOSINOPHIL # BLD AUTO: 0.21 THOUSAND/ÂΜL (ref 0–0.61)
EOSINOPHIL NFR BLD AUTO: 2 % (ref 0–6)
ERYTHROCYTE [DISTWIDTH] IN BLOOD BY AUTOMATED COUNT: 13.8 % (ref 11.6–15.1)
GFR SERPL CREATININE-BSD FRML MDRD: 57 ML/MIN/1.73SQ M
GLUCOSE SERPL-MCNC: 109 MG/DL (ref 65–140)
GLUCOSE SERPL-MCNC: 136 MG/DL (ref 65–140)
GLUCOSE SERPL-MCNC: 139 MG/DL (ref 65–140)
HCT VFR BLD AUTO: 39.1 % (ref 34.8–46.1)
HGB BLD-MCNC: 12.8 G/DL (ref 11.5–15.4)
IMM GRANULOCYTES # BLD AUTO: 0.02 THOUSAND/UL (ref 0–0.2)
IMM GRANULOCYTES NFR BLD AUTO: 0 % (ref 0–2)
LYMPHOCYTES # BLD AUTO: 1.39 THOUSANDS/ÂΜL (ref 0.6–4.47)
LYMPHOCYTES NFR BLD AUTO: 16 % (ref 14–44)
MCH RBC QN AUTO: 29.4 PG (ref 26.8–34.3)
MCHC RBC AUTO-ENTMCNC: 32.7 G/DL (ref 31.4–37.4)
MCV RBC AUTO: 90 FL (ref 82–98)
MONOCYTES # BLD AUTO: 0.62 THOUSAND/ÂΜL (ref 0.17–1.22)
MONOCYTES NFR BLD AUTO: 7 % (ref 4–12)
NEUTROPHILS # BLD AUTO: 6.37 THOUSANDS/ÂΜL (ref 1.85–7.62)
NEUTS SEG NFR BLD AUTO: 74 % (ref 43–75)
NRBC BLD AUTO-RTO: 0 /100 WBCS
P AXIS: 19 DEGREES
P AXIS: 56 DEGREES
PLATELET # BLD AUTO: 179 THOUSANDS/UL (ref 149–390)
PMV BLD AUTO: 9.4 FL (ref 8.9–12.7)
POTASSIUM SERPL-SCNC: 3.2 MMOL/L (ref 3.5–5.3)
PR INTERVAL: 176 MS
PR INTERVAL: 182 MS
PROT SERPL-MCNC: 7 G/DL (ref 6.4–8.4)
QRS AXIS: 29 DEGREES
QRS AXIS: 3 DEGREES
QRSD INTERVAL: 110 MS
QRSD INTERVAL: 110 MS
QT INTERVAL: 438 MS
QT INTERVAL: 480 MS
QTC INTERVAL: 455 MS
QTC INTERVAL: 467 MS
RBC # BLD AUTO: 4.36 MILLION/UL (ref 3.81–5.12)
SODIUM SERPL-SCNC: 143 MMOL/L (ref 135–147)
T WAVE AXIS: 11 DEGREES
T WAVE AXIS: 6 DEGREES
VENTRICULAR RATE: 57 BPM
VENTRICULAR RATE: 65 BPM
WBC # BLD AUTO: 8.65 THOUSAND/UL (ref 4.31–10.16)

## 2025-07-07 PROCEDURE — 82948 REAGENT STRIP/BLOOD GLUCOSE: CPT

## 2025-07-07 PROCEDURE — 93010 ELECTROCARDIOGRAM REPORT: CPT | Performed by: INTERNAL MEDICINE

## 2025-07-07 PROCEDURE — 99285 EMERGENCY DEPT VISIT HI MDM: CPT

## 2025-07-07 PROCEDURE — 93005 ELECTROCARDIOGRAM TRACING: CPT

## 2025-07-07 PROCEDURE — 74177 CT ABD & PELVIS W/CONTRAST: CPT

## 2025-07-07 PROCEDURE — 99285 EMERGENCY DEPT VISIT HI MDM: CPT | Performed by: NURSE PRACTITIONER

## 2025-07-07 PROCEDURE — 84484 ASSAY OF TROPONIN QUANT: CPT | Performed by: NURSE PRACTITIONER

## 2025-07-07 PROCEDURE — 85025 COMPLETE CBC W/AUTO DIFF WBC: CPT | Performed by: NURSE PRACTITIONER

## 2025-07-07 PROCEDURE — 36415 COLL VENOUS BLD VENIPUNCTURE: CPT | Performed by: NURSE PRACTITIONER

## 2025-07-07 PROCEDURE — 80053 COMPREHEN METABOLIC PANEL: CPT | Performed by: NURSE PRACTITIONER

## 2025-07-07 RX ORDER — POTASSIUM CHLORIDE 1500 MG/1
40 TABLET, EXTENDED RELEASE ORAL ONCE
Status: COMPLETED | OUTPATIENT
Start: 2025-07-07 | End: 2025-07-07

## 2025-07-07 RX ADMIN — POTASSIUM CHLORIDE 40 MEQ: 1500 TABLET, EXTENDED RELEASE ORAL at 11:40

## 2025-07-07 RX ADMIN — IOHEXOL 100 ML: 350 INJECTION, SOLUTION INTRAVENOUS at 10:27

## 2025-07-07 NOTE — TELEPHONE ENCOUNTER
Left message for patient to call the office to schedule a screening colonoscopy.    Patients son in law called in stating that they were trying to scheduled patient for a ct lung scan, but they stated that when trying to schedule they were asking if provider could put an extension date on there. As of the schedulers were telling them that is too soon. Please advise and call Lars back

## 2025-07-07 NOTE — ED PROVIDER NOTES
Time reflects when diagnosis was documented in both MDM as applicable and the Disposition within this note       Time User Action Codes Description Comment    7/7/2025 11:48 AM Claus Montes Add [E16.2] Hypoglycemia           ED Disposition       ED Disposition   Discharge    Condition   Stable    Date/Time   Mon Jul 7, 2025 11:48 AM    Comment   Shantelle Juan Antonio discharge to home/self care.                   Assessment & Plan       Medical Decision Making  I tried to explain to the patient that she is likely not consuming enough with the doses of insulin that she is taking.  I recommend that she contact her primary care but also be cognizant of Lantus administration.    Amount and/or Complexity of Data Reviewed  Labs: ordered.  Radiology: ordered.    Risk  Prescription drug management.        ED Course as of 07/07/25 1955 Mon Jul 07, 2025   [facility] just informed me that when she offered the patient a meal the patient declined in fear of having loose stool.  Patient offered a adult depends but declined she prefers a specific brand.  This seems to be an ongoing issue where the patient is hesitant to eat but is still taking her insulin.       Medications   iohexol (OMNIPAQUE) 350 MG/ML injection (MULTI-DOSE) 100 mL (100 mL Intravenous Given 7/7/25 1027)   potassium chloride (Klor-Con M20) CR tablet 40 mEq (40 mEq Oral Given 7/7/25 1140)       ED Risk Strat Scores                    (ISAR) Identification of Seniors at Risk  Before the illness or injury that brought you to the Emergency, did you need someone to help you on a regular basis?: 1  In the last 24 hours, have you needed more help than usual?: 1  Have you been hospitalized for one or more nights during the past 6 months?: 1  In general, do you see well?: 1  In general, do you have serious problems with your memory?: 1  Do you take more than three different medications every day?: 1  ISAR Score: 6            SBIRT 22yo+      Flowsheet Row Most Recent Value    Initial Alcohol Screen: US AUDIT-C     1. How often do you have a drink containing alcohol? 0 Filed at: 07/07/2025 0910   2. How many drinks containing alcohol do you have on a typical day you are drinking?  0 Filed at: 07/07/2025 0910   3b. FEMALE Any Age, or MALE 65+: How often do you have 4 or more drinks on one occassion? 0 Filed at: 07/07/2025 0910   Audit-C Score 0 Filed at: 07/07/2025 0910   LEV: How many times in the past year have you...    Used an illegal drug or used a prescription medication for non-medical reasons? Never Filed at: 07/07/2025 0910                            History of Present Illness       Chief Complaint   Patient presents with    Hypoglycemia - Symptomatic     Pt BIBA from home. Pt's AM blood sugar was 46. They gave her glucose tablets and juice. She passed out. They sternal rubbed her and she came around. Glucose for        Past Medical History[1]   Past Surgical History[2]   Family History[3]   Social History[4]   E-Cigarette/Vaping    E-Cigarette Use Never User       E-Cigarette/Vaping Substances    Nicotine No     THC No     CBD No     Flavoring No     Other No     Unknown No       I have reviewed and agree with the history as documented.     79-year-old female presenting here with chief complaint of hypoglycemia.  She reports her sugars were low this morning and she syncopized twice as a result of it.  Blood sugar in the 40s.  She was given juices and sugar in the outpatient setting and arrives here with blood sugar greater than 100.  She feels a lot better at this point.  Syncopal episode most likely related to this however will entertain acute coronary syndrome as a possibility and check cardiac labs as well.      Hypoglycemia - Symptomatic      Review of Systems   Constitutional:  Negative for chills and fever.   Eyes: Negative.    Respiratory: Negative.     Cardiovascular: Negative.    Musculoskeletal: Negative.    Skin:  Negative for color change, rash and wound.    Neurological:  Positive for syncope.   Hematological:  Does not bruise/bleed easily.   All other systems reviewed and are negative.          Objective       ED Triage Vitals [07/07/25 0904]   Temperature Pulse Blood Pressure Respirations SpO2 Patient Position - Orthostatic VS   97.8 °F (36.6 °C) 71 140/63 20 99 % Lying      Temp Source Heart Rate Source BP Location FiO2 (%) Pain Score    Oral Monitor Right arm -- No Pain      Vitals      Date and Time Temp Pulse SpO2 Resp BP Pain Score FACES Pain Rating User   07/07/25 1130 -- 55 98 % 22 -- -- -- SB   07/07/25 1045 -- 68 100 % 20 152/67 -- -- FB   07/07/25 1000 -- 66 99 % 19 171/72 -- -- FB   07/07/25 0930 -- 63 99 % 19 160/70 -- -- FB   07/07/25 0904 97.8 °F (36.6 °C) 71 99 % 20 140/63 No Pain -- FB            Physical Exam  Vitals and nursing note reviewed.   Constitutional:       General: She is not in acute distress.     Appearance: She is well-developed. She is not ill-appearing or toxic-appearing.   HENT:      Head: Normocephalic and atraumatic.      Nose: No rhinorrhea.      Mouth/Throat:      Mouth: Mucous membranes are moist.      Dentition: Normal dentition.     Eyes:      General:         Right eye: No discharge.         Left eye: No discharge.       Cardiovascular:      Rate and Rhythm: Normal rate and regular rhythm.   Pulmonary:      Effort: Pulmonary effort is normal. No accessory muscle usage or respiratory distress.   Abdominal:      General: There is no distension.      Tenderness: There is no guarding.     Musculoskeletal:         General: Normal range of motion.      Cervical back: Normal range of motion and neck supple. No rigidity.     Skin:     General: Skin is warm and dry.     Neurological:      Mental Status: She is alert and oriented to person, place, and time.      Coordination: Coordination normal.     Psychiatric:         Behavior: Behavior is cooperative.         Results Reviewed       Procedure Component Value Units Date/Time    HS  Troponin I 2hr [[id number]]  (Normal) Collected: 07/07/25 1117    Lab Status: Final result Specimen: Blood from Arm, Left Updated: 07/07/25 1145     hs TnI 2hr 10 ng/L      Delta 2hr hsTnI 2 ng/L     Fingerstick Glucose (POCT) [[id number]]  (Normal) Collected: 07/07/25 1115    Lab Status: Final result Specimen: Blood Updated: 07/07/25 1116     POC Glucose 109 mg/dl     Comprehensive metabolic panel [[id number]]  (Abnormal) Collected: 07/07/25 0939    Lab Status: Final result Specimen: Blood from Arm, Left Updated: 07/07/25 0959     Sodium 143 mmol/L      Potassium 3.2 mmol/L      Chloride 105 mmol/L      CO2 30 mmol/L      ANION GAP 8 mmol/L      BUN 21 mg/dL      Creatinine 0.93 mg/dL      Glucose 139 mg/dL      Calcium 9.3 mg/dL      AST 15 U/L      ALT 12 U/L      Alkaline Phosphatase 45 U/L      Total Protein 7.0 g/dL      Albumin 3.7 g/dL      Total Bilirubin 0.34 mg/dL      eGFR 57 ml/min/1.73sq m     Narrative:      National Kidney Disease Foundation guidelines for Chronic Kidney Disease (CKD):     Stage 1 with normal or high GFR (GFR > 90 mL/min/1.73 square meters)    Stage 2 Mild CKD (GFR = 60-89 mL/min/1.73 square meters)    Stage 3A Moderate CKD (GFR = 45-59 mL/min/1.73 square meters)    Stage 3B Moderate CKD (GFR = 30-44 mL/min/1.73 square meters)    Stage 4 Severe CKD (GFR = 15-29 mL/min/1.73 square meters)    Stage 5 End Stage CKD (GFR <15 mL/min/1.73 square meters)  Note: GFR calculation is accurate only with a steady state creatinine    HS Troponin 0hr (reflex protocol) [[id number]]  (Normal) Collected: 07/07/25 0914    Lab Status: Final result Specimen: Blood from Arm, Left Updated: 07/07/25 0944     hs TnI 0hr 8 ng/L     CBC and differential [[id number]] Collected: 07/07/25 0914    Lab Status: Final result Specimen: Blood from Arm, Left Updated: 07/07/25 0920     WBC 8.65 Thousand/uL      RBC 4.36 Million/uL      Hemoglobin 12.8 g/dL      Hematocrit 39.1 %      MCV 90 fL      MCH 29.4 pg      MCHC 32.7  g/dL      RDW 13.8 %      MPV 9.4 fL      Platelets 179 Thousands/uL      nRBC 0 /100 WBCs      Segmented % 74 %      Immature Grans % 0 %      Lymphocytes % 16 %      Monocytes % 7 %      Eosinophils Relative 2 %      Basophils Relative 1 %      Absolute Neutrophils 6.37 Thousands/µL      Absolute Immature Grans 0.02 Thousand/uL      Absolute Lymphocytes 1.39 Thousands/µL      Absolute Monocytes 0.62 Thousand/µL      Eosinophils Absolute 0.21 Thousand/µL      Basophils Absolute 0.04 Thousands/µL     Fingerstick Glucose (POCT) [[id number]]  (Normal) Collected: 07/07/25 0902    Lab Status: Final result Specimen: Blood Updated: 07/07/25 0902     POC Glucose 136 mg/dl             CT abdomen pelvis with contrast   Final Interpretation by Caesar Logan MD (07/07 1116)      No acute abnormality in the abdomen or pelvis.         Workstation performed: [id number]             Procedures    ED Medication and Procedure Management   Prior to Admission Medications   Prescriptions Last Dose Informant Patient Reported? Taking?   Calcium Carbonate-Vitamin D (CALCIUM-D PO)  Self, Child Yes No   Sig: Take 1 tablet by mouth in the morning   Continuous Blood Gluc  (Dexcom G7 ) YOLANDA  Self, Child No No   Sig: Use 1 Application if needed (check sugars)   Continuous Blood Gluc Sensor (Dexcom G7 Sensor)  Self, Child No No   Sig: Use 1 Device every 10 days   Insulin Glargine Solostar (Lantus SoloStar) 100 UNIT/ML SOPN  Child, Self No No   Sig: Inject 0.3 mL (30 Units total) under the skin daily   Insulin Pen Needle (B-D ULTRAFINE III SHORT PEN) 31G X 8 MM MISC  Self, Child No No   Sig: Use 4 (four) times a day (with meals and at bedtime)   Lumigan 0.01 % ophthalmic drops  Self, Child Yes No   Multiple Vitamin (MULTI VITAMIN DAILY PO)  Self, Child Yes No   Sig: Take 1 tablet by mouth in the morning.   Spacer/Aero-Holding Chambers (AeroChamber Plus Elian-Vu Large) MISC  Self, Child Yes No   Sig: Use as directed    acetaminophen (TYLENOL) 650 mg CR tablet  Self, Child Yes No   Sig: Take 500 mg by mouth in the morning and 500 mg before bedtime.   albuterol (2.5 mg/3 mL) 0.083 % nebulizer solution  Child, Self No No   Sig: Take 3 mL (2.5 mg total) by nebulization every 6 (six) hours as needed for wheezing or shortness of breath   albuterol (ProAir HFA) 90 mcg/act inhaler  Self, Child No No   Sig: Inhale 2 puffs every 6 (six) hours as needed for wheezing or shortness of breath (cough, chest tightness)   ammonium lactate (LAC-HYDRIN) 12 % lotion  Self, Child No No   Sig: Apply topically 2 (two) times a day as needed for dry skin   atorvastatin (LIPITOR) 20 mg tablet  Child, Self No No   Sig: Take 1 tablet (20 mg total) by mouth daily at bedtime   clotrimazole (LOTRIMIN) 1 % cream  Self, Child Yes No   divalproex sodium (Depakote ER) 250 mg 24 hr tablet   No No   Sig: Take 1 tablet (250 mg total) by mouth daily   fluticasone (FLONASE) 50 mcg/act nasal spray  Self, Child No No   Si spray into each nostril daily pt uses as needed   fluticasone-umeclidinium-vilanterol (Trelegy Ellipta) 100-62.5-25 mcg/actuation inhaler  Child, Self No No   Sig: inhale 1 puff by mouth daily Rinse mouth after use   furosemide (LASIX) 20 mg tablet  Child, Self No No   Sig: take 1 tablet by mouth twice a day   gabapentin (NEURONTIN) 100 mg capsule  Self, Child No No   Sig: Take 2 capsules (200 mg total) by mouth daily at bedtime   guaiFENesin (MUCINEX) 600 mg 12 hr tablet  Child, Self No No   Sig: Take 1 tablet (600 mg total) by mouth every 12 (twelve) hours   insulin aspart (NovoLOG FlexPen) 100 UNIT/ML injection pen  Child, Self No No   Sig: Per sliding scale, Max 100 units per day   lidocaine (Lidoderm) 5 %  Self, Child No No   Sig: Apply 1 patch topically over 12 hours daily as needed (pain) Remove & Discard patch within 12 hours or as directed by MD   Patient not taking: Reported on 6/10/2025   memantine (NAMENDA TITRATION PACK)   No No   Sig:  Follow package directions.   memantine (NAMENDA) 10 mg tablet   No No   Sig: Take 1 tablet (10 mg total) by mouth 2 (two) times a day To start after finishing Namenda titration pack   methocarbamol (ROBAXIN) 500 mg tablet  Child, Self Yes No   Sig: Take 500 mg by mouth daily at bedtime as needed for muscle spasms   Patient not taking: Reported on 4/16/2025   montelukast (SINGULAIR) 10 mg tablet  Child, Self No No   Sig: Take 1 tablet (10 mg total) by mouth every evening   mupirocin (BACTROBAN) 2 % ointment  Self, Child Yes No   mupirocin (BACTROBAN) 2 % ointment  Child, Self No No   Sig: Apply topically 3 (three) times a day   nystatin (MYCOSTATIN) cream  Self, Child No No   Sig: Apply topically 2 (two) times a day   nystatin powder  Child, Self No No   Sig: apply topically to affected area three times a day if needed for rash   omeprazole (PriLOSEC) 20 mg delayed release capsule  Child, Self No No   Sig: take 1 capsule by mouth twice a day   rivastigmine (EXELON) 4.6 mg/24 hr TD 24 hr patch  Self, Child No No   Sig: Place 1 patch on the skin over 24 hours daily   Patient not taking: Reported on 6/10/2025   rivastigmine (Exelon) 9.5 mg/24 hr TD 24 hr patch  Self, Child No No   Sig: Place 1 patch on the skin over 24 hours daily   saccharomyces boulardii (FLORASTOR) 250 mg capsule  Self, Child Yes No   Sig: Take 250 mg by mouth in the morning and 250 mg in the evening.      Facility-Administered Medications: None     Discharge Medication List as of 7/7/2025 11:51 AM        CONTINUE these medications which have NOT CHANGED    Details   acetaminophen (TYLENOL) 650 mg CR tablet Take 500 mg by mouth in the morning and 500 mg before bedtime., Historical Med      albuterol (2.5 mg/3 mL) 0.083 % nebulizer solution Take 3 mL (2.5 mg total) by nebulization every 6 (six) hours as needed for wheezing or shortness of breath, Starting Mon 9/30/2024, Normal      albuterol (ProAir HFA) 90 mcg/act inhaler Inhale 2 puffs every 6 (six)  hours as needed for wheezing or shortness of breath (cough, chest tightness), Starting Thu 5/2/2024, Normal      ammonium lactate (LAC-HYDRIN) 12 % lotion Apply topically 2 (two) times a day as needed for dry skin, Starting Mon 10/28/2024, Normal      atorvastatin (LIPITOR) 20 mg tablet Take 1 tablet (20 mg total) by mouth daily at bedtime, Starting Tue 6/3/2025, Normal      Calcium Carbonate-Vitamin D (CALCIUM-D PO) Take 1 tablet by mouth in the morning, Historical Med      clotrimazole (LOTRIMIN) 1 % cream Historical Med      Continuous Blood Gluc  (Dexcom G7 ) YOLANDA Use 1 Application if needed (check sugars), Starting Wed 7/26/2023, Print      Continuous Blood Gluc Sensor (Dexcom G7 Sensor) Use 1 Device every 10 days, Starting Wed 7/26/2023, Print      divalproex sodium (Depakote ER) 250 mg 24 hr tablet Take 1 tablet (250 mg total) by mouth daily, Starting Tue 6/10/2025, Normal      fluticasone (FLONASE) 50 mcg/act nasal spray 1 spray into each nostril daily pt uses as needed, Starting Thu 5/2/2024, Normal      fluticasone-umeclidinium-vilanterol (Trelegy Ellipta) 100-62.5-25 mcg/actuation inhaler inhale 1 puff by mouth daily Rinse mouth after use, Normal      furosemide (LASIX) 20 mg tablet take 1 tablet by mouth twice a day, Starting Mon 9/23/2024, Normal      gabapentin (NEURONTIN) 100 mg capsule Take 2 capsules (200 mg total) by mouth daily at bedtime, Starting Mon 3/10/2025, Normal      guaiFENesin (MUCINEX) 600 mg 12 hr tablet Take 1 tablet (600 mg total) by mouth every 12 (twelve) hours, Starting Sun 6/8/2025, Normal      insulin aspart (NovoLOG FlexPen) 100 UNIT/ML injection pen Per sliding scale, Max 100 units per day, Normal      Insulin Glargine Solostar (Lantus SoloStar) 100 UNIT/ML SOPN Inject 0.3 mL (30 Units total) under the skin daily, Starting Tue 6/3/2025, Normal      Insulin Pen Needle (B-D ULTRAFINE III SHORT PEN) 31G X 8 MM MISC Use 4 (four) times a day (with meals and at  bedtime), Starting Fri 8/9/2024, Normal      lidocaine (Lidoderm) 5 % Apply 1 patch topically over 12 hours daily as needed (pain) Remove & Discard patch within 12 hours or as directed by MD, Starting Fri 7/12/2024, Normal      Lumigan 0.01 % ophthalmic drops Historical Med      !! memantine (NAMENDA TITRATION PACK) Follow package directions., Normal      !! memantine (NAMENDA) 10 mg tablet Take 1 tablet (10 mg total) by mouth 2 (two) times a day To start after finishing Namenda titration pack, Starting Tue 6/10/2025, Normal      methocarbamol (ROBAXIN) 500 mg tablet Take 500 mg by mouth daily at bedtime as needed for muscle spasms, Starting Sat 10/19/2024, Historical Med      montelukast (SINGULAIR) 10 mg tablet Take 1 tablet (10 mg total) by mouth every evening, Starting Tue 6/3/2025, Normal      Multiple Vitamin (MULTI VITAMIN DAILY PO) Take 1 tablet by mouth in the morning., Historical Med      !! mupirocin (BACTROBAN) 2 % ointment Historical Med      !! mupirocin (BACTROBAN) 2 % ointment Apply topically 3 (three) times a day, Starting Tue 5/6/2025, Normal      nystatin (MYCOSTATIN) cream Apply topically 2 (two) times a day, Starting Wed 2/28/2024, Normal      nystatin powder apply topically to affected area three times a day if needed for rash, Normal      omeprazole (PriLOSEC) 20 mg delayed release capsule take 1 capsule by mouth twice a day, Starting Mon 4/14/2025, Normal      rivastigmine (EXELON) 4.6 mg/24 hr TD 24 hr patch Place 1 patch on the skin over 24 hours daily, Starting Thu 4/3/2025, Normal      rivastigmine (Exelon) 9.5 mg/24 hr TD 24 hr patch Place 1 patch on the skin over 24 hours daily, Starting Thu 4/3/2025, Normal      saccharomyces boulardii (FLORASTOR) 250 mg capsule Take 250 mg by mouth in the morning and 250 mg in the evening., Historical Med      Spacer/Aero-Holding Chambers (AeroChamber Plus Elian-Vu Large) MISC Use as directed, Starting Fri 5/5/2023, Historical Med       !! - Potential  duplicate medications found. Please discuss with provider.        No discharge procedures on file.  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    Carpal tunnel syndrome     Chest pain 03/06/2017    CHF (congestive heart failure) ([facility]) 2005    Chronic kidney disease     Claudication ([facility]) 03/15/2019    Closed fracture of base of middle phalanx of finger 06/22/2017    Closed fracture of middle phalanx of left little finger 07/08/2017    Coagulopathy ([facility]) 05/15/2019    Colloid cyst of brain ([facility])     COPD (chronic obstructive pulmonary disease) ([facility])     COPD (chronic obstructive pulmonary disease) ([facility])     Coronary artery disease     Cough     Diabetes mellitus ([facility])     Femur fracture, right ([facility]) 09/05/2024    GERD (gastroesophageal reflux disease)     Glaucoma     Gout     History of brain disorder 10/07/2020    Hyperlipidemia     Hypertension     Impetigo 2003    Irritable bowel syndrome     Lung nodule 7/24    Melena 02/26/2019    Memory loss 2020    PAD (peripheral artery disease) ([facility]) 05/15/2019    Paronychia of great toe of left foot 10/07/2020    Post-traumatic seizures ([facility]) 07/23/2015    Renal disorder     Seizures ([facility])     last 2015    Shortness of breath     Single seizure ([facility]) 05/31/2016    SOB (shortness of breath)     Stroke-like symptoms 07/02/2024    Syncope and collapse 11/11/2020    Urinary tract infection without hematuria 11/26/2016    Wheezing    [2]   Past Surgical History:  Procedure Laterality Date    BRAIN SURGERY      CARDIAC  CATHETERIZATION N/A 2024    Procedure: Cardiac catheterization;  Surgeon: Matt Beltran MD;  Location: [facility];  Service: Cardiology    CARPAL TUNNEL RELEASE  2016    CATARACT EXTRACTION      CHOLECYSTECTOMY      COLECTOMY RADHA      COLONOSCOPY      DECOMPRESSION SPINE LUMBAR POSTERIOR  2019    HERNIA REPAIR      HYSTERECTOMY      INCISION TENDON SHEATH HAND      JOINT REPLACEMENT  2005    NECK SURGERY  2018    NEUROPLASTY / TRANSPOSITION MEDIAN NERVE AT CARPAL TUNNEL      ORIF FEMUR FRACTURE Right 2024    Procedure: OPEN REDUCTION W/ INTERNAL FIXATION (ORIF) FEMUR;  Surgeon: Rambo Yanez MD;  Location: [facility];  Service: Orthopedics    ME COLONOSCOPY FLX DX W/COLLJ SPEC WHEN PFRMD N/A 2019    Procedure: COLONOSCOPY;  Surgeon: Yaniv Hawley MD;  Location: [facility];  Service: Gastroenterology    ME ESOPHAGOGASTRODUODENOSCOPY TRANSORAL DIAGNOSTIC N/A 2019    Procedure: ESOPHAGOGASTRODUODENOSCOPY (EGD);  Surgeon: Yaniv Hawley MD;  Location: [facility];  Service: Gastroenterology    REPLACEMENT TOTAL KNEE Right     RETINAL DETACHMENT SURGERY      TUBAL LIGATION     [3]   Family History  Problem Relation Name Age of Onset    Asthma Mother Mother         Asthma    Heart disease Mother Mother     Emphysema Father Father     Cancer Father Father     Prostate cancer Father Father     Kidney cancer Sister Sarahi     Diabetes Sister Sarahi     Kidney disease Sister Sarahi     Brain cancer Brother      Bone cancer Brother      Heart disease Brother     [4]   Social History  Tobacco Use    Smoking status: Former     Current packs/day: 0.00     Average packs/day: 0.5 packs/day for 40.0 years (20.0 ttl pk-yrs)     Types: Cigarettes     Start date: 1961     Quit date:      Years since quittin.5    Smokeless tobacco: Never    Tobacco comments:     stopped many years ago   Vaping Use    Vaping status: Never Used   Substance Use Topics    Alcohol use: Never     Drug use: Never        JOSHUA Kincaid  07/07/25 1955

## 2025-07-08 ENCOUNTER — VBI (OUTPATIENT)
Dept: FAMILY MEDICINE CLINIC | Facility: CLINIC | Age: 80
End: 2025-07-08

## 2025-07-08 DIAGNOSIS — R91.1 LUNG NODULE: Primary | ICD-10-CM

## 2025-07-08 DIAGNOSIS — J41.0 SIMPLE CHRONIC BRONCHITIS (HCC): Primary | ICD-10-CM

## 2025-07-08 RX ORDER — PEN NEEDLE, DIABETIC 31 GX5/16"
NEEDLE, DISPOSABLE MISCELLANEOUS
Qty: 400 EACH | Refills: 1 | Status: SHIPPED | OUTPATIENT
Start: 2025-07-08

## 2025-07-08 RX ORDER — ALBUTEROL SULFATE 0.83 MG/ML
2.5 SOLUTION RESPIRATORY (INHALATION) EVERY 6 HOURS PRN
Qty: 360 ML | Refills: 1 | Status: SHIPPED | OUTPATIENT
Start: 2025-07-08

## 2025-07-08 NOTE — TELEPHONE ENCOUNTER
She is due for the CT in July of 2025, so I'm not sure why they are being told it is too early? Can be scheduled any time now

## 2025-07-08 NOTE — TELEPHONE ENCOUNTER
Please call them to let them know that the prescription was filled.  Thank you.  
Pt son in law Matthew calling stating they need Albuterol sent to [facility] as [facility]. Please call pt on home phone number when sent.   
General

## 2025-07-08 NOTE — TELEPHONE ENCOUNTER
07/08/25 12:50 PM    Patient contacted post ED visit, first outreach attempt made. Message was left for patient to return a call to the [facility] Department at [facility]: Phone [phone].    Thank you.  Kamilla Quinonez MA  [facility]

## 2025-07-09 ENCOUNTER — APPOINTMENT (OUTPATIENT)
Age: 80
End: 2025-07-09
Attending: PSYCHIATRY & NEUROLOGY
Payer: COMMERCIAL

## 2025-07-09 ENCOUNTER — HOSPITAL ENCOUNTER (OUTPATIENT)
Dept: ULTRASOUND IMAGING | Facility: CLINIC | Age: 80
Discharge: HOME | End: 2025-07-09
Attending: FAMILY MEDICINE
Payer: COMMERCIAL

## 2025-07-09 ENCOUNTER — APPOINTMENT (OUTPATIENT)
Age: 80
End: 2025-07-09
Payer: COMMERCIAL

## 2025-07-09 DIAGNOSIS — E04.1 THYROID NODULE: ICD-10-CM

## 2025-07-09 DIAGNOSIS — R94.6 ABNORMAL THYROID FUNCTION TEST: ICD-10-CM

## 2025-07-09 PROCEDURE — 76536 US EXAM OF HEAD AND NECK: CPT

## 2025-07-09 NOTE — PROGRESS NOTES
Name: Shantelle Romano      : 1945      MRN: [id number]  Encounter Provider: Clarisa Billingsley DO  Encounter Date: 7/10/2025   Encounter department: [facility]  :  Assessment & Plan  Type 2 diabetes mellitus with diabetic neuropathy, with long-term current use of insulin (HCC)  Diarrhea, unspecified type  Reviewed ED visit as below. Pt is concerned that she is getting too much insulin in the evening. Discussed that we can hold off on mealtime insulin at dinner given she eats quite close to bedtime. Will decrease Lantus to 25U qHS if blood sugar >150 and 15U qHS if lower (do not want to hold entirely given pt quite hyperglycemic today with no long acting). Reminded pt she needs to eat regularly throughout the day to prevent significant drops. Will f/u in 1-2 weeks to monitor.   Also discussed trial of daily fiber supplement to help bind stools so that pt experiences less diarrhea after eating, which may also be affecting her sugars due to poor absorption. If no improvement, can consider trial of scheduled Imodium.     Lab Results   Component Value Date    HGBA1C 6.8 (A) 2025       Orders:  •  psyllium (METAMUCIL) 58.6 % packet; Take 1 packet by mouth daily  •  insulin aspart (NovoLOG FlexPen) 100 UNIT/ML injection pen; Per sliding scale with breakfast and lunch, Max 100 units per day  •  Insulin Glargine Solostar (Lantus SoloStar) 100 UNIT/ML SOPN; Inject 25U subcutaneously at bedtime if blood sugar >150. If blood sugar <150, inject 15U at bedtime.           History of Present Illness   HPI    Pt presents with her son in-law and caretaker for ED follow up.     [facility] ED  due to symptomatic hypoglycemia. Pt had home sugar 46, given glucose tabs but syncopized. Improved with PO intake.   EKG: Sinus jose a, iRBBB; Trop (-)x2   CT A/P: No acute process; renal cysts, atherosclerosis, post-surgical abdominal wall changes, degenerative spinal changes   CBC benign   Cr 0.93/GFR 57  "(stable), LFTs WNL  K 3.2 (repleted)     Today:   Pt did not have any long acting insulin last night and her sugars today are quite elevated   She feels her sugar dropped because of nothing staying in her stomach (due to chronic diarrhea) and because her dinner short acting and qHS insulin are too close together (she eats dinner at 7-730 and goes to bed at 9).     Review of Systems   Gastrointestinal:  Positive for diarrhea.       Objective   /58   Pulse 67   Temp 98.1 °F (36.7 °C)   Ht 4' 3\" (1.295 m)   Wt 64.8 kg (142 lb 12.8 oz)   SpO2 94%   BMI 38.60 kg/m²      Physical Exam  Vitals and nursing note reviewed.   Constitutional:       General: She is not in acute distress.     Appearance: Normal appearance.     Cardiovascular:      Rate and Rhythm: Normal rate and regular rhythm.   Pulmonary:      Effort: Pulmonary effort is normal. No respiratory distress.      Breath sounds: Normal breath sounds.   Abdominal:      General: Abdomen is flat. There is no distension.      Palpations: Abdomen is soft.      Tenderness: There is no abdominal tenderness.     Musculoskeletal:      Comments: Ambulating with walker      Neurological:      Mental Status: She is alert.      Comments: Grossly intact   Psychiatric:         Mood and Affect: Mood normal.         "

## 2025-07-10 ENCOUNTER — OFFICE VISIT (OUTPATIENT)
Dept: FAMILY MEDICINE CLINIC | Facility: CLINIC | Age: 80
End: 2025-07-10
Payer: COMMERCIAL

## 2025-07-10 VITALS
HEIGHT: 55 IN | BODY MASS INDEX: 33.05 KG/M2 | HEART RATE: 67 BPM | TEMPERATURE: 98.1 F | SYSTOLIC BLOOD PRESSURE: 110 MMHG | OXYGEN SATURATION: 94 % | WEIGHT: 142.8 LBS | DIASTOLIC BLOOD PRESSURE: 58 MMHG

## 2025-07-10 DIAGNOSIS — E11.40 TYPE 2 DIABETES MELLITUS WITH DIABETIC NEUROPATHY, WITH LONG-TERM CURRENT USE OF INSULIN (HCC): Primary | ICD-10-CM

## 2025-07-10 DIAGNOSIS — R19.7 DIARRHEA, UNSPECIFIED TYPE: ICD-10-CM

## 2025-07-10 DIAGNOSIS — Z79.4 TYPE 2 DIABETES MELLITUS WITH DIABETIC NEUROPATHY, WITH LONG-TERM CURRENT USE OF INSULIN (HCC): Primary | ICD-10-CM

## 2025-07-10 PROCEDURE — G2211 COMPLEX E/M VISIT ADD ON: HCPCS | Performed by: FAMILY MEDICINE

## 2025-07-10 PROCEDURE — 99214 OFFICE O/P EST MOD 30 MIN: CPT | Performed by: FAMILY MEDICINE

## 2025-07-10 RX ORDER — INSULIN ASPART 100 [IU]/ML
INJECTION, SOLUTION INTRAVENOUS; SUBCUTANEOUS
Qty: 15 ML | Refills: 5 | Status: SHIPPED | OUTPATIENT
Start: 2025-07-10

## 2025-07-10 RX ORDER — INSULIN GLARGINE 100 [IU]/ML
25 INJECTION, SOLUTION SUBCUTANEOUS DAILY
Status: SHIPPED
Start: 2025-07-10 | End: 2025-07-10

## 2025-07-10 RX ORDER — INSULIN GLARGINE 100 [IU]/ML
INJECTION, SOLUTION SUBCUTANEOUS
Status: SHIPPED
Start: 2025-07-10

## 2025-07-10 NOTE — ASSESSMENT & PLAN NOTE
Reviewed ED visit as below. Pt is concerned that she is getting too much insulin in the evening. Discussed that we can hold off on mealtime insulin at dinner given she eats quite close to bedtime. Will decrease Lantus to 25U qHS if blood sugar >150 and 15U qHS if lower (do not want to hold entirely given pt quite hyperglycemic today with no long acting). Reminded pt she needs to eat regularly throughout the day to prevent significant drops. Will f/u in 1-2 weeks to monitor.   Also discussed trial of daily fiber supplement to help bind stools so that pt experiences less diarrhea after eating, which may also be affecting her sugars due to poor absorption. If no improvement, can consider trial of scheduled Imodium.     Lab Results   Component Value Date    HGBA1C 6.8 (A) 06/19/2025       Orders:  •  psyllium (METAMUCIL) 58.6 % packet; Take 1 packet by mouth daily  •  insulin aspart (NovoLOG FlexPen) 100 UNIT/ML injection pen; Per sliding scale with breakfast and lunch, Max 100 units per day  •  Insulin Glargine Solostar (Lantus SoloStar) 100 UNIT/ML SOPN; Inject 25U subcutaneously at bedtime if blood sugar >150. If blood sugar <150, inject 15U at bedtime.

## 2025-07-10 NOTE — ASSESSMENT & PLAN NOTE
Lab Results   Component Value Date    HGBA1C 6.8 (A) 06/19/2025       Orders:  •  insulin aspart (NovoLOG FlexPen) 100 UNIT/ML injection pen; Per sliding scale with breakfast and lunch, Max 100 units per day  •  Insulin Glargine Solostar (Lantus SoloStar) 100 UNIT/ML SOPN; Inject 25U subcutaneously at bedtime if blood sugar >150. If blood sugar <150, inject 15U at bedtime.

## 2025-07-11 NOTE — TELEPHONE ENCOUNTER
07/11/25 10:40 AM    Patient contacted post ED visit, VBI phone outreaches documented. Patient called practice and scheduled a follow-up ED visit.     Thank you.  Kamilla Quinonez MA  [facility]

## 2025-07-16 ENCOUNTER — APPOINTMENT (OUTPATIENT)
Age: 80
End: 2025-07-16
Attending: PSYCHIATRY & NEUROLOGY
Payer: COMMERCIAL

## 2025-07-16 ENCOUNTER — APPOINTMENT (OUTPATIENT)
Age: 80
End: 2025-07-16
Payer: COMMERCIAL

## 2025-07-16 DIAGNOSIS — E04.1 THYROID NODULE: Primary | ICD-10-CM

## 2025-07-23 ENCOUNTER — OFFICE VISIT (OUTPATIENT)
Dept: FAMILY MEDICINE CLINIC | Facility: CLINIC | Age: 80
End: 2025-07-23
Payer: COMMERCIAL

## 2025-07-23 ENCOUNTER — EVALUATION (OUTPATIENT)
Age: 80
End: 2025-07-23
Attending: PSYCHIATRY & NEUROLOGY
Payer: COMMERCIAL

## 2025-07-23 ENCOUNTER — OFFICE VISIT (OUTPATIENT)
Age: 80
End: 2025-07-23
Payer: COMMERCIAL

## 2025-07-23 VITALS
SYSTOLIC BLOOD PRESSURE: 128 MMHG | BODY MASS INDEX: 33.33 KG/M2 | HEART RATE: 83 BPM | HEIGHT: 55 IN | OXYGEN SATURATION: 92 % | DIASTOLIC BLOOD PRESSURE: 60 MMHG | WEIGHT: 144 LBS | TEMPERATURE: 98.1 F

## 2025-07-23 DIAGNOSIS — E11.40 TYPE 2 DIABETES MELLITUS WITH DIABETIC NEUROPATHY, WITH LONG-TERM CURRENT USE OF INSULIN (HCC): Primary | ICD-10-CM

## 2025-07-23 DIAGNOSIS — R41.89 SEVERE COGNITIVE IMPAIRMENT: Primary | ICD-10-CM

## 2025-07-23 DIAGNOSIS — Z79.4 TYPE 2 DIABETES MELLITUS WITH DIABETIC NEUROPATHY, WITH LONG-TERM CURRENT USE OF INSULIN (HCC): Primary | ICD-10-CM

## 2025-07-23 DIAGNOSIS — R26.89 IMPAIRMENT OF BALANCE: Primary | ICD-10-CM

## 2025-07-23 PROCEDURE — G2211 COMPLEX E/M VISIT ADD ON: HCPCS | Performed by: FAMILY MEDICINE

## 2025-07-23 PROCEDURE — 97168 OT RE-EVAL EST PLAN CARE: CPT | Performed by: OCCUPATIONAL THERAPIST

## 2025-07-23 PROCEDURE — 97112 NEUROMUSCULAR REEDUCATION: CPT

## 2025-07-23 PROCEDURE — 97530 THERAPEUTIC ACTIVITIES: CPT | Performed by: OCCUPATIONAL THERAPIST

## 2025-07-23 PROCEDURE — 99213 OFFICE O/P EST LOW 20 MIN: CPT | Performed by: FAMILY MEDICINE

## 2025-07-23 NOTE — ASSESSMENT & PLAN NOTE
No further hypoglycemic episodes. Blood sugars improved. Can give small dose mealtime insulin at dinner if readings high/pt is eating earlier. Otherwise, continue current regimen   Lab Results   Component Value Date    HGBA1C 6.8 (A) 06/19/2025

## 2025-07-23 NOTE — PROGRESS NOTES
Daily Note     Today's date: 2025  Patient name: Shantelle Romano  : 1945  MRN: [id number]  Referring provider: Nicki Bah MD  Dx:   Encounter Diagnosis     ICD-10-CM    1. Impairment of balance  R26.89                      Subjective: patient reports not doing any exercises       Objective: See treatment diary below  NMR:  In // bars with gait belt   Sit to stands 10x   Lateral side stepping 4 laps ea   Forward walking 4 laps   Marching 4 laps     Assessment: Tolerated treatment fair. Patient was able to perform exercises above without any loss of balance. Patient was able to perform exercises with UE assist. Dicussed with patient and son in law to perform exercises in [place]. Both patient and son in law agreed. Son in law and patient were inquiring about getting a chair that would help the patient stand up. After dicussing with primary therapist we both agreed that this was not necessary as of today. Patient became extremely agitated after being told she did not require the chair. Educated patient about best practice of being the most functional at home. Continue to progress. Patient demonstrated fatigue post treatment, exhibited good technique with therapeutic exercises, and would benefit from continued PT      Plan: Continue per plan of care.            POC expires Unit limit Auth Expiration date PT/OT + Visit Limit? Co-Insurance   25 N/a pend BOMN Yes                               Visit/Unit Tracking  AUTH Status:  Date             pend Used 1 2 3             Remaining

## 2025-07-23 NOTE — PROGRESS NOTES
OCCUPATIONAL THERAPY RE-EVALUATION  POC expires Auth Status Total   Visits  Start date  Expiration date PT/OT + Visit Limit? Co-Insurance   10/18/25 BOMN  25                                                Visit/Unit Tracking  AUTH Status: BOMN Date  5 5/ 6/3 6/11 6/18 6/26 7/   Visits  Authed:  Used 1 (IE) 1 1 1  1 (PN) 1 1 1 1 1 (PN) 1 1 1 (re-eval)    Remaining                     PLAN OF CARE START: 25  PLAN OF CARE END: 10/18/2025  PROGRESS NOTE DUE: follow up next visit  FREQUENCY: 1-2x/week for 8-12 weeks  PRECAUTIONS: anxiety, FALL/SAFETY   DIAGNOSIS: severe cognitive impairment  VISITS: 13 (re-eval)      Today's date: 2025  Patient name: Shantelle Romano  : 1945  MRN: [id number]  Referring provider: Nicki Bah MD  Dx:   Encounter Diagnosis     ICD-10-CM    1. Severe cognitive impairment  R41.89             SKILLED ANALYSIS:  Pt is a 80 year old female presenting to OP OT re-evaluation on 25 s/p severe cognitive impairment, referral from neurology.  Pt has been receiving skilled OP OT services for the last 3 months. 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 Pt's son in law present for majority of re-evaluation today. Son in law reports severe difficulty with medication management and has now taken over managing patient's medications. Pt now has private assistance 7 days per week (previously 5 days per week). Pt demonstrates limited insights into deficits and reports that she does not have a hard time with anything at home, however son in law reports pt needs assistance with most ADLs/IADLS. Pt continues to repeat questions/comments within a few minutes of each other. Pt is now receiving skilled OP PT services as well for balance which son in law reports is still a barrier however is improving. Pt is now on medication and son in law reports that is significantly improving her mood. Pt is very pleasant this session. Son in law does  "report ER visit on 7/7. He reports pt woke up with low sugar levels that cam slightly with sugary foods but pt then lost consciousness and EMS was called. Pt was transported to ER by ambulance where she spent 3 hours prior to be discharged. Pt is demonstrating deficits based on clinical observation and the following assessments: SLUMS and [place] Making Part A. Post assessments pt demonstrating the following: Improvement in SLUMS score to 17/30, improvement in 3 points since initial evaluation. Trail making A score maintained since last progress note however slight improvement since initial evaluation and continues to be decreased compared to age-related norms. Recommend OP OT 1-2x/wk for 8-12  weeks with focus on aforementioned deficits to maximize functional performance and improve QOL.  Findings and recommendations discussed with pt, and they are in agreement. Educated pt on charges of insurance, assessments performed and standardized norms, POC, goal creation, and OP OT services.           Subjective  Occupational Profile  *type of home: private home,  split level   *lives with: son in law, daughter, and grandson   *vocation: retired, different small jobs through the year   *driving: no  *DME: commode, shower chair, grab bars, chair lift for stairs   *Assistive device for inside home: RW  *Assistive device for outside home: RW  *ADL and IADL status: private caregiver 5x/week from about 8-2 to assist with all ADLs and IADLs    PATIENT GOAL: \"to be better\" 7/23    PAIN: none 7/23    HISTORY OF PRESENT ILLNESS:   Pt is a 80 y.o. female who was referred to Occupational Therapy s/p  Severe cognitive impairment [R41.89].  Per neuro on 4/3/25 : \"MoCA is 14/30 discussed at length with the family patient does have cognitive impairment and her recent PET CT scan results were discussed with them suggestive of possible frontal temporal dementia versus her prior history of cranial surgery for removal of colloid cyst, advised " "patient to go for neuropsych testing, Dr. Griffin on the needs notes were reviewed in detail, patient was also advised to go for cognitive therapy discussed with them different medication options she is agreeable to go on Exelon patch 4.6 mg per 24-hour for 1 month followed by 9.5 mg per 24 hours side effects discussed with them they will discuss with the PCP prior to starting the patch and if has any side effects then they will stop and let me know.\"  \"PET brain metabolic scan from 1/24/2025 was reported as mild patchy hypometabolism in the right frontal and temporal lobes along with mildly decreased activity at the right anterior cingulate gyrus 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 given evidence of prior frontal surgery it is also possible this reflects postsurgical changes and asymmetric encephalomalacia no characteristics of Alzheimer's dementia\"  History of femur fx post fall, bout of SNF and home health rehab in September of 2024.     7/23: Pt was recently in the hospital on 7/7 for hypoglycemia however discharged same day and sugar levels have been stable as per son in law report. Pt saw neurology on 6/10 and was referred to senior care and put on new medications.     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    Carpal tunnel syndrome     Chest pain 03/06/2017    CHF (congestive heart failure) ([facility]) 2005    Chronic kidney disease     Claudication ([facility]) 03/15/2019    Closed fracture of base of middle phalanx of finger 06/22/2017    Closed fracture of middle phalanx of left little finger 07/08/2017    Coagulopathy ([facility]) 05/15/2019    Colloid cyst of brain ([facility])     COPD (chronic obstructive pulmonary disease) ([facility])     COPD (chronic obstructive pulmonary disease) ([facility])     Coronary artery disease     Cough     Diabetes mellitus ([facility])     Femur fracture, right ([facility]) 09/05/2024    GERD (gastroesophageal reflux disease)     Glaucoma     Gout     History of brain disorder 10/07/2020    Hyperlipidemia     Hypertension     Impetigo 2003    Irritable bowel syndrome     Lung nodule 7/24    Melena 02/26/2019    Memory loss 2020    PAD (peripheral artery disease) ([facility]) 05/15/2019    Paronychia of great toe of left foot 10/07/2020    Post-traumatic seizures ([facility]) 07/23/2015    Renal disorder     Seizures ([facility])     last 2015    Shortness of breath     Single seizure ([facility]) 05/31/2016    SOB (shortness of breath)     Stroke-like symptoms 07/02/2024    Syncope and collapse 11/11/2020    Urinary tract infection without hematuria 11/26/2016    Wheezing        Past Surgical Hx:   Past Surgical History:   Procedure Laterality Date    BRAIN SURGERY      CARDIAC CATHETERIZATION N/A 08/28/2024    Procedure: Cardiac catheterization;  Surgeon: Matt Beltran MD;  Location: [facility];  Service: " Cardiology    CARPAL TUNNEL RELEASE  2016    CATARACT EXTRACTION      CHOLECYSTECTOMY      COLECTOMY RADHA      COLONOSCOPY      DECOMPRESSION SPINE LUMBAR POSTERIOR  08/19/2019    HERNIA REPAIR      HYSTERECTOMY      INCISION TENDON SHEATH HAND      JOINT REPLACEMENT  2005    NECK SURGERY  05/24/2018    NEUROPLASTY / TRANSPOSITION MEDIAN NERVE AT CARPAL TUNNEL      ORIF FEMUR FRACTURE Right 09/07/2024    Procedure: OPEN REDUCTION W/ INTERNAL FIXATION (ORIF) FEMUR;  Surgeon: Rambo Yanez MD;  Location: [facility];  Service: Orthopedics    IL COLONOSCOPY FLX DX W/COLLJ SPEC WHEN PFRMD N/A 03/26/2019    Procedure: COLONOSCOPY;  Surgeon: Yaniv Hawley MD;  Location: [facility];  Service: Gastroenterology    IL ESOPHAGOGASTRODUODENOSCOPY TRANSORAL DIAGNOSTIC N/A 03/26/2019    Procedure: ESOPHAGOGASTRODUODENOSCOPY (EGD);  Surgeon: Yaniv Hawley MD;  Location: [facility];  Service: Gastroenterology    REPLACEMENT TOTAL KNEE Right     RETINAL DETACHMENT SURGERY      TUBAL LIGATION            Assessments         FUNCTIONAL ACTIVITY QUESTIONAIRE   The Functional Activities Questionnaire (FAQ) measures instrumental activities of daily living (IADLs). The FAQ may be used to differentiate those with mild cognitive impairment and mild Alzheimer's disease   TASK Completely unable to perform task (3)  Requires assistance (2)  Has difficulty but accomplishes task, or has never done, but the informant feels could do the task with difficulty (1)  Normal performance, or has never don the task, but the informant feels the patient could do the task if necessary    Writing checks, paying bills, balancing checkbook    X       Assembling tax records, business affairs, papers    X       Shopping alone for clothes, house hold necessities, or groceries  X         Playing a game of skill, working on a hobby     x      Heating water, making a cup of coffee, turning off the stove     X     Preparing a balance meal   X       Keeping  "track of current events   X       Paying attention to, understanding, discussing a TV show, book, or magazine       X   Remembering appointments, family occasions, holidays, medications     X     Traveling our of the neighborhood, arranging or taking buses  X         Total points: 18 (previously 17)      Evaluation   SLUMS:  *screen individuals to look for the presence of cognitive deficits, and to identify changes in cognition over time*     SCORING AND NORMS     HIGH SCHOOL EDUCATION  27-30, NORMAL   21-26, MILD NEUROCOGNITIVE DISORDER   1-20, DEMENTIA   LESS THEN HIGH SCHOOL EDUCATION  25-30, NORMAL   20-24, MILD NEUROCOGNITIVE DISORDER  1-19, DEMENTIA      QUESTION STATUS ON RE-EVAL 7/23 Status on 6/18/25 Status on 4/16/25   What day of the week is it 1 0 1   What is the year 1 1 1   What state are we in 1 1 1   How much did you spend 0 0 1   How much do you have left 0 2 0   Naming animals in 1 minute 2 1 1   Recall of 5 objects 2 2 2   Series of numbers 0 0 1   Clock face hour markers 2 2 0   Clock face time  0 0 0   X in triangle 1 1 1   Largest figure 1 1 1   Short story, females name 2 0 0   Short story, when did she go back to work 2 2 2   Short story, what work did she do 2 2 2   Short story, what state did she live in 0 0 0   Total score 17/30 (IMPROVED) 15/30 (IMPROVED) 14/30 (less then high school education)          TRAIL MAKING TEST:   \"widely used test to assess executive function in patients with neuro injury. Successful performance of the TMT requires a variety of mental abilities including letter and number recognition mental flexibility, visual scanning, and motor function. Norms are based on age related scores\"       RE-EVAL 7/23 PN 5/14 STATUS ON IE: 4/16/25 COMMENTS   TRAIL MAKING PART A      NORM: 58 seconds 1 minute and 13 seconds 1 minute and 14 seconds  1 minute and 19 seconds                SHORT TERM GOALS (6-8 weeks)    GOAL  STATUS ON IE  GOAL STATUS    Pt will increase sustained " attention by completing trail making part A in 1 minute and 14 seconds  or less to complete IADLs 1 minute and 19 seconds ACHIEVED                        Pt will score 16/30 or greater on SLUMS to improve independence with life roles 14/30 ACHIEVED      Pt will demo G carryover and understanding of temporal orientation compensatory strategies and orientation of daily schedules (ie. Use of calendars, alarms, etc) for inc safety with life roles and IADL fxn   CONTINUE GOAL       Pt will demo G carryover of use of internal/external memory strategy aides for improved recall of daily events, improved executive functioning with 25% accuracy    CONTINUE GOAL          LONG TERM GOALS (8-12 weeks)    GOAL  STATUS ON IE  GOAL STATUS    Pt will increase sustained attention by completing trail making part A in 1 minute and 10 seconds  or less to complete IADLs 1 minute and 19 seconds IMPROVED, CONTINUE GOAL          Pt will score 18/30 or greater on SLUMS to improve independence with life roles 14/30 IMPROVED, CONTINUE GOAL   Pt will follow 1-2 step written directions for improved overall comprehension and engagement in life roles and salient tasks.   CONTINUE GOAL      Pt will demo G carryover of use of internal/external memory strategy aides for improved recall of daily events, improved executive functioning with 50% accuracy.    CONTINUE GOAL           PLANNED THERAPY INTERVENTIONS:  Internal and external memory aides  Hypersensitivity strategies education  Multi-modal environment  Sustained/alternating/divided attention  Temporal Awareness: Organize the Hour activities  Memory and mental manipulation  Auditory processing with immediate recall  Memory retention with immediate and delayed recall  Edu on cog/vision apps

## 2025-07-23 NOTE — PROGRESS NOTES
"Name: Shantelle Romano      : 1945      MRN: [id number]  Encounter Provider: Clarisa Billingsley DO  Encounter Date: 2025   Encounter department: [facility]  :  Assessment & Plan  Type 2 diabetes mellitus with diabetic neuropathy, with long-term current use of insulin (HCC)  No further hypoglycemic episodes. Blood sugars improved. Can give small dose mealtime insulin at dinner if readings high/pt is eating earlier. Otherwise, continue current regimen   Lab Results   Component Value Date    HGBA1C 6.8 (A) 2025                   History of Present Illness   HPI    Pt presents with her son-in-law for medication follow up   Stopped insulin prior to dinner, decreased long-acting dosing  Per CGM/log review, blood sugars mostly in 140-180s, which is reasonable.   Has had some higher readings in the 200-300s -- they are wondering if they can give dinnertime insulin if it is earlier in the evening/her readings are high     Review of Systems   Neurological:  Negative for syncope.       Objective   /60   Pulse 83   Temp 98.1 °F (36.7 °C)   Ht 4' 3\" (1.295 m)   Wt 65.3 kg (144 lb)   SpO2 92%   BMI 38.92 kg/m²      Physical Exam  Vitals and nursing note reviewed.   Constitutional:       General: She is not in acute distress.     Appearance: Normal appearance.   Pulmonary:      Effort: Pulmonary effort is normal. No respiratory distress.     Musculoskeletal:      Comments: Using walker     Neurological:      Mental Status: She is alert. Mental status is at baseline.     Psychiatric:         Mood and Affect: Mood normal.         "

## 2025-07-24 ENCOUNTER — TELEPHONE (OUTPATIENT)
Dept: NEPHROLOGY | Facility: CLINIC | Age: 80
End: 2025-07-24

## 2025-07-24 ENCOUNTER — OFFICE VISIT (OUTPATIENT)
Age: 80
End: 2025-07-24

## 2025-07-24 VITALS — TEMPERATURE: 97.2 F

## 2025-07-24 DIAGNOSIS — L72.0 EPIDERMAL INCLUSION CYST: ICD-10-CM

## 2025-07-24 DIAGNOSIS — L72.9 INFECTED CYST OF SKIN: Primary | ICD-10-CM

## 2025-07-24 DIAGNOSIS — L08.9 INFECTED CYST OF SKIN: Primary | ICD-10-CM

## 2025-07-24 RX ORDER — DOXYCYCLINE 100 MG/1
100 CAPSULE ORAL 2 TIMES DAILY
Qty: 42 CAPSULE | Refills: 0 | Status: SHIPPED | OUTPATIENT
Start: 2025-07-24 | End: 2025-08-14

## 2025-07-24 NOTE — PROGRESS NOTES
"[facility]atology [facility] Note     Patient Name: Shantelle Romano  Encounter Date: 07/24/2025       Have you been cared for by a [facility] Dermatologist in the last 3 years and, if so, which description applies to you? Yes. I have been here within the last 3 years, and my medical history has NOT changed since that time. I am of child-bearing potential.     REVIEW OF SYSTEMS:  Have you recently had or currently have any of the following? No changes in my recent health.   PAST MEDICAL HISTORY:  Have you personally ever had or currently have any of the following?  If \"YES,\" then please provide more detail. No changes in my medical history.   HISTORY OF IMMUNOSUPPRESSION: Do you have a history of any of the following:  Systemic Immunosuppression such as Diabetes, Biologic or Immunotherapy, Chemotherapy, Organ Transplantation, Bone Marrow Transplantation or Prednisone?  YES, type 2 diabetes.      Answering \"YES\" requires the addition of the dotphrase \"IMMUNOSUPPRESSED\" as the first diagnosis of the patient's visit.   FAMILY HISTORY:  Any \"first degree relatives\" (parent, brother, sister, or child) with the following?    No changes in my family's known health.   PATIENT EXPERIENCE:    Do you want the Dermatologist to perform a COMPLETE skin exam today including a clinical examination under the \"bra and underwear\" areas?  Yes  If necessary, do we have your permission to call and leave a detailed message on your Preferred Phone number that includes your specific medical information?  Yes      Allergies[1] Current Medications[2]        Whom besides the patient is providing clinical information about today's encounter?   NO ADDITIONAL HISTORIAN (patient alone provided history)    Physical Exam and Assessment/Plan by Diagnosis:    SUBCUTANEOUS NODULE     Physical Exam:  Anatomic Location Affected:  right buttock   Morphological Description:  skin colored subcutaneous nodule  Pertinent Positives: tenderness   Pertinent " Negatives:    Additional History of Present Condition:  Patient is being seen for a lesion on her buttock onset few months ago. She notes having a wound on that side of he buttock was treated by wound care for months before eventually resolving. She notes this lesion is in the same area. She notes the spot is tender to touch.      Assessment and Plan:  Based on a thorough discussion of this condition and the management approach to it (including a comprehensive discussion of the known risks, side effects and potential benefits of treatment), the patient (family) agrees to implement the following specific plan:  Discussed that given history and morphology we considered cyst and will recommend treatment at below.   START taking doxycyline 100 mg twice daily for 3 weeks, take with food and water.   If symptoms do not improve, will consider punch biopsy in 4 weeks time.       Scribe Attestation      I,:  Maribel Campbell MA am acting as a scribe while in the presence of the attending physician.:       I,:  Yaniv Olivera MD personally performed the services described in this documentation    as scribed in my presence.:                [1]   Allergies  Allergen Reactions    Brimonidine Other (See Comments)    Salicylic Acid-Sulfur Other (See Comments)    Sulfa Antibiotics Rash and Other (See Comments)   [2]   Current Outpatient Medications:     acetaminophen (TYLENOL) 650 mg CR tablet, Take 500 mg by mouth in the morning and 500 mg before bedtime., Disp: , Rfl:     albuterol (2.5 mg/3 mL) 0.083 % nebulizer solution, Take 3 mL (2.5 mg total) by nebulization every 6 (six) hours as needed for wheezing or shortness of breath, Disp: 360 mL, Rfl: 1    albuterol (ProAir HFA) 90 mcg/act inhaler, Inhale 2 puffs every 6 (six) hours as needed for wheezing or shortness of breath (cough, chest tightness), Disp: 18 g, Rfl: 0    ammonium lactate (LAC-HYDRIN) 12 % lotion, Apply topically 2 (two) times a day as needed for dry skin,  Disp: 225 g, Rfl: 1    atorvastatin (LIPITOR) 20 mg tablet, Take 1 tablet (20 mg total) by mouth daily at bedtime, Disp: 90 tablet, Rfl: 1    Calcium Carbonate-Vitamin D (CALCIUM-D PO), Take 1 tablet by mouth in the morning, Disp: , Rfl:     clotrimazole (LOTRIMIN) 1 % cream, , Disp: , Rfl:     Continuous Blood Gluc  (Dexcom G7 ) YOLANDA, Use 1 Application if needed (check sugars), Disp: 1 each, Rfl: 0    Continuous Blood Gluc Sensor (Dexcom G7 Sensor), Use 1 Device every 10 days, Disp: 9 each, Rfl: 3    divalproex sodium (Depakote ER) 250 mg 24 hr tablet, Take 1 tablet (250 mg total) by mouth daily, Disp: 30 tablet, Rfl: 5    fluticasone (FLONASE) 50 mcg/act nasal spray, 1 spray into each nostril daily pt uses as needed, Disp: 54 g, Rfl: 1    fluticasone-umeclidinium-vilanterol (Trelegy Ellipta) 100-62.5-25 mcg/actuation inhaler, inhale 1 puff by mouth daily Rinse mouth after use, Disp: 180 blister, Rfl: 3    furosemide (LASIX) 20 mg tablet, take 1 tablet by mouth twice a day, Disp: 180 tablet, Rfl: 1    gabapentin (NEURONTIN) 100 mg capsule, Take 2 capsules (200 mg total) by mouth daily at bedtime, Disp: 180 capsule, Rfl: 1    guaiFENesin (MUCINEX) 600 mg 12 hr tablet, Take 1 tablet (600 mg total) by mouth every 12 (twelve) hours, Disp: 180 tablet, Rfl: 1    insulin aspart (NovoLOG FlexPen) 100 UNIT/ML injection pen, Per sliding scale with breakfast and lunch, Max 100 units per day, Disp: 15 mL, Rfl: 5    Insulin Glargine Solostar (Lantus SoloStar) 100 UNIT/ML SOPN, Inject 25U subcutaneously at bedtime if blood sugar >150. If blood sugar <150, inject 15U at bedtime., Disp: , Rfl:     Insulin Pen Needle (BD Pen Needle Short Ultrafine) 31G X 8 MM MISC, Use 4 (four) times a day (with meals and at bedtime), Disp: 400 each, Rfl: 1    Lumigan 0.01 % ophthalmic drops, , Disp: , Rfl:     memantine (NAMENDA TITRATION PACK), Follow package directions., Disp: 1 tablet, Rfl: 0    memantine (NAMENDA) 10 mg tablet,  Take 1 tablet (10 mg total) by mouth 2 (two) times a day To start after finishing Namenda titration pack, Disp: 60 tablet, Rfl: 5    montelukast (SINGULAIR) 10 mg tablet, Take 1 tablet (10 mg total) by mouth every evening, Disp: 90 tablet, Rfl: 1    Multiple Vitamin (MULTI VITAMIN DAILY PO), Take 1 tablet by mouth in the morning., Disp: , Rfl:     mupirocin (BACTROBAN) 2 % ointment, Apply topically 3 (three) times a day, Disp: 30 g, Rfl: 1    nystatin (MYCOSTATIN) cream, Apply topically 2 (two) times a day, Disp: 30 g, Rfl: 1    nystatin powder, apply topically to affected area three times a day if needed for rash, Disp: 60 g, Rfl: 1    omeprazole (PriLOSEC) 20 mg delayed release capsule, take 1 capsule by mouth twice a day, Disp: 180 capsule, Rfl: 1    psyllium (METAMUCIL) 58.6 % packet, Take 1 packet by mouth daily, Disp: 90 each, Rfl: 1    rivastigmine (EXELON) 4.6 mg/24 hr TD 24 hr patch, Place 1 patch on the skin over 24 hours daily (Patient not taking: Reported on 6/10/2025), Disp: 30 patch, Rfl: 0    rivastigmine (Exelon) 9.5 mg/24 hr TD 24 hr patch, Place 1 patch on the skin over 24 hours daily, Disp: 30 patch, Rfl: 5    saccharomyces boulardii (FLORASTOR) 250 mg capsule, Take 250 mg by mouth in the morning and 250 mg in the evening., Disp: , Rfl:     Spacer/Aero-Holding Chambers (AeroChamber Plus Elian-Vu Large) MISC, Use as directed, Disp: , Rfl:

## 2025-07-25 DIAGNOSIS — J41.0 SIMPLE CHRONIC BRONCHITIS (HCC): ICD-10-CM

## 2025-07-25 RX ORDER — FLUTICASONE PROPIONATE 50 MCG
1 SPRAY, SUSPENSION (ML) NASAL DAILY
Qty: 48 G | Refills: 1 | Status: SHIPPED | OUTPATIENT
Start: 2025-07-25

## 2025-07-25 NOTE — TELEPHONE ENCOUNTER
Medication:     fluticasone (FLONASE) 50 mcg/act nasal spray     Dose/Frequency:     1 spray into each nostril daily pt uses as needed     Quantity: 54 g    Pharmacy:     [id number] [address] [phone]     Office:   [x] PCP/Provider - Clarisa Billingsley  [] Speciality/Provider -     Does the patient have enough for 3 days?   [x] Yes   [] No - Send as HP to POD

## 2025-07-29 ENCOUNTER — OFFICE VISIT (OUTPATIENT)
Age: 80
End: 2025-07-29
Attending: PSYCHIATRY & NEUROLOGY
Payer: COMMERCIAL

## 2025-07-29 ENCOUNTER — TELEPHONE (OUTPATIENT)
Age: 80
End: 2025-07-29

## 2025-07-29 ENCOUNTER — OFFICE VISIT (OUTPATIENT)
Age: 80
End: 2025-07-29
Payer: COMMERCIAL

## 2025-07-29 DIAGNOSIS — R26.89 IMPAIRMENT OF BALANCE: Primary | ICD-10-CM

## 2025-07-29 DIAGNOSIS — R41.89 SEVERE COGNITIVE IMPAIRMENT: Primary | ICD-10-CM

## 2025-07-29 PROCEDURE — 97530 THERAPEUTIC ACTIVITIES: CPT

## 2025-07-29 PROCEDURE — 97112 NEUROMUSCULAR REEDUCATION: CPT

## 2025-07-30 ENCOUNTER — HOSPITAL ENCOUNTER (OUTPATIENT)
Dept: CT IMAGING | Facility: CLINIC | Age: 80
Discharge: HOME | End: 2025-07-30
Attending: FAMILY MEDICINE
Payer: COMMERCIAL

## 2025-07-30 ENCOUNTER — TELEPHONE (OUTPATIENT)
Age: 80
End: 2025-07-30

## 2025-07-30 DIAGNOSIS — R91.1 LUNG NODULE: ICD-10-CM

## 2025-07-30 PROCEDURE — 71250 CT THORAX DX C-: CPT

## 2025-07-31 ENCOUNTER — HOSPITAL ENCOUNTER (OUTPATIENT)
Dept: ULTRASOUND IMAGING | Facility: HOSPITAL | Age: 80
Discharge: HOME | End: 2025-07-31
Attending: FAMILY MEDICINE
Payer: COMMERCIAL

## 2025-07-31 DIAGNOSIS — E04.1 THYROID NODULE: ICD-10-CM

## 2025-07-31 PROCEDURE — 88173 CYTOPATH EVAL FNA REPORT: CPT | Performed by: PATHOLOGY

## 2025-07-31 PROCEDURE — 10005 FNA BX W/US GDN 1ST LES: CPT

## 2025-07-31 PROCEDURE — 88172 CYTP DX EVAL FNA 1ST EA SITE: CPT | Performed by: PATHOLOGY

## 2025-07-31 RX ORDER — LIDOCAINE HYDROCHLORIDE 10 MG/ML
5 INJECTION, SOLUTION EPIDURAL; INFILTRATION; INTRACAUDAL; PERINEURAL ONCE
Status: DISCONTINUED | OUTPATIENT
Start: 2025-07-31 | End: 2025-08-04 | Stop reason: HOSPADM

## 2025-08-01 ENCOUNTER — NURSE TRIAGE (OUTPATIENT)
Age: 80
End: 2025-08-01

## 2025-08-01 DIAGNOSIS — Z96.659 PERIPROSTHETIC FRACTURE AROUND PROSTHETIC KNEE, SUBSEQUENT ENCOUNTER: Primary | ICD-10-CM

## 2025-08-01 DIAGNOSIS — M97.8XXD PERIPROSTHETIC FRACTURE AROUND PROSTHETIC KNEE, SUBSEQUENT ENCOUNTER: Primary | ICD-10-CM

## 2025-08-01 PROCEDURE — 88173 CYTOPATH EVAL FNA REPORT: CPT | Performed by: PATHOLOGY

## 2025-08-01 PROCEDURE — 88172 CYTP DX EVAL FNA 1ST EA SITE: CPT | Performed by: PATHOLOGY

## 2025-08-05 ENCOUNTER — APPOINTMENT (OUTPATIENT)
Dept: RADIOLOGY | Facility: CLINIC | Age: 80
End: 2025-08-05
Attending: ORTHOPAEDIC SURGERY
Payer: COMMERCIAL

## 2025-08-05 ENCOUNTER — OFFICE VISIT (OUTPATIENT)
Dept: OBGYN CLINIC | Facility: CLINIC | Age: 80
End: 2025-08-05
Payer: COMMERCIAL

## 2025-08-05 VITALS — BODY MASS INDEX: 33 KG/M2 | WEIGHT: 142.6 LBS | HEIGHT: 55 IN

## 2025-08-05 DIAGNOSIS — Z96.659 PERIPROSTHETIC FRACTURE AROUND PROSTHETIC KNEE, SUBSEQUENT ENCOUNTER: ICD-10-CM

## 2025-08-05 DIAGNOSIS — M97.8XXD PERIPROSTHETIC FRACTURE AROUND PROSTHETIC KNEE, SUBSEQUENT ENCOUNTER: Primary | ICD-10-CM

## 2025-08-05 DIAGNOSIS — Z96.659 PERIPROSTHETIC FRACTURE AROUND PROSTHETIC KNEE, SUBSEQUENT ENCOUNTER: Primary | ICD-10-CM

## 2025-08-05 DIAGNOSIS — M97.8XXD PERIPROSTHETIC FRACTURE AROUND PROSTHETIC KNEE, SUBSEQUENT ENCOUNTER: ICD-10-CM

## 2025-08-05 PROCEDURE — 73552 X-RAY EXAM OF FEMUR 2/>: CPT

## 2025-08-05 PROCEDURE — 99213 OFFICE O/P EST LOW 20 MIN: CPT | Performed by: ORTHOPAEDIC SURGERY

## 2025-08-05 PROCEDURE — 73560 X-RAY EXAM OF KNEE 1 OR 2: CPT

## 2025-08-06 ENCOUNTER — OFFICE VISIT (OUTPATIENT)
Age: 80
End: 2025-08-06
Payer: COMMERCIAL

## 2025-08-06 ENCOUNTER — OFFICE VISIT (OUTPATIENT)
Age: 80
End: 2025-08-06
Attending: PSYCHIATRY & NEUROLOGY
Payer: COMMERCIAL

## 2025-08-06 DIAGNOSIS — R26.89 IMPAIRMENT OF BALANCE: Primary | ICD-10-CM

## 2025-08-06 DIAGNOSIS — R41.89 SEVERE COGNITIVE IMPAIRMENT: Primary | ICD-10-CM

## 2025-08-06 PROCEDURE — 97112 NEUROMUSCULAR REEDUCATION: CPT

## 2025-08-06 PROCEDURE — 97530 THERAPEUTIC ACTIVITIES: CPT

## 2025-08-18 ENCOUNTER — TELEPHONE (OUTPATIENT)
Age: 80
End: 2025-08-18

## 2025-08-19 ENCOUNTER — EVALUATION (OUTPATIENT)
Age: 80
End: 2025-08-19
Attending: PSYCHIATRY & NEUROLOGY
Payer: COMMERCIAL

## 2025-08-19 DIAGNOSIS — R41.89 SEVERE COGNITIVE IMPAIRMENT: Primary | ICD-10-CM

## 2025-08-19 PROCEDURE — 97530 THERAPEUTIC ACTIVITIES: CPT

## 2025-08-20 ENCOUNTER — TELEPHONE (OUTPATIENT)
Dept: FAMILY MEDICINE CLINIC | Facility: CLINIC | Age: 80
End: 2025-08-20

## 2025-08-20 ENCOUNTER — CLINICAL SUPPORT (OUTPATIENT)
Dept: FAMILY MEDICINE CLINIC | Facility: CLINIC | Age: 80
End: 2025-08-20

## 2025-08-20 VITALS — OXYGEN SATURATION: 92 %

## 2025-08-20 DIAGNOSIS — R06.02 SOB (SHORTNESS OF BREATH): Primary | ICD-10-CM

## 2025-08-20 DIAGNOSIS — I50.32 CHRONIC HEART FAILURE WITH PRESERVED EJECTION FRACTION (HCC): ICD-10-CM

## 2025-08-20 DIAGNOSIS — R60.0 BILATERAL LEG EDEMA: ICD-10-CM

## 2025-08-20 PROCEDURE — NURSE

## 2025-08-20 RX ORDER — FUROSEMIDE 20 MG/1
20 TABLET ORAL 2 TIMES DAILY
Qty: 180 TABLET | Refills: 1 | Status: SHIPPED | OUTPATIENT
Start: 2025-08-20

## 2025-08-22 ENCOUNTER — TELEPHONE (OUTPATIENT)
Age: 80
End: 2025-08-22

## (undated) DEVICE — 2.5MM DRILL BIT/QC/GOLD/110MM

## (undated) DEVICE — BETHLEHEM UNIVERSAL MINOR GEN: Brand: CARDINAL HEALTH

## (undated) DEVICE — DRESSING MEPILEX AG BORDER POST-OP 4 X 6 IN

## (undated) DEVICE — DRESSING MEPILEX AG BORDER POST-OP 4 X 12 IN

## (undated) DEVICE — WET SKIN PREP TRAY: Brand: MEDLINE INDUSTRIES, INC.

## (undated) DEVICE — INTENDED FOR TISSUE SEPARATION, AND OTHER PROCEDURES THAT REQUIRE A SHARP SURGICAL BLADE TO PUNCTURE OR CUT.: Brand: BARD-PARKER SAFETY BLADES SIZE 10, STERILE

## (undated) DEVICE — C-ARM: Brand: UNBRANDED

## (undated) DEVICE — CHLORAPREP HI-LITE 26ML ORANGE

## (undated) DEVICE — SUT VICRYL PLUS 2-0 CTB-1 27 IN VCPB259H

## (undated) DEVICE — PROXIMATE SKIN STAPLERS (35 WIDE) CONTAINS 35 STAINLESS STEEL STAPLES (FIXED HEAD): Brand: PROXIMATE

## (undated) DEVICE — 3.2MM GUIDE WIRE 400MM

## (undated) DEVICE — PADDING CAST 4 IN  COTTON STRL

## (undated) DEVICE — CATH DIAG 5FR IMPULSE 100CM FL3.5

## (undated) DEVICE — 2.0MM KIRSCHNER WIRE W/TROCAR POINT 150MM
Type: IMPLANTABLE DEVICE | Site: LEG | Status: NON-FUNCTIONAL
Removed: 2024-09-07

## (undated) DEVICE — SUT STRATAFIX SPIRAL PDS PLUS 1 CTX 18 IN SXPP1A400

## (undated) DEVICE — DRESSING MEPILEX AG BORDER POST-OP 4 X 8 IN

## (undated) DEVICE — 2.5MM DRILL TIP GUIDE WIRE 200MM

## (undated) DEVICE — ABDOMINAL PAD: Brand: DERMACEA

## (undated) DEVICE — DGW .035 FC J3MM 260CM TEF: Brand: EMERALD

## (undated) DEVICE — CATH F5 INF JR 4 100CM: Brand: INFINITI

## (undated) DEVICE — 2.5MM REAMING ROD WITH BALL TIP/950MM-STERILE: Type: IMPLANTABLE DEVICE | Site: LEG | Status: NON-FUNCTIONAL

## (undated) DEVICE — GLOVE SRG BIOGEL 9

## (undated) DEVICE — GLOVE INDICATOR PI UNDERGLOVE SZ 9 BLUE

## (undated) DEVICE — 3.2MM DRILL BIT/QC/145MM

## (undated) DEVICE — GLIDESHEATH SLENDER STAINLESS STEEL KIT: Brand: GLIDESHEATH SLENDER